# Patient Record
Sex: FEMALE | Race: WHITE | NOT HISPANIC OR LATINO | Employment: FULL TIME | URBAN - METROPOLITAN AREA
[De-identification: names, ages, dates, MRNs, and addresses within clinical notes are randomized per-mention and may not be internally consistent; named-entity substitution may affect disease eponyms.]

---

## 2022-03-07 ENCOUNTER — TRANSFERRED RECORDS (OUTPATIENT)
Dept: HEALTH INFORMATION MANAGEMENT | Facility: CLINIC | Age: 37
End: 2022-03-07

## 2022-03-08 LAB
ALT SERPL-CCNC: 15 U/L (ref 6–29)
AST SERPL-CCNC: 13 U/L (ref 10–30)
CHOLESTEROL (EXTERNAL): 172 MG/DL
CREATININE (EXTERNAL): 0.63 MG/DL (ref 0.5–1.1)
GFR ESTIMATED (EXTERNAL): 115 ML/MIN/1.73M2
GFR ESTIMATED (IF AFRICAN AMERICAN) (EXTERNAL): 133 ML/MIN/1.73M2
GLUCOSE (EXTERNAL): 102 MG/DL (ref 65–?)
HBA1C MFR BLD: 5 %
HDLC SERPL-MCNC: 65 MG/DL
LDL CHOLESTEROL CALCULATED (EXTERNAL): NORMAL MG/DL
NON HDL CHOLESTEROL (EXTERNAL): 107 MG/DL
POTASSIUM (EXTERNAL): 3.9 MMOL/L (ref 3.5–5.3)
TRIGLYCERIDES (EXTERNAL): 93 MG/DL
TSH SERPL-ACNC: 4.47 MIU/L (ref 0.4–4.5)

## 2022-03-21 LAB
ALT SERPL-CCNC: NORMAL U/L (ref 6–29)
AST SERPL-CCNC: 10 U/L (ref 10–30)
CREATININE (EXTERNAL): 0.7 MG/DL (ref 0.5–1.1)
GFR ESTIMATED (EXTERNAL): NORMAL ML/MIN/1.73M2
GFR ESTIMATED (IF AFRICAN AMERICAN) (EXTERNAL): 128 ML/MIN/1.73M2
GLUCOSE (EXTERNAL): 113 MG/DL (ref 65–99)
POTASSIUM (EXTERNAL): 4 MMOL/L (ref 3.5–5.3)

## 2022-05-13 ENCOUNTER — TRANSFERRED RECORDS (OUTPATIENT)
Dept: HEALTH INFORMATION MANAGEMENT | Facility: CLINIC | Age: 37
End: 2022-05-13
Payer: COMMERCIAL

## 2022-05-13 LAB
ALT SERPL-CCNC: 16 U/L (ref 6–29)
AST SERPL-CCNC: 13 U/L (ref 10–30)
CREATININE (EXTERNAL): 0.62 MG/DL (ref 0.5–1.1)
GFR ESTIMATED (EXTERNAL): 115 ML/MIN/1.73M2
GFR ESTIMATED (IF AFRICAN AMERICAN) (EXTERNAL): 134 ML/MIN/1.73M2
GLUCOSE (EXTERNAL): 94 MG/DL (ref 65–99)
POTASSIUM (EXTERNAL): 3.9 MMOL/L (ref 3.5–5.3)
TSH SERPL-ACNC: 0.02 MIU/L (ref 0.4–4.5)

## 2022-05-18 ENCOUNTER — TRANSCRIBE ORDERS (OUTPATIENT)
Dept: OTHER | Age: 37
End: 2022-05-18

## 2022-05-18 DIAGNOSIS — R79.9 ABNORMAL BLOOD CHEMISTRY: Primary | ICD-10-CM

## 2022-05-19 ENCOUNTER — DOCUMENTATION ONLY (OUTPATIENT)
Dept: ONCOLOGY | Facility: CLINIC | Age: 37
End: 2022-05-19

## 2022-05-19 ENCOUNTER — MEDICAL CORRESPONDENCE (OUTPATIENT)
Dept: HEALTH INFORMATION MANAGEMENT | Facility: CLINIC | Age: 37
End: 2022-05-19

## 2022-05-19 NOTE — PROGRESS NOTES
RECORDS STATUS - ALL OTHER DIAGNOSIS      Action    Action Taken 5/19/22  LVM @ [facility] [phone] re recs.   1:24 PM    5/20/22  Records received, sent to HIM for STAT upload.  9:02 AM         RECORDS RECEIVED FROM: [facility]   DATE RECEIVED:    NOTES STATUS DETAILS   OFFICE NOTE from referring provider Received 5/20    OFFICE NOTE from medical oncologist     DISCHARGE SUMMARY from hospital     DISCHARGE REPORT from the ER     OPERATIVE REPORT     MEDICATION LIST     CLINICAL TRIAL TREATMENTS TO DATE     LABS     PATHOLOGY REPORTS     ANYTHING RELATED TO DIAGNOSIS     GENONOMIC TESTING     TYPE:     IMAGING (NEED IMAGES & REPORT)     CT SCANS     MRI     MAMMO     ULTRASOUND     PET

## 2022-05-23 ENCOUNTER — PATIENT OUTREACH (OUTPATIENT)
Dept: ONCOLOGY | Facility: CLINIC | Age: 37
End: 2022-05-23

## 2022-05-23 NOTE — PROGRESS NOTES
Writer received referral for leukopenia, labs and records available in Media tab. Reviewed for urgency based on labs and symptomology. Appropriate scheduling instructions added and referral sent to New Patient Scheduling for completion.

## 2022-05-24 NOTE — PROGRESS NOTES
RECORDS STATUS - ALL OTHER DIAGNOSIS      Action    Action Taken 5/24/2022 5:16pm JIMENA   I called [facility] Phone: [phone]- they are closed for the night.     6/6/2022 11:29AM JIMENA   I called [facility] IMG Dept - I was transferred to radiology... I left a detailed vm for imaging to be pushed [phone]     RECORDS RECEIVED FROM:[facility]/ [facility]   DATE RECEIVED: 6/28/2022   NOTES STATUS DETAILS   OFFICE NOTE from referring provider     Labs/ Records Complete [facility] Records are in [facility]   DISCHARGE REPORT from the ER     OPERATIVE REPORT     MEDICATION LIST Complete Epic    CLINICAL TRIAL TREATMENTS TO DATE     LABS     PATHOLOGY REPORTS     ANYTHING RELATED TO DIAGNOSIS Complete Labs last updated on 5/13/2022    GENONOMIC TESTING     TYPE:     IMAGING (NEED IMAGES & REPORT)     CT SCANS     MRI     MAMMO     ULTRASOUND Complete- [facility]  US Venous Lower Extremity    PET

## 2022-05-29 ENCOUNTER — HEALTH MAINTENANCE LETTER (OUTPATIENT)
Age: 37
End: 2022-05-29

## 2022-06-07 ENCOUNTER — VIRTUAL VISIT (OUTPATIENT)
Dept: ONCOLOGY | Facility: CLINIC | Age: 37
End: 2022-06-07
Payer: COMMERCIAL

## 2022-06-07 DIAGNOSIS — R79.9 ABNORMAL BLOOD CHEMISTRY: ICD-10-CM

## 2022-06-07 DIAGNOSIS — D61.818 PANCYTOPENIA (H): Primary | ICD-10-CM

## 2022-06-07 PROCEDURE — 99204 OFFICE O/P NEW MOD 45 MIN: CPT | Mod: 95 | Performed by: INTERNAL MEDICINE

## 2022-06-07 RX ORDER — LEVOTHYROXINE SODIUM 125 UG/1
125 TABLET ORAL DAILY
Status: ON HOLD | COMMUNITY
Start: 2022-05-12 | End: 2022-07-15

## 2022-06-07 RX ORDER — BACILLUS COAGULANS/INULIN 1B-250 MG
1 CAPSULE ORAL DAILY
COMMUNITY

## 2022-06-07 RX ORDER — MULTIVITAMIN WITH IRON
250 TABLET ORAL DAILY
COMMUNITY

## 2022-06-07 RX ORDER — CHLORHEXIDINE GLUCONATE 4 %
1 LIQUID (ML) TOPICAL DAILY
COMMUNITY

## 2022-06-07 RX ORDER — MULTIVIT WITH MINERALS/LUTEIN
2000 TABLET ORAL DAILY
COMMUNITY

## 2022-06-07 RX ORDER — CHOLECALCIFEROL (VITAMIN D3) 50 MCG
1 TABLET ORAL DAILY
COMMUNITY

## 2022-06-07 RX ORDER — ZINC GLUCONATE 50 MG
50 TABLET ORAL DAILY
COMMUNITY

## 2022-06-07 NOTE — LETTER
6/7/2022         RE: Veda Marinelli  [address] [address] [facility] [address]        Dear Colleague,    Thank you for referring your patient, Veda Marinelli, to the [facility]. Please see a copy of my visit note below.    Hematology initial visit:  Date on this visit: 6/7/2022    Veda Marinelli  is referred by Dr.Referred Mendoza for a hematology consultation. She requires evaluation for new diagnosis of pancytopenia    Primary Physician: Maria Eugenia Villar     History Of Present Illness:  Ms. Marinelli is a 37 year old female who presents with pancytopenia.    This is a video visit.    I also reviewed outside records.  She was admitted from 10/1/2021 - 10/13/2021 for acute hypoxic respiratory failure from COVID-19 pneumonia.  She was treated with dexamethasone remdesivir baricitinib in addition to azithromycin and ceftriaxone.  She also developed C. difficile colitis after that treated with p.o. vancomycin.  She started to develop leukopenia/neutropenia in March 2022 and slowly it has progressed to pancytopenia.    Otherwise she feels pretty good and has good energy.  She mentions no other infections.  Recovered fully from COVID.  She developed hemorrhoids after C. difficile colitis which sometimes bothers her.  Has noticed a very superficial right armpit swelling which she thinks is a result of inflamed hair follicle but nothing deep inside.  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  Denies any pain apart from hemorrhoids bothering her.  No new skin rashes.  Denies new joint pains.  No shortness of breath currently.  No nausea vomiting diarrhea constipation.  Prior to developing COVID infection in October 2021, she was not on any medications and was feeling perfectly healthy.      ROS:  A comprehensive ROS was otherwise neg      Past Medical/Surgical History:  No past medical history on file.  No past surgical history on file.   Obesity  Hypothyroidism  Covid in Oct  2021  Allergies:  Allergies as of 06/07/2022     (No Known Allergies)     Current Medications:  Current Outpatient Medications   Medication Sig Dispense Refill     levothyroxine (SYNTHROID/LEVOTHROID) 125 MCG tablet        Bacillus Coagulans-Inulin (PROBIOTIC) 1-250 BILLION-MG CAPS        magnesium 250 MG tablet        Multiple Vitamins-Minerals (WOMENS MULTIVITAMIN) TABS        vitamin C (ASCORBIC ACID) 1000 MG TABS        vitamin D3 (CHOLECALCIFEROL) 50 mcg (2000 units) tablet        zinc gluconate 50 MG tablet         Family History:  No family history on file.  No family history of bleeding or clotting disorder.  Paternal grand mother and 2 maternal aunts with breast cancer  She does not have kids    Social History:  Social History     Socioeconomic History     Marital status: Single     Spouse name: Not on file     Number of children: Not on file     Years of education: Not on file     Highest education level: Not on file   Occupational History     Not on file   Tobacco Use     Smoking status: Never Smoker     Smokeless tobacco: Never Used   Substance and Sexual Activity     Alcohol use: Not on file     Drug use: Not on file     Sexual activity: Not on file   Other Topics Concern     Not on file   Social History Narrative     Not on file     Social Determinants of Health     Financial Resource Strain: Not on file   Food Insecurity: Not on file   Transportation Needs: Not on file   Physical Activity: Not on file   Stress: Not on file   Social Connections: Not on file   Intimate Partner Violence: Not on file   Housing Stability: Not on file     No smoking. Drinks etoh socially. No hard haley  Works as a RN with [employer].      Physical Exam:  There were no vitals taken for this visit.  Wt Readings from Last 4 Encounters:   No data found for Wt     Constitutional.  Does not seem to be in any acute distress.  Eyes.  No redness or discharge noted.  Respiratory.  Speaking in full sentences.  Breathing seems  comfortable without any accessory use of muscles.    Skin.  Visualized his skin does not show any obvious rashes.  Musculoskeletal.  Range of motion for visualized areas is intact.  Neurological.  Alert and oriented x3.  Psychiatric.  Mood, mentation and affect are normal.  Decision making capacity is intact.      The rest of a comprehensive physical examination is deferred due to Public [facility] Emergency video visit restrictions.    Laboratory/Imaging Studies    Reviewed.      In October 2021, CBC was essentially unremarkable.    CBC/differential  On 3/8/2022, white blood cell count 2.5.  Hemoglobin 13.6 with .  Platelets normal.  Neutrophils 1.  Low monocytes.  On 3/22/2022, white blood cell count was 1.8.   Hemoglobin 12.9..  ANC 8.75.  Monocytes are also low.    On 5/15/2022, she was noted to have white blood cell count of 1.6 with hemoglobin 11.  .  Platelets were 133.  ANC was 0.28.  Monocytopenia.  Chemistry was unremarkable  TSH 0.02. FT4 was 1.8    In March 2022, ferritin 104.  Iron 128.  TIBC 282.  Iron saturation index 52%.  Vitamin B12 and folate were normal.    ASSESSMENT/PLAN:    Pancytopenia.    She developed COVID in October 2021 and recovered from it.  At that time she was treated with dexamethasone remdesivir and  baricitinib in addition to azithromycin and ceftriaxone. She developed C. difficile colitis after that and she was treated with p.o. vancomycin.    Since March 2022, she started to develop leukopenia with neutropenia and monocytopenia and now has pancytopenia.  She feels very well and remains essentially asymptomatic.  I would recommend checking the peripheral blood smear and repeat vitamin B12/MMA and folate levels.  We will repeat iron studies.  We will also check HUMPHREY and haptoglobin to rule out autoimmune hemolysis  I will also check peripheral blood flow cytometry to rule out leukemia/lymphoma.  I will also check SPEP and LDH.  We may consider bone marrow biopsy  after that.   We discussed the procedure of bone marrow biopsy including its potential complications in detail.  We will decide about it later on.    I will see her back in a few weeks to    All questions were answered to her satisfaction.  She is agreeable and comfortable with the plan.    Nyla Bob MD               Minerva is a 37 year old who is being evaluated via a billable video visit.      How would you like to obtain your AVS? MyChart  If the video visit is dropped, the invitation should be resent by: Text to cell phone: [phone]  Will anyone else be joining your video visit? No       Cami Chandra Virtual Facilitator/LPN      Video Start Time: 11:40 AM  Video-Visit Details    Type of service:  Video Visit    Video End Time:11:56 AM    Originating Location (pt. Location): Home    Distant Location (provider location):  [facility]     Platform used for Video Visit: [facility]      Again, thank you for allowing me to participate in the care of your patient.        Sincerely,        Nyla Bob MD

## 2022-06-07 NOTE — PROGRESS NOTES
Hematology initial visit:  Date on this visit: 6/7/2022    Veda Marinelli  is referred by Dr.Referred Mendoza for a hematology consultation. She requires evaluation for new diagnosis of pancytopenia    Primary Physician: Maria Eugenia Villar     History Of Present Illness:  Ms. Marinelli is a 37 year old female who presents with pancytopenia.    This is a video visit.    I also reviewed outside records.  She was admitted from 10/1/2021 - 10/13/2021 for acute hypoxic respiratory failure from COVID-19 pneumonia.  She was treated with dexamethasone remdesivir baricitinib in addition to azithromycin and ceftriaxone.  She also developed C. difficile colitis after that treated with p.o. vancomycin.  She started to develop leukopenia/neutropenia in March 2022 and slowly it has progressed to pancytopenia.    Otherwise she feels pretty good and has good energy.  She mentions no other infections.  Recovered fully from COVID.  She developed hemorrhoids after C. difficile colitis which sometimes bothers her.  Has noticed a very superficial right armpit swelling which she thinks is a result of inflamed hair follicle but nothing deep inside.  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  Denies any pain apart from hemorrhoids bothering her.  No new skin rashes.  Denies new joint pains.  No shortness of breath currently.  No nausea vomiting diarrhea constipation.  Prior to developing COVID infection in October 2021, she was not on any medications and was feeling perfectly healthy.      ROS:  A comprehensive ROS was otherwise neg      Past Medical/Surgical History:  No past medical history on file.  No past surgical history on file.   Obesity  Hypothyroidism  Covid in Oct 2021  Allergies:  Allergies as of 06/07/2022     (No Known Allergies)     Current Medications:  Current Outpatient Medications   Medication Sig Dispense Refill     levothyroxine (SYNTHROID/LEVOTHROID) 125 MCG tablet        Bacillus Coagulans-Inulin (PROBIOTIC) 1-250  BILLION-MG CAPS        magnesium 250 MG tablet        Multiple Vitamins-Minerals (WOMENS MULTIVITAMIN) TABS        vitamin C (ASCORBIC ACID) 1000 MG TABS        vitamin D3 (CHOLECALCIFEROL) 50 mcg (2000 units) tablet        zinc gluconate 50 MG tablet         Family History:  No family history on file.  No family history of bleeding or clotting disorder.  Paternal grand mother and 2 maternal aunts with breast cancer  She does not have kids    Social History:  Social History     Socioeconomic History     Marital status: Single     Spouse name: Not on file     Number of children: Not on file     Years of education: Not on file     Highest education level: Not on file   Occupational History     Not on file   Tobacco Use     Smoking status: Never Smoker     Smokeless tobacco: Never Used   Substance and Sexual Activity     Alcohol use: Not on file     Drug use: Not on file     Sexual activity: Not on file   Other Topics Concern     Not on file   Social History Narrative     Not on file     Social Determinants of Health     Financial Resource Strain: Not on file   Food Insecurity: Not on file   Transportation Needs: Not on file   Physical Activity: Not on file   Stress: Not on file   Social Connections: Not on file   Intimate Partner Violence: Not on file   Housing Stability: Not on file     No smoking. Drinks etoh socially. No hard liqor  Works as a RN with [employer].      Physical Exam:  There were no vitals taken for this visit.  Wt Readings from Last 4 Encounters:   No data found for Wt     Constitutional.  Does not seem to be in any acute distress.  Eyes.  No redness or discharge noted.  Respiratory.  Speaking in full sentences.  Breathing seems comfortable without any accessory use of muscles.    Skin.  Visualized his skin does not show any obvious rashes.  Musculoskeletal.  Range of motion for visualized areas is intact.  Neurological.  Alert and oriented x3.  Psychiatric.  Mood, mentation and affect are  normal.  Decision making capacity is intact.      The rest of a comprehensive physical examination is deferred due to Public [facility] Emergency video visit restrictions.    Laboratory/Imaging Studies    Reviewed.      In October 2021, CBC was essentially unremarkable.    CBC/differential  On 3/8/2022, white blood cell count 2.5.  Hemoglobin 13.6 with .  Platelets normal.  Neutrophils 1.  Low monocytes.  On 3/22/2022, white blood cell count was 1.8.   Hemoglobin 12.9..  ANC 8.75.  Monocytes are also low.    On 5/15/2022, she was noted to have white blood cell count of 1.6 with hemoglobin 11.  .  Platelets were 133.  ANC was 0.28.  Monocytopenia.  Chemistry was unremarkable  TSH 0.02. FT4 was 1.8    In March 2022, ferritin 104.  Iron 128.  TIBC 282.  Iron saturation index 52%.  Vitamin B12 and folate were normal.    ASSESSMENT/PLAN:    Pancytopenia.    She developed COVID in October 2021 and recovered from it.  At that time she was treated with dexamethasone remdesivir and  baricitinib in addition to azithromycin and ceftriaxone. She developed C. difficile colitis after that and she was treated with p.o. vancomycin.    Since March 2022, she started to develop leukopenia with neutropenia and monocytopenia and now has pancytopenia.  She feels very well and remains essentially asymptomatic.  I would recommend checking the peripheral blood smear and repeat vitamin B12/MMA and folate levels.  We will repeat iron studies.  We will also check HUMPHREY and haptoglobin to rule out autoimmune hemolysis  I will also check peripheral blood flow cytometry to rule out leukemia/lymphoma.  I will also check SPEP and LDH.  We may consider bone marrow biopsy after that.   We discussed the procedure of bone marrow biopsy including its potential complications in detail.  We will decide about it later on.    I will see her back in a few weeks to    All questions were answered to her satisfaction.  She is agreeable and  comfortable with the plan.    Nyla Bob MD

## 2022-06-07 NOTE — NURSING NOTE
Chief Complaint   Patient presents with     Video Visit     Abnormal blood chemistry, self referral        Patient confirms medications and allergies are accurate via patients echeck in completion, and or denies any changes since last reviewed/verified.     Cami Chandra, Virtual Facilitator/LPN

## 2022-06-07 NOTE — PROGRESS NOTES
Minerva is a 37 year old who is being evaluated via a billable video visit.      How would you like to obtain your AVS? MyChart  If the video visit is dropped, the invitation should be resent by: Text to cell phone: [phone]  Will anyone else be joining your video visit? Rebecca Tanner Facilitator/LPN      Video Start Time: 11:40 AM  Video-Visit Details    Type of service:  Video Visit    Video End Time:11:56 AM    Originating Location (pt. Location): Home    Distant Location (provider location):  [facility]     Platform used for Video Visit: [employer] - Social Platform

## 2022-06-14 ENCOUNTER — LAB (OUTPATIENT)
Dept: LAB | Facility: CLINIC | Age: 37
End: 2022-06-14
Payer: COMMERCIAL

## 2022-06-14 ENCOUNTER — PATIENT OUTREACH (OUTPATIENT)
Dept: ONCOLOGY | Facility: CLINIC | Age: 37
End: 2022-06-14

## 2022-06-14 ENCOUNTER — TELEPHONE (OUTPATIENT)
Dept: ONCOLOGY | Facility: CLINIC | Age: 37
End: 2022-06-14

## 2022-06-14 DIAGNOSIS — D61.818 PANCYTOPENIA (H): ICD-10-CM

## 2022-06-14 DIAGNOSIS — D61.818 PANCYTOPENIA (H): Primary | ICD-10-CM

## 2022-06-14 LAB
ALBUMIN SERPL-MCNC: 3.8 G/DL (ref 3.4–5)
ALP SERPL-CCNC: 69 U/L (ref 40–150)
ALT SERPL W P-5'-P-CCNC: 26 U/L (ref 0–50)
ANION GAP SERPL CALCULATED.3IONS-SCNC: 6 MMOL/L (ref 3–14)
AST SERPL W P-5'-P-CCNC: 17 U/L (ref 0–45)
BILIRUB SERPL-MCNC: 0.8 MG/DL (ref 0.2–1.3)
BUN SERPL-MCNC: 10 MG/DL (ref 7–30)
CALCIUM SERPL-MCNC: 9 MG/DL (ref 8.5–10.1)
CHLORIDE BLD-SCNC: 105 MMOL/L (ref 94–109)
CO2 SERPL-SCNC: 26 MMOL/L (ref 20–32)
CREAT SERPL-MCNC: 0.48 MG/DL (ref 0.52–1.04)
FERRITIN SERPL-MCNC: 321 NG/ML (ref 12–150)
FOLATE SERPL-MCNC: 18.8 NG/ML
GFR SERPL CREATININE-BSD FRML MDRD: >90 ML/MIN/1.73M2
GLUCOSE BLD-MCNC: 110 MG/DL (ref 70–99)
IRON SATN MFR SERPL: 39 % (ref 15–46)
IRON SERPL-MCNC: 110 UG/DL (ref 35–180)
LDH SERPL L TO P-CCNC: 247 U/L (ref 81–234)
PLAT MORPH BLD: NORMAL
POTASSIUM BLD-SCNC: 3.8 MMOL/L (ref 3.4–5.3)
PROT SERPL-MCNC: 7.6 G/DL (ref 6.8–8.8)
RBC MORPH BLD: NORMAL
RETICS # AUTO: 0.08 10E6/UL (ref 0.03–0.1)
RETICS/RBC NFR AUTO: 2.9 % (ref 0.5–2)
SODIUM SERPL-SCNC: 137 MMOL/L (ref 133–144)
TIBC SERPL-MCNC: 279 UG/DL (ref 240–430)
TOTAL PROTEIN SERUM FOR ELP: 7.4 G/DL (ref 6.8–8.8)
VIT B12 SERPL-MCNC: 607 PG/ML (ref 193–986)

## 2022-06-14 PROCEDURE — 83615 LACTATE (LD) (LDH) ENZYME: CPT

## 2022-06-14 PROCEDURE — 83010 ASSAY OF HAPTOGLOBIN QUANT: CPT

## 2022-06-14 PROCEDURE — 85045 AUTOMATED RETICULOCYTE COUNT: CPT

## 2022-06-14 PROCEDURE — 84155 ASSAY OF PROTEIN SERUM: CPT | Mod: 59

## 2022-06-14 PROCEDURE — 80053 COMPREHEN METABOLIC PANEL: CPT

## 2022-06-14 PROCEDURE — 83550 IRON BINDING TEST: CPT

## 2022-06-14 PROCEDURE — 82607 VITAMIN B-12: CPT

## 2022-06-14 PROCEDURE — 84165 PROTEIN E-PHORESIS SERUM: CPT

## 2022-06-14 PROCEDURE — 83921 ORGANIC ACID SINGLE QUANT: CPT

## 2022-06-14 PROCEDURE — 36415 COLL VENOUS BLD VENIPUNCTURE: CPT

## 2022-06-14 PROCEDURE — 82746 ASSAY OF FOLIC ACID SERUM: CPT

## 2022-06-14 PROCEDURE — 82728 ASSAY OF FERRITIN: CPT

## 2022-06-14 PROCEDURE — 85025 COMPLETE CBC W/AUTO DIFF WBC: CPT

## 2022-06-14 PROCEDURE — 85060 BLOOD SMEAR INTERPRETATION: CPT | Performed by: PATHOLOGY

## 2022-06-14 NOTE — TELEPHONE ENCOUNTER
[place] lab is calling wanting us to reorder HUMPHREY Mariann, Lab 274. The tube that was drawn today was invalid and patient will need to be redrawn. They will r/s the patient. Future lab ordered.    Una Santacruz, RN, BSN, PHN  [facility]

## 2022-06-14 NOTE — PROGRESS NOTES
"[facility]:  Cancer Care Note    Received the following message/request from Dr. Bob regarding this patient's lab results from today:    Received: Today  Nyla Bob MD Lutz, Basia, RN  Lets get BM bx ASAP.  WBC is high with monocytosis.   Please let her know.     Writer placed telephone call to patient this afternoon, to communicate these recommendations from Dr. Bob.  Patient verbalized understanding, and is in agreement with this plan.  Provided over view of bone marrow biopsy procedure with the patient, and the need to have a  the day of the procedure.    Urgent message was sent to cancer center scheduling team to request ASAP appointment for patient.  [place],\" as she [place] of [place], [place].     Ronni Cornell, RN, BSN, OCN  RN Care Coordinator - Oncology  [facility]    "

## 2022-06-15 LAB
ALBUMIN SERPL ELPH-MCNC: 4.4 G/DL (ref 3.7–5.1)
ALPHA1 GLOB SERPL ELPH-MCNC: 0.4 G/DL (ref 0.2–0.4)
ALPHA2 GLOB SERPL ELPH-MCNC: 0.7 G/DL (ref 0.5–0.9)
B-GLOBULIN SERPL ELPH-MCNC: 0.9 G/DL (ref 0.6–1)
BASOPHILS # BLD MANUAL: 0 10E3/UL (ref 0–0.2)
BASOPHILS NFR BLD MANUAL: 0 %
BLASTS # BLD MANUAL: 20.5 10E3/UL
BLASTS NFR BLD MANUAL: 81 %
EOSINOPHIL # BLD MANUAL: 0 10E3/UL (ref 0–0.7)
EOSINOPHIL NFR BLD MANUAL: 0 %
ERYTHROCYTE [DISTWIDTH] IN BLOOD BY AUTOMATED COUNT: 14.3 % (ref 10–15)
GAMMA GLOB SERPL ELPH-MCNC: 1 G/DL (ref 0.7–1.6)
HAPTOGLOB SERPL-MCNC: 154 MG/DL (ref 32–197)
HCT VFR BLD AUTO: 30.2 % (ref 35–47)
HGB BLD-MCNC: 10.1 G/DL (ref 11.7–15.7)
LYMPHOCYTES # BLD MANUAL: 3.3 10E3/UL (ref 0.8–5.3)
LYMPHOCYTES NFR BLD MANUAL: 13 %
M PROTEIN SERPL ELPH-MCNC: 0 G/DL
MCH RBC QN AUTO: 36.2 PG (ref 26.5–33)
MCHC RBC AUTO-ENTMCNC: 33.4 G/DL (ref 31.5–36.5)
MCV RBC AUTO: 108 FL (ref 78–100)
METAMYELOCYTES # BLD MANUAL: 0.3 10E3/UL
METAMYELOCYTES NFR BLD MANUAL: 1 %
MONOCYTES # BLD MANUAL: 0.3 10E3/UL (ref 0–1.3)
MONOCYTES NFR BLD MANUAL: 1 %
MYELOCYTES # BLD MANUAL: 0.3 10E3/UL
MYELOCYTES NFR BLD MANUAL: 1 %
NEUTROPHILS # BLD MANUAL: 0.8 10E3/UL (ref 1.6–8.3)
NEUTROPHILS NFR BLD MANUAL: 3 %
OTHER CELLS # BLD MANUAL: 0 10E3/UL
OTHER CELLS NFR BLD MANUAL: 0 %
PATH REPORT.COMMENTS IMP SPEC: ABNORMAL
PATH REPORT.COMMENTS IMP SPEC: ABNORMAL
PATH REPORT.COMMENTS IMP SPEC: YES
PATH REPORT.FINAL DX SPEC: ABNORMAL
PATH REPORT.MICROSCOPIC SPEC OTHER STN: ABNORMAL
PATH REPORT.MICROSCOPIC SPEC OTHER STN: ABNORMAL
PATH REV: ABNORMAL
PLASMA CELLS # BLD MANUAL: 0 10E3/UL
PLASMA CELLS NFR BLD MANUAL: 0 %
PLAT MORPH BLD: ABNORMAL
PLATELET # BLD AUTO: 90 10E3/UL (ref 150–450)
PROMYELOCYTES # BLD MANUAL: 0 10E3/UL
PROMYELOCYTES NFR BLD MANUAL: 0 %
PROT PATTERN SERPL ELPH-IMP: NORMAL
RBC # BLD AUTO: 2.79 10E6/UL (ref 3.8–5.2)
RBC MORPH BLD: ABNORMAL
WBC # BLD AUTO: 25.3 10E3/UL (ref 4–11)

## 2022-06-16 ENCOUNTER — APPOINTMENT (OUTPATIENT)
Dept: CARDIOLOGY | Facility: CLINIC | Age: 37
DRG: 834 | End: 2022-06-16
Attending: PHYSICIAN ASSISTANT
Payer: COMMERCIAL

## 2022-06-16 ENCOUNTER — TELEPHONE (OUTPATIENT)
Dept: ONCOLOGY | Facility: CLINIC | Age: 37
End: 2022-06-16
Payer: COMMERCIAL

## 2022-06-16 ENCOUNTER — TRANSFERRED RECORDS (OUTPATIENT)
Dept: HEALTH INFORMATION MANAGEMENT | Facility: CLINIC | Age: 37
End: 2022-06-16

## 2022-06-16 ENCOUNTER — HOSPITAL ENCOUNTER (INPATIENT)
Facility: CLINIC | Age: 37
LOS: 29 days | Discharge: HOME | DRG: 834 | End: 2022-07-15
Attending: EMERGENCY MEDICINE | Admitting: INTERNAL MEDICINE
Payer: COMMERCIAL

## 2022-06-16 ENCOUNTER — APPOINTMENT (OUTPATIENT)
Dept: CT IMAGING | Facility: CLINIC | Age: 37
DRG: 834 | End: 2022-06-16
Attending: PHYSICIAN ASSISTANT
Payer: COMMERCIAL

## 2022-06-16 DIAGNOSIS — R50.81 NEUTROPENIC FEVER (H): ICD-10-CM

## 2022-06-16 DIAGNOSIS — A49.8 CLOSTRIDIUM DIFFICILE INFECTION: ICD-10-CM

## 2022-06-16 DIAGNOSIS — K64.4 EXTERNAL HEMORRHOIDS: ICD-10-CM

## 2022-06-16 DIAGNOSIS — Z11.52 ENCOUNTER FOR SCREENING LABORATORY TESTING FOR SEVERE ACUTE RESPIRATORY SYNDROME CORONAVIRUS 2 (SARS-COV-2): ICD-10-CM

## 2022-06-16 DIAGNOSIS — C95.00: Primary | ICD-10-CM

## 2022-06-16 DIAGNOSIS — I82.A12 ACUTE DEEP VEIN THROMBOSIS (DVT) OF AXILLARY VEIN OF LEFT UPPER EXTREMITY (H): ICD-10-CM

## 2022-06-16 DIAGNOSIS — D70.9 NEUTROPENIC FEVER (H): ICD-10-CM

## 2022-06-16 DIAGNOSIS — E03.9 HYPOTHYROIDISM, UNSPECIFIED TYPE: ICD-10-CM

## 2022-06-16 DIAGNOSIS — C95.00 ACUTE LEUKEMIA NOT HAVING ACHIEVED REMISSION (H): ICD-10-CM

## 2022-06-16 DIAGNOSIS — R21 RASH: ICD-10-CM

## 2022-06-16 LAB
3D LVEF ECHO: NORMAL
ALBUMIN SERPL-MCNC: 3.9 G/DL (ref 3.4–5)
ALP SERPL-CCNC: 64 U/L (ref 40–150)
ALT SERPL W P-5'-P-CCNC: 28 U/L (ref 0–50)
ANION GAP SERPL CALCULATED.3IONS-SCNC: 10 MMOL/L (ref 3–14)
ANION GAP SERPL CALCULATED.3IONS-SCNC: 8 MMOL/L (ref 3–14)
APTT PPP: 30 SECONDS (ref 22–38)
APTT PPP: 32 SECONDS (ref 22–38)
AST SERPL W P-5'-P-CCNC: 19 U/L (ref 0–45)
BASOPHILS # BLD MANUAL: 0 10E3/UL (ref 0–0.2)
BASOPHILS NFR BLD MANUAL: 0 %
BILIRUB SERPL-MCNC: 0.6 MG/DL (ref 0.2–1.3)
BLASTS # BLD MANUAL: 37.3 10E3/UL
BLASTS # BLD MANUAL: 39.3 10E3/UL
BLASTS NFR BLD MANUAL: 88 %
BLASTS NFR BLD MANUAL: 91 %
BUN SERPL-MCNC: 5 MG/DL (ref 7–30)
BUN SERPL-MCNC: 8 MG/DL (ref 7–30)
CALCIUM SERPL-MCNC: 9.2 MG/DL (ref 8.5–10.1)
CALCIUM SERPL-MCNC: 9.4 MG/DL (ref 8.5–10.1)
CHLORIDE BLD-SCNC: 107 MMOL/L (ref 94–109)
CHLORIDE BLD-SCNC: 107 MMOL/L (ref 94–109)
CO2 SERPL-SCNC: 23 MMOL/L (ref 20–32)
CO2 SERPL-SCNC: 24 MMOL/L (ref 20–32)
CREAT SERPL-MCNC: 0.51 MG/DL (ref 0.52–1.04)
CREAT SERPL-MCNC: 0.6 MG/DL (ref 0.52–1.04)
EOSINOPHIL # BLD MANUAL: 0 10E3/UL (ref 0–0.7)
EOSINOPHIL # BLD MANUAL: 0 10E3/UL (ref 0–0.7)
EOSINOPHIL # BLD MANUAL: 0.4 10E3/UL (ref 0–0.7)
EOSINOPHIL NFR BLD MANUAL: 0 %
EOSINOPHIL NFR BLD MANUAL: 0 %
EOSINOPHIL NFR BLD MANUAL: 1 %
ERYTHROCYTE [DISTWIDTH] IN BLOOD BY AUTOMATED COUNT: 14.5 % (ref 10–15)
FIBRINOGEN PPP-MCNC: 344 MG/DL (ref 170–490)
FIBRINOGEN PPP-MCNC: 383 MG/DL (ref 170–490)
GFR SERPL CREATININE-BSD FRML MDRD: >90 ML/MIN/1.73M2
GFR SERPL CREATININE-BSD FRML MDRD: >90 ML/MIN/1.73M2
GLUCOSE BLD-MCNC: 119 MG/DL (ref 70–99)
GLUCOSE BLD-MCNC: 96 MG/DL (ref 70–99)
HCT VFR BLD AUTO: 27.6 % (ref 35–47)
HCT VFR BLD AUTO: 28.6 % (ref 35–47)
HCT VFR BLD AUTO: 29 % (ref 35–47)
HGB BLD-MCNC: 9.5 G/DL (ref 11.7–15.7)
HGB BLD-MCNC: 9.5 G/DL (ref 11.7–15.7)
HGB BLD-MCNC: 9.8 G/DL (ref 11.7–15.7)
HOLD SPECIMEN: NORMAL
INR PPP: 1.15 (ref 0.85–1.15)
INR PPP: 1.15 (ref 0.85–1.15)
LAB DIRECTOR COMMENTS: NORMAL
LAB DIRECTOR DISCLAIMER: NORMAL
LAB DIRECTOR INTERPRETATION: NORMAL
LAB DIRECTOR METHODOLOGY: NORMAL
LAB DIRECTOR RESULTS: NORMAL
LACTATE SERPL-SCNC: 0.6 MMOL/L (ref 0.7–2)
LACTATE SERPL-SCNC: 1 MMOL/L (ref 0.7–2)
LDH SERPL L TO P-CCNC: 347 U/L (ref 81–234)
LVEF ECHO: NORMAL
LYMPHOCYTES # BLD MANUAL: 2.5 10E3/UL (ref 0.8–5.3)
LYMPHOCYTES # BLD MANUAL: 3.1 10E3/UL (ref 0.8–5.3)
LYMPHOCYTES # BLD MANUAL: 5.8 10E3/UL (ref 0.8–5.3)
LYMPHOCYTES NFR BLD MANUAL: 14 %
LYMPHOCYTES NFR BLD MANUAL: 6 %
LYMPHOCYTES NFR BLD MANUAL: 7 %
MAGNESIUM SERPL-MCNC: 2.1 MG/DL (ref 1.6–2.3)
MCH RBC QN AUTO: 36.3 PG (ref 26.5–33)
MCH RBC QN AUTO: 36.4 PG (ref 26.5–33)
MCH RBC QN AUTO: 36.5 PG (ref 26.5–33)
MCHC RBC AUTO-ENTMCNC: 33.2 G/DL (ref 31.5–36.5)
MCHC RBC AUTO-ENTMCNC: 33.8 G/DL (ref 31.5–36.5)
MCHC RBC AUTO-ENTMCNC: 34.4 G/DL (ref 31.5–36.5)
MCV RBC AUTO: 106 FL (ref 78–100)
MCV RBC AUTO: 107 FL (ref 78–100)
MCV RBC AUTO: 110 FL (ref 78–100)
METAMYELOCYTES # BLD MANUAL: 0.4 10E3/UL
METAMYELOCYTES NFR BLD MANUAL: 1 %
METHYLMALONATE SERPL-SCNC: 0.15 UMOL/L (ref 0–0.4)
MONOCYTES # BLD MANUAL: 0 10E3/UL (ref 0–1.3)
MONOCYTES NFR BLD MANUAL: 0 %
MYELOCYTES # BLD MANUAL: 0.4 10E3/UL
MYELOCYTES NFR BLD MANUAL: 1 %
NEUTROPHILS # BLD MANUAL: 0 10E3/UL (ref 1.6–8.3)
NEUTROPHILS # BLD MANUAL: 0.8 10E3/UL (ref 1.6–8.3)
NEUTROPHILS # BLD MANUAL: 0.9 10E3/UL (ref 1.6–8.3)
NEUTROPHILS NFR BLD MANUAL: 0 %
NEUTROPHILS NFR BLD MANUAL: 2 %
NEUTROPHILS NFR BLD MANUAL: 2 %
NRBC # BLD AUTO: 0.8 10E3/UL
NRBC BLD MANUAL-RTO: 2 %
OTHER CELLS # BLD MANUAL: 35.9 10E3/UL
OTHER CELLS NFR BLD MANUAL: 86 %
PATH REPORT.COMMENTS IMP SPEC: ABNORMAL
PATH REPORT.COMMENTS IMP SPEC: YES
PATH REPORT.FINAL DX SPEC: ABNORMAL
PATH REPORT.MICROSCOPIC SPEC OTHER STN: ABNORMAL
PATH REPORT.RELEVANT HX SPEC: ABNORMAL
PATH REV: ABNORMAL
PHOSPHATE SERPL-MCNC: 2.4 MG/DL (ref 2.5–4.5)
PHOSPHATE SERPL-MCNC: 3.3 MG/DL (ref 2.5–4.5)
PLAT MORPH BLD: ABNORMAL
PLATELET # BLD AUTO: 71 10E3/UL (ref 150–450)
PLATELET # BLD AUTO: 72 10E3/UL (ref 150–450)
PLATELET # BLD AUTO: 76 10E3/UL (ref 150–450)
POLYCHROMASIA BLD QL SMEAR: SLIGHT
POTASSIUM BLD-SCNC: 3.6 MMOL/L (ref 3.4–5.3)
POTASSIUM BLD-SCNC: 3.7 MMOL/L (ref 3.4–5.3)
PROMYELOCYTES # BLD MANUAL: 0.4 10E3/UL
PROMYELOCYTES NFR BLD MANUAL: 1 %
PROT SERPL-MCNC: 7.8 G/DL (ref 6.8–8.8)
RBC # BLD AUTO: 2.6 10E6/UL (ref 3.8–5.2)
RBC # BLD AUTO: 2.61 10E6/UL (ref 3.8–5.2)
RBC # BLD AUTO: 2.7 10E6/UL (ref 3.8–5.2)
RBC MORPH BLD: ABNORMAL
RETICS # AUTO: 0.06 10E6/UL (ref 0.03–0.1)
RETICS/RBC NFR AUTO: 2.4 % (ref 0.5–2)
SARS-COV-2 RNA RESP QL NAA+PROBE: NEGATIVE
SODIUM SERPL-SCNC: 139 MMOL/L (ref 133–144)
SODIUM SERPL-SCNC: 140 MMOL/L (ref 133–144)
SPECIMEN DESCRIPTION: NORMAL
URATE SERPL-MCNC: 5.8 MG/DL (ref 2.6–6)
URATE SERPL-MCNC: 5.8 MG/DL (ref 2.6–6)
WBC # BLD AUTO: 41 10E3/UL (ref 4–11)
WBC # BLD AUTO: 41.7 10E3/UL (ref 4–11)
WBC # BLD AUTO: 44.7 10E3/UL (ref 4–11)

## 2022-06-16 PROCEDURE — 83615 LACTATE (LD) (LDH) ENZYME: CPT | Performed by: PHYSICIAN ASSISTANT

## 2022-06-16 PROCEDURE — 250N000013 HC RX MED GY IP 250 OP 250 PS 637: Performed by: INTERNAL MEDICINE

## 2022-06-16 PROCEDURE — 85007 BL SMEAR W/DIFF WBC COUNT: CPT | Performed by: PHYSICIAN ASSISTANT

## 2022-06-16 PROCEDURE — 88189 FLOWCYTOMETRY/READ 16 & >: CPT | Mod: GC | Performed by: PATHOLOGY

## 2022-06-16 PROCEDURE — 85045 AUTOMATED RETICULOCYTE COUNT: CPT | Performed by: PHYSICIAN ASSISTANT

## 2022-06-16 PROCEDURE — 84550 ASSAY OF BLOOD/URIC ACID: CPT | Performed by: STUDENT IN AN ORGANIZED HEALTH CARE EDUCATION/TRAINING PROGRAM

## 2022-06-16 PROCEDURE — 86644 CMV ANTIBODY: CPT | Performed by: PHYSICIAN ASSISTANT

## 2022-06-16 PROCEDURE — 36569 INSJ PICC 5 YR+ W/O IMAGING: CPT

## 2022-06-16 PROCEDURE — 36415 COLL VENOUS BLD VENIPUNCTURE: CPT | Performed by: PHYSICIAN ASSISTANT

## 2022-06-16 PROCEDURE — 74177 CT ABD & PELVIS W/CONTRAST: CPT | Mod: 26 | Performed by: RADIOLOGY

## 2022-06-16 PROCEDURE — 83735 ASSAY OF MAGNESIUM: CPT | Performed by: PHYSICIAN ASSISTANT

## 2022-06-16 PROCEDURE — 250N000011 HC RX IP 250 OP 636: Performed by: PHYSICIAN ASSISTANT

## 2022-06-16 PROCEDURE — 2894A FISH TIER 3: CPT | Mod: 26 | Performed by: MEDICAL GENETICS

## 2022-06-16 PROCEDURE — 93306 TTE W/DOPPLER COMPLETE: CPT | Mod: 26 | Performed by: INTERNAL MEDICINE

## 2022-06-16 PROCEDURE — 88264 CHROMOSOME ANALYSIS 20-25: CPT | Performed by: INTERNAL MEDICINE

## 2022-06-16 PROCEDURE — 74177 CT ABD & PELVIS W/CONTRAST: CPT

## 2022-06-16 PROCEDURE — 86803 HEPATITIS C AB TEST: CPT | Performed by: PHYSICIAN ASSISTANT

## 2022-06-16 PROCEDURE — 93010 ELECTROCARDIOGRAM REPORT: CPT | Mod: 59 | Performed by: EMERGENCY MEDICINE

## 2022-06-16 PROCEDURE — 120N000002 HC R&B MED SURG/OB UMMC

## 2022-06-16 PROCEDURE — 71260 CT THORAX DX C+: CPT | Mod: 26 | Performed by: RADIOLOGY

## 2022-06-16 PROCEDURE — 99283 EMERGENCY DEPT VISIT LOW MDM: CPT | Mod: 25 | Performed by: EMERGENCY MEDICINE

## 2022-06-16 PROCEDURE — 85027 COMPLETE CBC AUTOMATED: CPT | Performed by: STUDENT IN AN ORGANIZED HEALTH CARE EDUCATION/TRAINING PROGRAM

## 2022-06-16 PROCEDURE — 96365 THER/PROPH/DIAG IV INF INIT: CPT | Performed by: EMERGENCY MEDICINE

## 2022-06-16 PROCEDURE — 272N000451 HC KIT SHRLOCK 5FR POWER PICC DOUBLE LUMEN

## 2022-06-16 PROCEDURE — 93010 ELECTROCARDIOGRAM REPORT: CPT | Performed by: INTERNAL MEDICINE

## 2022-06-16 PROCEDURE — 82310 ASSAY OF CALCIUM: CPT | Performed by: PHYSICIAN ASSISTANT

## 2022-06-16 PROCEDURE — 93005 ELECTROCARDIOGRAM TRACING: CPT

## 2022-06-16 PROCEDURE — 99285 EMERGENCY DEPT VISIT HI MDM: CPT | Mod: 25 | Performed by: EMERGENCY MEDICINE

## 2022-06-16 PROCEDURE — 36415 COLL VENOUS BLD VENIPUNCTURE: CPT | Performed by: STUDENT IN AN ORGANIZED HEALTH CARE EDUCATION/TRAINING PROGRAM

## 2022-06-16 PROCEDURE — 84100 ASSAY OF PHOSPHORUS: CPT | Performed by: PHYSICIAN ASSISTANT

## 2022-06-16 PROCEDURE — U0003 INFECTIOUS AGENT DETECTION BY NUCLEIC ACID (DNA OR RNA); SEVERE ACUTE RESPIRATORY SYNDROME CORONAVIRUS 2 (SARS-COV-2) (CORONAVIRUS DISEASE [COVID-19]), AMPLIFIED PROBE TECHNIQUE, MAKING USE OF HIGH THROUGHPUT TECHNOLOGIES AS DESCRIBED BY CMS-2020-01-R: HCPCS | Performed by: STUDENT IN AN ORGANIZED HEALTH CARE EDUCATION/TRAINING PROGRAM

## 2022-06-16 PROCEDURE — 84550 ASSAY OF BLOOD/URIC ACID: CPT | Performed by: PHYSICIAN ASSISTANT

## 2022-06-16 PROCEDURE — 88369 M/PHMTRC ALYSISHQUANT/SEMIQ: CPT | Mod: 26 | Performed by: MEDICAL GENETICS

## 2022-06-16 PROCEDURE — 88184 FLOWCYTOMETRY/ TC 1 MARKER: CPT | Performed by: PATHOLOGY

## 2022-06-16 PROCEDURE — G0452 MOLECULAR PATHOLOGY INTERPR: HCPCS | Mod: 26 | Performed by: PATHOLOGY

## 2022-06-16 PROCEDURE — C9803 HOPD COVID-19 SPEC COLLECT: HCPCS | Performed by: EMERGENCY MEDICINE

## 2022-06-16 PROCEDURE — 85610 PROTHROMBIN TIME: CPT | Performed by: PHYSICIAN ASSISTANT

## 2022-06-16 PROCEDURE — 250N000009 HC RX 250: Performed by: PHYSICIAN ASSISTANT

## 2022-06-16 PROCEDURE — 88291 CYTO/MOLECULAR REPORT: CPT | Performed by: MEDICAL GENETICS

## 2022-06-16 PROCEDURE — 88368 INSITU HYBRIDIZATION MANUAL: CPT | Mod: 26 | Performed by: MEDICAL GENETICS

## 2022-06-16 PROCEDURE — 81378 HLA I & II TYPING HR: CPT | Performed by: PHYSICIAN ASSISTANT

## 2022-06-16 PROCEDURE — 80053 COMPREHEN METABOLIC PANEL: CPT | Performed by: STUDENT IN AN ORGANIZED HEALTH CARE EDUCATION/TRAINING PROGRAM

## 2022-06-16 PROCEDURE — 86696 HERPES SIMPLEX TYPE 2 TEST: CPT | Performed by: PHYSICIAN ASSISTANT

## 2022-06-16 PROCEDURE — 76376 3D RENDER W/INTRP POSTPROCES: CPT

## 2022-06-16 PROCEDURE — 86706 HEP B SURFACE ANTIBODY: CPT | Performed by: PHYSICIAN ASSISTANT

## 2022-06-16 PROCEDURE — 258N000003 HC RX IP 258 OP 636: Performed by: PHYSICIAN ASSISTANT

## 2022-06-16 PROCEDURE — 85027 COMPLETE CBC AUTOMATED: CPT | Performed by: PHYSICIAN ASSISTANT

## 2022-06-16 PROCEDURE — 86704 HEP B CORE ANTIBODY TOTAL: CPT | Performed by: PHYSICIAN ASSISTANT

## 2022-06-16 PROCEDURE — 99207 PR SC NO CHARGE VISIT: CPT | Performed by: INTERNAL MEDICINE

## 2022-06-16 PROCEDURE — 83605 ASSAY OF LACTIC ACID: CPT | Performed by: STUDENT IN AN ORGANIZED HEALTH CARE EDUCATION/TRAINING PROGRAM

## 2022-06-16 PROCEDURE — 93005 ELECTROCARDIOGRAM TRACING: CPT | Performed by: EMERGENCY MEDICINE

## 2022-06-16 PROCEDURE — 93306 TTE W/DOPPLER COMPLETE: CPT

## 2022-06-16 PROCEDURE — 86665 EPSTEIN-BARR CAPSID VCA: CPT | Performed by: PHYSICIAN ASSISTANT

## 2022-06-16 PROCEDURE — 36592 COLLECT BLOOD FROM PICC: CPT | Performed by: INTERNAL MEDICINE

## 2022-06-16 PROCEDURE — 87389 HIV-1 AG W/HIV-1&-2 AB AG IA: CPT | Performed by: PHYSICIAN ASSISTANT

## 2022-06-16 PROCEDURE — 88275 CYTOGENETICS 100-300: CPT | Performed by: PHYSICIAN ASSISTANT

## 2022-06-16 PROCEDURE — 85384 FIBRINOGEN ACTIVITY: CPT | Performed by: PHYSICIAN ASSISTANT

## 2022-06-16 PROCEDURE — 250N000013 HC RX MED GY IP 250 OP 250 PS 637: Performed by: PHYSICIAN ASSISTANT

## 2022-06-16 PROCEDURE — 99222 1ST HOSP IP/OBS MODERATE 55: CPT | Performed by: INTERNAL MEDICINE

## 2022-06-16 PROCEDURE — 83605 ASSAY OF LACTIC ACID: CPT | Performed by: INTERNAL MEDICINE

## 2022-06-16 PROCEDURE — 87340 HEPATITIS B SURFACE AG IA: CPT | Performed by: PHYSICIAN ASSISTANT

## 2022-06-16 PROCEDURE — 84100 ASSAY OF PHOSPHORUS: CPT | Performed by: STUDENT IN AN ORGANIZED HEALTH CARE EDUCATION/TRAINING PROGRAM

## 2022-06-16 PROCEDURE — 250N000011 HC RX IP 250 OP 636: Performed by: INTERNAL MEDICINE

## 2022-06-16 PROCEDURE — 85060 BLOOD SMEAR INTERPRETATION: CPT | Performed by: PATHOLOGY

## 2022-06-16 PROCEDURE — 88275 CYTOGENETICS 100-300: CPT | Performed by: INTERNAL MEDICINE

## 2022-06-16 PROCEDURE — 85007 BL SMEAR W/DIFF WBC COUNT: CPT | Performed by: STUDENT IN AN ORGANIZED HEALTH CARE EDUCATION/TRAINING PROGRAM

## 2022-06-16 PROCEDURE — 88185 FLOWCYTOMETRY/TC ADD-ON: CPT | Performed by: PHYSICIAN ASSISTANT

## 2022-06-16 PROCEDURE — 88271 CYTOGENETICS DNA PROBE: CPT | Performed by: PHYSICIAN ASSISTANT

## 2022-06-16 PROCEDURE — 85730 THROMBOPLASTIN TIME PARTIAL: CPT | Performed by: PHYSICIAN ASSISTANT

## 2022-06-16 PROCEDURE — 258N000003 HC RX IP 258 OP 636: Performed by: STUDENT IN AN ORGANIZED HEALTH CARE EDUCATION/TRAINING PROGRAM

## 2022-06-16 PROCEDURE — 81315 PML/RARALPHA COM BREAKPOINTS: CPT | Performed by: PHYSICIAN ASSISTANT

## 2022-06-16 PROCEDURE — 250N000009 HC RX 250: Performed by: INTERNAL MEDICINE

## 2022-06-16 RX ORDER — ACYCLOVIR 400 MG/1
400 TABLET ORAL 2 TIMES DAILY
Status: DISCONTINUED | OUTPATIENT
Start: 2022-06-16 | End: 2022-07-15 | Stop reason: HOSPADM

## 2022-06-16 RX ORDER — PROCHLORPERAZINE MALEATE 10 MG
10 TABLET ORAL EVERY 6 HOURS PRN
Status: DISCONTINUED | OUTPATIENT
Start: 2022-06-16 | End: 2022-06-16

## 2022-06-16 RX ORDER — TRETINOIN 10 MG/1
50 CAPSULE ORAL
Status: DISCONTINUED | OUTPATIENT
Start: 2022-06-17 | End: 2022-06-17

## 2022-06-16 RX ORDER — LEVOFLOXACIN 250 MG/1
250 TABLET, FILM COATED ORAL DAILY
Status: DISCONTINUED | OUTPATIENT
Start: 2022-06-16 | End: 2022-07-04

## 2022-06-16 RX ORDER — METHYLPREDNISOLONE SODIUM SUCCINATE 125 MG/2ML
125 INJECTION, POWDER, LYOPHILIZED, FOR SOLUTION INTRAMUSCULAR; INTRAVENOUS
Status: DISCONTINUED | OUTPATIENT
Start: 2022-06-16 | End: 2022-06-17

## 2022-06-16 RX ORDER — LORAZEPAM 2 MG/ML
.5-1 INJECTION INTRAMUSCULAR EVERY 6 HOURS PRN
Status: DISCONTINUED | OUTPATIENT
Start: 2022-06-16 | End: 2022-06-16

## 2022-06-16 RX ORDER — METHYLPREDNISOLONE SODIUM SUCCINATE 125 MG/2ML
125 INJECTION, POWDER, LYOPHILIZED, FOR SOLUTION INTRAMUSCULAR; INTRAVENOUS
Status: CANCELLED
Start: 2022-06-16

## 2022-06-16 RX ORDER — ALLOPURINOL 300 MG/1
300 TABLET ORAL DAILY
Status: CANCELLED
Start: 2022-06-16

## 2022-06-16 RX ORDER — ALBUTEROL SULFATE 0.83 MG/ML
2.5 SOLUTION RESPIRATORY (INHALATION)
Status: DISCONTINUED | OUTPATIENT
Start: 2022-06-16 | End: 2022-06-17

## 2022-06-16 RX ORDER — ALBUTEROL SULFATE 0.83 MG/ML
2.5 SOLUTION RESPIRATORY (INHALATION)
Status: CANCELLED | OUTPATIENT
Start: 2022-06-16

## 2022-06-16 RX ORDER — NALOXONE HYDROCHLORIDE 0.4 MG/ML
0.2 INJECTION, SOLUTION INTRAMUSCULAR; INTRAVENOUS; SUBCUTANEOUS
Status: CANCELLED | OUTPATIENT
Start: 2022-06-16

## 2022-06-16 RX ORDER — IOPAMIDOL 755 MG/ML
135 INJECTION, SOLUTION INTRAVASCULAR ONCE
Status: COMPLETED | OUTPATIENT
Start: 2022-06-16 | End: 2022-06-16

## 2022-06-16 RX ORDER — MEPERIDINE HYDROCHLORIDE 25 MG/ML
25 INJECTION INTRAMUSCULAR; INTRAVENOUS; SUBCUTANEOUS EVERY 30 MIN PRN
Status: DISCONTINUED | OUTPATIENT
Start: 2022-06-16 | End: 2022-06-17

## 2022-06-16 RX ORDER — HEPARIN SODIUM,PORCINE 10 UNIT/ML
5-20 VIAL (ML) INTRAVENOUS EVERY 24 HOURS
Status: DISCONTINUED | OUTPATIENT
Start: 2022-06-16 | End: 2022-06-23 | Stop reason: CLARIF

## 2022-06-16 RX ORDER — PROCHLORPERAZINE MALEATE 10 MG
10 TABLET ORAL EVERY 6 HOURS PRN
Status: CANCELLED
Start: 2022-06-16

## 2022-06-16 RX ORDER — DIPHENHYDRAMINE HYDROCHLORIDE 50 MG/ML
50 INJECTION INTRAMUSCULAR; INTRAVENOUS
Status: DISCONTINUED | OUTPATIENT
Start: 2022-06-16 | End: 2022-06-17

## 2022-06-16 RX ORDER — HYDROXYUREA 500 MG/1
500 CAPSULE ORAL 4 TIMES DAILY
Status: CANCELLED | OUTPATIENT
Start: 2022-06-16

## 2022-06-16 RX ORDER — EPINEPHRINE 1 MG/ML
0.3 INJECTION, SOLUTION, CONCENTRATE INTRAVENOUS EVERY 5 MIN PRN
Status: DISCONTINUED | OUTPATIENT
Start: 2022-06-16 | End: 2022-06-17

## 2022-06-16 RX ORDER — PROCHLORPERAZINE MALEATE 5 MG
5-10 TABLET ORAL EVERY 6 HOURS PRN
Status: DISCONTINUED | OUTPATIENT
Start: 2022-06-16 | End: 2022-06-22

## 2022-06-16 RX ORDER — ONDANSETRON 4 MG/1
8 TABLET, ORALLY DISINTEGRATING ORAL EVERY 8 HOURS PRN
Status: DISCONTINUED | OUTPATIENT
Start: 2022-06-16 | End: 2022-07-15 | Stop reason: HOSPADM

## 2022-06-16 RX ORDER — HEPARIN SODIUM,PORCINE 10 UNIT/ML
5-20 VIAL (ML) INTRAVENOUS
Status: DISCONTINUED | OUTPATIENT
Start: 2022-06-16 | End: 2022-07-15 | Stop reason: HOSPADM

## 2022-06-16 RX ORDER — ALBUTEROL SULFATE 90 UG/1
1-2 AEROSOL, METERED RESPIRATORY (INHALATION)
Status: DISCONTINUED | OUTPATIENT
Start: 2022-06-16 | End: 2022-06-17

## 2022-06-16 RX ORDER — VITAMIN B COMPLEX
50 TABLET ORAL DAILY
Status: DISCONTINUED | OUTPATIENT
Start: 2022-06-17 | End: 2022-07-15 | Stop reason: HOSPADM

## 2022-06-16 RX ORDER — ASCORBIC ACID 500 MG
1000 TABLET ORAL DAILY
Status: DISCONTINUED | OUTPATIENT
Start: 2022-06-17 | End: 2022-07-15 | Stop reason: HOSPADM

## 2022-06-16 RX ORDER — ALLOPURINOL 300 MG/1
300 TABLET ORAL DAILY
Status: DISCONTINUED | OUTPATIENT
Start: 2022-06-16 | End: 2022-06-22

## 2022-06-16 RX ORDER — DIPHENHYDRAMINE HYDROCHLORIDE 50 MG/ML
50 INJECTION INTRAMUSCULAR; INTRAVENOUS
Status: CANCELLED
Start: 2022-06-16

## 2022-06-16 RX ORDER — LORAZEPAM 0.5 MG/1
.5-1 TABLET ORAL EVERY 6 HOURS PRN
Status: DISCONTINUED | OUTPATIENT
Start: 2022-06-16 | End: 2022-06-22

## 2022-06-16 RX ORDER — LORAZEPAM 2 MG/ML
.5-1 INJECTION INTRAMUSCULAR EVERY 6 HOURS PRN
Status: CANCELLED | OUTPATIENT
Start: 2022-06-16

## 2022-06-16 RX ORDER — MEPERIDINE HYDROCHLORIDE 25 MG/ML
25 INJECTION INTRAMUSCULAR; INTRAVENOUS; SUBCUTANEOUS EVERY 30 MIN PRN
Status: CANCELLED | OUTPATIENT
Start: 2022-06-16

## 2022-06-16 RX ORDER — AMOXICILLIN 250 MG
1-2 CAPSULE ORAL 2 TIMES DAILY PRN
Status: DISCONTINUED | OUTPATIENT
Start: 2022-06-16 | End: 2022-07-15 | Stop reason: HOSPADM

## 2022-06-16 RX ORDER — LORAZEPAM 0.5 MG/1
.5-1 TABLET ORAL EVERY 6 HOURS PRN
Status: CANCELLED
Start: 2022-06-16

## 2022-06-16 RX ORDER — LIDOCAINE 40 MG/G
CREAM TOPICAL
Status: ACTIVE | OUTPATIENT
Start: 2022-06-16 | End: 2022-06-19

## 2022-06-16 RX ORDER — POLYETHYLENE GLYCOL 3350 17 G/17G
17 POWDER, FOR SOLUTION ORAL DAILY PRN
Status: DISCONTINUED | OUTPATIENT
Start: 2022-06-16 | End: 2022-07-15 | Stop reason: HOSPADM

## 2022-06-16 RX ORDER — ONDANSETRON 2 MG/ML
8 INJECTION INTRAMUSCULAR; INTRAVENOUS EVERY 8 HOURS PRN
Status: DISCONTINUED | OUTPATIENT
Start: 2022-06-16 | End: 2022-07-15 | Stop reason: HOSPADM

## 2022-06-16 RX ORDER — TRETINOIN 10 MG/1
60 CAPSULE ORAL
Status: DISCONTINUED | OUTPATIENT
Start: 2022-06-16 | End: 2022-06-17

## 2022-06-16 RX ORDER — ACETAMINOPHEN 325 MG/1
650 TABLET ORAL EVERY 4 HOURS PRN
Status: DISCONTINUED | OUTPATIENT
Start: 2022-06-16 | End: 2022-06-30

## 2022-06-16 RX ORDER — SODIUM CHLORIDE, SODIUM LACTATE, POTASSIUM CHLORIDE, CALCIUM CHLORIDE 600; 310; 30; 20 MG/100ML; MG/100ML; MG/100ML; MG/100ML
INJECTION, SOLUTION INTRAVENOUS ONCE
Status: COMPLETED | OUTPATIENT
Start: 2022-06-16 | End: 2022-06-16

## 2022-06-16 RX ORDER — ALBUTEROL SULFATE 90 UG/1
1-2 AEROSOL, METERED RESPIRATORY (INHALATION)
Status: CANCELLED
Start: 2022-06-16

## 2022-06-16 RX ORDER — MULTIPLE VITAMINS W/ MINERALS TAB 9MG-400MCG
1 TAB ORAL DAILY
Status: DISCONTINUED | OUTPATIENT
Start: 2022-06-17 | End: 2022-07-15 | Stop reason: HOSPADM

## 2022-06-16 RX ORDER — EPINEPHRINE 1 MG/ML
0.3 INJECTION, SOLUTION, CONCENTRATE INTRAVENOUS EVERY 5 MIN PRN
Status: CANCELLED | OUTPATIENT
Start: 2022-06-16

## 2022-06-16 RX ORDER — LORAZEPAM 2 MG/ML
.5-1 INJECTION INTRAMUSCULAR EVERY 6 HOURS PRN
Status: DISCONTINUED | OUTPATIENT
Start: 2022-06-16 | End: 2022-06-22

## 2022-06-16 RX ORDER — LORAZEPAM 0.5 MG/1
.5-1 TABLET ORAL EVERY 6 HOURS PRN
Status: DISCONTINUED | OUTPATIENT
Start: 2022-06-16 | End: 2022-06-16

## 2022-06-16 RX ORDER — TRETINOIN 10 MG/1
45 CAPSULE ORAL 2 TIMES DAILY
Status: DISCONTINUED | OUTPATIENT
Start: 2022-06-16 | End: 2022-06-16

## 2022-06-16 RX ORDER — ONDANSETRON 8 MG/1
8 TABLET, FILM COATED ORAL EVERY 8 HOURS PRN
Status: DISCONTINUED | OUTPATIENT
Start: 2022-06-16 | End: 2022-07-15 | Stop reason: HOSPADM

## 2022-06-16 RX ORDER — NALOXONE HYDROCHLORIDE 0.4 MG/ML
0.2 INJECTION, SOLUTION INTRAMUSCULAR; INTRAVENOUS; SUBCUTANEOUS
Status: DISCONTINUED | OUTPATIENT
Start: 2022-06-16 | End: 2022-06-17

## 2022-06-16 RX ADMIN — SODIUM CHLORIDE, PRESERVATIVE FREE 84 ML: 5 INJECTION INTRAVENOUS at 16:19

## 2022-06-16 RX ADMIN — LEVOFLOXACIN 250 MG: 250 TABLET, FILM COATED ORAL at 17:06

## 2022-06-16 RX ADMIN — IOPAMIDOL 135 ML: 755 INJECTION, SOLUTION INTRAVENOUS at 16:18

## 2022-06-16 RX ADMIN — ALLOPURINOL 300 MG: 300 TABLET ORAL at 17:13

## 2022-06-16 RX ADMIN — ACYCLOVIR 400 MG: 400 TABLET ORAL at 19:34

## 2022-06-16 RX ADMIN — TRETINOIN 60 MG: 10 CAPSULE ORAL at 21:55

## 2022-06-16 RX ADMIN — SODIUM CHLORIDE, POTASSIUM CHLORIDE, SODIUM LACTATE AND CALCIUM CHLORIDE: 600; 310; 30; 20 INJECTION, SOLUTION INTRAVENOUS at 12:33

## 2022-06-16 RX ADMIN — Medication 5 ML: at 19:34

## 2022-06-16 RX ADMIN — LIDOCAINE HYDROCHLORIDE 3 ML: 10 INJECTION, SOLUTION EPIDURAL; INFILTRATION; INTRACAUDAL; PERINEURAL at 18:20

## 2022-06-16 RX ADMIN — MICAFUNGIN 50 MG: 10 INJECTION, POWDER, LYOPHILIZED, FOR SOLUTION INTRAVENOUS at 17:06

## 2022-06-16 ASSESSMENT — ACTIVITIES OF DAILY LIVING (ADL)
ADLS_ACUITY_SCORE: 18
ADLS_ACUITY_SCORE: 18
ADLS_ACUITY_SCORE: 35
ADLS_ACUITY_SCORE: 18

## 2022-06-16 NOTE — PROGRESS NOTES
Nursing Focus: Admission    D: Patient admitted from ED for acute leukemia . Patient has a history of COVID, C diff and worsening leukocytosis.     I: Upon arrival to the unit patient was oriented to room, unit, and call light. Patient s height, weight, and vital signs were obtained. Allergies reviewed and allergy band applied. MD notified of patient s arrival on the unit. Adult AVS completed. Head to toe assessment completed. Full skin assessment completed. Education assessment completed. Care plan initiated.    A: Vital signs stable upon admission. Patient rates pain at 0/10. Patient s skin WDL.     P: Continue to monitor patient s labs and intervene as needed. Continue with plan of care. Notify MD with any concerns or changes in patient status.

## 2022-06-16 NOTE — TELEPHONE ENCOUNTER
Call placed to [facility] ER with report that Dr. 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 Dr. Bob called patient and advised her to go to the [facility] ER. Patient stated that she feels fine. Patient being worked up for pancytopenia.

## 2022-06-16 NOTE — H&P
"[facility]    History and Physical  Hematology / Oncology     Date of Admission:  6/16/2022  Date of Service (when I saw the patient): 06/16/22    Assessment & Plan   Veda Marinelli is a 37 year old female with PMH significant for h/o COVID19 infection (10/2021), C.diff, and hypothyroidism who presents with leukocytosis and circulating blasts, concerning for acute leukemia.     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   Was in her usual state of health until 03/2022 when she underwent a routine physical with labs. On 3/8/2022, white blood cell count 2.5 (ANC 1.0), Hgb 13.6 with , platelets normal.  On 5/15/2022, she was noted to have white blood cell count of 1.6 (ANC 0.28) with hemoglobin 11.  .  Platelets were 133. She was referred to Hematology (Dr. Bob), who she saw on 6/7/22. Further blood workup was recommended and labs done 6/14. CBC revealed WBC 25.3 (ANC 0.8), Hgb 10.1 (), Plt 90K. On the differential and morphology review, 81% blasts were seen concerning for acute leukemia. No kyle rods noted on PB blood smear pathologist review. She was advised to present to [facility] for further evaluation and management.   - CBC today demonstrates WBC 41K (ANC 0.0) with 86% \"other\" cells, Hgb 9.5, Plt 72K. Per verbal report from Heme Path fellow there is concern for Kyle rods. Coags WNL, no e/o TLS.   - sent PB FISH, PML-ANNELIESE, flow cytometry.   - may consider ATRA later tonight IF we cannot get information from initial APL blood studies  - consider Hydrea if WBC count continues to rapidly rise  - bone marrow biopsy arranged for 6/17 @ 9:30 AM, orders in place  - HLA typing  - viral serologies  - PICC placement  - baseline ECHO, EKG  - CBC w/ diff, TLS/DIC labs BID  - start Allopurinol. Ok to hold off on IVFs at this time; pt drinking PO fluids well and no lysis. If develops e/o lysis on labs, will start NS @ 100 " ml/hr.  - with h/o hemorrhoid (see below) and no neutrophils, eval for perirectal abscess with CT A/P. Given plan for scan, will also get CT Chest to eval baseline.     # Pancytopenia  2/2 underlying malignancy.  - transfuse to maintain Hgb >7, Plt >10K   - conditional orders in place for FFP if INR >1.8, cryo if fibrinogen <100 (will change to 150 if APL)    - needs blood consent signed    # LMP  Approximately 2 weeks ago, bleeding was heavier than normal with clots. Usually moderate lasting ~3 days.     # ID PPx  Denies fevers. Given profound neutropenia, start prophy anti-infectives  - ACV 400mg BID  - Levaquin 250mg daily  - Micafungin 50mg IV daily    # H/o C.diff  # Hemorrhoid  Complicated her C.diff course and has been waxing/waning since. Sometimes painful. Stools have normalized.   - get CT A/P to eval for perirectal abscess.    # h/o COVID-19  Pt is not vaccinated nor interested in being vaccinated. She was admitted from 10/1/2021 - 10/13/2021 for acute hypoxic respiratory failure from COVID-19 pneumonia.  Required IMC, high flow and intermittent CPAP. She was treated with dexamethasone, remdesivir, and baricitinib in addition to azithromycin and ceftriaxone. Recovered symptomatically from this.    # Hypothyroidism  - continue PTA synthroid    FEN  - no current IVFs  - monitor lyte, hold off on automatic repletion scales while assessing for TLS  - regular diet as tolerated    PPx  - VTE: None due to worsening TCP and new leukemia/possible APL  - GI: none at present  - bowels: PRN    Dispo: Admit for workup of new acute leukemia. Unclear at this time but anticipated 3-4 week LOS at minimum.     Seen and discussed with Dr. Maddie MATAMOROS spent >60 minutes in the care of this patient today, which included time necessary for review of interval events, obtaining history and physical exam, ordering medication(s)/test(s) as medically indicated, discussion with interdisciplinary/consult team(s), and documentation  time. Over 50% of time was spent face-to-face and/or coordinating care.    Lamar Grover PA-C  Heme/Onc  [phone] (pager)    Code Status   Full Code    Chief Complaint   Circulating blast cells    History is obtained from the patient and review of chart    History of Present Illness   Veda Marinelli is a 37 year old female with PMH significant for h/o COVID19 infection (10/2021) and C.diff colitis who was in her usual state of health until 03/2022 when she underwent a routine physical with labs, prompted by concern for hair loss and possible relationship to thyroid issues as these run in her family. Also had started new job with an [employer] organization and did the routine wellness physical. On 3/8/2022, white blood cell count 2.5 (ANC 1.0), Hgb 13.6 with , platelets normal.  On 5/15/2022, she was noted to have white blood cell count of 1.6 (ANC 0.28) with hemoglobin 11.  . Platelets were 133. She was referred to Hematology (Dr. Bob), who she saw on 6/7/22. Further blood workup was recommended and labs done 6/14. CBC revealed WBC 25.3 (ANC 0.8), Hgb 10.1 (), Plt 90K. On the differential and morphology review, 81% blasts were seen concerning for acute leukemia. No kyle rods noted on PB blood smear pathologist review. She was advised to present to [facility] for further evaluation and management.     Seen in the ED today, she reports feeling pretty well overall. She describes some SOB with activity but attributed this to being out of shape. Denies fevers, headaches, upper respiratory symptoms, nose bleeds, mouth pain/sores, gum sensitivity or bleeding, chest pain, chest pressure, nausea, abdominal pain, diarrhea or constipation (last BM this AM), or dysuria. Had a small bump in her right axilla that is getting better in the last week. Denies any other new lumps/bumps or skin changes. LMP was approx 2 weeks ago, heavier than normal with clots. Does have h/o intermittently  painful/bothersome hemorrhoid since her ordeal with C.diff.        Past Medical History    Hypothyroidism   C.diff colitis  COVID19 infection (10/2021)       Past Surgical History   R knee ACL repair  Left cartilage repair  H/o eustachian tubes as a child  Vision correction surgery    Prior to Admission Medications   Prior to Admission Medications   Prescriptions Last Dose Informant Patient Reported? Taking?   Bacillus Coagulans-Inulin (PROBIOTIC) 1-250 BILLION-MG CAPS   Yes No   Multiple Vitamins-Minerals (WOMENS MULTIVITAMIN) TABS   Yes No   levothyroxine (SYNTHROID/LEVOTHROID) 125 MCG tablet   Yes No   magnesium 250 MG tablet   Yes No   vitamin C (ASCORBIC ACID) 1000 MG TABS   Yes No   vitamin D3 (CHOLECALCIFEROL) 50 mcg (2000 units) tablet   Yes No   zinc gluconate 50 MG tablet   Yes No      Facility-Administered Medications: None     Allergies   No Known Allergies    Social History    Pt lives alone [place]. She has family close by. 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 She has recently changed to a job with [employer] (started by her sister, who is a family practice NP) and has been working from home with this change.   Tobacco: none  EtOH: describes social alcohol use with approx 2-3 drinks at least when she does drink    She has 2 siblings: an older sister and younger brother, both in good health      Social History     Socioeconomic History     Marital status: Single     Spouse name: Not on file     Number of children: Not on file     Years of education: Not on file     Highest education level: Not on file   Occupational History     Not on file   Tobacco Use     Smoking status: Never Smoker     Smokeless tobacco: Never Used   Substance and Sexual Activity     Alcohol use: Not on file     Drug use: Not on file     Sexual activity: Not on file   Other Topics Concern     Not on file   Social History Narrative     Not on file     Social Determinants of  Health     Financial Resource Strain: Not on file   Food Insecurity: Not on file   Transportation Needs: Not on file   Physical Activity: Not on file   Stress: Not on file   Social Connections: Not on file   Intimate Partner Violence: Not on file   Housing Stability: Not on file         Family History   No known family history of blood or bone marrow cancers    Review of Systems   A comprehensive Review of Systems is negative other than noted in the HPI or here.     Physical Exam   Temp: 99  F (37.2  C) Temp src: Oral BP: (!) 152/100 Pulse: 111     SpO2: 97 % O2 Device: None (Room air)    Vital Signs with Ranges  Temp:  [99  F (37.2  C)] 99  F (37.2  C)  Pulse:  [111] 111  BP: (152)/(100) 152/100  SpO2:  [97 %] 97 %  259 lbs 0 oz    Constitutional: Pleasant, generally well-appearing female seen sitting up in ED [place]. Awake, alert, cooperative, no apparent distress, and appears stated age.  HEENT: NC/AT. Ever-so-slight conjunctival irritation but no icterus. Lids and lashes normal. No nasal drainage. OP pink and moist without lesions, erythema, thrush, or gingival hyperplasia.   Respiratory: No increased work of breathing, good air exchange, on RA. Lungs clear to auscultation bilaterally, no crackles or wheezing.  Cardiovascular: RRR, no murmur noted.  GI: Normal bowel sounds. Abd soft, non-distended, non-tender, no masses or hepatosplenomegaly appreciated. Rectal exam deferred.   Lymph/Hematologic: No cervical lymphadenopathy and no supra/infraclavicular lymphadenopathy. No inguinal lymphadenopathy.  Skin: No bruising or bleeding, normal skin color, texture. No lesion or rashes appreciated on exposed skin surfaces.   Musculoskeletal: Exremities grossly normal in appearance. Moves freely in bed. Tone is normal.   Neurologic: Awake, alert, oriented. Grossly nonfocal. Speech normal.   Neuropsychiatric: Calm, normal eye contact, normal affect, memory for past and recent events intact and thought process  normal.    Data   Results for orders placed or performed during the hospital encounter of 06/16/22 (from the past 24 hour(s))   Comprehensive metabolic panel   Result Value Ref Range    Sodium 140 133 - 144 mmol/L    Potassium 3.7 3.4 - 5.3 mmol/L    Chloride 107 94 - 109 mmol/L    Carbon Dioxide (CO2) 23 20 - 32 mmol/L    Anion Gap 10 3 - 14 mmol/L    Urea Nitrogen 8 7 - 30 mg/dL    Creatinine 0.60 0.52 - 1.04 mg/dL    Calcium 9.4 8.5 - 10.1 mg/dL    Glucose 119 (H) 70 - 99 mg/dL    Alkaline Phosphatase 64 40 - 150 U/L    AST 19 0 - 45 U/L    ALT 28 0 - 50 U/L    Protein Total 7.8 6.8 - 8.8 g/dL    Albumin 3.9 3.4 - 5.0 g/dL    Bilirubin Total 0.6 0.2 - 1.3 mg/dL    GFR Estimate >90 >60 mL/min/1.73m2   Lactic acid whole blood   Result Value Ref Range    Lactic Acid 1.0 0.7 - 2.0 mmol/L   CBC with platelets differential    Narrative    The following orders were created for panel order CBC with platelets differential.  Procedure                               Abnormality         Status                     ---------                               -----------         ------                     CBC with platelets and d...[[id number]]  Abnormal            Final result               Manual Differential[[id number]]          Abnormal            Final result                 Please view results for these tests on the individual orders.   Phosphorus   Result Value Ref Range    Phosphorus 2.4 (L) 2.5 - 4.5 mg/dL   Uric acid   Result Value Ref Range    Uric Acid 5.8 2.6 - 6.0 mg/dL   [place] Draw    Narrative    The following orders were created for panel order [place] Draw.  Procedure                               Abnormality         Status                     ---------                               -----------         ------                     Extra Blue Top Tube[[id number]]                              Final result               Extra Red Top Tube[[id number]]                               Final result               Extra Green Top  (Lithium...[[id number]]                      Final result               Extra Purple Top Tube[[id number]]                            Final result                 Please view results for these tests on the individual orders.   CBC with platelets and differential   Result Value Ref Range    WBC Count 41.7 (H) 4.0 - 11.0 10e3/uL    RBC Count 2.70 (L) 3.80 - 5.20 10e6/uL    Hemoglobin 9.8 (L) 11.7 - 15.7 g/dL    Hematocrit 29.0 (L) 35.0 - 47.0 %     (H) 78 - 100 fL    MCH 36.3 (H) 26.5 - 33.0 pg    MCHC 33.8 31.5 - 36.5 g/dL    RDW 14.5 10.0 - 15.0 %    Platelet Count 71 (L) 150 - 450 10e3/uL   Extra Blue Top Tube   Result Value Ref Range    Hold Specimen JIC    Extra Red Top Tube   Result Value Ref Range    Hold Specimen JIC    Extra Green Top (Lithium Heparin) Tube   Result Value Ref Range    Hold Specimen JIC    Extra Purple Top Tube   Result Value Ref Range    Hold Specimen JIC    Manual Differential   Result Value Ref Range    % Neutrophils 0 %    % Lymphocytes 14 %    % Monocytes 0 %    % Eosinophils 0 %    % Basophils 0 %    % Other Cells 86 %    NRBCs per 100 WBC 2 (H) <=0 %    Absolute Neutrophils 0.0 (LL) 1.6 - 8.3 10e3/uL    Absolute Lymphocytes 5.8 (H) 0.8 - 5.3 10e3/uL    Absolute Monocytes 0.0 0.0 - 1.3 10e3/uL    Absolute Eosinophils 0.0 0.0 - 0.7 10e3/uL    Absolute Basophils 0.0 0.0 - 0.2 10e3/uL    Absolute Other Cells 35.9 (H) <=0.0 10e3/uL    Absolute NRBCs 0.8 (H) <=0.0 10e3/uL    RBC Morphology Confirmed RBC Indices     Platelet Assessment  Automated Count Confirmed. Platelet morphology is normal.     Automated Count Confirmed. Platelet morphology is normal.    Pathologist Review Comments       Other cells are blasts.  Occasional blasts with multiple Blanca rods  Blasts show predominantly cup-shaped/indented nuclei and rare blasts show creased/bilobed morphology.  Recommend FISH testing for t(15;17) to rule out APML  No evidence of red cell fragments.  These findings are communicated to the  clinical team at 1:30 pm 6/16/2022.    Sylvester Dominguez MD on 6/16/2022 at 1:40 PM     Lactate Dehydrogenase   Result Value Ref Range    Lactate Dehydrogenase 347 (H) 81 - 234 U/L   Magnesium   Result Value Ref Range    Magnesium 2.1 1.6 - 2.3 mg/dL   INR   Result Value Ref Range    INR 1.15 0.85 - 1.15   Fibrinogen activity   Result Value Ref Range    Fibrinogen Activity 383 170 - 490 mg/dL   Lab Blood Morphology Pathologist Review    Narrative    The following orders were created for panel order Lab Blood Morphology Pathologist Review.  Procedure                               Abnormality         Status                     ---------                               -----------         ------                     Bld morphology pathology...[[id number]]                      In process                 CBC with platelets and d...[[id number]]  Abnormal            Preliminary result         Reticulocyte count[[id number]]           Abnormal            Final result               Morphology Tracking[[id number]]                              Final result                 Please view results for these tests on the individual orders.   Partial thromboplastin time   Result Value Ref Range    aPTT 32 22 - 38 Seconds   CBC with platelets and differential   Result Value Ref Range    WBC Count 41.0 (H) 4.0 - 11.0 10e3/uL    RBC Count 2.61 (L) 3.80 - 5.20 10e6/uL    Hemoglobin 9.5 (L) 11.7 - 15.7 g/dL    Hematocrit 28.6 (L) 35.0 - 47.0 %     (H) 78 - 100 fL    MCH 36.4 (H) 26.5 - 33.0 pg    MCHC 33.2 31.5 - 36.5 g/dL    RDW 14.5 10.0 - 15.0 %    Platelet Count 72 (L) 150 - 450 10e3/uL   Reticulocyte count   Result Value Ref Range    % Reticulocyte 2.4 (H) 0.5 - 2.0 %    Absolute Reticulocyte 0.063 0.025 - 0.095 10e6/uL   Asymptomatic COVID-19 Virus (Coronavirus) by PCR Nasopharyngeal    Specimen: Nasopharyngeal; Swab   Result Value Ref Range    SARS CoV2 PCR Negative Negative, Testing sent to reference lab. Results will be returned via  unsolicited result    Narrative    Testing was performed using the Xpert Xpress SARS-CoV-2 Assay on the  Cepheid Gene-Xpert Instrument Systems. Additional information about  this Emergency Use Authorization (EUA) assay can be found via the Lab  Guide. This test should be ordered for the detection of SARS-CoV-2 in  individuals who meet SARS-CoV-2 clinical and/or epidemiological  criteria. Test performance is unknown in asymptomatic patients. This  test is for in vitro diagnostic use under the FDA EUA for  laboratories certified under CLIA to perform high complexity testing.  This test has not been FDA cleared or approved. A negative result  does not rule out the presence of PCR inhibitors in the specimen or  target RNA in concentration below the limit of detection for the  assay. The possibility of a false negative should be considered if  the patient's recent exposure or clinical presentation suggests  COVID-19. This test was validated by the [facility]. This laboratory is certified under  the Clinical Laboratory Improvement Amendments of 1988 (CLIA-88) as  qualified to perform high complexity laboratory testing.     CT Chest/Abdomen/Pelvis w Contrast    Impression    RESIDENT PRELIMINARY INTERPRETATION  IMPRESSION:   1. No acute findings. No perirectal abscess.  2. Small 4 mm nodule in the left lower lobe. Recommend attention on  follow-up.

## 2022-06-16 NOTE — ED PROVIDER NOTES
ED Provider Note  [facility]      History   No chief complaint on file.    HPI  Veda Marinelli is a 37 year old female who presents to the emergency department for elevated blast count after being called by her oncologist about her blast count. Her only notable past medical history is COVID infection and sequelae.  She was admitted to the hospital 10/1/2021 - 10/13/2021 for acute hypoxic respiratory failure due to COVID-19 pneumonia.  During her hospitalization she was treated with dexamethasone, remdesivir, baricitinib, in addition to azithromycin and ceftriaxone for possible secondary pneumonia.  She developed C. difficile colitis after her antibiotic course treated by p.o. vancomycin.  Subsequently, she began to develop leukopenia/neutropenia in March 2022, which slowly progressed to pancytopenia per review of records.  She was seen for virtual oncology visit on 6/7/2022 and peripheral blood smear was ordered.  Smear collected 6/14/2022 resulted this a.m. with acute leukemia with 81% circulating blasts without Blanca rods associated with neutrophilic left shift, moderate macrocytic normochromic anemia, and moderate thrombocytopenia.  Lab draw on same day showed leukocytosis at 25.3 with 20.5 absolute blast count, hemoglobin 10.1, platelets 90.  Her most recent prior lab draw had been on 5/15/2022, when she was noted to have WBC count 1.6 with hemoglobin 11, platelets 133.  She was called by hematology this a.m. and asked to present to the emergency department.  She denies fatigue, pallor, weakness, bone pain, fever, rash, bleeding, adenopathy, arthralgia, shortness of breath, cough, sinus congestion, runny nose, dizziness, lightheadedness, changes to hearing, changes to vision, chest pain, palpitations, abdominal pain, extremity swelling, extremity pain, nausea, vomiting, diarrhea, constipation, blood in stool, blood in urine, dysuria, abnormal taste, lesions in mouth, lesions in  "nose, or any other changes to her baseline level of health.  She denies a first or second degree family history of malignancy.    Past Medical History  No past medical history on file.  No past surgical history on file.  Bacillus Coagulans-Inulin (PROBIOTIC) 1-250 BILLION-MG CAPS  levothyroxine (SYNTHROID/LEVOTHROID) 125 MCG tablet  magnesium 250 MG tablet  Multiple Vitamins-Minerals (WOMENS MULTIVITAMIN) TABS  vitamin C (ASCORBIC ACID) 1000 MG TABS  vitamin D3 (CHOLECALCIFEROL) 50 mcg (2000 units) tablet  zinc gluconate 50 MG tablet      No Known Allergies  Family History  No family history on file.  Social History   Social History     Tobacco Use     Smoking status: Never Smoker     Smokeless tobacco: Never Used      Past medical history, past surgical history, medications, allergies, family history, and social history were reviewed with the patient. No additional pertinent items.       Review of Systems  A complete review of systems was performed with pertinent positives and negatives noted in the HPI, and all other systems negative.    Physical Exam   BP: (!) 152/100  Pulse: 111  Temp: 99  F (37.2  C)  Height: 170.2 cm (5' 7\")  Weight: 117.5 kg (259 lb)  SpO2: 97 %  Physical Exam  Constitutional:       General: She is not in acute distress.     Appearance: Normal appearance. She is not ill-appearing, toxic-appearing or diaphoretic.   HENT:      Head: Normocephalic and atraumatic.      Nose: No congestion or rhinorrhea.      Mouth/Throat:      Mouth: Mucous membranes are moist.      Pharynx: Oropharynx is clear. No oropharyngeal exudate or posterior oropharyngeal erythema.   Eyes:      Extraocular Movements: Extraocular movements intact.      Conjunctiva/sclera: Conjunctivae normal.      Pupils: Pupils are equal, round, and reactive to light.   Cardiovascular:      Rate and Rhythm: Normal rate and regular rhythm.      Pulses: Normal pulses.      Heart sounds: Normal heart sounds. No murmur heard.    No friction " rub. No gallop.   Pulmonary:      Effort: Pulmonary effort is normal. No respiratory distress.      Breath sounds: Normal breath sounds. No stridor. No wheezing, rhonchi or rales.   Abdominal:      General: Bowel sounds are normal.      Palpations: Abdomen is soft.      Tenderness: There is no abdominal tenderness. There is no guarding or rebound.   Musculoskeletal:         General: No swelling, tenderness or deformity.      Cervical back: Normal range of motion and neck supple. No rigidity or tenderness.   Skin:     General: Skin is warm and dry.      Capillary Refill: Capillary refill takes less than 2 seconds.      Findings: No bruising, erythema or rash.   Neurological:      General: No focal deficit present.      Mental Status: She is alert and oriented to person, place, and time. Mental status is at baseline.      Cranial Nerves: No cranial nerve deficit.      Sensory: No sensory deficit.      Motor: No weakness.      Coordination: Coordination normal.   Psychiatric:         Mood and Affect: Mood normal.         Behavior: Behavior normal.       ED Course      Procedures       The medical record was reviewed and interpreted.  Current labs reviewed and interpreted.  Previous labs reviewed and interpreted.     12:12 PM initial evaluation, paged hematology fellow. CBC, CMP, uric acid, phosphorus, lactate all ordered while in waiting room prior to rooming. IVF (LR) ordered at 150 ml/hr    12:39 PM received call from hematology fellow Loi Owen, patient will be admitted to oncology service, attending Won Perkins.  We will continue fluids and swab for COVID per their recommendation. Admit order entered.         EKG Interpretation:      Interpreted by PAT NG MD, MD  Time reviewed:1528   Symptoms at time of EKG: none   Rhythm: normal sinus   Rate: 87  Axis: NORMAL  Ectopy: none  Conduction: normal  ST Segments/ T Waves: No ST-T wave changes  Q Waves: none  Comparison to prior: No old EKG  available    Clinical Impression: normal EKG     Results for orders placed or performed during the hospital encounter of 06/16/22   Comprehensive metabolic panel     Status: Abnormal   Result Value Ref Range    Sodium 140 133 - 144 mmol/L    Potassium 3.7 3.4 - 5.3 mmol/L    Chloride 107 94 - 109 mmol/L    Carbon Dioxide (CO2) 23 20 - 32 mmol/L    Anion Gap 10 3 - 14 mmol/L    Urea Nitrogen 8 7 - 30 mg/dL    Creatinine 0.60 0.52 - 1.04 mg/dL    Calcium 9.4 8.5 - 10.1 mg/dL    Glucose 119 (H) 70 - 99 mg/dL    Alkaline Phosphatase 64 40 - 150 U/L    AST 19 0 - 45 U/L    ALT 28 0 - 50 U/L    Protein Total 7.8 6.8 - 8.8 g/dL    Albumin 3.9 3.4 - 5.0 g/dL    Bilirubin Total 0.6 0.2 - 1.3 mg/dL    GFR Estimate >90 >60 mL/min/1.73m2   Lactic acid whole blood     Status: Normal   Result Value Ref Range    Lactic Acid 1.0 0.7 - 2.0 mmol/L   Phosphorus     Status: Abnormal   Result Value Ref Range    Phosphorus 2.4 (L) 2.5 - 4.5 mg/dL   Uric acid     Status: Normal   Result Value Ref Range    Uric Acid 5.8 2.6 - 6.0 mg/dL   [place] Draw     Status: None (In process)    Narrative    The following orders were created for panel order [place] Draw.  Procedure                               Abnormality         Status                     ---------                               -----------         ------                     Extra Blue Top Tube[[id number]]                              Final result               Extra Red Top Tube[[id number]]                               Final result               Extra Green Top (Lithium...[[id number]]                      Final result               Extra Purple Top Tube[[id number]]                            In process                   Please view results for these tests on the individual orders.   CBC with platelets and differential     Status: Abnormal   Result Value Ref Range    WBC Count 41.7 (H) 4.0 - 11.0 10e3/uL    RBC Count 2.70 (L) 3.80 - 5.20 10e6/uL    Hemoglobin 9.8 (L) 11.7 - 15.7 g/dL     Hematocrit 29.0 (L) 35.0 - 47.0 %     (H) 78 - 100 fL    MCH 36.3 (H) 26.5 - 33.0 pg    MCHC 33.8 31.5 - 36.5 g/dL    RDW 14.5 10.0 - 15.0 %    Platelet Count 71 (L) 150 - 450 10e3/uL   Extra Blue Top Tube     Status: None   Result Value Ref Range    Hold Specimen JIC    Extra Red Top Tube     Status: None   Result Value Ref Range    Hold Specimen JIC    Extra Green Top (Lithium Heparin) Tube     Status: None   Result Value Ref Range    Hold Specimen JIC    Manual Differential     Status: Abnormal (Preliminary result)   Result Value Ref Range    % Neutrophils 0 %    % Lymphocytes 14 %    % Monocytes 0 %    % Eosinophils 0 %    % Basophils 0 %    % Other Cells 86 %    NRBCs per 100 WBC 2 (H) <=0 %    Absolute Neutrophils 0.0 (LL) 1.6 - 8.3 10e3/uL    Absolute Lymphocytes 5.8 (H) 0.8 - 5.3 10e3/uL    Absolute Monocytes 0.0 0.0 - 1.3 10e3/uL    Absolute Eosinophils 0.0 0.0 - 0.7 10e3/uL    Absolute Basophils 0.0 0.0 - 0.2 10e3/uL    Absolute Other Cells 35.9 (H) <=0.0 10e3/uL    Absolute NRBCs 0.8 (H) <=0.0 10e3/uL    RBC Morphology Confirmed RBC Indices     Platelet Assessment  Automated Count Confirmed. Platelet morphology is normal.     Automated Count Confirmed. Platelet morphology is normal.    Pathologist Review Comments     CBC with platelets differential     Status: Abnormal (In process)    Narrative    The following orders were created for panel order CBC with platelets differential.  Procedure                               Abnormality         Status                     ---------                               -----------         ------                     CBC with platelets and d...[[id number]]  Abnormal            Final result               Manual Differential[[id number]]          Abnormal            Preliminary result           Please view results for these tests on the individual orders.     Medications   lactated ringers infusion ( Intravenous New Bag 6/16/22 8896)        Assessments & Plan (with  Medical Decision Making)   37-year-old female presenting in acute leukemic blast crisis without Blanca rods.  Possibly developed leukemia secondary to MDS after COVID, given her history of developing leukopenia with neutropenia and monocytopenia that progressed to pancytopenia after COVID infection, followed by the current presentation.  She continues to feel well and remain essentially asymptomatic and vitally stable without signs of leukostasis, infection, bruising, or bleeding, though labs are notable for an interim near-doubling of her white count in the past 2 days to 41.7 from 25.3, as well as ANC 0.0.  Lactate, phosphorus, uric acid all within normal limits. Hemoglobin and platelets essentially stable. Placed on 250 ml/hr LR while in the ED.  Discussed her case with hematology fellow, and she will be admitted to the oncology team.  Coagulation parameters and blast count still pending at time of admission.    I have reviewed the nursing notes. I have reviewed the findings, diagnosis, plan and need for follow up with the patient.    Staffed with Dr. Ng.    Kenneth Hawkins MD  Internal Medicine, PGY-1  Text page via [facility] Paging/Directory     --    ED Attending Physician Attestation    I PAT NG MD, MD, cared for this patient with the Resident. I have performed a history and physical examination of the patient and discussed management with the resident. I reviewed the resident's documentation above and agree with the documented findings and plan of care.    Summary of HPI, PE, ED Course   Patient is a 37 year old female evaluated in the emergency department for admission for elevated white blood cell count concerning for acute leukemia/blast crisis.  Patient does not have any symptoms. Exam notable for no abnormality.  Patient is awake and alert.  Heart is regular rate rhythm without murmur.  Lungs are clear to auscultation bilaterally.  Abdomen soft and nontender.  Patient is awake and alert.  Normal  strength and coordination.. ED course notable for above labs.  Discussed with malignant hematology.  After the completion of care in the emergency department, the patient was admitted to inpatient.    Critical Care & Procedures  Not applicable.    Medical Decision Making  The medical record was reviewed and interpreted.  Current labs reviewed and interpreted.  Previous labs reviewed and interpreted.      RAPHAEL NG MD, MD  Emergency Medicine       New Prescriptions    No medications on file       Final diagnoses:   Leukemia, blast cell (H)       --  Chart documentation was completed with Dragon voice-recognition software. Even though reviewed, this chart may still contain some grammatical, spelling, and word errors.     [facility] EMERGENCY DEPARTMENT  6/16/2022     Raphael Ng MD  06/16/22 1413       Raphael Ng MD  06/16/22 5162

## 2022-06-16 NOTE — LETTER
Transition Communication Hand-off for Care Transitions to Next Level of Care Provider    Name: Veda Marinelli  : 1985  MRN #: [id number]  Primary Care Provider: TATA NELSON     Primary Clinic: [address], [address]  [place] [id number]     Reason for Hospitalization:  Leukemia, blast cell (H) [C95.00]  Admit Date/Time: 2022 11:55 AM  Discharge Date: 7/15/2022    Payor Source: Payor: PREFERREDONE / Plan: PREFERREDONE HMO / Product Type: PPO /   Veda Marinelli is a 37 year old female with PMH significant for h/o COVID19 infection (10/2021), C.diff, and hypothyroidism who presented with leukocytosis and circulating blasts. 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 She initiated induction chemotherapy with 7+3+midostaurin (C1D1=22). Hospital course complicated by neutropenic fever and C.difficile colitis.    Reason for Communication Hand-off Referral: Fragility    Discharge Plan: Home with clinic follow-up as recommended.        Concern for non-adherence with plan of care: N  Discharge Needs Assessment:  Needs    Flowsheet Row Most Recent Value   Anticipated Changes Related to Illness none   Equipment Currently Used at Home none          Follow-up specialty is recommended: Yes    Follow-up plan:    Future Appointments   Date Time Provider Department Center   2022  1:00 PM Ortiz Earl MD [facility]       Any outstanding tests or procedures:        Referrals     Future Labs/Procedures    Adult Oncology/Hematology  Referral     Comments:    Please Schedule:  * Twice weekly labs and possible transfusion starting  at [facility]  * Left upper extremity ultrasound on 22 or 22 [place] or [facility]  * Bone marrow biopsy to be scheduled at [facility] on 22  * MD visit with oncologist ~1 week after BMBx ( or ) - She has established with Dr Perkins and Dr Earl during her admission  [facility] will call you to  coordinate your care as prescribed by the provider.  If you don t hear from a representative within 2 business days, please call [phone]      FOLLOW UP BMT GENERIC      Comments:    Please be aware that coverage of these services is subject to the terms and limitations of your health insurance plan.  Call member services at your health plan with any benefit or coverage questions.            Xiomara Toledo, RN, BSN, PHN  7D RN Care Coordinator   Phn: [phone]  Fax: [phone]        AVS/Discharge Summary is the source of truth; this is a helpful guide for improved communication of patient story

## 2022-06-16 NOTE — PROCEDURES
[facility]    Double Lumen PICC Placement    Date/Time: 6/16/2022 6:17 PM  Performed by: Ana Juárez RN  Authorized by: Lamar Grover PA-C   Indications: vascular access      UNIVERSAL PROTOCOL   Site Marked: Yes  Prior Images Obtained and Reviewed:  Yes  Required items: Required blood products, implants, devices and special equipment available    Patient identity confirmed:  Verbally with patient and arm band  NA - No sedation, light sedation, or local anesthesia  Confirmation Checklist:  Patient's identity using two indicators, relevant allergies, procedure was appropriate and matched the consent or emergent situation and correct equipment/implants were available  Time out: Immediately prior to the procedure a time out was called    Universal Protocol: the Joint Commission Universal Protocol was followed    Preparation: Patient was prepped and draped in usual sterile fashion       ANESTHESIA    Anesthesia: See MAR for details  Local Anesthetic:  Lidocaine 1% without epinephrine  Anesthetic Total (mL):  3      SEDATION    Patient Sedated: No        Preparation: skin prepped with ChloraPrep  Skin prep agent: skin prep agent completely dried prior to procedure  Sterile barriers: maximum sterile barriers were used: cap, mask, sterile gown, sterile gloves, and large sterile sheet  Hand hygiene: hand hygiene performed prior to central venous catheter insertion  Type of line used: Power PICC  Catheter type: double lumen  Lumen type: non-valved  Catheter size: 5 Fr  Brand: Bard  Lot number: [id number]  Placement method: tip navigation system, ultrasound, venipuncture and MST  Number of attempts: 1  Difficulty threading catheter: no  Successful placement: yes  Orientation: left  Catheter to Vein (%): 13  Location: brachial vein (lateral)  Arm circumference: adults 10 cm  Extremity circumference: 31  Visible catheter length: 0  Internal length: 46 cm  Total  catheter length: 46  Dressing and securement: blood cleaned with CHG, blood removed, chlorhexidine disc applied, dressing applied, securement device, site cleansed, statlock and sterile dressing applied  Post procedure assessment: placement verified by 3CG technology  PROCEDURE   Patient Tolerance:  Patient tolerated the procedure well with no immediate complicationsDescribe Procedure: Ok to use Picc line

## 2022-06-16 NOTE — PROGRESS NOTES
I was informed that she has acute leukemia with 81% blasts with Blanca rods.  I called Minerva and she feels fine.    I discussed with her what this means and that she would require urgent evaluation and management which will be done in the hospital.  [facility]  We will also inform the staff at the emergency room.  She mentioned that most likely her sister will drive her to the hospital.    6/16/2022  7:43 AM

## 2022-06-17 ENCOUNTER — TRANSFERRED RECORDS (OUTPATIENT)
Dept: HEALTH INFORMATION MANAGEMENT | Facility: CLINIC | Age: 37
End: 2022-06-17

## 2022-06-17 ENCOUNTER — APPOINTMENT (OUTPATIENT)
Dept: PHYSICAL THERAPY | Facility: CLINIC | Age: 37
DRG: 834 | End: 2022-06-17
Attending: PHYSICIAN ASSISTANT
Payer: COMMERCIAL

## 2022-06-17 LAB
ALBUMIN SERPL-MCNC: 3.4 G/DL (ref 3.4–5)
ALBUMIN UR-MCNC: NEGATIVE MG/DL
ALP SERPL-CCNC: 55 U/L (ref 40–150)
ALT SERPL W P-5'-P-CCNC: 21 U/L (ref 0–50)
ANION GAP SERPL CALCULATED.3IONS-SCNC: 6 MMOL/L (ref 3–14)
ANION GAP SERPL CALCULATED.3IONS-SCNC: 7 MMOL/L (ref 3–14)
APPEARANCE UR: CLEAR
APTT PPP: 31 SECONDS (ref 22–38)
APTT PPP: 32 SECONDS (ref 22–38)
AST SERPL W P-5'-P-CCNC: 13 U/L (ref 0–45)
ATRIAL RATE - MUSE: 80 BPM
ATRIAL RATE - MUSE: 95 BPM
BACTERIA #/AREA URNS HPF: ABNORMAL /HPF
BASOPHILS # BLD MANUAL: 0 10E3/UL (ref 0–0.2)
BASOPHILS # BLD MANUAL: 0 10E3/UL (ref 0–0.2)
BASOPHILS NFR BLD MANUAL: 0 %
BASOPHILS NFR BLD MANUAL: 0 %
BILIRUB DIRECT SERPL-MCNC: 0.1 MG/DL (ref 0–0.2)
BILIRUB SERPL-MCNC: 0.9 MG/DL (ref 0.2–1.3)
BILIRUB UR QL STRIP: NEGATIVE
BLASTS # BLD MANUAL: 42.4 10E3/UL
BLASTS # BLD MANUAL: 56.9 10E3/UL
BLASTS NFR BLD MANUAL: 94 %
BLASTS NFR BLD MANUAL: 97 %
BUN SERPL-MCNC: 11 MG/DL (ref 7–30)
BUN SERPL-MCNC: 8 MG/DL (ref 7–30)
CALCIUM SERPL-MCNC: 8.8 MG/DL (ref 8.5–10.1)
CALCIUM SERPL-MCNC: 9.1 MG/DL (ref 8.5–10.1)
CAOX CRY #/AREA URNS HPF: ABNORMAL /HPF
CHLORIDE BLD-SCNC: 108 MMOL/L (ref 94–109)
CHLORIDE BLD-SCNC: 108 MMOL/L (ref 94–109)
CMV IGG SERPL IA-ACNC: <0.2 U/ML
CMV IGG SERPL IA-ACNC: NORMAL
CO2 SERPL-SCNC: 24 MMOL/L (ref 20–32)
CO2 SERPL-SCNC: 26 MMOL/L (ref 20–32)
COLOR UR AUTO: ABNORMAL
CREAT SERPL-MCNC: 0.48 MG/DL (ref 0.52–1.04)
CREAT SERPL-MCNC: 0.57 MG/DL (ref 0.52–1.04)
CULTURE HARVEST COMPLETE DATE: NORMAL
DACRYOCYTES BLD QL SMEAR: SLIGHT
DIASTOLIC BLOOD PRESSURE - MUSE: NORMAL MMHG
DIASTOLIC BLOOD PRESSURE - MUSE: NORMAL MMHG
EBV VCA IGG SER IA-ACNC: <10 U/ML
EBV VCA IGG SER IA-ACNC: NORMAL
EOSINOPHIL # BLD MANUAL: 0 10E3/UL (ref 0–0.7)
EOSINOPHIL # BLD MANUAL: 0 10E3/UL (ref 0–0.7)
EOSINOPHIL NFR BLD MANUAL: 0 %
EOSINOPHIL NFR BLD MANUAL: 0 %
ERYTHROCYTE [DISTWIDTH] IN BLOOD BY AUTOMATED COUNT: 14.6 % (ref 10–15)
ERYTHROCYTE [DISTWIDTH] IN BLOOD BY AUTOMATED COUNT: 14.6 % (ref 10–15)
FIBRINOGEN PPP-MCNC: 316 MG/DL (ref 170–490)
FIBRINOGEN PPP-MCNC: 317 MG/DL (ref 170–490)
GFR SERPL CREATININE-BSD FRML MDRD: >90 ML/MIN/1.73M2
GFR SERPL CREATININE-BSD FRML MDRD: >90 ML/MIN/1.73M2
GLUCOSE BLD-MCNC: 102 MG/DL (ref 70–99)
GLUCOSE BLD-MCNC: 117 MG/DL (ref 70–99)
GLUCOSE UR STRIP-MCNC: NEGATIVE MG/DL
HBV CORE AB SERPL QL IA: NONREACTIVE
HBV SURFACE AB SERPL IA-ACNC: 31.11 M[IU]/ML
HBV SURFACE AG SERPL QL IA: NONREACTIVE
HCT VFR BLD AUTO: 25.3 % (ref 35–47)
HCT VFR BLD AUTO: 25.7 % (ref 35–47)
HCV AB SERPL QL IA: NONREACTIVE
HGB BLD-MCNC: 8.4 G/DL (ref 11.7–15.7)
HGB BLD-MCNC: 8.6 G/DL (ref 11.7–15.7)
HGB UR QL STRIP: NEGATIVE
HIV 1+2 AB+HIV1 P24 AG SERPL QL IA: NONREACTIVE
HSV1 IGG SERPL QL IA: 0.13 INDEX
HSV1 IGG SERPL QL IA: NORMAL
HSV2 IGG SERPL QL IA: 0.16 INDEX
HSV2 IGG SERPL QL IA: NORMAL
INR PPP: 1.16 (ref 0.85–1.15)
INR PPP: 1.22 (ref 0.85–1.15)
INTERPRETATION ECG - MUSE: NORMAL
INTERPRETATION ECG - MUSE: NORMAL
INTERPRETATION: NORMAL
KETONES UR STRIP-MCNC: 60 MG/DL
LACTATE SERPL-SCNC: 0.9 MMOL/L (ref 0.7–2)
LEUKOCYTE ESTERASE UR QL STRIP: NEGATIVE
LYMPHOCYTES # BLD MANUAL: 0.6 10E3/UL (ref 0.8–5.3)
LYMPHOCYTES # BLD MANUAL: 2.7 10E3/UL (ref 0.8–5.3)
LYMPHOCYTES NFR BLD MANUAL: 1 %
LYMPHOCYTES NFR BLD MANUAL: 6 %
MAGNESIUM SERPL-MCNC: 2.3 MG/DL (ref 1.6–2.3)
MCH RBC QN AUTO: 35.9 PG (ref 26.5–33)
MCH RBC QN AUTO: 36.1 PG (ref 26.5–33)
MCHC RBC AUTO-ENTMCNC: 33.2 G/DL (ref 31.5–36.5)
MCHC RBC AUTO-ENTMCNC: 33.5 G/DL (ref 31.5–36.5)
MCV RBC AUTO: 108 FL (ref 78–100)
MCV RBC AUTO: 108 FL (ref 78–100)
MONOCYTES # BLD MANUAL: 0 10E3/UL (ref 0–1.3)
MONOCYTES # BLD MANUAL: 0 10E3/UL (ref 0–1.3)
MONOCYTES NFR BLD MANUAL: 0 %
MONOCYTES NFR BLD MANUAL: 0 %
MUCOUS THREADS #/AREA URNS LPF: PRESENT /LPF
NEUTROPHILS # BLD MANUAL: 0 10E3/UL (ref 1.6–8.3)
NEUTROPHILS # BLD MANUAL: 1.2 10E3/UL (ref 1.6–8.3)
NEUTROPHILS NFR BLD MANUAL: 0 %
NEUTROPHILS NFR BLD MANUAL: 2 %
NITRATE UR QL: NEGATIVE
NRBC # BLD AUTO: 0.6 10E3/UL
NRBC # BLD AUTO: 1.4 10E3/UL
NRBC BLD MANUAL-RTO: 1 %
NRBC BLD MANUAL-RTO: 3 %
P AXIS - MUSE: 19 DEGREES
P AXIS - MUSE: 41 DEGREES
PATH REPORT.COMMENTS IMP SPEC: ABNORMAL
PATH REPORT.COMMENTS IMP SPEC: ABNORMAL
PATH REPORT.COMMENTS IMP SPEC: YES
PATH REPORT.FINAL DX SPEC: ABNORMAL
PATH REPORT.MICROSCOPIC SPEC OTHER STN: ABNORMAL
PATH REPORT.MICROSCOPIC SPEC OTHER STN: ABNORMAL
PATH REPORT.RELEVANT HX SPEC: ABNORMAL
PH UR STRIP: 6 [PH] (ref 5–7)
PHOSPHATE SERPL-MCNC: 3.3 MG/DL (ref 2.5–4.5)
PHOSPHATE SERPL-MCNC: 3.8 MG/DL (ref 2.5–4.5)
PLAT MORPH BLD: ABNORMAL
PLAT MORPH BLD: ABNORMAL
PLATELET # BLD AUTO: 55 10E3/UL (ref 150–450)
PLATELET # BLD AUTO: 63 10E3/UL (ref 150–450)
POLYCHROMASIA BLD QL SMEAR: SLIGHT
POTASSIUM BLD-SCNC: 3.6 MMOL/L (ref 3.4–5.3)
POTASSIUM BLD-SCNC: 3.8 MMOL/L (ref 3.4–5.3)
PR INTERVAL - MUSE: 146 MS
PR INTERVAL - MUSE: 154 MS
PROT SERPL-MCNC: 6.6 G/DL (ref 6.8–8.8)
QRS DURATION - MUSE: 84 MS
QRS DURATION - MUSE: 86 MS
QT - MUSE: 372 MS
QT - MUSE: 394 MS
QTC - MUSE: 454 MS
QTC - MUSE: 467 MS
R AXIS - MUSE: 11 DEGREES
R AXIS - MUSE: 43 DEGREES
RBC # BLD AUTO: 2.34 10E6/UL (ref 3.8–5.2)
RBC # BLD AUTO: 2.38 10E6/UL (ref 3.8–5.2)
RBC MORPH BLD: ABNORMAL
RBC MORPH BLD: ABNORMAL
RBC URINE: <1 /HPF
SODIUM SERPL-SCNC: 139 MMOL/L (ref 133–144)
SODIUM SERPL-SCNC: 140 MMOL/L (ref 133–144)
SP GR UR STRIP: 1.02 (ref 1–1.03)
SQUAMOUS EPITHELIAL: <1 /HPF
SYSTOLIC BLOOD PRESSURE - MUSE: NORMAL MMHG
SYSTOLIC BLOOD PRESSURE - MUSE: NORMAL MMHG
T AXIS - MUSE: 23 DEGREES
T AXIS - MUSE: 5 DEGREES
URATE SERPL-MCNC: 4.2 MG/DL (ref 2.6–6)
URATE SERPL-MCNC: 5.3 MG/DL (ref 2.6–6)
UROBILINOGEN UR STRIP-MCNC: NORMAL MG/DL
VENTRICULAR RATE- MUSE: 80 BPM
VENTRICULAR RATE- MUSE: 95 BPM
WBC # BLD AUTO: 45.1 10E3/UL (ref 4–11)
WBC # BLD AUTO: 58.7 10E3/UL (ref 4–11)
WBC URINE: 1 /HPF

## 2022-06-17 PROCEDURE — 84100 ASSAY OF PHOSPHORUS: CPT | Performed by: PHYSICIAN ASSISTANT

## 2022-06-17 PROCEDURE — 81450 HL NEO GSAP 5-50DNA/DNA&RNA: CPT | Performed by: PHYSICIAN ASSISTANT

## 2022-06-17 PROCEDURE — 88275 CYTOGENETICS 100-300: CPT | Performed by: PHYSICIAN ASSISTANT

## 2022-06-17 PROCEDURE — 120N000002 HC R&B MED SURG/OB UMMC

## 2022-06-17 PROCEDURE — 88341 IMHCHEM/IMCYTCHM EA ADD ANTB: CPT | Mod: 26 | Performed by: PATHOLOGY

## 2022-06-17 PROCEDURE — 84550 ASSAY OF BLOOD/URIC ACID: CPT | Performed by: PHYSICIAN ASSISTANT

## 2022-06-17 PROCEDURE — 85384 FIBRINOGEN ACTIVITY: CPT | Performed by: PHYSICIAN ASSISTANT

## 2022-06-17 PROCEDURE — 87086 URINE CULTURE/COLONY COUNT: CPT | Performed by: PHYSICIAN ASSISTANT

## 2022-06-17 PROCEDURE — 81001 URINALYSIS AUTO W/SCOPE: CPT | Performed by: PHYSICIAN ASSISTANT

## 2022-06-17 PROCEDURE — 83735 ASSAY OF MAGNESIUM: CPT | Performed by: PHYSICIAN ASSISTANT

## 2022-06-17 PROCEDURE — 07DR3ZX EXTRACTION OF ILIAC BONE MARROW, PERCUTANEOUS APPROACH, DIAGNOSTIC: ICD-10-PCS | Performed by: PHYSICIAN ASSISTANT

## 2022-06-17 PROCEDURE — 88189 FLOWCYTOMETRY/READ 16 & >: CPT | Mod: GC | Performed by: PATHOLOGY

## 2022-06-17 PROCEDURE — G0452 MOLECULAR PATHOLOGY INTERPR: HCPCS | Mod: 26 | Performed by: PATHOLOGY

## 2022-06-17 PROCEDURE — 88185 FLOWCYTOMETRY/TC ADD-ON: CPT | Performed by: PHYSICIAN ASSISTANT

## 2022-06-17 PROCEDURE — 85027 COMPLETE CBC AUTOMATED: CPT | Performed by: PHYSICIAN ASSISTANT

## 2022-06-17 PROCEDURE — 85610 PROTHROMBIN TIME: CPT | Performed by: PHYSICIAN ASSISTANT

## 2022-06-17 PROCEDURE — 36592 COLLECT BLOOD FROM PICC: CPT | Performed by: PHYSICIAN ASSISTANT

## 2022-06-17 PROCEDURE — 88342 IMHCHEM/IMCYTCHM 1ST ANTB: CPT | Mod: 26 | Performed by: PATHOLOGY

## 2022-06-17 PROCEDURE — 88184 FLOWCYTOMETRY/ TC 1 MARKER: CPT | Performed by: PATHOLOGY

## 2022-06-17 PROCEDURE — 88264 CHROMOSOME ANALYSIS 20-25: CPT | Performed by: PHYSICIAN ASSISTANT

## 2022-06-17 PROCEDURE — 85097 BONE MARROW INTERPRETATION: CPT | Mod: GC | Performed by: PATHOLOGY

## 2022-06-17 PROCEDURE — G0452 MOLECULAR PATHOLOGY INTERPR: HCPCS | Mod: 26 | Performed by: MEDICAL GENETICS

## 2022-06-17 PROCEDURE — 36592 COLLECT BLOOD FROM PICC: CPT | Performed by: STUDENT IN AN ORGANIZED HEALTH CARE EDUCATION/TRAINING PROGRAM

## 2022-06-17 PROCEDURE — 88313 SPECIAL STAINS GROUP 2: CPT | Mod: 26 | Performed by: PATHOLOGY

## 2022-06-17 PROCEDURE — 81277 CYTOGENOMIC NEO MICRORA ALYS: CPT | Performed by: INTERNAL MEDICINE

## 2022-06-17 PROCEDURE — 97116 GAIT TRAINING THERAPY: CPT | Mod: GP

## 2022-06-17 PROCEDURE — 250N000013 HC RX MED GY IP 250 OP 250 PS 637: Performed by: INTERNAL MEDICINE

## 2022-06-17 PROCEDURE — 85730 THROMBOPLASTIN TIME PARTIAL: CPT | Performed by: PHYSICIAN ASSISTANT

## 2022-06-17 PROCEDURE — 88237 TISSUE CULTURE BONE MARROW: CPT | Performed by: PHYSICIAN ASSISTANT

## 2022-06-17 PROCEDURE — 88311 DECALCIFY TISSUE: CPT | Mod: 26 | Performed by: PATHOLOGY

## 2022-06-17 PROCEDURE — 999N000044 HC STATISTIC CVC DRESSING CHANGE

## 2022-06-17 PROCEDURE — 97161 PT EVAL LOW COMPLEX 20 MIN: CPT | Mod: GP

## 2022-06-17 PROCEDURE — 250N000013 HC RX MED GY IP 250 OP 250 PS 637: Performed by: PHYSICIAN ASSISTANT

## 2022-06-17 PROCEDURE — 38222 DX BONE MARROW BX & ASPIR: CPT | Performed by: PHYSICIAN ASSISTANT

## 2022-06-17 PROCEDURE — 250N000011 HC RX IP 250 OP 636: Performed by: PHYSICIAN ASSISTANT

## 2022-06-17 PROCEDURE — 99233 SBSQ HOSP IP/OBS HIGH 50: CPT | Mod: 25 | Performed by: INTERNAL MEDICINE

## 2022-06-17 PROCEDURE — 93010 ELECTROCARDIOGRAM REPORT: CPT | Performed by: INTERNAL MEDICINE

## 2022-06-17 PROCEDURE — 80053 COMPREHEN METABOLIC PANEL: CPT | Performed by: PHYSICIAN ASSISTANT

## 2022-06-17 PROCEDURE — 93005 ELECTROCARDIOGRAM TRACING: CPT

## 2022-06-17 PROCEDURE — 85007 BL SMEAR W/DIFF WBC COUNT: CPT | Performed by: PHYSICIAN ASSISTANT

## 2022-06-17 PROCEDURE — 258N000003 HC RX IP 258 OP 636: Performed by: PHYSICIAN ASSISTANT

## 2022-06-17 PROCEDURE — 82248 BILIRUBIN DIRECT: CPT | Performed by: PHYSICIAN ASSISTANT

## 2022-06-17 PROCEDURE — 83605 ASSAY OF LACTIC ACID: CPT | Performed by: STUDENT IN AN ORGANIZED HEALTH CARE EDUCATION/TRAINING PROGRAM

## 2022-06-17 PROCEDURE — 88305 TISSUE EXAM BY PATHOLOGIST: CPT | Mod: 26 | Performed by: PATHOLOGY

## 2022-06-17 PROCEDURE — 88311 DECALCIFY TISSUE: CPT | Mod: TC | Performed by: PHYSICIAN ASSISTANT

## 2022-06-17 PROCEDURE — 85060 BLOOD SMEAR INTERPRETATION: CPT | Mod: 26 | Performed by: PATHOLOGY

## 2022-06-17 PROCEDURE — 88184 FLOWCYTOMETRY/ TC 1 MARKER: CPT | Performed by: PHYSICIAN ASSISTANT

## 2022-06-17 RX ORDER — HYDROXYUREA 500 MG/1
1000 CAPSULE ORAL 2 TIMES DAILY
Status: DISCONTINUED | OUTPATIENT
Start: 2022-06-17 | End: 2022-06-19

## 2022-06-17 RX ADMIN — SODIUM CHLORIDE, PRESERVATIVE FREE 6 ML: 5 INJECTION INTRAVENOUS at 17:38

## 2022-06-17 RX ADMIN — Medication 1 TABLET: at 12:25

## 2022-06-17 RX ADMIN — LEVOFLOXACIN 250 MG: 250 TABLET, FILM COATED ORAL at 07:53

## 2022-06-17 RX ADMIN — MIDAZOLAM HYDROCHLORIDE 2 MG: 1 INJECTION, SOLUTION INTRAMUSCULAR; INTRAVENOUS at 10:11

## 2022-06-17 RX ADMIN — SODIUM CHLORIDE, PRESERVATIVE FREE 5 ML: 5 INJECTION INTRAVENOUS at 19:36

## 2022-06-17 RX ADMIN — HYDROXYUREA 1000 MG: 500 CAPSULE ORAL at 19:53

## 2022-06-17 RX ADMIN — ACETAMINOPHEN 650 MG: 325 TABLET, FILM COATED ORAL at 20:00

## 2022-06-17 RX ADMIN — Medication 50 MCG: at 07:52

## 2022-06-17 RX ADMIN — SODIUM CHLORIDE, PRESERVATIVE FREE 5 ML: 5 INJECTION INTRAVENOUS at 05:58

## 2022-06-17 RX ADMIN — HYDROXYUREA 1000 MG: 500 CAPSULE ORAL at 12:25

## 2022-06-17 RX ADMIN — OXYCODONE HYDROCHLORIDE AND ACETAMINOPHEN 1000 MG: 500 TABLET ORAL at 07:53

## 2022-06-17 RX ADMIN — ACYCLOVIR 400 MG: 400 TABLET ORAL at 07:53

## 2022-06-17 RX ADMIN — SODIUM CHLORIDE, PRESERVATIVE FREE 5 ML: 5 INJECTION INTRAVENOUS at 17:00

## 2022-06-17 RX ADMIN — ALLOPURINOL 300 MG: 300 TABLET ORAL at 07:52

## 2022-06-17 RX ADMIN — MICAFUNGIN 50 MG: 10 INJECTION, POWDER, LYOPHILIZED, FOR SOLUTION INTRAVENOUS at 15:51

## 2022-06-17 RX ADMIN — ACYCLOVIR 400 MG: 400 TABLET ORAL at 19:53

## 2022-06-17 RX ADMIN — TRETINOIN 50 MG: 10 CAPSULE ORAL at 07:53

## 2022-06-17 ASSESSMENT — ACTIVITIES OF DAILY LIVING (ADL)
ADLS_ACUITY_SCORE: 18

## 2022-06-17 NOTE — PROGRESS NOTES
"   06/17/22 0843   Quick Adds   Type of Visit Initial PT Evaluation       Present no   Living Environment   People in Home alone   Current Living Arrangements house   Home Accessibility stairs to enter home   Number of Stairs, Main Entrance 4   Stair Railings, Main Entrance railing on left side (ascending);railings safe and in good condition   Transportation Anticipated car, drives self   Living Environment Comments Pt reports she lives in a one-level home alone. Support nearby to assist as needed. 4 YISSEL with L handrail. (+) driving.   Self-Care   Usual Activity Tolerance good   Current Activity Tolerance moderate   Regular Exercise No   Equipment Currently Used at Home none   Fall history within last six months no   Activity/Exercise/Self-Care Comment IND with ADL's and mobility with AD. No falls reported. 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 Works as a RN.   General Information   Onset of Illness/Injury or Date of Surgery 06/16/22   Referring Physician Lamar Grover PA-C   Patient/Family Therapy Goals Statement (PT) none stated   Pertinent History of Current Problem (include personal factors and/or comorbidities that impact the POC) per EMR: \"Veda Marinelli is a 37 year old female with a past medical history of COVID pneumonia ~6 months ago, necessitating hospitalization but not intubation, and complicated by C diff colitis, presenting now with new and rapidly worsening leukocytosis. Recently she has been feeling well, with perhaps minor fatigue.   When questioned though, she also endorses several weeks of perirectal pain that she attributes to a hemorrhoid. Denies headaches, vision changes, photophobia, bruising, or recent fevers/chills.\"   Existing Precautions/Restrictions fall;immunosuppressed   Weight-Bearing Status - LUE full weight-bearing   Weight-Bearing Status - RUE full weight-bearing   Weight-Bearing Status - LLE full weight-bearing   Weight-Bearing Status - RLE full " weight-bearing   Cognition   Orientation Status (Cognition) oriented x 4   Pain Assessment   Patient Currently in Pain No   Integumentary/Edema   Integumentary/Edema no deficits were identifed   Posture    Posture Not impaired   Range of Motion (ROM)   Range of Motion ROM is WFL   Strength (Manual Muscle Testing)   Strength (Manual Muscle Testing) strength is WFL   Strength Comments   (grossly 4/5 in B LE)   Bed Mobility   Comment, (Bed Mobility) IND   Transfers   Comment, (Transfers) IND   Gait/Stairs (Locomotion)   Distance in Feet (Required for LE Total Joints) 150ft   Comment, (Gait/Stairs) Ambulated 150ft with IND and no AD. Negotiated 4 steps with L handrail and supervision   Balance   Balance Comments fair(+) standing balance   Sensory Examination   Sensory Perception patient reports no sensory changes   Coordination   Coordination no deficits were identified   Muscle Tone   Muscle Tone no deficits were identified   Clinical Impression   Criteria for Skilled Therapeutic Intervention Yes, treatment indicated   PT Diagnosis (PT) at risk for deconditioning due to chemotherapy treatment   Influenced by the following impairments prolonged hospital stay due to chemotherapy treatment   Functional limitations due to impairments decreased activity tolerance   Clinical Presentation (PT Evaluation Complexity) Stable/Uncomplicated   Clinical Presentation Rationale per clinical judgment   Clinical Decision Making (Complexity) low complexity   Planned Therapy Interventions (PT) balance training;gait training;home exercise program;neuromuscular re-education;stair training;strengthening;stretching;progressive activity/exercise;risk factor education;home program guidelines   Anticipated Equipment Needs at Discharge (PT)   (none)   Risk & Benefits of therapy have been explained evaluation/treatment results reviewed;care plan/treatment goals reviewed;risks/benefits reviewed;current/potential barriers reviewed;participants  voiced agreement with care plan;participants included;patient   PT Discharge Planning   PT Discharge Recommendation (DC Rec) home   PT Rationale for DC Rec Pt is demonstrating functional mobility at baseline. At risk for deconditioning due to chemotherapy treatment. Anticipated to d/c home once medically appropriate. Will benefit from participating with IP PT to maintain activity tolerance/endurance while in hospital to return home.   PT Brief overview of current status IND   Total Evaluation Time   Total Evaluation Time (Minutes) 5   Physical Therapy Goals   PT Frequency 3x/week   PT Predicted Duration/Target Date for Goal Attainment 07/15/22   PT: Stairs Modified independent;4 stairs;Rail on left   PT: Perform aerobic activity with stable cardiovascular response intermittent activity;continuous activity;20 minutes;25 minutes;ambulation;NuStep   PT: Goal 1 IND with HEP to improve B LE strength and muscular endurance to reduce risk for deconditioning while in hospital

## 2022-06-17 NOTE — PROGRESS NOTES
"   06/17/22 0843   Quick Adds   Type of Visit Initial PT Evaluation       Present no   Living Environment   People in Home alone   Current Living Arrangements house   Home Accessibility stairs to enter home   Number of Stairs, Main Entrance 4   Stair Railings, Main Entrance railing on left side (ascending);railings safe and in good condition   Transportation Anticipated car, drives self   Living Environment Comments Pt reports she lives in a one-level home alone. Support nearby to assist as needed. 4 YISSEL with L handrail. (+) driving.   Self-Care   Usual Activity Tolerance good   Current Activity Tolerance moderate   Regular Exercise No   Equipment Currently Used at Home none   Fall history within last six months no   Activity/Exercise/Self-Care Comment IND with ADL's and mobility with AD. No falls reported. 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 Works as a RN.   General Information   Onset of Illness/Injury or Date of Surgery 06/16/22   Referring Physician Lamar Grover PA-C   Patient/Family Therapy Goals Statement (PT) none stated   Pertinent History of Current Problem (include personal factors and/or comorbidities that impact the POC) per EMR: \"Veda Marinelli is a 37 year old female with a past medical history of COVID pneumonia ~6 months ago, necessitating hospitalization but not intubation, and complicated by C diff colitis, presenting now with new and rapidly worsening leukocytosis. Recently she has been feeling well, with perhaps minor fatigue.   When questioned though, she also endorses several weeks of perirectal pain that she attributes to a hemorrhoid. Denies headaches, vision changes, photophobia, bruising, or recent fevers/chills.\"   Existing Precautions/Restrictions immunosuppressed   Weight-Bearing Status - LUE full weight-bearing   Weight-Bearing Status - RUE full weight-bearing   Weight-Bearing Status - LLE full weight-bearing   Weight-Bearing Status - RLE full weight-bearing "   Cognition   Orientation Status (Cognition) oriented x 4   Pain Assessment   Patient Currently in Pain No   Integumentary/Edema   Integumentary/Edema no deficits were identifed   Posture    Posture Not impaired   Range of Motion (ROM)   Range of Motion ROM is WFL   Strength (Manual Muscle Testing)   Strength (Manual Muscle Testing) strength is WFL   Strength Comments   (grossly 4/5 in B LE)   Bed Mobility   Comment, (Bed Mobility) IND   Transfers   Comment, (Transfers) IND   Gait/Stairs (Locomotion)   Distance in Feet (Required for LE Total Joints) 150ft   Comment, (Gait/Stairs) Ambulated 150ft with IND and no AD. Negotiated 4 steps with L handrail and supervision   Balance   Balance Comments fair(+) standing balance   Sensory Examination   Sensory Perception patient reports no sensory changes   Coordination   Coordination no deficits were identified   Muscle Tone   Muscle Tone no deficits were identified   Clinical Impression   Criteria for Skilled Therapeutic Intervention Yes, treatment indicated   PT Diagnosis (PT) at risk for deconditioning due to chemotherapy treatment   Influenced by the following impairments prolonged hospital stay due to chemotherapy treatment   Functional limitations due to impairments decreased activity tolerance   Clinical Presentation (PT Evaluation Complexity) Stable/Uncomplicated   Clinical Presentation Rationale per clinical judgment   Clinical Decision Making (Complexity) low complexity   Planned Therapy Interventions (PT) balance training;gait training;home exercise program;neuromuscular re-education;stair training;strengthening;stretching;progressive activity/exercise;risk factor education;home program guidelines   Anticipated Equipment Needs at Discharge (PT)   (none)   Risk & Benefits of therapy have been explained evaluation/treatment results reviewed;care plan/treatment goals reviewed;risks/benefits reviewed;current/potential barriers reviewed;participants voiced agreement  with care plan;participants included;patient   PT Discharge Planning   PT Discharge Recommendation (DC Rec) home   PT Rationale for DC Rec Pt is demonstrating functional mobility at baseline. At risk for deconditioning due to chemotherapy treatment. Anticipated to d/c home once medically appropriate. Will benefit from participating with IP PT to maintain activity tolerance/endurance while in hospital to return home.   PT Brief overview of current status IND in hallway   Total Evaluation Time   Total Evaluation Time (Minutes) 5   Physical Therapy Goals   PT Frequency 3x/week   PT Predicted Duration/Target Date for Goal Attainment 07/15/22   PT: Stairs Modified independent;4 stairs;Rail on left   PT: Perform aerobic activity with stable cardiovascular response intermittent activity;continuous activity;20 minutes;25 minutes;ambulation;NuStep   PT: Goal 1 IND with HEP to improve B LE strength and muscular endurance to reduce risk for deconditioning while in hospital

## 2022-06-17 NOTE — PLAN OF CARE
"[id number]    /73 (BP Location: Right arm)   Pulse 115   Temp 98.6  F (37  C) (Oral)   Resp 16   Ht 1.702 m (5' 7\")   Wt 118 kg (260 lb 3.2 oz)   SpO2 93%   BMI 40.75 kg/m      WBC- 45.1, Hydrea started today. TLS/DIC labs BID.    HR tachy in the teens, not within parameters to notify, reports she was walking around prior to vitals. Afebrile. Overall denies pain, but had some BMB discomfort after the procedure, cold pack applied. Denies nausea and SOB. 2 BMs this shift. Appetite good. PICC site ecchymotic. UpAdLib. Plan is pending BMB results, expected to start chemotherapy next week.    "

## 2022-06-17 NOTE — PROCEDURES
Bone Marrow Biopsy Procedure Note  Patient's Name: Minerva Marinelli  Date of Procedure: 6/17/22     PROCEDURE:  Unilateral bone marrow biopsy and unilateral aspirate      INDICATION:  Concern for acute leukemia       PERFORMED BY:  Lamar Grover PA-C     CONSENT:  Informed consent was obtained from the patient. The risks and benefits of the procedure were explained. The patient agreed to undergo the procedure. The consent form was signed and placed in the paper chart.     Of note, the patient also signed consents for the HMTB.      PREMEDICATION:  Versed 2mg IV x once     PROCEDURE SUMMARY:  The patient's identification was positively verified by ID band. Patient was laid in the prone position. Premedication with Versed was administered. The LEFT posterior iliac crest (LPIC) was prepped and draped in a sterile manner. The skin, deeper tissues, and periosteum of the LPIC were anesthetized with approximately 30 mL 1% lidocaine. Following this, a 3mm incision was made. Due to difficult positioning in relation to body habitus, it required several attempts to find a suitable location to proceed with sample collection. Once position was determined, the angle of the approach was closer to a 60 degree angle. The trephine needle was advanced into the LPIC bone cavity and aspirates were first collected. Approximately 30 ml of marrow were obtained. Following this, the trephine was further advanced and a 10mm core biopsy was obtained. Following the procedure, a sterile pressure dressing was applied to the bone marrow biopsy site.      COMPLICATIONS:  None. The patient tolerated the procedure well with mild discomfort. The process for numbing with Lidocaine and multiple attempts to find proper, secured positioning took the majority of the procedure time. Once in good position, the time for collection of samples was quite short.        RECOMMENDATIONS:  The patient was placed in the supine position to maintain pressure on the  biopsy site.   The patient was instructed to lie flat for 60 minutes and not to remove the dressing or get it wet for 24 hours post-procedure.      TESTS ORDERED:  Morphology  Flow cytometry  Chromosomes  FISH   NGS expanded AML panel  Sample for HMTB     Lamar Grover PA-C  Hematology/Oncology  Pager: [phone]

## 2022-06-17 NOTE — PLAN OF CARE
Goal Outcome Evaluation:    A&O x4, HR slightly tachycardic in low 100s. OVSS on RA. Denies pain, N/V, or SOB. Triggered sepsis, LA 0.6. PICC placed this evening, heparin locked. Started tretinoin PO 60 mg, will take 50 mg in AM. BMBx scheduled for 9:30 am 6/17. Up independently, saving urine output. LBM 6/16.

## 2022-06-17 NOTE — PROGRESS NOTES
"[facility]    Hematology / Oncology Progress Note    Date of Admission: 6/16/2022  Date of Service (when I saw the patient): 06/17/2022     Assessment & Plan   Veda Marinelli is a 37 year old female with PMH significant for h/o COVID19 infection (10/2021), C.diff, and hypothyroidism who presents with leukocytosis and circulating blasts, concerning for acute leukemia.      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   Was in her usual state of health until 03/2022 when she underwent a routine physical with labs. On 3/8/2022, white blood cell count 2.5 (ANC 1.0), Hgb 13.6 with , platelets normal.  On 5/15/2022, she was noted to have white blood cell count of 1.6 (ANC 0.28) with hemoglobin 11.  .  Platelets were 133. She was referred to Hematology (Dr. Bob), who she saw on 6/7/22. Further blood workup was recommended and labs done 6/14. CBC revealed WBC 25.3 (ANC 0.8), Hgb 10.1 (), Plt 90K. On the differential and morphology review, 81% blasts were seen concerning for acute leukemia. No kyle rods noted on PB blood smear pathologist review. She was advised to present to [facility] for further evaluation and management.   - CBC on admit demonstrates WBC 41K (ANC 0.0) with 86% \"other\" cells, Hgb 9.5, Plt 72K. Per verbal report from Heme Path fellow there is concern for Kyle rods. Coags WNL, no e/o TLS.   - sent PB FISH, PML-ANNELIESE (negative), flow cytometry.   - was started on ATRA on 6/16 PM prior to result of PML-ANNELIESE. Given that this test is now negative, can stop ATRA  - start Hydrea 1g BID while awaiting BMBx results to develop trt plan  - bone marrow biopsy today  - CBC w/ diff, TLS/DIC labs BID  - HLA typing sent, will send message to BMT coordinators  - continue Allopurinol. Ok to hold off on IVFs at this time; pt drinking PO fluids well and no lysis. If develops e/o lysis on labs, will start NS @ 100 ml/hr.  - CT " CAP without evidence for infectious source      # Pancytopenia  2/2 underlying malignancy.  - transfuse to maintain Hgb >7, Plt >10K   - conditional orders in place for FFP if INR >1.8, cryo if fibrinogen <100    - blood consent signed on 6/17     # LMP  Approximately 2 weeks PTA, bleeding was heavier than normal with clots. Usually moderate lasting ~3 days.      # ID PPx  Denies fevers. Given profound neutropenia, start prophy anti-infectives  - ACV 400mg BID  - Levaquin 250mg daily  - Micafungin 50mg IV daily     # H/o C.diff  # Hemorrhoid  Complicated her C.diff course and has been waxing/waning since. Sometimes painful. Stools have normalized.   - CT A/P negative for perirectal abscess.     # h/o COVID-19  Pt is not vaccinated nor interested in being vaccinated. She was admitted from 10/1/2021 - 10/13/2021 for acute hypoxic respiratory failure from COVID-19 pneumonia.  Required IMC, high flow and intermittent CPAP. She was treated with dexamethasone, remdesivir, and baricitinib in addition to azithromycin and ceftriaxone. Recovered symptomatically from this.     # Hypothyroidism  - continue PTA synthroid     FEN  - no current IVFs  - monitor lyte, hold off on automatic repletion scales while assessing for TLS  - regular diet as tolerated     PPx  - VTE: None due to worsening TCP and new leukemia/possible APL  - GI: none at present  - bowels: PRN     Dispo: Admit for workup of new acute leukemia. Unclear at this time but anticipated 3-4 week LOS at minimum.      Plan of care discussed with Dr. Perkins     I spent >45 minutes in the care of this patient today, which included time necessary for review of interval events, obtaining history and physical exam, ordering medication(s)/test(s) as medically indicated, discussion with interdisciplinary/consult team(s), and documentation time. Over 50% of time was spent face-to-face and/or coordinating care.     Lamar Grover PA-C  Heme/Onc  [phone]  (pager)     Interval History   No acute events overnight, afebrile. Got a little sleep. She and family are still in shock with the recent news. No new concerns this AM. PICC line feeling a little sore, not itching. There is some scant blood unde disc. We talked through the BMBx and plan for today.       Physical Exam   Temp: 98.6  F (37  C) Temp src: Oral BP: 122/73 Pulse: 115   Resp: 16 SpO2: 93 % O2 Device: None (Room air)    Vitals:    06/16/22 1057 06/16/22 1816 06/17/22 0808   Weight: 117.5 kg (259 lb) 118.3 kg (260 lb 14.4 oz) 118 kg (260 lb 3.2 oz)     Vital Signs with Ranges  Temp:  [97.3  F (36.3  C)-99.1  F (37.3  C)] 98.6  F (37  C)  Pulse:  [] 115  Resp:  [16-18] 16  BP: (105-135)/(55-78) 122/73  SpO2:  [93 %-100 %] 93 %  I/O last 3 completed shifts:  In: 400 [P.O.:400]  Out: 600 [Urine:600]  Constitutional: Pleasant, generally well-appearing female seen reclined in bed. Moves easily into position for BMBx.   HEENT: NC/AT. Lids and lashes normal. No nasal drainage. OP pink and moist.    Respiratory: No increased work of breathing, good air exchange, on RA.   Cardiovascular: No edema.  GI: Abd soft, non-distended, non-tender.  Skin: No bruising or bleeding, normal skin color, texture. No lesion or rashes appreciated on exposed skin surfaces.   Musculoskeletal: Exremities grossly normal in appearance. Moves freely in bed. Tone is normal.   Neurologic: Awake, alert, oriented. Grossly nonfocal. Speech normal.   Neuropsychiatric: Calm, normal eye contact, normal affect  VAD: Newly placed PICC in LUE, scant bleeding under disc.           Medications     - MEDICATION INSTRUCTIONS -         acyclovir  400 mg Oral BID     allopurinol  300 mg Oral Daily     heparin lock flush  5-20 mL Intracatheter Q24H     hydroxyurea  1,000 mg Oral BID     levofloxacin  250 mg Oral Daily     levothyroxine  125 mcg Oral Daily     micafungin  50 mg Intravenous Q24H     multivitamin w/minerals  1 tablet Oral Daily     sodium  chloride (PF)  10-40 mL Intracatheter Q8H     vitamin C  1,000 mg Oral Daily     vitamin D3  50 mcg Oral Daily       Data   Results for orders placed or performed during the hospital encounter of 22 (from the past 24 hour(s))   Echocardiogram Complete   Result Value Ref Range    LVEF  55-60%     3D LVEF 58%     [facility]    [id number]  [id number]  [id number]  [id number]^SELMA^VIDHI^DAMIAN     [facility],[place]  Echocardiography Laboratory  [address] [place] [id number]     Name: YAO GEIGER  MRN: [id number]  : 1985  Study Date: 2022 03:33 PM  Age: 37 yrs  Gender: Female  Patient Location: [facility]  Reason For Study: Chemo  Ordering Physician: VIDHI HAHN  Performed By: Nany Frazier     BSA: 2.3 m2  Height: 67 in  Weight: 259 lb  HR: 88  BP: 152/100 mmHg  ______________________________________________________________________________  Procedure  Complete Portable Echo Adult. Echocardiogram with two-dimensional, color and  spectral Doppler performed.  ______________________________________________________________________________  Interpretation Summary  Global and regional left ventricular function is normal with an EF of 55-60%.  3D LVEF volumetric analysis is 58%. Global peak LV longitudinal strain is  averaged at -23.8%. This is within reported normal limits (normal <-18%).  No significant valvular abnormalities were noted.  No pericardial effusion is present.  Previous study not available for comparison.  ______________________________________________________________________________  Left Ventricle  Global and regional left ventricular function is normal with an EF of 55-60%.  3D LVEF volumetric analysis is 58%. Left ventricular size is normal. Left  ventricular wall thickness is normal. Global peak LV longitudinal strain is  averaged at -23.8%. This is within reported normal limits (normal <-18%). Left  ventricular diastolic function  is normal. Diastolic Doppler findings (E/E'  ratio and/or other parameters) suggest left ventricular filling pressures are  normal.     Right Ventricle  Right ventricular function, chamber size, wall motion, and thickness are  normal.     Atria  Both atria appear normal.     Mitral Valve  The mitral valve is normal.     Aortic Valve  Aortic valve is normal in structure and function. The aortic valve is  tricuspid.     Tricuspid Valve  The tricuspid valve is normal. Trace tricuspid insufficiency is present. The  peak velocity of the tricuspid regurgitant jet is not obtainable. Pulmonary  artery systolic pressure cannot be assessed.     Pulmonic Valve  The pulmonic valve is normal.     Vessels  The aorta root is normal. The thoracic aorta is normal. The pulmonary artery  cannot be assessed. IVC diameter and respiratory changes fall into an  intermediate range suggesting an RA pressure of 8 mmHg.     Pericardium  No pericardial effusion is present.     Compared to Previous Study  Previous study not available for comparison.  ______________________________________________________________________________  MMode/2D Measurements & Calculations     IVSd: 0.78 cm  LVIDd: 5.9 cm  LVIDs: 4.2 cm  LVPWd: 0.69 cm  FS: 29.4 %  LV mass(C)d: 163.6 grams  LV mass(C)dI: 72.5 grams/m2  Ao root diam: 3.4 cm  asc Aorta Diam: 3.2 cm  LVOT diam: 2.5 cm  LVOT area: 4.9 cm2  LA Volume (BP): 80.0 ml  LA Volume Index (BP): 35.4 ml/m2  RWT: 0.23     Doppler Measurements & Calculations  MV E max chris: 80.6 cm/sec  MV A max chris: 63.3 cm/sec  MV E/A: 1.3  MV dec slope: 315.0 cm/sec2  PA acc time: 0.11 sec  E/E' av.6  Lateral E/e': 5.3  Medial E/e': 10.0     QLAB 3DQ Advanced  Stroke Vol: 97.8 ml  10_EDV(3DQA): 168.5 ml  10_ESV(3DQA): 70.7 ml  10_EF(3DQA): 58.0 %  10_Tmsv 3-6: -5.0 msec  10_Tmsv 3-5: -1.0 msec     10_Tmsv 3-6 (%): -0.72 %  10_Tmsv 3-5 (%): -0.20  %  ______________________________________________________________________________  Report approved by: Abundio Mcneal 06/16/2022 05:02 PM         CT Chest/Abdomen/Pelvis w Contrast    Narrative    EXAMINATION: CT CHEST/ABDOMEN/PELVIS W CONTRAST, 6/16/2022 4:35 PM    TECHNIQUE:  Helical CT images from the thoracic inlet through the  symphysis pubis were obtained  with contrast. Contrast dose: 135 ml  isovue 370     COMPARISON: None.    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 Pt also with  h/o Cdiff and resultant hemorrhoid. Please eval for possible  perirectal abscess in setting of profound neutropenia    FINDINGS:    Chest:   Thyroid is unremarkable. Heart is not enlarged. No pericardial  effusion. Normal caliber thoracic aorta and main pulmonary artery.  Conventional great arch anatomy. No pathologically enlarged thoracic  lymph nodes.    Central tracheobronchial tree is patent. There is subtle diffuse  mosaic groundglass changes. Minor basal atelectasis. No focal  consolidative opacity, pleural effusion, or pneumothorax. 4 mm nodule  in the left lower lobe (series 5, image 164).    Abdomen and pelvis: Focal fatty infiltration along the falciform. No  other focal hepatic lesion. Normal gallbladder and spleen. Adrenal  glands and pancreas are unremarkable. Symmetric enhancement of the  kidneys without evidence of hydronephrosis or nephrolithiasis.  Visualized ureters and bladder appear normal. Left simple cyst versus  corpus luteal cyst. There may be a few small uterine fibroids best  visualized on sagittal imaging. No abnormal pelvic mass. Possible  small hiatal hernia. Normal caliber small and large bowel without  evidence of obstruction. Appendix is not visualized but there is no  evidence of acute appendicitis. No free air or fluid in the abdomen or  pelvis.    Abdominal aorta and its major branches are patent. No pathologically  enlarged lymph nodes in the abdomen or pelvis.    Bones and  soft tissues: No acute or suspicious osseous lesions.      Impression    IMPRESSION:   1. No acute findings. No perirectal abscess.  2. Small 4 mm nodule in the left lower lobe. Recommend attention on  follow-up.    I have personally reviewed the examination and initial interpretation  and I agree with the findings.    KIAN BEDOLLA MD         SYSTEM ID:  [id number]   CBC with platelets differential    Narrative    The following orders were created for panel order CBC with platelets differential.  Procedure                               Abnormality         Status                     ---------                               -----------         ------                     CBC with platelets and d...[[id number]]  Abnormal            Final result               Manual Differential[[id number]]          Abnormal            Final result                 Please view results for these tests on the individual orders.   HLA Complete Typing BMT Recipient    Narrative    The following orders were created for panel order HLA Complete Typing BMT Recipient.  Procedure                               Abnormality         Status                     ---------                               -----------         ------                     HLA Complete Typing BMT ...[[id number]]                      In process                   Please view results for these tests on the individual orders.   Herpes Simplex Virus 1 and 2 IgG   Result Value Ref Range    HSV Type 1 IgG Instrument Value 0.13 <0.90 Index    Herpes Simplex Virus Type 1 IgG Antibody No HSV-1 IgG antibodies detected. No HSV-1 IgG antibodies detected    HSV Type 2 IgG Instrument Value 0.16 <0.90 Index    Herpes Simplex Virus Type 2 IgG Antibody No HSV-2 IgG antibodies detected. No HSV-2 IgG antibodies detected   CMV Antibody IgG   Result Value Ref Range    CMV Faina IgG Instrument Value <0.20 <0.60 U/mL    CMV Antibody IgG No detectable antibody. No detectable antibody.    EBV Capsid  Antibody IgG   Result Value Ref Range    EBV Capsid Faina IgG Instrument Value <10.0 <18.0 U/mL    EBV Capsid Antibody IgG No detectable antibody. No detectable antibody.   HIV Antigen Antibody Combo   Result Value Ref Range    HIV Antigen Antibody Combo Nonreactive Nonreactive   Hepatitis B Surface Antibody   Result Value Ref Range    Hepatitis B Surface Antibody 31.11 <8.00 m[IU]/mL   Hepatitis B surface antigen   Result Value Ref Range    Hepatitis B Surface Antigen Nonreactive Nonreactive   Hepatitis B core antibody   Result Value Ref Range    Hepatitis B Core Antibody Total Nonreactive Nonreactive   Hepatitis C antibody   Result Value Ref Range    Hepatitis C Antibody Nonreactive Nonreactive    Narrative    Assay performance characteristics have not been established for newborns, infants, and children.   Basic metabolic panel   Result Value Ref Range    Sodium 139 133 - 144 mmol/L    Potassium 3.6 3.4 - 5.3 mmol/L    Chloride 107 94 - 109 mmol/L    Carbon Dioxide (CO2) 24 20 - 32 mmol/L    Anion Gap 8 3 - 14 mmol/L    Urea Nitrogen 5 (L) 7 - 30 mg/dL    Creatinine 0.51 (L) 0.52 - 1.04 mg/dL    Calcium 9.2 8.5 - 10.1 mg/dL    Glucose 96 70 - 99 mg/dL    GFR Estimate >90 >60 mL/min/1.73m2   Phosphorus   Result Value Ref Range    Phosphorus 3.3 2.5 - 4.5 mg/dL   INR   Result Value Ref Range    INR 1.15 0.85 - 1.15   Fibrinogen activity   Result Value Ref Range    Fibrinogen Activity 344 170 - 490 mg/dL   Partial thromboplastin time   Result Value Ref Range    aPTT 30 22 - 38 Seconds   Uric acid   Result Value Ref Range    Uric Acid 5.8 2.6 - 6.0 mg/dL   CBC with platelets and differential   Result Value Ref Range    WBC Count 44.7 (H) 4.0 - 11.0 10e3/uL    RBC Count 2.60 (L) 3.80 - 5.20 10e6/uL    Hemoglobin 9.5 (L) 11.7 - 15.7 g/dL    Hematocrit 27.6 (L) 35.0 - 47.0 %     (H) 78 - 100 fL    MCH 36.5 (H) 26.5 - 33.0 pg    MCHC 34.4 31.5 - 36.5 g/dL    RDW 14.5 10.0 - 15.0 %    Platelet Count 76 (L) 150 - 450  10e3/uL   Manual Differential   Result Value Ref Range    % Neutrophils 2 %    % Lymphocytes 7 %    % Monocytes 0 %    % Eosinophils 0 %    % Basophils 0 %    % Metamyelocytes 1 %    % Myelocytes 1 %    % Promyelocytes 1 %    % Blasts 88 %    Absolute Neutrophils 0.9 (L) 1.6 - 8.3 10e3/uL    Absolute Lymphocytes 3.1 0.8 - 5.3 10e3/uL    Absolute Monocytes 0.0 0.0 - 1.3 10e3/uL    Absolute Eosinophils 0.0 0.0 - 0.7 10e3/uL    Absolute Basophils 0.0 0.0 - 0.2 10e3/uL    Absolute Metamyelocytes 0.4 (H) <=0.0 10e3/uL    Absolute Myelocytes 0.4 (H) <=0.0 10e3/uL    Absolute Promyelocytes 0.4 (H) <=0.0 10e3/uL    Absolute Blasts 39.3 (H) <=0.0 10e3/uL    RBC Morphology Confirmed RBC Indices     Platelet Assessment  Automated Count Confirmed. Platelet morphology is normal.     Automated Count Confirmed. Platelet morphology is normal.    Polychromasia Slight (A) None Seen   Double Lumen PICC Placement    Narrative    Ana Juárez RN     6/16/2022  6:19 PM  [facility]    Double Lumen PICC Placement    Date/Time: 6/16/2022 6:17 PM  Performed by: Ana Juárez RN  Authorized by: Lamar Grover PA-C   Indications: vascular access      UNIVERSAL PROTOCOL   Site Marked: Yes  Prior Images Obtained and Reviewed:  Yes  Required items: Required blood products, implants, devices and special   equipment available    Patient identity confirmed:  Verbally with patient and arm band  NA - No sedation, light sedation, or local anesthesia  Confirmation Checklist:  Patient's identity using two indicators, relevant   allergies, procedure was appropriate and matched the consent or emergent   situation and correct equipment/implants were available  Time out: Immediately prior to the procedure a time out was called    Universal Protocol: the Joint Commission Universal Protocol was followed    Preparation: Patient was prepped and draped in usual sterile fashion        ANESTHESIA    Anesthesia: See MAR for details  Local Anesthetic:  Lidocaine 1% without epinephrine  Anesthetic Total (mL):  3      SEDATION    Patient Sedated: No        Preparation: skin prepped with ChloraPrep  Skin prep agent: skin prep agent completely dried prior to procedure  Sterile barriers: maximum sterile barriers were used: cap, mask, sterile   gown, sterile gloves, and large sterile sheet  Hand hygiene: hand hygiene performed prior to central venous catheter   insertion  Type of line used: Power PICC  Catheter type: double lumen  Lumen type: non-valved  Catheter size: 5 Fr  Brand: Bard  Lot number: [id number]  Placement method: tip navigation system, ultrasound, venipuncture and MST  Number of attempts: 1  Difficulty threading catheter: no  Successful placement: yes  Orientation: left  Catheter to Vein (%): 13  Location: brachial vein (lateral)  Arm circumference: adults 10 cm  Extremity circumference: 31  Visible catheter length: 0  Internal length: 46 cm  Total catheter length: 46  Dressing and securement: blood cleaned with CHG, blood removed,   chlorhexidine disc applied, dressing applied, securement device, site   cleansed, statlock and sterile dressing applied  Post procedure assessment: placement verified by 3CG technology  PROCEDURE   Patient Tolerance:  Patient tolerated the procedure well with no immediate   complicationsDescribe Procedure: Ok to use Picc line   EKG 12-lead, complete   Result Value Ref Range    Systolic Blood Pressure  mmHg    Diastolic Blood Pressure  mmHg    Ventricular Rate 95 BPM    Atrial Rate 95 BPM    NV Interval 146 ms    QRS Duration 84 ms     ms    QTc 467 ms    P Axis 19 degrees    R AXIS 11 degrees    T Axis 5 degrees    Interpretation ECG       Sinus rhythm  Normal ECG  When compared with ECG of 16-JUN-2022 15:03, (unconfirmed)  T wave inversion now evident in Inferior leads  Confirmed by MD LATA, MARLON ([id number]) on 6/17/2022 12:33:35 PM     Lactic Acid STAT    Result Value Ref Range    Lactic Acid 0.6 (L) 0.7 - 2.0 mmol/L   CBC with platelets differential    Narrative    The following orders were created for panel order CBC with platelets differential.  Procedure                               Abnormality         Status                     ---------                               -----------         ------                     CBC with platelets and d...[[id number]]  Abnormal            Final result               Manual Differential[[id number]]          Abnormal            Final result                 Please view results for these tests on the individual orders.   Magnesium   Result Value Ref Range    Magnesium 2.3 1.6 - 2.3 mg/dL   Basic metabolic panel   Result Value Ref Range    Sodium 140 133 - 144 mmol/L    Potassium 3.6 3.4 - 5.3 mmol/L    Chloride 108 94 - 109 mmol/L    Carbon Dioxide (CO2) 26 20 - 32 mmol/L    Anion Gap 6 3 - 14 mmol/L    Urea Nitrogen 8 7 - 30 mg/dL    Creatinine 0.48 (L) 0.52 - 1.04 mg/dL    Calcium 8.8 8.5 - 10.1 mg/dL    Glucose 102 (H) 70 - 99 mg/dL    GFR Estimate >90 >60 mL/min/1.73m2   Phosphorus   Result Value Ref Range    Phosphorus 3.8 2.5 - 4.5 mg/dL   INR   Result Value Ref Range    INR 1.22 (H) 0.85 - 1.15   Fibrinogen activity   Result Value Ref Range    Fibrinogen Activity 317 170 - 490 mg/dL   Partial thromboplastin time   Result Value Ref Range    aPTT 32 22 - 38 Seconds   Uric acid   Result Value Ref Range    Uric Acid 5.3 2.6 - 6.0 mg/dL   Hepatic panel   Result Value Ref Range    Bilirubin Total 0.9 0.2 - 1.3 mg/dL    Bilirubin Direct 0.1 0.0 - 0.2 mg/dL    Protein Total 6.6 (L) 6.8 - 8.8 g/dL    Albumin 3.4 3.4 - 5.0 g/dL    Alkaline Phosphatase 55 40 - 150 U/L    AST 13 0 - 45 U/L    ALT 21 0 - 50 U/L   CBC with platelets and differential   Result Value Ref Range    WBC Count 45.1 (H) 4.0 - 11.0 10e3/uL    RBC Count 2.34 (L) 3.80 - 5.20 10e6/uL    Hemoglobin 8.4 (L) 11.7 - 15.7 g/dL    Hematocrit 25.3 (L) 35.0 - 47.0 %    MCV  108 (H) 78 - 100 fL    MCH 35.9 (H) 26.5 - 33.0 pg    MCHC 33.2 31.5 - 36.5 g/dL    RDW 14.6 10.0 - 15.0 %    Platelet Count 55 (L) 150 - 450 10e3/uL   Manual Differential   Result Value Ref Range    % Neutrophils 0 %    % Lymphocytes 6 %    % Monocytes 0 %    % Eosinophils 0 %    % Basophils 0 %    % Blasts 94 %    NRBCs per 100 WBC 3 (H) <=0 %    Absolute Neutrophils 0.0 (LL) 1.6 - 8.3 10e3/uL    Absolute Lymphocytes 2.7 0.8 - 5.3 10e3/uL    Absolute Monocytes 0.0 0.0 - 1.3 10e3/uL    Absolute Eosinophils 0.0 0.0 - 0.7 10e3/uL    Absolute Basophils 0.0 0.0 - 0.2 10e3/uL    Absolute Blasts 42.4 (H) <=0.0 10e3/uL    Absolute NRBCs 1.4 (H) <=0.0 10e3/uL    RBC Morphology Confirmed RBC Indices     Platelet Assessment  Automated Count Confirmed. Platelet morphology is normal.     Automated Count Confirmed. Platelet morphology is normal.    Polychromasia Slight (A) None Seen    Teardrop Cells Slight (A) None Seen   EKG 12-lead, tracing only   Result Value Ref Range    Systolic Blood Pressure  mmHg    Diastolic Blood Pressure  mmHg    Ventricular Rate 80 BPM    Atrial Rate 80 BPM    NY Interval 154 ms    QRS Duration 86 ms     ms    QTc 454 ms    P Axis 41 degrees    R AXIS 43 degrees    T Axis 23 degrees    Interpretation ECG       Sinus rhythm  Normal ECG  When compared with ECG of 16-JUN-2022 19:06, (unconfirmed)  Nonspecific T wave abnormality no longer evident in Lateral leads  Confirmed by MD LATA, MARLON ([id number]) on 6/17/2022 12:19:09 PM

## 2022-06-17 NOTE — PLAN OF CARE
Goal Outcome Evaluation:  0342-1421    VSS on RA. Denies pain, nausea, SOB.  No acute events. BMBx at 930am. Needs UA/UC.

## 2022-06-17 NOTE — PHARMACY-ADMISSION MEDICATION HISTORY
Admission Medication History Completed by Pharmacy    See [facility] Admission Navigator for allergy information, preferred outpatient pharmacy, prior to admission medications and immunization status.     Medication History Sources:     Patient    Surescript    Changes made to PTA medication list (reason):    Added: None    Deleted: None    Changed: added direction for:  Bacillus Coagulans-Inulin (PROBIOTIC) 1-250 BILLION-MG CAPS Take 1 capsule by mouth daily   levothyroxine (SYNTHROID/LEVOTHROID) 125 MCG tablet Take 125 mcg by mouth daily   magnesium 250 MG tablet Take 250 mg by mouth daily   Multiple Vitamins-Minerals (WOMENS MULTIVITAMIN) TABS Take 1 tablet by mouth daily   vitamin C (ASCORBIC ACID) 1000 MG TABS Take 2,000 mg by mouth daily   vitamin D3 (CHOLECALCIFEROL) 50 mcg (2000 units) tablet Take 2 tablets by mouth daily   zinc gluconate 50 MG tablet Take 50 mg by mouth daily     Additional Information:    Patient was a good historian.    Per patient, she takes Quercetin 50 mg tabs. I am unsure if Quercetin comes in this strength. Most commonly availables are 250 mg and 500 mg strength.     Prior to Admission medications    Medication Sig Last Dose Taking? Auth Provider Long Term End Date   Bacillus Coagulans-Inulin (PROBIOTIC) 1-250 BILLION-MG CAPS Take 1 capsule by mouth daily 6/16/2022 at 0800 Yes Reported, Patient     levothyroxine (SYNTHROID/LEVOTHROID) 125 MCG tablet Take 125 mcg by mouth daily 6/16/2022 at 0800 Yes Reported, Patient Yes    magnesium 250 MG tablet Take 250 mg by mouth daily 6/16/2022 at 0800 Yes Reported, Patient     Multiple Vitamins-Minerals (WOMENS MULTIVITAMIN) TABS Take 1 tablet by mouth daily 6/16/2022 at 0800 Yes Reported, Patient     Quercetin 50 MG TABS Take 50 mg by mouth daily 6/16/2022 at 0800 Yes Unknown, Entered By History     vitamin C (ASCORBIC ACID) 1000 MG TABS Take 2,000 mg by mouth daily 6/16/2022 at 0800 Yes Reported, Patient     vitamin D3 (CHOLECALCIFEROL) 50 mcg  (2000 units) tablet Take 2 tablets by mouth daily 6/16/2022 at 0800 Yes Reported, Patient     zinc gluconate 50 MG tablet Take 50 mg by mouth daily 6/16/2022 at 0800 Yes Reported, Patient         Date completed: 06/17/22    Medication history completed by: Arash CONLEY Pharmacy Student

## 2022-06-18 LAB
ALBUMIN SERPL-MCNC: 3.3 G/DL (ref 3.4–5)
ALP SERPL-CCNC: 52 U/L (ref 40–150)
ALT SERPL W P-5'-P-CCNC: 20 U/L (ref 0–50)
ANION GAP SERPL CALCULATED.3IONS-SCNC: 6 MMOL/L (ref 3–14)
ANION GAP SERPL CALCULATED.3IONS-SCNC: 7 MMOL/L (ref 3–14)
APTT PPP: 32 SECONDS (ref 22–38)
APTT PPP: 34 SECONDS (ref 22–38)
AST SERPL W P-5'-P-CCNC: 18 U/L (ref 0–45)
BACTERIA UR CULT: NO GROWTH
BASOPHILS # BLD MANUAL: 0 10E3/UL (ref 0–0.2)
BASOPHILS # BLD MANUAL: 0 10E3/UL (ref 0–0.2)
BASOPHILS NFR BLD MANUAL: 0 %
BASOPHILS NFR BLD MANUAL: 0 %
BILIRUB DIRECT SERPL-MCNC: 0.1 MG/DL (ref 0–0.2)
BILIRUB SERPL-MCNC: 1.2 MG/DL (ref 0.2–1.3)
BLASTS # BLD MANUAL: 50.3 10E3/UL
BLASTS # BLD MANUAL: 60.5 10E3/UL
BLASTS NFR BLD MANUAL: 94 %
BLASTS NFR BLD MANUAL: 97 %
BUN SERPL-MCNC: 8 MG/DL (ref 7–30)
BUN SERPL-MCNC: 9 MG/DL (ref 7–30)
CALCIUM SERPL-MCNC: 9.1 MG/DL (ref 8.5–10.1)
CALCIUM SERPL-MCNC: 9.3 MG/DL (ref 8.5–10.1)
CHLORIDE BLD-SCNC: 108 MMOL/L (ref 94–109)
CHLORIDE BLD-SCNC: 110 MMOL/L (ref 94–109)
CO2 SERPL-SCNC: 25 MMOL/L (ref 20–32)
CO2 SERPL-SCNC: 25 MMOL/L (ref 20–32)
CREAT SERPL-MCNC: 0.53 MG/DL (ref 0.52–1.04)
CREAT SERPL-MCNC: 0.54 MG/DL (ref 0.52–1.04)
DACRYOCYTES BLD QL SMEAR: ABNORMAL
DACRYOCYTES BLD QL SMEAR: SLIGHT
EOSINOPHIL # BLD MANUAL: 0 10E3/UL (ref 0–0.7)
EOSINOPHIL # BLD MANUAL: 0 10E3/UL (ref 0–0.7)
EOSINOPHIL NFR BLD MANUAL: 0 %
EOSINOPHIL NFR BLD MANUAL: 0 %
ERYTHROCYTE [DISTWIDTH] IN BLOOD BY AUTOMATED COUNT: 14.6 % (ref 10–15)
ERYTHROCYTE [DISTWIDTH] IN BLOOD BY AUTOMATED COUNT: 14.6 % (ref 10–15)
FIBRINOGEN PPP-MCNC: 287 MG/DL (ref 170–490)
FIBRINOGEN PPP-MCNC: 302 MG/DL (ref 170–490)
GFR SERPL CREATININE-BSD FRML MDRD: >90 ML/MIN/1.73M2
GFR SERPL CREATININE-BSD FRML MDRD: >90 ML/MIN/1.73M2
GLUCOSE BLD-MCNC: 110 MG/DL (ref 70–99)
GLUCOSE BLD-MCNC: 98 MG/DL (ref 70–99)
HCT VFR BLD AUTO: 24.1 % (ref 35–47)
HCT VFR BLD AUTO: 24.8 % (ref 35–47)
HGB BLD-MCNC: 8.1 G/DL (ref 11.7–15.7)
HGB BLD-MCNC: 8.2 G/DL (ref 11.7–15.7)
INR PPP: 1.23 (ref 0.85–1.15)
INR PPP: 1.33 (ref 0.85–1.15)
LDH SERPL L TO P-CCNC: 292 U/L (ref 81–234)
LYMPHOCYTES # BLD MANUAL: 1.6 10E3/UL (ref 0.8–5.3)
LYMPHOCYTES # BLD MANUAL: 3.9 10E3/UL (ref 0.8–5.3)
LYMPHOCYTES NFR BLD MANUAL: 3 %
LYMPHOCYTES NFR BLD MANUAL: 6 %
MAGNESIUM SERPL-MCNC: 2.3 MG/DL (ref 1.6–2.3)
MCH RBC QN AUTO: 36 PG (ref 26.5–33)
MCH RBC QN AUTO: 36 PG (ref 26.5–33)
MCHC RBC AUTO-ENTMCNC: 33.1 G/DL (ref 31.5–36.5)
MCHC RBC AUTO-ENTMCNC: 33.6 G/DL (ref 31.5–36.5)
MCV RBC AUTO: 107 FL (ref 78–100)
MCV RBC AUTO: 109 FL (ref 78–100)
MONOCYTES # BLD MANUAL: 0 10E3/UL (ref 0–1.3)
MONOCYTES # BLD MANUAL: 0 10E3/UL (ref 0–1.3)
MONOCYTES NFR BLD MANUAL: 0 %
MONOCYTES NFR BLD MANUAL: 0 %
NEUTROPHILS # BLD MANUAL: 0 10E3/UL (ref 1.6–8.3)
NEUTROPHILS # BLD MANUAL: 0 10E3/UL (ref 1.6–8.3)
NEUTROPHILS NFR BLD MANUAL: 0 %
NEUTROPHILS NFR BLD MANUAL: 0 %
PHOSPHATE SERPL-MCNC: 3.3 MG/DL (ref 2.5–4.5)
PHOSPHATE SERPL-MCNC: 3.4 MG/DL (ref 2.5–4.5)
PLAT MORPH BLD: ABNORMAL
PLAT MORPH BLD: ABNORMAL
PLATELET # BLD AUTO: 41 10E3/UL (ref 150–450)
PLATELET # BLD AUTO: 47 10E3/UL (ref 150–450)
POLYCHROMASIA BLD QL SMEAR: SLIGHT
POTASSIUM BLD-SCNC: 3.6 MMOL/L (ref 3.4–5.3)
POTASSIUM BLD-SCNC: 3.6 MMOL/L (ref 3.4–5.3)
PROT SERPL-MCNC: 6.7 G/DL (ref 6.8–8.8)
RBC # BLD AUTO: 2.25 10E6/UL (ref 3.8–5.2)
RBC # BLD AUTO: 2.28 10E6/UL (ref 3.8–5.2)
RBC MORPH BLD: ABNORMAL
RBC MORPH BLD: ABNORMAL
SODIUM SERPL-SCNC: 140 MMOL/L (ref 133–144)
SODIUM SERPL-SCNC: 141 MMOL/L (ref 133–144)
URATE SERPL-MCNC: 3.8 MG/DL (ref 2.6–6)
URATE SERPL-MCNC: 4.3 MG/DL (ref 2.6–6)
WBC # BLD AUTO: 51.9 10E3/UL (ref 4–11)
WBC # BLD AUTO: 64.4 10E3/UL (ref 4–11)

## 2022-06-18 PROCEDURE — 999N000007 HC SITE CHECK

## 2022-06-18 PROCEDURE — 85027 COMPLETE CBC AUTOMATED: CPT | Performed by: PHYSICIAN ASSISTANT

## 2022-06-18 PROCEDURE — 84550 ASSAY OF BLOOD/URIC ACID: CPT | Performed by: PHYSICIAN ASSISTANT

## 2022-06-18 PROCEDURE — 36592 COLLECT BLOOD FROM PICC: CPT | Performed by: PHYSICIAN ASSISTANT

## 2022-06-18 PROCEDURE — 83735 ASSAY OF MAGNESIUM: CPT | Performed by: PHYSICIAN ASSISTANT

## 2022-06-18 PROCEDURE — 120N000002 HC R&B MED SURG/OB UMMC

## 2022-06-18 PROCEDURE — 258N000003 HC RX IP 258 OP 636: Performed by: PHYSICIAN ASSISTANT

## 2022-06-18 PROCEDURE — 80053 COMPREHEN METABOLIC PANEL: CPT | Performed by: PHYSICIAN ASSISTANT

## 2022-06-18 PROCEDURE — 99233 SBSQ HOSP IP/OBS HIGH 50: CPT | Performed by: INTERNAL MEDICINE

## 2022-06-18 PROCEDURE — 85384 FIBRINOGEN ACTIVITY: CPT | Performed by: PHYSICIAN ASSISTANT

## 2022-06-18 PROCEDURE — 250N000011 HC RX IP 250 OP 636: Performed by: PHYSICIAN ASSISTANT

## 2022-06-18 PROCEDURE — 85730 THROMBOPLASTIN TIME PARTIAL: CPT | Performed by: PHYSICIAN ASSISTANT

## 2022-06-18 PROCEDURE — 84100 ASSAY OF PHOSPHORUS: CPT | Performed by: PHYSICIAN ASSISTANT

## 2022-06-18 PROCEDURE — 83615 LACTATE (LD) (LDH) ENZYME: CPT | Performed by: PHYSICIAN ASSISTANT

## 2022-06-18 PROCEDURE — 82248 BILIRUBIN DIRECT: CPT | Performed by: PHYSICIAN ASSISTANT

## 2022-06-18 PROCEDURE — 85007 BL SMEAR W/DIFF WBC COUNT: CPT | Performed by: PHYSICIAN ASSISTANT

## 2022-06-18 PROCEDURE — 250N000013 HC RX MED GY IP 250 OP 250 PS 637: Performed by: PHYSICIAN ASSISTANT

## 2022-06-18 PROCEDURE — 85610 PROTHROMBIN TIME: CPT | Performed by: PHYSICIAN ASSISTANT

## 2022-06-18 RX ADMIN — Medication 1 TABLET: at 12:20

## 2022-06-18 RX ADMIN — LEVOFLOXACIN 250 MG: 250 TABLET, FILM COATED ORAL at 08:47

## 2022-06-18 RX ADMIN — OXYCODONE HYDROCHLORIDE AND ACETAMINOPHEN 1000 MG: 500 TABLET ORAL at 08:47

## 2022-06-18 RX ADMIN — ACYCLOVIR 400 MG: 400 TABLET ORAL at 08:47

## 2022-06-18 RX ADMIN — ACETAMINOPHEN 650 MG: 325 TABLET, FILM COATED ORAL at 22:36

## 2022-06-18 RX ADMIN — SODIUM CHLORIDE, PRESERVATIVE FREE 5 ML: 5 INJECTION INTRAVENOUS at 18:42

## 2022-06-18 RX ADMIN — ACYCLOVIR 400 MG: 400 TABLET ORAL at 20:36

## 2022-06-18 RX ADMIN — MICAFUNGIN 50 MG: 10 INJECTION, POWDER, LYOPHILIZED, FOR SOLUTION INTRAVENOUS at 15:59

## 2022-06-18 RX ADMIN — Medication 50 MCG: at 08:46

## 2022-06-18 RX ADMIN — SODIUM CHLORIDE, PRESERVATIVE FREE 5 ML: 5 INJECTION INTRAVENOUS at 05:44

## 2022-06-18 RX ADMIN — ALLOPURINOL 300 MG: 300 TABLET ORAL at 08:47

## 2022-06-18 RX ADMIN — HYDROXYUREA 1000 MG: 500 CAPSULE ORAL at 20:36

## 2022-06-18 RX ADMIN — SODIUM CHLORIDE, PRESERVATIVE FREE 5 ML: 5 INJECTION INTRAVENOUS at 15:59

## 2022-06-18 RX ADMIN — HYDROXYUREA 1000 MG: 500 CAPSULE ORAL at 08:47

## 2022-06-18 ASSESSMENT — ACTIVITIES OF DAILY LIVING (ADL)
ADLS_ACUITY_SCORE: 18

## 2022-06-18 NOTE — PROVIDER NOTIFICATION
"Web-based messaging to Dr. Cano    \"7D - 1899 - K.C.  WBC critical level of 58.7. Sepsis triggered and lactic to be drawn. About to give hydrea. Any other interventions?   Cindy, [id number]\"    Outcome: no further interventions needed.  "

## 2022-06-18 NOTE — PROGRESS NOTES
"[facility]    Hematology / Oncology Progress Note    Date of Admission: 6/16/2022  Date of Service (when I saw the patient): 06/18/2022     Assessment & Plan   Veda Marinelli is a 37 year old female with PMH significant for h/o COVID19 infection (10/2021), C.diff, and hypothyroidism who presents with leukocytosis and circulating blasts, concerning for acute leukemia.    Today:  - awaiting BMBx results, anticipate results and treatment planning discussions early this coming week  - continue Hydrea 1g BID, TLS/DIC labs BID  - continue ppx antiinfectives and best supportive cares  - avoid PTA probiotic. However, with h/o previously treated C.diff, will recheck for colonization status. 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 On 3/8/2022, white blood cell count 2.5 (ANC 1.0), Hgb 13.6 with , platelets normal.  On 5/15/2022, she was noted to have further decreasd white blood cell count of 1.6 (ANC 0.28) with hemoglobin 11. platelets were 133. She was ultimately referred to Hematology (Dr. Bob), who she saw on 6/7/22. Further blood workup was recommended and labs done 6/14. CBC revealed WBC 25.3 (ANC 0.8), Hgb 10.1 (), Plt 90K. On the differential and morphology review, 81% blasts were seen concerning for acute leukemia. No kyle rods noted on PB blood smear pathologist review. She was advised to present to [facility] for further evaluation and management.   - CBC on admit demonstrates WBC 41K (ANC 0.0) with 86% \"other\" cells, Hgb 9.5, Plt 72K. Per verbal report from Heme Path fellow there was concern for Kyle rods. Coags WNL, no e/o TLS.   - sent PB FISH, PML-ANNELIESE (negative), flow cytometry.   - was started on ATRA on 6/16 PM prior to result of " PML-ANNELIESE. Given that this test is now negative, stopped ATRA after 6/17 AM dose  - start Hydrea 1g BID (x 6/17) while awaiting BMBx results to develop trt plan  - BMBx done 6/17, pending  - CBC w/ diff, TLS/DIC labs BID  - HLA typing sent, will send message to BMT coordinators  - continue Allopurinol. Ok to hold off on IVFs at this time; pt drinking PO fluids well and no lysis. If develops e/o lysis on labs, will start NS @ 100 ml/hr.  - CT CAP without evidence for infectious source      # Pancytopenia  2/2 underlying malignancy.  - transfuse to maintain Hgb >7, Plt >10K   - conditional orders in place for FFP if INR >1.8, cryo if fibrinogen <100    - blood consent signed on 6/17    # Mild coagulopathy  INR gradually uptrended 1.1-->1.3. No bleeding. Fibrinogen WNL.   - monitor with DIC labs BID as above     # LMP  Approximately 2 weeks PTA, bleeding was heavier than normal with clots. Usually moderate lasting ~3 days.      # ID PPx  Denies fevers. Given profound neutropenia, start prophy anti-infectives  - ACV 400mg BID  - Levaquin 250mg daily  - Micafungin 50mg IV daily     # H/o C.diff  # Hemorrhoid  Complicated her C.diff course and has been waxing/waning since. Sometimes painful. She primarily has used witch hazel pads when symptomatic. Stools have normalized.   - CT A/P negative for perirectal abscess.  - TUCKS PRN  - avoid PTA probiotic at this time. Recheck C.diff status at this time     # h/o COVID-19  Pt is not vaccinated nor interested in being vaccinated. She was admitted from 10/1/2021 - 10/13/2021 for acute hypoxic respiratory failure from COVID-19 pneumonia.  Required IMC, high flow and intermittent CPAP. She was treated with dexamethasone, remdesivir, and baricitinib in addition to azithromycin and ceftriaxone. Recovered symptomatically from this.     # Hypothyroidism  - continue PTA synthroid     FEN  - no current IVFs  - monitor lyte, hold off on automatic repletion scales while assessing for  TLS  - regular diet as tolerated     PPx  - VTE: None due to worsening TCP and new leukemia/possible APL  - GI: none at present  - bowels: PRN     Dispo: Admit for workup of new acute leukemia. Unclear at this time but anticipated 3-4 week LOS at minimum.      Plan of care discussed with Dr. Perkins     I spent >35 minutes in the care of this patient today, which included time necessary for review of interval events, obtaining history and physical exam, ordering medication(s)/test(s) as medically indicated, discussion with interdisciplinary/consult team(s), and documentation time. Over 50% of time was spent face-to-face and/or coordinating care.     Lamar Grover PA-C  Heme/Onc  [phone] (pager)     Interval History   No acute events overnight, afebrile. Doing ok, sitting up in recliner chair today. Has been walking in halls frequently. Yesterday after back from a walk she did notice a more pronounced heartbeat and saw on her Apple watch sarah that she had a single PVC. Nothing further but wanted us to be aware. Not describing SOB. Had mild HA yesterday, relieved with Tylenol. Denies upper respiratory symptoms. Denies mouth pain/sores. Denies nausea or abdominal upset. Denies change in bowels. Hemorrhoid not bothering her at this time. BMBx sore but management. PICC line ok.         Physical Exam   Temp: 98.3  F (36.8  C) Temp src: Oral BP: 103/68 Pulse: 81   Resp: 18 SpO2: 98 % O2 Device: None (Room air)    Vitals:    06/16/22 1816 06/17/22 0808 06/18/22 0744   Weight: 118.3 kg (260 lb 14.4 oz) 118 kg (260 lb 3.2 oz) 118.8 kg (261 lb 14.4 oz)     Vital Signs with Ranges  Temp:  [98.1  F (36.7  C)-99.2  F (37.3  C)] 98.3  F (36.8  C)  Pulse:  [] 81  Resp:  [16-20] 18  BP: (103-129)/(60-78) 103/68  SpO2:  [93 %-98 %] 98 %  I/O last 3 completed shifts:  In: 1700 [P.O.:1700]  Out: 1600 [Urine:1600]  Constitutional: Pleasant, generally well-appearing female seen sitting up in chair.   HEENT: NC/AT.  Lids and lashes normal. Sclerae anicteric. No nasal drainage. OP pink and moist. No lesions or thrush.   Respiratory: No increased work of breathing, good air exchange, on RA. Lungs CTA b/l.   Cardiovascular: RRR. No edema.  GI: Normal BS. Abd soft, non-distended, non-tender.  Skin: No bruising or bleeding, normal skin color, texture. No lesion or rashes appreciated on exposed skin surfaces.   Musculoskeletal: Exremities grossly normal in appearance. Tone is normal. LPIC BMBx site with nickel sized drainage on the guaze dressing that is old appearing and not outside drawn borders.  Neurologic: Awake, alert, oriented. Grossly nonfocal. Speech normal.   Neuropsychiatric: Calm, normal eye contact, normal affect  VAD: Newly placed PICC in E, no further bleeding under disk since dressing was changed.         Medications     - MEDICATION INSTRUCTIONS -         acyclovir  400 mg Oral BID     allopurinol  300 mg Oral Daily     heparin lock flush  5-20 mL Intracatheter Q24H     hydroxyurea  1,000 mg Oral BID     levofloxacin  250 mg Oral Daily     levothyroxine  125 mcg Oral Daily     micafungin  50 mg Intravenous Q24H     multivitamin w/minerals  1 tablet Oral Daily     sodium chloride (PF)  10-40 mL Intracatheter Q8H     vitamin C  1,000 mg Oral Daily     vitamin D3  50 mcg Oral Daily       Data   Results for orders placed or performed during the hospital encounter of 06/16/22 (from the past 24 hour(s))   CBC with platelets differential    Narrative    The following orders were created for panel order CBC with platelets differential.  Procedure                               Abnormality         Status                     ---------                               -----------         ------                     CBC with platelets and d...[[id number]]  Abnormal            Final result               Manual Differential[[id number]]          Abnormal            Final result                 Please view results for these tests on the  "individual orders.   Basic metabolic panel   Result Value Ref Range    Sodium 139 133 - 144 mmol/L    Potassium 3.8 3.4 - 5.3 mmol/L    Chloride 108 94 - 109 mmol/L    Carbon Dioxide (CO2) 24 20 - 32 mmol/L    Anion Gap 7 3 - 14 mmol/L    Urea Nitrogen 11 7 - 30 mg/dL    Creatinine 0.57 0.52 - 1.04 mg/dL    Calcium 9.1 8.5 - 10.1 mg/dL    Glucose 117 (H) 70 - 99 mg/dL    GFR Estimate >90 >60 mL/min/1.73m2   Phosphorus   Result Value Ref Range    Phosphorus 3.3 2.5 - 4.5 mg/dL   INR   Result Value Ref Range    INR 1.16 (H) 0.85 - 1.15   Fibrinogen activity   Result Value Ref Range    Fibrinogen Activity 316 170 - 490 mg/dL   Partial thromboplastin time   Result Value Ref Range    aPTT 31 22 - 38 Seconds   Uric acid   Result Value Ref Range    Uric Acid 4.2 2.6 - 6.0 mg/dL   CBC with platelets and differential   Result Value Ref Range    WBC Count 58.7 (HH) 4.0 - 11.0 10e3/uL    RBC Count 2.38 (L) 3.80 - 5.20 10e6/uL    Hemoglobin 8.6 (L) 11.7 - 15.7 g/dL    Hematocrit 25.7 (L) 35.0 - 47.0 %     (H) 78 - 100 fL    MCH 36.1 (H) 26.5 - 33.0 pg    MCHC 33.5 31.5 - 36.5 g/dL    RDW 14.6 10.0 - 15.0 %    Platelet Count 63 (L) 150 - 450 10e3/uL    Narrative    Previously reported component [ NRBCs ] is no longer reported.\"  Previously reported component [ NRBCs Absolute ] is no longer reported.\"   Manual Differential   Result Value Ref Range    % Neutrophils 2 %    % Lymphocytes 1 %    % Monocytes 0 %    % Eosinophils 0 %    % Basophils 0 %    % Blasts 97 %    NRBCs per 100 WBC 1 (H) <=0 %    Absolute Neutrophils 1.2 (L) 1.6 - 8.3 10e3/uL    Absolute Lymphocytes 0.6 (L) 0.8 - 5.3 10e3/uL    Absolute Monocytes 0.0 0.0 - 1.3 10e3/uL    Absolute Eosinophils 0.0 0.0 - 0.7 10e3/uL    Absolute Basophils 0.0 0.0 - 0.2 10e3/uL    Absolute Blasts 56.9 (H) <=0.0 10e3/uL    Absolute NRBCs 0.6 (H) <=0.0 10e3/uL    RBC Morphology Confirmed RBC Indices     Platelet Assessment  Automated Count Confirmed. Platelet morphology is " normal.     Automated Count Confirmed. Platelet morphology is normal.   Lactic Acid STAT   Result Value Ref Range    Lactic Acid 0.9 0.7 - 2.0 mmol/L   CBC with platelets differential    Narrative    The following orders were created for panel order CBC with platelets differential.  Procedure                               Abnormality         Status                     ---------                               -----------         ------                     CBC with platelets and d...[[id number]]  Abnormal            Final result               Manual Differential[[id number]]          Abnormal            Final result                 Please view results for these tests on the individual orders.   Magnesium   Result Value Ref Range    Magnesium 2.3 1.6 - 2.3 mg/dL   Basic metabolic panel   Result Value Ref Range    Sodium 141 133 - 144 mmol/L    Potassium 3.6 3.4 - 5.3 mmol/L    Chloride 110 (H) 94 - 109 mmol/L    Carbon Dioxide (CO2) 25 20 - 32 mmol/L    Anion Gap 6 3 - 14 mmol/L    Urea Nitrogen 9 7 - 30 mg/dL    Creatinine 0.54 0.52 - 1.04 mg/dL    Calcium 9.1 8.5 - 10.1 mg/dL    Glucose 110 (H) 70 - 99 mg/dL    GFR Estimate >90 >60 mL/min/1.73m2   Phosphorus   Result Value Ref Range    Phosphorus 3.4 2.5 - 4.5 mg/dL   INR   Result Value Ref Range    INR 1.33 (H) 0.85 - 1.15   Fibrinogen activity   Result Value Ref Range    Fibrinogen Activity 302 170 - 490 mg/dL   Partial thromboplastin time   Result Value Ref Range    aPTT 34 22 - 38 Seconds   Uric acid   Result Value Ref Range    Uric Acid 4.3 2.6 - 6.0 mg/dL   Hepatic panel   Result Value Ref Range    Bilirubin Total 1.2 0.2 - 1.3 mg/dL    Bilirubin Direct 0.1 0.0 - 0.2 mg/dL    Protein Total 6.7 (L) 6.8 - 8.8 g/dL    Albumin 3.3 (L) 3.4 - 5.0 g/dL    Alkaline Phosphatase 52 40 - 150 U/L    AST 18 0 - 45 U/L    ALT 20 0 - 50 U/L   CBC with platelets and differential   Result Value Ref Range    WBC Count 51.9 (HH) 4.0 - 11.0 10e3/uL    RBC Count 2.28 (L) 3.80 - 5.20  "10e6/uL    Hemoglobin 8.2 (L) 11.7 - 15.7 g/dL    Hematocrit 24.8 (L) 35.0 - 47.0 %     (H) 78 - 100 fL    MCH 36.0 (H) 26.5 - 33.0 pg    MCHC 33.1 31.5 - 36.5 g/dL    RDW 14.6 10.0 - 15.0 %    Platelet Count 47 (LL) 150 - 450 10e3/uL    Narrative    Previously reported component [ NRBCs ] is no longer reported.\"  Previously reported component [ NRBCs Absolute ] is no longer reported.\"   Lactate Dehydrogenase   Result Value Ref Range    Lactate Dehydrogenase 292 (H) 81 - 234 U/L   Manual Differential   Result Value Ref Range    % Neutrophils 0 %    % Lymphocytes 3 %    % Monocytes 0 %    % Eosinophils 0 %    % Basophils 0 %    % Blasts 97 %    Absolute Neutrophils 0.0 (LL) 1.6 - 8.3 10e3/uL    Absolute Lymphocytes 1.6 0.8 - 5.3 10e3/uL    Absolute Monocytes 0.0 0.0 - 1.3 10e3/uL    Absolute Eosinophils 0.0 0.0 - 0.7 10e3/uL    Absolute Basophils 0.0 0.0 - 0.2 10e3/uL    Absolute Blasts 50.3 (H) <=0.0 10e3/uL    RBC Morphology Confirmed RBC Indices     Platelet Assessment  Automated Count Confirmed. Platelet morphology is normal.     Automated Count Confirmed. Platelet morphology is normal.    Teardrop Cells Slight (A) None Seen       "

## 2022-06-18 NOTE — PLAN OF CARE
"[id number]    /67 (BP Location: Right arm)   Pulse 84   Temp 99.3  F (37.4  C) (Oral)   Resp 18   Ht 1.702 m (5' 7\")   Wt 118.8 kg (261 lb 14.4 oz)   SpO2 98%   BMI 41.02 kg/m      VSS. Afebrile. Denies pain, nausea and SOB. Ambulating the halls frequently. TLS/DIC labs BID. Had 1 BM in the AM, none since. Needs a stool sample sent. Salt and soda provided and encouraged. Able to make needs known. Awaiting BMB results. Continue with POC.     "

## 2022-06-18 NOTE — PLAN OF CARE
Time: 4863-5990    Tmax 99.2 with HR in low 100s, OVSS on RA. Triggered sepsis, lactic of 0.9. WBC level of 58.7. A/Ox4 and able to make needs known. Endorses headache and bmbx site soreness, tylenol given with relief. Drainage outlined and unchanged at end of shift. 3 soft stools on 6/17. Adequate UO, writing amounts on whiteboard. UAL. PICC heparin locked. Would like to discuss adding probiotic to medication regimen with primary team in AM.

## 2022-06-19 LAB
ALBUMIN SERPL-MCNC: 3.2 G/DL (ref 3.4–5)
ALP SERPL-CCNC: 50 U/L (ref 40–150)
ALT SERPL W P-5'-P-CCNC: 19 U/L (ref 0–50)
ANION GAP SERPL CALCULATED.3IONS-SCNC: 7 MMOL/L (ref 3–14)
ANION GAP SERPL CALCULATED.3IONS-SCNC: 7 MMOL/L (ref 3–14)
APTT PPP: 34 SECONDS (ref 22–38)
APTT PPP: 35 SECONDS (ref 22–38)
AST SERPL W P-5'-P-CCNC: 19 U/L (ref 0–45)
BASOPHILS # BLD MANUAL: 0 10E3/UL (ref 0–0.2)
BASOPHILS # BLD MANUAL: 0 10E3/UL (ref 0–0.2)
BASOPHILS NFR BLD MANUAL: 0 %
BASOPHILS NFR BLD MANUAL: 0 %
BILIRUB DIRECT SERPL-MCNC: 0.2 MG/DL (ref 0–0.2)
BILIRUB SERPL-MCNC: 0.8 MG/DL (ref 0.2–1.3)
BLASTS # BLD MANUAL: 53.6 10E3/UL
BLASTS # BLD MANUAL: 68.3 10E3/UL
BLASTS NFR BLD MANUAL: 90 %
BLASTS NFR BLD MANUAL: 95 %
BUN SERPL-MCNC: 11 MG/DL (ref 7–30)
BUN SERPL-MCNC: 7 MG/DL (ref 7–30)
C DIFF TOX B STL QL: NEGATIVE
CALCIUM SERPL-MCNC: 8.8 MG/DL (ref 8.5–10.1)
CALCIUM SERPL-MCNC: 9.2 MG/DL (ref 8.5–10.1)
CHLORIDE BLD-SCNC: 109 MMOL/L (ref 94–109)
CHLORIDE BLD-SCNC: 111 MMOL/L (ref 94–109)
CO2 SERPL-SCNC: 24 MMOL/L (ref 20–32)
CO2 SERPL-SCNC: 25 MMOL/L (ref 20–32)
CREAT SERPL-MCNC: 0.54 MG/DL (ref 0.52–1.04)
CREAT SERPL-MCNC: 0.55 MG/DL (ref 0.52–1.04)
DACRYOCYTES BLD QL SMEAR: SLIGHT
EOSINOPHIL # BLD MANUAL: 0 10E3/UL (ref 0–0.7)
EOSINOPHIL # BLD MANUAL: 0 10E3/UL (ref 0–0.7)
EOSINOPHIL NFR BLD MANUAL: 0 %
EOSINOPHIL NFR BLD MANUAL: 0 %
ERYTHROCYTE [DISTWIDTH] IN BLOOD BY AUTOMATED COUNT: 14.8 % (ref 10–15)
ERYTHROCYTE [DISTWIDTH] IN BLOOD BY AUTOMATED COUNT: 14.9 % (ref 10–15)
FIBRINOGEN PPP-MCNC: 274 MG/DL (ref 170–490)
FIBRINOGEN PPP-MCNC: 333 MG/DL (ref 170–490)
GFR SERPL CREATININE-BSD FRML MDRD: >90 ML/MIN/1.73M2
GFR SERPL CREATININE-BSD FRML MDRD: >90 ML/MIN/1.73M2
GLUCOSE BLD-MCNC: 108 MG/DL (ref 70–99)
GLUCOSE BLD-MCNC: 141 MG/DL (ref 70–99)
HCT VFR BLD AUTO: 24.1 % (ref 35–47)
HCT VFR BLD AUTO: 25.3 % (ref 35–47)
HGB BLD-MCNC: 7.9 G/DL (ref 11.7–15.7)
HGB BLD-MCNC: 8.4 G/DL (ref 11.7–15.7)
INR PPP: 1.22 (ref 0.85–1.15)
INR PPP: 1.22 (ref 0.85–1.15)
LACTATE SERPL-SCNC: 1.4 MMOL/L (ref 0.7–2)
LDH SERPL L TO P-CCNC: 298 U/L (ref 81–234)
LYMPHOCYTES # BLD MANUAL: 2.2 10E3/UL (ref 0.8–5.3)
LYMPHOCYTES # BLD MANUAL: 5.4 10E3/UL (ref 0.8–5.3)
LYMPHOCYTES NFR BLD MANUAL: 3 %
LYMPHOCYTES NFR BLD MANUAL: 9 %
MAGNESIUM SERPL-MCNC: 2.3 MG/DL (ref 1.6–2.3)
MCH RBC QN AUTO: 35.9 PG (ref 26.5–33)
MCH RBC QN AUTO: 36.1 PG (ref 26.5–33)
MCHC RBC AUTO-ENTMCNC: 32.8 G/DL (ref 31.5–36.5)
MCHC RBC AUTO-ENTMCNC: 33.2 G/DL (ref 31.5–36.5)
MCV RBC AUTO: 108 FL (ref 78–100)
MCV RBC AUTO: 110 FL (ref 78–100)
MONOCYTES # BLD MANUAL: 0 10E3/UL (ref 0–1.3)
MONOCYTES # BLD MANUAL: 0 10E3/UL (ref 0–1.3)
MONOCYTES NFR BLD MANUAL: 0 %
MONOCYTES NFR BLD MANUAL: 0 %
NEUTROPHILS # BLD MANUAL: 0.6 10E3/UL (ref 1.6–8.3)
NEUTROPHILS # BLD MANUAL: 1.4 10E3/UL (ref 1.6–8.3)
NEUTROPHILS NFR BLD MANUAL: 1 %
NEUTROPHILS NFR BLD MANUAL: 2 %
NRBC # BLD AUTO: 1.4 10E3/UL
NRBC BLD MANUAL-RTO: 2 %
PHOSPHATE SERPL-MCNC: 3.6 MG/DL (ref 2.5–4.5)
PHOSPHATE SERPL-MCNC: 4.7 MG/DL (ref 2.5–4.5)
PLAT MORPH BLD: ABNORMAL
PLAT MORPH BLD: ABNORMAL
PLATELET # BLD AUTO: 35 10E3/UL (ref 150–450)
PLATELET # BLD AUTO: 40 10E3/UL (ref 150–450)
POLYCHROMASIA BLD QL SMEAR: SLIGHT
POTASSIUM BLD-SCNC: 3.3 MMOL/L (ref 3.4–5.3)
POTASSIUM BLD-SCNC: 3.9 MMOL/L (ref 3.4–5.3)
PROT SERPL-MCNC: 6.2 G/DL (ref 6.8–8.8)
RBC # BLD AUTO: 2.19 10E6/UL (ref 3.8–5.2)
RBC # BLD AUTO: 2.34 10E6/UL (ref 3.8–5.2)
RBC MORPH BLD: ABNORMAL
RBC MORPH BLD: ABNORMAL
SODIUM SERPL-SCNC: 140 MMOL/L (ref 133–144)
SODIUM SERPL-SCNC: 143 MMOL/L (ref 133–144)
URATE SERPL-MCNC: 3.2 MG/DL (ref 2.6–6)
URATE SERPL-MCNC: 3.8 MG/DL (ref 2.6–6)
WBC # BLD AUTO: 59.6 10E3/UL (ref 4–11)
WBC # BLD AUTO: 71.9 10E3/UL (ref 4–11)

## 2022-06-19 PROCEDURE — 85384 FIBRINOGEN ACTIVITY: CPT | Performed by: PHYSICIAN ASSISTANT

## 2022-06-19 PROCEDURE — 258N000003 HC RX IP 258 OP 636: Performed by: PHYSICIAN ASSISTANT

## 2022-06-19 PROCEDURE — 87493 C DIFF AMPLIFIED PROBE: CPT | Performed by: PHYSICIAN ASSISTANT

## 2022-06-19 PROCEDURE — 84550 ASSAY OF BLOOD/URIC ACID: CPT | Performed by: PHYSICIAN ASSISTANT

## 2022-06-19 PROCEDURE — 83615 LACTATE (LD) (LDH) ENZYME: CPT | Performed by: PHYSICIAN ASSISTANT

## 2022-06-19 PROCEDURE — 84100 ASSAY OF PHOSPHORUS: CPT | Performed by: PHYSICIAN ASSISTANT

## 2022-06-19 PROCEDURE — 82248 BILIRUBIN DIRECT: CPT | Performed by: PHYSICIAN ASSISTANT

## 2022-06-19 PROCEDURE — 83605 ASSAY OF LACTIC ACID: CPT | Performed by: INTERNAL MEDICINE

## 2022-06-19 PROCEDURE — 36592 COLLECT BLOOD FROM PICC: CPT | Performed by: INTERNAL MEDICINE

## 2022-06-19 PROCEDURE — 80053 COMPREHEN METABOLIC PANEL: CPT | Performed by: PHYSICIAN ASSISTANT

## 2022-06-19 PROCEDURE — 250N000011 HC RX IP 250 OP 636: Performed by: PHYSICIAN ASSISTANT

## 2022-06-19 PROCEDURE — 120N000002 HC R&B MED SURG/OB UMMC

## 2022-06-19 PROCEDURE — 85027 COMPLETE CBC AUTOMATED: CPT | Performed by: PHYSICIAN ASSISTANT

## 2022-06-19 PROCEDURE — 83735 ASSAY OF MAGNESIUM: CPT | Performed by: PHYSICIAN ASSISTANT

## 2022-06-19 PROCEDURE — 85610 PROTHROMBIN TIME: CPT | Performed by: PHYSICIAN ASSISTANT

## 2022-06-19 PROCEDURE — 84132 ASSAY OF SERUM POTASSIUM: CPT | Performed by: INTERNAL MEDICINE

## 2022-06-19 PROCEDURE — 250N000013 HC RX MED GY IP 250 OP 250 PS 637: Performed by: INTERNAL MEDICINE

## 2022-06-19 PROCEDURE — 250N000013 HC RX MED GY IP 250 OP 250 PS 637: Performed by: PHYSICIAN ASSISTANT

## 2022-06-19 PROCEDURE — 85730 THROMBOPLASTIN TIME PARTIAL: CPT | Performed by: PHYSICIAN ASSISTANT

## 2022-06-19 PROCEDURE — 36592 COLLECT BLOOD FROM PICC: CPT | Performed by: PHYSICIAN ASSISTANT

## 2022-06-19 PROCEDURE — 85007 BL SMEAR W/DIFF WBC COUNT: CPT | Performed by: PHYSICIAN ASSISTANT

## 2022-06-19 PROCEDURE — 99233 SBSQ HOSP IP/OBS HIGH 50: CPT | Performed by: INTERNAL MEDICINE

## 2022-06-19 RX ORDER — HYDROXYUREA 500 MG/1
1000 CAPSULE ORAL ONCE
Status: COMPLETED | OUTPATIENT
Start: 2022-06-19 | End: 2022-06-19

## 2022-06-19 RX ORDER — POTASSIUM CHLORIDE 750 MG/1
40 TABLET, EXTENDED RELEASE ORAL ONCE
Status: COMPLETED | OUTPATIENT
Start: 2022-06-19 | End: 2022-06-19

## 2022-06-19 RX ORDER — HYDROXYUREA 500 MG/1
2000 CAPSULE ORAL 2 TIMES DAILY
Status: DISCONTINUED | OUTPATIENT
Start: 2022-06-19 | End: 2022-06-20

## 2022-06-19 RX ADMIN — ACETAMINOPHEN 650 MG: 325 TABLET, FILM COATED ORAL at 20:29

## 2022-06-19 RX ADMIN — LEVOFLOXACIN 250 MG: 250 TABLET, FILM COATED ORAL at 08:33

## 2022-06-19 RX ADMIN — Medication 1 TABLET: at 11:44

## 2022-06-19 RX ADMIN — ACYCLOVIR 400 MG: 400 TABLET ORAL at 20:30

## 2022-06-19 RX ADMIN — POTASSIUM CHLORIDE 40 MEQ: 750 TABLET, EXTENDED RELEASE ORAL at 20:29

## 2022-06-19 RX ADMIN — ACYCLOVIR 400 MG: 400 TABLET ORAL at 08:33

## 2022-06-19 RX ADMIN — OXYCODONE HYDROCHLORIDE AND ACETAMINOPHEN 1000 MG: 500 TABLET ORAL at 08:33

## 2022-06-19 RX ADMIN — MICAFUNGIN 50 MG: 10 INJECTION, POWDER, LYOPHILIZED, FOR SOLUTION INTRAVENOUS at 15:21

## 2022-06-19 RX ADMIN — HYDROXYUREA 1000 MG: 500 CAPSULE ORAL at 08:34

## 2022-06-19 RX ADMIN — ALLOPURINOL 300 MG: 300 TABLET ORAL at 08:33

## 2022-06-19 RX ADMIN — SODIUM CHLORIDE, PRESERVATIVE FREE 5 ML: 5 INJECTION INTRAVENOUS at 18:21

## 2022-06-19 RX ADMIN — SODIUM CHLORIDE, PRESERVATIVE FREE 5 ML: 5 INJECTION INTRAVENOUS at 15:21

## 2022-06-19 RX ADMIN — HYDROXYUREA 1000 MG: 500 CAPSULE ORAL at 15:21

## 2022-06-19 RX ADMIN — HYDROXYUREA 2000 MG: 500 CAPSULE ORAL at 20:30

## 2022-06-19 RX ADMIN — Medication 50 MCG: at 08:33

## 2022-06-19 ASSESSMENT — ACTIVITIES OF DAILY LIVING (ADL)
ADLS_ACUITY_SCORE: 18

## 2022-06-19 NOTE — PLAN OF CARE
"[id number]    /65 (BP Location: Right arm)   Pulse 101   Temp 98.3  F (36.8  C) (Oral)   Resp 18   Ht 1.702 m (5' 7\")   Wt 118.5 kg (261 lb 3.2 oz)   SpO2 99%   BMI 40.91 kg/m       TLS/DIC labs BID, Phos slightly up. Hydrea increased to 2 grams.     HR tachy in the low 100s, asymptomatic. Triggered sepsis, Lactic - 1.4. Afebrile. Had an episode of night sweats. Overall denies pain, but does have some PICC discomfort, feels like a muscle is pulled when reaching for things etc, ice-packs applied. BMB site w/ scant drainage, primapore on. Denies nausea and SOB. Had 1 BM, C.Diff NEG. UpAdLib. Plan to start chemo sometime this week. Continue with POC.     "

## 2022-06-19 NOTE — PLAN OF CARE
Time: 4271-0356    Afebrile with VSS on RA. Endorses bmbx site soreness and tender PICC site, tylenol and ice/heat given. Denies nausea/SOB. Had 3 BM today prior to stool sample being ordered. Adequate UO. UAL. Regular diet. Waiting on bmbx results. Continue with POC.

## 2022-06-19 NOTE — PROGRESS NOTES
"[facility]    Hematology / Oncology Progress Note    Date of Admission: 6/16/2022  Date of Service (when I saw the patient): 06/19/2022     Assessment & Plan   Veda Marinelli is a 37 year old female with PMH significant for h/o COVID19 infection (10/2021), C.diff, and hypothyroidism who presents with leukocytosis and circulating blasts, concerning for acute leukemia.    Today:  - awaiting BMBx results, anticipate results and treatment planning discussions early this coming week. Pt's sister planning to be available on 6/20 at 11 AM to discuss chemo plan, Dr. Perkins aware.  - continue Hydrea but increase to 2g BID, TLS/DIC labs BID  - continue ppx antiinfectives and best supportive cares  - avoid PTA probiotic. However, with h/o previously treated C.diff, will recheck for colonization status. 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 On 3/8/2022, white blood cell count 2.5 (ANC 1.0), Hgb 13.6 with , platelets normal.  On 5/15/2022, she was noted to have further decreasd white blood cell count of 1.6 (ANC 0.28) with hemoglobin 11. platelets were 133. She was ultimately referred to Hematology (Dr. Bob), who she saw on 6/7/22. Further blood workup was recommended and labs done 6/14. CBC revealed WBC 25.3 (ANC 0.8), Hgb 10.1 (), Plt 90K. On the differential and morphology review, 81% blasts were seen concerning for acute leukemia. No kyle rods noted on PB blood smear pathologist review. She was advised to present to [facility] for further evaluation and management.   - CBC on admit demonstrates WBC 41K (ANC 0.0) with 86% \"other\" cells, Hgb 9.5, Plt 72K. Per verbal report from Heme Path fellow there was concern for Kyle rods. Coags WNL, no e/o TLS. "   - sent PB FISH, PML-ANNELIESE (negative), flow cytometry.   - was started on ATRA on 6/16 PM prior to result of PML-ANNELIESE. Given that this test is now negative, stopped ATRA after 6/17 AM dose  - start Hydrea 1g BID (x 6/17), increase to 2g BID (x 6/19) while awaiting BMBx results to develop trt plan  - BMBx done 6/17, pending  - CBC w/ diff, TLS/DIC labs BID  - HLA typing sent, will need message sent to BMT coordinators  - continue Allopurinol. Ok to hold off on IVFs at this time; pt drinking PO fluids well and no lysis. If develops e/o lysis on labs, will start NS @ 100 ml/hr.  - CT CAP without evidence for infectious source      # Pancytopenia  2/2 underlying malignancy.  - transfuse to maintain Hgb >7, Plt >10K   - conditional orders in place for FFP if INR >1.8, cryo if fibrinogen <100    - blood consent signed on 6/17    # Mild coagulopathy  INR gradually uptrended 1.1-->1.3. No bleeding. Fibrinogen WNL.   - monitor with DIC labs BID as above     # LMP  Approximately 2 weeks PTA, bleeding was heavier than normal with clots. Usually moderate lasting ~3 days.      # ID PPx  Denies fevers. Given profound neutropenia, started prophy anti-infectives  - ACV 400mg BID  - Levaquin 250mg daily  - Micafungin 50mg IV daily     # H/o C.diff  # Hemorrhoid  Complicated her C.diff course and has been waxing/waning since. Sometimes painful. She primarily has used witch hazel pads when symptomatic. Stools have normalized.   - CT A/P negative for perirectal abscess.  - TUCKS PRN  - avoid PTA probiotic at this time. Recheck C.diff status at this time if able to collect stool sample for this     # h/o COVID-19  Pt is not vaccinated nor interested in being vaccinated. She was admitted from 10/1/2021 - 10/13/2021 for acute hypoxic respiratory failure from COVID-19 pneumonia.  Required IMC, high flow and intermittent CPAP. She was treated with dexamethasone, remdesivir, and baricitinib in addition to azithromycin and ceftriaxone.  Recovered symptomatically from this.     # Hypothyroidism  - continue PTA synthroid     FEN  - no current IVFs  - monitor lyte, hold off on automatic repletion scales while assessing for TLS  - regular diet as tolerated     PPx  - VTE: None due to worsening TCP and new leukemia/possible APL  - GI: none at present  - bowels: PRN     Dispo: Admit for workup of new acute leukemia. Unclear at this time but anticipated 3-4 week LOS at minimum.      Plan of care discussed with Dr. Maddie MATAMOROS spent >35 minutes in the care of this patient today, which included time necessary for review of interval events, obtaining history and physical exam, ordering medication(s)/test(s) as medically indicated, discussion with interdisciplinary/consult team(s), and documentation time. Over 50% of time was spent face-to-face and/or coordinating care.     Lamar Grover PA-C  Heme/Onc  [phone] (pager)     Interval History   No acute events overnight, afebrile. Doing ok, sitting up in recliner chair today. Has been walking in halls and feels good doing so. Yesterday, spent some time outside with her parents. She continues with some soreness at BMBx site but states this is improving. She does continue with some soreness of PICC line, proximal to insertion site. She has not appreciated any edema or erythema of the area, no palpable induration, and no edema distal to insertion site. No SOB or HA this AM. Denies nausea or abdominal upset. No mouth pain/sores. Denies change in bowels. Hemorrhoid not bothering her at this time.         Physical Exam   Temp: 98.3  F (36.8  C) Temp src: Oral BP: 108/65 Pulse: 101   Resp: 18 SpO2: 99 % O2 Device: None (Room air)    Vitals:    06/17/22 0808 06/18/22 0744 06/19/22 0821   Weight: 118 kg (260 lb 3.2 oz) 118.8 kg (261 lb 14.4 oz) 118.5 kg (261 lb 3.2 oz)     Vital Signs with Ranges  Temp:  [97.2  F (36.2  C)-99.3  F (37.4  C)] 98.3  F (36.8  C)  Pulse:  [] 101  Resp:  [18] 18  BP:  (103-116)/(62-76) 108/65  SpO2:  [97 %-100 %] 99 %  I/O last 3 completed shifts:  In: 1300 [P.O.:1200; I.V.:100]  Out: 2050 [Urine:2050]  Constitutional: Pleasant, generally well-appearing female seen sitting up in chair.   HEENT: NC/AT. Lids and lashes normal. Sclerae anicteric. No nasal drainage. OP pink and moist. No lesions or thrush.   Respiratory: No increased work of breathing, good air exchange, on RA. Lungs CTA b/l.   Cardiovascular: RRR. No edema.  GI: Normal BS. Abd soft, non-distended, non-tender.  Skin: No bruising or bleeding, normal skin color, texture. No lesion or rashes appreciated on exposed skin surfaces.   Musculoskeletal: Exremities grossly normal in appearance. Tone is normal. LPIC BMBx site c/d/i, non-tender to light palpation.  Neurologic: Awake, alert, oriented. Grossly nonfocal. Speech normal.   Neuropsychiatric: Calm, normal eye contact, normal affect  VAD: Newly placed PICC in E, no further bleeding under disk since dressing was changed. No erythema or edema.        Medications     - MEDICATION INSTRUCTIONS -         acyclovir  400 mg Oral BID     allopurinol  300 mg Oral Daily     heparin lock flush  5-20 mL Intracatheter Q24H     hydroxyurea  1,000 mg Oral Once     hydroxyurea  2,000 mg Oral BID     levofloxacin  250 mg Oral Daily     levothyroxine  125 mcg Oral Daily     micafungin  50 mg Intravenous Q24H     multivitamin w/minerals  1 tablet Oral Daily     sodium chloride (PF)  10-40 mL Intracatheter Q8H     vitamin C  1,000 mg Oral Daily     vitamin D3  50 mcg Oral Daily       Data   Results for orders placed or performed during the hospital encounter of 06/16/22 (from the past 24 hour(s))   CBC with platelets differential    Narrative    The following orders were created for panel order CBC with platelets differential.  Procedure                               Abnormality         Status                     ---------                               -----------         ------           "           CBC with platelets and d...[[id number]]  Abnormal            Final result               Manual Differential[[id number]]          Abnormal            Final result                 Please view results for these tests on the individual orders.   Basic metabolic panel   Result Value Ref Range    Sodium 140 133 - 144 mmol/L    Potassium 3.6 3.4 - 5.3 mmol/L    Chloride 108 94 - 109 mmol/L    Carbon Dioxide (CO2) 25 20 - 32 mmol/L    Anion Gap 7 3 - 14 mmol/L    Urea Nitrogen 8 7 - 30 mg/dL    Creatinine 0.53 0.52 - 1.04 mg/dL    Calcium 9.3 8.5 - 10.1 mg/dL    Glucose 98 70 - 99 mg/dL    GFR Estimate >90 >60 mL/min/1.73m2   Phosphorus   Result Value Ref Range    Phosphorus 3.3 2.5 - 4.5 mg/dL   INR   Result Value Ref Range    INR 1.23 (H) 0.85 - 1.15   Fibrinogen activity   Result Value Ref Range    Fibrinogen Activity 287 170 - 490 mg/dL   Partial thromboplastin time   Result Value Ref Range    aPTT 32 22 - 38 Seconds   Uric acid   Result Value Ref Range    Uric Acid 3.8 2.6 - 6.0 mg/dL   CBC with platelets and differential   Result Value Ref Range    WBC Count 64.4 (HH) 4.0 - 11.0 10e3/uL    RBC Count 2.25 (L) 3.80 - 5.20 10e6/uL    Hemoglobin 8.1 (L) 11.7 - 15.7 g/dL    Hematocrit 24.1 (L) 35.0 - 47.0 %     (H) 78 - 100 fL    MCH 36.0 (H) 26.5 - 33.0 pg    MCHC 33.6 31.5 - 36.5 g/dL    RDW 14.6 10.0 - 15.0 %    Platelet Count 41 (LL) 150 - 450 10e3/uL    Narrative    Previously reported component [ NRBCs ] is no longer reported.\"  Previously reported component [ NRBCs Absolute ] is no longer reported.\"   Manual Differential   Result Value Ref Range    % Neutrophils 0 %    % Lymphocytes 6 %    % Monocytes 0 %    % Eosinophils 0 %    % Basophils 0 %    % Blasts 94 %    Absolute Neutrophils 0.0 (LL) 1.6 - 8.3 10e3/uL    Absolute Lymphocytes 3.9 0.8 - 5.3 10e3/uL    Absolute Monocytes 0.0 0.0 - 1.3 10e3/uL    Absolute Eosinophils 0.0 0.0 - 0.7 10e3/uL    Absolute Basophils 0.0 0.0 - 0.2 10e3/uL    Absolute " Blasts 60.5 (H) <=0.0 10e3/uL    RBC Morphology Confirmed RBC Indices     Platelet Assessment  Automated Count Confirmed. Platelet morphology is normal.     Automated Count Confirmed. Platelet morphology is normal.    Polychromasia Slight (A) None Seen    Teardrop Cells Moderate (A) None Seen   CBC with platelets differential    Narrative    The following orders were created for panel order CBC with platelets differential.  Procedure                               Abnormality         Status                     ---------                               -----------         ------                     CBC with platelets and d...[[id number]]  Abnormal            Final result               Manual Differential[[id number]]          Abnormal            Final result                 Please view results for these tests on the individual orders.   Lactate Dehydrogenase   Result Value Ref Range    Lactate Dehydrogenase 298 (H) 81 - 234 U/L   Magnesium   Result Value Ref Range    Magnesium 2.3 1.6 - 2.3 mg/dL   Basic metabolic panel   Result Value Ref Range    Sodium 143 133 - 144 mmol/L    Potassium 3.9 3.4 - 5.3 mmol/L    Chloride 111 (H) 94 - 109 mmol/L    Carbon Dioxide (CO2) 25 20 - 32 mmol/L    Anion Gap 7 3 - 14 mmol/L    Urea Nitrogen 11 7 - 30 mg/dL    Creatinine 0.55 0.52 - 1.04 mg/dL    Calcium 8.8 8.5 - 10.1 mg/dL    Glucose 108 (H) 70 - 99 mg/dL    GFR Estimate >90 >60 mL/min/1.73m2   Phosphorus   Result Value Ref Range    Phosphorus 4.7 (H) 2.5 - 4.5 mg/dL   INR   Result Value Ref Range    INR 1.22 (H) 0.85 - 1.15   Fibrinogen activity   Result Value Ref Range    Fibrinogen Activity 274 170 - 490 mg/dL   Partial thromboplastin time   Result Value Ref Range    aPTT 34 22 - 38 Seconds   Uric acid   Result Value Ref Range    Uric Acid 3.8 2.6 - 6.0 mg/dL   Hepatic panel   Result Value Ref Range    Bilirubin Total 0.8 0.2 - 1.3 mg/dL    Bilirubin Direct 0.2 0.0 - 0.2 mg/dL    Protein Total 6.2 (L) 6.8 - 8.8 g/dL    Albumin  "3.2 (L) 3.4 - 5.0 g/dL    Alkaline Phosphatase 50 40 - 150 U/L    AST 19 0 - 45 U/L    ALT 19 0 - 50 U/L   CBC with platelets and differential   Result Value Ref Range    WBC Count 59.6 (HH) 4.0 - 11.0 10e3/uL    RBC Count 2.19 (L) 3.80 - 5.20 10e6/uL    Hemoglobin 7.9 (L) 11.7 - 15.7 g/dL    Hematocrit 24.1 (L) 35.0 - 47.0 %     (H) 78 - 100 fL    MCH 36.1 (H) 26.5 - 33.0 pg    MCHC 32.8 31.5 - 36.5 g/dL    RDW 14.8 10.0 - 15.0 %    Platelet Count 35 (LL) 150 - 450 10e3/uL    Narrative    Previously reported component [ NRBCs ] is no longer reported.\"  Previously reported component [ NRBCs Absolute ] is no longer reported.\"   Manual Differential   Result Value Ref Range    % Neutrophils 1 %    % Lymphocytes 9 %    % Monocytes 0 %    % Eosinophils 0 %    % Basophils 0 %    % Blasts 90 %    Absolute Neutrophils 0.6 (L) 1.6 - 8.3 10e3/uL    Absolute Lymphocytes 5.4 (H) 0.8 - 5.3 10e3/uL    Absolute Monocytes 0.0 0.0 - 1.3 10e3/uL    Absolute Eosinophils 0.0 0.0 - 0.7 10e3/uL    Absolute Basophils 0.0 0.0 - 0.2 10e3/uL    Absolute Blasts 53.6 (H) <=0.0 10e3/uL    RBC Morphology Confirmed RBC Indices     Platelet Assessment  Automated Count Confirmed. Platelet morphology is normal.     Automated Count Confirmed. Platelet morphology is normal.   Clostridium difficile toxin B PCR    Specimen: Per Rectum; Stool   Result Value Ref Range    C Difficile Toxin B by PCR Negative Negative    Narrative    The [employer] Xpert C. difficile Assay, performed on the [employer], is a qualitative in vitro diagnostic test for rapid detection of toxin B gene sequences from unformed (liquid or soft) stool specimens collected from patients suspected of having Clostridioides difficile infection (CDI). The test utilizes automated real-time polymerase chain reaction (PCR) to detect toxin gene sequences associated with toxin producing C. difficile. The Xpert C. difficile Assay is intended as an aid in the " diagnosis of CDI.   Lactic Acid STAT   Result Value Ref Range    Lactic Acid 1.4 0.7 - 2.0 mmol/L

## 2022-06-20 LAB
ALBUMIN SERPL-MCNC: 3.2 G/DL (ref 3.4–5)
ALP SERPL-CCNC: 48 U/L (ref 40–150)
ALT SERPL W P-5'-P-CCNC: 22 U/L (ref 0–50)
ANION GAP SERPL CALCULATED.3IONS-SCNC: 5 MMOL/L (ref 3–14)
ANION GAP SERPL CALCULATED.3IONS-SCNC: 8 MMOL/L (ref 3–14)
APTT PPP: 34 SECONDS (ref 22–38)
APTT PPP: 36 SECONDS (ref 22–38)
AST SERPL W P-5'-P-CCNC: 18 U/L (ref 0–45)
BASOPHILS # BLD MANUAL: 0 10E3/UL (ref 0–0.2)
BASOPHILS # BLD MANUAL: 0 10E3/UL (ref 0–0.2)
BASOPHILS NFR BLD MANUAL: 0 %
BASOPHILS NFR BLD MANUAL: 0 %
BILIRUB DIRECT SERPL-MCNC: 0.1 MG/DL (ref 0–0.2)
BILIRUB SERPL-MCNC: 0.6 MG/DL (ref 0.2–1.3)
BLASTS # BLD MANUAL: 62.3 10E3/UL
BLASTS # BLD MANUAL: 74.9 10E3/UL
BLASTS NFR BLD MANUAL: 88 %
BLASTS NFR BLD MANUAL: 93 %
BUN SERPL-MCNC: 7 MG/DL (ref 7–30)
BUN SERPL-MCNC: 9 MG/DL (ref 7–30)
CALCIUM SERPL-MCNC: 8.6 MG/DL (ref 8.5–10.1)
CALCIUM SERPL-MCNC: 9.1 MG/DL (ref 8.5–10.1)
CHLORIDE BLD-SCNC: 107 MMOL/L (ref 94–109)
CHLORIDE BLD-SCNC: 111 MMOL/L (ref 94–109)
CO2 SERPL-SCNC: 24 MMOL/L (ref 20–32)
CO2 SERPL-SCNC: 26 MMOL/L (ref 20–32)
CREAT SERPL-MCNC: 0.51 MG/DL (ref 0.52–1.04)
CREAT SERPL-MCNC: 0.63 MG/DL (ref 0.52–1.04)
CULTURE HARVEST COMPLETE DATE: NORMAL
EOSINOPHIL # BLD MANUAL: 0 10E3/UL (ref 0–0.7)
EOSINOPHIL # BLD MANUAL: 0 10E3/UL (ref 0–0.7)
EOSINOPHIL NFR BLD MANUAL: 0 %
EOSINOPHIL NFR BLD MANUAL: 0 %
ERYTHROCYTE [DISTWIDTH] IN BLOOD BY AUTOMATED COUNT: 14.7 % (ref 10–15)
ERYTHROCYTE [DISTWIDTH] IN BLOOD BY AUTOMATED COUNT: 14.9 % (ref 10–15)
FIBRINOGEN PPP-MCNC: 302 MG/DL (ref 170–490)
FIBRINOGEN PPP-MCNC: 309 MG/DL (ref 170–490)
GFR SERPL CREATININE-BSD FRML MDRD: >90 ML/MIN/1.73M2
GFR SERPL CREATININE-BSD FRML MDRD: >90 ML/MIN/1.73M2
GLUCOSE BLD-MCNC: 101 MG/DL (ref 70–99)
GLUCOSE BLD-MCNC: 108 MG/DL (ref 70–99)
HCT VFR BLD AUTO: 23.7 % (ref 35–47)
HCT VFR BLD AUTO: 24.2 % (ref 35–47)
HGB BLD-MCNC: 8 G/DL (ref 11.7–15.7)
HGB BLD-MCNC: 8 G/DL (ref 11.7–15.7)
INR PPP: 1.25 (ref 0.85–1.15)
INR PPP: 1.3 (ref 0.85–1.15)
INTERPRETATION: NORMAL
LACTATE SERPL-SCNC: 1 MMOL/L (ref 0.7–2)
LDH SERPL L TO P-CCNC: 342 U/L (ref 81–234)
LYMPHOCYTES # BLD MANUAL: 5.6 10E3/UL (ref 0.8–5.3)
LYMPHOCYTES # BLD MANUAL: 7.1 10E3/UL (ref 0.8–5.3)
LYMPHOCYTES NFR BLD MANUAL: 10 %
LYMPHOCYTES NFR BLD MANUAL: 7 %
MAGNESIUM SERPL-MCNC: 2.3 MG/DL (ref 1.6–2.3)
MCH RBC QN AUTO: 36.2 PG (ref 26.5–33)
MCH RBC QN AUTO: 36.5 PG (ref 26.5–33)
MCHC RBC AUTO-ENTMCNC: 33.1 G/DL (ref 31.5–36.5)
MCHC RBC AUTO-ENTMCNC: 33.8 G/DL (ref 31.5–36.5)
MCV RBC AUTO: 108 FL (ref 78–100)
MCV RBC AUTO: 110 FL (ref 78–100)
MONOCYTES # BLD MANUAL: 0 10E3/UL (ref 0–1.3)
MONOCYTES # BLD MANUAL: 0.7 10E3/UL (ref 0–1.3)
MONOCYTES NFR BLD MANUAL: 0 %
MONOCYTES NFR BLD MANUAL: 1 %
NEUTROPHILS # BLD MANUAL: 0 10E3/UL (ref 1.6–8.3)
NEUTROPHILS # BLD MANUAL: 0.7 10E3/UL (ref 1.6–8.3)
NEUTROPHILS NFR BLD MANUAL: 0 %
NEUTROPHILS NFR BLD MANUAL: 1 %
NRBC # BLD AUTO: 0.7 10E3/UL
NRBC BLD MANUAL-RTO: 1 %
PATH REPORT.COMMENTS IMP SPEC: ABNORMAL
PATH REPORT.COMMENTS IMP SPEC: YES
PATH REPORT.COMMENTS IMP SPEC: YES
PATH REPORT.FINAL DX SPEC: ABNORMAL
PATH REPORT.FINAL DX SPEC: ABNORMAL
PATH REPORT.GROSS SPEC: ABNORMAL
PATH REPORT.MICROSCOPIC SPEC OTHER STN: ABNORMAL
PATH REPORT.RELEVANT HX SPEC: ABNORMAL
PATH REPORT.RELEVANT HX SPEC: ABNORMAL
PHOSPHATE SERPL-MCNC: 3.6 MG/DL (ref 2.5–4.5)
PHOSPHATE SERPL-MCNC: 3.7 MG/DL (ref 2.5–4.5)
PLAT MORPH BLD: ABNORMAL
PLAT MORPH BLD: ABNORMAL
PLATELET # BLD AUTO: 37 10E3/UL (ref 150–450)
PLATELET # BLD AUTO: 41 10E3/UL (ref 150–450)
POTASSIUM BLD-SCNC: 3.5 MMOL/L (ref 3.4–5.3)
POTASSIUM BLD-SCNC: 3.5 MMOL/L (ref 3.4–5.3)
POTASSIUM BLD-SCNC: 3.8 MMOL/L (ref 3.4–5.3)
PROT SERPL-MCNC: 6.4 G/DL (ref 6.8–8.8)
RBC # BLD AUTO: 2.19 10E6/UL (ref 3.8–5.2)
RBC # BLD AUTO: 2.21 10E6/UL (ref 3.8–5.2)
RBC MORPH BLD: ABNORMAL
RBC MORPH BLD: ABNORMAL
SODIUM SERPL-SCNC: 139 MMOL/L (ref 133–144)
SODIUM SERPL-SCNC: 142 MMOL/L (ref 133–144)
URATE SERPL-MCNC: 3.5 MG/DL (ref 2.6–6)
URATE SERPL-MCNC: 3.7 MG/DL (ref 2.6–6)
WBC # BLD AUTO: 70.8 10E3/UL (ref 4–11)
WBC # BLD AUTO: 80.5 10E3/UL (ref 4–11)

## 2022-06-20 PROCEDURE — 36592 COLLECT BLOOD FROM PICC: CPT | Performed by: PHYSICIAN ASSISTANT

## 2022-06-20 PROCEDURE — 258N000003 HC RX IP 258 OP 636: Performed by: PHYSICIAN ASSISTANT

## 2022-06-20 PROCEDURE — 83615 LACTATE (LD) (LDH) ENZYME: CPT | Performed by: PHYSICIAN ASSISTANT

## 2022-06-20 PROCEDURE — 999N000007 HC SITE CHECK

## 2022-06-20 PROCEDURE — 85027 COMPLETE CBC AUTOMATED: CPT | Performed by: PHYSICIAN ASSISTANT

## 2022-06-20 PROCEDURE — 99233 SBSQ HOSP IP/OBS HIGH 50: CPT | Performed by: INTERNAL MEDICINE

## 2022-06-20 PROCEDURE — 84550 ASSAY OF BLOOD/URIC ACID: CPT | Performed by: PHYSICIAN ASSISTANT

## 2022-06-20 PROCEDURE — 85610 PROTHROMBIN TIME: CPT | Performed by: PHYSICIAN ASSISTANT

## 2022-06-20 PROCEDURE — 84100 ASSAY OF PHOSPHORUS: CPT | Performed by: PHYSICIAN ASSISTANT

## 2022-06-20 PROCEDURE — 83605 ASSAY OF LACTIC ACID: CPT | Performed by: INTERNAL MEDICINE

## 2022-06-20 PROCEDURE — 250N000011 HC RX IP 250 OP 636: Performed by: PHYSICIAN ASSISTANT

## 2022-06-20 PROCEDURE — 85007 BL SMEAR W/DIFF WBC COUNT: CPT | Performed by: PHYSICIAN ASSISTANT

## 2022-06-20 PROCEDURE — 250N000013 HC RX MED GY IP 250 OP 250 PS 637: Performed by: PHYSICIAN ASSISTANT

## 2022-06-20 PROCEDURE — 36592 COLLECT BLOOD FROM PICC: CPT | Performed by: INTERNAL MEDICINE

## 2022-06-20 PROCEDURE — 85384 FIBRINOGEN ACTIVITY: CPT | Performed by: PHYSICIAN ASSISTANT

## 2022-06-20 PROCEDURE — 120N000002 HC R&B MED SURG/OB UMMC

## 2022-06-20 PROCEDURE — 85730 THROMBOPLASTIN TIME PARTIAL: CPT | Performed by: PHYSICIAN ASSISTANT

## 2022-06-20 PROCEDURE — 83735 ASSAY OF MAGNESIUM: CPT | Performed by: PHYSICIAN ASSISTANT

## 2022-06-20 PROCEDURE — 82248 BILIRUBIN DIRECT: CPT | Performed by: PHYSICIAN ASSISTANT

## 2022-06-20 PROCEDURE — 82310 ASSAY OF CALCIUM: CPT | Performed by: PHYSICIAN ASSISTANT

## 2022-06-20 RX ORDER — HYDROXYUREA 500 MG/1
2000 CAPSULE ORAL 4 TIMES DAILY
Status: DISCONTINUED | OUTPATIENT
Start: 2022-06-20 | End: 2022-06-22

## 2022-06-20 RX ADMIN — HYDROXYUREA 2000 MG: 500 CAPSULE ORAL at 08:04

## 2022-06-20 RX ADMIN — ALLOPURINOL 300 MG: 300 TABLET ORAL at 08:04

## 2022-06-20 RX ADMIN — OXYCODONE HYDROCHLORIDE AND ACETAMINOPHEN 1000 MG: 500 TABLET ORAL at 08:04

## 2022-06-20 RX ADMIN — SODIUM CHLORIDE, PRESERVATIVE FREE 5 ML: 5 INJECTION INTRAVENOUS at 17:52

## 2022-06-20 RX ADMIN — ACETAMINOPHEN 650 MG: 325 TABLET, FILM COATED ORAL at 03:05

## 2022-06-20 RX ADMIN — Medication 1 TABLET: at 12:17

## 2022-06-20 RX ADMIN — SODIUM CHLORIDE, PRESERVATIVE FREE 3 ML: 5 INJECTION INTRAVENOUS at 05:46

## 2022-06-20 RX ADMIN — Medication 50 MCG: at 08:04

## 2022-06-20 RX ADMIN — ACYCLOVIR 400 MG: 400 TABLET ORAL at 08:04

## 2022-06-20 RX ADMIN — HYDROXYUREA 2000 MG: 500 CAPSULE ORAL at 19:44

## 2022-06-20 RX ADMIN — Medication 5 ML: at 12:59

## 2022-06-20 RX ADMIN — HYDROXYUREA 2000 MG: 500 CAPSULE ORAL at 12:17

## 2022-06-20 RX ADMIN — LEVOFLOXACIN 250 MG: 250 TABLET, FILM COATED ORAL at 08:04

## 2022-06-20 RX ADMIN — HYDROXYUREA 2000 MG: 500 CAPSULE ORAL at 16:37

## 2022-06-20 RX ADMIN — SODIUM CHLORIDE, PRESERVATIVE FREE 5 ML: 5 INJECTION INTRAVENOUS at 20:26

## 2022-06-20 RX ADMIN — ACYCLOVIR 400 MG: 400 TABLET ORAL at 19:44

## 2022-06-20 RX ADMIN — MICAFUNGIN 50 MG: 10 INJECTION, POWDER, LYOPHILIZED, FOR SOLUTION INTRAVENOUS at 16:40

## 2022-06-20 ASSESSMENT — ACTIVITIES OF DAILY LIVING (ADL)
ADLS_ACUITY_SCORE: 18

## 2022-06-20 NOTE — PLAN OF CARE
Goal Outcome Evaluation:  Lissette continues to have WBC increase.  Today's level is 70.8 and Hydrea increased to 2 gr 4x / day.  Waiting for final results of BMBX before starting chemo.  BMBX site not approximated / scabbed yet.  Site slightly red and tender.  Cleansed and bandaide on.  C/O sore left upper arm - bruise is present on underarm.  PICC is CDI   UAL in room and showered.  Family here for a [place] planning meeting with MD.

## 2022-06-20 NOTE — PLAN OF CARE
Time: 8410-8814    Afebrile with VSS on RA. Endorses right arm pain at PICC site and bmbx site soreness, tylenol given x2. Ice pack also given. Voiding and not saving. Endorses regular bowel pattern. UAL. Regular diet. Given education on sun exposure in anticipation of chemotherapy this week. Sister would like to be called during rounds before chemo begins. WBC 71.9, 2g hydrea started tonight.

## 2022-06-20 NOTE — PROGRESS NOTES
"[facility]    Hematology / Oncology Progress Note    Date of Admission: 6/16/2022  Date of Service (when I saw the patient): 06/20/2022     Assessment & Plan   Veda Marinelli is a 37 year old female with PMH significant for h/o COVID19 infection (10/2021), C.diff, and hypothyroidism who presents with leukocytosis and circulating blasts, concerning for acute leukemia.    Today:  - Awaiting BMBx results, anticipate results and treatment planning discussions early this coming week. Thoroughly discussed treatment options. Pending the final result patient will likely start chemotherapy tomorrow.  - Continue ppx antiinfectives and best supportive cares  - Avoid PTA probiotic. However, with h/o previously treated C.diff, will recheck for colonization status. 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 On 3/8/2022, white blood cell count 2.5 (ANC 1.0), Hgb 13.6 with , platelets normal.  On 5/15/2022, she was noted to have further decreasd white blood cell count of 1.6 (ANC 0.28) with hemoglobin 11. platelets were 133. She was ultimately referred to Hematology (Dr. Bob), who she saw on 6/7/22. Further blood workup was recommended and labs done 6/14. CBC revealed WBC 25.3 (ANC 0.8), Hgb 10.1 (), Plt 90K. On the differential and morphology review, 81% blasts were seen concerning for acute leukemia. No kyle rods noted on PB blood smear pathologist review. She was advised to present to [facility] for further evaluation and management.   - CBC on admit demonstrates WBC 41K (ANC 0.0) with 86% \"other\" cells, Hgb 9.5, Plt 72K. Per verbal report from Heme Path fellow there was concern for Kyle rods. Coags WNL, no e/o TLS.   - sent PB FISH, PML-ANNELIESE (negative), " flow cytometry.   - was started on ATRA on 6/16 PM prior to result of PML-ANNELIESE. Given that this test is now negative, stopped ATRA after 6/17 AM dose  - Start Hydrea 1g BID (x 6/17), increase to 2g BID (x 6/19), given that patient's WBC has remain elevated in the 70K - will increase Hydrea to 2g QID  - BMBx done 6/17, pending  - CBC w/ diff, TLS/DIC labs BID  - HLA typing sent, will need message sent to BMT coordinators  - continue Allopurinol. Ok to hold off on IVFs at this time; pt drinking PO fluids well and no lysis. If develops e/o lysis on labs, will start NS @ 100 ml/hr.  - CT CAP without evidence for infectious source      # Pancytopenia  2/2 underlying malignancy.  - transfuse to maintain Hgb >7, Plt >10K   - conditional orders in place for FFP if INR >1.8, cryo if fibrinogen <100    - blood consent signed on 6/17    # Mild coagulopathy  INR gradually uptrended 1.1-->1.3. No bleeding. Fibrinogen WNL.   - monitor with DIC labs BID as above     # LMP  Approximately 2 weeks PTA, bleeding was heavier than normal with clots. Usually moderate lasting ~3 days.      # ID PPx  Denies fevers. Given profound neutropenia, started prophy anti-infectives  - ACV 400mg BID  - Levaquin 250mg daily  - Micafungin 50mg IV daily     # H/o C.diff  # Hemorrhoid  Complicated her C.diff course and has been waxing/waning since. Sometimes painful. She primarily has used witch hazel pads when symptomatic. Stools have normalized.   - CT A/P negative for perirectal abscess.  - TUCKS PRN  - avoid PTA probiotic at this time. Recheck C.diff status at this time if able to collect stool sample for this     # h/o COVID-19  Pt is not vaccinated nor interested in being vaccinated. She was admitted from 10/1/2021 - 10/13/2021 for acute hypoxic respiratory failure from COVID-19 pneumonia.  Required IMC, high flow and intermittent CPAP. She was treated with dexamethasone, remdesivir, and baricitinib in addition to azithromycin and ceftriaxone.  Recovered symptomatically from this.     # Hypothyroidism  - continue PTA synthroid     FEN  - no current IVFs  - monitor lyte, hold off on automatic repletion scales while assessing for TLS  - regular diet as tolerated     PPx  - VTE: None due to worsening TCP and new leukemia/possible APL  - GI: none at present  - bowels: PRN     Dispo: Admit for workup of new acute leukemia. Unclear at this time but anticipated 3-4 week LOS at minimum.      Plan of care discussed with Dr. Perkins     I spent >80 minutes in the care of this patient today, which included time necessary for review of interval events, obtaining history and physical exam, ordering medication(s)/test(s) as medically indicated, discussion with interdisciplinary/consult team(s), and documentation time. Over 50% of time was spent face-to-face and/or coordinating care.     Gaby Arteaga PA-C  Heme/Onc  [phone](pager)     Interval History   No acute events overnight, afebrile.   Minerva is overall doing well today. No new symptoms. Still having some fatigue if she exerts herself too much. Continues to remain active and walking the halls.   Long discussion regarding treatment options, side effects, risk of treatment and the course of induction chemotherapy. Discussion with patient, mother and sister. Patient and family had many good questions. All questions answered at bedside.   Denies fever, chills, mouth sores, headache, SOB, cough, abdominal pain, diarrhea, constipation, nausea, vomiting, dysuria, hematuria, numbness, tingling, swelling    Physical Exam   Temp: 98.6  F (37  C) Temp src: Oral BP: 112/70 Pulse: 100   Resp: 18 SpO2: 99 % O2 Device: None (Room air)    Vitals:    06/17/22 0808 06/18/22 0744 06/19/22 0821   Weight: 118 kg (260 lb 3.2 oz) 118.8 kg (261 lb 14.4 oz) 118.5 kg (261 lb 3.2 oz)     Vital Signs with Ranges  Temp:  [96.3  F (35.7  C)-99.7  F (37.6  C)] 98.6  F (37  C)  Pulse:  [] 100  Resp:  [18] 18  BP: ()/(62-95)  112/70  SpO2:  [96 %-100 %] 99 %  I/O last 3 completed shifts:  In: 530 [P.O.:500; I.V.:30]  Out: -     Constitutional: Pleasant, generally well-appearing female seen sitting up in chair.   HEENT: NC/AT. Lids and lashes normal. Sclerae anicteric. No nasal drainage. OP pink and moist. No lesions or thrush.   Respiratory: No increased work of breathing, good air exchange, on RA. Lungs CTA b/l.   Cardiovascular: RRR. No edema.  GI: Normal BS. Abd soft, non-distended, non-tender.  Skin: No bruising or bleeding, normal skin color, texture. No lesion or rashes appreciated on exposed skin surfaces.   Musculoskeletal: Exremities grossly normal in appearance. Tone is normal. LPIC BMBx site c/d/i, non-tender to light palpation.  Neurologic: Awake, alert, oriented. Grossly nonfocal. Speech normal.   Neuropsychiatric: Calm, normal eye contact, normal affect  VAD: PICC in LUE, no further bleeding under disk since dressing was changed. No erythema or edema.        Medications     - MEDICATION INSTRUCTIONS -         acyclovir  400 mg Oral BID     allopurinol  300 mg Oral Daily     heparin lock flush  5-20 mL Intracatheter Q24H     hydroxyurea  2,000 mg Oral 4x Daily     levofloxacin  250 mg Oral Daily     levothyroxine  125 mcg Oral Daily     micafungin  50 mg Intravenous Q24H     multivitamin w/minerals  1 tablet Oral Daily     sodium chloride (PF)  10-40 mL Intracatheter Q8H     vitamin C  1,000 mg Oral Daily     vitamin D3  50 mcg Oral Daily       Data   Results for orders placed or performed during the hospital encounter of 06/16/22 (from the past 24 hour(s))   CBC with platelets differential    Narrative    The following orders were created for panel order CBC with platelets differential.  Procedure                               Abnormality         Status                     ---------                               -----------         ------                     CBC with platelets and d...[[id number]]  Abnormal            Final  "result               Manual Differential[[id number]]          Abnormal            Final result                 Please view results for these tests on the individual orders.   Basic metabolic panel   Result Value Ref Range    Sodium 140 133 - 144 mmol/L    Potassium 3.3 (L) 3.4 - 5.3 mmol/L    Chloride 109 94 - 109 mmol/L    Carbon Dioxide (CO2) 24 20 - 32 mmol/L    Anion Gap 7 3 - 14 mmol/L    Urea Nitrogen 7 7 - 30 mg/dL    Creatinine 0.54 0.52 - 1.04 mg/dL    Calcium 9.2 8.5 - 10.1 mg/dL    Glucose 141 (H) 70 - 99 mg/dL    GFR Estimate >90 >60 mL/min/1.73m2   Phosphorus   Result Value Ref Range    Phosphorus 3.6 2.5 - 4.5 mg/dL   INR   Result Value Ref Range    INR 1.22 (H) 0.85 - 1.15   Fibrinogen activity   Result Value Ref Range    Fibrinogen Activity 333 170 - 490 mg/dL   Partial thromboplastin time   Result Value Ref Range    aPTT 35 22 - 38 Seconds   Uric acid   Result Value Ref Range    Uric Acid 3.2 2.6 - 6.0 mg/dL   CBC with platelets and differential   Result Value Ref Range    WBC Count 71.9 (HH) 4.0 - 11.0 10e3/uL    RBC Count 2.34 (L) 3.80 - 5.20 10e6/uL    Hemoglobin 8.4 (L) 11.7 - 15.7 g/dL    Hematocrit 25.3 (L) 35.0 - 47.0 %     (H) 78 - 100 fL    MCH 35.9 (H) 26.5 - 33.0 pg    MCHC 33.2 31.5 - 36.5 g/dL    RDW 14.9 10.0 - 15.0 %    Platelet Count 40 (LL) 150 - 450 10e3/uL    Narrative    Previously reported component [ NRBCs ] is no longer reported.\"  Previously reported component [ NRBCs Absolute ] is no longer reported.\"   Manual Differential   Result Value Ref Range    % Neutrophils 2 %    % Lymphocytes 3 %    % Monocytes 0 %    % Eosinophils 0 %    % Basophils 0 %    % Blasts 95 %    NRBCs per 100 WBC 2 (H) <=0 %    Absolute Neutrophils 1.4 (L) 1.6 - 8.3 10e3/uL    Absolute Lymphocytes 2.2 0.8 - 5.3 10e3/uL    Absolute Monocytes 0.0 0.0 - 1.3 10e3/uL    Absolute Eosinophils 0.0 0.0 - 0.7 10e3/uL    Absolute Basophils 0.0 0.0 - 0.2 10e3/uL    Absolute Blasts 68.3 (H) <=0.0 10e3/uL    " Absolute NRBCs 1.4 (H) <=0.0 10e3/uL    RBC Morphology Confirmed RBC Indices     Platelet Assessment  Automated Count Confirmed. Platelet morphology is normal.     Automated Count Confirmed. Platelet morphology is normal.    Polychromasia Slight (A) None Seen    Teardrop Cells Slight (A) None Seen   Potassium   Result Value Ref Range    Potassium 3.5 3.4 - 5.3 mmol/L   CBC with platelets differential    Narrative    The following orders were created for panel order CBC with platelets differential.  Procedure                               Abnormality         Status                     ---------                               -----------         ------                     CBC with platelets and d...[[id number]]  Abnormal            Final result               Manual Differential[[id number]]          Abnormal            Final result                 Please view results for these tests on the individual orders.   Lactate Dehydrogenase   Result Value Ref Range    Lactate Dehydrogenase 342 (H) 81 - 234 U/L   Magnesium   Result Value Ref Range    Magnesium 2.3 1.6 - 2.3 mg/dL   Basic metabolic panel   Result Value Ref Range    Sodium 142 133 - 144 mmol/L    Potassium 3.8 3.4 - 5.3 mmol/L    Chloride 111 (H) 94 - 109 mmol/L    Carbon Dioxide (CO2) 26 20 - 32 mmol/L    Anion Gap 5 3 - 14 mmol/L    Urea Nitrogen 7 7 - 30 mg/dL    Creatinine 0.51 (L) 0.52 - 1.04 mg/dL    Calcium 8.6 8.5 - 10.1 mg/dL    Glucose 108 (H) 70 - 99 mg/dL    GFR Estimate >90 >60 mL/min/1.73m2   Phosphorus   Result Value Ref Range    Phosphorus 3.7 2.5 - 4.5 mg/dL   INR   Result Value Ref Range    INR 1.30 (H) 0.85 - 1.15   Fibrinogen activity   Result Value Ref Range    Fibrinogen Activity 302 170 - 490 mg/dL   Partial thromboplastin time   Result Value Ref Range    aPTT 36 22 - 38 Seconds   Uric acid   Result Value Ref Range    Uric Acid 3.7 2.6 - 6.0 mg/dL   Hepatic panel   Result Value Ref Range    Bilirubin Total 0.6 0.2 - 1.3 mg/dL    Bilirubin  "Direct 0.1 0.0 - 0.2 mg/dL    Protein Total 6.4 (L) 6.8 - 8.8 g/dL    Albumin 3.2 (L) 3.4 - 5.0 g/dL    Alkaline Phosphatase 48 40 - 150 U/L    AST 18 0 - 45 U/L    ALT 22 0 - 50 U/L   CBC with platelets and differential   Result Value Ref Range    WBC Count 70.8 (HH) 4.0 - 11.0 10e3/uL    RBC Count 2.21 (L) 3.80 - 5.20 10e6/uL    Hemoglobin 8.0 (L) 11.7 - 15.7 g/dL    Hematocrit 24.2 (L) 35.0 - 47.0 %     (H) 78 - 100 fL    MCH 36.2 (H) 26.5 - 33.0 pg    MCHC 33.1 31.5 - 36.5 g/dL    RDW 14.9 10.0 - 15.0 %    Platelet Count 41 (LL) 150 - 450 10e3/uL    Narrative    Previously reported component [ NRBCs ] is no longer reported.\"  Previously reported component [ NRBCs Absolute ] is no longer reported.\"   Manual Differential   Result Value Ref Range    % Neutrophils 1 %    % Lymphocytes 10 %    % Monocytes 1 %    % Eosinophils 0 %    % Basophils 0 %    % Blasts 88 %    NRBCs per 100 WBC 1 (H) <=0 %    Absolute Neutrophils 0.7 (L) 1.6 - 8.3 10e3/uL    Absolute Lymphocytes 7.1 (H) 0.8 - 5.3 10e3/uL    Absolute Monocytes 0.7 0.0 - 1.3 10e3/uL    Absolute Eosinophils 0.0 0.0 - 0.7 10e3/uL    Absolute Basophils 0.0 0.0 - 0.2 10e3/uL    Absolute Blasts 62.3 (H) <=0.0 10e3/uL    Absolute NRBCs 0.7 (H) <=0.0 10e3/uL    RBC Morphology Confirmed RBC Indices     Platelet Assessment  Automated Count Confirmed. Platelet morphology is normal.     Automated Count Confirmed. Platelet morphology is normal.       "

## 2022-06-21 ENCOUNTER — APPOINTMENT (OUTPATIENT)
Dept: PHYSICAL THERAPY | Facility: CLINIC | Age: 37
DRG: 834 | End: 2022-06-21
Payer: COMMERCIAL

## 2022-06-21 LAB
A*LOCUS SEROLOGIC EQUIVALENT: 3
A*LOCUS: NORMAL
ABTEST METHOD: NORMAL
ALBUMIN SERPL-MCNC: 3.4 G/DL (ref 3.4–5)
ALP SERPL-CCNC: 50 U/L (ref 40–150)
ALT SERPL W P-5'-P-CCNC: 21 U/L (ref 0–50)
ANION GAP SERPL CALCULATED.3IONS-SCNC: 10 MMOL/L (ref 7–15)
ANION GAP SERPL CALCULATED.3IONS-SCNC: 5 MMOL/L (ref 3–14)
APTT PPP: 36 SECONDS (ref 22–38)
APTT PPP: 38 SECONDS (ref 22–38)
AST SERPL W P-5'-P-CCNC: 20 U/L (ref 0–45)
B*LOCUS SEROLOGIC EQUIVALENT: 7
B*LOCUS: NORMAL
BASOPHILS # BLD MANUAL: 0 10E3/UL (ref 0–0.2)
BASOPHILS # BLD MANUAL: 0 10E3/UL (ref 0–0.2)
BASOPHILS NFR BLD MANUAL: 0 %
BASOPHILS NFR BLD MANUAL: 0 %
BILIRUB DIRECT SERPL-MCNC: 0.1 MG/DL (ref 0–0.2)
BILIRUB SERPL-MCNC: 0.8 MG/DL (ref 0.2–1.3)
BLASTS # BLD MANUAL: 65.6 10E3/UL
BLASTS # BLD MANUAL: 78.3 10E3/UL
BLASTS NFR BLD MANUAL: 91 %
BLASTS NFR BLD MANUAL: 96 %
BUN SERPL-MCNC: 10 MG/DL (ref 7–30)
BUN SERPL-MCNC: 7.1 MG/DL (ref 6–20)
BW-1: NORMAL
C*: NORMAL
C*LOCUS SEROLOGIC EQUIVALENT: 6
C*LOCUS: NORMAL
C*SEROLOGIC EQUIVALENT: 7
CALCIUM SERPL-MCNC: 9 MG/DL (ref 8.5–10.1)
CALCIUM SERPL-MCNC: 9.2 MG/DL (ref 8.6–10)
CHLORIDE BLD-SCNC: 109 MMOL/L (ref 94–109)
CHLORIDE SERPL-SCNC: 101 MMOL/L (ref 98–107)
CO2 SERPL-SCNC: 27 MMOL/L (ref 20–32)
CREAT SERPL-MCNC: 0.52 MG/DL (ref 0.52–1.04)
CREAT SERPL-MCNC: 0.62 MG/DL (ref 0.51–0.95)
DACRYOCYTES BLD QL SMEAR: SLIGHT
DEPRECATED HCO3 PLAS-SCNC: 23 MMOL/L (ref 22–29)
DPA1*: NORMAL
DPB1*: NORMAL
DQA1*: NORMAL
DQA1*LOCUS: NORMAL
DQB1*: NORMAL
DQB1*LOCUS SEROLOGIC EQUIVALENT: 8
DQB1*LOCUS: NORMAL
DQB1*SEROLOGIC EQUIVALENT: 5
DRB1*: NORMAL
DRB1*LOCUS SEROLOGIC EQUIVALENT: NORMAL
DRB1*LOCUS: NORMAL
DRB1*SEROLOGIC EQUIVALENT: 4
DRB4*LOCUS SEROLOGIC EQUIVALENT: 53
DRB4*LOCUS: NORMAL
DRSSO TEST METHOD: NORMAL
EOSINOPHIL # BLD MANUAL: 0 10E3/UL (ref 0–0.7)
EOSINOPHIL # BLD MANUAL: 0 10E3/UL (ref 0–0.7)
EOSINOPHIL NFR BLD MANUAL: 0 %
EOSINOPHIL NFR BLD MANUAL: 0 %
ERYTHROCYTE [DISTWIDTH] IN BLOOD BY AUTOMATED COUNT: 14.8 % (ref 10–15)
ERYTHROCYTE [DISTWIDTH] IN BLOOD BY AUTOMATED COUNT: 14.8 % (ref 10–15)
FIBRINOGEN PPP-MCNC: 291 MG/DL (ref 170–490)
FIBRINOGEN PPP-MCNC: 302 MG/DL (ref 170–490)
GFR SERPL CREATININE-BSD FRML MDRD: >90 ML/MIN/1.73M2
GFR SERPL CREATININE-BSD FRML MDRD: >90 ML/MIN/1.73M2
GLUCOSE BLD-MCNC: 99 MG/DL (ref 70–99)
GLUCOSE SERPL-MCNC: 99 MG/DL (ref 70–99)
HCT VFR BLD AUTO: 24.3 % (ref 35–47)
HCT VFR BLD AUTO: 25.4 % (ref 35–47)
HGB BLD-MCNC: 8 G/DL (ref 11.7–15.7)
HGB BLD-MCNC: 8.4 G/DL (ref 11.7–15.7)
INR PPP: 1.27 (ref 0.85–1.15)
INR PPP: 1.29 (ref 0.85–1.15)
LACTATE SERPL-SCNC: 1.3 MMOL/L (ref 0.7–2)
LDH SERPL L TO P-CCNC: 391 U/L (ref 81–234)
LYMPHOCYTES # BLD MANUAL: 2.4 10E3/UL (ref 0.8–5.3)
LYMPHOCYTES # BLD MANUAL: 5.8 10E3/UL (ref 0.8–5.3)
LYMPHOCYTES NFR BLD MANUAL: 3 %
LYMPHOCYTES NFR BLD MANUAL: 8 %
MAGNESIUM SERPL-MCNC: 2.5 MG/DL (ref 1.6–2.3)
MCH RBC QN AUTO: 35.4 PG (ref 26.5–33)
MCH RBC QN AUTO: 35.9 PG (ref 26.5–33)
MCHC RBC AUTO-ENTMCNC: 32.9 G/DL (ref 31.5–36.5)
MCHC RBC AUTO-ENTMCNC: 33.1 G/DL (ref 31.5–36.5)
MCV RBC AUTO: 107 FL (ref 78–100)
MCV RBC AUTO: 109 FL (ref 78–100)
MONOCYTES # BLD MANUAL: 0 10E3/UL (ref 0–1.3)
MONOCYTES # BLD MANUAL: 0 10E3/UL (ref 0–1.3)
MONOCYTES NFR BLD MANUAL: 0 %
MONOCYTES NFR BLD MANUAL: 0 %
NEUTROPHILS # BLD MANUAL: 0.7 10E3/UL (ref 1.6–8.3)
NEUTROPHILS # BLD MANUAL: 0.8 10E3/UL (ref 1.6–8.3)
NEUTROPHILS NFR BLD MANUAL: 1 %
NEUTROPHILS NFR BLD MANUAL: 1 %
NRBC # BLD AUTO: 0.7 10E3/UL
NRBC BLD MANUAL-RTO: 1 %
PHOSPHATE SERPL-MCNC: 3.6 MG/DL (ref 2.5–4.5)
PHOSPHATE SERPL-MCNC: 4 MG/DL (ref 2.5–4.5)
PLAT MORPH BLD: ABNORMAL
PLAT MORPH BLD: ABNORMAL
PLATELET # BLD AUTO: 33 10E3/UL (ref 150–450)
PLATELET # BLD AUTO: 37 10E3/UL (ref 150–450)
POTASSIUM BLD-SCNC: 3.6 MMOL/L (ref 3.4–5.3)
POTASSIUM SERPL-SCNC: 3.5 MMOL/L (ref 3.4–4.5)
PROT SERPL-MCNC: 6.6 G/DL (ref 6.8–8.8)
RBC # BLD AUTO: 2.23 10E6/UL (ref 3.8–5.2)
RBC # BLD AUTO: 2.37 10E6/UL (ref 3.8–5.2)
RBC MORPH BLD: ABNORMAL
RBC MORPH BLD: ABNORMAL
SODIUM SERPL-SCNC: 134 MMOL/L (ref 136–145)
SODIUM SERPL-SCNC: 141 MMOL/L (ref 133–144)
URATE SERPL-MCNC: 4.3 MG/DL (ref 2.6–6)
URATE SERPL-MCNC: 4.5 MG/DL (ref 2.4–5.7)
WBC # BLD AUTO: 72.1 10E3/UL (ref 4–11)
WBC # BLD AUTO: 81.6 10E3/UL (ref 4–11)

## 2022-06-21 PROCEDURE — 250N000013 HC RX MED GY IP 250 OP 250 PS 637: Performed by: PHYSICIAN ASSISTANT

## 2022-06-21 PROCEDURE — 83735 ASSAY OF MAGNESIUM: CPT | Performed by: PHYSICIAN ASSISTANT

## 2022-06-21 PROCEDURE — 99233 SBSQ HOSP IP/OBS HIGH 50: CPT | Performed by: INTERNAL MEDICINE

## 2022-06-21 PROCEDURE — 84100 ASSAY OF PHOSPHORUS: CPT | Performed by: PHYSICIAN ASSISTANT

## 2022-06-21 PROCEDURE — 250N000011 HC RX IP 250 OP 636: Performed by: PHYSICIAN ASSISTANT

## 2022-06-21 PROCEDURE — 85007 BL SMEAR W/DIFF WBC COUNT: CPT | Performed by: PHYSICIAN ASSISTANT

## 2022-06-21 PROCEDURE — 85384 FIBRINOGEN ACTIVITY: CPT | Performed by: PHYSICIAN ASSISTANT

## 2022-06-21 PROCEDURE — 999N000007 HC SITE CHECK

## 2022-06-21 PROCEDURE — 83605 ASSAY OF LACTIC ACID: CPT | Performed by: INTERNAL MEDICINE

## 2022-06-21 PROCEDURE — 82248 BILIRUBIN DIRECT: CPT | Performed by: PHYSICIAN ASSISTANT

## 2022-06-21 PROCEDURE — 36592 COLLECT BLOOD FROM PICC: CPT | Performed by: INTERNAL MEDICINE

## 2022-06-21 PROCEDURE — 83615 LACTATE (LD) (LDH) ENZYME: CPT | Performed by: PHYSICIAN ASSISTANT

## 2022-06-21 PROCEDURE — 120N000002 HC R&B MED SURG/OB UMMC

## 2022-06-21 PROCEDURE — 97110 THERAPEUTIC EXERCISES: CPT | Mod: GP

## 2022-06-21 PROCEDURE — 85027 COMPLETE CBC AUTOMATED: CPT | Performed by: PHYSICIAN ASSISTANT

## 2022-06-21 PROCEDURE — 258N000003 HC RX IP 258 OP 636: Performed by: PHYSICIAN ASSISTANT

## 2022-06-21 PROCEDURE — 97530 THERAPEUTIC ACTIVITIES: CPT | Mod: GP

## 2022-06-21 PROCEDURE — 85730 THROMBOPLASTIN TIME PARTIAL: CPT | Performed by: PHYSICIAN ASSISTANT

## 2022-06-21 PROCEDURE — 36592 COLLECT BLOOD FROM PICC: CPT | Performed by: PHYSICIAN ASSISTANT

## 2022-06-21 PROCEDURE — 85610 PROTHROMBIN TIME: CPT | Performed by: PHYSICIAN ASSISTANT

## 2022-06-21 PROCEDURE — 84550 ASSAY OF BLOOD/URIC ACID: CPT | Performed by: PHYSICIAN ASSISTANT

## 2022-06-21 PROCEDURE — 80053 COMPREHEN METABOLIC PANEL: CPT | Performed by: PHYSICIAN ASSISTANT

## 2022-06-21 RX ADMIN — HYDROXYUREA 2000 MG: 500 CAPSULE ORAL at 12:46

## 2022-06-21 RX ADMIN — HYDROXYUREA 2000 MG: 500 CAPSULE ORAL at 16:45

## 2022-06-21 RX ADMIN — LEVOFLOXACIN 250 MG: 250 TABLET, FILM COATED ORAL at 08:23

## 2022-06-21 RX ADMIN — Medication 50 MCG: at 08:23

## 2022-06-21 RX ADMIN — MICAFUNGIN 50 MG: 10 INJECTION, POWDER, LYOPHILIZED, FOR SOLUTION INTRAVENOUS at 16:41

## 2022-06-21 RX ADMIN — HYDROXYUREA 2000 MG: 500 CAPSULE ORAL at 20:04

## 2022-06-21 RX ADMIN — ACYCLOVIR 400 MG: 400 TABLET ORAL at 08:23

## 2022-06-21 RX ADMIN — ACYCLOVIR 400 MG: 400 TABLET ORAL at 20:04

## 2022-06-21 RX ADMIN — HYDROXYUREA 2000 MG: 500 CAPSULE ORAL at 08:24

## 2022-06-21 RX ADMIN — SODIUM CHLORIDE, PRESERVATIVE FREE 3 ML: 5 INJECTION INTRAVENOUS at 06:09

## 2022-06-21 RX ADMIN — ALLOPURINOL 300 MG: 300 TABLET ORAL at 08:23

## 2022-06-21 RX ADMIN — OXYCODONE HYDROCHLORIDE AND ACETAMINOPHEN 1000 MG: 500 TABLET ORAL at 08:23

## 2022-06-21 RX ADMIN — Medication 1 TABLET: at 12:46

## 2022-06-21 ASSESSMENT — ACTIVITIES OF DAILY LIVING (ADL)
ADLS_ACUITY_SCORE: 18

## 2022-06-21 NOTE — PLAN OF CARE
Goal Outcome Evaluation:  Afebrile. Denied pain or nausea. Waiting for a few more lab tests to come back so she can start chemotherapy. UP independently.

## 2022-06-21 NOTE — PROGRESS NOTES
"[facility]    Hematology / Oncology Progress Note    Date of Admission: 6/16/2022  Date of Service (when I saw the patient): 06/21/2022     Assessment & Plan   Veda Marinelli is a 37 year old female with PMH significant for h/o COVID19 infection (10/2021), C.diff, and hypothyroidism who presents with leukocytosis and circulating blasts, concerning for acute leukemia. Patient has been medicallyy stable therefore we will continue to await final BMBx results prior to initiation of treatment.    Today:  - Awaiting BMBx results, anticipate results today or tomorrow. Thoroughly discussed treatment options. Pending the final result patient will likely start chemotherapy today or tomorrow.  - Continue ppx antiinfectives and best supportive cares  - Avoid PTA probiotic. C diff negative so will plan to hold off on adding ppx PO Vanco. Continue to monitor closely.    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 On 3/8/2022, white blood cell count 2.5 (ANC 1.0), Hgb 13.6 with , platelets normal.  On 5/15/2022, she was noted to have further decreasd white blood cell count of 1.6 (ANC 0.28) with hemoglobin 11. platelets were 133. She was ultimately referred to Hematology (Dr. Bob), who she saw on 6/7/22. Further blood workup was recommended and labs done 6/14. CBC revealed WBC 25.3 (ANC 0.8), Hgb 10.1 (), Plt 90K. On the differential and morphology review, 81% blasts were seen concerning for acute leukemia. No kyle rods noted on PB blood smear pathologist review. She was advised to present to [facility] for further evaluation and management.   - CBC on admit demonstrates WBC 41K (ANC 0.0) with 86% \"other\" cells, Hgb 9.5, Plt 72K. Per verbal report from Heme Path fellow there was concern for Kyle rods. Coags WNL, no e/o TLS.   - sent PB " FISH, PML-ANNELIESE (negative), flow cytometry.   - was started on ATRA on 6/16 PM prior to result of PML-ANNELIESE. Given that this test is now negative, stopped ATRA after 6/17 AM dose  - Start Hydrea 1g BID (x 6/17), increase to 2g BID (x 6/19), Continue hydrea to 2g QID  - BMBx done 6/17, pending  - CBC w/ diff, TLS/DIC labs BID  - HLA typing sent, will need message sent to BMT coordinators. Awaiting NPM1 and FLT3 results.  - CT CAP without evidence for infectious source     # Risk of TLS  - Continue Allopurinol. Ok to hold off on IVFs at this time; pt drinking PO fluids well and no lysis. If develops e/o lysis on labs, will start NS @ 100 ml/hr.  - Phos and uric acid slightly uptrending today. Monitor  - TLS BID (BMP, Phos, Uric Acid), LDH daily, DIC labs BID   ? If uric acid >8, give rasburicase 6 mg x1 dose  ? If phosphorus >5, start Phoslo TID  ? Additional correction of electrolyte abnormalities (K+, Ca++) PRN per usual means.     # Pancytopenia  2/2 underlying malignancy.  - transfuse to maintain Hgb >7, Plt >10K   - conditional orders in place for FFP if INR >1.8, cryo if fibrinogen <100    - blood consent signed on 6/17    # Mild coagulopathy  INR gradually uptrended 1.1-->1.3. No bleeding. Fibrinogen WNL.   - monitor with DIC labs BID as above     GYN  # LMP  Approximately 2 weeks PTA, bleeding was heavier than normal with clots. Usually moderate lasting ~3 days.      ID  # ID PPx  Denies fevers. Given profound neutropenia, started prophy anti-infectives  - ACV 400mg BID  - Levaquin 250mg daily  - Micafungin 50mg IV daily     # H/o C.diff  # Hemorrhoid  Complicated her C.diff course and has been waxing/waning since. Sometimes painful. She primarily has used witch hazel pads when symptomatic. Stools have normalized.   - CT A/P negative for perirectal abscess.  - TUCKS PRN  - Avoid PTA probiotic at this time.  - C. Diff negative. Will hold off on adding oral ppx vanco.  - Continue to monitor for diarrhea. Recheck  C.diff status if diarrhea occurs.    # h/o COVID-19  Pt is not vaccinated nor interested in being vaccinated. She was admitted from 10/1/2021 - 10/13/2021 for acute hypoxic respiratory failure from COVID-19 pneumonia.  Required IMC, high flow and intermittent CPAP. She was treated with dexamethasone, remdesivir, and baricitinib in addition to azithromycin and ceftriaxone. Recovered symptomatically from this.     Endocrine  # Hypothyroidism  - continue PTA synthroid    MISC  # Social  - Patient considering donating her hair prior to receiving chemotherapy.  - [employer] application faxed 6/21/22.         FEN  - no current IVFs  - monitor lyte, hold off on automatic repletion scales while assessing for TLS  - regular diet as tolerated     PPx  - VTE: None due to worsening TCP and new leukemia/possible APL  - GI: none at present  - bowels: PRN     Dispo: Admit for workup of new acute leukemia. Unclear at this time but anticipated 3-4 week LOS at minimum.      Plan of care discussed with Dr. Perkins     I spent >70 minutes in the care of this patient today, which included time necessary for review of interval events, obtaining history and physical exam, ordering medication(s)/test(s) as medically indicated, discussion with interdisciplinary/consult team(s), and documentation time. Over 50% of time was spent face-to-face and/or coordinating care.     Gaby Arteaga PA-C  Heme/Onc  [phone](pager)     Interval History   No acute events overnight, afebrile.   Minerva continues to feel well today. Having some fatigue. States her sleep was shortened due to being woken up in the morning. No other new symptoms or concerns.   Continues to remain active and walking the halls.   Re-discussed treatment options and answered all questions at bedside.  Minerva is thinking about donating her hair prior to getting chemotherapy. Have arranged for a  to come in and then will send her hair in for donation if Minerva wishes to  proceed.   Denies fever, chills, mouth sores, headache, SOB, cough, abdominal pain, diarrhea, constipation, nausea, vomiting, dysuria, hematuria, numbness, tingling, swelling    Physical Exam   Temp: 98.9  F (37.2  C) Temp src: Oral BP: 134/88 Pulse: 103   Resp: 18 SpO2: 97 % O2 Device: None (Room air)    Vitals:    06/19/22 0821 06/20/22 1501 06/21/22 1037   Weight: 118.5 kg (261 lb 3.2 oz) 118.5 kg (261 lb 4.8 oz) 117.3 kg (258 lb 11.2 oz)     Vital Signs with Ranges  Temp:  [98  F (36.7  C)-99.5  F (37.5  C)] 98.9  F (37.2  C)  Pulse:  [] 103  Resp:  [18] 18  BP: (104-138)/(53-88) 134/88  SpO2:  [96 %-98 %] 97 %  I/O last 3 completed shifts:  In: 740 [P.O.:600; I.V.:140]  Out: -     Constitutional: Pleasant, generally well-appearing female seen sitting up in chair.   HEENT: NC/AT. Lids and lashes normal. Sclerae anicteric. No nasal drainage. OP pink and moist. No lesions or thrush.   Respiratory: No increased work of breathing, good air exchange, on RA. Lungs CTA b/l.   Cardiovascular: RRR. No edema.  GI: Normal BS. Abd soft, non-distended, non-tender.  Skin: No bruising or bleeding, normal skin color, texture. No lesion or rashes appreciated on exposed skin surfaces.   Musculoskeletal: Exremities grossly normal in appearance. Tone is normal. LPIC BMBx site c/d/i, non-tender to light palpation.  Neurologic: Awake, alert, oriented. Grossly nonfocal. Speech normal.   Neuropsychiatric: Calm, normal eye contact, normal affect  VAD: PICC in LUE, no further bleeding under disk since dressing was changed. No erythema or edema.        Medications     - MEDICATION INSTRUCTIONS -         acyclovir  400 mg Oral BID     allopurinol  300 mg Oral Daily     heparin lock flush  5-20 mL Intracatheter Q24H     hydroxyurea  2,000 mg Oral 4x Daily     levofloxacin  250 mg Oral Daily     levothyroxine  125 mcg Oral Daily     micafungin  50 mg Intravenous Q24H     multivitamin w/minerals  1 tablet Oral Daily     vitamin C  1,000  mg Oral Daily     vitamin D3  50 mcg Oral Daily       Data   Results for orders placed or performed during the hospital encounter of 06/16/22 (from the past 24 hour(s))   Lactic Acid STAT   Result Value Ref Range    Lactic Acid 1.0 0.7 - 2.0 mmol/L   CBC with platelets differential    Narrative    The following orders were created for panel order CBC with platelets differential.  Procedure                               Abnormality         Status                     ---------                               -----------         ------                     CBC with platelets and d...[[id number]]  Abnormal            Final result               Manual Differential[[id number]]          Abnormal            Final result                 Please view results for these tests on the individual orders.   Basic metabolic panel   Result Value Ref Range    Sodium 139 133 - 144 mmol/L    Potassium 3.5 3.4 - 5.3 mmol/L    Chloride 107 94 - 109 mmol/L    Carbon Dioxide (CO2) 24 20 - 32 mmol/L    Anion Gap 8 3 - 14 mmol/L    Urea Nitrogen 9 7 - 30 mg/dL    Creatinine 0.63 0.52 - 1.04 mg/dL    Calcium 9.1 8.5 - 10.1 mg/dL    Glucose 101 (H) 70 - 99 mg/dL    GFR Estimate >90 >60 mL/min/1.73m2   Phosphorus   Result Value Ref Range    Phosphorus 3.6 2.5 - 4.5 mg/dL   INR   Result Value Ref Range    INR 1.25 (H) 0.85 - 1.15   Fibrinogen activity   Result Value Ref Range    Fibrinogen Activity 309 170 - 490 mg/dL   Partial thromboplastin time   Result Value Ref Range    aPTT 34 22 - 38 Seconds   Uric acid   Result Value Ref Range    Uric Acid 3.5 2.6 - 6.0 mg/dL   CBC with platelets and differential   Result Value Ref Range    WBC Count 80.5 (HH) 4.0 - 11.0 10e3/uL    RBC Count 2.19 (L) 3.80 - 5.20 10e6/uL    Hemoglobin 8.0 (L) 11.7 - 15.7 g/dL    Hematocrit 23.7 (L) 35.0 - 47.0 %     (H) 78 - 100 fL    MCH 36.5 (H) 26.5 - 33.0 pg    MCHC 33.8 31.5 - 36.5 g/dL    RDW 14.7 10.0 - 15.0 %    Platelet Count 37 (LL) 150 - 450 10e3/uL     "Narrative    Previously reported component [ NRBCs ] is no longer reported.\"  Previously reported component [ NRBCs Absolute ] is no longer reported.\"   Manual Differential   Result Value Ref Range    % Neutrophils 0 %    % Lymphocytes 7 %    % Monocytes 0 %    % Eosinophils 0 %    % Basophils 0 %    % Blasts 93 %    Absolute Neutrophils 0.0 (LL) 1.6 - 8.3 10e3/uL    Absolute Lymphocytes 5.6 (H) 0.8 - 5.3 10e3/uL    Absolute Monocytes 0.0 0.0 - 1.3 10e3/uL    Absolute Eosinophils 0.0 0.0 - 0.7 10e3/uL    Absolute Basophils 0.0 0.0 - 0.2 10e3/uL    Absolute Blasts 74.9 (H) <=0.0 10e3/uL    RBC Morphology Confirmed RBC Indices     Platelet Assessment  Automated Count Confirmed. Platelet morphology is normal.     Automated Count Confirmed. Platelet morphology is normal.   CBC with platelets differential    Narrative    The following orders were created for panel order CBC with platelets differential.  Procedure                               Abnormality         Status                     ---------                               -----------         ------                     CBC with platelets and d...[[id number]]  Abnormal            Final result               Manual Differential[[id number]]          Abnormal            Final result                 Please view results for these tests on the individual orders.   Lactate Dehydrogenase   Result Value Ref Range    Lactate Dehydrogenase 391 (H) 81 - 234 U/L   Magnesium   Result Value Ref Range    Magnesium 2.5 (H) 1.6 - 2.3 mg/dL   Basic metabolic panel   Result Value Ref Range    Sodium 141 133 - 144 mmol/L    Potassium 3.6 3.4 - 5.3 mmol/L    Chloride 109 94 - 109 mmol/L    Carbon Dioxide (CO2) 27 20 - 32 mmol/L    Anion Gap 5 3 - 14 mmol/L    Urea Nitrogen 10 7 - 30 mg/dL    Creatinine 0.52 0.52 - 1.04 mg/dL    Calcium 9.0 8.5 - 10.1 mg/dL    Glucose 99 70 - 99 mg/dL    GFR Estimate >90 >60 mL/min/1.73m2   Phosphorus   Result Value Ref Range    Phosphorus 4.0 2.5 - 4.5 mg/dL " "  INR   Result Value Ref Range    INR 1.29 (H) 0.85 - 1.15   Fibrinogen activity   Result Value Ref Range    Fibrinogen Activity 291 170 - 490 mg/dL   Partial thromboplastin time   Result Value Ref Range    aPTT 36 22 - 38 Seconds   Uric acid   Result Value Ref Range    Uric Acid 4.3 2.6 - 6.0 mg/dL   Hepatic panel   Result Value Ref Range    Bilirubin Total 0.8 0.2 - 1.3 mg/dL    Bilirubin Direct 0.1 0.0 - 0.2 mg/dL    Protein Total 6.6 (L) 6.8 - 8.8 g/dL    Albumin 3.4 3.4 - 5.0 g/dL    Alkaline Phosphatase 50 40 - 150 U/L    AST 20 0 - 45 U/L    ALT 21 0 - 50 U/L   CBC with platelets and differential   Result Value Ref Range    WBC Count 72.1 (HH) 4.0 - 11.0 10e3/uL    RBC Count 2.23 (L) 3.80 - 5.20 10e6/uL    Hemoglobin 8.0 (L) 11.7 - 15.7 g/dL    Hematocrit 24.3 (L) 35.0 - 47.0 %     (H) 78 - 100 fL    MCH 35.9 (H) 26.5 - 33.0 pg    MCHC 32.9 31.5 - 36.5 g/dL    RDW 14.8 10.0 - 15.0 %    Platelet Count 33 (LL) 150 - 450 10e3/uL    Narrative    Previously reported component [ NRBCs ] is no longer reported.\"  Previously reported component [ NRBCs Absolute ] is no longer reported.\"   Manual Differential   Result Value Ref Range    % Neutrophils 1 %    % Lymphocytes 8 %    % Monocytes 0 %    % Eosinophils 0 %    % Basophils 0 %    % Blasts 91 %    NRBCs per 100 WBC 1 (H) <=0 %    Absolute Neutrophils 0.7 (L) 1.6 - 8.3 10e3/uL    Absolute Lymphocytes 5.8 (H) 0.8 - 5.3 10e3/uL    Absolute Monocytes 0.0 0.0 - 1.3 10e3/uL    Absolute Eosinophils 0.0 0.0 - 0.7 10e3/uL    Absolute Basophils 0.0 0.0 - 0.2 10e3/uL    Absolute Blasts 65.6 (H) <=0.0 10e3/uL    Absolute NRBCs 0.7 (H) <=0.0 10e3/uL    RBC Morphology Confirmed RBC Indices     Platelet Assessment  Automated Count Confirmed. Platelet morphology is normal.     Automated Count Confirmed. Platelet morphology is normal.    Teardrop Cells Slight (A) None Seen       "

## 2022-06-21 NOTE — PLAN OF CARE
[id number]:     A&O x4. VSS on RA. UAL. Pt reports mild discomfort in left upper arm r/t PICC. Denies having any N/V or SOB. Last BM: 6/20, and pt reports voiding spontaneously with UAOP. Patient hopeful to start chemo today. Large bruise on left upper arm r/t PICC. Bmbx site - dressing is CDI.

## 2022-06-21 NOTE — PLAN OF CARE
Goal Outcome Evaluation:    [id number]    Tachycardic at rest 100-110s, OVSS on RA. Sepsis triggered at 1500, lactic 1.3. Denies pain, SOB and N/V. PICC heparin locked. Continues on Hydrea 4 times a day. Up independently, voiding spontaneously. No BM this shift. Appetite good. Able to make needs known. Pt was able to cut hair for donation. Pt is in good spirits and verbalized eagerness to start treatment.

## 2022-06-21 NOTE — PLAN OF CARE
"Goal Outcome Evaluation:    Plan of Care Reviewed With: patient     Overall Patient Progress: improving    Time 7534-0996    /78 (BP Location: Right arm)   Pulse 98   Temp 99.3  F (37.4  C) (Oral)   Resp 18   Ht 1.702 m (5' 7\")   Wt 118.5 kg (261 lb 4.8 oz)   SpO2 97%   BMI 40.93 kg/m      Reason for admission: Workup of new acute leukemia.  Activity: Independent, ambulated in halls.  Pain: Denies  Neuro: A&Ox4  Cardiac: wnl x tachycardic  Respiratory: wnl  GI/: Voiding adequately. Last BM today.  Diet: Regular  Lines: PICC - heparin locked  Wounds: Bruising on left underarm. BMBx site CDI.  Labs/imaging: Triggered sepsis - lactic 1.0      New changes this shift: Pt requested mini fridge for room. Start chemo 6/21/22. Provided chemo education.     Plan: Unclear at this time but anticipated 3-4 week LOS at minimum.       Continue to monitor and follow POC       "

## 2022-06-22 LAB
ALBUMIN SERPL BCG-MCNC: 3.8 G/DL (ref 3.5–5.2)
ALP SERPL-CCNC: 48 U/L (ref 35–104)
ALT SERPL W P-5'-P-CCNC: 19 U/L (ref 10–35)
ANION GAP SERPL CALCULATED.3IONS-SCNC: 11 MMOL/L (ref 7–15)
APTT PPP: 37 SECONDS (ref 22–38)
AST SERPL W P-5'-P-CCNC: 20 U/L (ref 10–35)
AUER BODIES BLD QL SMEAR: PRESENT
BASOPHILS # BLD MANUAL: 0 10E3/UL (ref 0–0.2)
BASOPHILS # BLD MANUAL: 0 10E3/UL (ref 0–0.2)
BASOPHILS NFR BLD MANUAL: 0 %
BASOPHILS NFR BLD MANUAL: 0 %
BILIRUB DIRECT SERPL-MCNC: <0.2 MG/DL (ref 0–0.3)
BILIRUB SERPL-MCNC: 0.6 MG/DL
BLASTS # BLD MANUAL: 57.3 10E3/UL
BLASTS # BLD MANUAL: 62.4 10E3/UL
BLASTS NFR BLD MANUAL: 96 %
BLASTS NFR BLD MANUAL: 96 %
BUN SERPL-MCNC: 12.5 MG/DL (ref 6–20)
CALCIUM SERPL-MCNC: 9.1 MG/DL (ref 8.6–10)
CHLORIDE SERPL-SCNC: 107 MMOL/L (ref 98–107)
CREAT SERPL-MCNC: 0.52 MG/DL (ref 0.51–0.95)
DACRYOCYTES BLD QL SMEAR: SLIGHT
DEPRECATED HCO3 PLAS-SCNC: 22 MMOL/L (ref 22–29)
EOSINOPHIL # BLD MANUAL: 0 10E3/UL (ref 0–0.7)
EOSINOPHIL # BLD MANUAL: 0 10E3/UL (ref 0–0.7)
EOSINOPHIL NFR BLD MANUAL: 0 %
EOSINOPHIL NFR BLD MANUAL: 0 %
ERYTHROCYTE [DISTWIDTH] IN BLOOD BY AUTOMATED COUNT: 14.9 % (ref 10–15)
ERYTHROCYTE [DISTWIDTH] IN BLOOD BY AUTOMATED COUNT: 14.9 % (ref 10–15)
FIBRINOGEN PPP-MCNC: 262 MG/DL (ref 170–490)
FRAGMENTS BLD QL SMEAR: SLIGHT
GFR SERPL CREATININE-BSD FRML MDRD: >90 ML/MIN/1.73M2
GLUCOSE SERPL-MCNC: 100 MG/DL (ref 70–99)
HCT VFR BLD AUTO: 23.4 % (ref 35–47)
HCT VFR BLD AUTO: 25 % (ref 35–47)
HGB BLD-MCNC: 7.6 G/DL (ref 11.7–15.7)
HGB BLD-MCNC: 8.3 G/DL (ref 11.7–15.7)
INR PPP: 1.26 (ref 0.85–1.15)
INTERPRETATION: NORMAL
INTERPRETATION: NORMAL
LACTATE SERPL-SCNC: 0.6 MMOL/L (ref 0.7–2)
LDH SERPL L TO P-CCNC: 350 U/L (ref 0–250)
LYMPHOCYTES # BLD MANUAL: 2 10E3/UL (ref 0.8–5.3)
LYMPHOCYTES # BLD MANUAL: 2.4 10E3/UL (ref 0.8–5.3)
LYMPHOCYTES NFR BLD MANUAL: 3 %
LYMPHOCYTES NFR BLD MANUAL: 4 %
MAGNESIUM SERPL-MCNC: 2.2 MG/DL (ref 1.7–2.3)
MCH RBC QN AUTO: 35.3 PG (ref 26.5–33)
MCH RBC QN AUTO: 35.5 PG (ref 26.5–33)
MCHC RBC AUTO-ENTMCNC: 32.5 G/DL (ref 31.5–36.5)
MCHC RBC AUTO-ENTMCNC: 33.2 G/DL (ref 31.5–36.5)
MCV RBC AUTO: 106 FL (ref 78–100)
MCV RBC AUTO: 109 FL (ref 78–100)
MONOCYTES # BLD MANUAL: 0 10E3/UL (ref 0–1.3)
MONOCYTES # BLD MANUAL: 0 10E3/UL (ref 0–1.3)
MONOCYTES NFR BLD MANUAL: 0 %
MONOCYTES NFR BLD MANUAL: 0 %
NEUTROPHILS # BLD MANUAL: 0 10E3/UL (ref 1.6–8.3)
NEUTROPHILS # BLD MANUAL: 0.7 10E3/UL (ref 1.6–8.3)
NEUTROPHILS NFR BLD MANUAL: 0 %
NEUTROPHILS NFR BLD MANUAL: 1 %
NRBC # BLD AUTO: 0.7 10E3/UL
NRBC BLD MANUAL-RTO: 1 %
PHOSPHATE SERPL-MCNC: 3.9 MG/DL (ref 2.5–4.5)
PLAT MORPH BLD: ABNORMAL
PLAT MORPH BLD: ABNORMAL
PLATELET # BLD AUTO: 27 10E3/UL (ref 150–450)
PLATELET # BLD AUTO: 31 10E3/UL (ref 150–450)
POTASSIUM SERPL-SCNC: 3.8 MMOL/L (ref 3.4–4.5)
PROT SERPL-MCNC: 6.1 G/DL (ref 6.4–8.3)
RBC # BLD AUTO: 2.14 10E6/UL (ref 3.8–5.2)
RBC # BLD AUTO: 2.35 10E6/UL (ref 3.8–5.2)
RBC MORPH BLD: ABNORMAL
RBC MORPH BLD: ABNORMAL
SODIUM SERPL-SCNC: 140 MMOL/L (ref 136–145)
URATE SERPL-MCNC: 5 MG/DL (ref 2.4–5.7)
WBC # BLD AUTO: 59.7 10E3/UL (ref 4–11)
WBC # BLD AUTO: 65 10E3/UL (ref 4–11)

## 2022-06-22 PROCEDURE — 250N000013 HC RX MED GY IP 250 OP 250 PS 637: Performed by: PHYSICIAN ASSISTANT

## 2022-06-22 PROCEDURE — 83605 ASSAY OF LACTIC ACID: CPT | Performed by: STUDENT IN AN ORGANIZED HEALTH CARE EDUCATION/TRAINING PROGRAM

## 2022-06-22 PROCEDURE — 84100 ASSAY OF PHOSPHORUS: CPT | Performed by: PHYSICIAN ASSISTANT

## 2022-06-22 PROCEDURE — 999N000007 HC SITE CHECK

## 2022-06-22 PROCEDURE — 83615 LACTATE (LD) (LDH) ENZYME: CPT | Performed by: PHYSICIAN ASSISTANT

## 2022-06-22 PROCEDURE — 250N000011 HC RX IP 250 OP 636: Performed by: PHYSICIAN ASSISTANT

## 2022-06-22 PROCEDURE — 85007 BL SMEAR W/DIFF WBC COUNT: CPT | Performed by: PHYSICIAN ASSISTANT

## 2022-06-22 PROCEDURE — 85610 PROTHROMBIN TIME: CPT | Performed by: PHYSICIAN ASSISTANT

## 2022-06-22 PROCEDURE — 99207 PR SC NO CHARGE VISIT: CPT | Performed by: STUDENT IN AN ORGANIZED HEALTH CARE EDUCATION/TRAINING PROGRAM

## 2022-06-22 PROCEDURE — 250N000011 HC RX IP 250 OP 636: Performed by: STUDENT IN AN ORGANIZED HEALTH CARE EDUCATION/TRAINING PROGRAM

## 2022-06-22 PROCEDURE — 84550 ASSAY OF BLOOD/URIC ACID: CPT | Performed by: PHYSICIAN ASSISTANT

## 2022-06-22 PROCEDURE — 85730 THROMBOPLASTIN TIME PARTIAL: CPT | Performed by: PHYSICIAN ASSISTANT

## 2022-06-22 PROCEDURE — 83735 ASSAY OF MAGNESIUM: CPT | Performed by: PHYSICIAN ASSISTANT

## 2022-06-22 PROCEDURE — 85014 HEMATOCRIT: CPT | Performed by: PHYSICIAN ASSISTANT

## 2022-06-22 PROCEDURE — 3E04305 INTRODUCTION OF OTHER ANTINEOPLASTIC INTO CENTRAL VEIN, PERCUTANEOUS APPROACH: ICD-10-PCS | Performed by: STUDENT IN AN ORGANIZED HEALTH CARE EDUCATION/TRAINING PROGRAM

## 2022-06-22 PROCEDURE — 82248 BILIRUBIN DIRECT: CPT | Performed by: PHYSICIAN ASSISTANT

## 2022-06-22 PROCEDURE — 258N000003 HC RX IP 258 OP 636: Performed by: STUDENT IN AN ORGANIZED HEALTH CARE EDUCATION/TRAINING PROGRAM

## 2022-06-22 PROCEDURE — 85384 FIBRINOGEN ACTIVITY: CPT | Performed by: PHYSICIAN ASSISTANT

## 2022-06-22 PROCEDURE — 99232 SBSQ HOSP IP/OBS MODERATE 35: CPT | Performed by: STUDENT IN AN ORGANIZED HEALTH CARE EDUCATION/TRAINING PROGRAM

## 2022-06-22 PROCEDURE — 36592 COLLECT BLOOD FROM PICC: CPT | Performed by: STUDENT IN AN ORGANIZED HEALTH CARE EDUCATION/TRAINING PROGRAM

## 2022-06-22 PROCEDURE — 120N000002 HC R&B MED SURG/OB UMMC

## 2022-06-22 PROCEDURE — 36592 COLLECT BLOOD FROM PICC: CPT | Performed by: PHYSICIAN ASSISTANT

## 2022-06-22 PROCEDURE — 258N000003 HC RX IP 258 OP 636: Performed by: PHYSICIAN ASSISTANT

## 2022-06-22 RX ORDER — ONDANSETRON 8 MG/1
8 TABLET, FILM COATED ORAL EVERY 12 HOURS
Status: COMPLETED | OUTPATIENT
Start: 2022-06-22 | End: 2022-06-29

## 2022-06-22 RX ORDER — LORAZEPAM 0.5 MG/1
.5-1 TABLET ORAL EVERY 6 HOURS PRN
Status: DISCONTINUED | OUTPATIENT
Start: 2022-06-22 | End: 2022-07-15 | Stop reason: HOSPADM

## 2022-06-22 RX ORDER — DIPHENHYDRAMINE HYDROCHLORIDE 50 MG/ML
50 INJECTION INTRAMUSCULAR; INTRAVENOUS
Status: DISCONTINUED | OUTPATIENT
Start: 2022-06-22 | End: 2022-07-15 | Stop reason: HOSPADM

## 2022-06-22 RX ORDER — NALOXONE HYDROCHLORIDE 0.4 MG/ML
0.2 INJECTION, SOLUTION INTRAMUSCULAR; INTRAVENOUS; SUBCUTANEOUS
Status: DISCONTINUED | OUTPATIENT
Start: 2022-06-22 | End: 2022-07-15 | Stop reason: HOSPADM

## 2022-06-22 RX ORDER — LORAZEPAM 2 MG/ML
.5-1 INJECTION INTRAMUSCULAR EVERY 6 HOURS PRN
Status: DISCONTINUED | OUTPATIENT
Start: 2022-06-22 | End: 2022-06-22

## 2022-06-22 RX ORDER — ALBUTEROL SULFATE 90 UG/1
1-2 AEROSOL, METERED RESPIRATORY (INHALATION)
Status: DISCONTINUED | OUTPATIENT
Start: 2022-06-22 | End: 2022-07-14

## 2022-06-22 RX ORDER — PROCHLORPERAZINE MALEATE 10 MG
10 TABLET ORAL EVERY 6 HOURS PRN
Status: DISCONTINUED | OUTPATIENT
Start: 2022-06-22 | End: 2022-07-15 | Stop reason: HOSPADM

## 2022-06-22 RX ORDER — METHYLPREDNISOLONE SODIUM SUCCINATE 125 MG/2ML
125 INJECTION, POWDER, LYOPHILIZED, FOR SOLUTION INTRAMUSCULAR; INTRAVENOUS
Status: DISCONTINUED | OUTPATIENT
Start: 2022-06-22 | End: 2022-07-14

## 2022-06-22 RX ORDER — ALBUTEROL SULFATE 0.83 MG/ML
2.5 SOLUTION RESPIRATORY (INHALATION)
Status: DISCONTINUED | OUTPATIENT
Start: 2022-06-22 | End: 2022-07-14

## 2022-06-22 RX ORDER — ALLOPURINOL 300 MG/1
300 TABLET ORAL DAILY
Status: DISCONTINUED | OUTPATIENT
Start: 2022-06-22 | End: 2022-06-28

## 2022-06-22 RX ORDER — MEPERIDINE HYDROCHLORIDE 25 MG/ML
25 INJECTION INTRAMUSCULAR; INTRAVENOUS; SUBCUTANEOUS EVERY 30 MIN PRN
Status: DISCONTINUED | OUTPATIENT
Start: 2022-06-22 | End: 2022-07-15 | Stop reason: HOSPADM

## 2022-06-22 RX ORDER — DEXAMETHASONE 4 MG/1
12 TABLET ORAL EVERY 24 HOURS
Status: COMPLETED | OUTPATIENT
Start: 2022-06-22 | End: 2022-06-24

## 2022-06-22 RX ORDER — EPINEPHRINE 1 MG/ML
0.3 INJECTION, SOLUTION, CONCENTRATE INTRAVENOUS EVERY 5 MIN PRN
Status: DISCONTINUED | OUTPATIENT
Start: 2022-06-22 | End: 2022-07-14

## 2022-06-22 RX ADMIN — SODIUM CHLORIDE, PRESERVATIVE FREE 5 ML: 5 INJECTION INTRAVENOUS at 16:47

## 2022-06-22 RX ADMIN — Medication 50 MCG: at 08:15

## 2022-06-22 RX ADMIN — ACYCLOVIR 400 MG: 400 TABLET ORAL at 08:15

## 2022-06-22 RX ADMIN — ONDANSETRON HYDROCHLORIDE 8 MG: 8 TABLET, FILM COATED ORAL at 16:22

## 2022-06-22 RX ADMIN — ALLOPURINOL 300 MG: 300 TABLET ORAL at 08:15

## 2022-06-22 RX ADMIN — HYDROXYUREA 2000 MG: 500 CAPSULE ORAL at 08:16

## 2022-06-22 RX ADMIN — DAUNORUBICIN HYDROCHLORIDE 212 MG: 5 INJECTION, SOLUTION INTRAVENOUS at 17:35

## 2022-06-22 RX ADMIN — LEVOFLOXACIN 250 MG: 250 TABLET, FILM COATED ORAL at 08:15

## 2022-06-22 RX ADMIN — OXYCODONE HYDROCHLORIDE AND ACETAMINOPHEN 1000 MG: 500 TABLET ORAL at 08:15

## 2022-06-22 RX ADMIN — Medication 1 TABLET: at 13:17

## 2022-06-22 RX ADMIN — ACYCLOVIR 400 MG: 400 TABLET ORAL at 19:35

## 2022-06-22 RX ADMIN — DEXAMETHASONE 12 MG: 4 TABLET ORAL at 16:23

## 2022-06-22 RX ADMIN — SODIUM CHLORIDE, PRESERVATIVE FREE 5 ML: 5 INJECTION INTRAVENOUS at 17:40

## 2022-06-22 RX ADMIN — MICAFUNGIN 50 MG: 10 INJECTION, POWDER, LYOPHILIZED, FOR SOLUTION INTRAVENOUS at 16:23

## 2022-06-22 RX ADMIN — CYTARABINE 240 MG: 100 INJECTION, SOLUTION INTRATHECAL; INTRAVENOUS; SUBCUTANEOUS at 18:47

## 2022-06-22 ASSESSMENT — ACTIVITIES OF DAILY LIVING (ADL)
ADLS_ACUITY_SCORE: 18
DEPENDENT_IADLS:: INDEPENDENT
ADLS_ACUITY_SCORE: 18

## 2022-06-22 NOTE — PROGRESS NOTES
"[facility]    Hematology / Oncology Progress Note    Date of Admission: 6/16/2022  Date of Service (when I saw the patient): 06/22/2022     Assessment & Plan   Veda Marinelli is a 37 year old female with PMH significant for h/o COVID19 infection (10/2021), C.diff, and hypothyroidism who presents with leukocytosis and circulating blasts, concerning for acute leukemia. Patient has been medicallyy stable therefore we will continue to await final BMBx results prior to initiation of treatment.    Today:  - BMBx shows markedly hypercellular marrow (85-95% estimated cellularity), with markedly diminished trilineage hematopoiesis, no overt dysplasia in the evaluable elements, and 92% blasts    - FLT3 ITD +/ TKD neg  - Will plan to initiate chemotherapy today (C1D1: 6/22/22) with 7+3+Midostaurin  - Continue ppx antiinfectives and best supportive cares  - Avoid PTA probiotic. C diff negative so will plan to hold off on adding ppx PO Vanco. Continue to monitor closely.    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 On 3/8/2022, white blood cell count 2.5 (ANC 1.0), Hgb 13.6 with , platelets normal.  On 5/15/2022, she was noted to have further decreasd white blood cell count of 1.6 (ANC 0.28) with hemoglobin 11. platelets were 133. She was ultimately referred to Hematology (Dr. Bob), who she saw on 6/7/22. Further blood workup was recommended and labs done 6/14. CBC revealed WBC 25.3 (ANC 0.8), Hgb 10.1 (), Plt 90K. On the differential and morphology review, 81% blasts were seen concerning for acute leukemia. No kyle rods noted on PB blood smear pathologist review. She was advised to present to [facility] for further evaluation and management.   - CBC on admit demonstrates WBC 41K (ANC 0.0) with 86% \"other\" cells, Hgb 9.5, Plt 72K. " Per verbal report from Heme Path fellow there was concern for Blanca rods. Coags WNL, no e/o TLS.   - CT CAP without evidence for infectious source   - Sent PB FISH, PML-ANNELIESE (negative), flow cytometry.   - was started on ATRA on 6/16 PM prior to result of PML-ANNELIESE. Given that this test is now negative, stopped ATRA after 6/17 AM dose  - Start Hydrea 1g BID (x 6/17), increase to 2g BID (x 6/19), Continue hydrea to 2g QID. Will now stop Hydrea will plan to initiate chemotherapy with 7+3 today.   - BMBx shows markedly hypercellular marrow (85-95% estimated cellularity), with markedly diminished trilineage hematopoiesis, no overt dysplasia in the evaluable elements, and 92% blasts    - FLT3 ITD +/ TKD neg  - Will plan to initiate chemotherapy today (C1D1: 6/22/22) with 7+3+Midostaurin  - HLA typing sent, will need message sent to BMT coordinators. Awaiting NPM1 and FLT3 results.     Treatment Plan: Cytarabine and Daunorubicin (7+3) plus Midostaurin (D1: 6/22/22)   - Daunorubicin 90mg/m2 - D1-3   - Cytarabine 100 mg/m2 - D1-7   - Midostaurin 50mg BID Day 8-21   Supportive Meds -    - Dexamethasone 12mg - D1-3   - Compazine, Zofran, Ativan    - bowel regimen     # Risk of TLS  - Continue Allopurinol. Ok to hold off on IVFs at this time; pt drinking PO fluids well and no lysis. If develops e/o lysis on labs, will start NS @ 100 ml/hr.  - Phos and uric acid slightly uptrending today. Monitor  - TLS BID (BMP, Phos, Uric Acid), LDH daily, DIC labs BID   ? If uric acid >8, give rasburicase 6 mg x1 dose  ? If phosphorus >5, start Phoslo TID  ? Additional correction of electrolyte abnormalities (K+, Ca++) PRN per usual means.     # Pancytopenia  2/2 underlying malignancy.  - transfuse to maintain Hgb >7, Plt >10K   - conditional orders in place for FFP if INR >1.8, cryo if fibrinogen <100    - blood consent signed on 6/17    # Mild coagulopathy  INR gradually uptrended 1.1-->1.3. No bleeding. Fibrinogen WNL.   - monitor with DIC  labs BID as above     GYN  # LMP  Approximately 2 weeks PTA, bleeding was heavier than normal with clots. Usually moderate lasting ~3 days.      ID  # ID PPx  Denies fevers. Given profound neutropenia, started prophy anti-infectives  - ACV 400mg BID  - Levaquin 250mg daily  - Micafungin 50mg IV daily     # H/o C.diff  # Hemorrhoid  Complicated her C.diff course and has been waxing/waning since. Sometimes painful. She primarily has used witch hazel pads when symptomatic. Stools have normalized.   - CT A/P negative for perirectal abscess.  - TUCKS PRN  - Avoid PTA probiotic at this time.  - C. Diff negative. Will hold off on adding oral ppx vanco.  - Continue to monitor for diarrhea. Recheck C.diff status if diarrhea occurs.    # h/o COVID-19  Pt is not vaccinated nor interested in being vaccinated. She was admitted from 10/1/2021 - 10/13/2021 for acute hypoxic respiratory failure from COVID-19 pneumonia.  Required IMC, high flow and intermittent CPAP. She was treated with dexamethasone, remdesivir, and baricitinib in addition to azithromycin and ceftriaxone. Recovered symptomatically from this.     Endocrine  # Hypothyroidism  - continue PTA synthroid    MISC  # Social  - Patient considering donating her hair prior to receiving chemotherapy.  - [employer] application faxed 6/21/22.    # Integrated Health Services  - Patient is interested with our integrated health services/ support  - Appreciate support from [facility] health       [facility]  - IVF per chemotherapy protocol  - monitor lyte, hold off on automatic repletion scales while assessing for TLS  - regular diet as tolerated     PPx  - VTE: None due to worsening TCP and new leukemia/possible APL  - GI: none at present  - bowels: PRN     Dispo: Admit for workup of new acute leukemia. Unclear at this time but anticipated 3-4 week LOS at minimum.      Plan of care discussed with Dr. Palacios     I spent >80 minutes in the care of this patient today, which  included time necessary for review of interval events, obtaining history and physical exam, ordering medication(s)/test(s) as medically indicated, discussion with interdisciplinary/consult team(s), and documentation time. Over 50% of time was spent face-to-face and/or coordinating care.     Gaby Arteaga PA-C  Heme/Onc  [phone](pager)     Interval History   No acute events overnight, afebrile.   Minerva reports that she is physically feeling well. Continues to struggle with the emotional side of this new diagnosis. Will consult spiritual health to offer further emotional support. Patient is interested in a second opinion from [place]. Referral placed. Multiple conversations held at bedside regarding questions of new diagnosis. All questions answered at bedside.  No other new symptoms or concerns.   Continues to remain active and walking the halls.   Denies fever, chills, mouth sores, headache, SOB, cough, abdominal pain, diarrhea, constipation, nausea, vomiting, dysuria, hematuria, numbness, tingling, swelling    Physical Exam   Temp: 99.1  F (37.3  C) Temp src: Oral BP: 115/77 Pulse: 93   Resp: 16 SpO2: 97 % O2 Device: None (Room air)    Vitals:    06/20/22 1501 06/21/22 1037 06/22/22 0726   Weight: 118.5 kg (261 lb 4.8 oz) 117.3 kg (258 lb 11.2 oz) 117.9 kg (259 lb 14.4 oz)     Vital Signs with Ranges  Temp:  [97  F (36.1  C)-99.1  F (37.3  C)] 99.1  F (37.3  C)  Pulse:  [] 93  Resp:  [16-20] 16  BP: (114-130)/(55-97) 115/77  SpO2:  [95 %-99 %] 97 %  I/O last 3 completed shifts:  In: 750 [P.O.:650; I.V.:100]  Out: -     Constitutional: Pleasant, generally well-appearing female seen sitting up in chair.   HEENT: NC/AT. Lids and lashes normal. Sclerae anicteric. No nasal drainage. OP pink and moist. No lesions or thrush.   Respiratory: No increased work of breathing, good air exchange, on RA. Lungs CTA b/l.   Cardiovascular: RRR. No edema.  GI: Normal BS. Abd soft, non-distended, non-tender.  Skin: No  bruising or bleeding, normal skin color, texture. No lesion or rashes appreciated on exposed skin surfaces.   Musculoskeletal: Exremities grossly normal in appearance. Tone is normal. LPIC BMBx site c/d/i, non-tender to light palpation.  Neurologic: Awake, alert, oriented. Grossly nonfocal. Speech normal.   Neuropsychiatric: Calm, normal eye contact, normal affect  VAD: PICC in LUE, no further bleeding under disk since dressing was changed. No erythema or edema.        Medications     - MEDICATION INSTRUCTIONS -         acyclovir  400 mg Oral BID     allopurinol  300 mg Oral Daily     allopurinol  300 mg Oral Daily     Chemotherapy Infusing-Continuous Infusion   Does not apply Q8H     cytarabine (CYTOSAR) infusion  100 mg/m2 (Treatment Plan Recorded) Intravenous Q24H     DAUNOrubicin  90 mg/m2 (Treatment Plan Recorded) Intravenous Q24H     dexamethasone  12 mg Oral Q24H     heparin lock flush  5-20 mL Intracatheter Q24H     hydroxyurea  2,000 mg Oral 4x Daily     levofloxacin  250 mg Oral Daily     levothyroxine  125 mcg Oral Daily     micafungin  50 mg Intravenous Q24H     multivitamin w/minerals  1 tablet Oral Daily     ondansetron  8 mg Oral Q12H     vitamin C  1,000 mg Oral Daily     vitamin D3  50 mcg Oral Daily       Data   Results for orders placed or performed during the hospital encounter of 06/16/22 (from the past 24 hour(s))   Lactic Acid STAT   Result Value Ref Range    Lactic Acid 1.3 0.7 - 2.0 mmol/L   CBC with platelets differential    Narrative    The following orders were created for panel order CBC with platelets differential.  Procedure                               Abnormality         Status                     ---------                               -----------         ------                     CBC with platelets and d...[[id number]]  Abnormal            Final result               Manual Differential[[id number]]          Abnormal            Final result                 Please view results for  "these tests on the individual orders.   Basic metabolic panel   Result Value Ref Range    Creatinine 0.62 0.51 - 0.95 mg/dL    Sodium 134 (L) 136 - 145 mmol/L    Potassium 3.5 3.4 - 4.5 mmol/L    Urea Nitrogen 7.1 6.0 - 20.0 mg/dL    Chloride 101 98 - 107 mmol/L    Carbon Dioxide (CO2) 23 22 - 29 mmol/L    Anion Gap 10 7 - 15 mmol/L    Glucose 99 70 - 99 mg/dL    GFR Estimate >90 >60 mL/min/1.73m2    Calcium 9.2 8.6 - 10.0 mg/dL   Phosphorus   Result Value Ref Range    Phosphorus 3.6 2.5 - 4.5 mg/dL   INR   Result Value Ref Range    INR 1.27 (H) 0.85 - 1.15   Fibrinogen activity   Result Value Ref Range    Fibrinogen Activity 302 170 - 490 mg/dL   Partial thromboplastin time   Result Value Ref Range    aPTT 38 22 - 38 Seconds   Uric acid   Result Value Ref Range    Uric Acid 4.5 2.4 - 5.7 mg/dL   CBC with platelets and differential   Result Value Ref Range    WBC Count 81.6 (HH) 4.0 - 11.0 10e3/uL    RBC Count 2.37 (L) 3.80 - 5.20 10e6/uL    Hemoglobin 8.4 (L) 11.7 - 15.7 g/dL    Hematocrit 25.4 (L) 35.0 - 47.0 %     (H) 78 - 100 fL    MCH 35.4 (H) 26.5 - 33.0 pg    MCHC 33.1 31.5 - 36.5 g/dL    RDW 14.8 10.0 - 15.0 %    Platelet Count 37 (LL) 150 - 450 10e3/uL    Narrative    Previously reported component [ NRBCs ] is no longer reported.\"  Previously reported component [ NRBCs Absolute ] is no longer reported.\"   Manual Differential   Result Value Ref Range    % Neutrophils 1 %    % Lymphocytes 3 %    % Monocytes 0 %    % Eosinophils 0 %    % Basophils 0 %    % Blasts 96 %    Absolute Neutrophils 0.8 (L) 1.6 - 8.3 10e3/uL    Absolute Lymphocytes 2.4 0.8 - 5.3 10e3/uL    Absolute Monocytes 0.0 0.0 - 1.3 10e3/uL    Absolute Eosinophils 0.0 0.0 - 0.7 10e3/uL    Absolute Basophils 0.0 0.0 - 0.2 10e3/uL    Absolute Blasts 78.3 (H) <=0.0 10e3/uL    RBC Morphology Confirmed RBC Indices     Platelet Assessment  Automated Count Confirmed. Platelet morphology is normal.     Automated Count Confirmed. Platelet " "morphology is normal.   CBC with platelets differential    Narrative    The following orders were created for panel order CBC with platelets differential.  Procedure                               Abnormality         Status                     ---------                               -----------         ------                     CBC with platelets and d...[[id number]]  Abnormal            Final result               Manual Differential[[id number]]          Abnormal            Final result                 Please view results for these tests on the individual orders.   Lactate Dehydrogenase   Result Value Ref Range    Lactate Dehydrogenase 350 (H) 0 - 250 U/L   Magnesium   Result Value Ref Range    Magnesium 2.2 1.7 - 2.3 mg/dL   Phosphorus   Result Value Ref Range    Phosphorus 3.9 2.5 - 4.5 mg/dL   INR   Result Value Ref Range    INR 1.26 (H) 0.85 - 1.15   Fibrinogen activity   Result Value Ref Range    Fibrinogen Activity 262 170 - 490 mg/dL   Partial thromboplastin time   Result Value Ref Range    aPTT 37 22 - 38 Seconds   Uric acid   Result Value Ref Range    Uric Acid 5.0 2.4 - 5.7 mg/dL   Hepatic panel   Result Value Ref Range    Protein Total 6.1 (L) 6.4 - 8.3 g/dL    Albumin 3.8 3.5 - 5.2 g/dL    Bilirubin Total 0.6 <=1.2 mg/dL    Alkaline Phosphatase 48 35 - 104 U/L    AST 20 10 - 35 U/L    ALT 19 10 - 35 U/L    Bilirubin Direct <0.20 0.00 - 0.30 mg/dL   CBC with platelets and differential   Result Value Ref Range    WBC Count 65.0 (HH) 4.0 - 11.0 10e3/uL    RBC Count 2.14 (L) 3.80 - 5.20 10e6/uL    Hemoglobin 7.6 (L) 11.7 - 15.7 g/dL    Hematocrit 23.4 (L) 35.0 - 47.0 %     (H) 78 - 100 fL    MCH 35.5 (H) 26.5 - 33.0 pg    MCHC 32.5 31.5 - 36.5 g/dL    RDW 14.9 10.0 - 15.0 %    Platelet Count 27 (LL) 150 - 450 10e3/uL    Narrative    Previously reported component [ NRBCs ] is no longer reported.\"  Previously reported component [ NRBCs Absolute ] is no longer reported.\"   Manual Differential   Result " Value Ref Range    % Neutrophils 1 %    % Lymphocytes 3 %    % Monocytes 0 %    % Eosinophils 0 %    % Basophils 0 %    % Blasts 96 %    NRBCs per 100 WBC 1 (H) <=0 %    Absolute Neutrophils 0.7 (L) 1.6 - 8.3 10e3/uL    Absolute Lymphocytes 2.0 0.8 - 5.3 10e3/uL    Absolute Monocytes 0.0 0.0 - 1.3 10e3/uL    Absolute Eosinophils 0.0 0.0 - 0.7 10e3/uL    Absolute Basophils 0.0 0.0 - 0.2 10e3/uL    Absolute Blasts 62.4 (H) <=0.0 10e3/uL    Absolute NRBCs 0.7 (H) <=0.0 10e3/uL    RBC Morphology Confirmed RBC Indices     Platelet Assessment  Automated Count Confirmed. Platelet morphology is normal.     Automated Count Confirmed. Platelet morphology is normal.    Blanca Rods Present (A) None Seen    RBC Fragments Slight (A) None Seen    Teardrop Cells Slight (A) None Seen

## 2022-06-22 NOTE — PLAN OF CARE
Goal Outcome Evaluation:  Denied pain or nausea. Afebrile. She will start chemotherapy tonight. Given information on the chemotherapy she will receive. Up independently. She had a stool today.

## 2022-06-22 NOTE — PROGRESS NOTES
"SPIRITUAL HEALTH SERVICES  [facility]D  REFERRAL SOURCE: Consult     Pt shared some of her recent medical narrative that has left her concerned about potential outcomes, still unknown.     Pt states that in the past she has ignored upsetting things \"I put them in a box and set them aside,\" but in this current case, pt is having some difficulty waiting for results. 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     PLAN: Chaplains remain available per pt/family/staff request.     Rev. Samia Ferrera MDiv, [facility]  Staff    Pager [phone]  * [facility] remains available 24/7 for emergent requests/referrals, either by having the switchboard page the on-call  or by entering an ASAP/STAT consult in Epic (this will also page the on-call ).*   "

## 2022-06-22 NOTE — PLAN OF CARE
Goal Outcome Evaluation:    [id number]    Tachycardic in the 100s, sepsis triggered, lactic 0.6. OVSS on RA. Denies pain, SOB and N/V. PICC infusing CIVI cytarabine. Today is D1 of 7+3. Daunorubicin given without incident, see chemo notes for details. Pt doing salt and soda rinses throughout shift. Up independently, voiding adequately. No BM this shift. Able to make needs known.

## 2022-06-22 NOTE — CONSULTS
Care Management Initial Consult    General Information  Assessment completed with: Patient, Care Team Member, Parents, chart review  Type of CM/SW Visit: Initial Assessment  Primary Care Provider verified and updated as needed: Yes   Readmission within the last 30 days: no previous admission in last 30 days   Reason for Consult: insurance concerns  Advance Care Planning Reviewed: no concerns identified       Communication Assessment  Patient's communication style: spoken language (English or Bilingual)    Hearing Difficulty or Deaf: no   Wear Glasses or Blind: no    Cognitive  Cognitive/Neuro/Behavioral: WDL                      Living Environment:   People in home: alone     Current living Arrangements: house      Able to return to prior arrangements: yes     Family/Social Support:  Care provided by: self  Provides care for: no one  Marital Status: Single  Support System: Parent(s)          Description of Support System: Supportive, Involved    Support Assessment: Adequate family and caregiver support, Adequate social supports    Current Resources:   Patient receiving home care services: No  Community Resources: None  Equipment currently used at home: none  Supplies currently used at home: None    Employment/Financial:  Employment Status: employed full-time     Financial Concerns: No concerns identified   Referral to Financial Worker: No       Lifestyle & Psychosocial Needs:  Social Determinants of Health     Tobacco Use: Low Risk      Smoking Tobacco Use: Never Smoker     Smokeless Tobacco Use: Never Used   Alcohol Use: Not on file   Financial Resource Strain: Not on file   Food Insecurity: Not on file   Transportation Needs: Not on file   Physical Activity: Not on file   Stress: Not on file   Social Connections: Not on file   Intimate Partner Violence: Not on file   Depression: Not on file   Housing Stability: Not on file       Functional Status:  Prior to admission patient needed assistance:   Dependent ADLs::  Independent  Dependent IADLs:: Independent  Assesssment of Functional Status: At functional baseline    Mental Health Status:  Mental Health Status: No Current Concerns       Chemical Dependency Status:  Chemical Dependency Status: No Current Concerns           Values/Beliefs:  Spiritual, Cultural Beliefs, [employer]ism Practices, Values that affect care: no               Additional Information:    Patient is a 37 year old female with PMH significant for h/o COVID19 infection (10/2021), C.diff, and hypothyroidism who presents with leukocytosis and circulating blasts, concerning for acute leukemia. Patient has been medicallyy stable therefore we will continue to await final BMBx results prior to initiation of treatment.    Request received from patient for  to visit.      met with patient in her hospital room; introduced self and explained role. Patient being visited with father and mother at time of writer's visit.     Patient explained that as of July 2022, her insurance will be changing from [employer] to [employer]. Patient expressed concerns that she may have a lapse in coverage, experience an error of coverage, or have to continue to pay [employer] premiums as she admitted with it.  explained that once her new plan is active and the information is available to her, to please provide the information to this writer so details can be provided to the Financial Counseling Department in order to keep her information up to date.     Patient and family inquired about financial assistance programs. Writer discussed the [employer], Cancer Care, and the [employer]. Patient stated that she does not feel like she needs to apply at this time but expressed gratitude for the information.     No additional needs or questions reported at this time. Writer provided contact information and encouraged them to reach out.     SAMMY Tyler  7D/5C  Hematology/Oncology Social Worker  Phone: [phone]  Pager: [phone]  [email or web address]

## 2022-06-22 NOTE — PROGRESS NOTES
Nursing Focus: Chemotherapy    D: Positive blood return via PICC, via purple lumen. Insertion site is clean/dry/intact, dressing intact with no complaints of pain.  Urine output is recorded in intake in Doc Flowsheet.    I: Premedications given per order (see electronic medical administration record). Dose #1 of daunorubicin started to infuse over 30 minutes. Reviewed pt teaching on chemotherapy side effects.  Pt denies need for further teaching. Chemotherapy double checked per protocol by two chemotherapy competent RN's.   A: Tolerating procedure well. Denies nausea and or pain.   P: Continue to monitor urine output and symptoms of nausea. Screen for symptoms of toxicity.

## 2022-06-22 NOTE — PLAN OF CARE
[id number]: VSS on RA. Denies pain, nausea, and SOB. LBM 6/21. No acute events. Patient hoping to start chemo today.

## 2022-06-23 ENCOUNTER — APPOINTMENT (OUTPATIENT)
Dept: GENERAL RADIOLOGY | Facility: CLINIC | Age: 37
DRG: 834 | End: 2022-06-23
Attending: PHYSICIAN ASSISTANT
Payer: COMMERCIAL

## 2022-06-23 ENCOUNTER — APPOINTMENT (OUTPATIENT)
Dept: PHYSICAL THERAPY | Facility: CLINIC | Age: 37
DRG: 834 | End: 2022-06-23
Payer: COMMERCIAL

## 2022-06-23 LAB
ALBUMIN SERPL BCG-MCNC: 4 G/DL (ref 3.5–5.2)
ALBUMIN UR-MCNC: 10 MG/DL
ALP SERPL-CCNC: 51 U/L (ref 35–104)
ALT SERPL W P-5'-P-CCNC: 33 U/L (ref 10–35)
ANION GAP SERPL CALCULATED.3IONS-SCNC: 11 MMOL/L (ref 7–15)
ANION GAP SERPL CALCULATED.3IONS-SCNC: 12 MMOL/L (ref 7–15)
APPEARANCE UR: ABNORMAL
APTT PPP: 38 SECONDS (ref 22–38)
APTT PPP: 44 SECONDS (ref 22–38)
AST SERPL W P-5'-P-CCNC: 35 U/L (ref 10–35)
BACTERIA #/AREA URNS HPF: ABNORMAL /HPF
BASOPHILS # BLD MANUAL: 0 10E3/UL (ref 0–0.2)
BASOPHILS NFR BLD MANUAL: 0 %
BILIRUB DIRECT SERPL-MCNC: <0.2 MG/DL (ref 0–0.3)
BILIRUB SERPL-MCNC: 0.5 MG/DL
BILIRUB UR QL STRIP: NEGATIVE
BLASTS # BLD MANUAL: 44.1 10E3/UL
BLASTS NFR BLD MANUAL: 96 %
BUN SERPL-MCNC: 13.7 MG/DL (ref 6–20)
BUN SERPL-MCNC: 15.2 MG/DL (ref 6–20)
CALCIUM SERPL-MCNC: 8.6 MG/DL (ref 8.6–10)
CALCIUM SERPL-MCNC: 9.4 MG/DL (ref 8.6–10)
CHLORIDE SERPL-SCNC: 104 MMOL/L (ref 98–107)
CHLORIDE SERPL-SCNC: 98 MMOL/L (ref 98–107)
COLOR UR AUTO: ABNORMAL
CREAT SERPL-MCNC: 0.52 MG/DL (ref 0.51–0.95)
CREAT SERPL-MCNC: 0.56 MG/DL (ref 0.51–0.95)
CULTURE HARVEST COMPLETE DATE: NORMAL
DACRYOCYTES BLD QL SMEAR: SLIGHT
DEPRECATED HCO3 PLAS-SCNC: 20 MMOL/L (ref 22–29)
DEPRECATED HCO3 PLAS-SCNC: 22 MMOL/L (ref 22–29)
EOSINOPHIL # BLD MANUAL: 0 10E3/UL (ref 0–0.7)
EOSINOPHIL NFR BLD MANUAL: 0 %
ERYTHROCYTE [DISTWIDTH] IN BLOOD BY AUTOMATED COUNT: 14.6 % (ref 10–15)
FIBRINOGEN PPP-MCNC: 204 MG/DL (ref 170–490)
FIBRINOGEN PPP-MCNC: 225 MG/DL (ref 170–490)
GFR SERPL CREATININE-BSD FRML MDRD: >90 ML/MIN/1.73M2
GFR SERPL CREATININE-BSD FRML MDRD: >90 ML/MIN/1.73M2
GLUCOSE SERPL-MCNC: 132 MG/DL (ref 70–99)
GLUCOSE SERPL-MCNC: 316 MG/DL (ref 70–99)
GLUCOSE UR STRIP-MCNC: NEGATIVE MG/DL
HCT VFR BLD AUTO: 24.1 % (ref 35–47)
HGB BLD-MCNC: 8.1 G/DL (ref 11.7–15.7)
HGB UR QL STRIP: NEGATIVE
INR PPP: 1.62 (ref 0.85–1.15)
INR PPP: 1.82 (ref 0.85–1.15)
INTERPRETATION: NORMAL
ISCN: NORMAL
KETONES UR STRIP-MCNC: 20 MG/DL
LACTATE SERPL-SCNC: 0.8 MMOL/L (ref 0.7–2)
LDH SERPL L TO P-CCNC: 503 U/L (ref 0–250)
LEUKOCYTE ESTERASE UR QL STRIP: NEGATIVE
LYMPHOCYTES # BLD MANUAL: 1.4 10E3/UL (ref 0.8–5.3)
LYMPHOCYTES NFR BLD MANUAL: 3 %
MAGNESIUM SERPL-MCNC: 2.3 MG/DL (ref 1.7–2.3)
MCH RBC QN AUTO: 35.7 PG (ref 26.5–33)
MCHC RBC AUTO-ENTMCNC: 33.6 G/DL (ref 31.5–36.5)
MCV RBC AUTO: 106 FL (ref 78–100)
METHODS: NORMAL
MONOCYTES # BLD MANUAL: 0 10E3/UL (ref 0–1.3)
MONOCYTES NFR BLD MANUAL: 0 %
MUCOUS THREADS #/AREA URNS LPF: PRESENT /LPF
NEUTROPHILS # BLD MANUAL: 0.5 10E3/UL (ref 1.6–8.3)
NEUTROPHILS NFR BLD MANUAL: 1 %
NITRATE UR QL: NEGATIVE
NRBC # BLD AUTO: 0.5 10E3/UL
NRBC BLD MANUAL-RTO: 1 %
PH UR STRIP: 6 [PH] (ref 5–7)
PHOSPHATE SERPL-MCNC: 3.8 MG/DL (ref 2.5–4.5)
PHOSPHATE SERPL-MCNC: 4.8 MG/DL (ref 2.5–4.5)
PLAT MORPH BLD: ABNORMAL
PLATELET # BLD AUTO: 28 10E3/UL (ref 150–450)
POTASSIUM SERPL-SCNC: 3.6 MMOL/L (ref 3.4–5.3)
POTASSIUM SERPL-SCNC: 4.2 MMOL/L (ref 3.4–4.5)
PROT SERPL-MCNC: 6.7 G/DL (ref 6.4–8.3)
RBC # BLD AUTO: 2.27 10E6/UL (ref 3.8–5.2)
RBC MORPH BLD: ABNORMAL
RBC URINE: <1 /HPF
SODIUM SERPL-SCNC: 130 MMOL/L (ref 136–145)
SODIUM SERPL-SCNC: 137 MMOL/L (ref 136–145)
SP GR UR STRIP: 1.02 (ref 1–1.03)
SQUAMOUS EPITHELIAL: 1 /HPF
URATE SERPL-MCNC: 6.2 MG/DL (ref 2.4–5.7)
URATE SERPL-MCNC: 6.2 MG/DL (ref 2.4–5.7)
UROBILINOGEN UR STRIP-MCNC: NORMAL MG/DL
WBC # BLD AUTO: 45.9 10E3/UL (ref 4–11)
WBC URINE: 4 /HPF

## 2022-06-23 PROCEDURE — 85730 THROMBOPLASTIN TIME PARTIAL: CPT | Performed by: PHYSICIAN ASSISTANT

## 2022-06-23 PROCEDURE — 84550 ASSAY OF BLOOD/URIC ACID: CPT | Performed by: PHYSICIAN ASSISTANT

## 2022-06-23 PROCEDURE — 80053 COMPREHEN METABOLIC PANEL: CPT | Performed by: PHYSICIAN ASSISTANT

## 2022-06-23 PROCEDURE — 85610 PROTHROMBIN TIME: CPT | Performed by: PHYSICIAN ASSISTANT

## 2022-06-23 PROCEDURE — 36415 COLL VENOUS BLD VENIPUNCTURE: CPT | Performed by: PHYSICIAN ASSISTANT

## 2022-06-23 PROCEDURE — 250N000011 HC RX IP 250 OP 636: Performed by: PHYSICIAN ASSISTANT

## 2022-06-23 PROCEDURE — 97110 THERAPEUTIC EXERCISES: CPT | Mod: GP

## 2022-06-23 PROCEDURE — 85007 BL SMEAR W/DIFF WBC COUNT: CPT | Performed by: PHYSICIAN ASSISTANT

## 2022-06-23 PROCEDURE — 250N000013 HC RX MED GY IP 250 OP 250 PS 637: Performed by: PHYSICIAN ASSISTANT

## 2022-06-23 PROCEDURE — 999N000044 HC STATISTIC CVC DRESSING CHANGE

## 2022-06-23 PROCEDURE — 258N000003 HC RX IP 258 OP 636: Performed by: PHYSICIAN ASSISTANT

## 2022-06-23 PROCEDURE — 85384 FIBRINOGEN ACTIVITY: CPT | Performed by: PHYSICIAN ASSISTANT

## 2022-06-23 PROCEDURE — 87040 BLOOD CULTURE FOR BACTERIA: CPT | Performed by: PHYSICIAN ASSISTANT

## 2022-06-23 PROCEDURE — 36592 COLLECT BLOOD FROM PICC: CPT | Performed by: PHYSICIAN ASSISTANT

## 2022-06-23 PROCEDURE — 83735 ASSAY OF MAGNESIUM: CPT | Performed by: PHYSICIAN ASSISTANT

## 2022-06-23 PROCEDURE — 71046 X-RAY EXAM CHEST 2 VIEWS: CPT | Mod: 26 | Performed by: STUDENT IN AN ORGANIZED HEALTH CARE EDUCATION/TRAINING PROGRAM

## 2022-06-23 PROCEDURE — 250N000011 HC RX IP 250 OP 636: Performed by: STUDENT IN AN ORGANIZED HEALTH CARE EDUCATION/TRAINING PROGRAM

## 2022-06-23 PROCEDURE — 87086 URINE CULTURE/COLONY COUNT: CPT | Performed by: PHYSICIAN ASSISTANT

## 2022-06-23 PROCEDURE — 85027 COMPLETE CBC AUTOMATED: CPT | Performed by: PHYSICIAN ASSISTANT

## 2022-06-23 PROCEDURE — 83615 LACTATE (LD) (LDH) ENZYME: CPT | Performed by: PHYSICIAN ASSISTANT

## 2022-06-23 PROCEDURE — 120N000002 HC R&B MED SURG/OB UMMC

## 2022-06-23 PROCEDURE — 82248 BILIRUBIN DIRECT: CPT | Performed by: PHYSICIAN ASSISTANT

## 2022-06-23 PROCEDURE — 250N000013 HC RX MED GY IP 250 OP 250 PS 637: Performed by: STUDENT IN AN ORGANIZED HEALTH CARE EDUCATION/TRAINING PROGRAM

## 2022-06-23 PROCEDURE — 81003 URINALYSIS AUTO W/O SCOPE: CPT | Performed by: PHYSICIAN ASSISTANT

## 2022-06-23 PROCEDURE — 83605 ASSAY OF LACTIC ACID: CPT | Performed by: STUDENT IN AN ORGANIZED HEALTH CARE EDUCATION/TRAINING PROGRAM

## 2022-06-23 PROCEDURE — 84100 ASSAY OF PHOSPHORUS: CPT | Performed by: PHYSICIAN ASSISTANT

## 2022-06-23 PROCEDURE — 71046 X-RAY EXAM CHEST 2 VIEWS: CPT

## 2022-06-23 PROCEDURE — 258N000003 HC RX IP 258 OP 636: Performed by: STUDENT IN AN ORGANIZED HEALTH CARE EDUCATION/TRAINING PROGRAM

## 2022-06-23 PROCEDURE — 99232 SBSQ HOSP IP/OBS MODERATE 35: CPT | Performed by: STUDENT IN AN ORGANIZED HEALTH CARE EDUCATION/TRAINING PROGRAM

## 2022-06-23 RX ORDER — SODIUM CHLORIDE 9 MG/ML
INJECTION, SOLUTION INTRAVENOUS CONTINUOUS
Status: DISCONTINUED | OUTPATIENT
Start: 2022-06-23 | End: 2022-06-25

## 2022-06-23 RX ADMIN — Medication 50 MCG: at 08:40

## 2022-06-23 RX ADMIN — ONDANSETRON HYDROCHLORIDE 8 MG: 8 TABLET, FILM COATED ORAL at 04:27

## 2022-06-23 RX ADMIN — ACYCLOVIR 400 MG: 400 TABLET ORAL at 20:42

## 2022-06-23 RX ADMIN — DEXAMETHASONE 12 MG: 4 TABLET ORAL at 15:28

## 2022-06-23 RX ADMIN — SODIUM CHLORIDE: 9 INJECTION, SOLUTION INTRAVENOUS at 15:25

## 2022-06-23 RX ADMIN — ACETAMINOPHEN 650 MG: 325 TABLET, FILM COATED ORAL at 15:28

## 2022-06-23 RX ADMIN — Medication 1 TABLET: at 08:40

## 2022-06-23 RX ADMIN — ACYCLOVIR 400 MG: 400 TABLET ORAL at 08:40

## 2022-06-23 RX ADMIN — CYTARABINE 240 MG: 100 INJECTION, SOLUTION INTRATHECAL; INTRAVENOUS; SUBCUTANEOUS at 17:22

## 2022-06-23 RX ADMIN — MICAFUNGIN 50 MG: 10 INJECTION, POWDER, LYOPHILIZED, FOR SOLUTION INTRAVENOUS at 17:09

## 2022-06-23 RX ADMIN — OXYCODONE HYDROCHLORIDE AND ACETAMINOPHEN 1000 MG: 500 TABLET ORAL at 08:40

## 2022-06-23 RX ADMIN — ALLOPURINOL 300 MG: 300 TABLET ORAL at 08:40

## 2022-06-23 RX ADMIN — CEFEPIME HYDROCHLORIDE 2 G: 2 INJECTION, POWDER, FOR SOLUTION INTRAVENOUS at 16:16

## 2022-06-23 RX ADMIN — ONDANSETRON HYDROCHLORIDE 8 MG: 8 TABLET, FILM COATED ORAL at 15:28

## 2022-06-23 RX ADMIN — LEVOFLOXACIN 250 MG: 250 TABLET, FILM COATED ORAL at 08:42

## 2022-06-23 RX ADMIN — DAUNORUBICIN HYDROCHLORIDE 212 MG: 5 INJECTION, SOLUTION INTRAVENOUS at 18:34

## 2022-06-23 ASSESSMENT — ACTIVITIES OF DAILY LIVING (ADL)
ADLS_ACUITY_SCORE: 18

## 2022-06-23 NOTE — PROGRESS NOTES
Nursing Focus: Chemotherapy    D: Positive blood return via PICC, purple lumen. Insertion site is clean/dry/intact, dressing intact with no complaints of pain.  Urine output is recorded in intake in Doc Flowsheet.    I: Premedications given per order (see electronic medical administration record). Dose #1 of Cytarabine started to infuse over 24 hours. Reviewed pt teaching on chemotherapy side effects.  Pt denies need for further teaching. Chemotherapy double checked per protocol by two chemotherapy competent RN's.   A: Tolerating procedure well. Denies nausea and or pain.   P: Continue to monitor urine output and symptoms of nausea. Screen for symptoms of toxicity.

## 2022-06-23 NOTE — PLAN OF CARE
[id number]:     A&O x4. VSS on RA. UAL. Pt denies having any pain, N/V or SOB. Last BM: 6/22, pt voiding spontaneously with AUOP. CIVI cytarabine infusing @ 43.9 ml/hr - pt continuing to tolerate well. Continue with POC.

## 2022-06-23 NOTE — PLAN OF CARE
Predicted problem r/t initiation of chemotherapy 6/22.     Problem: Nausea and Vomiting (Chemotherapy Effects)  Goal: Fluid and Electrolyte Balance  6/23/2022 1508 by Dora Rush  Outcome: Ongoing, Progressing     Problem: Oral Mucositis (Chemotherapy Effects)  Goal: Improved Oral Mucous Membrane Integrity  Outcome: Ongoing, Progressing   Goal Outcome Evaluation:      Observed by tim OLIVARES

## 2022-06-23 NOTE — PROGRESS NOTES
"CLINICAL NUTRITION SERVICES - ASSESSMENT NOTE     Nutrition Prescription    RECOMMENDATIONS FOR MDs/PROVIDERS TO ORDER:  None at this time.     Malnutrition Status:    Patient does not meet two of the established criteria necessary for diagnosing malnutrition    Recommendations already ordered by Registered Dietitian (RD):  None at this time.     Future/Additional Recommendations:  Monitor for symptoms of nausea and poor appetite as chemotherapy regimen progresses.  Monitor for potential downward trends in PO intake.         REASON FOR ASSESSMENT  Veda Marinelli is a/an 37 year old female assessed by the dietitian for LOS.     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 FISH negative for PML-ANNELIESE.\"     Pt started chemotherapy regimen evening of 6/22/2022.     NUTRITION HISTORY  Writer met with patient today, 6/23. Prior to admission, pt reported eating 2-3 meals daily. At home pt takes vitamins upon waking: Vit D, Vit C, Mg, Zn, Quercetin (allergy and immune support), Ca. For breakfast has cottage cheese and coffee. For lunch, pt typically has eggs and breakfast meat or a salad. For dinner, she would have meat, veg and occasionally a pastry. Pt reports 2-4 alcoholic drinks weekly. Pt reported no changes in appetite prior to admit.     Since admission, pt reports that appetite is normal. Supplementing meals with eggs, cottage cheese, and almonds brought in by parents. Pt reports no nausea or vomiting, no constipation or diarrhea. Pt drinking adequate fluids and remaining hydrated.     CURRENT NUTRITION ORDERS  Diet: Regular  Intake/Tolerance: Pt appears to be ordering two meals daily, 100% intake recorded. " "Pt reports tolerating meals and snacks from home.     LABS  PO4: 4.8 (H)   WBC: 59.7, trending down, Hemoglobin: 8.1, Hematocrit: 24.1  Lactate acid: 0.6 (L)  Lactate Dehydrogenase: 503 (H)   Uric acid: 6.2 (H)  Ketones Urine: 60 (H)  Creatinine: 0.52     MEDICATIONS  Acyclovir BID   Chemotherapy infusing- continuous every 8 hrs   Multivitamin w/ minerals daily   Vitamin C daily   Vitamin D daily   Methylprednisolone PRN       ANTHROPOMETRICS  Height: 170.2 cm (5' 7\")  Most Recent Weight: 117.9 kg (259 lb 14.4 oz)    IBW: 61.2 kg kg (1925)   BMI: Obesity Grade III BMI >40  Weight History:   06/22/22 0726 117.9 kg (259 lb 14.4 oz) --   06/21/22 1037 117.3 kg (258 lb 11.2 oz) Standing scale   06/20/22 1501 118.5 kg (261 lb 4.8 oz) --   06/19/22 0821 118.5 kg (261 lb 3.2 oz) Standing scale   06/18/22 0744 118.8 kg (261 lb 14.4 oz) Standing scale   06/17/22 0808 118 kg (260 lb 3.2 oz) Standing scale   06/16/22 1816 118.3 kg (260 lb 14.4 oz) Standing scale   Wt stable since admission.     Dosing Weight: 75 kg AdjBW    ASSESSED NUTRITION NEEDS  Estimated Energy Needs: 8733-9065 kcals/day (20 - 25 kcals/kg)  Justification: Obese  Estimated Protein Needs:  grams protein/day (1.2 - 1.5 grams of pro/kg)  Justification: Hypercatabolism with critical illness  Estimated Fluid Needs: 1 mL/kcal   Justification: Maintenance    PHYSICAL FINDINGS  See malnutrition section below.    MALNUTRITION  % Intake: No decreased intake noted  % Weight Loss: None noted  Subcutaneous Fat Loss: None observed  Muscle Loss: None observed  Fluid Accumulation/Edema: None noted  Malnutrition Diagnosis: Patient does not meet two of the established criteria necessary for diagnosing malnutrition    NUTRITION DIAGNOSIS  Predicted inadequate oral intake related to chemotherapy initiation evening of 6/22 and it's anticipated side effects of nausea, vomiting and poor appetite.   PO intake adequate at this time. "     INTERVENTIONS  Implementation  Nutrition Education: Provided education on continued adequate oral intake and importance of protein foods.      Goals  Patient to consume % of nutritionally adequate meal trays TID, or the equivalent with supplements/snacks.     Monitoring/Evaluation  Progress toward goals will be monitored and evaluated per protocol.    Dora Rush, Dietetic Intern

## 2022-06-23 NOTE — PROGRESS NOTES
[facility]    Hematology / Oncology Progress Note    Date of Admission: 6/16/2022  Date of Service (when I saw the patient): 06/23/2022     Assessment & Plan   Veda Marinelli is a 37 year old female with PMH significant for h/o COVID19 infection (10/2021), C.diff, and hypothyroidism who presents with leukocytosis and circulating blasts, concerning for acute leukemia. Patient has been medicallyy stable therefore we will continue to await final BMBx results prior to initiation of treatment.    Today:  - BMBx shows markedly hypercellular marrow (85-95% estimated cellularity), with markedly diminished trilineage hematopoiesis, no overt dysplasia in the evaluable elements, and 92% blasts    - FLT3 ITD +/ TKD neg  - Today is Day 2 of 7+3+Midostaurin (C1D1: 6/22/22)   - Tolerating well.   - WBC count trending down. Uric acid, phos and potassium trending up.   - Start IVF 100mL/hr  - Continue ppx anti-infectives and best supportive cares  - Avoid PTA probiotic. C diff negative so will plan to hold off on adding ppx PO Vanco. Continue to monitor closely.    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 On 3/8/2022, white blood cell count 2.5 (ANC 1.0), Hgb 13.6 with , platelets normal.  On 5/15/2022, she was noted to have further decreasd white blood cell count of 1.6 (ANC 0.28) with hemoglobin 11. platelets were 133. She was ultimately referred to Hematology (Dr. Bob), who she saw on 6/7/22. Further blood workup was recommended and labs done 6/14. CBC revealed WBC 25.3 (ANC 0.8), Hgb 10.1 (), Plt 90K. On the differential and morphology review, 81% blasts were seen concerning for acute leukemia. No kyle rods noted on PB blood smear pathologist review. She was advised to present to [facility] for further evaluation and management.  "  - CBC on admit demonstrates WBC 41K (ANC 0.0) with 86% \"other\" cells, Hgb 9.5, Plt 72K. Per verbal report from Heme Path fellow there was concern for Blanca rods. Coags WNL, no e/o TLS.   - CT CAP without evidence for infectious source   - Sent PB FISH, PML-ANNELIESE (negative), flow cytometry.   - was started on ATRA on 6/16 PM prior to result of PML-ANNELIESE. Given that this test is now negative, stopped ATRA after 6/17 AM dose  - Start Hydrea 1g BID (x 6/17), increase to 2g BID (x 6/19), Continue hydrea to 2g QID. Will now stop Hydrea will plan to initiate chemotherapy with 7+3 today.   - BMBx shows markedly hypercellular marrow (85-95% estimated cellularity), with markedly diminished trilineage hematopoiesis, no overt dysplasia in the evaluable elements, and 92% blasts    - FLT3 ITD +/ TKD neg  - HLA typing sent, will need message sent to BMT coordinators. Awaiting NPM1 and FLT3 results.     Treatment Plan: Cytarabine and Daunorubicin (7+3) plus Midostaurin (D1: 6/22/22)   - Daunorubicin 90mg/m2 - D1-3   - Cytarabine 100 mg/m2 - D1-7   - Midostaurin 50mg BID Day 8-21   Supportive Meds -    - Dexamethasone 12mg - D1-3   - Compazine, Zofran, Ativan    - bowel regimen     # TLS  - Continue Allopurinol 300mg  - WBC count trending down. Uric acid, phos and potassium trending up. Creatinine stable.   - Start IVF 100mL/hr  - TLS BID (BMP, Phos, Uric Acid), LDH daily, DIC labs BID   ? If uric acid >8, give rasburicase 6 mg x1 dose  ? If phosphorus >5, start Phoslo TID  ? Additional correction of electrolyte abnormalities (K+, Ca++) PRN per usual means.     # Pancytopenia  2/2 underlying malignancy.  - transfuse to maintain Hgb >7, Plt >10K   - conditional orders in place for FFP if INR >1.8, cryo if fibrinogen <100    - blood consent signed on 6/17    # Mild coagulopathy  INR gradually uptrended 1.1-->1.3. No bleeding. Fibrinogen WNL.   - monitor with DIC labs BID as above     GYN  # LMP  Approximately 2 weeks PTA, bleeding was " heavier than normal with clots. Usually moderate lasting ~3 days.      ID  # ID PPx  Denies fevers. Given profound neutropenia, started prophy anti-infectives  - ACV 400mg BID  - Levaquin 250mg daily  - Micafungin 50mg IV daily     # H/o C.diff  # Hemorrhoid  Complicated her C.diff course and has been waxing/waning since. Sometimes painful. She primarily has used witch hazel pads when symptomatic. Stools have normalized.   - CT A/P negative for perirectal abscess.  - TUCKS PRN  - Avoid PTA probiotic at this time.  - C. Diff negative. Will hold off on adding oral ppx vanco.  - Continue to monitor for diarrhea. Recheck C.diff status if diarrhea occurs.    # h/o COVID-19  Pt is not vaccinated nor interested in being vaccinated. She was admitted from 10/1/2021 - 10/13/2021 for acute hypoxic respiratory failure from COVID-19 pneumonia.  Required IMC, high flow and intermittent CPAP. She was treated with dexamethasone, remdesivir, and baricitinib in addition to azithromycin and ceftriaxone. Recovered symptomatically from this.     Endocrine  # Hypothyroidism  - continue PTA synthroid    MISC  # Social  - Patient considering donating her hair prior to receiving chemotherapy.  - [employer] application faxed 6/21/22.    # Integrated Health Services  - Patient is interested with our integrated health services/ support  - Appreciate support from [facility] health       FEN  - IVF per chemotherapy protocol  - monitor lyte, hold off on automatic repletion scales while assessing for TLS  - regular diet as tolerated     PPx  - VTE: None due to worsening TCP and new leukemia/possible APL  - GI: none at present  - bowels: PRN     Dispo: Admit for workup of new acute leukemia. Unclear at this time but anticipated 3-4 week LOS at minimum.      Plan of care discussed with Dr. Palacios     I spent >45 minutes in the care of this patient today, which included time necessary for review of interval events, obtaining history and  physical exam, ordering medication(s)/test(s) as medically indicated, discussion with interdisciplinary/consult team(s), and documentation time. Over 50% of time was spent face-to-face and/or coordinating care.     Gaby Arteaga PA-C  Heme/Onc  [phone]     Interval History   No acute events overnight, afebrile.   Minerva is overall feeling well today. Appears to continue to struggle with coping with this new diagnosis. Continuing to give support as able.   Discussed plan for today. Starting treatment with IVF for concern for TLS  No other new symptoms or concerns.   Continues to remain active and walking the halls.   Denies fever, chills, mouth sores, headache, SOB, cough, abdominal pain, diarrhea, constipation, nausea, vomiting, dysuria, hematuria, numbness, tingling, swelling    Physical Exam   Temp: 98.5  F (36.9  C) Temp src: Oral BP: 126/73 Pulse: 93   Resp: 18 SpO2: 99 % O2 Device: None (Room air)    Vitals:    06/20/22 1501 06/21/22 1037 06/22/22 0726   Weight: 118.5 kg (261 lb 4.8 oz) 117.3 kg (258 lb 11.2 oz) 117.9 kg (259 lb 14.4 oz)     Vital Signs with Ranges  Temp:  [97.3  F (36.3  C)-99.1  F (37.3  C)] 98.5  F (36.9  C)  Pulse:  [] 93  Resp:  [16-18] 18  BP: (110-127)/(60-82) 126/73  SpO2:  [94 %-100 %] 99 %  I/O last 3 completed shifts:  In: 1193 [P.O.:800; I.V.:120; IV Piggyback:273]  Out: 1150 [Urine:1150]    Constitutional: Pleasant, generally well-appearing female seen sitting up in chair.   HEENT: NC/AT. Lids and lashes normal. Sclerae anicteric. No nasal drainage. OP pink and moist. No lesions or thrush.   Respiratory: No increased work of breathing, good air exchange, on RA. Lungs CTA b/l.   Cardiovascular: RRR. No edema.  GI: Normal BS. Abd soft, non-distended, non-tender.  Skin: No bruising or bleeding, normal skin color, texture. No lesion or rashes appreciated on exposed skin surfaces.   Musculoskeletal: Exremities grossly normal in appearance. Tone is normal. LPIC BMBx site  c/d/i, non-tender to light palpation.  Neurologic: Awake, alert, oriented. Grossly nonfocal. Speech normal.   Neuropsychiatric: Calm, normal eye contact, normal affect  VAD: PICC in LUE, no further bleeding under disk since dressing was changed. No erythema or edema.        Medications     - MEDICATION INSTRUCTIONS -         acyclovir  400 mg Oral BID     allopurinol  300 mg Oral Daily     Chemotherapy Infusing-Continuous Infusion   Does not apply Q8H     cytarabine (CYTOSAR) infusion  100 mg/m2 (Treatment Plan Recorded) Intravenous Q24H     DAUNOrubicin  90 mg/m2 (Treatment Plan Recorded) Intravenous Q24H     dexamethasone  12 mg Oral Q24H     levofloxacin  250 mg Oral Daily     levothyroxine  125 mcg Oral Daily     micafungin  50 mg Intravenous Q24H     multivitamin w/minerals  1 tablet Oral Daily     ondansetron  8 mg Oral Q12H     vitamin C  1,000 mg Oral Daily     vitamin D3  50 mcg Oral Daily       Data   Results for orders placed or performed during the hospital encounter of 06/16/22 (from the past 24 hour(s))   HLA Complete Typing BMT Recipient *Canceled*    Narrative    The following orders were created for panel order HLA Complete Typing BMT Recipient.  Procedure                               Abnormality         Status                     ---------                               -----------         ------                       Please view results for these tests on the individual orders.   Lactic Acid STAT   Result Value Ref Range    Lactic Acid 0.6 (L) 0.7 - 2.0 mmol/L   CBC with platelets differential    Narrative    The following orders were created for panel order CBC with platelets differential.  Procedure                               Abnormality         Status                     ---------                               -----------         ------                     CBC with platelets and d...[[id number]]  Abnormal            Final result               Manual Differential[[id number]]          Abnormal             Final result                 Please view results for these tests on the individual orders.   CBC with platelets and differential   Result Value Ref Range    WBC Count 59.7 (HH) 4.0 - 11.0 10e3/uL    RBC Count 2.35 (L) 3.80 - 5.20 10e6/uL    Hemoglobin 8.3 (L) 11.7 - 15.7 g/dL    Hematocrit 25.0 (L) 35.0 - 47.0 %     (H) 78 - 100 fL    MCH 35.3 (H) 26.5 - 33.0 pg    MCHC 33.2 31.5 - 36.5 g/dL    RDW 14.9 10.0 - 15.0 %    Platelet Count 31 (LL) 150 - 450 10e3/uL   Manual Differential   Result Value Ref Range    % Neutrophils 0 %    % Lymphocytes 4 %    % Monocytes 0 %    % Eosinophils 0 %    % Basophils 0 %    % Blasts 96 %    Absolute Neutrophils 0.0 (LL) 1.6 - 8.3 10e3/uL    Absolute Lymphocytes 2.4 0.8 - 5.3 10e3/uL    Absolute Monocytes 0.0 0.0 - 1.3 10e3/uL    Absolute Eosinophils 0.0 0.0 - 0.7 10e3/uL    Absolute Basophils 0.0 0.0 - 0.2 10e3/uL    Absolute Blasts 57.3 (H) <=0.0 10e3/uL    RBC Morphology Confirmed RBC Indices     Platelet Assessment  Automated Count Confirmed. Platelet morphology is normal.     Automated Count Confirmed. Platelet morphology is normal.   CBC with platelets differential    Narrative    The following orders were created for panel order CBC with platelets differential.  Procedure                               Abnormality         Status                     ---------                               -----------         ------                     CBC with platelets and d...[[id number]]  Abnormal            Final result               Manual Differential[[id number]]          Abnormal            Final result                 Please view results for these tests on the individual orders.   Lactate Dehydrogenase   Result Value Ref Range    Lactate Dehydrogenase 503 (H) 0 - 250 U/L   Magnesium   Result Value Ref Range    Magnesium 2.3 1.7 - 2.3 mg/dL   Basic metabolic panel   Result Value Ref Range    Creatinine 0.52 0.51 - 0.95 mg/dL    Sodium 137 136 - 145 mmol/L    Potassium 4.2  "3.4 - 4.5 mmol/L    Urea Nitrogen 13.7 6.0 - 20.0 mg/dL    Chloride 104 98 - 107 mmol/L    Carbon Dioxide (CO2) 22 22 - 29 mmol/L    Anion Gap 11 7 - 15 mmol/L    Glucose 132 (H) 70 - 99 mg/dL    GFR Estimate >90 >60 mL/min/1.73m2    Calcium 9.4 8.6 - 10.0 mg/dL   Phosphorus   Result Value Ref Range    Phosphorus 4.8 (H) 2.5 - 4.5 mg/dL   INR   Result Value Ref Range    INR 1.62 (H) 0.85 - 1.15   Fibrinogen activity   Result Value Ref Range    Fibrinogen Activity 225 170 - 490 mg/dL   Partial thromboplastin time   Result Value Ref Range    aPTT 44 (H) 22 - 38 Seconds   Uric acid   Result Value Ref Range    Uric Acid 6.2 (H) 2.4 - 5.7 mg/dL   Hepatic panel   Result Value Ref Range    Protein Total 6.7 6.4 - 8.3 g/dL    Albumin 4.0 3.5 - 5.2 g/dL    Bilirubin Total 0.5 <=1.2 mg/dL    Alkaline Phosphatase 51 35 - 104 U/L    AST 35 10 - 35 U/L    ALT 33 10 - 35 U/L    Bilirubin Direct <0.20 0.00 - 0.30 mg/dL   CBC with platelets and differential   Result Value Ref Range    WBC Count 45.9 (H) 4.0 - 11.0 10e3/uL    RBC Count 2.27 (L) 3.80 - 5.20 10e6/uL    Hemoglobin 8.1 (L) 11.7 - 15.7 g/dL    Hematocrit 24.1 (L) 35.0 - 47.0 %     (H) 78 - 100 fL    MCH 35.7 (H) 26.5 - 33.0 pg    MCHC 33.6 31.5 - 36.5 g/dL    RDW 14.6 10.0 - 15.0 %    Platelet Count 28 (LL) 150 - 450 10e3/uL    Narrative    Previously reported component [ NRBCs ] is no longer reported.\"  Previously reported component [ NRBCs Absolute ] is no longer reported.\"   Manual Differential   Result Value Ref Range    % Neutrophils 1 %    % Lymphocytes 3 %    % Monocytes 0 %    % Eosinophils 0 %    % Basophils 0 %    % Blasts 96 %    NRBCs per 100 WBC 1 (H) <=0 %    Absolute Neutrophils 0.5 (L) 1.6 - 8.3 10e3/uL    Absolute Lymphocytes 1.4 0.8 - 5.3 10e3/uL    Absolute Monocytes 0.0 0.0 - 1.3 10e3/uL    Absolute Eosinophils 0.0 0.0 - 0.7 10e3/uL    Absolute Basophils 0.0 0.0 - 0.2 10e3/uL    Absolute Blasts 44.1 (H) <=0.0 10e3/uL    Absolute NRBCs 0.5 (H) " <=0.0 10e3/uL    RBC Morphology Confirmed RBC Indices     Platelet Assessment  Automated Count Confirmed. Platelet morphology is normal.     Automated Count Confirmed. Platelet morphology is normal.    Teardrop Cells Slight (A) None Seen

## 2022-06-24 ENCOUNTER — TELEPHONE (OUTPATIENT)
Dept: TRANSPLANT | Facility: CLINIC | Age: 37
End: 2022-06-24

## 2022-06-24 ENCOUNTER — APPOINTMENT (OUTPATIENT)
Dept: PHYSICAL THERAPY | Facility: CLINIC | Age: 37
DRG: 834 | End: 2022-06-24
Payer: COMMERCIAL

## 2022-06-24 DIAGNOSIS — C92.00 AML (ACUTE MYELOGENOUS LEUKEMIA) (H): Primary | ICD-10-CM

## 2022-06-24 LAB
ABO/RH(D): NORMAL
ALBUMIN SERPL BCG-MCNC: 3.6 G/DL (ref 3.5–5.2)
ALP SERPL-CCNC: 42 U/L (ref 35–104)
ALT SERPL W P-5'-P-CCNC: 27 U/L (ref 10–35)
ANION GAP SERPL CALCULATED.3IONS-SCNC: 11 MMOL/L (ref 7–15)
ANION GAP SERPL CALCULATED.3IONS-SCNC: 8 MMOL/L (ref 7–15)
ANTIBODY SCREEN: NEGATIVE
APTT PPP: 38 SECONDS (ref 22–38)
APTT PPP: 48 SECONDS (ref 22–38)
AST SERPL W P-5'-P-CCNC: 31 U/L (ref 10–35)
BASOPHILS # BLD MANUAL: 0 10E3/UL (ref 0–0.2)
BASOPHILS # BLD MANUAL: 0 10E3/UL (ref 0–0.2)
BASOPHILS NFR BLD MANUAL: 0 %
BASOPHILS NFR BLD MANUAL: 0 %
BILIRUB DIRECT SERPL-MCNC: <0.2 MG/DL (ref 0–0.3)
BILIRUB SERPL-MCNC: 0.6 MG/DL
BLASTS # BLD MANUAL: 1.1 10E3/UL
BLASTS # BLD MANUAL: 2.5 10E3/UL
BLASTS NFR BLD MANUAL: 71 %
BLASTS NFR BLD MANUAL: 94 %
BLD PROD TYP BPU: NORMAL
BLOOD COMPONENT TYPE: NORMAL
BUN SERPL-MCNC: 14.2 MG/DL (ref 6–20)
BUN SERPL-MCNC: 16.9 MG/DL (ref 6–20)
CALCIUM SERPL-MCNC: 8.5 MG/DL (ref 8.6–10)
CALCIUM SERPL-MCNC: 8.8 MG/DL (ref 8.6–10)
CHLORIDE SERPL-SCNC: 106 MMOL/L (ref 98–107)
CHLORIDE SERPL-SCNC: 107 MMOL/L (ref 98–107)
CODING SYSTEM: NORMAL
CREAT SERPL-MCNC: 0.45 MG/DL (ref 0.51–0.95)
CREAT SERPL-MCNC: 0.6 MG/DL (ref 0.51–0.95)
CROSSMATCH: NORMAL
DACRYOCYTES BLD QL SMEAR: SLIGHT
DACRYOCYTES BLD QL SMEAR: SLIGHT
DEPRECATED HCO3 PLAS-SCNC: 21 MMOL/L (ref 22–29)
DEPRECATED HCO3 PLAS-SCNC: 21 MMOL/L (ref 22–29)
ELLIPTOCYTES BLD QL SMEAR: SLIGHT
EOSINOPHIL # BLD MANUAL: 0 10E3/UL (ref 0–0.7)
EOSINOPHIL # BLD MANUAL: 0 10E3/UL (ref 0–0.7)
EOSINOPHIL NFR BLD MANUAL: 0 %
EOSINOPHIL NFR BLD MANUAL: 0 %
ERYTHROCYTE [DISTWIDTH] IN BLOOD BY AUTOMATED COUNT: 14.5 % (ref 10–15)
ERYTHROCYTE [DISTWIDTH] IN BLOOD BY AUTOMATED COUNT: 17 % (ref 10–15)
FIBRINOGEN PPP-MCNC: 245 MG/DL (ref 170–490)
FIBRINOGEN PPP-MCNC: 250 MG/DL (ref 170–490)
GFR SERPL CREATININE-BSD FRML MDRD: >90 ML/MIN/1.73M2
GFR SERPL CREATININE-BSD FRML MDRD: >90 ML/MIN/1.73M2
GLUCOSE SERPL-MCNC: 138 MG/DL (ref 70–99)
GLUCOSE SERPL-MCNC: 139 MG/DL (ref 70–99)
HCT VFR BLD AUTO: 19.2 % (ref 35–47)
HCT VFR BLD AUTO: 21.9 % (ref 35–47)
HGB BLD-MCNC: 6.4 G/DL (ref 11.7–15.7)
HGB BLD-MCNC: 7.4 G/DL (ref 11.7–15.7)
INR PPP: 1.41 (ref 0.85–1.15)
INR PPP: 1.61 (ref 0.85–1.15)
ISSUE DATE AND TIME: NORMAL
LDH SERPL L TO P-CCNC: 889 U/L (ref 0–250)
LYMPHOCYTES # BLD MANUAL: 0.1 10E3/UL (ref 0.8–5.3)
LYMPHOCYTES # BLD MANUAL: 0.4 10E3/UL (ref 0.8–5.3)
LYMPHOCYTES NFR BLD MANUAL: 25 %
LYMPHOCYTES NFR BLD MANUAL: 3 %
MAGNESIUM SERPL-MCNC: 3.2 MG/DL (ref 1.7–2.3)
MCH RBC QN AUTO: 34.6 PG (ref 26.5–33)
MCH RBC QN AUTO: 35.6 PG (ref 26.5–33)
MCHC RBC AUTO-ENTMCNC: 33.3 G/DL (ref 31.5–36.5)
MCHC RBC AUTO-ENTMCNC: 33.8 G/DL (ref 31.5–36.5)
MCV RBC AUTO: 102 FL (ref 78–100)
MCV RBC AUTO: 107 FL (ref 78–100)
MONOCYTES # BLD MANUAL: 0 10E3/UL (ref 0–1.3)
MONOCYTES # BLD MANUAL: 0 10E3/UL (ref 0–1.3)
MONOCYTES NFR BLD MANUAL: 0 %
MONOCYTES NFR BLD MANUAL: 0 %
NEUTROPHILS # BLD MANUAL: 0.1 10E3/UL (ref 1.6–8.3)
NEUTROPHILS # BLD MANUAL: 0.1 10E3/UL (ref 1.6–8.3)
NEUTROPHILS NFR BLD MANUAL: 3 %
NEUTROPHILS NFR BLD MANUAL: 4 %
NRBC # BLD AUTO: 0 10E3/UL
NRBC BLD MANUAL-RTO: 1 %
PHOSPHATE SERPL-MCNC: 3 MG/DL (ref 2.5–4.5)
PHOSPHATE SERPL-MCNC: 3.5 MG/DL (ref 2.5–4.5)
PLAT MORPH BLD: ABNORMAL
PLAT MORPH BLD: ABNORMAL
PLATELET # BLD AUTO: 13 10E3/UL (ref 150–450)
PLATELET # BLD AUTO: 16 10E3/UL (ref 150–450)
POTASSIUM SERPL-SCNC: 3.9 MMOL/L (ref 3.4–5.3)
POTASSIUM SERPL-SCNC: 4.1 MMOL/L (ref 3.4–5.3)
PROT SERPL-MCNC: 5.9 G/DL (ref 6.4–8.3)
RBC # BLD AUTO: 1.8 10E6/UL (ref 3.8–5.2)
RBC # BLD AUTO: 2.14 10E6/UL (ref 3.8–5.2)
RBC MORPH BLD: ABNORMAL
RBC MORPH BLD: ABNORMAL
SODIUM SERPL-SCNC: 136 MMOL/L (ref 136–145)
SODIUM SERPL-SCNC: 138 MMOL/L (ref 136–145)
SPECIMEN EXPIRATION DATE: NORMAL
T4 FREE SERPL-MCNC: 1.48 NG/DL (ref 0.9–1.7)
TSH SERPL DL<=0.005 MIU/L-ACNC: 0.04 UIU/ML (ref 0.3–4.2)
UNIT ABO/RH: NORMAL
UNIT NUMBER: NORMAL
UNIT STATUS: NORMAL
UNIT TYPE ISBT: 600
URATE SERPL-MCNC: 4.2 MG/DL (ref 2.4–5.7)
URATE SERPL-MCNC: 5.6 MG/DL (ref 2.4–5.7)
WBC # BLD AUTO: 1.6 10E3/UL (ref 4–11)
WBC # BLD AUTO: 2.7 10E3/UL (ref 4–11)

## 2022-06-24 PROCEDURE — 97110 THERAPEUTIC EXERCISES: CPT | Mod: GP

## 2022-06-24 PROCEDURE — 250N000011 HC RX IP 250 OP 636: Performed by: PHYSICIAN ASSISTANT

## 2022-06-24 PROCEDURE — 120N000002 HC R&B MED SURG/OB UMMC

## 2022-06-24 PROCEDURE — 258N000003 HC RX IP 258 OP 636: Performed by: STUDENT IN AN ORGANIZED HEALTH CARE EDUCATION/TRAINING PROGRAM

## 2022-06-24 PROCEDURE — 250N000013 HC RX MED GY IP 250 OP 250 PS 637: Performed by: STUDENT IN AN ORGANIZED HEALTH CARE EDUCATION/TRAINING PROGRAM

## 2022-06-24 PROCEDURE — 82248 BILIRUBIN DIRECT: CPT | Performed by: PHYSICIAN ASSISTANT

## 2022-06-24 PROCEDURE — 86923 COMPATIBILITY TEST ELECTRIC: CPT | Performed by: PHYSICIAN ASSISTANT

## 2022-06-24 PROCEDURE — 84550 ASSAY OF BLOOD/URIC ACID: CPT | Performed by: PHYSICIAN ASSISTANT

## 2022-06-24 PROCEDURE — 99232 SBSQ HOSP IP/OBS MODERATE 35: CPT | Performed by: STUDENT IN AN ORGANIZED HEALTH CARE EDUCATION/TRAINING PROGRAM

## 2022-06-24 PROCEDURE — 250N000011 HC RX IP 250 OP 636: Performed by: STUDENT IN AN ORGANIZED HEALTH CARE EDUCATION/TRAINING PROGRAM

## 2022-06-24 PROCEDURE — P9016 RBC LEUKOCYTES REDUCED: HCPCS | Performed by: PHYSICIAN ASSISTANT

## 2022-06-24 PROCEDURE — 85384 FIBRINOGEN ACTIVITY: CPT | Performed by: PHYSICIAN ASSISTANT

## 2022-06-24 PROCEDURE — 99207 PR SC NO CHARGE VISIT: CPT | Performed by: STUDENT IN AN ORGANIZED HEALTH CARE EDUCATION/TRAINING PROGRAM

## 2022-06-24 PROCEDURE — 83735 ASSAY OF MAGNESIUM: CPT | Performed by: PHYSICIAN ASSISTANT

## 2022-06-24 PROCEDURE — 85610 PROTHROMBIN TIME: CPT | Performed by: PHYSICIAN ASSISTANT

## 2022-06-24 PROCEDURE — 83615 LACTATE (LD) (LDH) ENZYME: CPT | Performed by: PHYSICIAN ASSISTANT

## 2022-06-24 PROCEDURE — 84439 ASSAY OF FREE THYROXINE: CPT | Performed by: PHYSICIAN ASSISTANT

## 2022-06-24 PROCEDURE — 36592 COLLECT BLOOD FROM PICC: CPT | Performed by: PHYSICIAN ASSISTANT

## 2022-06-24 PROCEDURE — 85027 COMPLETE CBC AUTOMATED: CPT | Performed by: PHYSICIAN ASSISTANT

## 2022-06-24 PROCEDURE — 258N000003 HC RX IP 258 OP 636: Performed by: PHYSICIAN ASSISTANT

## 2022-06-24 PROCEDURE — 84443 ASSAY THYROID STIM HORMONE: CPT | Performed by: PHYSICIAN ASSISTANT

## 2022-06-24 PROCEDURE — 86850 RBC ANTIBODY SCREEN: CPT | Performed by: PHYSICIAN ASSISTANT

## 2022-06-24 PROCEDURE — 84100 ASSAY OF PHOSPHORUS: CPT | Performed by: PHYSICIAN ASSISTANT

## 2022-06-24 PROCEDURE — 85007 BL SMEAR W/DIFF WBC COUNT: CPT | Performed by: PHYSICIAN ASSISTANT

## 2022-06-24 PROCEDURE — 250N000013 HC RX MED GY IP 250 OP 250 PS 637: Performed by: PHYSICIAN ASSISTANT

## 2022-06-24 PROCEDURE — 85730 THROMBOPLASTIN TIME PARTIAL: CPT | Performed by: PHYSICIAN ASSISTANT

## 2022-06-24 RX ADMIN — SODIUM CHLORIDE: 9 INJECTION, SOLUTION INTRAVENOUS at 03:21

## 2022-06-24 RX ADMIN — DEXAMETHASONE 12 MG: 4 TABLET ORAL at 16:09

## 2022-06-24 RX ADMIN — Medication 50 MCG: at 09:42

## 2022-06-24 RX ADMIN — ACYCLOVIR 400 MG: 400 TABLET ORAL at 09:42

## 2022-06-24 RX ADMIN — Medication 1 TABLET: at 09:42

## 2022-06-24 RX ADMIN — ONDANSETRON HYDROCHLORIDE 8 MG: 8 TABLET, FILM COATED ORAL at 16:10

## 2022-06-24 RX ADMIN — ACYCLOVIR 400 MG: 400 TABLET ORAL at 20:19

## 2022-06-24 RX ADMIN — SODIUM CHLORIDE: 9 INJECTION, SOLUTION INTRAVENOUS at 18:37

## 2022-06-24 RX ADMIN — DAUNORUBICIN HYDROCHLORIDE 212 MG: 5 INJECTION, SOLUTION INTRAVENOUS at 16:30

## 2022-06-24 RX ADMIN — MICAFUNGIN 50 MG: 10 INJECTION, POWDER, LYOPHILIZED, FOR SOLUTION INTRAVENOUS at 18:34

## 2022-06-24 RX ADMIN — CEFEPIME HYDROCHLORIDE 2 G: 2 INJECTION, POWDER, FOR SOLUTION INTRAVENOUS at 00:34

## 2022-06-24 RX ADMIN — ONDANSETRON HYDROCHLORIDE 8 MG: 8 TABLET, FILM COATED ORAL at 03:22

## 2022-06-24 RX ADMIN — CEFEPIME HYDROCHLORIDE 2 G: 2 INJECTION, POWDER, FOR SOLUTION INTRAVENOUS at 09:43

## 2022-06-24 RX ADMIN — ALLOPURINOL 300 MG: 300 TABLET ORAL at 09:42

## 2022-06-24 RX ADMIN — CEFEPIME HYDROCHLORIDE 2 G: 2 INJECTION, POWDER, FOR SOLUTION INTRAVENOUS at 17:32

## 2022-06-24 RX ADMIN — OXYCODONE HYDROCHLORIDE AND ACETAMINOPHEN 1000 MG: 500 TABLET ORAL at 09:42

## 2022-06-24 RX ADMIN — CYTARABINE 240 MG: 100 INJECTION, SOLUTION INTRATHECAL; INTRAVENOUS; SUBCUTANEOUS at 16:30

## 2022-06-24 ASSESSMENT — ACTIVITIES OF DAILY LIVING (ADL)
ADLS_ACUITY_SCORE: 18

## 2022-06-24 NOTE — PLAN OF CARE
Goal Outcome Evaluation:  [id number]  She spiked a fever of 101.6 (0) . Blood culture, UA/UC, and chest xray done. She started Cefepime. Day # 2 of chemotherapy hung per protocol. Good blood return from PICC line. Denied pain or nausea. Up independently. She had a stool today.

## 2022-06-24 NOTE — PROGRESS NOTES
"SPIRITUAL HEALTH SERVICES Progress Note  [facility] ([place]) 7D  Follow Up    Pt shared that she is \"doing okay,\" and \"getting into the swing of things.\" She expressed that she was \"less overwhelmed,\" and felt \"supported\" by a string of visitors. Pt's aunt was present at beginning of visit.    Plan: [facility] remains available per pt/family/staff request.    Yohan Bansal, [facility]   Intern  Pager [phone]      * [facility] remains available 24/7 for emergent requests/referrals, either by having the switchboard page the on-call  or by entering an ASAP/STAT consult in Epic (this will also page the on-call ). Routine Epic consults receive an initial response within 24 hours.*    "

## 2022-06-24 NOTE — PROGRESS NOTES
"2300- 0700    BP 99/53 (BP Location: Right arm)   Pulse 76   Temp 97.4  F (36.3  C) (Oral)   Resp 18   Ht 1.702 m (5' 7\")   Wt 117.9 kg (259 lb 14.4 oz)   SpO2 96%   BMI 40.71 kg/m      VSS on RA. Pt denies pain, nausea, SOB. Cytarabine infusing through PICC. Pt tolerating well. Up independently. Able to make needs known. Last BM: 6/23, pt reports voiding spontaneously with AUOP. Continue with POC.  "

## 2022-06-24 NOTE — PROGRESS NOTES
"[facility]    Hematology / Oncology Progress Note    Date of Admission: 6/16/2022  Date of Service (when I saw the patient): 06/24/2022     Assessment & Plan   Veda Marinelli is a 37 year old female with PMH significant for h/o COVID19 infection (10/2021), C.diff, and hypothyroidism who presents with leukocytosis and circulating blasts, concerning for acute leukemia. Patient has been medicallyy stable therefore we will continue to await final BMBx results prior to initiation of treatment.    Today:  - BMBx shows markedly hypercellular marrow (85-95% estimated cellularity), with markedly diminished trilineage hematopoiesis, no overt dysplasia in the evaluable elements, and 92% blasts    - FLT3 ITD positive / TKD neg  - Today is Day 3 of 7+3+Midostaurin (C1D1: 6/22/22)   - Tolerating well.   - Neutropenic Fever to 101.6F on 6/23.   - Started Cefepime 2g q8h IV   - UA/UCx; Blood culture; CXR clear  - WBC down to 2.7. Uric acid, phos and potassium stable. 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 On 3/8/2022, white blood cell count 2.5 (ANC 1.0), Hgb 13.6 with , platelets normal.  On 5/15/2022, she was noted to have further decreasd white blood cell count of 1.6 (ANC 0.28) with hemoglobin 11. platelets were 133. She was ultimately referred to Hematology (Dr. Bob), who she saw on 6/7/22. Further blood workup was recommended and labs done 6/14. CBC revealed WBC 25.3 (ANC 0.8), Hgb 10.1 (), Plt 90K. On the differential and morphology review, 81% blasts were seen concerning for acute leukemia. No kyle rods noted on PB blood smear pathologist review. She was advised to present to [facility] for further evaluation and management.   - CBC on admit demonstrates WBC 41K (ANC 0.0) with 86% \"other\" " cells, Hgb 9.5, Plt 72K. Per verbal report from Heme Path fellow there was concern for Blanca rods. Coags WNL, no e/o TLS.   - CT CAP without evidence for infectious source   - Sent PB FISH, PML-ANNELIESE (negative), flow cytometry.   - was started on ATRA on 6/16 PM prior to result of PML-ANNELIESE. Given that this test is now negative, stopped ATRA after 6/17 AM dose  - Start Hydrea 1g BID (x 6/17), increase to 2g BID (x 6/19), Continue hydrea to 2g QID. Will now stop Hydrea will plan to initiate chemotherapy with 7+3 today.   - BMBx shows markedly hypercellular marrow (85-95% estimated cellularity), with markedly diminished trilineage hematopoiesis, no overt dysplasia in the evaluable elements, and 92% blasts    - FLT3 ITD +/ TKD neg  - HLA typing sent, message sent to BMT coordinators 6/24. Awaiting NPM1 results.     Treatment Plan: Cytarabine and Daunorubicin (7+3) plus Midostaurin (D1: 6/22/22)   - Daunorubicin 90mg/m2 - D1-3   - Cytarabine 100 mg/m2 - D1-7   - Midostaurin 50mg BID Day 8-21   Supportive Meds -    - Dexamethasone 12mg - D1-3   - Compazine, Zofran, Ativan    - bowel regimen     # TLS  - Continue Allopurinol 300mg  - WBC down to 2.7. Uric acid, phos and potassium stable. Will continue NS IVF 100mL/hr   - TLS BID (BMP, Phos, Uric Acid), LDH daily, DIC labs BID   ? If uric acid >8, give rasburicase 6 mg x1 dose  ? If phosphorus >5, start Phoslo TID  ? Additional correction of electrolyte abnormalities (K+, Ca++) PRN per usual means.     # Pancytopenia  2/2 underlying malignancy.  - transfuse to maintain Hgb >7, Plt >10K   - conditional orders in place for FFP if INR >1.8, cryo if fibrinogen <100    - blood consent signed on 6/17    # Mild coagulopathy  INR gradually uptrended 1.1-->1.3. No bleeding. Fibrinogen WNL.   - monitor with DIC labs BID as above     GYN  # LMP  Approximately 2 weeks PTA, bleeding was heavier than normal with clots. Usually moderate lasting ~3 days.      ID  # Neutropenic Fever  Fevered  o 101.6F on 6/23. Noted to have chills though no other focal symptoms.  - Started Cefepime 2g q8h IV  - UA/UCx; Blood culture  - CXR clear    # ID PPx  Denies fevers. Given profound neutropenia, started prophy anti-infectives  - ACV 400mg BID  - Levaquin 250mg daily  - Micafungin 50mg IV daily     # H/o C.diff  # Hemorrhoid  Complicated her C.diff course and has been waxing/waning since. Sometimes painful. She primarily has used witch hazel pads when symptomatic. Stools have normalized.   - CT A/P negative for perirectal abscess.  - TUCKS PRN  - Avoid PTA probiotic at this time.  - C. Diff negative. Will hold off on adding oral ppx vanco.  - Continue to monitor for diarrhea. Recheck C.diff status if diarrhea occurs.    # h/o COVID-19  Pt is not vaccinated nor interested in being vaccinated. She was admitted from 10/1/2021 - 10/13/2021 for acute hypoxic respiratory failure from COVID-19 pneumonia.  Required IMC, high flow and intermittent CPAP. She was treated with dexamethasone, remdesivir, and baricitinib in addition to azithromycin and ceftriaxone. Recovered symptomatically from this.     Endocrine  # Hypothyroidism  - continue PTA synthroid    MISC  # Social  - Patient considering donating her hair prior to receiving chemotherapy.  - [employer] application faxed 6/21/22.    # Integrated Health Services  - Patient is interested with our integrated health services/ support  - Appreciate support from [facility] health       [facility]  - IVF per chemotherapy protocol  - monitor lyte, hold off on automatic repletion scales while assessing for TLS  - regular diet as tolerated     PPx  - VTE: None due to worsening TCP and new leukemia/possible APL  - GI: none at present  - bowels: PRN     Dispo: Admit for workup of new acute leukemia. Unclear at this time but anticipated 3-4 week LOS at minimum.      Plan of care discussed with Dr. Palacios     I spent >45 minutes in the care of this patient today, which included  time necessary for review of interval events, obtaining history and physical exam, ordering medication(s)/test(s) as medically indicated, discussion with interdisciplinary/consult team(s), and documentation time. Over 50% of time was spent face-to-face and/or coordinating care.     Gaby Arteaga PA-C  Heme/Onc  [phone]     Interval History   No acute events overnight, afebrile.   Minerva is feeling well today. No repeat fevers. Has not noted any focal symptoms. States that while she was fevering she felt warm. Had some chills.   No other new symptoms or concerns.   Continues to remain active and walking the halls.   Denies fever, chills, mouth sores, headache, SOB, cough, abdominal pain, diarrhea, constipation, nausea, vomiting, dysuria, hematuria, numbness, tingling, swelling    Physical Exam   Temp: 98.1  F (36.7  C) Temp src: Oral BP: 118/76 Pulse: 70   Resp: 16 SpO2: 100 % O2 Device: None (Room air)    Vitals:    06/20/22 1501 06/21/22 1037 06/22/22 0726   Weight: 118.5 kg (261 lb 4.8 oz) 117.3 kg (258 lb 11.2 oz) 117.9 kg (259 lb 14.4 oz)     Vital Signs with Ranges  Temp:  [97.3  F (36.3  C)-101.6  F (38.7  C)] 98.1  F (36.7  C)  Pulse:  [70-97] 70  Resp:  [16-18] 16  BP: ()/(53-93) 118/76  SpO2:  [96 %-100 %] 100 %  I/O last 3 completed shifts:  In: 568.6 [I.V.:120; IV Piggyback:448.6]  Out: 4575 [Urine:4575]    Constitutional: Pleasant female sitting up in chair. Awake and conversational. Non- toxic appearing. No acute distress.   HEENT: Normocephalic, atraumatic. Sclerae anicteric. EOM intact. Moist mucus membranes without lesions, thrush, or exudates appreciated  Respiratory: Breathing comfortable with no increased work on room air. Good air exchange. No signs of respiratory distress or accessory muscle use. Lungs clear to auscultation bilaterally, no crackles or wheezing noted.  Cardiovascular: Regular rate and rhythm. Normal S1 and S2. No murmurs, rubs, or gallops. No peripheral edema.    GI:  Abdomen is soft, non-distended, non-tender and without distension. Bowel sounds present and normoactive.   Skin: Skin is clean, dry, intact. No jaundice appreciated.   Musculoskeletal/ Extremities: No redness, warmth, or swelling of the joints appreciated. Distal pulses palpable. Nailbeds pink and without cyanosis or clubbing.   Neurologic: Alert and oriented. Speech normal. Grossly nonfocal. Memory and thought process preserved. Motor function grossly normal. Sensation intact bilaterally. CN II-XII intact.   Neuropsychiatric: Calm, affect congruent to situation. Appropriate mood and affect. Good judgment and insight. No visual/auditory hallucinations.  VAD: PICC in LUE. No erythema or edema.        Medications     - MEDICATION INSTRUCTIONS -       sodium chloride 100 mL/hr at 06/24/22 0321       acyclovir  400 mg Oral BID     allopurinol  300 mg Oral Daily     ceFEPIme (MAXIPIME) IV  2 g Intravenous Q8H     Chemotherapy Infusing-Continuous Infusion   Does not apply Q8H     cytarabine (CYTOSAR) infusion  100 mg/m2 (Treatment Plan Recorded) Intravenous Q24H     DAUNOrubicin  90 mg/m2 (Treatment Plan Recorded) Intravenous Q24H     dexamethasone  12 mg Oral Q24H     [Held by provider] levofloxacin  250 mg Oral Daily     levothyroxine  125 mcg Oral Daily     micafungin  50 mg Intravenous Q24H     multivitamin w/minerals  1 tablet Oral Daily     ondansetron  8 mg Oral Q12H     vitamin C  1,000 mg Oral Daily     vitamin D3  50 mcg Oral Daily       Data   Results for orders placed or performed during the hospital encounter of 06/16/22 (from the past 24 hour(s))   Blood Culture Arm, Right    Specimen: Arm, Right; Blood   Result Value Ref Range    Culture No growth after 12 hours    Blood Culture Line, venous    Specimen: Line, venous; Blood   Result Value Ref Range    Culture No growth after 12 hours    Urine Culture    Specimen: Urine, Midstream   Result Value Ref Range    Culture No growth, less than 1 day    UA with  Microscopic reflex to Culture    Specimen: Urine, Midstream   Result Value Ref Range    Color Urine Orange (A) Colorless, Straw, Light Yellow, Yellow    Appearance Urine Cloudy (A) Clear    Glucose Urine Negative Negative mg/dL    Bilirubin Urine Negative Negative    Ketones Urine 20  (A) Negative mg/dL    Specific Gravity Urine 1.024 1.003 - 1.035    Blood Urine Negative Negative    pH Urine 6.0 5.0 - 7.0    Protein Albumin Urine 10  (A) Negative mg/dL    Urobilinogen Urine Normal Normal, 2.0 mg/dL    Nitrite Urine Negative Negative    Leukocyte Esterase Urine Negative Negative    Bacteria Urine Many (A) None Seen /HPF    Mucus Urine Present (A) None Seen /LPF    RBC Urine <1 <=2 /HPF    WBC Urine 4 <=5 /HPF    Squamous Epithelials Urine 1 <=1 /HPF    Narrative    Urine Culture not indicated   XR Chest 2 Views    Narrative    EXAM: XR CHEST 2 VW 6/23/2022 4:52 PM    HISTORY: neutropenic fever.    COMPARISON: CT of the chest and pelvis is 6/16/2022.    TECHNIQUE: Upright frontal and lateral views of the chest.    FINDINGS: Left-sided PICC with tip in the low SVC. Trachea is midline.  Cardiac and mediastinal silhouette is within normal limits. No focal  pulmonary opacities. No pleural effusions. No pneumothoraces. No acute  osseous abnormalities.      Impression    IMPRESSION: No focal pulmonary opacities.    I have personally reviewed the examination and initial interpretation  and I agree with the findings.    VALERIE ARNOLD MD         SYSTEM ID:  [id number]   Basic metabolic panel   Result Value Ref Range    Creatinine 0.56 0.51 - 0.95 mg/dL    Sodium 130 (L) 136 - 145 mmol/L    Potassium 3.6 3.4 - 5.3 mmol/L    Urea Nitrogen 15.2 6.0 - 20.0 mg/dL    Chloride 98 98 - 107 mmol/L    Carbon Dioxide (CO2) 20 (L) 22 - 29 mmol/L    Anion Gap 12 7 - 15 mmol/L    Glucose 316 (H) 70 - 99 mg/dL    GFR Estimate >90 >60 mL/min/1.73m2    Calcium 8.6 8.6 - 10.0 mg/dL   Phosphorus   Result Value Ref Range    Phosphorus 3.8 2.5 -  4.5 mg/dL   INR   Result Value Ref Range    INR 1.82 (H) 0.85 - 1.15   Fibrinogen activity   Result Value Ref Range    Fibrinogen Activity 204 170 - 490 mg/dL   Partial thromboplastin time   Result Value Ref Range    aPTT 38 22 - 38 Seconds   Uric acid   Result Value Ref Range    Uric Acid 6.2 (H) 2.4 - 5.7 mg/dL   Lactic Acid STAT   Result Value Ref Range    Lactic Acid 0.8 0.7 - 2.0 mmol/L   CBC with platelets differential    Narrative    The following orders were created for panel order CBC with platelets differential.  Procedure                               Abnormality         Status                     ---------                               -----------         ------                     CBC with platelets and d...[[id number]]  Abnormal            Final result               Manual Differential[[id number]]          Abnormal            Final result                 Please view results for these tests on the individual orders.   Lactate Dehydrogenase   Result Value Ref Range    Lactate Dehydrogenase 889 (H) 0 - 250 U/L   Magnesium   Result Value Ref Range    Magnesium 3.2 (H) 1.7 - 2.3 mg/dL   Basic metabolic panel   Result Value Ref Range    Creatinine 0.45 (L) 0.51 - 0.95 mg/dL    Sodium 138 136 - 145 mmol/L    Potassium 4.1 3.4 - 5.3 mmol/L    Urea Nitrogen 14.2 6.0 - 20.0 mg/dL    Chloride 106 98 - 107 mmol/L    Carbon Dioxide (CO2) 21 (L) 22 - 29 mmol/L    Anion Gap 11 7 - 15 mmol/L    Glucose 138 (H) 70 - 99 mg/dL    GFR Estimate >90 >60 mL/min/1.73m2    Calcium 8.8 8.6 - 10.0 mg/dL   Phosphorus   Result Value Ref Range    Phosphorus 3.5 2.5 - 4.5 mg/dL   INR   Result Value Ref Range    INR 1.61 (H) 0.85 - 1.15   Fibrinogen activity   Result Value Ref Range    Fibrinogen Activity 245 170 - 490 mg/dL   Partial thromboplastin time   Result Value Ref Range    aPTT 48 (H) 22 - 38 Seconds   Uric acid   Result Value Ref Range    Uric Acid 5.6 2.4 - 5.7 mg/dL   Hepatic panel   Result Value Ref Range    Protein Total  "5.9 (L) 6.4 - 8.3 g/dL    Albumin 3.6 3.5 - 5.2 g/dL    Bilirubin Total 0.6 <=1.2 mg/dL    Alkaline Phosphatase 42 35 - 104 U/L    AST 31 10 - 35 U/L    ALT 27 10 - 35 U/L    Bilirubin Direct <0.20 0.00 - 0.30 mg/dL   CBC with platelets and differential   Result Value Ref Range    WBC Count 2.7 (L) 4.0 - 11.0 10e3/uL    RBC Count 1.80 (L) 3.80 - 5.20 10e6/uL    Hemoglobin 6.4 (LL) 11.7 - 15.7 g/dL    Hematocrit 19.2 (L) 35.0 - 47.0 %     (H) 78 - 100 fL    MCH 35.6 (H) 26.5 - 33.0 pg    MCHC 33.3 31.5 - 36.5 g/dL    RDW 14.5 10.0 - 15.0 %    Platelet Count 16 (LL) 150 - 450 10e3/uL    Narrative    Previously reported component [ NRBCs ] is no longer reported.\"  Previously reported component [ NRBCs Absolute ] is no longer reported.\"   Manual Differential   Result Value Ref Range    % Neutrophils 3 %    % Lymphocytes 3 %    % Monocytes 0 %    % Eosinophils 0 %    % Basophils 0 %    % Blasts 94 %    NRBCs per 100 WBC 1 (H) <=0 %    Absolute Neutrophils 0.1 (LL) 1.6 - 8.3 10e3/uL    Absolute Lymphocytes 0.1 (L) 0.8 - 5.3 10e3/uL    Absolute Monocytes 0.0 0.0 - 1.3 10e3/uL    Absolute Eosinophils 0.0 0.0 - 0.7 10e3/uL    Absolute Basophils 0.0 0.0 - 0.2 10e3/uL    Absolute Blasts 2.5 (H) <=0.0 10e3/uL    Absolute NRBCs 0.0 <=0.0 10e3/uL    RBC Morphology Confirmed RBC Indices     Platelet Assessment  Automated Count Confirmed. Platelet morphology is normal.     Automated Count Confirmed. Platelet morphology is normal.    Elliptocytes Slight (A) None Seen    Teardrop Cells Slight (A) None Seen   ABO/Rh type and screen    Narrative    The following orders were created for panel order ABO/Rh type and screen.  Procedure                               Abnormality         Status                     ---------                               -----------         ------                     Adult Type and Screen[[id number]]                            Final result                 Please view results for these tests on the " individual orders.   Adult Type and Screen   Result Value Ref Range    ABO/RH(D) A NEG     Antibody Screen Negative Negative    SPECIMEN EXPIRATION DATE [id number]    CONDITIONAL Prepare red blood cells (unit)   Result Value Ref Range    CROSSMATCH Compatible     UNIT ABO/RH A Neg     Unit Number [id number]     Unit Status Issued     Blood Component Type Red Blood Cells     Product Code [id number]     CODING SYSTEM [id number]     UNIT TYPE ISBT 0600     ISSUE DATE AND TIME [id number]

## 2022-06-25 LAB
ALBUMIN SERPL BCG-MCNC: 4.1 G/DL (ref 3.5–5.2)
ALP SERPL-CCNC: 35 U/L (ref 35–104)
ALT SERPL W P-5'-P-CCNC: 31 U/L (ref 10–35)
ANION GAP SERPL CALCULATED.3IONS-SCNC: 9 MMOL/L (ref 7–15)
APTT PPP: 35 SECONDS (ref 22–38)
AST SERPL W P-5'-P-CCNC: 18 U/L (ref 10–35)
BACTERIA UR CULT: NORMAL
BASOPHILS # BLD MANUAL: 0 10E3/UL (ref 0–0.2)
BASOPHILS NFR BLD MANUAL: 0 %
BILIRUB DIRECT SERPL-MCNC: <0.2 MG/DL (ref 0–0.3)
BILIRUB SERPL-MCNC: 0.6 MG/DL
BLASTS # BLD MANUAL: 0.3 10E3/UL
BLASTS NFR BLD MANUAL: 42 %
BLD PROD TYP BPU: NORMAL
BLOOD COMPONENT TYPE: NORMAL
BUN SERPL-MCNC: 12.4 MG/DL (ref 6–20)
BURR CELLS BLD QL SMEAR: SLIGHT
CALCIUM SERPL-MCNC: 8.9 MG/DL (ref 8.6–10)
CHLORIDE SERPL-SCNC: 108 MMOL/L (ref 98–107)
CODING SYSTEM: NORMAL
CREAT SERPL-MCNC: 0.39 MG/DL (ref 0.51–0.95)
DACRYOCYTES BLD QL SMEAR: ABNORMAL
DEPRECATED HCO3 PLAS-SCNC: 21 MMOL/L (ref 22–29)
ELLIPTOCYTES BLD QL SMEAR: SLIGHT
EOSINOPHIL # BLD MANUAL: 0 10E3/UL (ref 0–0.7)
EOSINOPHIL NFR BLD MANUAL: 0 %
ERYTHROCYTE [DISTWIDTH] IN BLOOD BY AUTOMATED COUNT: 16.3 % (ref 10–15)
FIBRINOGEN PPP-MCNC: 236 MG/DL (ref 170–490)
GFR SERPL CREATININE-BSD FRML MDRD: >90 ML/MIN/1.73M2
GLUCOSE SERPL-MCNC: 128 MG/DL (ref 70–99)
HCT VFR BLD AUTO: 21.5 % (ref 35–47)
HGB BLD-MCNC: 7.3 G/DL (ref 11.7–15.7)
INR PPP: 1.29 (ref 0.85–1.15)
ISSUE DATE AND TIME: NORMAL
LDH SERPL L TO P-CCNC: 545 U/L (ref 0–250)
LYMPHOCYTES # BLD MANUAL: 0.3 10E3/UL (ref 0.8–5.3)
LYMPHOCYTES NFR BLD MANUAL: 51 %
MAGNESIUM SERPL-MCNC: 2.6 MG/DL (ref 1.7–2.3)
MCH RBC QN AUTO: 34.8 PG (ref 26.5–33)
MCHC RBC AUTO-ENTMCNC: 34 G/DL (ref 31.5–36.5)
MCV RBC AUTO: 102 FL (ref 78–100)
MONOCYTES # BLD MANUAL: 0 10E3/UL (ref 0–1.3)
MONOCYTES NFR BLD MANUAL: 0 %
NEUTROPHILS # BLD MANUAL: 0 10E3/UL (ref 1.6–8.3)
NEUTROPHILS NFR BLD MANUAL: 7 %
PHOSPHATE SERPL-MCNC: 3.5 MG/DL (ref 2.5–4.5)
PLAT MORPH BLD: ABNORMAL
PLATELET # BLD AUTO: 10 10E3/UL (ref 150–450)
POTASSIUM SERPL-SCNC: 4.2 MMOL/L (ref 3.4–5.3)
PROT SERPL-MCNC: 5.9 G/DL (ref 6.4–8.3)
RBC # BLD AUTO: 2.1 10E6/UL (ref 3.8–5.2)
RBC MORPH BLD: ABNORMAL
SODIUM SERPL-SCNC: 138 MMOL/L (ref 136–145)
UNIT ABO/RH: NORMAL
UNIT NUMBER: NORMAL
UNIT STATUS: NORMAL
UNIT TYPE ISBT: 600
URATE SERPL-MCNC: 4.6 MG/DL (ref 2.4–5.7)
WBC # BLD AUTO: 0.6 10E3/UL (ref 4–11)

## 2022-06-25 PROCEDURE — 99232 SBSQ HOSP IP/OBS MODERATE 35: CPT | Performed by: STUDENT IN AN ORGANIZED HEALTH CARE EDUCATION/TRAINING PROGRAM

## 2022-06-25 PROCEDURE — 120N000002 HC R&B MED SURG/OB UMMC

## 2022-06-25 PROCEDURE — 83735 ASSAY OF MAGNESIUM: CPT | Performed by: PHYSICIAN ASSISTANT

## 2022-06-25 PROCEDURE — 258N000003 HC RX IP 258 OP 636: Performed by: PHYSICIAN ASSISTANT

## 2022-06-25 PROCEDURE — 83615 LACTATE (LD) (LDH) ENZYME: CPT | Performed by: PHYSICIAN ASSISTANT

## 2022-06-25 PROCEDURE — 80053 COMPREHEN METABOLIC PANEL: CPT | Performed by: PHYSICIAN ASSISTANT

## 2022-06-25 PROCEDURE — 85730 THROMBOPLASTIN TIME PARTIAL: CPT | Performed by: PHYSICIAN ASSISTANT

## 2022-06-25 PROCEDURE — 84100 ASSAY OF PHOSPHORUS: CPT | Performed by: PHYSICIAN ASSISTANT

## 2022-06-25 PROCEDURE — 85007 BL SMEAR W/DIFF WBC COUNT: CPT | Performed by: PHYSICIAN ASSISTANT

## 2022-06-25 PROCEDURE — 258N000003 HC RX IP 258 OP 636: Performed by: STUDENT IN AN ORGANIZED HEALTH CARE EDUCATION/TRAINING PROGRAM

## 2022-06-25 PROCEDURE — 85027 COMPLETE CBC AUTOMATED: CPT | Performed by: PHYSICIAN ASSISTANT

## 2022-06-25 PROCEDURE — 36592 COLLECT BLOOD FROM PICC: CPT | Performed by: PHYSICIAN ASSISTANT

## 2022-06-25 PROCEDURE — 250N000011 HC RX IP 250 OP 636: Performed by: STUDENT IN AN ORGANIZED HEALTH CARE EDUCATION/TRAINING PROGRAM

## 2022-06-25 PROCEDURE — P9037 PLATE PHERES LEUKOREDU IRRAD: HCPCS | Performed by: PHYSICIAN ASSISTANT

## 2022-06-25 PROCEDURE — 250N000013 HC RX MED GY IP 250 OP 250 PS 637: Performed by: INTERNAL MEDICINE

## 2022-06-25 PROCEDURE — 250N000013 HC RX MED GY IP 250 OP 250 PS 637: Performed by: PHYSICIAN ASSISTANT

## 2022-06-25 PROCEDURE — 82248 BILIRUBIN DIRECT: CPT | Performed by: PHYSICIAN ASSISTANT

## 2022-06-25 PROCEDURE — 85384 FIBRINOGEN ACTIVITY: CPT | Performed by: PHYSICIAN ASSISTANT

## 2022-06-25 PROCEDURE — 250N000013 HC RX MED GY IP 250 OP 250 PS 637: Performed by: STUDENT IN AN ORGANIZED HEALTH CARE EDUCATION/TRAINING PROGRAM

## 2022-06-25 PROCEDURE — 99207 PR SC NO CHARGE VISIT: CPT | Performed by: STUDENT IN AN ORGANIZED HEALTH CARE EDUCATION/TRAINING PROGRAM

## 2022-06-25 PROCEDURE — 250N000011 HC RX IP 250 OP 636: Performed by: PHYSICIAN ASSISTANT

## 2022-06-25 PROCEDURE — 85610 PROTHROMBIN TIME: CPT | Performed by: PHYSICIAN ASSISTANT

## 2022-06-25 PROCEDURE — 84550 ASSAY OF BLOOD/URIC ACID: CPT | Performed by: PHYSICIAN ASSISTANT

## 2022-06-25 RX ORDER — PANTOPRAZOLE SODIUM 40 MG/1
40 TABLET, DELAYED RELEASE ORAL
Status: DISCONTINUED | OUTPATIENT
Start: 2022-06-25 | End: 2022-07-15 | Stop reason: HOSPADM

## 2022-06-25 RX ORDER — PANTOPRAZOLE SODIUM 40 MG/1
40 TABLET, DELAYED RELEASE ORAL
Status: DISCONTINUED | OUTPATIENT
Start: 2022-06-26 | End: 2022-06-25

## 2022-06-25 RX ORDER — CALCIUM CARBONATE 750 MG/1
750 TABLET, CHEWABLE ORAL 3 TIMES DAILY PRN
Status: DISCONTINUED | OUTPATIENT
Start: 2022-06-25 | End: 2022-07-15 | Stop reason: HOSPADM

## 2022-06-25 RX ADMIN — CEFEPIME HYDROCHLORIDE 2 G: 2 INJECTION, POWDER, FOR SOLUTION INTRAVENOUS at 09:10

## 2022-06-25 RX ADMIN — ONDANSETRON HYDROCHLORIDE 8 MG: 8 TABLET, FILM COATED ORAL at 16:25

## 2022-06-25 RX ADMIN — ALLOPURINOL 300 MG: 300 TABLET ORAL at 09:10

## 2022-06-25 RX ADMIN — CEFEPIME HYDROCHLORIDE 2 G: 2 INJECTION, POWDER, FOR SOLUTION INTRAVENOUS at 18:31

## 2022-06-25 RX ADMIN — Medication 50 MCG: at 09:10

## 2022-06-25 RX ADMIN — ACYCLOVIR 400 MG: 400 TABLET ORAL at 09:10

## 2022-06-25 RX ADMIN — ACYCLOVIR 400 MG: 400 TABLET ORAL at 20:19

## 2022-06-25 RX ADMIN — ONDANSETRON HYDROCHLORIDE 8 MG: 8 TABLET, FILM COATED ORAL at 04:27

## 2022-06-25 RX ADMIN — MICAFUNGIN 50 MG: 10 INJECTION, POWDER, LYOPHILIZED, FOR SOLUTION INTRAVENOUS at 16:25

## 2022-06-25 RX ADMIN — CEFEPIME HYDROCHLORIDE 2 G: 2 INJECTION, POWDER, FOR SOLUTION INTRAVENOUS at 00:15

## 2022-06-25 RX ADMIN — OXYCODONE HYDROCHLORIDE AND ACETAMINOPHEN 1000 MG: 500 TABLET ORAL at 09:10

## 2022-06-25 RX ADMIN — CYTARABINE 240 MG: 100 INJECTION, SOLUTION INTRATHECAL; INTRAVENOUS; SUBCUTANEOUS at 16:36

## 2022-06-25 RX ADMIN — Medication 1 TABLET: at 09:10

## 2022-06-25 RX ADMIN — SODIUM CHLORIDE: 9 INJECTION, SOLUTION INTRAVENOUS at 07:23

## 2022-06-25 RX ADMIN — PANTOPRAZOLE SODIUM 40 MG: 40 TABLET, DELAYED RELEASE ORAL at 17:06

## 2022-06-25 RX ADMIN — CALCIUM CARBONATE (ANTACID) CHEW TAB 500 MG 750 MG: 500 CHEW TAB at 20:21

## 2022-06-25 ASSESSMENT — ACTIVITIES OF DAILY LIVING (ADL)
ADLS_ACUITY_SCORE: 18

## 2022-06-25 NOTE — PLAN OF CARE
[id number]    Afebrile, VSS. CIVI cytarabine infusing through PICC. Good UOP. Denies pain/nausea. NS infusing at 100 mL/hr. Continue w/ POC.

## 2022-06-25 NOTE — PROGRESS NOTES
"[facility]    Hematology / Oncology Progress Note    Date of Admission: 6/16/2022  Date of Service (when I saw the patient): 06/25/2022     Assessment & Plan   Veda Marinelli is a 37 year old female with PMH significant for h/o COVID19 infection (10/2021), C.diff, and hypothyroidism who presents with leukocytosis and circulating blasts, concerning for acute leukemia. Patient has been medicallyy stable therefore we will continue to await final BMBx results prior to initiation of treatment.    Today:  - Today is Day 3 of 7+3+Midostaurin (C1D1: 6/22/22)   - Tolerating well.   - Neutropenic Fever to 101.6F on 6/23. Afebrile for >36 hours.   - Started Cefepime 2g q8h IV   - UA/UCx; Blood culture; CXR clear  - WBC down to 0.6. Uric acid, phos and potassium stable. Will stop NS IVF 100mL/hr given stable labs.    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 On 3/8/2022, white blood cell count 2.5 (ANC 1.0), Hgb 13.6 with , platelets normal.  On 5/15/2022, she was noted to have further decreasd white blood cell count of 1.6 (ANC 0.28) with hemoglobin 11. platelets were 133. She was ultimately referred to Hematology (Dr. Bob), who she saw on 6/7/22. Further blood workup was recommended and labs done 6/14. CBC revealed WBC 25.3 (ANC 0.8), Hgb 10.1 (), Plt 90K. On the differential and morphology review, 81% blasts were seen concerning for acute leukemia. No kyle rods noted on PB blood smear pathologist review. She was advised to present to [facility] for further evaluation and management.   - CBC on admit demonstrates WBC 41K (ANC 0.0) with 86% \"other\" cells, Hgb 9.5, Plt 72K. Per verbal report from Heme Path fellow there was concern for Kyle rods. Coags WNL, no e/o TLS.   - CT CAP " without evidence for infectious source   - Sent PB FISH, PML-ANNELIESE (negative), flow cytometry.   - was started on ATRA on 6/16 PM prior to result of PML-ANNELIESE. Given that this test is now negative, stopped ATRA after 6/17 AM dose  - Start Hydrea 1g BID (x 6/17), increase to 2g BID (x 6/19), Continue hydrea to 2g QID. Will now stop Hydrea will plan to initiate chemotherapy with 7+3 today.   - BMBx shows markedly hypercellular marrow (85-95% estimated cellularity), with markedly diminished trilineage hematopoiesis, no overt dysplasia in the evaluable elements, and 92% blasts    - FLT3 ITD positive / TKD neg  - HLA typing sent, message sent to BMT coordinators on 6/24. Awaiting NPM1 results.     Treatment Plan: Cytarabine and Daunorubicin (7+3) plus Midostaurin (D1: 6/22/22)   - Daunorubicin 90mg/m2 - D1-3   - Cytarabine 100 mg/m2 - D1-7   - Midostaurin 50mg BID Day 8-21   Supportive Meds -    - Dexamethasone 12mg - D1-3   - Compazine, Zofran, Ativan    - bowel regimen     # TLS  - Continue Allopurinol 300mg  - WBC down to 0.6. Uric acid, phos and potassium stable. Will stop NS IVF 100mL/hr given stable labs.    - Decrease frequency of TLS/DIC labs to daily   - TLS BID (BMP, Phos, Uric Acid), LDH daily, DIC labs BID   ? If uric acid >8, give rasburicase 6 mg x1 dose  ? If phosphorus >5, start Phoslo TID  ? Additional correction of electrolyte abnormalities (K+, Ca++) PRN per usual means.     # Pancytopenia  2/2 underlying malignancy.  - transfuse to maintain Hgb >7, Plt >10K   - conditional orders in place for FFP if INR >1.8, cryo if fibrinogen <100    - blood consent signed on 6/17    # Mild coagulopathy  INR gradually uptrended 1.1-->1.3. No bleeding. Fibrinogen WNL.   - monitor with DIC labs BID as above     GYN  # LMP  Approximately 2 weeks PTA, bleeding was heavier than normal with clots. Usually moderate lasting ~3 days.   - Monitor      ID  # Neutropenic Fever  Fevered to 101.6F on 6/23. Noted to have chills  though no other focal symptoms. Afebrile now for >36 hours  - Continue Cefepime 2g q8h IV  - UA/UCx; Blood culture  - CXR clear    # ID PPx  Denies fevers. Given profound neutropenia, started prophy anti-infectives  - ACV 400mg BID  - Levaquin 250mg daily - on HOLD due to treatment dose abx  - Micafungin 50mg IV daily     # H/o C.diff  # Hemorrhoid  Complicated her C.diff course and has been waxing/waning since. Sometimes painful. She primarily has used witch hazel pads when symptomatic. Stools have normalized.   - CT A/P negative for perirectal abscess.  - TUCKS PRN  - Avoid PTA probiotic at this time.  - C. Diff negative. Will hold off on adding oral ppx vanco.  - Continue to monitor for diarrhea. Recheck C.diff status if diarrhea occurs.    # h/o COVID-19  Pt is not vaccinated nor interested in being vaccinated. She was admitted from 10/1/2021 - 10/13/2021 for acute hypoxic respiratory failure from COVID-19 pneumonia.  Required IMC, high flow and intermittent CPAP. She was treated with dexamethasone, remdesivir, and baricitinib in addition to azithromycin and ceftriaxone. Recovered symptomatically from this.     Endocrine  # Hypothyroidism  - continue PTA synthroid  - TSH low at 0.04, T4 wnl at 1.48,     MISC  # Social  - Patient considering donating her hair prior to receiving chemotherapy.  - [employer] application faxed 6/21/22. Consult completed 6/25.    # Integrated Health Services  - Patient is interested with our integrated health services/ support  - Appreciate support from [facility] health       FEN  - IVF per chemotherapy protocol  - monitor lyte, hold off on automatic repletion scales while assessing for TLS  - regular diet as tolerated     PPx  - VTE: None due to worsening TCP and new leukemia/possible APL  - GI: none at present  - bowels: PRN     Dispo: Admit for workup of new acute leukemia. Unclear at this time but anticipated 3-4 week LOS at minimum.      Plan of care discussed with  Dr. Philip MATAMOROS spent >35 minutes in the care of this patient today, which included time necessary for review of interval events, obtaining history and physical exam, ordering medication(s)/test(s) as medically indicated, discussion with interdisciplinary/consult team(s), and documentation time. Over 50% of time was spent face-to-face and/or coordinating care.     Gaby Arteaga PA-C  Heme/Onc  [phone]     Interval History   No acute events overnight, afebrile.  Minerva continues to feel well. No new symptoms or concerns today. Completed consult with deeplocal today. No repeat fevers. Discussed that her T4 levels looked good and we will keep her on her same dose of levothyroxine.   No other new symptoms or concerns.   Continues to remain active and walking the halls.   Denies fever, chills, mouth sores, headache, SOB, cough, abdominal pain, diarrhea, constipation, nausea, vomiting, dysuria, hematuria, numbness, tingling, swelling    Physical Exam   Temp: 97.8  F (36.6  C) Temp src: Oral BP: 119/84 Pulse: 62   Resp: 18 SpO2: 97 % O2 Device: None (Room air)    Vitals:    06/21/22 1037 06/22/22 0726 06/25/22 0856   Weight: 117.3 kg (258 lb 11.2 oz) 117.9 kg (259 lb 14.4 oz) 117.7 kg (259 lb 7.7 oz)     Vital Signs with Ranges  Temp:  [97.2  F (36.2  C)-98.8  F (37.1  C)] 97.8  F (36.6  C)  Pulse:  [62-86] 62  Resp:  [16-18] 18  BP: (100-135)/(58-93) 119/84  SpO2:  [97 %-100 %] 97 %  I/O last 3 completed shifts:  In: 3651.93 [I.V.:2598.33; IV Piggyback:1053.6]  Out: 6100 [Urine:6100]    Constitutional: Pleasant female sitting up in chair. Awake and conversational. Non- toxic appearing. No acute distress.   HEENT: Normocephalic, atraumatic. Sclerae anicteric. EOM intact. Moist mucus membranes without lesions, thrush, or exudates appreciated  Respiratory: Breathing comfortable with no increased work on room air. Good air exchange. No signs of respiratory distress or accessory muscle use. Lungs clear to  auscultation bilaterally, no crackles or wheezing noted.  Cardiovascular: Regular rate and rhythm. Normal S1 and S2. No murmurs, rubs, or gallops. No peripheral edema.    GI: Abdomen is soft, non-distended, non-tender and without distension. Bowel sounds present and normoactive.   Skin: Skin is clean, dry, intact. No jaundice appreciated.   Musculoskeletal/ Extremities: No redness, warmth, or swelling of the joints appreciated. Distal pulses palpable. Nailbeds pink and without cyanosis or clubbing.   Neurologic: Alert and oriented. Speech normal. Grossly nonfocal. Memory and thought process preserved. Motor function grossly normal. Sensation intact bilaterally. CN II-XII intact.   Neuropsychiatric: Calm, affect congruent to situation. Appropriate mood and affect. Good judgment and insight. No visual/auditory hallucinations.  VAD: PICC in LUE. No erythema or edema.        Medications     - MEDICATION INSTRUCTIONS -         acyclovir  400 mg Oral BID     allopurinol  300 mg Oral Daily     ceFEPIme (MAXIPIME) IV  2 g Intravenous Q8H     Chemotherapy Infusing-Continuous Infusion   Does not apply Q8H     cytarabine (CYTOSAR) infusion  100 mg/m2 (Treatment Plan Recorded) Intravenous Q24H     [Held by provider] levofloxacin  250 mg Oral Daily     levothyroxine  125 mcg Oral Daily     micafungin  50 mg Intravenous Q24H     multivitamin w/minerals  1 tablet Oral Daily     ondansetron  8 mg Oral Q12H     vitamin C  1,000 mg Oral Daily     vitamin D3  50 mcg Oral Daily       Data   Results for orders placed or performed during the hospital encounter of 06/16/22 (from the past 24 hour(s))   CBC with platelets differential    Narrative    The following orders were created for panel order CBC with platelets differential.  Procedure                               Abnormality         Status                     ---------                               -----------         ------                     CBC with platelets and  "d...[[id number]]  Abnormal            Final result               Manual Differential[[id number]]          Abnormal            Final result                 Please view results for these tests on the individual orders.   Basic metabolic panel   Result Value Ref Range    Creatinine 0.60 0.51 - 0.95 mg/dL    Sodium 136 136 - 145 mmol/L    Potassium 3.9 3.4 - 5.3 mmol/L    Urea Nitrogen 16.9 6.0 - 20.0 mg/dL    Chloride 107 98 - 107 mmol/L    Carbon Dioxide (CO2) 21 (L) 22 - 29 mmol/L    Anion Gap 8 7 - 15 mmol/L    Glucose 139 (H) 70 - 99 mg/dL    GFR Estimate >90 >60 mL/min/1.73m2    Calcium 8.5 (L) 8.6 - 10.0 mg/dL   Phosphorus   Result Value Ref Range    Phosphorus 3.0 2.5 - 4.5 mg/dL   INR   Result Value Ref Range    INR 1.41 (H) 0.85 - 1.15   Fibrinogen activity   Result Value Ref Range    Fibrinogen Activity 250 170 - 490 mg/dL   Partial thromboplastin time   Result Value Ref Range    aPTT 38 22 - 38 Seconds   Uric acid   Result Value Ref Range    Uric Acid 4.2 2.4 - 5.7 mg/dL   CBC with platelets and differential   Result Value Ref Range    WBC Count 1.6 (L) 4.0 - 11.0 10e3/uL    RBC Count 2.14 (L) 3.80 - 5.20 10e6/uL    Hemoglobin 7.4 (L) 11.7 - 15.7 g/dL    Hematocrit 21.9 (L) 35.0 - 47.0 %     (H) 78 - 100 fL    MCH 34.6 (H) 26.5 - 33.0 pg    MCHC 33.8 31.5 - 36.5 g/dL    RDW 17.0 (H) 10.0 - 15.0 %    Platelet Count 13 (LL) 150 - 450 10e3/uL    Narrative    Previously reported component [ NRBCs ] is no longer reported.\"  Previously reported component [ NRBCs Absolute ] is no longer reported.\"   Manual Differential   Result Value Ref Range    % Neutrophils 4 %    % Lymphocytes 25 %    % Monocytes 0 %    % Eosinophils 0 %    % Basophils 0 %    % Blasts 71 %    Absolute Neutrophils 0.1 (LL) 1.6 - 8.3 10e3/uL    Absolute Lymphocytes 0.4 (L) 0.8 - 5.3 10e3/uL    Absolute Monocytes 0.0 0.0 - 1.3 10e3/uL    Absolute Eosinophils 0.0 0.0 - 0.7 10e3/uL    Absolute Basophils 0.0 0.0 - 0.2 10e3/uL    Absolute Blasts " 1.1 (H) <=0.0 10e3/uL    RBC Morphology Confirmed RBC Indices     Platelet Assessment  Automated Count Confirmed. Platelet morphology is normal.     Automated Count Confirmed. Platelet morphology is normal.    Teardrop Cells Slight (A) None Seen   CBC with platelets differential    Narrative    The following orders were created for panel order CBC with platelets differential.  Procedure                               Abnormality         Status                     ---------                               -----------         ------                     CBC with platelets and d...[[id number]]  Abnormal            Final result               Manual Differential[[id number]]          Abnormal            Final result                 Please view results for these tests on the individual orders.   Lactate Dehydrogenase   Result Value Ref Range    Lactate Dehydrogenase 545 (H) 0 - 250 U/L   Magnesium   Result Value Ref Range    Magnesium 2.6 (H) 1.7 - 2.3 mg/dL   Basic metabolic panel   Result Value Ref Range    Creatinine 0.39 (L) 0.51 - 0.95 mg/dL    Sodium 138 136 - 145 mmol/L    Potassium 4.2 3.4 - 5.3 mmol/L    Urea Nitrogen 12.4 6.0 - 20.0 mg/dL    Chloride 108 (H) 98 - 107 mmol/L    Carbon Dioxide (CO2) 21 (L) 22 - 29 mmol/L    Anion Gap 9 7 - 15 mmol/L    Glucose 128 (H) 70 - 99 mg/dL    GFR Estimate >90 >60 mL/min/1.73m2    Calcium 8.9 8.6 - 10.0 mg/dL   Phosphorus   Result Value Ref Range    Phosphorus 3.5 2.5 - 4.5 mg/dL   INR   Result Value Ref Range    INR 1.29 (H) 0.85 - 1.15   Fibrinogen activity   Result Value Ref Range    Fibrinogen Activity 236 170 - 490 mg/dL   Partial thromboplastin time   Result Value Ref Range    aPTT 35 22 - 38 Seconds   Uric acid   Result Value Ref Range    Uric Acid 4.6 2.4 - 5.7 mg/dL   Hepatic panel   Result Value Ref Range    Protein Total 5.9 (L) 6.4 - 8.3 g/dL    Albumin 4.1 3.5 - 5.2 g/dL    Bilirubin Total 0.6 <=1.2 mg/dL    Alkaline Phosphatase 35 35 - 104 U/L    AST 18 10 - 35 U/L  "   ALT 31 10 - 35 U/L    Bilirubin Direct <0.20 0.00 - 0.30 mg/dL   CBC with platelets and differential   Result Value Ref Range    WBC Count 0.6 (LL) 4.0 - 11.0 10e3/uL    RBC Count 2.10 (L) 3.80 - 5.20 10e6/uL    Hemoglobin 7.3 (L) 11.7 - 15.7 g/dL    Hematocrit 21.5 (L) 35.0 - 47.0 %     (H) 78 - 100 fL    MCH 34.8 (H) 26.5 - 33.0 pg    MCHC 34.0 31.5 - 36.5 g/dL    RDW 16.3 (H) 10.0 - 15.0 %    Platelet Count 10 (LL) 150 - 450 10e3/uL    Narrative    Previously reported component [ NRBCs ] is no longer reported.\"  Previously reported component [ NRBCs Absolute ] is no longer reported.\"   Manual Differential   Result Value Ref Range    % Neutrophils 7 %    % Lymphocytes 51 %    % Monocytes 0 %    % Eosinophils 0 %    % Basophils 0 %    % Blasts 42 %    Absolute Neutrophils 0.0 (LL) 1.6 - 8.3 10e3/uL    Absolute Lymphocytes 0.3 (L) 0.8 - 5.3 10e3/uL    Absolute Monocytes 0.0 0.0 - 1.3 10e3/uL    Absolute Eosinophils 0.0 0.0 - 0.7 10e3/uL    Absolute Basophils 0.0 0.0 - 0.2 10e3/uL    Absolute Blasts 0.3 (H) <=0.0 10e3/uL    RBC Morphology Confirmed RBC Indices     Platelet Assessment  Automated Count Confirmed. Platelet morphology is normal.     Automated Count Confirmed. Platelet morphology is normal.    [place] Cells Slight (A) None Seen    Elliptocytes Slight (A) None Seen    Teardrop Cells Moderate (A) None Seen   CONDITIONAL Prepare pheresed platelets (unit)   Result Value Ref Range    UNIT ABO/RH A Neg     Unit Number [id number]     Unit Status Issued     Blood Component Type Platelets     Product Code [id number]     CODING SYSTEM [id number]     UNIT TYPE ISBT 0600     ISSUE DATE AND TIME [id number]        "

## 2022-06-25 NOTE — PLAN OF CARE
Afebrile, vss. RBCs given for 6.4. Good appetite. Adequate urine output, BM today. No complaints at this time.     Nursing Focus: Chemotherapy  D: Positive brisk bright red blood return via Picc. Insertion site is clean/dry/intact, dressing intact with no complaints of pain.  Urine output is recorded in intake Doc Flowsheet.    I: Premedications given per order (see electronic medical administration record). Dose #3 of daunorubicin started to infuse over 60 minutes. Reviewed pt teaching on chemotherapy side effects.  Pt denies need for further teaching. Chemotherapy double checked per protocol by two chemotherapy competent RN's.   A: Tolerating chemo well. Denies nausea.   P: Continue to monitor urine output and symptoms of nausea. Screen for symptoms of toxicity.    Nursing Focus: Chemotherapy  D: Positive brisk bright red blood return via Picc. Insertion site is clean/dry/intact, dressing intact with no complaints of pain.  Urine output is recorded in intake Doc Flowsheet.    I: Premedications given per order (see electronic medical administration record). Dose #3 of cytarabine started to infuse over 24 hours. Reviewed pt teaching on chemotherapy side effects.  Pt denies need for further teaching. Chemotherapy double checked per protocol by two chemotherapy competent RN's.   A: Tolerating chemo well. Denies nausea.   P: Continue to monitor urine output and symptoms of nausea. Screen for symptoms of toxicity.

## 2022-06-26 LAB
ALBUMIN SERPL BCG-MCNC: 3.3 G/DL (ref 3.5–5.2)
ALP SERPL-CCNC: 37 U/L (ref 35–104)
ALT SERPL W P-5'-P-CCNC: 25 U/L (ref 10–35)
ANION GAP SERPL CALCULATED.3IONS-SCNC: 8 MMOL/L (ref 7–15)
APTT PPP: 31 SECONDS (ref 22–38)
AST SERPL W P-5'-P-CCNC: 11 U/L (ref 10–35)
BASOPHILS # BLD MANUAL: 0 10E3/UL (ref 0–0.2)
BASOPHILS NFR BLD MANUAL: 0 %
BILIRUB DIRECT SERPL-MCNC: 0.26 MG/DL (ref 0–0.3)
BILIRUB SERPL-MCNC: 1.1 MG/DL
BLASTS # BLD MANUAL: 0.1 10E3/UL
BLASTS NFR BLD MANUAL: 10 %
BLD PROD TYP BPU: NORMAL
BLOOD COMPONENT TYPE: NORMAL
BUN SERPL-MCNC: 14.6 MG/DL (ref 6–20)
CALCIUM SERPL-MCNC: 8.2 MG/DL (ref 8.6–10)
CHLORIDE SERPL-SCNC: 108 MMOL/L (ref 98–107)
CODING SYSTEM: NORMAL
CREAT SERPL-MCNC: 0.46 MG/DL (ref 0.51–0.95)
CROSSMATCH: NORMAL
DACRYOCYTES BLD QL SMEAR: SLIGHT
DEPRECATED HCO3 PLAS-SCNC: 21 MMOL/L (ref 22–29)
EOSINOPHIL # BLD MANUAL: 0 10E3/UL (ref 0–0.7)
EOSINOPHIL NFR BLD MANUAL: 0 %
ERYTHROCYTE [DISTWIDTH] IN BLOOD BY AUTOMATED COUNT: 15.1 % (ref 10–15)
FIBRINOGEN PPP-MCNC: 227 MG/DL (ref 170–490)
GFR SERPL CREATININE-BSD FRML MDRD: >90 ML/MIN/1.73M2
GLUCOSE SERPL-MCNC: 94 MG/DL (ref 70–99)
HCT VFR BLD AUTO: 19.6 % (ref 35–47)
HGB BLD-MCNC: 6.7 G/DL (ref 11.7–15.7)
INR PPP: 1.16 (ref 0.85–1.15)
ISSUE DATE AND TIME: NORMAL
LYMPHOCYTES # BLD MANUAL: 1.2 10E3/UL (ref 0.8–5.3)
LYMPHOCYTES NFR BLD MANUAL: 88 %
MAGNESIUM SERPL-MCNC: 2 MG/DL (ref 1.7–2.3)
MCH RBC QN AUTO: 34.5 PG (ref 26.5–33)
MCHC RBC AUTO-ENTMCNC: 34.2 G/DL (ref 31.5–36.5)
MCV RBC AUTO: 101 FL (ref 78–100)
MONOCYTES # BLD MANUAL: 0 10E3/UL (ref 0–1.3)
MONOCYTES NFR BLD MANUAL: 0 %
NEUTROPHILS # BLD MANUAL: 0 10E3/UL (ref 1.6–8.3)
NEUTROPHILS NFR BLD MANUAL: 2 %
PHOSPHATE SERPL-MCNC: 3.8 MG/DL (ref 2.5–4.5)
PLAT MORPH BLD: ABNORMAL
PLATELET # BLD AUTO: 22 10E3/UL (ref 150–450)
POTASSIUM SERPL-SCNC: 3.9 MMOL/L (ref 3.4–5.3)
PROT SERPL-MCNC: 5.3 G/DL (ref 6.4–8.3)
RBC # BLD AUTO: 1.94 10E6/UL (ref 3.8–5.2)
RBC MORPH BLD: ABNORMAL
SODIUM SERPL-SCNC: 137 MMOL/L (ref 136–145)
UNIT ABO/RH: NORMAL
UNIT NUMBER: NORMAL
UNIT STATUS: NORMAL
UNIT TYPE ISBT: 9500
URATE SERPL-MCNC: 3.7 MG/DL (ref 2.4–5.7)
WBC # BLD AUTO: 1.4 10E3/UL (ref 4–11)

## 2022-06-26 PROCEDURE — 82248 BILIRUBIN DIRECT: CPT | Performed by: PHYSICIAN ASSISTANT

## 2022-06-26 PROCEDURE — 36592 COLLECT BLOOD FROM PICC: CPT | Performed by: PHYSICIAN ASSISTANT

## 2022-06-26 PROCEDURE — 250N000011 HC RX IP 250 OP 636: Performed by: PHYSICIAN ASSISTANT

## 2022-06-26 PROCEDURE — 84100 ASSAY OF PHOSPHORUS: CPT | Performed by: PHYSICIAN ASSISTANT

## 2022-06-26 PROCEDURE — 85610 PROTHROMBIN TIME: CPT | Performed by: PHYSICIAN ASSISTANT

## 2022-06-26 PROCEDURE — 99207 PR SC NO CHARGE VISIT: CPT | Performed by: STUDENT IN AN ORGANIZED HEALTH CARE EDUCATION/TRAINING PROGRAM

## 2022-06-26 PROCEDURE — 258N000003 HC RX IP 258 OP 636: Performed by: PHYSICIAN ASSISTANT

## 2022-06-26 PROCEDURE — 258N000003 HC RX IP 258 OP 636: Performed by: STUDENT IN AN ORGANIZED HEALTH CARE EDUCATION/TRAINING PROGRAM

## 2022-06-26 PROCEDURE — P9016 RBC LEUKOCYTES REDUCED: HCPCS | Performed by: PHYSICIAN ASSISTANT

## 2022-06-26 PROCEDURE — 250N000013 HC RX MED GY IP 250 OP 250 PS 637: Performed by: STUDENT IN AN ORGANIZED HEALTH CARE EDUCATION/TRAINING PROGRAM

## 2022-06-26 PROCEDURE — 250N000011 HC RX IP 250 OP 636: Performed by: STUDENT IN AN ORGANIZED HEALTH CARE EDUCATION/TRAINING PROGRAM

## 2022-06-26 PROCEDURE — 85730 THROMBOPLASTIN TIME PARTIAL: CPT | Performed by: PHYSICIAN ASSISTANT

## 2022-06-26 PROCEDURE — 85384 FIBRINOGEN ACTIVITY: CPT | Performed by: PHYSICIAN ASSISTANT

## 2022-06-26 PROCEDURE — 84550 ASSAY OF BLOOD/URIC ACID: CPT | Performed by: PHYSICIAN ASSISTANT

## 2022-06-26 PROCEDURE — 250N000013 HC RX MED GY IP 250 OP 250 PS 637: Performed by: PHYSICIAN ASSISTANT

## 2022-06-26 PROCEDURE — 85027 COMPLETE CBC AUTOMATED: CPT | Performed by: PHYSICIAN ASSISTANT

## 2022-06-26 PROCEDURE — 99232 SBSQ HOSP IP/OBS MODERATE 35: CPT | Performed by: STUDENT IN AN ORGANIZED HEALTH CARE EDUCATION/TRAINING PROGRAM

## 2022-06-26 PROCEDURE — 83735 ASSAY OF MAGNESIUM: CPT | Performed by: PHYSICIAN ASSISTANT

## 2022-06-26 PROCEDURE — 120N000002 HC R&B MED SURG/OB UMMC

## 2022-06-26 PROCEDURE — 85007 BL SMEAR W/DIFF WBC COUNT: CPT | Performed by: PHYSICIAN ASSISTANT

## 2022-06-26 RX ADMIN — CEFEPIME HYDROCHLORIDE 2 G: 2 INJECTION, POWDER, FOR SOLUTION INTRAVENOUS at 00:19

## 2022-06-26 RX ADMIN — ONDANSETRON HYDROCHLORIDE 8 MG: 8 TABLET, FILM COATED ORAL at 15:16

## 2022-06-26 RX ADMIN — PANTOPRAZOLE SODIUM 40 MG: 40 TABLET, DELAYED RELEASE ORAL at 06:09

## 2022-06-26 RX ADMIN — MICAFUNGIN 50 MG: 10 INJECTION, POWDER, LYOPHILIZED, FOR SOLUTION INTRAVENOUS at 15:21

## 2022-06-26 RX ADMIN — ACYCLOVIR 400 MG: 400 TABLET ORAL at 20:01

## 2022-06-26 RX ADMIN — Medication 1 TABLET: at 08:18

## 2022-06-26 RX ADMIN — ONDANSETRON HYDROCHLORIDE 8 MG: 8 TABLET, FILM COATED ORAL at 04:09

## 2022-06-26 RX ADMIN — CYTARABINE 240 MG: 100 INJECTION, SOLUTION INTRATHECAL; INTRAVENOUS; SUBCUTANEOUS at 16:49

## 2022-06-26 RX ADMIN — OXYCODONE HYDROCHLORIDE AND ACETAMINOPHEN 1000 MG: 500 TABLET ORAL at 08:18

## 2022-06-26 RX ADMIN — CEFEPIME HYDROCHLORIDE 2 G: 2 INJECTION, POWDER, FOR SOLUTION INTRAVENOUS at 16:59

## 2022-06-26 RX ADMIN — ALLOPURINOL 300 MG: 300 TABLET ORAL at 08:18

## 2022-06-26 RX ADMIN — ACYCLOVIR 400 MG: 400 TABLET ORAL at 08:18

## 2022-06-26 RX ADMIN — CEFEPIME HYDROCHLORIDE 2 G: 2 INJECTION, POWDER, FOR SOLUTION INTRAVENOUS at 08:17

## 2022-06-26 RX ADMIN — Medication 50 MCG: at 08:18

## 2022-06-26 ASSESSMENT — ACTIVITIES OF DAILY LIVING (ADL)
ADLS_ACUITY_SCORE: 18

## 2022-06-26 NOTE — PROGRESS NOTES
"[facility]    Hematology / Oncology Progress Note    Date of Admission: 6/16/2022  Date of Service (when I saw the patient): 06/26/2022     Assessment & Plan   Veda Marinelli is a 37 year old female with PMH significant for h/o COVID19 infection (10/2021), C.diff, and hypothyroidism who presents with leukocytosis and circulating blasts, concerning for acute leukemia. Patient has been medicallyy stable therefore we will continue to await final BMBx results prior to initiation of treatment.    Today:  - Today is Day 5 of 7+3+Midostaurin (C1D1: 6/22/22)   - Tolerating well.   - Neutropenic Fever to 101.6F on 6/23. Afebrile for >48 hours. Could consider deescalating IV abx tomorrow, 6/27 pending cultures, symptoms and hemodynamic stability.   - Continue Cefepime 2g q8h IV   - UCx NGTD; Blood culture NGTD; CXR clear  - WBC stable. Uric acid, phos and potassium stable. Off NS IVF 100mL/hr given stable labs. 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 On 3/8/2022, white blood cell count 2.5 (ANC 1.0), Hgb 13.6 with , platelets normal.  On 5/15/2022, she was noted to have further decreasd white blood cell count of 1.6 (ANC 0.28) with hemoglobin 11. platelets were 133. She was ultimately referred to Hematology (Dr. Bob), who she saw on 6/7/22. Further blood workup was recommended and labs done 6/14. CBC revealed WBC 25.3 (ANC 0.8), Hgb 10.1 (), Plt 90K. On the differential and morphology review, 81% blasts were seen concerning for acute leukemia. No kyle rods noted on PB blood smear pathologist review. She was advised to present to [facility] for further evaluation and management.   - CBC on admit demonstrates WBC 41K (ANC 0.0) with 86% \"other\" cells, Hgb 9.5, Plt 72K. Per verbal report from Heme " Path fellow there was concern for Blanca rods. Coags WNL, no e/o TLS.   - CT CAP without evidence for infectious source   - Sent PB FISH, PML-ANNELIESE (negative), flow cytometry.   - was started on ATRA on 6/16 PM prior to result of PML-ANNELIESE. Given that this test is now negative, stopped ATRA after 6/17 AM dose  - Start Hydrea 1g BID (x 6/17), increase to 2g BID (x 6/19), Continue hydrea to 2g QID (6/20-6/22). Now off Hydrea given initiation of chemotherapy with 7+3+midostaurin.   - BMBx shows markedly hypercellular marrow (85-95% estimated cellularity), with markedly diminished trilineage hematopoiesis, no overt dysplasia in the evaluable elements, and 92% blasts    - FLT3 ITD positive / TKD neg  - HLA typing sent, message sent to BMT coordinators on 6/24. Awaiting NPM1 results.     Treatment Plan: Cytarabine and Daunorubicin (7+3) plus Midostaurin (D1: 6/22/22)   - Daunorubicin 90mg/m2 - D1-3   - Cytarabine 100 mg/m2 - D1-7   - Midostaurin 50mg BID Day 8-21   Supportive Meds -    - Dexamethasone 12mg - D1-3   - Compazine, Zofran, Ativan    - bowel regimen     # TLS  - Continue Allopurinol 300mg  - WBC stable. Uric acid, phos and potassium stable. Off NS IVF 100mL/hr given stable labs.   - TLS DAILY (BMP, Phos, Uric Acid), LDH daily, DIC labs DAILY   ? If uric acid >8, give rasburicase 6 mg x1 dose  ? If phosphorus >5, start Phoslo TID  ? Additional correction of electrolyte abnormalities (K+, Ca++) PRN per usual means.     # Pancytopenia  2/2 underlying malignancy.  - transfuse to maintain Hgb >7, Plt >10K   - conditional orders in place for FFP if INR >1.8, cryo if fibrinogen <100    - blood consent signed on 6/17    # Mild coagulopathy  INR gradually uptrended 1.1-->1.3. No bleeding. Fibrinogen WNL.   - monitor with DIC labs BID as above     GYN  # LMP  Approximately 2 weeks PTA, bleeding was heavier than normal with clots. Usually moderate lasting ~3 days.   - Monitor      ID  # Neutropenic Fever  Fevered to 101.6F  on 6/23. Noted to have chills though no other focal symptoms. Afebrile now for >48 hours  - Continue Cefepime 2g q8h IV (will consider de-escalating abx tomorrow, 6/27)  - UA/UCx NGTD; Blood culture NGTD  - CXR clear    # ID PPx  Denies fevers. Given profound neutropenia, started prophy anti-infectives  - ACV 400mg BID  - Levaquin 250mg daily - on HOLD due to treatment dose abx  - Micafungin 50mg IV daily       # h/o COVID-19  Pt is not vaccinated nor interested in being vaccinated. She was admitted from 10/1/2021 - 10/13/2021 for acute hypoxic respiratory failure from COVID-19 pneumonia.  Required IMC, high flow and intermittent CPAP. She was treated with dexamethasone, remdesivir, and baricitinib in addition to azithromycin and ceftriaxone. Recovered symptomatically from this.    GI  # H/o C.diff  # Hemorrhoid  Complicated her C.diff course and has been waxing/waning since. Sometimes painful. She primarily has used witch hazel pads when symptomatic. Stools have normalized.   - CT A/P negative for perirectal abscess.  - TUCKS PRN  - Avoid PTA probiotic at this time.  - C. Diff negative. Will hold off on adding oral ppx vanco.  - Continue to monitor for diarrhea. Recheck C.diff status if diarrhea occurs.    # Heartburn  - Noted to have some heart burn yesterday evening. Typically uses TUMS at home  - SANDEEP PRN  - PPI daily     Endocrine  # Hypothyroidism  - continue PTA synthroid  - TSH low at 0.04, T4 wnl at 1.48,     MISC  # Social  - Patient considering donating her hair prior to receiving chemotherapy.  - [employer] application faxed 6/21/22. Consult completed 6/25.    # Integrated Health Services  - Patient is interested with our integrated health services/ support  - Appreciate support from spiritual health       FEN  - IVF per chemotherapy protocol  - monitor lyte, hold off on automatic repletion scales while assessing for TLS  - regular diet as tolerated     PPx  - VTE: None due to worsening TCP  and new leukemia/possible APL  - GI: none at present  - bowels: PRN     Dispo: Admit for workup of new acute leukemia. Unclear at this time but anticipated 3-4 week LOS at minimum.      Plan of care discussed with Dr. Palacios     I spent >35 minutes in the care of this patient today, which included time necessary for review of interval events, obtaining history and physical exam, ordering medication(s)/test(s) as medically indicated, discussion with interdisciplinary/consult team(s), and documentation time. Over 50% of time was spent face-to-face and/or coordinating care.     Gaby Arteaga PA-C  Heme/Onc  [phone]     Interval History   No acute events overnight, afebrile.  Minerva continues to feel well. Noted some heartburn last night. Usually take TUMS. No other new symptoms or concerns today. No repeat fevers.   Continues to remain active and walking the halls.   Denies fever, chills, mouth sores, headache, SOB, cough, abdominal pain, diarrhea, constipation, nausea, vomiting, dysuria, hematuria, numbness, tingling, swelling    Physical Exam   Temp: 98.1  F (36.7  C) Temp src: Oral BP: 106/67 Pulse: 80   Resp: 18 SpO2: 99 % O2 Device: None (Room air)    Vitals:    06/22/22 0726 06/25/22 0856 06/26/22 0802   Weight: 117.9 kg (259 lb 14.4 oz) 117.7 kg (259 lb 7.7 oz) 118.8 kg (261 lb 14.5 oz)     Vital Signs with Ranges  Temp:  [97.5  F (36.4  C)-99.1  F (37.3  C)] 98.1  F (36.7  C)  Pulse:  [69-85] 80  Resp:  [16-18] 18  BP: (100-117)/(60-75) 106/67  SpO2:  [97 %-100 %] 99 %  I/O last 3 completed shifts:  In: 1151.8 [I.V.:420; IV Piggyback:526.8]  Out: 4500 [Urine:4500]    Constitutional: Pleasant female sitting up in chair. Awake and conversational. Non- toxic appearing. No acute distress.   HEENT: Normocephalic, atraumatic. Sclerae anicteric. EOM intact. Moist mucus membranes without lesions, thrush, or exudates appreciated  Respiratory: Breathing comfortable with no increased work on room air. Good air  exchange. No signs of respiratory distress or accessory muscle use. Lungs clear to auscultation bilaterally, no crackles or wheezing noted.  Cardiovascular: Regular rate and rhythm. Normal S1 and S2. No murmurs, rubs, or gallops. No peripheral edema.    GI: Abdomen is soft, non-distended, non-tender and without distension. Bowel sounds present and normoactive.   Skin: Skin is clean, dry, intact. No jaundice appreciated.   Musculoskeletal/ Extremities: No redness, warmth, or swelling of the joints appreciated. Distal pulses palpable. Nailbeds pink and without cyanosis or clubbing.   Neurologic: Alert and oriented. Speech normal. Grossly nonfocal. Memory and thought process preserved. Motor function grossly normal. Sensation intact bilaterally. CN II-XII intact.   Neuropsychiatric: Calm, affect congruent to situation. Appropriate mood and affect. Good judgment and insight. No visual/auditory hallucinations.  VAD: PICC in LUE. No erythema or edema.        Medications     - MEDICATION INSTRUCTIONS -         acyclovir  400 mg Oral BID     allopurinol  300 mg Oral Daily     ceFEPIme (MAXIPIME) IV  2 g Intravenous Q8H     Chemotherapy Infusing-Continuous Infusion   Does not apply Q8H     cytarabine (CYTOSAR) infusion  100 mg/m2 (Treatment Plan Recorded) Intravenous Q24H     [Held by provider] levofloxacin  250 mg Oral Daily     levothyroxine  125 mcg Oral Daily     micafungin  50 mg Intravenous Q24H     multivitamin w/minerals  1 tablet Oral Daily     ondansetron  8 mg Oral Q12H     pantoprazole  40 mg Oral QAM AC     vitamin C  1,000 mg Oral Daily     vitamin D3  50 mcg Oral Daily       Data   Results for orders placed or performed during the hospital encounter of 06/16/22 (from the past 24 hour(s))   CBC with platelets differential    Narrative    The following orders were created for panel order CBC with platelets differential.  Procedure                               Abnormality         Status                    "  ---------                               -----------         ------                     CBC with platelets and d...[[id number]]  Abnormal            Final result               Manual Differential[[id number]]          Abnormal            Final result                 Please view results for these tests on the individual orders.   Basic metabolic panel   Result Value Ref Range    Creatinine 0.46 (L) 0.51 - 0.95 mg/dL    Sodium 137 136 - 145 mmol/L    Potassium 3.9 3.4 - 5.3 mmol/L    Urea Nitrogen 14.6 6.0 - 20.0 mg/dL    Chloride 108 (H) 98 - 107 mmol/L    Carbon Dioxide (CO2) 21 (L) 22 - 29 mmol/L    Anion Gap 8 7 - 15 mmol/L    Glucose 94 70 - 99 mg/dL    GFR Estimate >90 >60 mL/min/1.73m2    Calcium 8.2 (L) 8.6 - 10.0 mg/dL   Fibrinogen activity   Result Value Ref Range    Fibrinogen Activity 227 170 - 490 mg/dL   INR   Result Value Ref Range    INR 1.16 (H) 0.85 - 1.15   Partial thromboplastin time   Result Value Ref Range    aPTT 31 22 - 38 Seconds   Phosphorus   Result Value Ref Range    Phosphorus 3.8 2.5 - 4.5 mg/dL   Uric acid   Result Value Ref Range    Uric Acid 3.7 2.4 - 5.7 mg/dL   Magnesium   Result Value Ref Range    Magnesium 2.0 1.7 - 2.3 mg/dL   Hepatic panel   Result Value Ref Range    Protein Total 5.3 (L) 6.4 - 8.3 g/dL    Albumin 3.3 (L) 3.5 - 5.2 g/dL    Bilirubin Total 1.1 <=1.2 mg/dL    Alkaline Phosphatase 37 35 - 104 U/L    AST 11 10 - 35 U/L    ALT 25 10 - 35 U/L    Bilirubin Direct 0.26 0.00 - 0.30 mg/dL   CBC with platelets and differential   Result Value Ref Range    WBC Count 1.4 (L) 4.0 - 11.0 10e3/uL    RBC Count 1.94 (L) 3.80 - 5.20 10e6/uL    Hemoglobin 6.7 (LL) 11.7 - 15.7 g/dL    Hematocrit 19.6 (L) 35.0 - 47.0 %     (H) 78 - 100 fL    MCH 34.5 (H) 26.5 - 33.0 pg    MCHC 34.2 31.5 - 36.5 g/dL    RDW 15.1 (H) 10.0 - 15.0 %    Platelet Count 22 (LL) 150 - 450 10e3/uL    Narrative    Previously reported component [ NRBCs ] is no longer reported.\"  Previously reported component " "[ NRBCs Absolute ] is no longer reported.\"   Manual Differential   Result Value Ref Range    % Neutrophils 2 %    % Lymphocytes 88 %    % Monocytes 0 %    % Eosinophils 0 %    % Basophils 0 %    % Blasts 10 %    Absolute Neutrophils 0.0 (LL) 1.6 - 8.3 10e3/uL    Absolute Lymphocytes 1.2 0.8 - 5.3 10e3/uL    Absolute Monocytes 0.0 0.0 - 1.3 10e3/uL    Absolute Eosinophils 0.0 0.0 - 0.7 10e3/uL    Absolute Basophils 0.0 0.0 - 0.2 10e3/uL    Absolute Blasts 0.1 (H) <=0.0 10e3/uL    RBC Morphology Confirmed RBC Indices     Platelet Assessment  Automated Count Confirmed. Platelet morphology is normal.     Automated Count Confirmed. Platelet morphology is normal.    Teardrop Cells Slight (A) None Seen   CONDITIONAL Prepare red blood cells (unit)   Result Value Ref Range    CROSSMATCH Compatible     UNIT ABO/RH O Neg     Unit Number [id number]     Unit Status Issued     Blood Component Type Red Blood Cells     Product Code J0680S53     CODING SYSTEM [id number]     UNIT TYPE ISBT 9500     ISSUE DATE AND TIME [id number]        "

## 2022-06-26 NOTE — PLAN OF CARE
[phone]    VSS, afebrile. Cytarabine infusing. No pain/nausea. PRN tums given x1 for heartburn. Good UOP. Slept between cares. Continue w/ POC.                        Cheek To Nose Interpolation Flap Division And Inset Text: Division and inset of the cheek to nose interpolation flap was performed to achieve optimal aesthetic result, restore normal anatomic appearance and avoid distortion of normal anatomy, expedite and facilitate wound healing, achieve optimal functional result and because linear closure either not possible or would produce suboptimal result. The patient was prepped and draped in the usual manner. The pedicle was infiltrated with local anesthesia. The pedicle was sectioned with a #15 blade. The pedicle was de-bulked and trimmed to match the shape of the defect. Hemostasis was achieved. The flap donor site and free margin of the flap were secured with deep buried sutures and the wound edges were re-approximated.

## 2022-06-26 NOTE — PLAN OF CARE
Vss. Afebrile. Plts given for 10k. Chemo day 4 infusing without incidence. C/o heartburn, protonix and tums added. Pt has good appetite. Adequate urine output. Two BMs. Up walking the halls. No further complaints at this time.

## 2022-06-26 NOTE — PROGRESS NOTES
Occupational Therapy Daily Treatment     Visit Count: 8  Plan of Care Dates: Initial: 10/12/2017 Through: 12/7/2017  Insurance Information: [email or web address]  ID#: [id number]  Grp#: [id number]  Nursing Focus: Chemotherapy  D: Positive brisk bright red blood return via PICC. Insertion site is clean/dry/intact, dressing intact with no complaints of pain.  Urine output is recorded in intake Doc Flowsheet.    I: Premedications given per order (see electronic medical administration record). Dose #5 of Cytarabine started to infuse over 24 hours. Reviewed pt teaching on chemotherapy side effects.  Pt denies need for further teaching. Chemotherapy double checked per protocol by two chemotherapy competent RN's.   A: Tolerating chemo well. Denies nausea.   P: Continue to monitor urine output and symptoms of nausea. Screen for symptoms of toxicity.     home exercise program.  2. Patient will decrease involved shoulder pain to 1/10  to aid in normalization of upper extremity movements to aid activities of independent daily living.   3. Patient will increase involved shoulder active range of motion to abduction WFL°, flexion WFL° to aid in normalization of upper extremity movements to aid activities of independent daily living.   4. Patient will increase involved shoulder strength to 4/5 to aid in normalization of upper extremity movements to aid activities of independent daily living.  5. Patient will be able to reach behind back with minimal pain/difficulty to improve function in dressing.   6. Patient will be able to reach over head with minimal pain/difficulty to improve function in cooking, reaching into cupboard, grooming.  7. Patient will be able to sleep 6 hours without disruption from pain.   8. Patient will demonstrate within functional limits involved scapulohumeral rhythm to aid in normalization of upper extremity movements to aid activities of independent daily living.    PLAN   Frequency/Duration: 2 times per week for 8 weeks with tapering as the patient progresses  Skilled training and instruction for the following interventions:  Activities of Daily Living/Self Care (17045)  Manual Therapy (27264)  Neuromuscular Re-Education (58736)  Therapeutic Activity (42756)  Therapeutic Exercise (48466)  Electrical Stimulation (33918//24351)   Fluidotherapy/Whirlpool (55692/16480)  Heat/Cold (92057)  Iontophoresis (14489) dexamethasone sodium phosphate, 4mg/ml  Ultrasound/Phonophoresis (91562)  Laser  Orthotics Training (16407)  Paraffin (58339)  Sensory Integration (47348)  Strapping  Splinting/Orthotics      The plan of care and goals were established with the patient who concurs.  Patient has been given attendance policy at time of initial evaluation    THERAPY DAILY BILLING   Primary Insurance:  [facility]  Secondary Insurance: N/A    Evaluation  Procedures:  No evaluation codes were used on this date of service    Timed Procedures:  Manual Therapy, 16 minutes  Therapeutic Exercise, 29 minutes    Untimed Procedures:  No untimed codes were used on this date of service    Total Treatment Time: 45 minutes

## 2022-06-26 NOTE — PLAN OF CARE
Afebrile, vss. Chemo day 5 infusing. Got RBCs for hgb of 6.7. Eating well. Voiding adequate amounts and had a BM. Up walking the halls and outside. Salt water rinse used frequently, no change in mouth lesions.

## 2022-06-27 ENCOUNTER — DOCUMENTATION ONLY (OUTPATIENT)
Dept: MEDSURG UNIT | Facility: CLINIC | Age: 37
End: 2022-06-27

## 2022-06-27 LAB
ALBUMIN SERPL BCG-MCNC: 3.7 G/DL (ref 3.5–5.2)
ALP SERPL-CCNC: 37 U/L (ref 35–104)
ALT SERPL W P-5'-P-CCNC: 21 U/L (ref 10–35)
ANION GAP SERPL CALCULATED.3IONS-SCNC: 8 MMOL/L (ref 7–15)
APTT PPP: 31 SECONDS (ref 22–38)
AST SERPL W P-5'-P-CCNC: 9 U/L (ref 10–35)
BASOPHILS # BLD MANUAL: 0 10E3/UL (ref 0–0.2)
BASOPHILS NFR BLD MANUAL: 0 %
BILIRUB DIRECT SERPL-MCNC: 0.27 MG/DL (ref 0–0.3)
BILIRUB SERPL-MCNC: 1.2 MG/DL
BLASTS # BLD MANUAL: 0 10E3/UL
BLASTS NFR BLD MANUAL: 2 %
BUN SERPL-MCNC: 12.2 MG/DL (ref 6–20)
CALCIUM SERPL-MCNC: 9 MG/DL (ref 8.6–10)
CHLORIDE SERPL-SCNC: 104 MMOL/L (ref 98–107)
CREAT SERPL-MCNC: 0.44 MG/DL (ref 0.51–0.95)
DACRYOCYTES BLD QL SMEAR: SLIGHT
DEPRECATED HCO3 PLAS-SCNC: 22 MMOL/L (ref 22–29)
ELLIPTOCYTES BLD QL SMEAR: SLIGHT
EOSINOPHIL # BLD MANUAL: 0 10E3/UL (ref 0–0.7)
EOSINOPHIL NFR BLD MANUAL: 1 %
ERYTHROCYTE [DISTWIDTH] IN BLOOD BY AUTOMATED COUNT: 15.8 % (ref 10–15)
FIBRINOGEN PPP-MCNC: 288 MG/DL (ref 170–490)
GFR SERPL CREATININE-BSD FRML MDRD: >90 ML/MIN/1.73M2
GLUCOSE SERPL-MCNC: 98 MG/DL (ref 70–99)
HCT VFR BLD AUTO: 23.2 % (ref 35–47)
HGB BLD-MCNC: 8.1 G/DL (ref 11.7–15.7)
INR PPP: 1.09 (ref 0.85–1.15)
INTERPRETATION: NORMAL
LAB DIRECTOR COMMENTS: NORMAL
LAB DIRECTOR DISCLAIMER: NORMAL
LAB DIRECTOR INTERPRETATION: NORMAL
LAB DIRECTOR METHODOLOGY: NORMAL
LAB DIRECTOR RESULTS: NORMAL
LYMPHOCYTES # BLD MANUAL: 1.1 10E3/UL (ref 0.8–5.3)
LYMPHOCYTES NFR BLD MANUAL: 94 %
MAGNESIUM SERPL-MCNC: 2.2 MG/DL (ref 1.7–2.3)
MCH RBC QN AUTO: 34.2 PG (ref 26.5–33)
MCHC RBC AUTO-ENTMCNC: 34.9 G/DL (ref 31.5–36.5)
MCV RBC AUTO: 98 FL (ref 78–100)
MONOCYTES # BLD MANUAL: 0 10E3/UL (ref 0–1.3)
MONOCYTES NFR BLD MANUAL: 0 %
NEUTROPHILS # BLD MANUAL: 0 10E3/UL (ref 1.6–8.3)
NEUTROPHILS NFR BLD MANUAL: 3 %
PHOSPHATE SERPL-MCNC: 3.3 MG/DL (ref 2.5–4.5)
PLAT MORPH BLD: ABNORMAL
PLATELET # BLD AUTO: 19 10E3/UL (ref 150–450)
POTASSIUM SERPL-SCNC: 4 MMOL/L (ref 3.4–5.3)
PROT SERPL-MCNC: 5.8 G/DL (ref 6.4–8.3)
RBC # BLD AUTO: 2.37 10E6/UL (ref 3.8–5.2)
RBC MORPH BLD: ABNORMAL
SIGNIFICANT RESULTS: NORMAL
SODIUM SERPL-SCNC: 134 MMOL/L (ref 136–145)
SPECIMEN DESCRIPTION: NORMAL
SPECIMEN DESCRIPTION: NORMAL
TEST DETAILS, MDL: NORMAL
URATE SERPL-MCNC: 3 MG/DL (ref 2.4–5.7)
WBC # BLD AUTO: 1.2 10E3/UL (ref 4–11)

## 2022-06-27 PROCEDURE — 84550 ASSAY OF BLOOD/URIC ACID: CPT | Performed by: PHYSICIAN ASSISTANT

## 2022-06-27 PROCEDURE — 258N000003 HC RX IP 258 OP 636: Performed by: STUDENT IN AN ORGANIZED HEALTH CARE EDUCATION/TRAINING PROGRAM

## 2022-06-27 PROCEDURE — 85384 FIBRINOGEN ACTIVITY: CPT | Performed by: PHYSICIAN ASSISTANT

## 2022-06-27 PROCEDURE — 84100 ASSAY OF PHOSPHORUS: CPT | Performed by: PHYSICIAN ASSISTANT

## 2022-06-27 PROCEDURE — 36592 COLLECT BLOOD FROM PICC: CPT | Performed by: PHYSICIAN ASSISTANT

## 2022-06-27 PROCEDURE — 82248 BILIRUBIN DIRECT: CPT | Performed by: PHYSICIAN ASSISTANT

## 2022-06-27 PROCEDURE — 250N000011 HC RX IP 250 OP 636: Performed by: PHYSICIAN ASSISTANT

## 2022-06-27 PROCEDURE — 85007 BL SMEAR W/DIFF WBC COUNT: CPT | Performed by: PHYSICIAN ASSISTANT

## 2022-06-27 PROCEDURE — 250N000013 HC RX MED GY IP 250 OP 250 PS 637: Performed by: STUDENT IN AN ORGANIZED HEALTH CARE EDUCATION/TRAINING PROGRAM

## 2022-06-27 PROCEDURE — 80053 COMPREHEN METABOLIC PANEL: CPT | Performed by: PHYSICIAN ASSISTANT

## 2022-06-27 PROCEDURE — 85014 HEMATOCRIT: CPT | Performed by: PHYSICIAN ASSISTANT

## 2022-06-27 PROCEDURE — 99232 SBSQ HOSP IP/OBS MODERATE 35: CPT | Performed by: STUDENT IN AN ORGANIZED HEALTH CARE EDUCATION/TRAINING PROGRAM

## 2022-06-27 PROCEDURE — 250N000011 HC RX IP 250 OP 636: Performed by: STUDENT IN AN ORGANIZED HEALTH CARE EDUCATION/TRAINING PROGRAM

## 2022-06-27 PROCEDURE — 85610 PROTHROMBIN TIME: CPT | Performed by: PHYSICIAN ASSISTANT

## 2022-06-27 PROCEDURE — 85730 THROMBOPLASTIN TIME PARTIAL: CPT | Performed by: PHYSICIAN ASSISTANT

## 2022-06-27 PROCEDURE — 83735 ASSAY OF MAGNESIUM: CPT | Performed by: PHYSICIAN ASSISTANT

## 2022-06-27 PROCEDURE — 250N000013 HC RX MED GY IP 250 OP 250 PS 637: Performed by: INTERNAL MEDICINE

## 2022-06-27 PROCEDURE — 250N000013 HC RX MED GY IP 250 OP 250 PS 637: Performed by: PHYSICIAN ASSISTANT

## 2022-06-27 PROCEDURE — 258N000003 HC RX IP 258 OP 636: Performed by: PHYSICIAN ASSISTANT

## 2022-06-27 PROCEDURE — 120N000002 HC R&B MED SURG/OB UMMC

## 2022-06-27 RX ORDER — LEVOFLOXACIN 250 MG/1
250 TABLET, FILM COATED ORAL DAILY
Status: DISCONTINUED | OUTPATIENT
Start: 2022-06-27 | End: 2022-06-27

## 2022-06-27 RX ADMIN — Medication 50 MCG: at 08:19

## 2022-06-27 RX ADMIN — ONDANSETRON HYDROCHLORIDE 8 MG: 8 TABLET, FILM COATED ORAL at 17:59

## 2022-06-27 RX ADMIN — ONDANSETRON HYDROCHLORIDE 8 MG: 8 TABLET, FILM COATED ORAL at 03:34

## 2022-06-27 RX ADMIN — CEFEPIME HYDROCHLORIDE 2 G: 2 INJECTION, POWDER, FOR SOLUTION INTRAVENOUS at 00:18

## 2022-06-27 RX ADMIN — ACYCLOVIR 400 MG: 400 TABLET ORAL at 08:19

## 2022-06-27 RX ADMIN — PANTOPRAZOLE SODIUM 40 MG: 40 TABLET, DELAYED RELEASE ORAL at 06:08

## 2022-06-27 RX ADMIN — CEFEPIME HYDROCHLORIDE 2 G: 2 INJECTION, POWDER, FOR SOLUTION INTRAVENOUS at 08:18

## 2022-06-27 RX ADMIN — CALCIUM CARBONATE (ANTACID) CHEW TAB 500 MG 500 MG: 500 CHEW TAB at 03:36

## 2022-06-27 RX ADMIN — MICAFUNGIN 50 MG: 10 INJECTION, POWDER, LYOPHILIZED, FOR SOLUTION INTRAVENOUS at 16:39

## 2022-06-27 RX ADMIN — CALCIUM CARBONATE (ANTACID) CHEW TAB 500 MG 500 MG: 500 CHEW TAB at 21:02

## 2022-06-27 RX ADMIN — CYTARABINE 240 MG: 100 INJECTION, SOLUTION INTRATHECAL; INTRAVENOUS; SUBCUTANEOUS at 16:09

## 2022-06-27 RX ADMIN — OXYCODONE HYDROCHLORIDE AND ACETAMINOPHEN 1000 MG: 500 TABLET ORAL at 08:19

## 2022-06-27 RX ADMIN — ALLOPURINOL 300 MG: 300 TABLET ORAL at 08:19

## 2022-06-27 RX ADMIN — Medication 1 TABLET: at 08:19

## 2022-06-27 RX ADMIN — ACYCLOVIR 400 MG: 400 TABLET ORAL at 20:56

## 2022-06-27 RX ADMIN — SODIUM CHLORIDE, PRESERVATIVE FREE 5 ML: 5 INJECTION INTRAVENOUS at 21:02

## 2022-06-27 ASSESSMENT — ACTIVITIES OF DAILY LIVING (ADL)
ADLS_ACUITY_SCORE: 18

## 2022-06-27 NOTE — PROGRESS NOTES
[facility]    Hematology / Oncology Progress Note    Date of Admission: 6/16/2022  Date of Service (when I saw the patient): 06/27/2022     Assessment & Plan   Veda Marinelli is a 37 year old female with PMH significant for h/o COVID19 infection (10/2021), C.diff, and hypothyroidism who presented with leukocytosis and circulating blasts. 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 She initiated induction chemotherapy with 7+3+midostaurin (C1D1=6/22/22).      Today:  - D6 of chemotherapy, tolerated well overall  - did have neutropenic fever on 6/23 without identified source of infection. Now afebrile and clinically stable. Will de-escalate back to ppx antibiotic dosing.  - ok to further de-escalate frequency of TLS/DIC labs      LIZ  # AML (FLT3 ITD positive)  Was in her usual state of health until 03/2022 when she underwent a routine physical with labs. On 3/8/2022, white blood cell count 2.5 (ANC 1.0), Hgb 13.6 with , platelets normal.  On 5/15/2022, she was noted to have further decreasd white blood cell count of 1.6 (ANC 0.28) with hemoglobin 11. platelets were 133. She was ultimately referred to Hematology (Dr. Bob), who she saw on 6/7/22. Further blood workup was recommended and labs done 6/14. CBC revealed WBC 25.3 (ANC 0.8), Hgb 10.1 (), Plt 90K. On the differential and morphology review, 81% blasts were seen concerning for acute leukemia. She was advised to present to [facility] for further evaluation and management.   - Per verbal report from Heme Path fellow there was concern for Blanca rods. She was started on ATRA 6/16 PM-6/17 AM while awaiting PML-ANNELIESE testing. This was ultimately negative.   - s/p Hydrea (6/17-6/22) while awaiting final diagnostic studies  - 6/16 PB chromosomes: 46, XX. No abnormality.   - 6/16 PB FISH: no rearrangments of MLLT10, NUP98, or KMT2A.   - 6/17 BMBx: markedly hypercellular marrow (85-95% estimated cellularity),  with markedly diminished trilineage hematopoiesis, no overt dysplasia in the evaluable elements, and 92% blasts   - FLT3 ITD positive   - NGS complete AML panel pending  - HLA typing sent, message sent to BMT coordinators on 6/24. Awaiting NPM1 results.  - will plan for Day 21 BMBx (~7/12), needs to be scheduled    Treatment Plan: Cytarabine and Daunorubicin (7+3) plus Midostaurin (D1=6/22/22)   - Daunorubicin 90mg/m2 - D1-3   - Cytarabine 100 mg/m2 - D1-7   - Midostaurin 50mg BID Day 8-21     # Monitor for TLS  - Continue Allopurinol 300mg     # Pancytopenia  2/2 underlying malignancy and now exacerbated by chemotherapy  - transfuse to maintain Hgb >7, Plt >10K   - conditional orders in place for FFP if INR >1.8, cryo if fibrinogen <100    - blood consent signed on 6/17    # Mild coagulopathy, improved  INR gradually uptrended 1.1-->1.3. No bleeding. Fibrinogen WNL.      GYN  # LMP  Approximately 2 weeks PTA, bleeding was heavier than normal with clots. Usually moderate lasting ~3 days.   - Monitor      ID  # Neutropenic Fever  Fevered to 101.6F on 6/23. Noted to have chills though no other focal symptoms. Afebrile now for >48 hours  - 6/16 baseline CT CAP without evidence for infectious source   - UA/UCx NGTD; Blood culture NGTD  - CXR clear  - Continue Cefepime 2g q8h IV (x 6/23-6/27). De-escalate back to ppx Levaquin on 6/27.    # ID PPx  Denies fevers. Given profound neutropenia, started prophy anti-infectives  - ACV 400mg BID  - Levaquin 250mg daily  - Micafungin 50mg IV daily. As she is nearing completion of chemotherapy, prior auth in works for [employer].       # h/o COVID-19  Pt is not vaccinated nor interested in being vaccinated. She was admitted from 10/1/2021 - 10/13/2021 for acute hypoxic respiratory failure from COVID-19 pneumonia.  Required IMC, high flow and intermittent CPAP. She was treated with dexamethasone, remdesivir, and baricitinib in addition to azithromycin and ceftriaxone. Recovered  symptomatically from this.    GI  # H/o C.diff  # Hemorrhoid  Complicated her C.diff course and has been waxing/waning since. Sometimes painful. She primarily has used witch hazel pads when symptomatic. Stools have normalized.   - CT A/P negative for perirectal abscess.  - TUCKS PRN  - Avoid PTA probiotic at this time.  - C. Diff negative. Will hold off on adding oral ppx vanco.  - Continue to monitor for diarrhea. Recheck C.diff status if diarrhea occurs.    # Heartburn  - Noted to have some heart burn yesterday evening. Typically uses TUMS at home  - SANDEEP PRN  - PPI daily     Endocrine  # Hypothyroidism  - continue PTA synthroid    MISC  # Social  Works for [employer]. Currently reports that the company has been flexible and she does not currently have any paperwork for medical team to complete.     # Integrated Health Services  - Patient is interested in our integrated health services/ support  - Appreciate support from spiritual health       [facility]  - no current IVFs, bolus PRN  - lyte repletion per unit sliding scale  - regular diet as tolerated     PPx  - VTE: none due to thrombocytopenia  - GI: PPI     Dispo: Admit for workup of new acute leukemia. Unclear at this time but anticipated 3-4 week LOS at minimum.      Plan of care discussed with Dr. Palacios     I spent >35 minutes in the care of this patient today, which included time necessary for review of interval events, obtaining history and physical exam, discussion with interdisciplinary/consult team(s), and documentation time. Over 50% of time was spent face-to-face and/or coordinating care.     Lamar Grover PA-C  Heme/Onc  [phone] (pager)      Interval History   No acute events overnight, afebrile. Doing well overall. Heartburn relieved with TUMS and recent addition of Protonix. No nausea or abdominal upset, no change in bowels. Denies difficulty with known intermittent hemorrhoid. Started to notice a little ridginess along buccal mucosa  but no overt sores. Denies feeling of thick tongue coating or taste changes. Denies change in breathing. Not feeling like she is retaining fluid. Continues to walk. Feels that she is tolerating chemo well at present.       Physical Exam   Temp: 98.3  F (36.8  C) Temp src: Oral BP: 110/71 Pulse: 70   Resp: 16 SpO2: 100 % O2 Device: None (Room air)    Vitals:    06/25/22 0856 06/26/22 0802 06/27/22 0817   Weight: 117.7 kg (259 lb 7.7 oz) 118.8 kg (261 lb 14.5 oz) 118.8 kg (262 lb)     Vital Signs with Ranges  Temp:  [97.5  F (36.4  C)-98.9  F (37.2  C)] 98.3  F (36.8  C)  Pulse:  [67-80] 70  Resp:  [16-18] 16  BP: ()/(54-73) 110/71  SpO2:  [98 %-100 %] 100 %  I/O last 3 completed shifts:  In: 1603.6 [I.V.:200; IV Piggyback:1053.6]  Out: 3900 [Urine:3900]    Constitutional: Pleasant female sitting up in chair, working on computer. Awake and conversational. Non- toxic appearing. No acute distress.   HEENT: NC/AT. Sclerae anicteric. OP pink and moist, no lesions or sores appreciated. No thrush.    Respiratory: Breathing comfortable with no increased work on room air. Good air exchange. Lungs clear to auscultation bilaterally, no crackles or wheezing noted.  Cardiovascular: Regular rate and rhythm. No peripheral edema.    GI: Normal BS. Abdomen is soft, non-distended, non-tender and without distension.  Skin: Skin is clean, dry, intact.   Musculoskeletal/ Extremities: Extremities grossly normal in appearance. Moves freely/able to maneuver self in chair.   Neurologic: Alert and oriented. Speech normal. Grossly nonfocal.   Neuropsychiatric: Calm, affect congruent to situation.   VAD: PICC in LUE. Scattered surrounding and distal ecchymoses in various stages of healing. No associated erythema or edema.        Medications     - MEDICATION INSTRUCTIONS -         acyclovir  400 mg Oral BID     allopurinol  300 mg Oral Daily     ceFEPIme (MAXIPIME) IV  2 g Intravenous Q8H     Chemotherapy Infusing-Continuous Infusion    Does not apply Q8H     cytarabine (CYTOSAR) infusion  100 mg/m2 (Treatment Plan Recorded) Intravenous Q24H     [Held by provider] levofloxacin  250 mg Oral Daily     levothyroxine  125 mcg Oral Daily     micafungin  50 mg Intravenous Q24H     multivitamin w/minerals  1 tablet Oral Daily     ondansetron  8 mg Oral Q12H     pantoprazole  40 mg Oral QAM AC     vitamin C  1,000 mg Oral Daily     vitamin D3  50 mcg Oral Daily       Data   Results for orders placed or performed during the hospital encounter of 06/16/22 (from the past 24 hour(s))   CBC with platelets differential    Narrative    The following orders were created for panel order CBC with platelets differential.  Procedure                               Abnormality         Status                     ---------                               -----------         ------                     CBC with platelets and d...[[id number]]  Abnormal            Final result               Manual Differential[[id number]]          Abnormal            Final result                 Please view results for these tests on the individual orders.   Basic metabolic panel   Result Value Ref Range    Creatinine 0.44 (L) 0.51 - 0.95 mg/dL    Sodium 134 (L) 136 - 145 mmol/L    Potassium 4.0 3.4 - 5.3 mmol/L    Urea Nitrogen 12.2 6.0 - 20.0 mg/dL    Chloride 104 98 - 107 mmol/L    Carbon Dioxide (CO2) 22 22 - 29 mmol/L    Anion Gap 8 7 - 15 mmol/L    Glucose 98 70 - 99 mg/dL    GFR Estimate >90 >60 mL/min/1.73m2    Calcium 9.0 8.6 - 10.0 mg/dL   Fibrinogen activity   Result Value Ref Range    Fibrinogen Activity 288 170 - 490 mg/dL   INR   Result Value Ref Range    INR 1.09 0.85 - 1.15   Partial thromboplastin time   Result Value Ref Range    aPTT 31 22 - 38 Seconds   Phosphorus   Result Value Ref Range    Phosphorus 3.3 2.5 - 4.5 mg/dL   Uric acid   Result Value Ref Range    Uric Acid 3.0 2.4 - 5.7 mg/dL   Magnesium   Result Value Ref Range    Magnesium 2.2 1.7 - 2.3 mg/dL   Hepatic panel  "  Result Value Ref Range    Protein Total 5.8 (L) 6.4 - 8.3 g/dL    Albumin 3.7 3.5 - 5.2 g/dL    Bilirubin Total 1.2 <=1.2 mg/dL    Alkaline Phosphatase 37 35 - 104 U/L    AST 9 (L) 10 - 35 U/L    ALT 21 10 - 35 U/L    Bilirubin Direct 0.27 0.00 - 0.30 mg/dL   CBC with platelets and differential   Result Value Ref Range    WBC Count 1.2 (L) 4.0 - 11.0 10e3/uL    RBC Count 2.37 (L) 3.80 - 5.20 10e6/uL    Hemoglobin 8.1 (L) 11.7 - 15.7 g/dL    Hematocrit 23.2 (L) 35.0 - 47.0 %    MCV 98 78 - 100 fL    MCH 34.2 (H) 26.5 - 33.0 pg    MCHC 34.9 31.5 - 36.5 g/dL    RDW 15.8 (H) 10.0 - 15.0 %    Platelet Count 19 (LL) 150 - 450 10e3/uL    Narrative    Previously reported component [ NRBCs ] is no longer reported.\"  Previously reported component [ NRBCs Absolute ] is no longer reported.\"   Manual Differential   Result Value Ref Range    % Neutrophils 3 %    % Lymphocytes 94 %    % Monocytes 0 %    % Eosinophils 1 %    % Basophils 0 %    % Blasts 2 %    Absolute Neutrophils 0.0 (LL) 1.6 - 8.3 10e3/uL    Absolute Lymphocytes 1.1 0.8 - 5.3 10e3/uL    Absolute Monocytes 0.0 0.0 - 1.3 10e3/uL    Absolute Eosinophils 0.0 0.0 - 0.7 10e3/uL    Absolute Basophils 0.0 0.0 - 0.2 10e3/uL    Absolute Blasts 0.0 <=0.0 10e3/uL    RBC Morphology Confirmed RBC Indices     Platelet Assessment  Automated Count Confirmed. Platelet morphology is normal.     Automated Count Confirmed. Platelet morphology is normal.    Elliptocytes Slight (A) None Seen    Teardrop Cells Slight (A) None Seen       "

## 2022-06-27 NOTE — PLAN OF CARE
[id number]    VSS, afebrile. Denies pain/nausea. Salt and soda rinse provided for mouth sores. Good UOP.Tums given x1 for heartburn. Cytarabine infusing. Continue w/ POC.

## 2022-06-27 NOTE — PROGRESS NOTES
Nursing Focus: Chemotherapy    D: Positive blood return via PICC. Insertion site is clean/dry/intact, dressing intact with no complaints of pain.  Urine output is recorded in intake in Doc Flowsheet.      I: Premedications given per order (see electronic medical administration record). Dose #6 of Cytarabine started to infuse over 24 hours. Reviewed pt teaching on chemotherapy side effects.  Pt denies need for further teaching. Chemotherapy double checked per protocol by two chemotherapy competent RN's.     A: Tolerating chemotherapy well. Denies nausea and or pain.     P: Continue to monitor urine output and symptoms of nausea. Screen for symptoms of toxicity.

## 2022-06-27 NOTE — PLAN OF CARE
Chemo day 6 infusing per protocol, tolerating well thus far. Eating well. Mouth discomfort unchanged. No need for blood or lytes today. Continue to monitor.

## 2022-06-28 ENCOUNTER — PRE VISIT (OUTPATIENT)
Dept: ONCOLOGY | Facility: CLINIC | Age: 37
End: 2022-06-28

## 2022-06-28 ENCOUNTER — DOCUMENTATION ONLY (OUTPATIENT)
Dept: MEDSURG UNIT | Facility: CLINIC | Age: 37
End: 2022-06-28

## 2022-06-28 LAB
ANION GAP SERPL CALCULATED.3IONS-SCNC: 11 MMOL/L (ref 7–15)
BACTERIA BLD CULT: NO GROWTH
BACTERIA BLD CULT: NO GROWTH
BASOPHILS # BLD AUTO: 0 10E3/UL (ref 0–0.2)
BASOPHILS NFR BLD AUTO: 0 %
BUN SERPL-MCNC: 12 MG/DL (ref 6–20)
CALCIUM SERPL-MCNC: 9.1 MG/DL (ref 8.6–10)
CHLORIDE SERPL-SCNC: 106 MMOL/L (ref 98–107)
CREAT SERPL-MCNC: 0.46 MG/DL (ref 0.51–0.95)
DACRYOCYTES BLD QL SMEAR: ABNORMAL
DEPRECATED HCO3 PLAS-SCNC: 21 MMOL/L (ref 22–29)
EOSINOPHIL # BLD AUTO: 0 10E3/UL (ref 0–0.7)
EOSINOPHIL NFR BLD AUTO: 0 %
ERYTHROCYTE [DISTWIDTH] IN BLOOD BY AUTOMATED COUNT: 14.8 % (ref 10–15)
GFR SERPL CREATININE-BSD FRML MDRD: >90 ML/MIN/1.73M2
GLUCOSE SERPL-MCNC: 113 MG/DL (ref 70–99)
HCT VFR BLD AUTO: 26.6 % (ref 35–47)
HGB BLD-MCNC: 9.3 G/DL (ref 11.7–15.7)
IMM GRANULOCYTES # BLD: 0 10E3/UL
IMM GRANULOCYTES NFR BLD: 0 %
LYMPHOCYTES # BLD AUTO: 1 10E3/UL (ref 0.8–5.3)
LYMPHOCYTES NFR BLD AUTO: 98 %
MAGNESIUM SERPL-MCNC: 2.4 MG/DL (ref 1.7–2.3)
MCH RBC QN AUTO: 34.1 PG (ref 26.5–33)
MCHC RBC AUTO-ENTMCNC: 35 G/DL (ref 31.5–36.5)
MCV RBC AUTO: 97 FL (ref 78–100)
MONOCYTES # BLD AUTO: 0 10E3/UL (ref 0–1.3)
MONOCYTES NFR BLD AUTO: 1 %
NEUTROPHILS # BLD AUTO: 0 10E3/UL (ref 1.6–8.3)
NEUTROPHILS NFR BLD AUTO: 1 %
NRBC # BLD AUTO: 0 10E3/UL
NRBC BLD AUTO-RTO: 0 /100
PHOSPHATE SERPL-MCNC: 3.1 MG/DL (ref 2.5–4.5)
PLAT MORPH BLD: ABNORMAL
PLATELET # BLD AUTO: 14 10E3/UL (ref 150–450)
POTASSIUM SERPL-SCNC: 4.2 MMOL/L (ref 3.4–5.3)
RBC # BLD AUTO: 2.73 10E6/UL (ref 3.8–5.2)
RBC MORPH BLD: ABNORMAL
SODIUM SERPL-SCNC: 138 MMOL/L (ref 136–145)
WBC # BLD AUTO: 1.1 10E3/UL (ref 4–11)

## 2022-06-28 PROCEDURE — 250N000013 HC RX MED GY IP 250 OP 250 PS 637: Performed by: PHYSICIAN ASSISTANT

## 2022-06-28 PROCEDURE — 99207 PR SC NO CHARGE VISIT: CPT | Performed by: STUDENT IN AN ORGANIZED HEALTH CARE EDUCATION/TRAINING PROGRAM

## 2022-06-28 PROCEDURE — 250N000011 HC RX IP 250 OP 636: Performed by: PHYSICIAN ASSISTANT

## 2022-06-28 PROCEDURE — 120N000002 HC R&B MED SURG/OB UMMC

## 2022-06-28 PROCEDURE — 84100 ASSAY OF PHOSPHORUS: CPT | Performed by: STUDENT IN AN ORGANIZED HEALTH CARE EDUCATION/TRAINING PROGRAM

## 2022-06-28 PROCEDURE — 83735 ASSAY OF MAGNESIUM: CPT | Performed by: STUDENT IN AN ORGANIZED HEALTH CARE EDUCATION/TRAINING PROGRAM

## 2022-06-28 PROCEDURE — 250N000013 HC RX MED GY IP 250 OP 250 PS 637: Performed by: STUDENT IN AN ORGANIZED HEALTH CARE EDUCATION/TRAINING PROGRAM

## 2022-06-28 PROCEDURE — 85004 AUTOMATED DIFF WBC COUNT: CPT | Performed by: PHYSICIAN ASSISTANT

## 2022-06-28 PROCEDURE — 36592 COLLECT BLOOD FROM PICC: CPT | Performed by: PHYSICIAN ASSISTANT

## 2022-06-28 PROCEDURE — 99232 SBSQ HOSP IP/OBS MODERATE 35: CPT | Performed by: STUDENT IN AN ORGANIZED HEALTH CARE EDUCATION/TRAINING PROGRAM

## 2022-06-28 PROCEDURE — 258N000003 HC RX IP 258 OP 636: Performed by: PHYSICIAN ASSISTANT

## 2022-06-28 PROCEDURE — 250N000011 HC RX IP 250 OP 636: Performed by: STUDENT IN AN ORGANIZED HEALTH CARE EDUCATION/TRAINING PROGRAM

## 2022-06-28 PROCEDURE — 258N000003 HC RX IP 258 OP 636: Performed by: STUDENT IN AN ORGANIZED HEALTH CARE EDUCATION/TRAINING PROGRAM

## 2022-06-28 PROCEDURE — 80048 BASIC METABOLIC PNL TOTAL CA: CPT | Performed by: PHYSICIAN ASSISTANT

## 2022-06-28 RX ORDER — LIDOCAINE HYDROCHLORIDE 20 MG/ML
5 SOLUTION OROPHARYNGEAL
Status: DISCONTINUED | OUTPATIENT
Start: 2022-06-28 | End: 2022-07-15 | Stop reason: HOSPADM

## 2022-06-28 RX ORDER — LEVOTHYROXINE SODIUM 100 UG/1
100 TABLET ORAL DAILY
Status: DISCONTINUED | OUTPATIENT
Start: 2022-06-29 | End: 2022-06-29

## 2022-06-28 RX ORDER — DIPHENHYDRAMINE HYDROCHLORIDE AND LIDOCAINE HYDROCHLORIDE AND ALUMINUM HYDROXIDE AND MAGNESIUM HYDRO
10 KIT 4 TIMES DAILY PRN
Status: DISCONTINUED | OUTPATIENT
Start: 2022-06-28 | End: 2022-07-15 | Stop reason: HOSPADM

## 2022-06-28 RX ADMIN — LEVOFLOXACIN 250 MG: 250 TABLET, FILM COATED ORAL at 07:40

## 2022-06-28 RX ADMIN — Medication 50 MCG: at 07:40

## 2022-06-28 RX ADMIN — CYTARABINE 240 MG: 100 INJECTION, SOLUTION INTRATHECAL; INTRAVENOUS; SUBCUTANEOUS at 16:05

## 2022-06-28 RX ADMIN — ACYCLOVIR 400 MG: 400 TABLET ORAL at 07:40

## 2022-06-28 RX ADMIN — PANTOPRAZOLE SODIUM 40 MG: 40 TABLET, DELAYED RELEASE ORAL at 05:45

## 2022-06-28 RX ADMIN — SODIUM CHLORIDE, PRESERVATIVE FREE 5 ML: 5 INJECTION INTRAVENOUS at 18:14

## 2022-06-28 RX ADMIN — MICAFUNGIN 50 MG: 10 INJECTION, POWDER, LYOPHILIZED, FOR SOLUTION INTRAVENOUS at 16:10

## 2022-06-28 RX ADMIN — ONDANSETRON HYDROCHLORIDE 8 MG: 8 TABLET, FILM COATED ORAL at 05:44

## 2022-06-28 RX ADMIN — Medication 1 TABLET: at 11:32

## 2022-06-28 RX ADMIN — ALLOPURINOL 300 MG: 300 TABLET ORAL at 07:40

## 2022-06-28 RX ADMIN — OXYCODONE HYDROCHLORIDE AND ACETAMINOPHEN 1000 MG: 500 TABLET ORAL at 07:40

## 2022-06-28 RX ADMIN — ACYCLOVIR 400 MG: 400 TABLET ORAL at 20:49

## 2022-06-28 RX ADMIN — ONDANSETRON HYDROCHLORIDE 8 MG: 8 TABLET, FILM COATED ORAL at 18:14

## 2022-06-28 ASSESSMENT — ACTIVITIES OF DAILY LIVING (ADL)
ADLS_ACUITY_SCORE: 18

## 2022-06-28 NOTE — PLAN OF CARE
2602-1487: VSS on RA. Denies pain, nausea, and SOB. CIVI chemo infusing into PICC with good blood return. LBM 6/27. Voiding adequately. Continue with POC.

## 2022-06-28 NOTE — PLAN OF CARE
Goal Outcome Evaluation:    Plan of Care Reviewed With: patient     0700-1530: Afebrile, VSS. No transfusions required today. Reports 2 loose stools today. Minerva states she will let us know if she has a 3rd. Bag #6 of Cytarabine infusing CIVI via left PICC with great blood return. Up in the chair all shift working. Ambulated in the halls independently. Mild mucositis. Using saline rinses. Declines MMW    0384-0175:     Day # 7 of Cytarabine hung CIVI over 24 hours via left PICC with great blood return.Scheduled Zofran given. Denies nausea. Eating dinner with her family. To start Ryda[employer] tomorrow.     Of note, family visiting and putting masks on only when staff came into the room. I reiterated our policy and the importance of wearing a mask the entire time they are in the hospital.

## 2022-06-28 NOTE — PROGRESS NOTES
Prior Authorization Approval    Posaconazole 100mg tabs  Date Initiated: 6/27/2022  Date Completed: 6/27/2022  Prior Auth Type: Clinical                Status: Approved    Effective Date: 06/27/2022 - 06/27/2023  Copay: 2048.66     Note: patient has an unmet deductible with 2541.64 remaining  Filling Pharmacy: [facility] - [address]    Insurance: [employer] - Phone [phone] Fax [phone]  ID: [id number]  Case Number: [id number]   Submitted Via: Saurav Farrell  [facility] Liaison  Ph: [phone] Pager: [phone]

## 2022-06-28 NOTE — PLAN OF CARE
[id number]    Cytarabine infusing through PICC. Salt and soda rinse provided for mouth sores. Denies pain/nausea. Tums given x1 for heartburn.

## 2022-06-28 NOTE — PROGRESS NOTES
[facility]  Hematology / Oncology Progress Note    Date of Admission: 6/16/2022  Date of Service (when I saw the patient): 06/28/2022     Assessment & Plan   Veda Marinelli is a 37 year old female with PMH significant for h/o COVID19 infection (10/2021), C.diff, and hypothyroidism who presented with leukocytosis and circulating blasts. 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 She initiated induction chemotherapy with 7+3+midostaurin (C1D1=6/22/22).      Today:  - D7 of chemotherapy, tolerating well overall  - ok to stop Allopurinol    - plan for midostaurin to start on 6/29 evening (after completion of D7 CIVI chemo). Consider scheduled Zofran premed BID with midostaurin dosing should pt develop difficulty with nausea.   - updated BMT team with results of NGS, will await scheduling of BMT consult inpatient   - continue ppx anti-infectives: ACV, Levaquin, Micafungin  - monitor fluid status. Note that her weight is down 5 lbs on 6/28 (from 6/27) and Hgb appears to be slightly hemoconcentrated today. Pt reports eating/drinking ok at this time however does now have loose stools. Encourage to push PO fluids today. Consider bolus fluids pending clinical trend.  - monitor for mucositis, continue salt/soda rinses, made MMW and viscous lidocaine available  - if pt has 3 or more loose stools, will recheck C.diff status      HEME  # AML (FLT3 ITD(low), NPM1, IDH2)  Was in her usual state of health until 03/2022 when she underwent a routine physical with labs. On 3/8/2022, white blood cell count 2.5 (ANC 1.0), Hgb 13.6 with , platelets normal.  On 5/15/2022, she was noted to have further decreasd white blood cell count of 1.6 (ANC 0.28) with hemoglobin 11. platelets were 133. She was ultimately referred to Hematology (Dr. Bob), who she saw on 6/7/22. Further blood workup was recommended and labs done 6/14. CBC revealed WBC 25.3 (ANC 0.8), Hgb 10.1 (), Plt 90K. On  the differential and morphology review, 81% blasts were seen concerning for acute leukemia. She was advised to present to [facility] for further evaluation and management.   - Per verbal report from Heme Path fellow there was concern for Blanca rods. She was started on ATRA 6/16 PM-6/17 AM while awaiting PML-ANNELIESE testing. This was ultimately negative.   - s/p Hydrea (6/17-6/22) while awaiting final diagnostic studies  - 6/16 PB chromosomes: 46, XX. No abnormality.   - 6/16 PB FISH: no rearrangments of MLLT10, NUP98, or KMT2A.   - 6/17 BMBx: markedly hypercellular marrow (85-95% estimated cellularity), with markedly diminished trilineage hematopoiesis, no overt dysplasia in the evaluable elements, and 92% blasts  - FLT3 PCR: positive for FLT3-ITD(low) with allelic ratio 0.21 (corrected from previously reported value of 0.144 per staff message to Lamar Grover from molecular lab personnel)  - NGS panel: NPM1 positive, IDH2, FLT3-ITD  - HLA typing sent, follow-up message sent to BMT coordinators on 6/28 with NGS results, awaiting BMT consult to be arranged inpatient  - discontinue Allopurinol on 6/28  - will plan for repeat BMBx ~Day 21 (~7/12) vs count recovery    Treatment Plan: Cytarabine and Daunorubicin (7+3) plus Midostaurin (D1=6/22/22)   - Daunorubicin 90mg/m2 - D1-3   - Cytarabine 100 mg/m2 - D1-7   - Midostaurin 50mg BID Day 8-21. Consider scheduled Zofran premed BID with midostaurin dosing should pt develop difficulty with nausea.      # Pancytopenia  2/2 underlying malignancy and now exacerbated by chemotherapy  - transfuse to maintain Hgb >7, Plt >10K   - conditional orders in place for FFP if INR >1.8, cryo if fibrinogen <100    - blood consent signed on 6/17    # Mild coagulopathy, improved  INR gradually uptrended 1.1-->1.3. Now improved back to WNL (as of 6/27). No bleeding. Fibrinogen WNL.   - reduced frequency of DIC labs    GYN  # LMP  Approximately 2 weeks PTA, bleeding was heavier than normal  with clots. Usually moderate lasting ~3 days.   - Monitor for recurrent bleeding and consider Provera at that time     ID  # Neutropenic Fever, fever resolved  Fevered to 101.6F on 6/23. Noted to have chills though no other focal symptoms. Afebrile since that time.   - 6/16 baseline CT CAP without evidence for infectious source   - UA/UCx NGTD; Blood culture NGTD  - CXR clear  - s/p Cefepime 2g q8h IV (x 6/23-6/27). De-escalated back to ppx Levaquin on 6/27.    # ID PPx  - ACV 400mg BID  - Levaquin 250mg daily  - Micafungin 50mg IV daily. Given that she will be on midostaurin, will keep on Micafungin to limit potential interactions with posaconazole/voriconazole. However, Prior Auth is in the works for both should we need oral antifungal after completion of midostaurin.      # h/o COVID-19  Pt is not vaccinated nor interested in being vaccinated. She was admitted from 10/1/2021 - 10/13/2021 for acute hypoxic respiratory failure from COVID-19 pneumonia.  Required IMC, high flow and intermittent CPAP. She was treated with dexamethasone, remdesivir, and baricitinib in addition to azithromycin and ceftriaxone. Recovered symptomatically from this.    GI  # H/o C.diff  # Hemorrhoid  # Loose stools (6/27-6/28)  H/o C.diff (10/20/21) and course was complicated by hemorrhoid which does wax/wane and was sometimes painful. She primarily has used witch hazel pads when symptomatic. Pt reported normal bowels at time of AML diagnosis.   - 6/16 CT A/P negative for perirectal abscess.  - TUCKS PRN  - Avoid PTA probiotic at this time.  - baseline 6/19 C. Diff negative so we deferred prophylactic PO Vancomycin during chemo course  - 6/28: pt reporting new onset loose stools, not yet watery diarrhea. Suspect related to mucosal changes with chemotherapy. No associated nausea, abd pain or cramping. If has 3 or more loose stools in 24hrs, will repeat C.diff test.    # Mucositis   Pt noting inflammatory changes in b/l buccal mucosa and  small lesion on right inner lower lip. Not painful or affecting PO intake at this time. Continue to monitor.   - s/s rinses  - MMW PRN, viscous lidocaine PRN    # Heartburn  - TUMS PRN  - PPI daily     Endocrine  # Hypothyroidism  PTA was on Synthroid 125mcg daily. TSH 0.04, T4 Free 1.48 (done 6/24). Per pt, her PCP reviewed and advised her to reduce her Synthroid to 100mcg daily. Ordered to start on 6/29.     MISC  # Social  Works for [employer]. Currently reports that the company has been flexible and she does not currently have any paperwork for medical team to complete.     # Integrated Health Services  - Patient is interested in our integrated health services/ support  - Appreciate support from [facility]  - no current IVFs, bolus PRN. Her weight is down 5 lbs on 6/28 (from 6/27) and Hgb appears to be slightly hemoconcentrated. Pt reports eating/drinking ok at this time however does now have loose stools. Encourage to push PO fluids today. Consider bolus fluids pending clinical trend.  - lyte repletion per unit sliding scale  - regular diet as tolerated     PPx  - VTE: none due to thrombocytopenia  - GI: PPI     Dispo: Admit for workup of new acute leukemia. Unclear at this time but anticipated 3-4 week LOS at minimum.      Plan of care discussed with Dr. Philip MATAMOROS spent >45 minutes in the care of this patient today, which included time necessary for review of interval events, obtaining history and physical exam, adjusting medication orders, discussion with interdisciplinary/consult team(s), and documentation time. Over 50% of time was spent face-to-face and/or coordinating care.     Lamar Grover PA-C  Heme/Onc  [phone] (pager)      Interval History   No acute events overnight, afebrile. Doing well overall. She does having ongoing buccal mucosal changes (describes feeling ridges on each inner cheek) and a small lesion to her right inner lower lip. She also has had new  loose stools overnight/early this AM. No associated abdominal pain or abdominal cramping. Denies nausea. Denies blood in stool. Known hemorrhoid is more sore with loose stools but not persistently painful. She has TUCKS pads available. Otherwise denies HA, SOB, or feeling of heart palpitations. Reports eating food from home and drinking PO fluids well. Asking good questions about her AML NGS panel and next steps after this round of chemo.       Physical Exam   Temp: 98.2  F (36.8  C) Temp src: Oral BP: 109/76 Pulse: 88   Resp: 18 SpO2: 100 % O2 Device: None (Room air)    Vitals:    06/26/22 0802 06/27/22 0817 06/28/22 0802   Weight: 118.8 kg (261 lb 14.5 oz) 118.8 kg (262 lb) 116.7 kg (257 lb 3.2 oz)     Vital Signs with Ranges  Temp:  [97.9  F (36.6  C)-99.1  F (37.3  C)] 98.2  F (36.8  C)  Pulse:  [76-88] 88  Resp:  [16-18] 18  BP: ()/(64-81) 109/76  SpO2:  [97 %-100 %] 100 %  I/O last 3 completed shifts:  In: 2178 [P.O.:1290; I.V.:10; IV Piggyback:878]  Out: 4600 [Urine:4600]    Constitutional: Pleasant female sitting up in chair, working on computer. Awake and conversational. Non- toxic appearing. No acute distress.   HEENT: NC/AT. Sclerae anicteric. OP pink and moist. Some whitish inflammatory changes to b/l buccal mucosa and one small erythematous sore to right lower inner lip. No thrush.   Respiratory: Breathing comfortable with no increased work on room air. Good air exchange. Lungs clear to auscultation bilaterally, no crackles or wheezing noted.  Cardiovascular: Regular rate and rhythm. No peripheral edema.    GI: Normal BS. Abdomen is soft, non-distended, non-tender.  Skin: Skin is clean, dry, intact.   Musculoskeletal/ Extremities: Extremities grossly normal in appearance. Moves freely/able to maneuver self in chair.   Neurologic: Alert and oriented. Speech normal. Grossly nonfocal.   Neuropsychiatric: Calm, affect congruent to situation.   VAD: PICC in LUE. Scattered surrounding and distal  ecchymoses in various stages of healing. No associated erythema or edema.        Medications     - MEDICATION INSTRUCTIONS -         acyclovir  400 mg Oral BID     Chemotherapy Infusing-Continuous Infusion   Does not apply Q8H     cytarabine (CYTOSAR) infusion  100 mg/m2 (Treatment Plan Recorded) Intravenous Q24H     levofloxacin  250 mg Oral Daily     [START ON 6/29/2022] levothyroxine  100 mcg Oral Daily     micafungin  50 mg Intravenous Q24H     multivitamin w/minerals  1 tablet Oral Daily     ondansetron  8 mg Oral Q12H     pantoprazole  40 mg Oral QAM AC     vitamin C  1,000 mg Oral Daily     vitamin D3  50 mcg Oral Daily       Data   Results for orders placed or performed during the hospital encounter of 06/16/22 (from the past 24 hour(s))   CBC with platelets differential    Narrative    The following orders were created for panel order CBC with platelets differential.  Procedure                               Abnormality         Status                     ---------                               -----------         ------                     CBC with platelets and d...[[id number]]  Abnormal            Final result               RBC and Platelet Morphology[[id number]]  Abnormal            Final result                 Please view results for these tests on the individual orders.   Basic metabolic panel   Result Value Ref Range    Creatinine 0.46 (L) 0.51 - 0.95 mg/dL    Sodium 138 136 - 145 mmol/L    Potassium 4.2 3.4 - 5.3 mmol/L    Urea Nitrogen 12.0 6.0 - 20.0 mg/dL    Chloride 106 98 - 107 mmol/L    Carbon Dioxide (CO2) 21 (L) 22 - 29 mmol/L    Anion Gap 11 7 - 15 mmol/L    Glucose 113 (H) 70 - 99 mg/dL    GFR Estimate >90 >60 mL/min/1.73m2    Calcium 9.1 8.6 - 10.0 mg/dL   Magnesium   Result Value Ref Range    Magnesium 2.4 (H) 1.7 - 2.3 mg/dL   Phosphorus   Result Value Ref Range    Phosphorus 3.1 2.5 - 4.5 mg/dL   CBC with platelets and differential   Result Value Ref Range    WBC Count 1.1 (L) 4.0 - 11.0  10e3/uL    RBC Count 2.73 (L) 3.80 - 5.20 10e6/uL    Hemoglobin 9.3 (L) 11.7 - 15.7 g/dL    Hematocrit 26.6 (L) 35.0 - 47.0 %    MCV 97 78 - 100 fL    MCH 34.1 (H) 26.5 - 33.0 pg    MCHC 35.0 31.5 - 36.5 g/dL    RDW 14.8 10.0 - 15.0 %    Platelet Count 14 (LL) 150 - 450 10e3/uL    % Neutrophils 1 %    % Lymphocytes 98 %    % Monocytes 1 %    % Eosinophils 0 %    % Basophils 0 %    % Immature Granulocytes 0 %    NRBCs per 100 WBC 0 <1 /100    Absolute Neutrophils 0.0 (LL) 1.6 - 8.3 10e3/uL    Absolute Lymphocytes 1.0 0.8 - 5.3 10e3/uL    Absolute Monocytes 0.0 0.0 - 1.3 10e3/uL    Absolute Eosinophils 0.0 0.0 - 0.7 10e3/uL    Absolute Basophils 0.0 0.0 - 0.2 10e3/uL    Absolute Immature Granulocytes 0.0 <=0.4 10e3/uL    Absolute NRBCs 0.0 10e3/uL   RBC and Platelet Morphology   Result Value Ref Range    Platelet Assessment  Automated Count Confirmed. Platelet morphology is normal.     Automated Count Confirmed. Platelet morphology is normal.    Teardrop Cells Moderate (A) None Seen    RBC Morphology Confirmed RBC Indices

## 2022-06-29 ENCOUNTER — APPOINTMENT (OUTPATIENT)
Dept: GENERAL RADIOLOGY | Facility: CLINIC | Age: 37
DRG: 834 | End: 2022-06-29
Attending: STUDENT IN AN ORGANIZED HEALTH CARE EDUCATION/TRAINING PROGRAM
Payer: COMMERCIAL

## 2022-06-29 LAB
ALBUMIN SERPL BCG-MCNC: 3.8 G/DL (ref 3.5–5.2)
ALBUMIN UR-MCNC: NEGATIVE MG/DL
ALP SERPL-CCNC: 42 U/L (ref 35–104)
ALT SERPL W P-5'-P-CCNC: 15 U/L (ref 10–35)
ANION GAP SERPL CALCULATED.3IONS-SCNC: 8 MMOL/L (ref 7–15)
APPEARANCE UR: CLEAR
AST SERPL W P-5'-P-CCNC: 7 U/L (ref 10–35)
BASOPHILS # BLD MANUAL: 0 10E3/UL (ref 0–0.2)
BASOPHILS NFR BLD MANUAL: 0 %
BILIRUB DIRECT SERPL-MCNC: 0.38 MG/DL (ref 0–0.3)
BILIRUB SERPL-MCNC: 2 MG/DL
BILIRUB UR QL STRIP: NEGATIVE
BLD PROD TYP BPU: NORMAL
BLOOD COMPONENT TYPE: NORMAL
BUN SERPL-MCNC: 8.1 MG/DL (ref 6–20)
CALCIUM SERPL-MCNC: 9.4 MG/DL (ref 8.6–10)
CHLORIDE SERPL-SCNC: 106 MMOL/L (ref 98–107)
CODING SYSTEM: NORMAL
COLOR UR AUTO: NORMAL
CREAT SERPL-MCNC: 0.41 MG/DL (ref 0.51–0.95)
DACRYOCYTES BLD QL SMEAR: SLIGHT
DEPRECATED HCO3 PLAS-SCNC: 23 MMOL/L (ref 22–29)
EOSINOPHIL # BLD MANUAL: 0 10E3/UL (ref 0–0.7)
EOSINOPHIL NFR BLD MANUAL: 0 %
ERYTHROCYTE [DISTWIDTH] IN BLOOD BY AUTOMATED COUNT: 14.2 % (ref 10–15)
GFR SERPL CREATININE-BSD FRML MDRD: >90 ML/MIN/1.73M2
GLUCOSE SERPL-MCNC: 112 MG/DL (ref 70–99)
GLUCOSE UR STRIP-MCNC: NEGATIVE MG/DL
HCT VFR BLD AUTO: 22.8 % (ref 35–47)
HGB BLD-MCNC: 8 G/DL (ref 11.7–15.7)
HGB UR QL STRIP: NEGATIVE
ISSUE DATE AND TIME: NORMAL
ISSUE DATE AND TIME: NORMAL
KETONES UR STRIP-MCNC: NEGATIVE MG/DL
LEUKOCYTE ESTERASE UR QL STRIP: NEGATIVE
LYMPHOCYTES # BLD MANUAL: 0.7 10E3/UL (ref 0.8–5.3)
LYMPHOCYTES NFR BLD MANUAL: 100 %
MAGNESIUM SERPL-MCNC: 2.1 MG/DL (ref 1.7–2.3)
MCH RBC QN AUTO: 34.2 PG (ref 26.5–33)
MCHC RBC AUTO-ENTMCNC: 35.1 G/DL (ref 31.5–36.5)
MCV RBC AUTO: 97 FL (ref 78–100)
MONOCYTES # BLD MANUAL: 0 10E3/UL (ref 0–1.3)
MONOCYTES NFR BLD MANUAL: 0 %
NEUTROPHILS # BLD MANUAL: 0 10E3/UL (ref 1.6–8.3)
NEUTROPHILS NFR BLD MANUAL: 0 %
NITRATE UR QL: NEGATIVE
PH UR STRIP: 7 [PH] (ref 5–7)
PHOSPHATE SERPL-MCNC: 3 MG/DL (ref 2.5–4.5)
PLAT MORPH BLD: ABNORMAL
PLATELET # BLD AUTO: 8 10E3/UL (ref 150–450)
POTASSIUM SERPL-SCNC: 4.2 MMOL/L (ref 3.4–5.3)
PROT SERPL-MCNC: 6.1 G/DL (ref 6.4–8.3)
RBC # BLD AUTO: 2.34 10E6/UL (ref 3.8–5.2)
RBC MORPH BLD: ABNORMAL
SODIUM SERPL-SCNC: 137 MMOL/L (ref 136–145)
SP GR UR STRIP: 1.01 (ref 1–1.03)
TRANSFUSION REACTION REPORT?: YES
UNIT ABO/RH: NORMAL
UNIT NUMBER: NORMAL
UNIT STATUS: NORMAL
UNIT TYPE ISBT: 600
UROBILINOGEN UR STRIP-MCNC: NORMAL MG/DL
WBC # BLD AUTO: 0.7 10E3/UL (ref 4–11)

## 2022-06-29 PROCEDURE — 86078 PHYS BLOOD BANK SERV REACTJ: CPT | Performed by: PATHOLOGY

## 2022-06-29 PROCEDURE — 250N000013 HC RX MED GY IP 250 OP 250 PS 637: Performed by: PHYSICIAN ASSISTANT

## 2022-06-29 PROCEDURE — 36592 COLLECT BLOOD FROM PICC: CPT | Performed by: INTERNAL MEDICINE

## 2022-06-29 PROCEDURE — 258N000003 HC RX IP 258 OP 636: Performed by: PHYSICIAN ASSISTANT

## 2022-06-29 PROCEDURE — 84100 ASSAY OF PHOSPHORUS: CPT | Performed by: PHYSICIAN ASSISTANT

## 2022-06-29 PROCEDURE — 99207 PR SC NO CHARGE VISIT: CPT | Performed by: INTERNAL MEDICINE

## 2022-06-29 PROCEDURE — P9037 PLATE PHERES LEUKOREDU IRRAD: HCPCS | Performed by: INTERNAL MEDICINE

## 2022-06-29 PROCEDURE — 250N000013 HC RX MED GY IP 250 OP 250 PS 637: Performed by: STUDENT IN AN ORGANIZED HEALTH CARE EDUCATION/TRAINING PROGRAM

## 2022-06-29 PROCEDURE — 85007 BL SMEAR W/DIFF WBC COUNT: CPT | Performed by: PHYSICIAN ASSISTANT

## 2022-06-29 PROCEDURE — 87040 BLOOD CULTURE FOR BACTERIA: CPT | Performed by: INTERNAL MEDICINE

## 2022-06-29 PROCEDURE — 36592 COLLECT BLOOD FROM PICC: CPT | Performed by: PHYSICIAN ASSISTANT

## 2022-06-29 PROCEDURE — 250N000011 HC RX IP 250 OP 636: Performed by: STUDENT IN AN ORGANIZED HEALTH CARE EDUCATION/TRAINING PROGRAM

## 2022-06-29 PROCEDURE — 71045 X-RAY EXAM CHEST 1 VIEW: CPT | Mod: 26 | Performed by: RADIOLOGY

## 2022-06-29 PROCEDURE — 99233 SBSQ HOSP IP/OBS HIGH 50: CPT | Performed by: INTERNAL MEDICINE

## 2022-06-29 PROCEDURE — 71045 X-RAY EXAM CHEST 1 VIEW: CPT

## 2022-06-29 PROCEDURE — 81003 URINALYSIS AUTO W/O SCOPE: CPT | Performed by: STUDENT IN AN ORGANIZED HEALTH CARE EDUCATION/TRAINING PROGRAM

## 2022-06-29 PROCEDURE — 85027 COMPLETE CBC AUTOMATED: CPT | Performed by: PHYSICIAN ASSISTANT

## 2022-06-29 PROCEDURE — 250N000011 HC RX IP 250 OP 636: Performed by: PHYSICIAN ASSISTANT

## 2022-06-29 PROCEDURE — 120N000002 HC R&B MED SURG/OB UMMC

## 2022-06-29 PROCEDURE — 83735 ASSAY OF MAGNESIUM: CPT | Performed by: PHYSICIAN ASSISTANT

## 2022-06-29 PROCEDURE — 82248 BILIRUBIN DIRECT: CPT | Performed by: PHYSICIAN ASSISTANT

## 2022-06-29 PROCEDURE — 36415 COLL VENOUS BLD VENIPUNCTURE: CPT | Performed by: INTERNAL MEDICINE

## 2022-06-29 PROCEDURE — 80053 COMPREHEN METABOLIC PANEL: CPT | Performed by: PHYSICIAN ASSISTANT

## 2022-06-29 PROCEDURE — 250N000012 HC RX MED GY IP 250 OP 636 PS 637: Performed by: STUDENT IN AN ORGANIZED HEALTH CARE EDUCATION/TRAINING PROGRAM

## 2022-06-29 RX ORDER — SUCRALFATE ORAL 1 G/10ML
1 SUSPENSION ORAL 4 TIMES DAILY PRN
Status: DISCONTINUED | OUTPATIENT
Start: 2022-06-29 | End: 2022-07-01

## 2022-06-29 RX ORDER — LEVOTHYROXINE SODIUM 100 UG/1
100 TABLET ORAL
Status: DISCONTINUED | OUTPATIENT
Start: 2022-06-29 | End: 2022-07-15 | Stop reason: HOSPADM

## 2022-06-29 RX ORDER — MAGNESIUM HYDROXIDE/ALUMINUM HYDROXICE/SIMETHICONE 120; 1200; 1200 MG/30ML; MG/30ML; MG/30ML
30 SUSPENSION ORAL
Status: COMPLETED | OUTPATIENT
Start: 2022-06-29 | End: 2022-06-29

## 2022-06-29 RX ORDER — SIMETHICONE 80 MG
80 TABLET,CHEWABLE ORAL EVERY 6 HOURS PRN
Status: DISCONTINUED | OUTPATIENT
Start: 2022-06-29 | End: 2022-07-15 | Stop reason: HOSPADM

## 2022-06-29 RX ORDER — DIPHENHYDRAMINE HCL 25 MG
25 CAPSULE ORAL
Status: COMPLETED | OUTPATIENT
Start: 2022-06-29 | End: 2022-06-29

## 2022-06-29 RX ADMIN — SODIUM CHLORIDE, PRESERVATIVE FREE 5 ML: 5 INJECTION INTRAVENOUS at 21:24

## 2022-06-29 RX ADMIN — SODIUM CHLORIDE, PRESERVATIVE FREE 5 ML: 5 INJECTION INTRAVENOUS at 17:58

## 2022-06-29 RX ADMIN — ACYCLOVIR 400 MG: 400 TABLET ORAL at 08:19

## 2022-06-29 RX ADMIN — ONDANSETRON HYDROCHLORIDE 8 MG: 8 TABLET, FILM COATED ORAL at 06:26

## 2022-06-29 RX ADMIN — SODIUM CHLORIDE, PRESERVATIVE FREE 5 ML: 5 INJECTION INTRAVENOUS at 17:29

## 2022-06-29 RX ADMIN — Medication 50 MCG: at 08:19

## 2022-06-29 RX ADMIN — SIMETHICONE 80 MG: 80 TABLET, CHEWABLE ORAL at 16:41

## 2022-06-29 RX ADMIN — ACYCLOVIR 400 MG: 400 TABLET ORAL at 20:16

## 2022-06-29 RX ADMIN — Medication 1 TABLET: at 12:01

## 2022-06-29 RX ADMIN — ACETAMINOPHEN 650 MG: 325 TABLET, FILM COATED ORAL at 14:48

## 2022-06-29 RX ADMIN — DIPHENHYDRAMINE HYDROCHLORIDE 25 MG: 25 CAPSULE ORAL at 14:48

## 2022-06-29 RX ADMIN — ALUMINUM HYDROXIDE, MAGNESIUM HYDROXIDE, AND SIMETHICONE 30 ML: 200; 200; 20 SUSPENSION ORAL at 20:29

## 2022-06-29 RX ADMIN — RYDAPT 50 MG: 25 CAPSULE, LIQUID FILLED ORAL at 21:27

## 2022-06-29 RX ADMIN — PANTOPRAZOLE SODIUM 40 MG: 40 TABLET, DELAYED RELEASE ORAL at 06:26

## 2022-06-29 RX ADMIN — LEVOTHYROXINE SODIUM 100 MCG: 100 TABLET ORAL at 06:55

## 2022-06-29 RX ADMIN — SIMETHICONE 80 MG: 80 TABLET, CHEWABLE ORAL at 10:39

## 2022-06-29 RX ADMIN — CEFEPIME HYDROCHLORIDE 2 G: 2 INJECTION, POWDER, FOR SOLUTION INTRAVENOUS at 20:17

## 2022-06-29 RX ADMIN — ONDANSETRON HYDROCHLORIDE 8 MG: 8 TABLET, FILM COATED ORAL at 18:04

## 2022-06-29 RX ADMIN — MICAFUNGIN 50 MG: 10 INJECTION, POWDER, LYOPHILIZED, FOR SOLUTION INTRAVENOUS at 16:41

## 2022-06-29 RX ADMIN — LEVOFLOXACIN 250 MG: 250 TABLET, FILM COATED ORAL at 08:19

## 2022-06-29 RX ADMIN — ACETAMINOPHEN 650 MG: 325 TABLET, FILM COATED ORAL at 21:29

## 2022-06-29 RX ADMIN — ACETAMINOPHEN 650 MG: 325 TABLET, FILM COATED ORAL at 10:39

## 2022-06-29 RX ADMIN — OXYCODONE HYDROCHLORIDE AND ACETAMINOPHEN 1000 MG: 500 TABLET ORAL at 08:19

## 2022-06-29 ASSESSMENT — ACTIVITIES OF DAILY LIVING (ADL)
ADLS_ACUITY_SCORE: 18

## 2022-06-29 NOTE — PROVIDER NOTIFICATION
"Provider Notification     Chen Menendez (#[id number]) paged at 1213:  \"FYI pt has platelets infusing and developed three raised bumps on the left shoulder. Reports they are itchy, but not bothersome enough that she needs medication. Should we stop the transfusion?\"    Response: Continue the transfusion and notify provider if new or worsening symptoms occur.       Will continue to monitor.   "

## 2022-06-29 NOTE — PROGRESS NOTES
Prior Authorization Approval    Voriconazole 200mg tabs  Date Initiated: 6/28/2022  Date Completed: 6/28/2022  Prior Auth Type: Clinical                Status: Approved    Effective Date: 06/28/2022 - 12/28/2022  Copay: 176.41     Filling Pharmacy: [facility] - [place], [place] - [address] [place] [address]    Insurance: [employer] - Phone [phone] Fax [phone]  ID: [id number]  Case Number: [id number] / [id number]   Submitted Via: Saurav Farrell  [facility] Liaison  Ph: [phone] Pager: [phone]

## 2022-06-29 NOTE — PROVIDER NOTIFICATION
"Provider Notification      Chen Menendez (#[id number]) paged at 1314:  \"FYI pt's platelet transfusion is complete. She notes three new areas on her face and forehead that look red and feel itchy. Still declining any meds for the itching.\"     Response: No interventions ordered.       Will continue to monitor.   "

## 2022-06-29 NOTE — PROGRESS NOTES
[facility]  Hematology / Oncology Progress Note    Date of Admission: 6/16/2022  Date of Service (when I saw the patient): 06/29/2022     Assessment & Plan   Veda Marinelli is a 37 year old female with PMH significant for h/o COVID19 infection (10/2021), C.diff, and hypothyroidism who presented with leukocytosis and circulating blasts. 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 She initiated induction chemotherapy with 7+3+midostaurin (C1D1=6/22/22).      Today:  - D8 of chemotherapy, tolerating well overall  - Completing final cytarabine infusion today. Midostaurin scheduled to start tonight. Consider scheduled Zofran premed BID with midostaurin dosing should pt develop difficulty with nausea.   - 1 unit(s) plt for plts 8k on AM labs.  - Pt reporting esophageal dysphagia with solid foods. Will monitor for now and consider barium swallow study if persistent. Trial Carafate, simethicone PRN.   - Pt also reporting bilateral ear erythema, irritation. See details below. Continue to monitor.  - continue ppx anti-infectives: ACV, Levaquin, Micafungin  - Encourage PO intake and monitor weights. Bolus PRN.   - monitor for mucositis, continue salt/soda rinses, made MMW and viscous lidocaine available    ADDENDUM: With plt tranfusion, pt developed several hives and had rigors/chills. Benadryl 25 mg and Tylenol given. RNs initiating transfusion work up. No angioedema or respiratory issues.     HEME  # AML (FLT3 ITD(low), NPM1, IDH2)  Was in her usual state of health until 03/2022 when she underwent a routine physical with labs. On 3/8/2022, white blood cell count 2.5 (ANC 1.0), Hgb 13.6 with , platelets normal.  On 5/15/2022, she was noted to have further decreasd white blood cell count of 1.6 (ANC 0.28) with hemoglobin 11. platelets were 133. She was ultimately referred to Hematology (Dr. Bob), who she saw on 6/7/22. Further blood workup was recommended and labs done 6/14.  CBC revealed WBC 25.3 (ANC 0.8), Hgb 10.1 (), Plt 90K. On the differential and morphology review, 81% blasts were seen concerning for acute leukemia. She was advised to present to [facility] for further evaluation and management.   - Per verbal report from Heme Path fellow there was concern for Blanca rods. She was started on ATRA 6/16 PM-6/17 AM while awaiting PML-ANNELIESE testing. This was ultimately negative.   - s/p Hydrea (6/17-6/22) while awaiting final diagnostic studies  - 6/16 PB chromosomes: 46, XX. No abnormality.   - 6/16 PB FISH: no rearrangments of MLLT10, NUP98, or KMT2A.   - 6/17 BMBx: markedly hypercellular marrow (85-95% estimated cellularity), with markedly diminished trilineage hematopoiesis, no overt dysplasia in the evaluable elements, and 92% blasts  - FLT3 PCR: positive for FLT3-ITD(low) with allelic ratio 0.21 (corrected from previously reported value of 0.144 per staff message to Lamar Grover from molecular lab personnel)  - NGS panel: NPM1 positive, IDH2, FLT3-ITD  - HLA typing sent, BMT coordinators aware and working on arranging IP consultation  - discontinued Allopurinol on 6/28  - will plan for repeat BMBx ~Day 21 (~7/12) vs count recovery    Treatment Plan: Cytarabine and Daunorubicin (7+3) plus Midostaurin (D1=6/22/22)   - Daunorubicin 90mg/m2 - D1-3   - Cytarabine 100 mg/m2 - D1-7   - Midostaurin 50mg BID Day 8-21, started 6/29 PM. Consider scheduled Zofran premed BID with midostaurin dosing should pt develop difficulty with nausea.      # Pancytopenia  2/2 underlying malignancy and now exacerbated by chemotherapy  - transfuse to maintain Hgb >7, Plt >10K   - conditional orders in place for FFP if INR >1.8, cryo if fibrinogen <100      # Mild coagulopathy, improved  INR gradually uptrended 1.1-->1.3. Now improved back to WNL (as of 6/27). No bleeding. Fibrinogen WNL.   - reduced frequency of DIC labs    GYN  # LMP  Approximately 2 weeks PTA, bleeding was heavier than normal  with clots. Usually moderate lasting ~3 days.   - Monitor for recurrent bleeding and consider Provera at that time     ID  # Neutropenic Fever, fever resolved  Fevered to 101.6F on 6/23. Noted to have chills though no other focal symptoms. Afebrile since that time.   - 6/16 baseline CT CAP without evidence for infectious source   - UA/UCx NGTD; Blood culture NGTD  - CXR clear  - s/p Cefepime 2g q8h IV (x 6/23-6/27). De-escalated back to ppx Levaquin on 6/27.    # ID PPx  - ACV 400mg BID  - Levaquin 250mg daily  - Micafungin 50mg IV daily. Given that she will be on midostaurin, will keep on Micafungin to limit potential interactions with posaconazole/voriconazole. However, Prior Auth is in the works for both should we need oral antifungal after completion of midostaurin.      # h/o COVID-19  Pt is not vaccinated nor interested in being vaccinated. She was admitted from 10/1/2021 - 10/13/2021 for acute hypoxic respiratory failure from COVID-19 pneumonia.  Required IMC, high flow and intermittent CPAP. She was treated with dexamethasone, remdesivir, and baricitinib in addition to azithromycin and ceftriaxone. Recovered symptomatically from this.    GI  # H/o C.diff  # Hemorrhoid  # Loose stools (6/27-6/28)  H/o C.diff (10/20/21) and course was complicated by hemorrhoid which does wax/wane and was sometimes painful. She primarily has used witch hazel pads when symptomatic. Pt reported normal bowels at time of AML diagnosis.   - 6/16 CT A/P negative for perirectal abscess.  - TUCKS PRN  - Avoid PTA probiotic at this time.  - baseline 6/19 C. Diff negative so we deferred prophylactic PO Vancomycin during chemo course  - 6/28: pt reporting new onset loose stools, not watery diarrhea. Suspect related to mucosal changes with chemotherapy. No associated nausea, abd pain or cramping. Improved with firmer stool on 6/29. If has 3 or more loose stools in 24hrs, will repeat C.diff test.    # Mucositis   Pt noting inflammatory  "changes in b/l buccal mucosa and small lesion on right inner lower lip. Not painful or affecting PO intake at this time. No e/o thrush. Continue to monitor.   - s/s rinses  - MMW PRN, viscous lidocaine PRN    # Heartburn  - TUMS PRN  - PPI daily    # Esophageal dysphagia  On 6/29, patient reports new onset of lower chest pain when swallowing solid foods, no pain with liquids. Denies SOB, reflux, palpitations, nausea. Already on PPI as above. No e/o oral candidiasis, on micafungin ppx.   - Trial Carafate, simethicone PRN  - Continue to monitor, consider barium swallow study if persistent     Endocrine  # Hypothyroidism  PTA was on Synthroid 125mcg daily. TSH 0.04, T4 Free 1.48 (done 6/24). Per pt, her PCP reviewed and advised her to reduce her Synthroid to 100mcg daily. Ordered to start on 6/29.     MISC  # Bilateral ear irritation  On 6/29, pt reporting bilateral ear lobe erythema and irritation in ear canal \"like there's a zit inside.\" No mastoid or auricular tenderness. Otoscopic exam with cerumen impaction of L ear canal, normal canal and TM on R. No discharge from ear canal.   - Continue to monitor    # Social  Works for [employer]. Currently reports that the company has been flexible and she does not currently have any paperwork for medical team to complete.     # Integrated Health Services  - Patient is interested in our integrated health services/ support  - Appreciate support from [employer] [facility]  - no current IVFs, bolus PRN. Her weight is down 5 lbs on 6/28 (from 6/27) and Hgb appears to be slightly hemoconcentrated. Pt reports eating/drinking ok at this time however does now have loose stools. Encourage to push PO fluids today. Consider bolus fluids pending clinical trend.  - lyte repletion per unit sliding scale  - regular diet as tolerated     PPx  - VTE: none due to thrombocytopenia  - GI: PPI     Dispo: Admit for workup of new acute leukemia. Unclear at this time but anticipated " "3-4 week LOS at minimum.      Plan of care discussed with Dr. Earl.     I spent >55 minutes in the care of this patient today, which included time necessary for review of interval events, obtaining history and physical exam, adjusting medication orders, discussion with interdisciplinary/consult team(s), and documentation time. Over 50% of time was spent face-to-face and/or coordinating care.     Chen Menendez PA-C  Heme/Onc  [phone] (pager)      Interval History   No acute events overnight, afebrile. Doing well overall. Reports new onset of lower chest pain when swallowing solid foods, no pain with liquids. Denies SOB, reflux, palpitations, nausea. Already on PPI as above. No e/o oral candidiasis, on micafungin ppx. Pt is ok with monitoring, does not feel the need to pursue swallow study since <24 hrs onset. Will trial simethicone. Reports that PO intake is good. She was hungry this AM but maybe ate a little less because of dysphagia. She ate most of her omelet on bedside table. Good fluid intake per pt. She notes bilateral ear lobe redness and bilateral ear canal irritation, like \"there is a zit inside.\" No mastoid or auricular tenderness. Otoscopic exam unremakrable. No discharge from ear canal. Pt reports that loose stool has improved, BM this morning was more firm. Hemorrhoid mildly bothersome. Mucositis with mild gingival irritation, no sores on exam.     Physical Exam   Temp: 98.4  F (36.9  C) Temp src: Oral BP: 109/76 Pulse: 95   Resp: 16 SpO2: 98 % O2 Device: None (Room air)    Vitals:    06/26/22 0802 06/27/22 0817 06/28/22 0802   Weight: 118.8 kg (261 lb 14.5 oz) 118.8 kg (262 lb) 116.7 kg (257 lb 3.2 oz)     Vital Signs with Ranges  Temp:  [97.9  F (36.6  C)-98.8  F (37.1  C)] 98.4  F (36.9  C)  Pulse:  [80-95] 95  Resp:  [16] 16  BP: (104-128)/(62-87) 109/76  SpO2:  [95 %-99 %] 98 %  I/O last 3 completed shifts:  In: 1706.8 [P.O.:1180; IV Piggyback:526.8]  Out: 6820 " [Urine:4450]    Constitutional: Pleasant female sitting up in chair, working on computer. Awake and conversational. Non-toxic appearing. No acute distress.   HEENT: NC/AT. Sclerae anicteric. OP pink and moist. Gingival and pharynx mildly erythematous but no sores visualized. No thrush. Bilateral inferior ear lob mildly erythematous, no discharge in external canal. No mastoid or auricular tenderness.  Otoscopic exam: L ear canal with cerumen impaction, no erythema or exposed canal. R ear with normal canal, no erythema, discharge; and grey, pearly TM with no notable fluid or bulge.   Respiratory: Breathing comfortable with no increased work on room air. Good air exchange. Lungs clear to auscultation bilaterally, no crackles or wheezing noted.  Cardiovascular: Regular rate and rhythm. No peripheral edema.    GI: Normal BS. Abdomen is soft, non-distended, non-tender.  Skin: Skin is clean, dry, intact.   Musculoskeletal/ Extremities: Extremities grossly normal in appearance. Moves freely/able to maneuver self in chair.   Neurologic: Alert and oriented. Speech normal. Grossly nonfocal.   Neuropsychiatric: Calm, affect congruent to situation.      Medications     - MEDICATION INSTRUCTIONS -         acyclovir  400 mg Oral BID     cytarabine (CYTOSAR) infusion  100 mg/m2 (Treatment Plan Recorded) Intravenous Q24H     levofloxacin  250 mg Oral Daily     levothyroxine  100 mcg Oral QAM AC     micafungin  50 mg Intravenous Q24H     multivitamin w/minerals  1 tablet Oral Daily     pantoprazole  40 mg Oral QAM AC     vitamin C  1,000 mg Oral Daily     vitamin D3  50 mcg Oral Daily       Data   Results for orders placed or performed during the hospital encounter of 06/16/22 (from the past 24 hour(s))   CBC with platelets differential    Narrative    The following orders were created for panel order CBC with platelets differential.  Procedure                               Abnormality         Status                     ---------      "                          -----------         ------                     CBC with platelets and d...[[id number]]  Abnormal            Final result               Manual Differential[[id number]]          Abnormal            Final result                 Please view results for these tests on the individual orders.   Basic metabolic panel   Result Value Ref Range    Creatinine 0.41 (L) 0.51 - 0.95 mg/dL    Sodium 137 136 - 145 mmol/L    Potassium 4.2 3.4 - 5.3 mmol/L    Urea Nitrogen 8.1 6.0 - 20.0 mg/dL    Chloride 106 98 - 107 mmol/L    Carbon Dioxide (CO2) 23 22 - 29 mmol/L    Anion Gap 8 7 - 15 mmol/L    Glucose 112 (H) 70 - 99 mg/dL    GFR Estimate >90 >60 mL/min/1.73m2    Calcium 9.4 8.6 - 10.0 mg/dL   Hepatic panel   Result Value Ref Range    Protein Total 6.1 (L) 6.4 - 8.3 g/dL    Albumin 3.8 3.5 - 5.2 g/dL    Bilirubin Total 2.0 (H) <=1.2 mg/dL    Alkaline Phosphatase 42 35 - 104 U/L    AST 7 (L) 10 - 35 U/L    ALT 15 10 - 35 U/L    Bilirubin Direct 0.38 (H) 0.00 - 0.30 mg/dL   Magnesium   Result Value Ref Range    Magnesium 2.1 1.7 - 2.3 mg/dL   Phosphorus   Result Value Ref Range    Phosphorus 3.0 2.5 - 4.5 mg/dL   CBC with platelets and differential   Result Value Ref Range    WBC Count 0.7 (LL) 4.0 - 11.0 10e3/uL    RBC Count 2.34 (L) 3.80 - 5.20 10e6/uL    Hemoglobin 8.0 (L) 11.7 - 15.7 g/dL    Hematocrit 22.8 (L) 35.0 - 47.0 %    MCV 97 78 - 100 fL    MCH 34.2 (H) 26.5 - 33.0 pg    MCHC 35.1 31.5 - 36.5 g/dL    RDW 14.2 10.0 - 15.0 %    Platelet Count 8 (LL) 150 - 450 10e3/uL    Narrative    Previously reported component [ NRBCs ] is no longer reported.\"  Previously reported component [ NRBCs Absolute ] is no longer reported.\"   Manual Differential   Result Value Ref Range    % Neutrophils 0 %    % Lymphocytes 100 %    % Monocytes 0 %    % Eosinophils 0 %    % Basophils 0 %    Absolute Neutrophils 0.0 (LL) 1.6 - 8.3 10e3/uL    Absolute Lymphocytes 0.7 (L) 0.8 - 5.3 10e3/uL    Absolute Monocytes 0.0 0.0 - " 1.3 10e3/uL    Absolute Eosinophils 0.0 0.0 - 0.7 10e3/uL    Absolute Basophils 0.0 0.0 - 0.2 10e3/uL    RBC Morphology Confirmed RBC Indices     Platelet Assessment  Automated Count Confirmed. Platelet morphology is normal.     Automated Count Confirmed. Platelet morphology is normal.    Teardrop Cells Slight (A) None Seen   CONDITIONAL Prepare pheresed platelets (unit)   Result Value Ref Range    UNIT ABO/RH A Neg     Unit Number [id number]     Unit Status Ready     Blood Component Type Platelets     Product Code B5499G62     CODING SYSTEM [id number]     UNIT TYPE ISBT 0600    Prepare pheresed platelets (unit)   Result Value Ref Range    UNIT ABO/RH A Neg     Unit Number [id number]     Unit Status Issued     Blood Component Type Platelets     Product Code J3347U17     CODING SYSTEM [id number]     UNIT TYPE ISBT 0600     ISSUE DATE AND TIME [id number]

## 2022-06-29 NOTE — PLAN OF CARE
[id number]    A&Ox4. VSS on RA. C/o itching and pain in ears, given tylenol x1 with some relief. Stomach discomfort managed with simethicone x1. Denies nausea and SOB. Platelets 8 this AM, received 1 unit this shift. Pt developed redness and itching on left shoulder that then spread to face, MD notified, no interventions ordered. Pt later developed chills, transfusion reaction workup in progress. Given tylenol and benadryl. Cytarabine infusing 43.9 mL/hr, tolerating well. Continue with plan of care.

## 2022-06-29 NOTE — PLAN OF CARE
Goal Outcome Evaluation:  [id number]    VSS on RA. Denies pain, nausea, SOB. Reports mild heartburn/indigestion. Declined tums. CIVI cytarabine infusing via PICC with brisk blood return. No loose stools overnight. No acute events. Continue with POC.

## 2022-06-30 ENCOUNTER — APPOINTMENT (OUTPATIENT)
Dept: PHYSICAL THERAPY | Facility: CLINIC | Age: 37
DRG: 834 | End: 2022-06-30
Payer: COMMERCIAL

## 2022-06-30 LAB
ABO/RH(D): NORMAL
ALBUMIN SERPL BCG-MCNC: 3.8 G/DL (ref 3.5–5.2)
ALP SERPL-CCNC: 43 U/L (ref 35–104)
ALT SERPL W P-5'-P-CCNC: 15 U/L (ref 10–35)
AMYLASE SERPL-CCNC: 35 U/L (ref 28–100)
ANION GAP SERPL CALCULATED.3IONS-SCNC: 9 MMOL/L (ref 7–15)
ANTIBODY SCREEN: NEGATIVE
AST SERPL W P-5'-P-CCNC: 10 U/L (ref 10–35)
BILIRUB DIRECT SERPL-MCNC: 0.32 MG/DL (ref 0–0.3)
BILIRUB SERPL-MCNC: 1.7 MG/DL
BLD PROD TYP BPU: NORMAL
BLOOD COMPONENT TYPE: NORMAL
BUN SERPL-MCNC: 9 MG/DL (ref 6–20)
CALCIUM SERPL-MCNC: 9 MG/DL (ref 8.6–10)
CHLORIDE SERPL-SCNC: 104 MMOL/L (ref 98–107)
CODING SYSTEM: NORMAL
CREAT SERPL-MCNC: 0.44 MG/DL (ref 0.51–0.95)
CROSSMATCH: NORMAL
DACRYOCYTES BLD QL SMEAR: SLIGHT
DEPRECATED HCO3 PLAS-SCNC: 22 MMOL/L (ref 22–29)
ERYTHROCYTE [DISTWIDTH] IN BLOOD BY AUTOMATED COUNT: 13.8 % (ref 10–15)
FIBRINOGEN PPP-MCNC: 523 MG/DL (ref 170–490)
GFR SERPL CREATININE-BSD FRML MDRD: >90 ML/MIN/1.73M2
GLUCOSE SERPL-MCNC: 123 MG/DL (ref 70–99)
HCT VFR BLD AUTO: 19.9 % (ref 35–47)
HGB BLD-MCNC: 6.9 G/DL (ref 11.7–15.7)
INR PPP: 1.14 (ref 0.85–1.15)
ISSUE DATE AND TIME: NORMAL
LACTATE SERPL-SCNC: 0.8 MMOL/L (ref 0.7–2)
LIPASE SERPL-CCNC: 23 U/L (ref 13–60)
MAGNESIUM SERPL-MCNC: 2 MG/DL (ref 1.7–2.3)
MCH RBC QN AUTO: 33.5 PG (ref 26.5–33)
MCHC RBC AUTO-ENTMCNC: 34.7 G/DL (ref 31.5–36.5)
MCV RBC AUTO: 97 FL (ref 78–100)
PHOSPHATE SERPL-MCNC: 3 MG/DL (ref 2.5–4.5)
PLAT MORPH BLD: ABNORMAL
PLATELET # BLD AUTO: 35 10E3/UL (ref 150–450)
POTASSIUM SERPL-SCNC: 3.9 MMOL/L (ref 3.4–5.3)
PROT SERPL-MCNC: 6.1 G/DL (ref 6.4–8.3)
RBC # BLD AUTO: 2.06 10E6/UL (ref 3.8–5.2)
RBC MORPH BLD: ABNORMAL
SODIUM SERPL-SCNC: 135 MMOL/L (ref 136–145)
SPECIMEN EXPIRATION DATE: NORMAL
UNIT ABO/RH: NORMAL
UNIT NUMBER: NORMAL
UNIT STATUS: NORMAL
UNIT TYPE ISBT: 600
URATE SERPL-MCNC: 2 MG/DL (ref 2.4–5.7)
WBC # BLD AUTO: 0.4 10E3/UL (ref 4–11)

## 2022-06-30 PROCEDURE — 36592 COLLECT BLOOD FROM PICC: CPT | Performed by: PHYSICIAN ASSISTANT

## 2022-06-30 PROCEDURE — 250N000011 HC RX IP 250 OP 636: Performed by: STUDENT IN AN ORGANIZED HEALTH CARE EDUCATION/TRAINING PROGRAM

## 2022-06-30 PROCEDURE — 97110 THERAPEUTIC EXERCISES: CPT | Mod: GP

## 2022-06-30 PROCEDURE — 83735 ASSAY OF MAGNESIUM: CPT | Performed by: INTERNAL MEDICINE

## 2022-06-30 PROCEDURE — 250N000013 HC RX MED GY IP 250 OP 250 PS 637: Performed by: INTERNAL MEDICINE

## 2022-06-30 PROCEDURE — 250N000012 HC RX MED GY IP 250 OP 636 PS 637: Performed by: STUDENT IN AN ORGANIZED HEALTH CARE EDUCATION/TRAINING PROGRAM

## 2022-06-30 PROCEDURE — 250N000011 HC RX IP 250 OP 636: Performed by: PHYSICIAN ASSISTANT

## 2022-06-30 PROCEDURE — 250N000013 HC RX MED GY IP 250 OP 250 PS 637: Performed by: PHYSICIAN ASSISTANT

## 2022-06-30 PROCEDURE — 999N000044 HC STATISTIC CVC DRESSING CHANGE

## 2022-06-30 PROCEDURE — 83690 ASSAY OF LIPASE: CPT | Performed by: PHYSICIAN ASSISTANT

## 2022-06-30 PROCEDURE — 258N000003 HC RX IP 258 OP 636: Performed by: PHYSICIAN ASSISTANT

## 2022-06-30 PROCEDURE — 84550 ASSAY OF BLOOD/URIC ACID: CPT | Performed by: PHYSICIAN ASSISTANT

## 2022-06-30 PROCEDURE — 97530 THERAPEUTIC ACTIVITIES: CPT | Mod: GP

## 2022-06-30 PROCEDURE — 85027 COMPLETE CBC AUTOMATED: CPT | Performed by: PHYSICIAN ASSISTANT

## 2022-06-30 PROCEDURE — 86923 COMPATIBILITY TEST ELECTRIC: CPT | Performed by: PHYSICIAN ASSISTANT

## 2022-06-30 PROCEDURE — 82248 BILIRUBIN DIRECT: CPT | Performed by: PHYSICIAN ASSISTANT

## 2022-06-30 PROCEDURE — 83605 ASSAY OF LACTIC ACID: CPT | Performed by: INTERNAL MEDICINE

## 2022-06-30 PROCEDURE — 86901 BLOOD TYPING SEROLOGIC RH(D): CPT | Performed by: INTERNAL MEDICINE

## 2022-06-30 PROCEDURE — 86850 RBC ANTIBODY SCREEN: CPT | Performed by: INTERNAL MEDICINE

## 2022-06-30 PROCEDURE — 120N000002 HC R&B MED SURG/OB UMMC

## 2022-06-30 PROCEDURE — 84100 ASSAY OF PHOSPHORUS: CPT | Performed by: INTERNAL MEDICINE

## 2022-06-30 PROCEDURE — P9016 RBC LEUKOCYTES REDUCED: HCPCS | Performed by: PHYSICIAN ASSISTANT

## 2022-06-30 PROCEDURE — 82150 ASSAY OF AMYLASE: CPT | Performed by: PHYSICIAN ASSISTANT

## 2022-06-30 PROCEDURE — 99233 SBSQ HOSP IP/OBS HIGH 50: CPT | Performed by: INTERNAL MEDICINE

## 2022-06-30 PROCEDURE — 250N000013 HC RX MED GY IP 250 OP 250 PS 637: Performed by: STUDENT IN AN ORGANIZED HEALTH CARE EDUCATION/TRAINING PROGRAM

## 2022-06-30 PROCEDURE — 85384 FIBRINOGEN ACTIVITY: CPT | Performed by: PHYSICIAN ASSISTANT

## 2022-06-30 PROCEDURE — 85610 PROTHROMBIN TIME: CPT | Performed by: PHYSICIAN ASSISTANT

## 2022-06-30 PROCEDURE — 80053 COMPREHEN METABOLIC PANEL: CPT | Performed by: PHYSICIAN ASSISTANT

## 2022-06-30 RX ORDER — DIPHENHYDRAMINE HCL 25 MG
25-50 CAPSULE ORAL EVERY 6 HOURS PRN
Status: COMPLETED | OUTPATIENT
Start: 2022-06-30 | End: 2022-07-05

## 2022-06-30 RX ORDER — TRIAMCINOLONE ACETONIDE 1 MG/G
CREAM TOPICAL 2 TIMES DAILY
Status: DISCONTINUED | OUTPATIENT
Start: 2022-06-30 | End: 2022-07-04

## 2022-06-30 RX ORDER — DIPHENHYDRAMINE HCL 25 MG
25-50 CAPSULE ORAL
Status: COMPLETED | OUTPATIENT
Start: 2022-06-30 | End: 2022-06-30

## 2022-06-30 RX ORDER — ACETAMINOPHEN 325 MG/1
650 TABLET ORAL EVERY 4 HOURS PRN
Status: DISCONTINUED | OUTPATIENT
Start: 2022-06-30 | End: 2022-07-15 | Stop reason: HOSPADM

## 2022-06-30 RX ADMIN — SUCRALFATE 1 G: 1 SUSPENSION ORAL at 14:30

## 2022-06-30 RX ADMIN — CEFEPIME HYDROCHLORIDE 2 G: 2 INJECTION, POWDER, FOR SOLUTION INTRAVENOUS at 02:53

## 2022-06-30 RX ADMIN — ACETAMINOPHEN 650 MG: 325 TABLET, FILM COATED ORAL at 14:30

## 2022-06-30 RX ADMIN — SODIUM CHLORIDE, PRESERVATIVE FREE 5 ML: 5 INJECTION INTRAVENOUS at 11:07

## 2022-06-30 RX ADMIN — SODIUM CHLORIDE, PRESERVATIVE FREE 5 ML: 5 INJECTION INTRAVENOUS at 20:09

## 2022-06-30 RX ADMIN — DIPHENHYDRAMINE HYDROCHLORIDE 50 MG: 25 CAPSULE ORAL at 07:52

## 2022-06-30 RX ADMIN — ACETAMINOPHEN 650 MG: 325 TABLET, FILM COATED ORAL at 07:52

## 2022-06-30 RX ADMIN — MICAFUNGIN 50 MG: 10 INJECTION, POWDER, LYOPHILIZED, FOR SOLUTION INTRAVENOUS at 16:27

## 2022-06-30 RX ADMIN — OXYCODONE HYDROCHLORIDE AND ACETAMINOPHEN 1000 MG: 500 TABLET ORAL at 07:52

## 2022-06-30 RX ADMIN — ACYCLOVIR 400 MG: 400 TABLET ORAL at 19:24

## 2022-06-30 RX ADMIN — TRIAMCINOLONE ACETONIDE: 1 CREAM TOPICAL at 19:24

## 2022-06-30 RX ADMIN — SODIUM CHLORIDE, PRESERVATIVE FREE 5 ML: 5 INJECTION INTRAVENOUS at 17:42

## 2022-06-30 RX ADMIN — RYDAPT 50 MG: 25 CAPSULE, LIQUID FILLED ORAL at 19:34

## 2022-06-30 RX ADMIN — ACETAMINOPHEN 650 MG: 325 TABLET, FILM COATED ORAL at 02:41

## 2022-06-30 RX ADMIN — Medication 50 MCG: at 07:52

## 2022-06-30 RX ADMIN — ACYCLOVIR 400 MG: 400 TABLET ORAL at 07:52

## 2022-06-30 RX ADMIN — TRIAMCINOLONE ACETONIDE: 1 CREAM TOPICAL at 11:01

## 2022-06-30 RX ADMIN — SODIUM CHLORIDE, PRESERVATIVE FREE 5 ML: 5 INJECTION INTRAVENOUS at 06:06

## 2022-06-30 RX ADMIN — PANTOPRAZOLE SODIUM 40 MG: 40 TABLET, DELAYED RELEASE ORAL at 06:06

## 2022-06-30 RX ADMIN — CEFEPIME HYDROCHLORIDE 2 G: 2 INJECTION, POWDER, FOR SOLUTION INTRAVENOUS at 19:24

## 2022-06-30 RX ADMIN — CEFEPIME HYDROCHLORIDE 2 G: 2 INJECTION, POWDER, FOR SOLUTION INTRAVENOUS at 11:04

## 2022-06-30 RX ADMIN — SUCRALFATE 1 G: 1 SUSPENSION ORAL at 09:32

## 2022-06-30 RX ADMIN — RYDAPT 50 MG: 25 CAPSULE, LIQUID FILLED ORAL at 07:52

## 2022-06-30 RX ADMIN — Medication 1 TABLET: at 11:48

## 2022-06-30 RX ADMIN — LEVOTHYROXINE SODIUM 100 MCG: 100 TABLET ORAL at 06:05

## 2022-06-30 ASSESSMENT — ACTIVITIES OF DAILY LIVING (ADL)
ADLS_ACUITY_SCORE: 18

## 2022-06-30 NOTE — PLAN OF CARE
[id number]    VSS on RA. Tmax 101.2, given tylenol. Pt not due for BCs at this time and provider notified. Denies pain, nausea, and SOB. Continues to have stomach discomfort, given prn carafate x2 with some relief. Hgb 6.9, received RBCs. Given tylenol and benadryl prior to transfusion. Continue with plan of care.

## 2022-06-30 NOTE — INTERIM SUMMARY
Cross Cover    Patient now spiking fever. Blood cultures, urine culture, CXR ordered and restarted on cefepime per sign out.    Leodan Prado DO on 6/29/2022 at 7:06 PM

## 2022-06-30 NOTE — PLAN OF CARE
[id number] hours: Tmax 100.9. Other vital signs stable. On room air with adequate oxygenation. Neutropenic work up of chest xray, urine sample and blood cultures x 2 obtained. Complaints of chills and epigastric abdominal cramping/discomfort. Simethicone and zofran given without improvement. Maalox ordered. Plan to start Rydapt when patients stomach can handle the medication this evening. Hives from platelet transfusion this afternoon is improved on left side of face and behind the left arm (deltoid region).

## 2022-06-30 NOTE — PROGRESS NOTES
[facility]  Hematology / Oncology Progress Note    Date of Admission: 6/16/2022  Date of Service (when I saw the patient): 06/30/2022     Assessment & Plan   Veda Marinelli is a 37 year old female with PMH significant for h/o COVID19 infection (10/2021), C.diff, and hypothyroidism who presented with leukocytosis and circulating blasts. 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 She initiated induction chemotherapy with 7+3+midostaurin (C1D1=6/22/22).      Today:  - D9 of 7+3+midostaurin  - Midostaurin started 6/29 PM. Continue D8-21. Consider scheduled Zofran premed BID with midostaurin dosing should pt develop difficulty with nausea.   - 1 unit(s) PRBC today  - recurrent neutropenic fever on 6/29 evening, Tmax 101.6  - BCx pending  - UA negative, CXR negative  - started Cefepime (x 6/29 evening)  - intermittent epigastric discomfort exacerbated by eating. Some relief with Maalox. Trial carafate PRN.   - low suspicion but check amylase/lipase   - ongoing bilateral external ear erythema, itching. Possibly related to recent cytarabine. No overt lesions or extended rashes. Trial triamcinolone cream. Could also consider oral antihistamine. Continue to monitor.  - repeat hepatic panel to monitor isolated elevated Tbili   - Encourage PO intake and monitor weights. Bolus PRN.          HEME  # AML (FLT3 ITD(low), NPM1, IDH2)  Was in her usual state of health until 03/2022 when she underwent a routine physical with labs. On 3/8/2022, white blood cell count 2.5 (ANC 1.0), Hgb 13.6 with , platelets normal.  On 5/15/2022, she was noted to have further decreasd white blood cell count of 1.6 (ANC 0.28) with hemoglobin 11. platelets were 133. She was ultimately referred to Hematology (Dr. Bob), who she saw on 6/7/22. Further blood workup was recommended and labs done 6/14. CBC revealed WBC 25.3 (ANC 0.8), Hgb 10.1 (), Plt 90K. On the differential and morphology review,  81% blasts were seen concerning for acute leukemia. She was advised to present to [facility] for further evaluation and management.   - Per verbal report from Heme Path fellow there was concern for Blanca rods. She was started on ATRA 6/16 PM-6/17 AM while awaiting PML-ANNELIESE testing. This was ultimately negative.   - s/p Hydrea (6/17-6/22) while awaiting final diagnostic studies  - 6/16 PB chromosomes: 46, XX. No abnormality.   - 6/16 PB FISH: no rearrangments of MLLT10, NUP98, or KMT2A.   - 6/17 BMBx: markedly hypercellular marrow (85-95% estimated cellularity), with markedly diminished trilineage hematopoiesis, no overt dysplasia in the evaluable elements, and 92% blasts  - FLT3 PCR: positive for FLT3-ITD(low) with allelic ratio 0.21 (corrected from previously reported value of 0.144 per staff message to Lamar Grover from molecular lab personnel)  - NGS panel: NPM1 positive, IDH2, FLT3-ITD  - HLA typing sent, BMT coordinators aware and working on arranging IP consultation  - discontinued Allopurinol on 6/28  - will plan for repeat BMBx ~Day 21 (7/12)    Treatment Plan: Cytarabine and Daunorubicin (7+3) plus Midostaurin (D1=6/22/22)   - Daunorubicin 90mg/m2 - D1-3   - Cytarabine 100 mg/m2 - D1-7   - Midostaurin 50mg BID Day 8-21, started 6/29 PM. Consider scheduled Zofran premed BID with midostaurin dosing should pt develop difficulty with nausea.      # Pancytopenia  2/2 underlying malignancy and now exacerbated by chemotherapy  - transfuse to maintain Hgb >7, Plt >10K   - conditional orders in place for FFP if INR >1.8, cryo if fibrinogen <100      # Mild coagulopathy, improved  INR gradually uptrended 1.1-->1.3. Now improved back to WNL (as of 6/27). No bleeding. Fibrinogen WNL.   - reduced frequency of DIC labs    GYN  # LMP  Approximately 2 weeks PTA, bleeding was heavier than normal with clots. Usually moderate lasting ~3 days.   - Monitor for recurrent bleeding     ID  # Neutropenic Fever, recurrent  (1)  Fevered to 101.6F on 6/23. Noted to have chills though no other focal symptoms. Afebrile thereafter.  - 6/16 baseline CT CAP without evidence for infectious source   - 6/23: BCx NG, UA/UCx NGTD, CXR clear  - s/p IV Cefepime (x 6/23-6/27). De-escalated back to ppx Levaquin on 6/27.  (2) Recurrent fever on 6/29 PM. Persistent temps 6/29-6/30. Having some epigastric discomfort but no other new symptoms.  - 6/29: BCx pending, UA negative, CXR negative.   - restarted on Cefepime 2g IV q8hr (x 6/29 PM)    # ID PPx  - ACV 400mg BID  - Levaquin 250mg daily  - Micafungin 50mg IV daily. Given that she will be on midostaurin, will keep on Micafungin to limit potential interactions with posaconazole/voriconazole.  - when able to transition to PO antifungal, Prior Auth approved for both posaconazole and voriconazole. However, as of 6/29, pt has not met deductible (but should meet it once hospital stay is billed). At present, monthly cost for Posaconazole $2048.66, Voriconazole $176.41.       # h/o COVID-19  Pt is not vaccinated nor interested in being vaccinated. She was admitted from 10/1/2021 - 10/13/2021 for acute hypoxic respiratory failure from COVID-19 pneumonia.  Required IMC, high flow and intermittent CPAP. She was treated with dexamethasone, remdesivir, and baricitinib in addition to azithromycin and ceftriaxone. Recovered symptomatically from this.    GI  # H/o C.diff  # Hemorrhoid  # Loose stools (6/27-6/28)  H/o C.diff (10/20/21) and course was complicated by hemorrhoid which does wax/wane and was sometimes painful. She primarily has used witch hazel pads when symptomatic. Pt reported normal bowels at time of AML diagnosis.   - 6/16 CT A/P negative for perirectal abscess.  - TUCKS PRN  - Avoid PTA probiotic at this time.  - baseline 6/19 C. Diff negative so we deferred prophylactic PO Vancomycin during chemo course  - on 6/29, pt reported new onset loose stools, not watery diarrhea. Suspect related to mucosal  "changes with chemotherapy. No associated nausea, abd pain or cramping. Improved with firmer stool on 6/29. If has 3 or more loose stools in 24hrs, will repeat C.diff test.    # Mucositis, stable   On ~6/27, pt noting inflammatory changes in b/l buccal mucosa and small lesion on right inner lower lip. Not painful or affecting PO intake at this time. No e/o thrush. Continue to monitor. Stable at this time.  - s/s rinses  - MMW PRN, viscous lidocaine PRN    # Heartburn, improved  - TUMS PRN  - PPI daily    # Distal esophageal discomfort/dysphagia  On 6/29, patient reported new onset of epigastric discomfort when swallowing solid foods, not with liquids. No radiation to back or other areas of abdomen, no RUQ tenderness. Denies reflux, nausea or emesis. Most recent stool more formed. Already on PPI as above. No e/o oral candidiasis, on micafungin ppx. Question relation to GI mucosal changes. Better on 6/30 AM, continue to monitor.  - Trial Carafate, simethicone PRN, Maalox PRN  - Continue to monitor, could consider barium swallow study if persistent    # Isolated Hyperbilirubinemia, mainly indirect  Tbili 2.0 on 6/29. Repeat hepatic panel today.     Endocrine  # Hypothyroidism  PTA was on Synthroid 125mcg daily. TSH 0.04, T4 Free 1.48 (done 6/24). Per pt, her PCP reviewed and advised her to reduce her Synthroid to 100mcg daily. Ordered to start on 6/29.     MISC  # Bilateral ear erythema, pruritis  On 6/29, pt reported bilateral ear lobe erythema and irritation in ear canal \"like there's a zit inside.\" No mastoid or auricular tenderness. Otoscopic exam (6/29) with cerumen impaction of L ear canal, normal canal and TM on R. No discharge from ear canal. Ear canal irritation improved on 6/30 but pt continues with erythema/irritation and pruritis of b/l ear lobs. Of note, symptoms started before her Plt transfusion to which she did have a transfusion reaction. On 6/30, ongoing erythema of b/l external ears. No overt lesions. " "Nontender to palpation. Findings possible related to recent cytarabine as delayed hypersensitivities can be seen. Interestingly, there are some case reports of this (\"cytarabine ears\") occurring 6-8 days from start of chemotherapy.   - trial triamcinolone cream   - if not improving, could also trial oral antihistamine   - Continue to monitor    # en[employer] for [employer]. Currently reports that the company has been flexible and she does not currently have any paperwork for medical team to complete.     # Integrated Health Services  - Patient is interested in our integrated health services/ support  - Appreciate support from [employer] [facility]  - no current IVFs, bolus PRN. Her weight is slightly further downtrended again on 6/30 and she had persistent fevers overnight. She reports that she continues to eat/drink ok at this time and urinate normally. Pt would like to see how the day goes first before getting bolus IVFs. She is also going to get volume with PRBCs today.   - lyte repletion per unit sliding scale  - regular diet as tolerated     PPx  - VTE: none due to thrombocytopenia  - GI: PPI     Dispo: Admit for workup of new acute leukemia. Presented under Dr. Won Perkins. Unclear at this time but anticipated 3-4 week LOS at minimum.      Plan of care discussed with Dr. Earl.     I spent >45 minutes in the care of this patient today, which included time necessary for review of interval events, obtaining history and physical exam, adjusting medication orders, discussion with interdisciplinary/consult team(s), and documentation time. Over 50% of time was spent face-to-face and/or coordinating care.     Lamar Grover PA-C  Heme/Onc  [phone] (pager)     Interval History   Recurrent neutropenic fever last evening, Tmax 101.6. Infectious workup completed, Cefepime started. Continues to feel generally well overall. However, does discuss her epigastric discomfort that she experienced " yesterday. States she didn't get Carafate but did take Maalox with some relief. Describes the sensation with solid foods, not with liquids. No radiation to back or other areas of abdomen. Denies reflux, nausea or emesis. Most recent stool was more formed and she only had 1 yesterday. Discomfort is not present this AM so she is going to order breakfast and see how it goes. She reports drinking PO fluids ok. Her weight is slightly further downtrended again on 6/30 and she had persistent fevers overnight. She reports that she continues to urinate normally. Pt would like to see how the day goes first before getting bolus IVFs. She is also going to get volume with PRBCs today.     Mouth is feeling ok. She denies feeling of sores. Otherwise denies HA, sinus pain/pressure, SOB, or dysuria. Did have slight blood streak with last BM which she attributes to recent hemorrhoidal irritation from several loose stools the day before yesterday. Denies any vaginal bleeding but would be due for her menstrual cycle to start in the next couple days. She will monitor and let us know if she starts to have vaginal bleeding. She showered this AM. Felt good, denies any dizziness. PICC site if feeling ok.        Physical Exam   Temp: 99.3  F (37.4  C) Temp src: Oral BP: 109/69 Pulse: 94   Resp: 18 SpO2: 98 % O2 Device: None (Room air)    Vitals:    06/28/22 0802 06/29/22 1017 06/30/22 0800   Weight: 116.7 kg (257 lb 3.2 oz) 116.4 kg (256 lb 9.6 oz) 115.3 kg (254 lb 4.8 oz)     Vital Signs with Ranges  Temp:  [98.2  F (36.8  C)-101.6  F (38.7  C)] 99.3  F (37.4  C)  Pulse:  [] 94  Resp:  [16-18] 18  BP: (101-120)/(53-78) 109/69  SpO2:  [97 %-100 %] 98 %  I/O last 3 completed shifts:  In: 1641.2 [P.O.:640; I.V.:300; IV Piggyback:351.2]  Out: 2800 [Urine:2800]    Constitutional: Pleasant female sitting up in chair. Already showered.  Awake and conversational. Non-toxic appearing. No acute distress.   HEENT: NC/AT. Sclerae anicteric. OP  pink and moist. Bilateral ear lobes mildly erythematous, no discharge in external canal. No mastoid or auricular tenderness.  Respiratory: Breathing comfortable with no increased work on room air. Good air exchange. Lungs clear to auscultation bilaterally, no crackles or wheezing noted.  Cardiovascular: Regular rate and rhythm. No peripheral edema.    GI: Normal BS. Abdomen is soft, non-distended, non-tender throughout and particularly no epigastric tenderness.   Skin: Skin is clean, dry, intact.   Musculoskeletal/ Extremities: Extremities grossly normal in appearance. Moves freely/able to maneuver self in chair.   Neurologic: Alert and oriented. Speech normal. Grossly nonfocal.   Neuropsychiatric: Calm, affect congruent to situation.   VAD: PICC line in E, c/d/i. She has surrounding ecchymoses in various stages of healing.        Medications     - MEDICATION INSTRUCTIONS -         acyclovir  400 mg Oral BID     ceFEPIme (MAXIPIME) IV  2 g Intravenous Q8H     [Held by provider] levofloxacin  250 mg Oral Daily     levothyroxine  100 mcg Oral QAM AC     micafungin  50 mg Intravenous Q24H     midostaurin  50 mg Oral Q12H     multivitamin w/minerals  1 tablet Oral Daily     pantoprazole  40 mg Oral QAM AC     triamcinolone   Topical BID     vitamin C  1,000 mg Oral Daily     vitamin D3  50 mcg Oral Daily       Data   Results for orders placed or performed during the hospital encounter of 06/16/22 (from the past 24 hour(s))   Prepare pheresed platelets (unit)   Result Value Ref Range    UNIT ABO/RH A Neg     Unit Number [id number]     Unit Status Issued     Blood Component Type Platelets     Product Code [id number]     CODING SYSTEM [id number]     UNIT TYPE ISBT 0600     ISSUE DATE AND TIME [id number]    Prepare pheresed platelets (unit)   Result Value Ref Range    UNIT ABO/RH A Neg     Unit Number [id number]     Unit Status Transfused     Blood Component Type Platelets     Product Code [id number]     ISSUE DATE AND  TIME [id number]     CODING SYSTEM [id number]     UNIT TYPE ISBT 0600    Transfusion Reaction Investigation *Canceled*    Narrative    The following orders were created for panel order Transfusion Reaction Investigation.  Procedure                               Abnormality         Status                     ---------                               -----------         ------                       Please view results for these tests on the individual orders.   Transfusion reaction hives   Result Value Ref Range    TRX Interpretation Yes    Transfusion Reaction Investigation *Canceled*    Narrative    The following orders were created for panel order Transfusion Reaction Investigation.  Procedure                               Abnormality         Status                     ---------                               -----------         ------                     Transfusion reaction walker...[[id number]]                                                   Please view results for these tests on the individual orders.   Blood Culture Arm, Right    Specimen: Arm, Right; Blood   Result Value Ref Range    Culture No growth after 12 hours    Blood Culture Purple Lumen    Specimen: Purple Lumen; Blood   Result Value Ref Range    Culture No growth after 12 hours    XR Chest Port 1 View    Narrative    EXAM: XR CHEST PORT 1 VIEW  6/29/2022 7:19 PM     HISTORY:  neutropenic fever, concern for PNA       COMPARISON:  Chest radiograph 6/23/2022    FINDINGS: Single view of the chest. Left upper extremity PICC tip  projects over the low SVC.     Trachea is midline. The cardiomediastinal silhouette is within normal  limits. No significant pleural effusion or pneumothorax. Linear  opacity at the left lung consistent with a skinfold. No acute focal  airspace opacity. The visualized upper abdomen is unremarkable.      Impression    IMPRESSION: No focal airspace opacity. If high clinical concern  consider a CT chest which is more sensitive.    I have  personally reviewed the examination and initial interpretation  and I agree with the findings.    YANELY BLEVINS MD         SYSTEM ID:  [id number]   UA reflex to Microscopic and Culture    Specimen: Urine, Clean Catch   Result Value Ref Range    Color Urine Light Yellow Colorless, Straw, Light Yellow, Yellow    Appearance Urine Clear Clear    Glucose Urine Negative Negative mg/dL    Bilirubin Urine Negative Negative    Ketones Urine Negative Negative mg/dL    Specific Gravity Urine 1.011 1.003 - 1.035    Blood Urine Negative Negative    pH Urine 7.0 5.0 - 7.0    Protein Albumin Urine Negative Negative mg/dL    Urobilinogen Urine Normal Normal, 2.0 mg/dL    Nitrite Urine Negative Negative    Leukocyte Esterase Urine Negative Negative    Narrative    Microscopic not indicated   CBC with platelets differential    Narrative    The following orders were created for panel order CBC with platelets differential.  Procedure                               Abnormality         Status                     ---------                               -----------         ------                     CBC with platelets and d...[[id number]]  Abnormal            Final result               RBC and Platelet Morphology[[id number]]  Abnormal            Final result                 Please view results for these tests on the individual orders.   Basic metabolic panel   Result Value Ref Range    Creatinine 0.44 (L) 0.51 - 0.95 mg/dL    Sodium 135 (L) 136 - 145 mmol/L    Potassium 3.9 3.4 - 5.3 mmol/L    Urea Nitrogen 9.0 6.0 - 20.0 mg/dL    Chloride 104 98 - 107 mmol/L    Carbon Dioxide (CO2) 22 22 - 29 mmol/L    Anion Gap 9 7 - 15 mmol/L    Glucose 123 (H) 70 - 99 mg/dL    GFR Estimate >90 >60 mL/min/1.73m2    Calcium 9.0 8.6 - 10.0 mg/dL   Fibrinogen activity   Result Value Ref Range    Fibrinogen Activity 523 (H) 170 - 490 mg/dL   INR   Result Value Ref Range    INR 1.14 0.85 - 1.15   Uric acid   Result Value Ref Range    Uric Acid 2.0 (L) 2.4 -  5.7 mg/dL   Lactic Acid STAT   Result Value Ref Range    Lactic Acid 0.8 0.7 - 2.0 mmol/L   CBC with platelets and differential   Result Value Ref Range    WBC Count 0.4 (LL) 4.0 - 11.0 10e3/uL    RBC Count 2.06 (L) 3.80 - 5.20 10e6/uL    Hemoglobin 6.9 (LL) 11.7 - 15.7 g/dL    Hematocrit 19.9 (L) 35.0 - 47.0 %    MCV 97 78 - 100 fL    MCH 33.5 (H) 26.5 - 33.0 pg    MCHC 34.7 31.5 - 36.5 g/dL    RDW 13.8 10.0 - 15.0 %    Platelet Count 35 (LL) 150 - 450 10e3/uL   RBC and Platelet Morphology   Result Value Ref Range    Platelet Assessment  Automated Count Confirmed. Platelet morphology is normal.     Automated Count Confirmed. Platelet morphology is normal.    Teardrop Cells Slight (A) None Seen    RBC Morphology Confirmed RBC Indices    ABO/Rh type and screen    Narrative    The following orders were created for panel order ABO/Rh type and screen.  Procedure                               Abnormality         Status                     ---------                               -----------         ------                     Adult Type and Screen[[id number]]                            Final result                 Please view results for these tests on the individual orders.   Adult Type and Screen   Result Value Ref Range    ABO/RH(D) A NEG     Antibody Screen Negative Negative    SPECIMEN EXPIRATION DATE [id number]    CONDITIONAL Prepare red blood cells (unit)   Result Value Ref Range    CROSSMATCH Compatible     UNIT ABO/RH A Neg     Unit Number [id number]     Unit Status Issued     Blood Component Type Red Blood Cells     Product Code [id number]     CODING SYSTEM [id number]     UNIT TYPE ISBT 0600     ISSUE DATE AND TIME [id number]

## 2022-06-30 NOTE — PLAN OF CARE
Goal Outcome Evaluation:    Plan of Care Reviewed With: patient     Overall Patient Progress: declining d/t onset of epigastric pain

## 2022-06-30 NOTE — PLAN OF CARE
Goal Outcome Evaluation:        [id number]      Febrile Tmax 101.6. Tachy 100s. OVSS on RA. Denies nausea and SOB. Maalox given for epigastric pain with some effect. Slight chills with fevers.Tylenol x2 effective. Received first dose of Rydapt. Triggered sepsis. Lactic 0.8. UA sent, WDL. Rash/hives resolved on neck and left shoulder. C/o ears itching, right greater than left.

## 2022-06-30 NOTE — PROVIDER NOTIFICATION
"Provider Notification     Lamar Grover (#[id number]) paged at [phone]:   \"FYI pt spiked a temp of 101.2. Given tylenol. Pt is already receiving cefepime and not due for BCs at this time. Thanks!\"   "

## 2022-06-30 NOTE — PROGRESS NOTES
CLINICAL NUTRITION SERVICES - REASSESSMENT NOTE     Nutrition Prescription    RECOMMENDATIONS FOR MDs/PROVIDERS TO ORDER:  - None at this time    Malnutrition Status:    - Patient does not meet two of the established criteria necessary for diagnosing malnutrition but is at risk for malnutrition      Recommendations already ordered by Registered Dietitian (RD):  - Provided cafe passes to help increase meal option d/t menu fatigue with hospitalization    Future/Additional Recommendations:  - Monitor wt trends and possible need for MIVF's to ensure maintenance  - Monitor po tolerance d/t epigastric pain and need for diet modification (? Clear liquid diet)   - Monitor po intakes and need for calorie count monitoring to assess adequacy       EVALUATION OF THE PROGRESS TOWARD GOALS   Diet: Regular    Intake: Reports she has been eating well until the past 2 days d/t onset of cramping and epigastric pain with eating (primarily with solid foods). Denies any swallowing discomfort. Received maalox with some relief.  - Per review of Room Service ordering, pt only ordered on meal yesterday. Commented that she has snacks that her family has brought in for her.    NEW FINDINGS   Weight: 115.3 kg (today) - lowest wt this admission, 117.9 kg (6.22); Trending downward over past week with loss of 2.6 kg (2.2% loss).  Question d/t vol depletion d/t high     Labs:  Labs reviewed    Meds:  Meds reviewed    MALNUTRITION (limited d/t pt in street clothes)  % Intake: Decreased intake does not meet criteria  % Weight Loss: > 2% in 1 week (severe)  Subcutaneous Fat Loss: None observed  Muscle Loss: None observed  Fluid Accumulation/Edema: None noted per chart review  Malnutrition Diagnosis: Patient does not meet two of the established criteria necessary for diagnosing malnutrition but is at risk for malnutrition    Previous Goals   Patient to consume % of nutritionally adequate meal trays TID, or the equivalent with  supplements/snacks.    Evaluation: Not met for past 1-2 days    Previous Nutrition Diagnosis  Predicted inadequate oral intake related to chemotherapy initiation evening of 6/22 and it's anticipated side effects of nausea, vomiting and poor appetite.   PO intake adequate at this time.     Evaluation: No longer applicable, nutrition diagnosis changed below    CURRENT NUTRITION DIAGNOSIS  Inadequate oral intake related to new onset of epigastric pain and cramping ith solids as evidenced by pt consuming less meals over past 24 hrs.    INTERVENTIONS  Implementation  Nutrition education for recommended modifications    Goals  Patient to consume % of nutritionally adequate meal trays TID, or the equivalent with supplements/snacks.    Monitoring/Evaluation  Progress toward goals will be monitored and evaluated per protocol.      Sheeba Pandey RD,LD  7D pager [phone]

## 2022-07-01 LAB
ALBUMIN SERPL BCG-MCNC: 3.7 G/DL (ref 3.5–5.2)
ALP SERPL-CCNC: 47 U/L (ref 35–104)
ALT SERPL W P-5'-P-CCNC: 13 U/L (ref 10–35)
ANION GAP SERPL CALCULATED.3IONS-SCNC: 10 MMOL/L (ref 7–15)
AST SERPL W P-5'-P-CCNC: 6 U/L (ref 10–35)
BASOPHILS # BLD MANUAL: 0 10E3/UL (ref 0–0.2)
BASOPHILS NFR BLD MANUAL: 0 %
BILIRUB DIRECT SERPL-MCNC: 0.35 MG/DL (ref 0–0.3)
BILIRUB SERPL-MCNC: 1.1 MG/DL
BKR LAB AP TR RXN INTERPRETATION: NORMAL
BKR LAB AP TRAN RXN RECOMMENDATION: NORMAL
BKR LAB AP TRANS SIGNS AND SX: NORMAL
BKR LAB AP TRANSFUSION REACTION: NORMAL
BUN SERPL-MCNC: 8.6 MG/DL (ref 6–20)
C DIFF TOX B STL QL: POSITIVE
CALCIUM SERPL-MCNC: 9 MG/DL (ref 8.6–10)
CHLORIDE SERPL-SCNC: 105 MMOL/L (ref 98–107)
CREAT SERPL-MCNC: 0.41 MG/DL (ref 0.51–0.95)
DEPRECATED HCO3 PLAS-SCNC: 21 MMOL/L (ref 22–29)
EOSINOPHIL # BLD MANUAL: 0 10E3/UL (ref 0–0.7)
EOSINOPHIL NFR BLD MANUAL: 0 %
ERYTHROCYTE [DISTWIDTH] IN BLOOD BY AUTOMATED COUNT: 14.9 % (ref 10–15)
GFR SERPL CREATININE-BSD FRML MDRD: >90 ML/MIN/1.73M2
GLUCOSE SERPL-MCNC: 114 MG/DL (ref 70–99)
HCT VFR BLD AUTO: 20.3 % (ref 35–47)
HGB BLD-MCNC: 7.2 G/DL (ref 11.7–15.7)
LYMPHOCYTES # BLD MANUAL: 0.5 10E3/UL (ref 0.8–5.3)
LYMPHOCYTES NFR BLD MANUAL: 100 %
MAGNESIUM SERPL-MCNC: 2.1 MG/DL (ref 1.7–2.3)
MCH RBC QN AUTO: 33.5 PG (ref 26.5–33)
MCHC RBC AUTO-ENTMCNC: 35.5 G/DL (ref 31.5–36.5)
MCV RBC AUTO: 94 FL (ref 78–100)
MONOCYTES # BLD MANUAL: 0 10E3/UL (ref 0–1.3)
MONOCYTES NFR BLD MANUAL: 0 %
NEUTROPHILS # BLD MANUAL: 0 10E3/UL (ref 1.6–8.3)
NEUTROPHILS NFR BLD MANUAL: 0 %
PATH REPORT.COMMENTS IMP SPEC: NORMAL
PATH REPORT.COMMENTS IMP SPEC: NORMAL
PHOSPHATE SERPL-MCNC: 2.5 MG/DL (ref 2.5–4.5)
PLAT MORPH BLD: ABNORMAL
PLATELET # BLD AUTO: 22 10E3/UL (ref 150–450)
POTASSIUM SERPL-SCNC: 3.8 MMOL/L (ref 3.4–5.3)
PROT SERPL-MCNC: 6.4 G/DL (ref 6.4–8.3)
RBC # BLD AUTO: 2.15 10E6/UL (ref 3.8–5.2)
RBC MORPH BLD: ABNORMAL
SODIUM SERPL-SCNC: 136 MMOL/L (ref 136–145)
WBC # BLD AUTO: 0.5 10E3/UL (ref 4–11)

## 2022-07-01 PROCEDURE — 250N000011 HC RX IP 250 OP 636: Performed by: PHYSICIAN ASSISTANT

## 2022-07-01 PROCEDURE — 99233 SBSQ HOSP IP/OBS HIGH 50: CPT | Performed by: INTERNAL MEDICINE

## 2022-07-01 PROCEDURE — 999N000007 HC SITE CHECK

## 2022-07-01 PROCEDURE — 250N000013 HC RX MED GY IP 250 OP 250 PS 637: Performed by: STUDENT IN AN ORGANIZED HEALTH CARE EDUCATION/TRAINING PROGRAM

## 2022-07-01 PROCEDURE — 84100 ASSAY OF PHOSPHORUS: CPT | Performed by: PHYSICIAN ASSISTANT

## 2022-07-01 PROCEDURE — 99207 PR SC NO CHARGE VISIT: CPT | Performed by: STUDENT IN AN ORGANIZED HEALTH CARE EDUCATION/TRAINING PROGRAM

## 2022-07-01 PROCEDURE — 83735 ASSAY OF MAGNESIUM: CPT | Performed by: PHYSICIAN ASSISTANT

## 2022-07-01 PROCEDURE — 258N000003 HC RX IP 258 OP 636: Performed by: PHYSICIAN ASSISTANT

## 2022-07-01 PROCEDURE — 82248 BILIRUBIN DIRECT: CPT | Performed by: PHYSICIAN ASSISTANT

## 2022-07-01 PROCEDURE — 80053 COMPREHEN METABOLIC PANEL: CPT | Performed by: PHYSICIAN ASSISTANT

## 2022-07-01 PROCEDURE — 120N000002 HC R&B MED SURG/OB UMMC

## 2022-07-01 PROCEDURE — 85007 BL SMEAR W/DIFF WBC COUNT: CPT | Performed by: PHYSICIAN ASSISTANT

## 2022-07-01 PROCEDURE — 36592 COLLECT BLOOD FROM PICC: CPT | Performed by: PHYSICIAN ASSISTANT

## 2022-07-01 PROCEDURE — 85027 COMPLETE CBC AUTOMATED: CPT | Performed by: PHYSICIAN ASSISTANT

## 2022-07-01 PROCEDURE — 87493 C DIFF AMPLIFIED PROBE: CPT | Performed by: PHYSICIAN ASSISTANT

## 2022-07-01 PROCEDURE — 250N000011 HC RX IP 250 OP 636: Performed by: STUDENT IN AN ORGANIZED HEALTH CARE EDUCATION/TRAINING PROGRAM

## 2022-07-01 PROCEDURE — 250N000012 HC RX MED GY IP 250 OP 636 PS 637: Performed by: STUDENT IN AN ORGANIZED HEALTH CARE EDUCATION/TRAINING PROGRAM

## 2022-07-01 PROCEDURE — 250N000013 HC RX MED GY IP 250 OP 250 PS 637: Performed by: PHYSICIAN ASSISTANT

## 2022-07-01 RX ORDER — MEDROXYPROGESTERONE ACETATE 10 MG
10 TABLET ORAL DAILY
Status: DISCONTINUED | OUTPATIENT
Start: 2022-07-01 | End: 2022-07-06

## 2022-07-01 RX ORDER — VANCOMYCIN HYDROCHLORIDE 125 MG/1
125 CAPSULE ORAL 4 TIMES DAILY
Status: DISCONTINUED | OUTPATIENT
Start: 2022-07-01 | End: 2022-07-08

## 2022-07-01 RX ORDER — SUCRALFATE ORAL 1 G/10ML
1 SUSPENSION ORAL
Status: DISCONTINUED | OUTPATIENT
Start: 2022-07-01 | End: 2022-07-04

## 2022-07-01 RX ADMIN — Medication 5 DROP: at 19:44

## 2022-07-01 RX ADMIN — VANCOMYCIN HYDROCHLORIDE 125 MG: 125 CAPSULE ORAL at 19:39

## 2022-07-01 RX ADMIN — ACYCLOVIR 400 MG: 400 TABLET ORAL at 07:57

## 2022-07-01 RX ADMIN — MICAFUNGIN 50 MG: 10 INJECTION, POWDER, LYOPHILIZED, FOR SOLUTION INTRAVENOUS at 15:51

## 2022-07-01 RX ADMIN — SUCRALFATE 1 G: 1 SUSPENSION ORAL at 22:15

## 2022-07-01 RX ADMIN — Medication 1 TABLET: at 11:41

## 2022-07-01 RX ADMIN — MEDROXYPROGESTERONE ACETATE 10 MG: 10 TABLET ORAL at 12:13

## 2022-07-01 RX ADMIN — SODIUM CHLORIDE, PRESERVATIVE FREE 5 ML: 5 INJECTION INTRAVENOUS at 05:31

## 2022-07-01 RX ADMIN — SUCRALFATE 1 G: 1 SUSPENSION ORAL at 15:52

## 2022-07-01 RX ADMIN — SODIUM CHLORIDE, PRESERVATIVE FREE 5 ML: 5 INJECTION INTRAVENOUS at 11:47

## 2022-07-01 RX ADMIN — CEFEPIME HYDROCHLORIDE 2 G: 2 INJECTION, POWDER, FOR SOLUTION INTRAVENOUS at 18:48

## 2022-07-01 RX ADMIN — Medication 50 MCG: at 07:57

## 2022-07-01 RX ADMIN — CEFEPIME HYDROCHLORIDE 2 G: 2 INJECTION, POWDER, FOR SOLUTION INTRAVENOUS at 11:41

## 2022-07-01 RX ADMIN — CEFEPIME HYDROCHLORIDE 2 G: 2 INJECTION, POWDER, FOR SOLUTION INTRAVENOUS at 02:16

## 2022-07-01 RX ADMIN — LEVOTHYROXINE SODIUM 100 MCG: 100 TABLET ORAL at 05:31

## 2022-07-01 RX ADMIN — RYDAPT 50 MG: 25 CAPSULE, LIQUID FILLED ORAL at 07:57

## 2022-07-01 RX ADMIN — SODIUM CHLORIDE, PRESERVATIVE FREE 5 ML: 5 INJECTION INTRAVENOUS at 22:15

## 2022-07-01 RX ADMIN — PANTOPRAZOLE SODIUM 40 MG: 40 TABLET, DELAYED RELEASE ORAL at 05:31

## 2022-07-01 RX ADMIN — Medication 5 DROP: at 10:16

## 2022-07-01 RX ADMIN — SODIUM CHLORIDE, PRESERVATIVE FREE 5 ML: 5 INJECTION INTRAVENOUS at 07:02

## 2022-07-01 RX ADMIN — RYDAPT 50 MG: 25 CAPSULE, LIQUID FILLED ORAL at 19:39

## 2022-07-01 RX ADMIN — OXYCODONE HYDROCHLORIDE AND ACETAMINOPHEN 1000 MG: 500 TABLET ORAL at 07:57

## 2022-07-01 RX ADMIN — ACYCLOVIR 400 MG: 400 TABLET ORAL at 19:39

## 2022-07-01 RX ADMIN — TRIAMCINOLONE ACETONIDE: 1 CREAM TOPICAL at 07:57

## 2022-07-01 RX ADMIN — VANCOMYCIN HYDROCHLORIDE 125 MG: 125 CAPSULE ORAL at 15:52

## 2022-07-01 RX ADMIN — TRIAMCINOLONE ACETONIDE: 1 CREAM TOPICAL at 19:44

## 2022-07-01 ASSESSMENT — ACTIVITIES OF DAILY LIVING (ADL)
ADLS_ACUITY_SCORE: 18

## 2022-07-01 NOTE — PLAN OF CARE
[id number]  No acute event, Afebrile, VSS.      NEURO: Alert and oriented x4.      RESPIRATORY: Room Air, denies dizziness, and SOB. Lungs sound, clear, equal bilateral.     CARDIO: VSS, denies dizziness, and extremity pain.      GI/:  Denies N/V, BS active, BM x1, AUOP     SKIN: Intact.      ACTIVITY: Independent.      PAIN: Denies pain.    DLA: PICC, double lumen, both heparin locked.     BG: No      PLAN: Continue monitoring.         Goal Outcome Evaluation:    Plan of Care Reviewed With: patient

## 2022-07-01 NOTE — PLAN OF CARE
[id number]    VSS on RA. Afebrile. Stomach discomfort managed with prn carafate x1. Denies pain, nausea, and SOB. Pt reports 3 loose stools overnight, stool sample collected. C. Diff positive, enteric precautions initiated. Started on PO vanco. 1 loose stool this shift. Started menses, platelet parameters adjusted. Pt reports increased blood loss, provera ordered and given. Continue with plan of care.

## 2022-07-01 NOTE — PLAN OF CARE
A&Ox4. Up ad qasim. Tmax 100. BC due if fever spikes. Tachycardic in 110's with activity. C/o abdominal pain with eating solids. Denies nausea. BM today. Recording o/p on whiteboard - adequate o/p. PICC line heparin locked. Started period - pads and tampons provided. Remains in neutropenic isolation. Continue to follow plan of care.     Goal Outcome Evaluation:    Plan of Care Reviewed With: patient     Overall Patient Progress: no change

## 2022-07-01 NOTE — PROVIDER NOTIFICATION
"Provider Notification     Lamar Grover (#[id number]) paged at [phone]:  \"FYI pt's c diff sample came back positive. Thanks.\"    Response: PO randall ordered 4x/day.       Continue with plan of care.  "

## 2022-07-01 NOTE — PROVIDER NOTIFICATION
"Provider Notification     Lamar Grover (#[id number]) paged at [phone]:   \"Hi, pt reports three loose stools overnight, do we want to test for c diff? Thanks.\"    Response: C diff test ordered.       Will continue to monitor.  "

## 2022-07-02 LAB
ANION GAP SERPL CALCULATED.3IONS-SCNC: 7 MMOL/L (ref 7–15)
BASOPHILS # BLD MANUAL: 0 10E3/UL (ref 0–0.2)
BASOPHILS NFR BLD MANUAL: 0 %
BLD PROD TYP BPU: NORMAL
BLD PROD TYP BPU: NORMAL
BLOOD COMPONENT TYPE: NORMAL
BLOOD COMPONENT TYPE: NORMAL
BUN SERPL-MCNC: 9.2 MG/DL (ref 6–20)
CALCIUM SERPL-MCNC: 8.8 MG/DL (ref 8.6–10)
CHLORIDE SERPL-SCNC: 104 MMOL/L (ref 98–107)
CODING SYSTEM: NORMAL
CODING SYSTEM: NORMAL
CREAT SERPL-MCNC: 0.45 MG/DL (ref 0.51–0.95)
CROSSMATCH: NORMAL
DACRYOCYTES BLD QL SMEAR: SLIGHT
DEPRECATED HCO3 PLAS-SCNC: 21 MMOL/L (ref 22–29)
EOSINOPHIL # BLD MANUAL: 0 10E3/UL (ref 0–0.7)
EOSINOPHIL NFR BLD MANUAL: 0 %
ERYTHROCYTE [DISTWIDTH] IN BLOOD BY AUTOMATED COUNT: 14.6 % (ref 10–15)
GFR SERPL CREATININE-BSD FRML MDRD: >90 ML/MIN/1.73M2
GLUCOSE SERPL-MCNC: 113 MG/DL (ref 70–99)
HCT VFR BLD AUTO: 19 % (ref 35–47)
HGB BLD-MCNC: 6.4 G/DL (ref 11.7–15.7)
ISSUE DATE AND TIME: NORMAL
ISSUE DATE AND TIME: NORMAL
LYMPHOCYTES # BLD MANUAL: 0.6 10E3/UL (ref 0.8–5.3)
LYMPHOCYTES NFR BLD MANUAL: 98 %
MAGNESIUM SERPL-MCNC: 2 MG/DL (ref 1.7–2.3)
MCH RBC QN AUTO: 32.3 PG (ref 26.5–33)
MCHC RBC AUTO-ENTMCNC: 33.7 G/DL (ref 31.5–36.5)
MCV RBC AUTO: 96 FL (ref 78–100)
MONOCYTES # BLD MANUAL: 0 10E3/UL (ref 0–1.3)
MONOCYTES NFR BLD MANUAL: 2 %
NEUTROPHILS # BLD MANUAL: 0 10E3/UL (ref 1.6–8.3)
NEUTROPHILS NFR BLD MANUAL: 0 %
PHOSPHATE SERPL-MCNC: 3.3 MG/DL (ref 2.5–4.5)
PLAT MORPH BLD: ABNORMAL
PLATELET # BLD AUTO: 16 10E3/UL (ref 150–450)
POTASSIUM SERPL-SCNC: 3.6 MMOL/L (ref 3.4–5.3)
RBC # BLD AUTO: 1.98 10E6/UL (ref 3.8–5.2)
RBC MORPH BLD: ABNORMAL
SODIUM SERPL-SCNC: 132 MMOL/L (ref 136–145)
UNIT ABO/RH: NORMAL
UNIT ABO/RH: NORMAL
UNIT NUMBER: NORMAL
UNIT NUMBER: NORMAL
UNIT STATUS: NORMAL
UNIT STATUS: NORMAL
UNIT TYPE ISBT: 600
UNIT TYPE ISBT: 600
WBC # BLD AUTO: 0.6 10E3/UL (ref 4–11)

## 2022-07-02 PROCEDURE — 250N000013 HC RX MED GY IP 250 OP 250 PS 637: Performed by: STUDENT IN AN ORGANIZED HEALTH CARE EDUCATION/TRAINING PROGRAM

## 2022-07-02 PROCEDURE — 99233 SBSQ HOSP IP/OBS HIGH 50: CPT | Mod: GC | Performed by: INTERNAL MEDICINE

## 2022-07-02 PROCEDURE — P9037 PLATE PHERES LEUKOREDU IRRAD: HCPCS | Performed by: PHYSICIAN ASSISTANT

## 2022-07-02 PROCEDURE — 36592 COLLECT BLOOD FROM PICC: CPT | Performed by: PHYSICIAN ASSISTANT

## 2022-07-02 PROCEDURE — 250N000011 HC RX IP 250 OP 636: Performed by: STUDENT IN AN ORGANIZED HEALTH CARE EDUCATION/TRAINING PROGRAM

## 2022-07-02 PROCEDURE — 83735 ASSAY OF MAGNESIUM: CPT | Performed by: INTERNAL MEDICINE

## 2022-07-02 PROCEDURE — 250N000011 HC RX IP 250 OP 636: Performed by: PHYSICIAN ASSISTANT

## 2022-07-02 PROCEDURE — P9016 RBC LEUKOCYTES REDUCED: HCPCS | Performed by: PHYSICIAN ASSISTANT

## 2022-07-02 PROCEDURE — 85007 BL SMEAR W/DIFF WBC COUNT: CPT | Performed by: PHYSICIAN ASSISTANT

## 2022-07-02 PROCEDURE — 84100 ASSAY OF PHOSPHORUS: CPT | Performed by: INTERNAL MEDICINE

## 2022-07-02 PROCEDURE — 250N000013 HC RX MED GY IP 250 OP 250 PS 637: Performed by: PHYSICIAN ASSISTANT

## 2022-07-02 PROCEDURE — 85027 COMPLETE CBC AUTOMATED: CPT | Performed by: PHYSICIAN ASSISTANT

## 2022-07-02 PROCEDURE — 258N000003 HC RX IP 258 OP 636: Performed by: PHYSICIAN ASSISTANT

## 2022-07-02 PROCEDURE — 120N000002 HC R&B MED SURG/OB UMMC

## 2022-07-02 PROCEDURE — 80048 BASIC METABOLIC PNL TOTAL CA: CPT | Performed by: PHYSICIAN ASSISTANT

## 2022-07-02 PROCEDURE — 250N000012 HC RX MED GY IP 250 OP 636 PS 637: Performed by: STUDENT IN AN ORGANIZED HEALTH CARE EDUCATION/TRAINING PROGRAM

## 2022-07-02 RX ADMIN — SODIUM CHLORIDE, PRESERVATIVE FREE 5 ML: 5 INJECTION INTRAVENOUS at 03:42

## 2022-07-02 RX ADMIN — ACYCLOVIR 400 MG: 400 TABLET ORAL at 08:35

## 2022-07-02 RX ADMIN — MEDROXYPROGESTERONE ACETATE 10 MG: 10 TABLET ORAL at 08:45

## 2022-07-02 RX ADMIN — SUCRALFATE 1 G: 1 SUSPENSION ORAL at 08:37

## 2022-07-02 RX ADMIN — SUCRALFATE 1 G: 1 SUSPENSION ORAL at 16:03

## 2022-07-02 RX ADMIN — Medication 5 DROP: at 19:05

## 2022-07-02 RX ADMIN — MICAFUNGIN 50 MG: 10 INJECTION, POWDER, LYOPHILIZED, FOR SOLUTION INTRAVENOUS at 16:03

## 2022-07-02 RX ADMIN — TRIAMCINOLONE ACETONIDE: 1 CREAM TOPICAL at 19:05

## 2022-07-02 RX ADMIN — VANCOMYCIN HYDROCHLORIDE 125 MG: 125 CAPSULE ORAL at 19:03

## 2022-07-02 RX ADMIN — CEFEPIME HYDROCHLORIDE 2 G: 2 INJECTION, POWDER, FOR SOLUTION INTRAVENOUS at 18:21

## 2022-07-02 RX ADMIN — RYDAPT 50 MG: 25 CAPSULE, LIQUID FILLED ORAL at 19:03

## 2022-07-02 RX ADMIN — Medication 5 DROP: at 08:38

## 2022-07-02 RX ADMIN — VANCOMYCIN HYDROCHLORIDE 125 MG: 125 CAPSULE ORAL at 08:35

## 2022-07-02 RX ADMIN — ACYCLOVIR 400 MG: 400 TABLET ORAL at 19:03

## 2022-07-02 RX ADMIN — LEVOTHYROXINE SODIUM 100 MCG: 100 TABLET ORAL at 06:05

## 2022-07-02 RX ADMIN — Medication 1 TABLET: at 12:09

## 2022-07-02 RX ADMIN — CEFEPIME HYDROCHLORIDE 2 G: 2 INJECTION, POWDER, FOR SOLUTION INTRAVENOUS at 10:18

## 2022-07-02 RX ADMIN — PANTOPRAZOLE SODIUM 40 MG: 40 TABLET, DELAYED RELEASE ORAL at 06:05

## 2022-07-02 RX ADMIN — SUCRALFATE 1 G: 1 SUSPENSION ORAL at 12:09

## 2022-07-02 RX ADMIN — CEFEPIME HYDROCHLORIDE 2 G: 2 INJECTION, POWDER, FOR SOLUTION INTRAVENOUS at 02:54

## 2022-07-02 RX ADMIN — TRIAMCINOLONE ACETONIDE: 1 CREAM TOPICAL at 08:39

## 2022-07-02 RX ADMIN — VANCOMYCIN HYDROCHLORIDE 125 MG: 125 CAPSULE ORAL at 16:03

## 2022-07-02 RX ADMIN — VANCOMYCIN HYDROCHLORIDE 125 MG: 125 CAPSULE ORAL at 12:09

## 2022-07-02 RX ADMIN — OXYCODONE HYDROCHLORIDE AND ACETAMINOPHEN 1000 MG: 500 TABLET ORAL at 08:35

## 2022-07-02 RX ADMIN — Medication 50 MCG: at 08:35

## 2022-07-02 RX ADMIN — RYDAPT 50 MG: 25 CAPSULE, LIQUID FILLED ORAL at 08:44

## 2022-07-02 ASSESSMENT — ACTIVITIES OF DAILY LIVING (ADL)
ADLS_ACUITY_SCORE: 18

## 2022-07-02 NOTE — PLAN OF CARE
Alert and oriented X 4. AVSS. Denies SOB, pain or nausea. Ambulating to and from bathroom independently. She reports two loose stool. Voiding spontaneously. Sleeping in between cares.

## 2022-07-02 NOTE — PLAN OF CARE
[id number]: On Enteric precautions, pt had 4-5 watery BMs this shift. VSS on RA. Denies pain and nausea. On scheduled Caravate for stomach discomfort. No acute events. Continue with POC.

## 2022-07-02 NOTE — PROGRESS NOTES
[facility]  Hematology / Oncology Progress Note    Date of Admission: 6/16/2022  Date of Service (when I saw the patient): 07/02/2022     Assessment & Plan   Veda Marinelli is a 37 year old female with PMH significant for h/o COVID19 infection (10/2021), C.diff, and hypothyroidism who presented with leukocytosis and circulating blasts. 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 She initiated induction chemotherapy with 7+3+midostaurin (C1D1=6/22/22).      Today:  - D10 of 7+3+midostaurin  - Midostaurin started 6/29 PM. Continue D8-21. Consider scheduled Zofran premed BID with midostaurin dosing should pt develop difficulty with nausea.   - 1 unit(s) PRBC and 1 Unit PLT on 7/2  - recurrent neutropenic fever on 6/29 evening, Tmax 101.6  - BCx negative  - UA negative, CXR negative  - C Diff positive on 7/1- is on oral vancomycin 4 times a day  - Started Cefepime (x 6/29 evening)  - intermittent epigastric discomfort exacerbated by eating. Some relief with Maalox. Trial carafate PRN.   - low suspicion but check amylase/lipase - NL  - ongoing bilateral external ear erythema, itching. Possibly related to recent cytarabine. No overt lesions or extended rashes. Trial triamcinolone cream. Could also consider oral antihistamine. Continue to monitor.  - repeat hepatic panel to monitor isolated elevated Tbili - Hilario NL  - Encourage PO intake and monitor weights. Bolus PRN.          HEME  # AML (FLT3 ITD(low), NPM1, IDH2)  Was in her usual state of health until 03/2022 when she underwent a routine physical with labs. On 3/8/2022, white blood cell count 2.5 (ANC 1.0), Hgb 13.6 with , platelets normal.  On 5/15/2022, she was noted to have further decreasd white blood cell count of 1.6 (ANC 0.28) with hemoglobin 11. platelets were 133. She was ultimately referred to Hematology (Dr. Bob), who she saw on 6/7/22. Further blood workup was recommended and labs done 6/14. CBC revealed  WBC 25.3 (ANC 0.8), Hgb 10.1 (), Plt 90K. On the differential and morphology review, 81% blasts were seen concerning for acute leukemia. She was advised to present to [facility] for further evaluation and management.   - Per verbal report from Heme Path fellow there was concern for Blanca rods. She was started on ATRA 6/16 PM-6/17 AM while awaiting PML-ANNELIESE testing. This was ultimately negative.   - s/p Hydrea (6/17-6/22) while awaiting final diagnostic studies  - 6/16 PB chromosomes: 46, XX. No abnormality.   - 6/16 PB FISH: no rearrangments of MLLT10, NUP98, or KMT2A.   - 6/17 BMBx: markedly hypercellular marrow (85-95% estimated cellularity), with markedly diminished trilineage hematopoiesis, no overt dysplasia in the evaluable elements, and 92% blasts  - FLT3 PCR: positive for FLT3-ITD(low) with allelic ratio 0.21 (corrected from previously reported value of 0.144 per staff message to Lamar Grover from molecular lab personnel)  - NGS panel: NPM1 positive, IDH2, FLT3-ITD  - HLA typing sent, BMT coordinators aware and working on arranging IP consultation  - discontinued Allopurinol on 6/28  - will plan for repeat BMBx ~Day 21 (7/12)    Treatment Plan: Cytarabine and Daunorubicin (7+3) plus Midostaurin (D1=6/22/22)   - Daunorubicin 90mg/m2 - D1-3   - Cytarabine 100 mg/m2 - D1-7   - Midostaurin 50mg BID Day 8-21, started 6/29 PM. Consider scheduled Zofran premed BID with midostaurin dosing should pt develop difficulty with nausea.      # Pancytopenia  2/2 underlying malignancy and now exacerbated by chemotherapy  - transfuse to maintain Hgb >7, Plt >10K   - conditional orders in place for FFP if INR >1.8, cryo if fibrinogen <100      # Mild coagulopathy, improved  INR gradually uptrended 1.1-->1.3. Now improved back to WNL (as of 6/27). No bleeding. Fibrinogen WNL.   - reduced frequency of DIC labs    GYN  # LMP  Approximately 2 weeks PTA, bleeding was heavier than normal with clots. Usually moderate  lasting ~3 days.   - Monitor for recurrent bleeding     ID  # Neutropenic Fever, recurrent  (1) Fevered to 101.6F on 6/23. Noted to have chills though no other focal symptoms. Afebrile thereafter.  - 6/16 baseline CT CAP without evidence for infectious source   - 6/23: BCx NG, UA/UCx NGTD, CXR clear  - s/p IV Cefepime (x 6/23-6/27). De-escalated back to ppx Levaquin on 6/27.  (2) Recurrent fever on 6/29 PM. Persistent temps 6/29-6/30. Having some epigastric discomfort but no other new symptoms.  - 6/29: BCx negative, UA negative, CXR negative.   - restarted on Cefepime 2g IV q8hr (x 6/29 PM)   - C Diff positive on 7/1- is on 125 mg oral vancomycin 4 times a day      # ID PPx  - ACV 400mg BID  - Levaquin 250mg daily  - Micafungin 50mg IV daily. Given that she will be on midostaurin, will keep on Micafungin to limit potential interactions with posaconazole/voriconazole.  - when able to transition to PO antifungal, Prior Auth approved for both posaconazole and voriconazole. However, as of 6/29, pt has not met deductible (but should meet it once hospital stay is billed). At present, monthly cost for Posaconazole $2048.66, Voriconazole $176.41.       # h/o COVID-19  Pt is not vaccinated nor interested in being vaccinated. She was admitted from 10/1/2021 - 10/13/2021 for acute hypoxic respiratory failure from COVID-19 pneumonia.  Required IMC, high flow and intermittent CPAP. She was treated with dexamethasone, remdesivir, and baricitinib in addition to azithromycin and ceftriaxone. Recovered symptomatically from this.    GI  # H/o C.diff  # Hemorrhoid  # Loose stools (6/27-6/28)  H/o C.diff (10/20/21) and course was complicated by hemorrhoid which does wax/wane and was sometimes painful. She primarily has used witch hazel pads when symptomatic. Pt reported normal bowels at time of AML diagnosis.   - 6/16 CT A/P negative for perirectal abscess.  - TUCKS PRN  - Avoid PTA probiotic at this time.  - baseline 6/19 C. Diff  "negative so we deferred prophylactic PO Vancomycin during chemo course- on 6/29, pt reported new onset loose stools, not watery diarrhea. Suspect related to mucosal changes with chemotherapy. No associated nausea, abd pain or cramping.- C Diff positive on 7/1- is on oral vancomycin 4 times a day      # Mucositis, stable   On ~6/27, pt noting inflammatory changes in b/l buccal mucosa and small lesion on right inner lower lip. Not painful or affecting PO intake at this time. No e/o thrush. Continue to monitor. Stable at this time.  - s/s rinses  - MMW PRN, viscous lidocaine PRN    # Heartburn, improved  - TUMS PRN  - PPI daily    # Distal esophageal discomfort/dysphagia  On 6/29, patient reported new onset of epigastric discomfort when swallowing solid foods, not with liquids. No radiation to back or other areas of abdomen, no RUQ tenderness. Denies reflux, nausea or emesis. Most recent stool more formed. Already on PPI as above. No e/o oral candidiasis, on micafungin ppx. Question relation to GI mucosal changes. Better on 6/30 AM, continue to monitor.  - Trial Carafate, simethicone PRN, Maalox PRN  - Continue to monitor, could consider barium swallow study if persistent    # Isolated Hyperbilirubinemia, mainly indirect  Tbili 2.0 on 6/29. Repeat hepatic panel today. Hilario back to normal.     Endocrine  # Hypothyroidism  PTA was on Synthroid 125mcg daily. TSH 0.04, T4 Free 1.48 (done 6/24). Per pt, her PCP reviewed and advised her to reduce her Synthroid to 100mcg daily. Ordered to start on 6/29.     MISC  # Bilateral ear erythema, pruritis  On 6/29, pt reported bilateral ear lobe erythema and irritation in ear canal \"like there's a zit inside.\" No mastoid or auricular tenderness. Otoscopic exam (6/29) with cerumen impaction of L ear canal, normal canal and TM on R. No discharge from ear canal. Ear canal irritation improved on 6/30 but pt continues with erythema/irritation and pruritis of b/l ear lobs. Of note, symptoms " "started before her Plt transfusion to which she did have a transfusion reaction. On 6/30, ongoing erythema of b/l external ears. No overt lesions. Nontender to palpation. Findings possible related to recent cytarabine as delayed hypersensitivities can be seen. Interestingly, there are some case reports of this (\"cytarabine ears\") occurring 6-8 days from start of chemotherapy.   - trial triamcinolone cream   - if not improving, could also trial oral antihistamine   - Continue to monitor    # [employer] for [employer]. Currently reports that the company has been flexible and she does not currently have any paperwork for medical team to complete.     # Integrated Health Services  - Patient is interested in our integrated health services/ support  - Appreciate support from [employer] [facility]  - no current IVFs, bolus PRN. Her weight is slightly further downtrended again on 6/30 and she had persistent fevers overnight. She reports that she continues to eat/drink ok at this time and urinate normally. Pt would like to see how the day goes first before getting bolus IVFs. She is also going to get volume with PRBCs today.   - lyte repletion per unit sliding scale  - regular diet as tolerated     PPx  - VTE: none due to thrombocytopenia  - GI: PPI     Dispo: Admit for workup of new acute leukemia. Presented under Dr. Won Perkins. Unclear at this time but anticipated 3-4 week LOS at minimum.      Plan of care discussed with Dr. Earl.  Cristy Quinn MD  Hem-Onc Fellow  Pager: [phone]  Plan was discussed with attending  Please feel free to page the on call fellow if any questions    Interval History   Patient had 2 episodes of loose stool in the morning. She thinks her diarrhea is slowly improving. She has remained afebrile since 6/30. She does not endorse any pain or bleeding.    Physical Exam   Temp: 97.9  F (36.6  C) Temp src: Oral BP: 130/84 Pulse: 89   Resp: 18 SpO2: 98 % O2 Device: None (Room air)  "   Vitals:    06/30/22 0800 07/01/22 0740 07/02/22 0917   Weight: 115.3 kg (254 lb 4.8 oz) 114.3 kg (251 lb 14.4 oz) 114.6 kg (252 lb 10.4 oz)     Vital Signs with Ranges  Temp:  [97.9  F (36.6  C)-99.3  F (37.4  C)] 97.9  F (36.6  C)  Pulse:  [75-95] 89  Resp:  [18-20] 18  BP: ()/(60-84) 130/84  SpO2:  [97 %-100 %] 98 %  I/O last 3 completed shifts:  In: 1050 [P.O.:640; I.V.:410]  Out: -     Constitutional: Pleasant female sitting up in chair. Already showered.  Awake and conversational. Non-toxic appearing. No acute distress.   HEENT: NC/AT. Sclerae anicteric. OP pink and moist. Bilateral ear lobes mildly erythematous, no discharge in external canal. No mastoid or auricular tenderness.  Respiratory: Breathing comfortable with no increased work on room air. Good air exchange. Lungs clear to auscultation bilaterally, no crackles or wheezing noted.  Cardiovascular: Regular rate and rhythm. No peripheral edema.    GI: Normal BS. Abdomen is soft, non-distended, non-tender throughout and particularly no epigastric tenderness.   Skin: Skin is clean, dry, intact.   Musculoskeletal/ Extremities: Extremities grossly normal in appearance. Moves freely/able to maneuver self in chair.   Neurologic: Alert and oriented. Speech normal. Grossly nonfocal.   Neuropsychiatric: Calm, affect congruent to situation.   VAD: PICC line in LUE, c/d/i. She has surrounding ecchymoses in various stages of healing.        Medications     - MEDICATION INSTRUCTIONS -         acyclovir  400 mg Oral BID     carbamide peroxide  5 drop Left Ear BID     ceFEPIme (MAXIPIME) IV  2 g Intravenous Q8H     [Held by provider] levofloxacin  250 mg Oral Daily     levothyroxine  100 mcg Oral QAM AC     medroxyPROGESTERone  10 mg Oral Daily     micafungin  50 mg Intravenous Q24H     midostaurin  50 mg Oral Q12H     multivitamin w/minerals  1 tablet Oral Daily     pantoprazole  40 mg Oral QAM AC     sucralfate  1 g Oral 4x Daily AC & HS     triamcinolone    "Topical BID     vancomycin  125 mg Oral 4x Daily     vitamin C  1,000 mg Oral Daily     vitamin D3  50 mcg Oral Daily       Data   Results for orders placed or performed during the hospital encounter of 06/16/22 (from the past 24 hour(s))   CBC with platelets differential    Narrative    The following orders were created for panel order CBC with platelets differential.  Procedure                               Abnormality         Status                     ---------                               -----------         ------                     CBC with platelets and d...[[id number]]  Abnormal            Final result               Manual Differential[[id number]]          Abnormal            Final result                 Please view results for these tests on the individual orders.   Basic metabolic panel   Result Value Ref Range    Creatinine 0.45 (L) 0.51 - 0.95 mg/dL    Sodium 132 (L) 136 - 145 mmol/L    Potassium 3.6 3.4 - 5.3 mmol/L    Urea Nitrogen 9.2 6.0 - 20.0 mg/dL    Chloride 104 98 - 107 mmol/L    Carbon Dioxide (CO2) 21 (L) 22 - 29 mmol/L    Anion Gap 7 7 - 15 mmol/L    Glucose 113 (H) 70 - 99 mg/dL    GFR Estimate >90 >60 mL/min/1.73m2    Calcium 8.8 8.6 - 10.0 mg/dL   CBC with platelets and differential   Result Value Ref Range    WBC Count 0.6 (LL) 4.0 - 11.0 10e3/uL    RBC Count 1.98 (L) 3.80 - 5.20 10e6/uL    Hemoglobin 6.4 (LL) 11.7 - 15.7 g/dL    Hematocrit 19.0 (L) 35.0 - 47.0 %    MCV 96 78 - 100 fL    MCH 32.3 26.5 - 33.0 pg    MCHC 33.7 31.5 - 36.5 g/dL    RDW 14.6 10.0 - 15.0 %    Platelet Count 16 (LL) 150 - 450 10e3/uL    Narrative    Previously reported component [ NRBCs ] is no longer reported.\"  Previously reported component [ NRBCs Absolute ] is no longer reported.\"   Magnesium   Result Value Ref Range    Magnesium 2.0 1.7 - 2.3 mg/dL   Phosphorus   Result Value Ref Range    Phosphorus 3.3 2.5 - 4.5 mg/dL   Manual Differential   Result Value Ref Range    % Neutrophils 0 %    % Lymphocytes 98 % "    % Monocytes 2 %    % Eosinophils 0 %    % Basophils 0 %    Absolute Neutrophils 0.0 (LL) 1.6 - 8.3 10e3/uL    Absolute Lymphocytes 0.6 (L) 0.8 - 5.3 10e3/uL    Absolute Monocytes 0.0 0.0 - 1.3 10e3/uL    Absolute Eosinophils 0.0 0.0 - 0.7 10e3/uL    Absolute Basophils 0.0 0.0 - 0.2 10e3/uL    RBC Morphology Confirmed RBC Indices     Platelet Assessment  Automated Count Confirmed. Platelet morphology is normal.     Automated Count Confirmed. Platelet morphology is normal.    Teardrop Cells Slight (A) None Seen   CONDITIONAL Prepare red blood cells (unit)   Result Value Ref Range    CROSSMATCH Compatible     UNIT ABO/RH A Neg     Unit Number [id number]     Unit Status Issued     Blood Component Type Red Blood Cells     Product Code [id number]     CODING SYSTEM [id number]     UNIT TYPE ISBT 0600     ISSUE DATE AND TIME [id number]    CONDITIONAL Prepare pheresed platelets (unit)   Result Value Ref Range    UNIT ABO/RH A Neg     Unit Number [id number]     Unit Status Issued     Blood Component Type Platelets     Product Code [id number]     CODING SYSTEM [id number]     UNIT TYPE ISBT 0600     ISSUE DATE AND TIME [id number]

## 2022-07-03 LAB
ANION GAP SERPL CALCULATED.3IONS-SCNC: 9 MMOL/L (ref 7–15)
BASOPHILS # BLD MANUAL: 0 10E3/UL (ref 0–0.2)
BASOPHILS NFR BLD MANUAL: 0 %
BLASTS # BLD MANUAL: 0 10E3/UL
BLASTS NFR BLD MANUAL: 8 %
BLD PROD TYP BPU: NORMAL
BLOOD COMPONENT TYPE: NORMAL
BUN SERPL-MCNC: 9.9 MG/DL (ref 6–20)
CALCIUM SERPL-MCNC: 8.7 MG/DL (ref 8.6–10)
CHLORIDE SERPL-SCNC: 107 MMOL/L (ref 98–107)
CODING SYSTEM: NORMAL
CREAT SERPL-MCNC: 0.43 MG/DL (ref 0.51–0.95)
CROSSMATCH: NORMAL
DEPRECATED HCO3 PLAS-SCNC: 22 MMOL/L (ref 22–29)
EOSINOPHIL # BLD MANUAL: 0 10E3/UL (ref 0–0.7)
EOSINOPHIL NFR BLD MANUAL: 0 %
ERYTHROCYTE [DISTWIDTH] IN BLOOD BY AUTOMATED COUNT: 15.6 % (ref 10–15)
GFR SERPL CREATININE-BSD FRML MDRD: >90 ML/MIN/1.73M2
GLUCOSE SERPL-MCNC: 103 MG/DL (ref 70–99)
HCT VFR BLD AUTO: 18.5 % (ref 35–47)
HGB BLD-MCNC: 6.4 G/DL (ref 11.7–15.7)
ISSUE DATE AND TIME: NORMAL
LACTATE SERPL-SCNC: 0.5 MMOL/L (ref 0.7–2)
LYMPHOCYTES # BLD MANUAL: 0.5 10E3/UL (ref 0.8–5.3)
LYMPHOCYTES NFR BLD MANUAL: 89 %
MAGNESIUM SERPL-MCNC: 2.2 MG/DL (ref 1.7–2.3)
MCH RBC QN AUTO: 32.8 PG (ref 26.5–33)
MCHC RBC AUTO-ENTMCNC: 34.6 G/DL (ref 31.5–36.5)
MCV RBC AUTO: 95 FL (ref 78–100)
MONOCYTES # BLD MANUAL: 0 10E3/UL (ref 0–1.3)
MONOCYTES NFR BLD MANUAL: 0 %
NEUTROPHILS # BLD MANUAL: 0 10E3/UL (ref 1.6–8.3)
NEUTROPHILS NFR BLD MANUAL: 3 %
PHOSPHATE SERPL-MCNC: 3.4 MG/DL (ref 2.5–4.5)
PLAT MORPH BLD: ABNORMAL
PLATELET # BLD AUTO: 30 10E3/UL (ref 150–450)
POTASSIUM SERPL-SCNC: 3.6 MMOL/L (ref 3.4–5.3)
RBC # BLD AUTO: 1.95 10E6/UL (ref 3.8–5.2)
RBC MORPH BLD: ABNORMAL
SODIUM SERPL-SCNC: 138 MMOL/L (ref 136–145)
UNIT ABO/RH: NORMAL
UNIT NUMBER: NORMAL
UNIT STATUS: NORMAL
UNIT TYPE ISBT: 600
WBC # BLD AUTO: 0.6 10E3/UL (ref 4–11)

## 2022-07-03 PROCEDURE — P9016 RBC LEUKOCYTES REDUCED: HCPCS | Performed by: PHYSICIAN ASSISTANT

## 2022-07-03 PROCEDURE — 120N000002 HC R&B MED SURG/OB UMMC

## 2022-07-03 PROCEDURE — 250N000011 HC RX IP 250 OP 636: Performed by: STUDENT IN AN ORGANIZED HEALTH CARE EDUCATION/TRAINING PROGRAM

## 2022-07-03 PROCEDURE — 250N000011 HC RX IP 250 OP 636: Performed by: PHYSICIAN ASSISTANT

## 2022-07-03 PROCEDURE — 36592 COLLECT BLOOD FROM PICC: CPT | Performed by: PHYSICIAN ASSISTANT

## 2022-07-03 PROCEDURE — 80048 BASIC METABOLIC PNL TOTAL CA: CPT | Performed by: PHYSICIAN ASSISTANT

## 2022-07-03 PROCEDURE — 258N000003 HC RX IP 258 OP 636: Performed by: PHYSICIAN ASSISTANT

## 2022-07-03 PROCEDURE — 250N000012 HC RX MED GY IP 250 OP 636 PS 637: Performed by: STUDENT IN AN ORGANIZED HEALTH CARE EDUCATION/TRAINING PROGRAM

## 2022-07-03 PROCEDURE — 83735 ASSAY OF MAGNESIUM: CPT | Performed by: INTERNAL MEDICINE

## 2022-07-03 PROCEDURE — 250N000013 HC RX MED GY IP 250 OP 250 PS 637: Performed by: STUDENT IN AN ORGANIZED HEALTH CARE EDUCATION/TRAINING PROGRAM

## 2022-07-03 PROCEDURE — 84100 ASSAY OF PHOSPHORUS: CPT | Performed by: INTERNAL MEDICINE

## 2022-07-03 PROCEDURE — 250N000013 HC RX MED GY IP 250 OP 250 PS 637: Performed by: PHYSICIAN ASSISTANT

## 2022-07-03 PROCEDURE — 99233 SBSQ HOSP IP/OBS HIGH 50: CPT | Mod: GC | Performed by: INTERNAL MEDICINE

## 2022-07-03 PROCEDURE — 83605 ASSAY OF LACTIC ACID: CPT | Performed by: INTERNAL MEDICINE

## 2022-07-03 PROCEDURE — 85027 COMPLETE CBC AUTOMATED: CPT | Performed by: PHYSICIAN ASSISTANT

## 2022-07-03 PROCEDURE — 36592 COLLECT BLOOD FROM PICC: CPT | Performed by: INTERNAL MEDICINE

## 2022-07-03 PROCEDURE — 85007 BL SMEAR W/DIFF WBC COUNT: CPT | Performed by: PHYSICIAN ASSISTANT

## 2022-07-03 RX ADMIN — CEFEPIME HYDROCHLORIDE 2 G: 2 INJECTION, POWDER, FOR SOLUTION INTRAVENOUS at 18:17

## 2022-07-03 RX ADMIN — ACYCLOVIR 400 MG: 400 TABLET ORAL at 08:58

## 2022-07-03 RX ADMIN — ALTEPLASE 2 MG: 2.2 INJECTION, POWDER, LYOPHILIZED, FOR SOLUTION INTRAVENOUS at 13:01

## 2022-07-03 RX ADMIN — ACYCLOVIR 400 MG: 400 TABLET ORAL at 19:17

## 2022-07-03 RX ADMIN — TRIAMCINOLONE ACETONIDE: 1 CREAM TOPICAL at 09:03

## 2022-07-03 RX ADMIN — SUCRALFATE 1 G: 1 SUSPENSION ORAL at 15:48

## 2022-07-03 RX ADMIN — VANCOMYCIN HYDROCHLORIDE 125 MG: 125 CAPSULE ORAL at 08:57

## 2022-07-03 RX ADMIN — VANCOMYCIN HYDROCHLORIDE 125 MG: 125 CAPSULE ORAL at 11:36

## 2022-07-03 RX ADMIN — VANCOMYCIN HYDROCHLORIDE 125 MG: 125 CAPSULE ORAL at 19:17

## 2022-07-03 RX ADMIN — SUCRALFATE 1 G: 1 SUSPENSION ORAL at 09:01

## 2022-07-03 RX ADMIN — Medication 5 DROP: at 19:22

## 2022-07-03 RX ADMIN — SODIUM CHLORIDE, PRESERVATIVE FREE 5 ML: 5 INJECTION INTRAVENOUS at 19:31

## 2022-07-03 RX ADMIN — LEVOTHYROXINE SODIUM 100 MCG: 100 TABLET ORAL at 06:16

## 2022-07-03 RX ADMIN — VANCOMYCIN HYDROCHLORIDE 125 MG: 125 CAPSULE ORAL at 15:47

## 2022-07-03 RX ADMIN — OXYCODONE HYDROCHLORIDE AND ACETAMINOPHEN 1000 MG: 500 TABLET ORAL at 08:57

## 2022-07-03 RX ADMIN — PANTOPRAZOLE SODIUM 40 MG: 40 TABLET, DELAYED RELEASE ORAL at 06:16

## 2022-07-03 RX ADMIN — Medication 1 TABLET: at 11:36

## 2022-07-03 RX ADMIN — RYDAPT 50 MG: 25 CAPSULE, LIQUID FILLED ORAL at 19:17

## 2022-07-03 RX ADMIN — RYDAPT 50 MG: 25 CAPSULE, LIQUID FILLED ORAL at 08:59

## 2022-07-03 RX ADMIN — Medication 5 DROP: at 09:02

## 2022-07-03 RX ADMIN — SODIUM CHLORIDE, PRESERVATIVE FREE 5 ML: 5 INJECTION INTRAVENOUS at 06:47

## 2022-07-03 RX ADMIN — SUCRALFATE 1 G: 1 SUSPENSION ORAL at 11:36

## 2022-07-03 RX ADMIN — CEFEPIME HYDROCHLORIDE 2 G: 2 INJECTION, POWDER, FOR SOLUTION INTRAVENOUS at 13:06

## 2022-07-03 RX ADMIN — CEFEPIME HYDROCHLORIDE 2 G: 2 INJECTION, POWDER, FOR SOLUTION INTRAVENOUS at 02:56

## 2022-07-03 RX ADMIN — MICAFUNGIN 50 MG: 10 INJECTION, POWDER, LYOPHILIZED, FOR SOLUTION INTRAVENOUS at 15:47

## 2022-07-03 RX ADMIN — TRIAMCINOLONE ACETONIDE: 1 CREAM TOPICAL at 19:22

## 2022-07-03 RX ADMIN — SODIUM CHLORIDE, PRESERVATIVE FREE 5 ML: 5 INJECTION INTRAVENOUS at 03:41

## 2022-07-03 RX ADMIN — Medication 50 MCG: at 08:56

## 2022-07-03 RX ADMIN — MEDROXYPROGESTERONE ACETATE 10 MG: 10 TABLET ORAL at 08:58

## 2022-07-03 ASSESSMENT — ACTIVITIES OF DAILY LIVING (ADL)
ADLS_ACUITY_SCORE: 18

## 2022-07-03 NOTE — PLAN OF CARE
[id number]: VSS on RA. Denies pain and nausea. Pt endorses less frequent watery stools today. Independent in her room. Continue with POC.

## 2022-07-03 NOTE — PLAN OF CARE
Alert and oriented X 4. AVSS. Denies pain, SOB, nausea or abdominal discomfort. She's had two watery stools this shift. She reports voiding adequately. Sleeping in between cares.

## 2022-07-03 NOTE — PLAN OF CARE
AVSS, no c/o pain or nausea. Hgb 6.4, 1 unit pRBC with no signs or symptoms of a reaction. No electrolyte replacement needed; re-checks scheduled for 7/4 AM. Up ind. Watery stool continue w/ some improvement per pt. Sepsis protocol triggered; lactic: 0.5.

## 2022-07-03 NOTE — PROGRESS NOTES
[facility]  Hematology / Oncology Progress Note    Date of Admission: 6/16/2022  Date of Service (when I saw the patient): 07/03/2022     Assessment & Plan   Veda Marinelli is a 37 year old female with PMH significant for h/o COVID19 infection (10/2021), C.diff, and hypothyroidism who presented with leukocytosis and circulating blasts. 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 She initiated induction chemotherapy with 7+3+midostaurin (C1D1=6/22/22). Hospital course complicated by neutropenic fever and C.difficile colitis.    Today:  - D12 of 7+3+midostaurin  - Transfusion support as needed per parameter  - Continue cefepime for neutropenic fever (6/29 - ), plan to de-escalate to prophylactic dose if remains afebrile past 5 days course of antibiotics  - Continue PO vancomycin for C.difficile colitis    HEME  # AML (FLT3 ITD(low), NPM1, IDH2)  Was in her usual state of health until 03/2022 when she underwent a routine physical with labs. On 3/8/2022, white blood cell count 2.5 (ANC 1.0), Hgb 13.6 with , platelets normal.  On 5/15/2022, she was noted to have further decreasd white blood cell count of 1.6 (ANC 0.28) with hemoglobin 11. platelets were 133. She was ultimately referred to Hematology (Dr. Bob), who she saw on 6/7/22. Further blood workup was recommended and labs done 6/14. CBC revealed WBC 25.3 (ANC 0.8), Hgb 10.1 (), Plt 90K. On the differential and morphology review, 81% blasts were seen concerning for acute leukemia. She was advised to present to [facility] for further evaluation and management.   - Per verbal report from Heme Path fellow there was concern for Blanca rods. She was started on ATRA 6/16 PM-6/17 AM while awaiting PML-ANNELIESE testing. This was ultimately negative.   - s/p Hydrea (6/17-6/22) while awaiting final diagnostic studies  - 6/16 PB chromosomes: 46, XX. No abnormality.   - 6/16 PB FISH: no rearrangments of MLLT10, NUP98, or  KMT2A.   - 6/17 BMBx: markedly hypercellular marrow (85-95% estimated cellularity), with markedly diminished trilineage hematopoiesis, no overt dysplasia in the evaluable elements, and 92% blasts  - FLT3 PCR: positive for FLT3-ITD(low) with allelic ratio 0.21 (corrected from previously reported value of 0.144 per staff message to Lamar Grover from molecular lab personnel)  - NGS panel: NPM1 positive, IDH2, FLT3-ITD  - HLA typing sent, BMT coordinators aware and working on arranging IP consultation  - Will plan for repeat BMBx ~Day 21 (7/12)  - No concerns for TLS or DIC    Treatment Plan: Cytarabine and Daunorubicin (7+3) plus Midostaurin (D1=6/22/22)   - Daunorubicin 90mg/m2 - D1-3   - Cytarabine 100 mg/m2 - D1-7   - Midostaurin 50mg BID Day 8-21, started 6/29 PM. Consider scheduled Zofran premed BID with midostaurin dosing should pt develop difficulty with nausea.      # Pancytopenia secondary to chemotherapy and AML  - Transfuse to maintain Hgb >7, Plt >10K   - Conditional orders in place for FFP if INR >1.8, cryo if fibrinogen <100      ID  # Neutropenic Fever, recurrent  Recurrent fever while on chemotherapy treatment, last episode was 6/29. Work-up so far positive for C.difficile colitis.  - Continue cefepime for neutropenic fever (6/29 - ), plan to de-escalate to prophylactic dose levofloxacin if remains afebrile past 5 days course of antibiotics     - C Diff positive on 7/1- is on 125 mg oral vancomycin 4 times a day      # ID PPx  - ACV 400mg BID  - Levaquin 250mg daily on hold while on cefepime  - Micafungin 50mg IV daily. Given that she will be on midostaurin, will keep on Micafungin to limit potential interactions with posaconazole/voriconazole.   -- When able to transition to PO antifungal, Prior Auth approved for both posaconazole and voriconazole. However, as of  6/29, pt has not met deductible (but should meet it once hospital stay is billed). At present, monthly cost for  Posaconazole $2045.66,  Voriconazole $176.41.    GI  # C.difficile colitis  H/o C.diff (10/20/21), developed loose/watery stool on 6/29. C. Diff positive on 7/1.  - PO vancomycin QID (7/1 - ), plan to continue to complete a course of 10 days, then prophylactic dose while on antibiotics    # Mucositis, stable   On ~6/27, pt noting inflammatory changes in b/l buccal mucosa and small lesion on right inner lower lip. Not painful or affecting PO intake at this time.   - MMW PRN, viscous lidocaine PRN    # GERD  - Trial Carafate, simethicone PRN, Maalox PRN  - TUMS PRN  - PPI daily       Endocrine  # Hypothyroidism  PTA was on Synthroid 125mcg daily. TSH 0.04, T4 Free 1.48 (done 6/24). Per pt, her PCP reviewed and advised her to reduce her Synthroid to 100mcg daily. Ordered to start on 6/29.     MISC  # Bilateral ear erythema, pruritis  On 6/29, pt reported bilateral ear lobe erythema and irritation in ear canal. No mastoid or auricular tenderness. Otoscopic exam (6/29) with cerumen impaction of L ear canal, normal canal and TM on R. No discharge from ear canal. Ear canal irritation improved on 6/30 but pt continues with erythema/irritation and pruritis of b/l ear lobs.   - Trial triamcinolone cream      FEN  - Regular diet as tolerated  PPx  - VTE: none due to thrombocytopenia  - GI: PPI     Dispo: Inpatient treatment for AML. Anticipate 3-4 week LOS at minimum.      Plan of care discussed with Dr. Earl.    Maycol Scott MD  Hematology/Oncology PGY-4  P: [phone]      Interval History   Patient had 5 episodes of loose stool overnight, feels that frequency is decreasing. She thinks her diarrhea is slowly improving.     Physical Exam   Temp: 98.1  F (36.7  C) Temp src: Oral BP: 99/66 Pulse: 85   Resp: 20 SpO2: 99 % O2 Device: None (Room air)    Vitals:    06/30/22 0800 07/01/22 0740 07/02/22 0917   Weight: 115.3 kg (254 lb 4.8 oz) 114.3 kg (251 lb 14.4 oz) 114.6 kg (252 lb 10.4 oz)     Vital Signs with Ranges  Temp:  [97.8  F (36.6   C)-99.1  F (37.3  C)] 98.1  F (36.7  C)  Pulse:  [80-95] 85  Resp:  [18-20] 20  BP: ()/(60-84) 99/66  SpO2:  [98 %-100 %] 99 %  I/O last 3 completed shifts:  In: 1749 [P.O.:840; I.V.:320]  Out: -     Constitutional: Awake and conversational. Non-toxic appearing. No acute distress.   Respiratory: Breathing comfortable with no increased work on room air. Good air exchange. Lungs clear to auscultation bilaterally, no crackles or wheezing noted.  Cardiovascular: Regular rate and rhythm. No peripheral edema.    GI: Normal BS. Abdomen is soft, non-distended, non-tender throughout and particularly no epigastric tenderness.   Skin: Skin is clean, dry, intact.   Musculoskeletal/ Extremities: Extremities grossly normal in appearance.  Neurologic: Alert and oriented. Speech normal. Grossly nonfocal.   VAD: PICC line in E, c/d/i. She has surrounding ecchymoses in various stages of healing.       Medications     - MEDICATION INSTRUCTIONS -         acyclovir  400 mg Oral BID     carbamide peroxide  5 drop Left Ear BID     ceFEPIme (MAXIPIME) IV  2 g Intravenous Q8H     [Held by provider] levofloxacin  250 mg Oral Daily     levothyroxine  100 mcg Oral QAM AC     medroxyPROGESTERone  10 mg Oral Daily     micafungin  50 mg Intravenous Q24H     midostaurin  50 mg Oral Q12H     multivitamin w/minerals  1 tablet Oral Daily     pantoprazole  40 mg Oral QAM AC     sucralfate  1 g Oral 4x Daily AC & HS     triamcinolone   Topical BID     vancomycin  125 mg Oral 4x Daily     vitamin C  1,000 mg Oral Daily     vitamin D3  50 mcg Oral Daily       Data   Results for orders placed or performed during the hospital encounter of 06/16/22 (from the past 24 hour(s))   CONDITIONAL Prepare pheresed platelets (unit)   Result Value Ref Range    UNIT ABO/RH A Neg     Unit Number [id number]     Unit Status Transfused     Blood Component Type Platelets     Product Code [id number]     CODING SYSTEM [id number]     UNIT TYPE ISBT 0600     ISSUE DATE  AND TIME [id number]    CBC with platelets differential    Narrative    The following orders were created for panel order CBC with platelets differential.  Procedure                               Abnormality         Status                     ---------                               -----------         ------                     CBC with platelets and d...[[id number]]                      In process                   Please view results for these tests on the individual orders.

## 2022-07-04 LAB
ALBUMIN SERPL BCG-MCNC: 3.5 G/DL (ref 3.5–5.2)
ALP SERPL-CCNC: 44 U/L (ref 35–104)
ALT SERPL W P-5'-P-CCNC: 10 U/L (ref 10–35)
ANION GAP SERPL CALCULATED.3IONS-SCNC: 6 MMOL/L (ref 7–15)
AST SERPL W P-5'-P-CCNC: 10 U/L (ref 10–35)
BACTERIA BLD CULT: NO GROWTH
BACTERIA BLD CULT: NO GROWTH
BASOPHILS # BLD MANUAL: 0 10E3/UL (ref 0–0.2)
BASOPHILS NFR BLD MANUAL: 0 %
BILIRUB DIRECT SERPL-MCNC: 0.26 MG/DL (ref 0–0.3)
BILIRUB SERPL-MCNC: 1.2 MG/DL
BUN SERPL-MCNC: 8.4 MG/DL (ref 6–20)
CALCIUM SERPL-MCNC: 8.8 MG/DL (ref 8.6–10)
CHLORIDE SERPL-SCNC: 105 MMOL/L (ref 98–107)
CREAT SERPL-MCNC: 0.46 MG/DL (ref 0.51–0.95)
DEPRECATED HCO3 PLAS-SCNC: 22 MMOL/L (ref 22–29)
EOSINOPHIL # BLD MANUAL: 0 10E3/UL (ref 0–0.7)
EOSINOPHIL NFR BLD MANUAL: 0 %
ERYTHROCYTE [DISTWIDTH] IN BLOOD BY AUTOMATED COUNT: 14.5 % (ref 10–15)
FIBRINOGEN PPP-MCNC: 475 MG/DL (ref 170–490)
GFR SERPL CREATININE-BSD FRML MDRD: >90 ML/MIN/1.73M2
GLUCOSE SERPL-MCNC: 104 MG/DL (ref 70–99)
HCT VFR BLD AUTO: 20.5 % (ref 35–47)
HGB BLD-MCNC: 7.2 G/DL (ref 11.7–15.7)
INR PPP: 1.1 (ref 0.85–1.15)
LYMPHOCYTES # BLD MANUAL: 0.7 10E3/UL (ref 0.8–5.3)
LYMPHOCYTES NFR BLD MANUAL: 98 %
MAGNESIUM SERPL-MCNC: 2.1 MG/DL (ref 1.7–2.3)
MCH RBC QN AUTO: 33 PG (ref 26.5–33)
MCHC RBC AUTO-ENTMCNC: 35.1 G/DL (ref 31.5–36.5)
MCV RBC AUTO: 94 FL (ref 78–100)
MONOCYTES # BLD MANUAL: 0 10E3/UL (ref 0–1.3)
MONOCYTES NFR BLD MANUAL: 2 %
NEUTROPHILS # BLD MANUAL: 0 10E3/UL (ref 1.6–8.3)
NEUTROPHILS NFR BLD MANUAL: 0 %
PHOSPHATE SERPL-MCNC: 3.7 MG/DL (ref 2.5–4.5)
PLAT MORPH BLD: ABNORMAL
PLATELET # BLD AUTO: 25 10E3/UL (ref 150–450)
POTASSIUM SERPL-SCNC: 3.6 MMOL/L (ref 3.4–5.3)
PROT SERPL-MCNC: 6 G/DL (ref 6.4–8.3)
RBC # BLD AUTO: 2.18 10E6/UL (ref 3.8–5.2)
RBC MORPH BLD: ABNORMAL
SODIUM SERPL-SCNC: 133 MMOL/L (ref 136–145)
WBC # BLD AUTO: 0.7 10E3/UL (ref 4–11)

## 2022-07-04 PROCEDURE — 250N000011 HC RX IP 250 OP 636: Performed by: STUDENT IN AN ORGANIZED HEALTH CARE EDUCATION/TRAINING PROGRAM

## 2022-07-04 PROCEDURE — 82310 ASSAY OF CALCIUM: CPT | Performed by: PHYSICIAN ASSISTANT

## 2022-07-04 PROCEDURE — 99233 SBSQ HOSP IP/OBS HIGH 50: CPT | Mod: GC | Performed by: INTERNAL MEDICINE

## 2022-07-04 PROCEDURE — 250N000012 HC RX MED GY IP 250 OP 636 PS 637: Performed by: STUDENT IN AN ORGANIZED HEALTH CARE EDUCATION/TRAINING PROGRAM

## 2022-07-04 PROCEDURE — 84100 ASSAY OF PHOSPHORUS: CPT | Performed by: PHYSICIAN ASSISTANT

## 2022-07-04 PROCEDURE — 120N000002 HC R&B MED SURG/OB UMMC

## 2022-07-04 PROCEDURE — 250N000013 HC RX MED GY IP 250 OP 250 PS 637: Performed by: PHYSICIAN ASSISTANT

## 2022-07-04 PROCEDURE — 258N000003 HC RX IP 258 OP 636: Performed by: PHYSICIAN ASSISTANT

## 2022-07-04 PROCEDURE — 250N000011 HC RX IP 250 OP 636: Performed by: PHYSICIAN ASSISTANT

## 2022-07-04 PROCEDURE — 82248 BILIRUBIN DIRECT: CPT | Performed by: PHYSICIAN ASSISTANT

## 2022-07-04 PROCEDURE — 83735 ASSAY OF MAGNESIUM: CPT | Performed by: PHYSICIAN ASSISTANT

## 2022-07-04 PROCEDURE — 85027 COMPLETE CBC AUTOMATED: CPT | Performed by: PHYSICIAN ASSISTANT

## 2022-07-04 PROCEDURE — 250N000013 HC RX MED GY IP 250 OP 250 PS 637: Performed by: STUDENT IN AN ORGANIZED HEALTH CARE EDUCATION/TRAINING PROGRAM

## 2022-07-04 PROCEDURE — 36592 COLLECT BLOOD FROM PICC: CPT | Performed by: PHYSICIAN ASSISTANT

## 2022-07-04 PROCEDURE — 85610 PROTHROMBIN TIME: CPT | Performed by: PHYSICIAN ASSISTANT

## 2022-07-04 PROCEDURE — 85384 FIBRINOGEN ACTIVITY: CPT | Performed by: PHYSICIAN ASSISTANT

## 2022-07-04 PROCEDURE — 85007 BL SMEAR W/DIFF WBC COUNT: CPT | Performed by: PHYSICIAN ASSISTANT

## 2022-07-04 RX ORDER — SUCRALFATE ORAL 1 G/10ML
1 SUSPENSION ORAL 4 TIMES DAILY PRN
Status: DISCONTINUED | OUTPATIENT
Start: 2022-07-04 | End: 2022-07-15 | Stop reason: HOSPADM

## 2022-07-04 RX ORDER — TRIAMCINOLONE ACETONIDE 1 MG/G
CREAM TOPICAL 2 TIMES DAILY PRN
Status: DISCONTINUED | OUTPATIENT
Start: 2022-07-04 | End: 2022-07-06

## 2022-07-04 RX ORDER — LEVOFLOXACIN 250 MG/1
250 TABLET, FILM COATED ORAL DAILY
Status: DISCONTINUED | OUTPATIENT
Start: 2022-07-04 | End: 2022-07-06

## 2022-07-04 RX ADMIN — RYDAPT 50 MG: 25 CAPSULE, LIQUID FILLED ORAL at 20:22

## 2022-07-04 RX ADMIN — Medication 50 MCG: at 08:18

## 2022-07-04 RX ADMIN — RYDAPT 50 MG: 25 CAPSULE, LIQUID FILLED ORAL at 08:21

## 2022-07-04 RX ADMIN — TRIAMCINOLONE ACETONIDE: 1 CREAM TOPICAL at 08:23

## 2022-07-04 RX ADMIN — Medication 1 TABLET: at 13:56

## 2022-07-04 RX ADMIN — GLYCERIN, PETROLATUM, PHENYLEPHRINE HCL, PRAMOXINE HCL: 144; 2.5; 10; 15 CREAM TOPICAL at 20:23

## 2022-07-04 RX ADMIN — SODIUM CHLORIDE, PRESERVATIVE FREE 3 ML: 5 INJECTION INTRAVENOUS at 06:35

## 2022-07-04 RX ADMIN — VANCOMYCIN HYDROCHLORIDE 125 MG: 125 CAPSULE ORAL at 13:56

## 2022-07-04 RX ADMIN — VANCOMYCIN HYDROCHLORIDE 125 MG: 125 CAPSULE ORAL at 08:18

## 2022-07-04 RX ADMIN — GLYCERIN, PETROLATUM, PHENYLEPHRINE HCL, PRAMOXINE HCL: 144; 2.5; 10; 15 CREAM TOPICAL at 10:10

## 2022-07-04 RX ADMIN — VANCOMYCIN HYDROCHLORIDE 125 MG: 125 CAPSULE ORAL at 16:44

## 2022-07-04 RX ADMIN — SUCRALFATE 1 G: 1 SUSPENSION ORAL at 08:18

## 2022-07-04 RX ADMIN — ACYCLOVIR 400 MG: 400 TABLET ORAL at 08:18

## 2022-07-04 RX ADMIN — LEVOTHYROXINE SODIUM 100 MCG: 100 TABLET ORAL at 06:13

## 2022-07-04 RX ADMIN — OXYCODONE HYDROCHLORIDE AND ACETAMINOPHEN 1000 MG: 500 TABLET ORAL at 08:18

## 2022-07-04 RX ADMIN — MICAFUNGIN 50 MG: 10 INJECTION, POWDER, LYOPHILIZED, FOR SOLUTION INTRAVENOUS at 16:44

## 2022-07-04 RX ADMIN — ACYCLOVIR 400 MG: 400 TABLET ORAL at 20:22

## 2022-07-04 RX ADMIN — SODIUM CHLORIDE, PRESERVATIVE FREE 5 ML: 5 INJECTION INTRAVENOUS at 18:19

## 2022-07-04 RX ADMIN — VANCOMYCIN HYDROCHLORIDE 125 MG: 125 CAPSULE ORAL at 20:22

## 2022-07-04 RX ADMIN — SODIUM CHLORIDE, PRESERVATIVE FREE 5 ML: 5 INJECTION INTRAVENOUS at 03:49

## 2022-07-04 RX ADMIN — LEVOFLOXACIN 250 MG: 250 TABLET, FILM COATED ORAL at 10:09

## 2022-07-04 RX ADMIN — MEDROXYPROGESTERONE ACETATE 10 MG: 10 TABLET ORAL at 08:19

## 2022-07-04 RX ADMIN — CEFEPIME HYDROCHLORIDE 2 G: 2 INJECTION, POWDER, FOR SOLUTION INTRAVENOUS at 03:03

## 2022-07-04 RX ADMIN — GLYCERIN, PETROLATUM, PHENYLEPHRINE HCL, PRAMOXINE HCL: 144; 2.5; 10; 15 CREAM TOPICAL at 13:57

## 2022-07-04 RX ADMIN — PANTOPRAZOLE SODIUM 40 MG: 40 TABLET, DELAYED RELEASE ORAL at 06:13

## 2022-07-04 ASSESSMENT — ACTIVITIES OF DAILY LIVING (ADL)
ADLS_ACUITY_SCORE: 18

## 2022-07-04 NOTE — PLAN OF CARE
[id number]: Afebrile, VSS on RA. Denies pain and nausea. 1 unit of RBCs given in the AM. Pt says that she is having less frequent watery stools. Independent. Continue with POC.

## 2022-07-04 NOTE — PROGRESS NOTES
[facility]  Hematology / Oncology Progress Note    Date of Admission: 6/16/2022  Date of Service (when I saw the patient): 07/04/2022     Assessment & Plan   Veda Marinelli is a 37 year old female with PMH significant for h/o COVID19 infection (10/2021), C.diff, and hypothyroidism who presented with leukocytosis and circulating blasts. 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 She initiated induction chemotherapy with 7+3+midostaurin (C1D1=6/22/22). Hospital course complicated by neutropenic fever and C.difficile colitis.    Today:  - D13 of 7+3+midostaurin  - de-escalate Cefepime to ppx Levaquin  - continue trt dose PO Vanco  - trial prepH for hemorrhoid, if ineffective could trial dilt cream, applied very superficially  - ok to back off on carafate and triamcinolone cream to PRN      HEME  # AML (FLT3 ITD(low), NPM1, IDH2)  Was in her usual state of health until 03/2022 when she underwent a routine physical with labs. On 3/8/2022, white blood cell count 2.5 (ANC 1.0), Hgb 13.6 with , platelets normal.  On 5/15/2022, she was noted to have further decreasd white blood cell count of 1.6 (ANC 0.28) with hemoglobin 11. platelets were 133. She was ultimately referred to Hematology (Dr. Bob), who she saw on 6/7/22. Further blood workup was recommended and labs done 6/14. CBC revealed WBC 25.3 (ANC 0.8), Hgb 10.1 (), Plt 90K. On the differential and morphology review, 81% blasts were seen concerning for acute leukemia. She was advised to present to [facility] for further evaluation and management.   - Per verbal report from Heme Path fellow there was concern for Blanca rods. She was started on ATRA 6/16 PM-6/17 AM while awaiting PML-ANNELIESE testing. This was ultimately negative.   - s/p Hydrea (6/17-6/22) while awaiting final diagnostic studies  - 6/16 PB chromosomes: 46, XX. No abnormality.   - 6/16 PB FISH: no rearrangments of MLLT10, NUP98, or KMT2A.   - 6/17  BMBx: markedly hypercellular marrow (85-95% estimated cellularity), with markedly diminished trilineage hematopoiesis, no overt dysplasia in the evaluable elements, and 92% blasts  - FLT3 PCR: positive for FLT3-ITD(low) with allelic ratio 0.21 (corrected from previously reported value of 0.144 per staff message to Lamar Grover from molecular lab personnel)  - NGS panel: NPM1 positive, IDH2, FLT3-ITD  - HLA typing sent, BMT coordinators aware and working on arranging IP consultation  - receiving induction with 7+3 (cytarabine+daunorubicin PLUS midostaurin (D1= 6/22/22)   - Will plan for repeat BMBx ~Day 21 (7/12); needs to be scheduled     Treatment Plan: Cytarabine and Daunorubicin (7+3) plus Midostaurin (D1=6/22/22)   - Daunorubicin 90mg/m2 - D1-3   - Cytarabine 100 mg/m2 - D1-7   - Midostaurin 50mg BID Day 8-21, started 6/29 PM. Consider scheduled Zofran premed BID with midostaurin dosing should pt develop difficulty with nausea.      # Pancytopenia secondary to chemotherapy and AML  - Transfuse to maintain Hgb >7, Plt >10K     GYN  # Vaginal bleeding  Menstrual cycle began inpatient overnight 6/30-7/1. Usually heaviest flow is first ~3 days. Reviewed neutropenic precautions and advised not to use tampons at this time. On 7/4, bleeding is improving but she is still passing some small clots.  - adjusted Plt parameter to >20K with active bleeding  - Provera 10mg daily while bleeding continues    ID  # Neutropenic Fever, recurrent  # Recurrent C.diff colitis  (1) Fevered to 101.6F on 6/23. Noted to have chills though no other focal symptoms. Afebrile thereafter.  - 6/16 baseline CT CAP without evidence for infectious source   - 6/23: BCx NG, UA/UCx NGTD, CXR clear  - s/p IV Cefepime (x 6/23-6/27). De-escalated back to ppx Levaquin on 6/27.  (2) Recurrent fever on 6/29 PM. Persistent temps 6/29-6/30, now afebrile since 6/30 afternoon. C.diff positive on 71 - 6/29: BCx pending, UA negative, CXR negative.   -  restarted on Cefepime 2g IV q8hr (x 6/29 PM - 7/4 AM)  - continue PO Vancomycin 125mg QID (x 7/1); plan for treatment course followed by taper and then ongoing maintenance dosing.    # ID PPx  - ACV 400mg BID  - resume Levaquin 250mg daily   - Micafungin 50mg IV daily. Given that she will be on midostaurin, will keep on Micafungin to limit potential interactions with posaconazole/voriconazole.  - when able to transition to PO antifungal, Prior Auth approved for both posaconazole and voriconazole. However, as of  6/29, pt has not met deductible (but should meet it once hospital stay is billed). At present, monthly cost for Posaconazole $2048.66, Voriconazole $176.41.     # h/o COVID-19  Pt is not vaccinated nor interested in being vaccinated. She was admitted from 10/1/2021 - 10/13/2021 for acute hypoxic respiratory failure from COVID-19 pneumonia.  Required IMC, high flow and intermittent CPAP. She was treated with dexamethasone, remdesivir, and baricitinib in addition to azithromycin and ceftriaxone. Recovered symptomatically from this.    GI  # H/o C.diff (10/2021)  # Hemorrhoid  # Recurrent C.diff (7/1)  H/o C.diff (10/20/21) and course was complicated by hemorrhoid which does wax/wane and was sometimes painful. She primarily has used witch hazel pads when symptomatic. Pt reported normal bowels at time of AML diagnosis. Developed intermittent loose stools throughout admission, then increased frequency of loose/watery stools overnight 6/30-7/1. Rechecked C.diff (7/1), positive.   - 6/16 CT A/P negative for perirectal abscess.  - TUCKS PRN, add Prep H (7/4)  - Avoid PTA probiotic at this time.  - baseline 6/19 C. Diff negative so we deferred prophylactic PO Vancomycin during chemo course  - recurrent/increased loose stools reported on 7/1, checked C.diff and this was positive. Continue PO Vancomycin as above.    # Mucositis, improved  On ~6/27, pt noting inflammatory changes in b/l buccal mucosa and small lesion on  right inner lower lip. Not painful or affecting PO intake at this time.   - MMW PRN, viscous lidocaine PRN    # GERD  - TUMS PRN  - PPI daily     # Distal esophageal discomfort/dysphagia - improving  On 6/29, patient reported new onset of epigastric discomfort when swallowing solid foods, not with liquids. No radiation to back or other areas of abdomen, no RUQ tenderness. Denies reflux, nausea or emesis. Already on PPI as above. No e/o oral candidiasis, on micafungin ppx. Question relation to GI mucosal changes? Nearly resolved on 7/4.  - Trial Carafate before meals and at nighttime --> can back off to PRN (x 7/4)  - simethicone PRN, Maalox PRN, GI Cocktail PRN  - Continue to monitor, could consider barium swallow study if persistent     Endocrine  # Hypothyroidism  PTA was on Synthroid 125mcg daily. TSH 0.04, T4 Free 1.48 (done 6/24). Per pt, her PCP reviewed and advised her to reduce her Synthroid to 100mcg daily. Ordered to start on 6/29.     MISC  # Bilateral ear erythema, pruritis - nearly resolved  On 6/29, pt reported bilateral ear lobe erythema and irritation in ear canal. No mastoid or auricular tenderness. Otoscopic exam (6/29) with cerumen impaction of L ear canal, normal canal and TM on R. No discharge from ear canal. Ear canal irritation improved on 6/30 but pt continues with erythema/irritation and pruritis of b/l ear lobs.   - Trial triamcinolone cream      FEN  - Regular diet as tolerated    PPx  - VTE: none due to thrombocytopenia  - GI: PPI     Dispo:  Anticipate 4+ week LOS for management of acute leukemia. Presented under Dr. Won Perkins.     Plan of care discussed with Dr. Earl.     I spent >40 minutes in the care of this patient today, which included time necessary for review of interval events, obtaining history and physical exam, adjusting medication orders, discussion with interdisciplinary/consult team(s), and documentation time. Over 50% of time was spent face-to-face and/or  coordinating care.     Plan of care discussed with Dr. Earl.    Lamar Grover PA-C  Hematology/Oncology   pager: [phone]       Interval History   No acute events overnight, afebrile. Epigastric discomfort with eating nearly resolved. Now almost feels more like heartburn. Thinks it is ok to change carafate back to PRN. Loose stools continue but are less frequent. Her hemorrhoid has become more bothersome due to stool frequency. Denies nausea. Ongoing vaginal bleeding though this is improving, passing some small clots. Feels she is eating/drinking PO fluids ok. PICC is feeling ok.     Physical Exam   Temp: 98.6  F (37  C) Temp src: Oral BP: 107/69 Pulse: 79   Resp: 16 SpO2: 98 % O2 Device: None (Room air)    Vitals:    07/01/22 0740 07/02/22 0917 07/03/22 0619   Weight: 114.3 kg (251 lb 14.4 oz) 114.6 kg (252 lb 10.4 oz) 114.8 kg (253 lb 1.4 oz)     Vital Signs with Ranges  Temp:  [96.7  F (35.9  C)-99.1  F (37.3  C)] 98.6  F (37  C)  Pulse:  [] 79  Resp:  [16-20] 16  BP: ()/(60-85) 107/69  SpO2:  [96 %-100 %] 98 %  I/O last 3 completed shifts:  In: 1050 [P.O.:600; I.V.:100]  Out: -     Constitutional: Awake and conversational. Non-toxic appearing. No acute distress.   Respiratory: Breathing comfortable with no increased work on room air. Good air exchange. Lungs clear to auscultation bilaterally, no crackles or wheezing noted.  Cardiovascular: Regular rate and rhythm. No peripheral edema.    GI: Normal BS. Abdomen is soft, non-distended, non-tender throughout and particularly no epigastric tenderness.   Skin: Skin is clean, dry, intact.   Musculoskeletal/ Extremities: Extremities grossly normal in appearance.  Neurologic: Alert and oriented. Speech normal. Grossly nonfocal.   VAD: PICC line in LUE, c/d/i. She has surrounding ecchymoses in various stages of healing.       Medications     - MEDICATION INSTRUCTIONS -         acyclovir  400 mg Oral BID     carbamide peroxide  5 drop Left Ear BID      levofloxacin  250 mg Oral Daily     levothyroxine  100 mcg Oral QAM AC     medroxyPROGESTERone  10 mg Oral Daily     micafungin  50 mg Intravenous Q24H     midostaurin  50 mg Oral Q12H     multivitamin w/minerals  1 tablet Oral Daily     pantoprazole  40 mg Oral QAM AC     pramox-pe-glycerin-petrolatum   Rectal TID     vancomycin  125 mg Oral 4x Daily     vitamin C  1,000 mg Oral Daily     vitamin D3  50 mcg Oral Daily       Data   Results for orders placed or performed during the hospital encounter of 06/16/22 (from the past 24 hour(s))   CONDITIONAL Prepare red blood cells (unit)   Result Value Ref Range    CROSSMATCH Compatible     UNIT ABO/RH A Neg     Unit Number [id number]     Unit Status Transfused     Blood Component Type Red Blood Cells     Product Code [id number]     CODING SYSTEM [id number]     UNIT TYPE ISBT 0600     ISSUE DATE AND TIME [id number]    Lactic Acid STAT   Result Value Ref Range    Lactic Acid 0.5 (L) 0.7 - 2.0 mmol/L   CBC with platelets differential    Narrative    The following orders were created for panel order CBC with platelets differential.  Procedure                               Abnormality         Status                     ---------                               -----------         ------                     CBC with platelets and d...[[id number]]  Abnormal            Final result               Manual Differential[[id number]]          Abnormal            Final result                 Please view results for these tests on the individual orders.   Basic metabolic panel   Result Value Ref Range    Creatinine 0.46 (L) 0.51 - 0.95 mg/dL    Sodium 133 (L) 136 - 145 mmol/L    Potassium 3.6 3.4 - 5.3 mmol/L    Urea Nitrogen 8.4 6.0 - 20.0 mg/dL    Chloride 105 98 - 107 mmol/L    Carbon Dioxide (CO2) 22 22 - 29 mmol/L    Anion Gap 6 (L) 7 - 15 mmol/L    Glucose 104 (H) 70 - 99 mg/dL    GFR Estimate >90 >60 mL/min/1.73m2    Calcium 8.8 8.6 - 10.0 mg/dL   Fibrinogen activity   Result  "Value Ref Range    Fibrinogen Activity 475 170 - 490 mg/dL   Hepatic panel   Result Value Ref Range    Protein Total 6.0 (L) 6.4 - 8.3 g/dL    Albumin 3.5 3.5 - 5.2 g/dL    Bilirubin Total 1.2 <=1.2 mg/dL    Alkaline Phosphatase 44 35 - 104 U/L    AST 10 10 - 35 U/L    ALT 10 10 - 35 U/L    Bilirubin Direct 0.26 0.00 - 0.30 mg/dL   INR   Result Value Ref Range    INR 1.10 0.85 - 1.15   Magnesium   Result Value Ref Range    Magnesium 2.1 1.7 - 2.3 mg/dL   Phosphorus   Result Value Ref Range    Phosphorus 3.7 2.5 - 4.5 mg/dL   CBC with platelets and differential   Result Value Ref Range    WBC Count 0.7 (LL) 4.0 - 11.0 10e3/uL    RBC Count 2.18 (L) 3.80 - 5.20 10e6/uL    Hemoglobin 7.2 (L) 11.7 - 15.7 g/dL    Hematocrit 20.5 (L) 35.0 - 47.0 %    MCV 94 78 - 100 fL    MCH 33.0 26.5 - 33.0 pg    MCHC 35.1 31.5 - 36.5 g/dL    RDW 14.5 10.0 - 15.0 %    Platelet Count 25 (LL) 150 - 450 10e3/uL    Narrative    Previously reported component [ NRBCs ] is no longer reported.\"  Previously reported component [ NRBCs Absolute ] is no longer reported.\"   Manual Differential   Result Value Ref Range    % Neutrophils 0 %    % Lymphocytes 98 %    % Monocytes 2 %    % Eosinophils 0 %    % Basophils 0 %    Absolute Neutrophils 0.0 (LL) 1.6 - 8.3 10e3/uL    Absolute Lymphocytes 0.7 (L) 0.8 - 5.3 10e3/uL    Absolute Monocytes 0.0 0.0 - 1.3 10e3/uL    Absolute Eosinophils 0.0 0.0 - 0.7 10e3/uL    Absolute Basophils 0.0 0.0 - 0.2 10e3/uL    RBC Morphology Confirmed RBC Indices     Platelet Assessment  Automated Count Confirmed. Platelet morphology is normal.     Automated Count Confirmed. Platelet morphology is normal.       "

## 2022-07-04 NOTE — PLAN OF CARE
Alert and oriented X 4. AVSS. Denies SOB, pain, nausea or abdominal discomfort. She reports decreased stool frequency but still having loose stools. Voiding spontaneously. Sleeping in between cares.

## 2022-07-04 NOTE — PLAN OF CARE
AVSS, no c/o pain or nausea. Prep H cream scheduled for hemmorhoids; pt applies herself. oriented X 4. AVSS. Denies SOB, pain, nausea or abdominal discomfort. Loose stools continue but not worsening. Voiding spontaneously.

## 2022-07-05 ENCOUNTER — APPOINTMENT (OUTPATIENT)
Dept: PHYSICAL THERAPY | Facility: CLINIC | Age: 37
DRG: 834 | End: 2022-07-05
Payer: COMMERCIAL

## 2022-07-05 LAB
ANION GAP SERPL CALCULATED.3IONS-SCNC: 9 MMOL/L (ref 7–15)
BASOPHILS # BLD MANUAL: 0 10E3/UL (ref 0–0.2)
BASOPHILS NFR BLD MANUAL: 0 %
BLD PROD TYP BPU: NORMAL
BLOOD COMPONENT TYPE: NORMAL
BUN SERPL-MCNC: 10.8 MG/DL (ref 6–20)
CALCIUM SERPL-MCNC: 9.2 MG/DL (ref 8.6–10)
CHLORIDE SERPL-SCNC: 108 MMOL/L (ref 98–107)
CODING SYSTEM: NORMAL
CREAT SERPL-MCNC: 0.46 MG/DL (ref 0.51–0.95)
DEPRECATED HCO3 PLAS-SCNC: 23 MMOL/L (ref 22–29)
EOSINOPHIL # BLD MANUAL: 0 10E3/UL (ref 0–0.7)
EOSINOPHIL NFR BLD MANUAL: 0 %
ERYTHROCYTE [DISTWIDTH] IN BLOOD BY AUTOMATED COUNT: 14 % (ref 10–15)
GFR SERPL CREATININE-BSD FRML MDRD: >90 ML/MIN/1.73M2
GLUCOSE SERPL-MCNC: 111 MG/DL (ref 70–99)
HCT VFR BLD AUTO: 20.5 % (ref 35–47)
HGB BLD-MCNC: 7.1 G/DL (ref 11.7–15.7)
ISSUE DATE AND TIME: NORMAL
LYMPHOCYTES # BLD MANUAL: 0.8 10E3/UL (ref 0.8–5.3)
LYMPHOCYTES NFR BLD MANUAL: 98 %
MAGNESIUM SERPL-MCNC: 2.2 MG/DL (ref 1.7–2.3)
MCH RBC QN AUTO: 32.1 PG (ref 26.5–33)
MCHC RBC AUTO-ENTMCNC: 34.6 G/DL (ref 31.5–36.5)
MCV RBC AUTO: 93 FL (ref 78–100)
MONOCYTES # BLD MANUAL: 0 10E3/UL (ref 0–1.3)
MONOCYTES NFR BLD MANUAL: 2 %
NEUTROPHILS # BLD MANUAL: 0 10E3/UL (ref 1.6–8.3)
NEUTROPHILS NFR BLD MANUAL: 0 %
NRBC # BLD AUTO: 0 10E3/UL
NRBC BLD MANUAL-RTO: 3 %
PHOSPHATE SERPL-MCNC: 4.2 MG/DL (ref 2.5–4.5)
PLAT MORPH BLD: ABNORMAL
PLATELET # BLD AUTO: 11 10E3/UL (ref 150–450)
POTASSIUM SERPL-SCNC: 3.6 MMOL/L (ref 3.4–5.3)
RBC # BLD AUTO: 2.21 10E6/UL (ref 3.8–5.2)
RBC MORPH BLD: ABNORMAL
SODIUM SERPL-SCNC: 140 MMOL/L (ref 136–145)
UNIT ABO/RH: NORMAL
UNIT NUMBER: NORMAL
UNIT STATUS: NORMAL
UNIT TYPE ISBT: 600
WBC # BLD AUTO: 0.8 10E3/UL (ref 4–11)

## 2022-07-05 PROCEDURE — 36415 COLL VENOUS BLD VENIPUNCTURE: CPT | Performed by: INTERNAL MEDICINE

## 2022-07-05 PROCEDURE — 250N000011 HC RX IP 250 OP 636: Performed by: PHYSICIAN ASSISTANT

## 2022-07-05 PROCEDURE — 250N000012 HC RX MED GY IP 250 OP 636 PS 637: Performed by: STUDENT IN AN ORGANIZED HEALTH CARE EDUCATION/TRAINING PROGRAM

## 2022-07-05 PROCEDURE — 87040 BLOOD CULTURE FOR BACTERIA: CPT | Performed by: INTERNAL MEDICINE

## 2022-07-05 PROCEDURE — 83735 ASSAY OF MAGNESIUM: CPT | Performed by: INTERNAL MEDICINE

## 2022-07-05 PROCEDURE — 250N000013 HC RX MED GY IP 250 OP 250 PS 637: Performed by: STUDENT IN AN ORGANIZED HEALTH CARE EDUCATION/TRAINING PROGRAM

## 2022-07-05 PROCEDURE — 99233 SBSQ HOSP IP/OBS HIGH 50: CPT | Performed by: INTERNAL MEDICINE

## 2022-07-05 PROCEDURE — 97110 THERAPEUTIC EXERCISES: CPT | Mod: GP

## 2022-07-05 PROCEDURE — 250N000011 HC RX IP 250 OP 636: Performed by: INTERNAL MEDICINE

## 2022-07-05 PROCEDURE — 80048 BASIC METABOLIC PNL TOTAL CA: CPT | Performed by: PHYSICIAN ASSISTANT

## 2022-07-05 PROCEDURE — 85027 COMPLETE CBC AUTOMATED: CPT | Performed by: PHYSICIAN ASSISTANT

## 2022-07-05 PROCEDURE — 36592 COLLECT BLOOD FROM PICC: CPT | Performed by: PHYSICIAN ASSISTANT

## 2022-07-05 PROCEDURE — 87086 URINE CULTURE/COLONY COUNT: CPT | Performed by: INTERNAL MEDICINE

## 2022-07-05 PROCEDURE — 250N000013 HC RX MED GY IP 250 OP 250 PS 637: Performed by: PHYSICIAN ASSISTANT

## 2022-07-05 PROCEDURE — 84100 ASSAY OF PHOSPHORUS: CPT | Performed by: INTERNAL MEDICINE

## 2022-07-05 PROCEDURE — 36592 COLLECT BLOOD FROM PICC: CPT | Performed by: INTERNAL MEDICINE

## 2022-07-05 PROCEDURE — 85007 BL SMEAR W/DIFF WBC COUNT: CPT | Performed by: PHYSICIAN ASSISTANT

## 2022-07-05 PROCEDURE — 120N000002 HC R&B MED SURG/OB UMMC

## 2022-07-05 PROCEDURE — P9037 PLATE PHERES LEUKOREDU IRRAD: HCPCS | Performed by: PHYSICIAN ASSISTANT

## 2022-07-05 PROCEDURE — 258N000003 HC RX IP 258 OP 636: Performed by: PHYSICIAN ASSISTANT

## 2022-07-05 RX ORDER — DIPHENHYDRAMINE HYDROCHLORIDE 50 MG/ML
25 INJECTION INTRAMUSCULAR; INTRAVENOUS ONCE
Status: COMPLETED | OUTPATIENT
Start: 2022-07-05 | End: 2022-07-05

## 2022-07-05 RX ADMIN — RYDAPT 50 MG: 25 CAPSULE, LIQUID FILLED ORAL at 20:32

## 2022-07-05 RX ADMIN — VANCOMYCIN HYDROCHLORIDE 125 MG: 125 CAPSULE ORAL at 09:02

## 2022-07-05 RX ADMIN — DIPHENHYDRAMINE HYDROCHLORIDE 50 MG: 25 CAPSULE ORAL at 10:01

## 2022-07-05 RX ADMIN — CEFEPIME HYDROCHLORIDE 2 G: 2 INJECTION, POWDER, FOR SOLUTION INTRAVENOUS at 19:25

## 2022-07-05 RX ADMIN — SUCRALFATE 1 G: 1 SUSPENSION ORAL at 05:59

## 2022-07-05 RX ADMIN — VANCOMYCIN HYDROCHLORIDE 125 MG: 125 CAPSULE ORAL at 16:32

## 2022-07-05 RX ADMIN — DIPHENHYDRAMINE HYDROCHLORIDE 25 MG: 50 INJECTION, SOLUTION INTRAMUSCULAR; INTRAVENOUS at 11:46

## 2022-07-05 RX ADMIN — ACYCLOVIR 400 MG: 400 TABLET ORAL at 20:24

## 2022-07-05 RX ADMIN — SUCRALFATE 1 G: 1 SUSPENSION ORAL at 16:32

## 2022-07-05 RX ADMIN — VANCOMYCIN HYDROCHLORIDE 125 MG: 125 CAPSULE ORAL at 20:24

## 2022-07-05 RX ADMIN — GLYCERIN, PETROLATUM, PHENYLEPHRINE HCL, PRAMOXINE HCL: 144; 2.5; 10; 15 CREAM TOPICAL at 20:43

## 2022-07-05 RX ADMIN — LEVOFLOXACIN 250 MG: 250 TABLET, FILM COATED ORAL at 09:01

## 2022-07-05 RX ADMIN — GLYCERIN, PETROLATUM, PHENYLEPHRINE HCL, PRAMOXINE HCL: 144; 2.5; 10; 15 CREAM TOPICAL at 15:07

## 2022-07-05 RX ADMIN — SODIUM CHLORIDE, PRESERVATIVE FREE 5 ML: 5 INJECTION INTRAVENOUS at 20:37

## 2022-07-05 RX ADMIN — ACETAMINOPHEN 650 MG: 325 TABLET, FILM COATED ORAL at 17:57

## 2022-07-05 RX ADMIN — Medication 1 TABLET: at 12:43

## 2022-07-05 RX ADMIN — ACETAMINOPHEN 650 MG: 325 TABLET, FILM COATED ORAL at 10:01

## 2022-07-05 RX ADMIN — OXYCODONE HYDROCHLORIDE AND ACETAMINOPHEN 1000 MG: 500 TABLET ORAL at 09:01

## 2022-07-05 RX ADMIN — SODIUM CHLORIDE, PRESERVATIVE FREE 5 ML: 5 INJECTION INTRAVENOUS at 14:22

## 2022-07-05 RX ADMIN — LEVOTHYROXINE SODIUM 100 MCG: 100 TABLET ORAL at 05:59

## 2022-07-05 RX ADMIN — RYDAPT 50 MG: 25 CAPSULE, LIQUID FILLED ORAL at 09:10

## 2022-07-05 RX ADMIN — Medication 50 MCG: at 09:02

## 2022-07-05 RX ADMIN — ACYCLOVIR 400 MG: 400 TABLET ORAL at 09:02

## 2022-07-05 RX ADMIN — PANTOPRAZOLE SODIUM 40 MG: 40 TABLET, DELAYED RELEASE ORAL at 05:59

## 2022-07-05 RX ADMIN — MEDROXYPROGESTERONE ACETATE 10 MG: 10 TABLET ORAL at 09:02

## 2022-07-05 RX ADMIN — VANCOMYCIN HYDROCHLORIDE 125 MG: 125 CAPSULE ORAL at 12:43

## 2022-07-05 RX ADMIN — GLYCERIN, PETROLATUM, PHENYLEPHRINE HCL, PRAMOXINE HCL: 144; 2.5; 10; 15 CREAM TOPICAL at 09:05

## 2022-07-05 RX ADMIN — MICAFUNGIN 50 MG: 10 INJECTION, POWDER, LYOPHILIZED, FOR SOLUTION INTRAVENOUS at 16:32

## 2022-07-05 ASSESSMENT — ACTIVITIES OF DAILY LIVING (ADL)
ADLS_ACUITY_SCORE: 18

## 2022-07-05 NOTE — PLAN OF CARE
[id number]:  Goal Outcome Evaluation:  Pt A&Ox4, VSS on RA. Denies pain/N/V. Loose BM x1. Enteric precautions maintained. Sleep rest/promoted overnight. Continue to monitor and with POC.

## 2022-07-05 NOTE — PLAN OF CARE
"[id number]    /68 (BP Location: Right arm)   Pulse 86   Temp 99  F (37.2  C) (Oral)   Resp 16   Ht 1.702 m (5' 7\")   Wt 114.8 kg (253 lb 1.6 oz)   SpO2 97%   BMI 39.64 kg/m       Reason for admission: Chemo for new AML, fevers, c-diff  Activity: UAL  Pain: Denies  Neuro: A&Ox4, neuros intact  Cardiac: Febrile, T-max 100.5, MD aware.   Respiratory: Room air, denies SOB  GI/: C-diff positive, continues to have loose stools. UA sent to lab. Voiding spontaneously.  Diet: Regular, tolerating well  Lines: DL PICC HL, blood return noted  Wounds: Some bruising on left arm. Rectum continues to be irritated, preparation H cream applied by patient.   Labs/imaging: Reviewed, see chart.      New changes this shift: T-max 100.4. Tylenol given. MD aware, UA & blood cultures collected. Carafate given x1 for discomfort with eating.      Continue to follow POC     "

## 2022-07-05 NOTE — PLAN OF CARE
"Goal Outcome Evaluation:    /68   Pulse 82   Temp 98.1  F (36.7  C)   Resp 16   Ht 1.702 m (5' 7\")   Wt 114.8 kg (253 lb 1.6 oz)   SpO2 98%   BMI 39.64 kg/m      Pt A&Ox4. VSS. Afebrile. Denied pain. Endorsed continued loose/watery stools. Pt had platelets transfused, noted itching and hives with administration. Had been premedicated with PO tylenol and benadryl. PRETTY Zuniga made aware. Ordered one time 25 mg IV benadryl. Platelets restarted per verbal order after hives had notably resolved. No new itching or hives one hour into restarting platelets.   Parents at bedside today.  "

## 2022-07-05 NOTE — PROGRESS NOTES
Swing 2 crosscover:     Notified that patient fevering to 100.4F, ordered repeat BC, UC, restarted cefepime.    Pily Webber MD

## 2022-07-05 NOTE — PROGRESS NOTES
[facility]  Hematology / Oncology Progress Note    Date of Admission: 6/16/2022  Date of Service (when I saw the patient): 07/05/2022     Assessment & Plan   Veda Marinelli is a 37 year old female with PMH significant for h/o COVID19 infection (10/2021), C.diff, and hypothyroidism who presented with leukocytosis and circulating blasts. 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 She initiated induction chemotherapy with 7+3+midostaurin (C1D1=6/22/22). Hospital course complicated by neutropenic fever and C.difficile colitis.    Today:  - D14 of 7+3+midostaurin  - hives during Plt transfusion today (despite premeds). Give IV benadryl x once and try to re-challenge. If unable to complete, will check Plt count this evening.  - message sent to BMT team to follow-up on scheduling of consult      HEME  # AML (FLT3 ITD(low), NPM1, IDH2)  Was in her usual state of health until 03/2022 when she underwent a routine physical with labs. On 3/8/2022, white blood cell count 2.5 (ANC 1.0), Hgb 13.6 with , platelets normal.  On 5/15/2022, she was noted to have further decreasd white blood cell count of 1.6 (ANC 0.28) with hemoglobin 11. platelets were 133. She was ultimately referred to Hematology (Dr. Bob), who she saw on 6/7/22. Further blood workup was recommended and labs done 6/14. CBC revealed WBC 25.3 (ANC 0.8), Hgb 10.1 (), Plt 90K. On the differential and morphology review, 81% blasts were seen concerning for acute leukemia. She was advised to present to [facility] for further evaluation and management.   - Per verbal report from Heme Path fellow there was concern for Blanca rods. She was started on ATRA 6/16 PM-6/17 AM while awaiting PML-ANNELIESE testing. This was ultimately negative.   - s/p Hydrea (6/17-6/22) while awaiting final diagnostic studies  - 6/16 PB chromosomes: 46, XX. No abnormality.   - 6/16 PB FISH: no rearrangments of MLLT10, NUP98, or KMT2A.   - 6/17  BMBx: markedly hypercellular marrow (85-95% estimated cellularity), with markedly diminished trilineage hematopoiesis, no overt dysplasia in the evaluable elements, and 92% blasts  - FLT3 PCR: positive for FLT3-ITD(low) with allelic ratio 0.21 (corrected from previously reported value of 0.144 per staff message to Lamar Grover from molecular lab personnel)  - NGS panel: NPM1 positive, IDH2, FLT3-ITD  - HLA typing sent, BMT coordinators aware and working on arranging IP consultation  - receiving induction with 7+3 (cytarabine+daunorubicin PLUS midostaurin (D1= 6/22/22)   - Will plan for repeat BMBx ~Day 21 (7/12); needs to be scheduled     Treatment Plan: Cytarabine and Daunorubicin (7+3) plus Midostaurin (D1=6/22/22)   - Daunorubicin 90mg/m2 - D1-3   - Cytarabine 100 mg/m2 - D1-7   - Midostaurin 50mg BID Day 8-21, started 6/29 PM. Consider scheduled Zofran premed BID with midostaurin dosing should pt develop difficulty with nausea.      # Pancytopenia secondary to chemotherapy and AML  - Transfuse to maintain Hgb >7, Plt >10K     GYN  # Vaginal bleeding, improving   Menstrual cycle began inpatient overnight 6/30-7/1. Usually heaviest flow is first ~3 days. Reviewed neutropenic precautions and advised not to use tampons at this time. On 7/4, bleeding is improving but she is still passing some small clots.  - adjusted Plt parameter to >20K with active bleeding  - Provera 10mg daily while bleeding continues    ID  # Neutropenic Fever, recurrent  # Recurrent C.diff colitis  (1) Fevered to 101.6F on 6/23. Noted to have chills though no other focal symptoms. Afebrile thereafter.  - 6/16 baseline CT CAP without evidence for infectious source   - 6/23: BCx NG, UA/UCx NGTD, CXR clear  - s/p IV Cefepime (x 6/23-6/27). De-escalated back to ppx Levaquin on 6/27.  (2) Recurrent fever on 6/29 PM. Persistent temps 6/29-6/30, now afebrile since 6/30 afternoon. C.diff positive on 71 - 6/29: BCx pending, UA negative, CXR  negative.   - restarted on Cefepime 2g IV q8hr (x 6/29 PM - 7/4 AM)  - continue PO Vancomycin 125mg QID (x 7/1); plan for treatment course followed by taper/ongoing maintenance dosing.    # ID PPx  - ACV 400mg BID  - resume Levaquin 250mg daily   - Micafungin 50mg IV daily. Given that she will be on midostaurin, will keep on Micafungin to limit potential interactions with posaconazole/voriconazole.  - when able to transition to PO antifungal, Prior Auth approved for both posaconazole and voriconazole. However, as of  6/29, pt has not met deductible (but should meet it once hospital stay is billed). At present, monthly cost for Posaconazole $2048.66, Voriconazole $176.41.     # h/o COVID-19  Pt is not vaccinated nor interested in being vaccinated. She was admitted from 10/1/2021 - 10/13/2021 for acute hypoxic respiratory failure from COVID-19 pneumonia.  Required IMC, high flow and intermittent CPAP. She was treated with dexamethasone, remdesivir, and baricitinib in addition to azithromycin and ceftriaxone. Recovered symptomatically from this.    GI  # H/o C.diff (10/2021)  # Hemorrhoid  # Recurrent C.diff (7/1)  H/o C.diff (10/20/21) and course was complicated by hemorrhoid which does wax/wane and was sometimes painful. She primarily has used witch hazel pads when symptomatic. Pt reported normal bowels at time of AML diagnosis. Developed intermittent loose stools throughout admission, then increased frequency of loose/watery stools overnight 6/30-7/1. Rechecked C.diff (7/1), positive.   - 6/16 CT A/P negative for perirectal abscess.  - TUCKS PRN, add Prep H (7/4)  - Avoid PTA probiotic at this time.  - baseline 6/19 C. Diff negative so we deferred prophylactic PO Vancomycin during chemo course  - recurrent/increased loose stools reported on 7/1, checked C.diff and this was positive. Continue PO Vancomycin as above.    # Mucositis, improved  On ~6/27, pt noting inflammatory changes in b/l buccal mucosa and small  lesion on right inner lower lip. Not painful or affecting PO intake at this time.   - MMW PRN, viscous lidocaine PRN    # GERD  - TUMS PRN  - PPI daily     # Distal esophageal discomfort/dysphagia - improving  On 6/29, patient reported new onset of epigastric discomfort when swallowing solid foods, not with liquids. No radiation to back or other areas of abdomen, no RUQ tenderness. Denies reflux, nausea or emesis. Already on PPI as above. No e/o oral candidiasis, on micafungin ppx. Question relation to GI mucosal changes? Nearly resolved on 7/4.  - Trial Carafate before meals and at nighttime --> can back off to PRN (x 7/4)  - simethicone PRN, Maalox PRN, GI Cocktail PRN  - Continue to monitor, could consider barium swallow study if persistent     Endocrine  # Hypothyroidism  PTA was on Synthroid 125mcg daily. TSH 0.04, T4 Free 1.48 (done 6/24). Per pt, her PCP reviewed and advised her to reduce her Synthroid to 100mcg daily. Ordered to start on 6/29.     MISC  # Bilateral ear erythema, pruritis - nearly resolved  On 6/29, pt reported bilateral ear lobe erythema and irritation in ear canal. No mastoid or auricular tenderness. Otoscopic exam (6/29) with cerumen impaction of L ear canal, normal canal and TM on R. No discharge from ear canal. Ear canal irritation improved on 6/30 but pt continues with erythema/irritation and pruritis of b/l ear lobs.   - Trial triamcinolone cream --> PRN     FEN  - Regular diet as tolerated    PPx  - VTE: none due to thrombocytopenia  - GI: PPI     Dispo:  Anticipate 4+ week LOS for management of acute leukemia. Presented under Dr. Won Perkins.     Plan of care discussed with Dr. Earl.     I spent >35 minutes in the care of this patient today, which included time necessary for review of interval events, obtaining history and physical exam, adjusting medication orders, discussion with interdisciplinary/consult team(s), and documentation time. Over 50% of time was spent face-to-face  and/or coordinating care.     Plan of care discussed with Dr. Earl.    Lamar Grover PA-C  Hematology/Oncology   pager: [phone]       Interval History   No acute events overnight, afebrile. Epigastric discomfort with eating remains nearly resolved. Loose stools continue but are less frequent. Denies nausea. Vaginal bleeding also improving but still present. Parents at bedside today, asking about when BMT consult will be. Worked with PT earlier, feels ok when up and moving around.    Physical Exam   Temp: 98.1  F (36.7  C) Temp src: Oral BP: 110/68 Pulse: 82   Resp: 16 SpO2: 98 % O2 Device: None (Room air)    Vitals:    07/03/22 0619 07/04/22 1045 07/05/22 0900   Weight: 114.8 kg (253 lb 1.4 oz) 114.9 kg (253 lb 4.9 oz) 114.8 kg (253 lb 1.6 oz)     Vital Signs with Ranges  Temp:  [97.9  F (36.6  C)-99.7  F (37.6  C)] 98.1  F (36.7  C)  Pulse:  [77-96] 82  Resp:  [16-18] 16  BP: ()/(62-82) 110/68  SpO2:  [92 %-99 %] 98 %  No intake/output data recorded.    Constitutional: Awake and conversational. Non-toxic appearing. No acute distress.   HEENT: NC/AT. MMM.   Respiratory: Breathing comfortable with no increased work on room air. Cardiovascular: Regular rate and rhythm. No peripheral edema.    GI: Normal BS. Abdomen is soft, non-distended, non-tender throughout and particularly no epigastric tenderness.   Skin: Skin is clean, dry, intact. Few scattered hives on shoulders/upper chest.  Musculoskeletal/ Extremities: Extremities grossly normal in appearance.  Neurologic: Alert and oriented. Speech normal. Grossly nonfocal.   VAD: PICC line in E, c/d/i. She has surrounding ecchymoses in various stages of healing.       Medications     - MEDICATION INSTRUCTIONS -         acyclovir  400 mg Oral BID     levofloxacin  250 mg Oral Daily     levothyroxine  100 mcg Oral QAM AC     medroxyPROGESTERone  10 mg Oral Daily     micafungin  50 mg Intravenous Q24H     midostaurin  50 mg Oral Q12H     multivitamin  "w/minerals  1 tablet Oral Daily     pantoprazole  40 mg Oral QAM AC     pramox-pe-glycerin-petrolatum   Rectal TID     vancomycin  125 mg Oral 4x Daily     vitamin C  1,000 mg Oral Daily     vitamin D3  50 mcg Oral Daily       Data   Results for orders placed or performed during the hospital encounter of 06/16/22 (from the past 24 hour(s))   CBC with platelets differential    Narrative    The following orders were created for panel order CBC with platelets differential.  Procedure                               Abnormality         Status                     ---------                               -----------         ------                     CBC with platelets and d...[[id number]]  Abnormal            Final result               Manual Differential[[id number]]          Abnormal            Final result                 Please view results for these tests on the individual orders.   Basic metabolic panel   Result Value Ref Range    Creatinine 0.46 (L) 0.51 - 0.95 mg/dL    Sodium 140 136 - 145 mmol/L    Potassium 3.6 3.4 - 5.3 mmol/L    Urea Nitrogen 10.8 6.0 - 20.0 mg/dL    Chloride 108 (H) 98 - 107 mmol/L    Carbon Dioxide (CO2) 23 22 - 29 mmol/L    Anion Gap 9 7 - 15 mmol/L    Glucose 111 (H) 70 - 99 mg/dL    GFR Estimate >90 >60 mL/min/1.73m2    Calcium 9.2 8.6 - 10.0 mg/dL   Magnesium   Result Value Ref Range    Magnesium 2.2 1.7 - 2.3 mg/dL   Phosphorus   Result Value Ref Range    Phosphorus 4.2 2.5 - 4.5 mg/dL   CBC with platelets and differential   Result Value Ref Range    WBC Count 0.8 (LL) 4.0 - 11.0 10e3/uL    RBC Count 2.21 (L) 3.80 - 5.20 10e6/uL    Hemoglobin 7.1 (L) 11.7 - 15.7 g/dL    Hematocrit 20.5 (L) 35.0 - 47.0 %    MCV 93 78 - 100 fL    MCH 32.1 26.5 - 33.0 pg    MCHC 34.6 31.5 - 36.5 g/dL    RDW 14.0 10.0 - 15.0 %    Platelet Count 11 (LL) 150 - 450 10e3/uL    Narrative    Previously reported component [ NRBCs ] is no longer reported.\"  Previously reported component [ NRBCs Absolute ] is no longer " "reported.\"   Manual Differential   Result Value Ref Range    % Neutrophils 0 %    % Lymphocytes 98 %    % Monocytes 2 %    % Eosinophils 0 %    % Basophils 0 %    NRBCs per 100 WBC 3 (H) <=0 %    Absolute Neutrophils 0.0 (LL) 1.6 - 8.3 10e3/uL    Absolute Lymphocytes 0.8 0.8 - 5.3 10e3/uL    Absolute Monocytes 0.0 0.0 - 1.3 10e3/uL    Absolute Eosinophils 0.0 0.0 - 0.7 10e3/uL    Absolute Basophils 0.0 0.0 - 0.2 10e3/uL    Absolute NRBCs 0.0 <=0.0 10e3/uL    RBC Morphology Confirmed RBC Indices     Platelet Assessment  Automated Count Confirmed. Platelet morphology is normal.     Automated Count Confirmed. Platelet morphology is normal.   CONDITIONAL Prepare pheresed platelets (unit)   Result Value Ref Range    UNIT ABO/RH A Neg     Unit Number [id number]     Unit Status Issued     Blood Component Type Platelets     Product Code C8058Q20     CODING SYSTEM [id number]     UNIT TYPE ISBT 0600     ISSUE DATE AND TIME [id number]        "

## 2022-07-05 NOTE — PROGRESS NOTES
A&O times 4. Pt denies pain, SOB, nausea, and dizziness. Pt states she is still having frequent loose stools but that it is improving. Pt has no further needs and is safe with call light in reach.

## 2022-07-06 LAB
ABO/RH(D): NORMAL
ALBUMIN SERPL BCG-MCNC: 3.8 G/DL (ref 3.5–5.2)
ALP SERPL-CCNC: 48 U/L (ref 35–104)
ALT SERPL W P-5'-P-CCNC: 10 U/L (ref 10–35)
ANION GAP SERPL CALCULATED.3IONS-SCNC: 9 MMOL/L (ref 7–15)
ANTIBODY SCREEN: NEGATIVE
AST SERPL W P-5'-P-CCNC: 7 U/L (ref 10–35)
BILIRUB DIRECT SERPL-MCNC: 0.29 MG/DL (ref 0–0.3)
BILIRUB SERPL-MCNC: 1.2 MG/DL
BLD PROD TYP BPU: NORMAL
BLOOD COMPONENT TYPE: NORMAL
BUN SERPL-MCNC: 8.4 MG/DL (ref 6–20)
CALCIUM SERPL-MCNC: 9.1 MG/DL (ref 8.6–10)
CHLORIDE SERPL-SCNC: 106 MMOL/L (ref 98–107)
CODING SYSTEM: NORMAL
CREAT SERPL-MCNC: 0.42 MG/DL (ref 0.51–0.95)
CROSSMATCH: NORMAL
DEPRECATED HCO3 PLAS-SCNC: 23 MMOL/L (ref 22–29)
ERYTHROCYTE [DISTWIDTH] IN BLOOD BY AUTOMATED COUNT: 13.6 % (ref 10–15)
GFR SERPL CREATININE-BSD FRML MDRD: >90 ML/MIN/1.73M2
GLUCOSE SERPL-MCNC: 111 MG/DL (ref 70–99)
HCT VFR BLD AUTO: 18.9 % (ref 35–47)
HGB BLD-MCNC: 6.6 G/DL (ref 11.7–15.7)
ISSUE DATE AND TIME: NORMAL
LACTATE SERPL-SCNC: 0.8 MMOL/L (ref 0.7–2)
MAGNESIUM SERPL-MCNC: 2 MG/DL (ref 1.7–2.3)
MCH RBC QN AUTO: 32.4 PG (ref 26.5–33)
MCHC RBC AUTO-ENTMCNC: 34.9 G/DL (ref 31.5–36.5)
MCV RBC AUTO: 93 FL (ref 78–100)
MRSA DNA SPEC QL NAA+PROBE: NEGATIVE
PHOSPHATE SERPL-MCNC: 3.4 MG/DL (ref 2.5–4.5)
PLAT MORPH BLD: NORMAL
PLATELET # BLD AUTO: 23 10E3/UL (ref 150–450)
POTASSIUM SERPL-SCNC: 3.6 MMOL/L (ref 3.4–5.3)
PROT SERPL-MCNC: 6.4 G/DL (ref 6.4–8.3)
RBC # BLD AUTO: 2.04 10E6/UL (ref 3.8–5.2)
RBC MORPH BLD: NORMAL
SA TARGET DNA: NEGATIVE
SODIUM SERPL-SCNC: 138 MMOL/L (ref 136–145)
SPECIMEN EXPIRATION DATE: NORMAL
UNIT ABO/RH: NORMAL
UNIT NUMBER: NORMAL
UNIT STATUS: NORMAL
UNIT TYPE ISBT: 9500
WBC # BLD AUTO: 0.4 10E3/UL (ref 4–11)

## 2022-07-06 PROCEDURE — 250N000011 HC RX IP 250 OP 636: Performed by: INTERNAL MEDICINE

## 2022-07-06 PROCEDURE — P9016 RBC LEUKOCYTES REDUCED: HCPCS | Performed by: PHYSICIAN ASSISTANT

## 2022-07-06 PROCEDURE — 86850 RBC ANTIBODY SCREEN: CPT | Performed by: PHYSICIAN ASSISTANT

## 2022-07-06 PROCEDURE — 99233 SBSQ HOSP IP/OBS HIGH 50: CPT | Performed by: STUDENT IN AN ORGANIZED HEALTH CARE EDUCATION/TRAINING PROGRAM

## 2022-07-06 PROCEDURE — 36592 COLLECT BLOOD FROM PICC: CPT | Performed by: PHYSICIAN ASSISTANT

## 2022-07-06 PROCEDURE — 250N000011 HC RX IP 250 OP 636: Performed by: PHYSICIAN ASSISTANT

## 2022-07-06 PROCEDURE — 84100 ASSAY OF PHOSPHORUS: CPT | Performed by: PHYSICIAN ASSISTANT

## 2022-07-06 PROCEDURE — 36592 COLLECT BLOOD FROM PICC: CPT | Performed by: INTERNAL MEDICINE

## 2022-07-06 PROCEDURE — 80053 COMPREHEN METABOLIC PANEL: CPT | Performed by: PHYSICIAN ASSISTANT

## 2022-07-06 PROCEDURE — 87641 MR-STAPH DNA AMP PROBE: CPT | Performed by: PHYSICIAN ASSISTANT

## 2022-07-06 PROCEDURE — 85027 COMPLETE CBC AUTOMATED: CPT | Performed by: PHYSICIAN ASSISTANT

## 2022-07-06 PROCEDURE — 86901 BLOOD TYPING SEROLOGIC RH(D): CPT | Performed by: PHYSICIAN ASSISTANT

## 2022-07-06 PROCEDURE — 83605 ASSAY OF LACTIC ACID: CPT | Performed by: INTERNAL MEDICINE

## 2022-07-06 PROCEDURE — 82248 BILIRUBIN DIRECT: CPT | Performed by: PHYSICIAN ASSISTANT

## 2022-07-06 PROCEDURE — 258N000003 HC RX IP 258 OP 636: Performed by: PHYSICIAN ASSISTANT

## 2022-07-06 PROCEDURE — 250N000013 HC RX MED GY IP 250 OP 250 PS 637: Performed by: STUDENT IN AN ORGANIZED HEALTH CARE EDUCATION/TRAINING PROGRAM

## 2022-07-06 PROCEDURE — 120N000002 HC R&B MED SURG/OB UMMC

## 2022-07-06 PROCEDURE — 250N000012 HC RX MED GY IP 250 OP 636 PS 637: Performed by: STUDENT IN AN ORGANIZED HEALTH CARE EDUCATION/TRAINING PROGRAM

## 2022-07-06 PROCEDURE — 86923 COMPATIBILITY TEST ELECTRIC: CPT | Performed by: PHYSICIAN ASSISTANT

## 2022-07-06 PROCEDURE — 82435 ASSAY OF BLOOD CHLORIDE: CPT | Performed by: PHYSICIAN ASSISTANT

## 2022-07-06 PROCEDURE — 83735 ASSAY OF MAGNESIUM: CPT | Performed by: PHYSICIAN ASSISTANT

## 2022-07-06 PROCEDURE — 250N000013 HC RX MED GY IP 250 OP 250 PS 637: Performed by: PHYSICIAN ASSISTANT

## 2022-07-06 RX ORDER — TRIAMCINOLONE ACETONIDE 1 MG/G
CREAM TOPICAL 2 TIMES DAILY
Status: DISCONTINUED | OUTPATIENT
Start: 2022-07-06 | End: 2022-07-12

## 2022-07-06 RX ADMIN — Medication 50 MCG: at 08:48

## 2022-07-06 RX ADMIN — MEDROXYPROGESTERONE ACETATE 10 MG: 10 TABLET ORAL at 08:48

## 2022-07-06 RX ADMIN — TRIAMCINOLONE ACETONIDE: 1 CREAM TOPICAL at 12:19

## 2022-07-06 RX ADMIN — SODIUM CHLORIDE, PRESERVATIVE FREE 5 ML: 5 INJECTION INTRAVENOUS at 14:01

## 2022-07-06 RX ADMIN — Medication 1 TABLET: at 12:19

## 2022-07-06 RX ADMIN — CEFEPIME HYDROCHLORIDE 2 G: 2 INJECTION, POWDER, FOR SOLUTION INTRAVENOUS at 02:07

## 2022-07-06 RX ADMIN — TRIAMCINOLONE ACETONIDE: 1 CREAM TOPICAL at 19:51

## 2022-07-06 RX ADMIN — VANCOMYCIN HYDROCHLORIDE 125 MG: 125 CAPSULE ORAL at 12:19

## 2022-07-06 RX ADMIN — SODIUM CHLORIDE, PRESERVATIVE FREE 5 ML: 5 INJECTION INTRAVENOUS at 07:48

## 2022-07-06 RX ADMIN — SUCRALFATE 1 G: 1 SUSPENSION ORAL at 17:44

## 2022-07-06 RX ADMIN — PANTOPRAZOLE SODIUM 40 MG: 40 TABLET, DELAYED RELEASE ORAL at 06:24

## 2022-07-06 RX ADMIN — ACETAMINOPHEN 650 MG: 325 TABLET, FILM COATED ORAL at 18:50

## 2022-07-06 RX ADMIN — SUCRALFATE 1 G: 1 SUSPENSION ORAL at 08:48

## 2022-07-06 RX ADMIN — Medication 0.25 G: at 12:18

## 2022-07-06 RX ADMIN — VANCOMYCIN HYDROCHLORIDE 125 MG: 125 CAPSULE ORAL at 19:51

## 2022-07-06 RX ADMIN — VANCOMYCIN HYDROCHLORIDE 125 MG: 125 CAPSULE ORAL at 08:48

## 2022-07-06 RX ADMIN — VANCOMYCIN HYDROCHLORIDE 125 MG: 125 CAPSULE ORAL at 16:12

## 2022-07-06 RX ADMIN — ACYCLOVIR 400 MG: 400 TABLET ORAL at 08:48

## 2022-07-06 RX ADMIN — OXYCODONE HYDROCHLORIDE AND ACETAMINOPHEN 1000 MG: 500 TABLET ORAL at 08:48

## 2022-07-06 RX ADMIN — SUCRALFATE 1 G: 1 SUSPENSION ORAL at 12:28

## 2022-07-06 RX ADMIN — ACYCLOVIR 400 MG: 400 TABLET ORAL at 19:51

## 2022-07-06 RX ADMIN — CEFEPIME HYDROCHLORIDE 2 G: 2 INJECTION, POWDER, FOR SOLUTION INTRAVENOUS at 18:35

## 2022-07-06 RX ADMIN — ACETAMINOPHEN 650 MG: 325 TABLET, FILM COATED ORAL at 12:19

## 2022-07-06 RX ADMIN — RYDAPT 50 MG: 25 CAPSULE, LIQUID FILLED ORAL at 19:51

## 2022-07-06 RX ADMIN — ACETAMINOPHEN 650 MG: 325 TABLET, FILM COATED ORAL at 02:13

## 2022-07-06 RX ADMIN — GLYCERIN, PETROLATUM, PHENYLEPHRINE HCL, PRAMOXINE HCL: 144; 2.5; 10; 15 CREAM TOPICAL at 14:01

## 2022-07-06 RX ADMIN — MICAFUNGIN 50 MG: 10 INJECTION, POWDER, LYOPHILIZED, FOR SOLUTION INTRAVENOUS at 16:12

## 2022-07-06 RX ADMIN — GLYCERIN, PETROLATUM, PHENYLEPHRINE HCL, PRAMOXINE HCL: 144; 2.5; 10; 15 CREAM TOPICAL at 10:25

## 2022-07-06 RX ADMIN — GLYCERIN, PETROLATUM, PHENYLEPHRINE HCL, PRAMOXINE HCL: 144; 2.5; 10; 15 CREAM TOPICAL at 19:51

## 2022-07-06 RX ADMIN — CEFEPIME HYDROCHLORIDE 2 G: 2 INJECTION, POWDER, FOR SOLUTION INTRAVENOUS at 10:25

## 2022-07-06 RX ADMIN — LEVOTHYROXINE SODIUM 100 MCG: 100 TABLET ORAL at 06:23

## 2022-07-06 RX ADMIN — RYDAPT 50 MG: 25 CAPSULE, LIQUID FILLED ORAL at 08:48

## 2022-07-06 ASSESSMENT — ACTIVITIES OF DAILY LIVING (ADL)
ADLS_ACUITY_SCORE: 18

## 2022-07-06 NOTE — PLAN OF CARE
"/52 (BP Location: Right arm)   Pulse 98   Temp 99.8  F (37.7  C) (Oral)   Resp 18   Ht 1.702 m (5' 7\")   Wt 115.2 kg (253 lb 14.4 oz)   SpO2 96%   BMI 39.77 kg/m      7901-5713: Intermittently tachycardic, OVSS on RA. Afebrile this shift. Denies nausea and SOB. Indigestion controlled by PRN carafate given prior to meals. Hemorrhoid pain controlled by diltiazem cream and scheduled preparation H. Continues having loose stools, on PO vanco. Provera discontinued this shift - pt reports no more spotting. RBCs transfused without incident for hgb 6.6. Triamcinolone cream scheduled for rash on chest/abdomen. MRSA swab collected and sent - see results. Continues on V cefepime - PICC heparin locked between infusions. Up in chair most of the day. Fair appetite. Continue to monitor.    "

## 2022-07-06 NOTE — PROGRESS NOTES
[facility]  Hematology / Oncology Progress Note    Date of Admission: 6/16/2022  Date of Service (when I saw the patient): 07/06/2022     Assessment & Plan   Veda Marinelli is a 37 year old female with PMH significant for h/o COVID19 infection (10/2021), C.diff, and hypothyroidism who presented with leukocytosis and circulating blasts. 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 She initiated induction chemotherapy with 7+3+midostaurin (C1D1=6/22/22). Hospital course complicated by neutropenic fever and C.difficile colitis.    Today:  - D15 of 7+3+midostaurin  - 1U PRBCs today  - D21 BMBx now scheduled for 7/12 @ 11 AM  - recurrent neutropenic fever 7/5-7/6, back on Cefepime. Follow ID workup. If has recurrent fevers, will get further imaging.  - continue PO Vancomycin for C.diff  - new rash on torso and upper abdomen, not itchy or bothersome to pt. Suspect possible cytarabine rash. Pt has been on/off Cefepime and previously tolerated but rash to abx is also in differential. Can try triamcinolone cream to rash sites.  - follow erythematous/tender lesion to right lower shin (outlined on 7/6)      HEME  # AML (FLT3 ITD(low), NPM1, IDH2)  Was in her usual state of health until 03/2022 when she underwent a routine physical with labs. On 3/8/2022, white blood cell count 2.5 (ANC 1.0), Hgb 13.6 with , platelets normal.  On 5/15/2022, she was noted to have further decreasd white blood cell count of 1.6 (ANC 0.28) with hemoglobin 11. platelets were 133. She was ultimately referred to Hematology (Dr. Bob), who she saw on 6/7/22. Further blood workup was recommended and labs done 6/14. CBC revealed WBC 25.3 (ANC 0.8), Hgb 10.1 (), Plt 90K. On the differential and morphology review, 81% blasts were seen concerning for acute leukemia. She was advised to present to [facility] for further evaluation and management.   - Per verbal report from Heme Path fellow there was  concern for Blanca rods. She was started on ATRA 6/16 PM-6/17 AM while awaiting PML-ANNELIESE testing. This was ultimately negative.   - s/p Hydrea (6/17-6/22) while awaiting final diagnostic studies  - 6/16 PB chromosomes: 46, XX. No abnormality.   - 6/16 PB FISH: no rearrangments of MLLT10, NUP98, or KMT2A.   - 6/17 BMBx: markedly hypercellular marrow (85-95% estimated cellularity), with markedly diminished trilineage hematopoiesis, no overt dysplasia in the evaluable elements, and 92% blasts  - FLT3 PCR: positive for FLT3-ITD(low) with allelic ratio 0.21 (corrected from previously reported value of 0.144 per staff message to Lamar Grover from molecular lab personnel)  - NGS panel: NPM1 positive, IDH2, FLT3-ITD  - HLA typing sent, BMT coordinators aware and working on arranging IP consultation  - receiving induction with 7+3 (cytarabine+daunorubicin PLUS midostaurin (D1= 6/22/22)   - D21 BMBx scheduled on 7/12 at 11 AM    # Pancytopenia secondary to chemotherapy and AML  - Transfuse to maintain Hgb >7, Plt >10K     GYN  # Vaginal bleeding, nearly resolved  Menstrual cycle began inpatient overnight 6/30-7/1. Usually heaviest flow is first ~3 days. Reviewed neutropenic precautions and advised not to use tampons at this time. As of 7/6, bleeding is nearly resolved.  - adjusted Plt parameter to >20K with active bleeding. Will reassess counts on 7/7 but likely plan to decrease parameter back to 10K  - Provera 10mg daily while bleeding continues. Will stop after today's dose and monitor.     ID  # Neutropenic Fever, recurrent  # Recurrent C.diff colitis  (1) Fevered to 101.6F on 6/23. Noted to have chills though no other focal symptoms. Afebrile thereafter.  - 6/16 baseline CT CAP without evidence for infectious source   - 6/23: BCx NG, UA/UCx NGTD, CXR clear  - s/p IV Cefepime (x 6/23-6/27). De-escalated back to ppx Levaquin on 6/27.  (2) Recurrent fever on 6/29 PM. Persistent temps 6/29-6/30, then afebrile since 6/30  afternoon. BCx, UA, CXR negative. C.diff positive on 7/1  - restarted on Cefepime 2g IV q8hr (x 6/29 PM - 7/4 AM)  (3) Recurrent fever 100.4-->101 (7/5-7/6), restarted Cefepime (x 7/5)  - BCx/UCx pending  - if continues to have fevers, will get repeat imaging  - check MRSA nares swab    **Antibiotics:  - Cefepime (6/23-6/27, 6/29-7/4), (x 7/5 - present)  - PO Vancomycin 125mg QID (x 7/1); plan for treatment course at least 10-14 days but will also depend on ongoing systemic IV abx, followed by ongoing maintenance dosing.    # ID PPx  - ACV 400mg BID  - resume Levaquin 250mg daily   - Micafungin 50mg IV daily. Given that she will be on midostaurin, will keep on Micafungin to limit potential interactions with posaconazole/voriconazole.  - when able to transition to PO antifungal, Prior Auth approved for both posaconazole and voriconazole. However, as of  6/29, pt has not met deductible (but should meet it once hospital stay is billed). At present, monthly cost for Posaconazole $2048.66, Voriconazole $176.41.     # h/o COVID-19  Pt is not vaccinated nor interested in being vaccinated. She was admitted from 10/1/2021 - 10/13/2021 for acute hypoxic respiratory failure from COVID-19 pneumonia.  Required IMC, high flow and intermittent CPAP. She was treated with dexamethasone, remdesivir, and baricitinib in addition to azithromycin and ceftriaxone. Recovered symptomatically from this.    GI  # H/o C.diff (10/2021)  # Hemorrhoid  # Recurrent C.diff (7/1)  H/o C.diff (10/20/21) and course was complicated by hemorrhoid which does wax/wane and was sometimes painful. She primarily has used witch hazel pads when symptomatic. Pt reported normal bowels at time of AML diagnosis. Developed intermittent loose stools throughout admission, then increased frequency of loose/watery stools overnight 6/30-7/1. Rechecked C.diff (7/1), positive.   - 6/16 CT A/P negative for perirectal abscess.  - TUCKS PRN, add Prep H (7/4), dilt cream  -  Avoid PTA probiotic at this time.  - baseline 6/19 C. Diff negative so we deferred prophylactic PO Vancomycin during chemo course  - recurrent/increased loose stools reported on 7/1, checked C.diff and this was positive. Continue PO Vancomycin as above.    # Mucositis, improved  On ~6/27, pt noting inflammatory changes in b/l buccal mucosa and small lesion on right inner lower lip. Not painful or affecting PO intake. Since improved and pt has denied mouth pain/sores recently, eating/drinking well.   - MMW PRN, viscous lidocaine PRN    # GERD  - TUMS PRN  - PPI daily     # Distal esophageal discomfort/dysphagia - improved  On 6/29, patient reported new onset of epigastric discomfort when swallowing solid foods, not with liquids. No radiation to back or other areas of abdomen, no RUQ tenderness. Denies reflux, nausea or emesis. Already on PPI as above. No e/o oral candidiasis, on micafungin ppx. Question relation to GI mucosal changes? Nearly resolved on 7/4.  - Trial Carafate before meals and at nighttime --> can back off to PRN (x 7/4)  - simethicone PRN, Maalox PRN, GI Cocktail PRN  - Continue to monitor, could consider barium swallow study if persistent     Endocrine  # Hypothyroidism  PTA was on Synthroid 125mcg daily. TSH 0.04, T4 Free 1.48 (done 6/24). Per pt, her PCP reviewed and advised her to reduce her Synthroid to 100mcg daily. Ordered to start on 6/29.     DERM  # Bilateral ear erythema, pruritis - resolved  On 6/29, pt reported bilateral ear lobe erythema and irritation in ear canal. No mastoid or auricular tenderness. Otoscopic exam (6/29) with cerumen impaction of L ear canal, normal canal and TM on R. No discharge from ear canal. Ear canal irritation improved on 6/30 but pt continues with erythema/irritation and pruritis of b/l ear lobs. Resolved early week of 7/4.  - Trial triamcinolone cream --> PRN     # Truncal rash  Noted rash to upper abdomen which progressed to upper torso and back throughout  morning of 7/6. Not itchy or bothersome to pt. Suspect possible cytarabine rash. Pt has been on/off Cefepime and previously tolerated but rash to abx is also in differential. Can try triamcinolone cream to rash sites.    # Lesion to R shin  - outlined, monitor for worsening cellulitis     FEN  - Regular diet as tolerated  - lyte repletion per unit protocol  - regular diet     PPx  - VTE: none due to thrombocytopenia  - GI: PPI     Dispo:  Anticipate 4+ week LOS for management of acute leukemia. Presented under Dr. Won Perkins.     Plan of care discussed with Dr. Pantoja     I spent >35 minutes in the care of this patient today, which included time necessary for review of interval events, obtaining history and physical exam, adjusting medication orders, discussion with interdisciplinary/consult team(s), and documentation time. Over 50% of time was spent face-to-face and/or coordinating care.     Plan of care discussed with Dr. Scarlett Grover PA-C  Hematology/Oncology   pager: [phone]       Interval History   No acute events overnight, afebrile. Doing ok again today. Noted new rash across her upper abdomen today, now also on lower torso and back. Not itchy or bothersome to her. She also noted increased erythematous/purple lesion to right lower shin that was tender. States there has been a small spot there over the last several days but she noticed it was larger and more tender when showering today. Ongoing loose stools but overall improving slowly. Denies abd pain or cramping. Hemorrhoidal discomfort is the biggest thing so she plans to try the dilt cream. Denies nausea. Had some of that epigastric discomfort with eating yesterday but it is better than it was. Vaginal bleeding nearly resolved. Suspects her menstrual cycle will be done by tomorrow.     Gave her insurance info to BMT team; they are working on scheduling her consult.       Physical Exam   Temp: 99.8  F (37.7  C) Temp src: Oral BP: 108/52  Pulse: 98   Resp: 18 SpO2: 96 % O2 Device: None (Room air)    Vitals:    07/04/22 1045 07/05/22 0900 07/06/22 0819   Weight: 114.9 kg (253 lb 4.9 oz) 114.8 kg (253 lb 1.6 oz) 115.2 kg (253 lb 14.4 oz)     Vital Signs with Ranges  Temp:  [99  F (37.2  C)-101  F (38.3  C)] 99.8  F (37.7  C)  Pulse:  [] 98  Resp:  [16-20] 18  BP: ()/(52-81) 108/52  SpO2:  [96 %-99 %] 96 %  I/O last 3 completed shifts:  In: 1180 [P.O.:480; I.V.:200]  Out: -     Constitutional: Awake and conversational. Non-toxic appearing. No acute distress. Sitting up in chair, working on her computer.  HEENT: NC/AT. OP pale pink and moist, few tiny petechial like lesions to buccal mucosa. No overt sores or thrush.    Respiratory: Breathing comfortable with no increased work on room air. Lungs CTA b/l.  Cardiovascular: Borderline tachycardia. No peripheral edema.    GI: Normal BS. Abdomen is soft, non-distended, non-tender throughout and particularly no epigastric tenderness.   Skin: Skin is clean, dry, intact. Faint pink papular rash to upper abdomen and lower torso as well as back. Dime size red-purple lesion to right lower shin; outlined. Mildly tender on exam.   Musculoskeletal/ Extremities: Extremities grossly normal in appearance.  Neurologic: Alert and oriented. Speech normal. Grossly nonfocal.   VAD: PICC line in [facility], c/d/i. She has surrounding ecchymoses in various stages of healing.       Medications     - MEDICATION INSTRUCTIONS -         acyclovir  400 mg Oral BID     ceFEPIme (MAXIPIME) IV  2 g Intravenous Q8H     levothyroxine  100 mcg Oral QAM AC     micafungin  50 mg Intravenous Q24H     midostaurin  50 mg Oral Q12H     multivitamin w/minerals  1 tablet Oral Daily     pantoprazole  40 mg Oral QAM AC     pramox-pe-glycerin-petrolatum   Rectal TID     triamcinolone   Topical BID     vancomycin  125 mg Oral 4x Daily     vitamin C  1,000 mg Oral Daily     vitamin D3  50 mcg Oral Daily       Data   Results for orders placed or  performed during the hospital encounter of 06/16/22 (from the past 24 hour(s))   Blood Culture Arm, Right    Specimen: Arm, Right; Blood   Result Value Ref Range    Culture No growth after 12 hours    Blood Culture Red Lumen    Specimen: Red Lumen; Blood   Result Value Ref Range    Culture No growth after 12 hours    Lactic Acid STAT   Result Value Ref Range    Lactic Acid 0.8 0.7 - 2.0 mmol/L   CBC with platelets differential    Narrative    The following orders were created for panel order CBC with platelets differential.  Procedure                               Abnormality         Status                     ---------                               -----------         ------                     CBC with platelets and d...[[id number]]  Abnormal            Final result               RBC and Platelet Morphology[[id number]]                      Final result                 Please view results for these tests on the individual orders.   Basic metabolic panel   Result Value Ref Range    Creatinine 0.42 (L) 0.51 - 0.95 mg/dL    Sodium 138 136 - 145 mmol/L    Potassium 3.6 3.4 - 5.3 mmol/L    Urea Nitrogen 8.4 6.0 - 20.0 mg/dL    Chloride 106 98 - 107 mmol/L    Carbon Dioxide (CO2) 23 22 - 29 mmol/L    Anion Gap 9 7 - 15 mmol/L    Glucose 111 (H) 70 - 99 mg/dL    GFR Estimate >90 >60 mL/min/1.73m2    Calcium 9.1 8.6 - 10.0 mg/dL   Hepatic panel   Result Value Ref Range    Protein Total 6.4 6.4 - 8.3 g/dL    Albumin 3.8 3.5 - 5.2 g/dL    Bilirubin Total 1.2 <=1.2 mg/dL    Alkaline Phosphatase 48 35 - 104 U/L    AST 7 (L) 10 - 35 U/L    ALT 10 10 - 35 U/L    Bilirubin Direct 0.29 0.00 - 0.30 mg/dL   Magnesium   Result Value Ref Range    Magnesium 2.0 1.7 - 2.3 mg/dL   Phosphorus   Result Value Ref Range    Phosphorus 3.4 2.5 - 4.5 mg/dL   CBC with platelets and differential   Result Value Ref Range    WBC Count 0.4 (LL) 4.0 - 11.0 10e3/uL    RBC Count 2.04 (L) 3.80 - 5.20 10e6/uL    Hemoglobin 6.6 (LL) 11.7 - 15.7 g/dL     Hematocrit 18.9 (L) 35.0 - 47.0 %    MCV 93 78 - 100 fL    MCH 32.4 26.5 - 33.0 pg    MCHC 34.9 31.5 - 36.5 g/dL    RDW 13.6 10.0 - 15.0 %    Platelet Count 23 (LL) 150 - 450 10e3/uL   RBC and Platelet Morphology   Result Value Ref Range    Platelet Assessment  Automated Count Confirmed. Platelet morphology is normal.     Automated Count Confirmed. Platelet morphology is normal.    RBC Morphology Confirmed RBC Indices    ABO/Rh type and screen    Narrative    The following orders were created for panel order ABO/Rh type and screen.  Procedure                               Abnormality         Status                     ---------                               -----------         ------                     Adult Type and Screen[[id number]]                            Final result                 Please view results for these tests on the individual orders.   Adult Type and Screen   Result Value Ref Range    ABO/RH(D) A NEG     Antibody Screen Negative Negative    SPECIMEN EXPIRATION DATE [id number]    CONDITIONAL Prepare red blood cells (unit)   Result Value Ref Range    CROSSMATCH Compatible     UNIT ABO/RH O Neg     Unit Number [id number]     Unit Status Issued     Blood Component Type Red Blood Cells     Product Code [id number]     CODING SYSTEM [id number]     UNIT TYPE ISBT 9500     ISSUE DATE AND TIME [id number]

## 2022-07-06 NOTE — PLAN OF CARE
[id number]: Tmax 101. . OVSS on RA. Tylenol given with effect. Triggered sepsis. Lactic 0.8. Voiding well. Frequency/ consistency of stools somewhat improving. Continues with menses.

## 2022-07-07 ENCOUNTER — APPOINTMENT (OUTPATIENT)
Dept: CT IMAGING | Facility: CLINIC | Age: 37
DRG: 834 | End: 2022-07-07
Attending: PHYSICIAN ASSISTANT
Payer: COMMERCIAL

## 2022-07-07 LAB
ANION GAP SERPL CALCULATED.3IONS-SCNC: 9 MMOL/L (ref 7–15)
BACTERIA UR CULT: NO GROWTH
BASOPHILS # BLD MANUAL: 0 10E3/UL (ref 0–0.2)
BASOPHILS NFR BLD MANUAL: 0 %
BLD PROD TYP BPU: NORMAL
BLOOD COMPONENT TYPE: NORMAL
BUN SERPL-MCNC: 6.8 MG/DL (ref 6–20)
CALCIUM SERPL-MCNC: 8.8 MG/DL (ref 8.6–10)
CHLORIDE SERPL-SCNC: 107 MMOL/L (ref 98–107)
CODING SYSTEM: NORMAL
CREAT SERPL-MCNC: 0.44 MG/DL (ref 0.51–0.95)
CROSSMATCH: NORMAL
DEPRECATED HCO3 PLAS-SCNC: 22 MMOL/L (ref 22–29)
EOSINOPHIL # BLD MANUAL: 0 10E3/UL (ref 0–0.7)
EOSINOPHIL NFR BLD MANUAL: 0 %
ERYTHROCYTE [DISTWIDTH] IN BLOOD BY AUTOMATED COUNT: 13.5 % (ref 10–15)
GFR SERPL CREATININE-BSD FRML MDRD: >90 ML/MIN/1.73M2
GLUCOSE SERPL-MCNC: 106 MG/DL (ref 70–99)
HCT VFR BLD AUTO: 18.1 % (ref 35–47)
HGB BLD-MCNC: 6.3 G/DL (ref 11.7–15.7)
ISSUE DATE AND TIME: NORMAL
LACTATE SERPL-SCNC: 0.4 MMOL/L (ref 0.7–2)
LYMPHOCYTES # BLD MANUAL: 0.5 10E3/UL (ref 0.8–5.3)
LYMPHOCYTES NFR BLD MANUAL: 98 %
MAGNESIUM SERPL-MCNC: 2.2 MG/DL (ref 1.7–2.3)
MCH RBC QN AUTO: 32.3 PG (ref 26.5–33)
MCHC RBC AUTO-ENTMCNC: 34.8 G/DL (ref 31.5–36.5)
MCV RBC AUTO: 93 FL (ref 78–100)
MONOCYTES # BLD MANUAL: 0 10E3/UL (ref 0–1.3)
MONOCYTES NFR BLD MANUAL: 0 %
NEUTROPHILS # BLD MANUAL: 0 10E3/UL (ref 1.6–8.3)
NEUTROPHILS NFR BLD MANUAL: 0 %
NRBC # BLD AUTO: 0 10E3/UL
NRBC BLD MANUAL-RTO: 2 %
PHOSPHATE SERPL-MCNC: 3.5 MG/DL (ref 2.5–4.5)
PLASMA CELLS # BLD MANUAL: 0 10E3/UL
PLASMA CELLS NFR BLD MANUAL: 2 %
PLAT MORPH BLD: ABNORMAL
PLATELET # BLD AUTO: 12 10E3/UL (ref 150–450)
POTASSIUM SERPL-SCNC: 3.3 MMOL/L (ref 3.4–5.3)
POTASSIUM SERPL-SCNC: 3.7 MMOL/L (ref 3.4–5.3)
RBC # BLD AUTO: 1.95 10E6/UL (ref 3.8–5.2)
RBC MORPH BLD: ABNORMAL
SODIUM SERPL-SCNC: 138 MMOL/L (ref 136–145)
UNIT ABO/RH: NORMAL
UNIT NUMBER: NORMAL
UNIT STATUS: NORMAL
UNIT TYPE ISBT: 600
WBC # BLD AUTO: 0.5 10E3/UL (ref 4–11)

## 2022-07-07 PROCEDURE — 85027 COMPLETE CBC AUTOMATED: CPT | Performed by: PHYSICIAN ASSISTANT

## 2022-07-07 PROCEDURE — 74177 CT ABD & PELVIS W/CONTRAST: CPT | Mod: 26 | Performed by: STUDENT IN AN ORGANIZED HEALTH CARE EDUCATION/TRAINING PROGRAM

## 2022-07-07 PROCEDURE — 250N000013 HC RX MED GY IP 250 OP 250 PS 637: Performed by: STUDENT IN AN ORGANIZED HEALTH CARE EDUCATION/TRAINING PROGRAM

## 2022-07-07 PROCEDURE — P9016 RBC LEUKOCYTES REDUCED: HCPCS | Performed by: PHYSICIAN ASSISTANT

## 2022-07-07 PROCEDURE — 120N000002 HC R&B MED SURG/OB UMMC

## 2022-07-07 PROCEDURE — 71260 CT THORAX DX C+: CPT | Mod: 26 | Performed by: STUDENT IN AN ORGANIZED HEALTH CARE EDUCATION/TRAINING PROGRAM

## 2022-07-07 PROCEDURE — 74177 CT ABD & PELVIS W/CONTRAST: CPT

## 2022-07-07 PROCEDURE — 36592 COLLECT BLOOD FROM PICC: CPT | Performed by: STUDENT IN AN ORGANIZED HEALTH CARE EDUCATION/TRAINING PROGRAM

## 2022-07-07 PROCEDURE — 999N000044 HC STATISTIC CVC DRESSING CHANGE

## 2022-07-07 PROCEDURE — 87040 BLOOD CULTURE FOR BACTERIA: CPT | Performed by: STUDENT IN AN ORGANIZED HEALTH CARE EDUCATION/TRAINING PROGRAM

## 2022-07-07 PROCEDURE — 250N000012 HC RX MED GY IP 250 OP 636 PS 637: Performed by: STUDENT IN AN ORGANIZED HEALTH CARE EDUCATION/TRAINING PROGRAM

## 2022-07-07 PROCEDURE — 84132 ASSAY OF SERUM POTASSIUM: CPT | Performed by: STUDENT IN AN ORGANIZED HEALTH CARE EDUCATION/TRAINING PROGRAM

## 2022-07-07 PROCEDURE — 99233 SBSQ HOSP IP/OBS HIGH 50: CPT | Performed by: STUDENT IN AN ORGANIZED HEALTH CARE EDUCATION/TRAINING PROGRAM

## 2022-07-07 PROCEDURE — 250N000011 HC RX IP 250 OP 636: Performed by: INTERNAL MEDICINE

## 2022-07-07 PROCEDURE — 36592 COLLECT BLOOD FROM PICC: CPT | Performed by: PHYSICIAN ASSISTANT

## 2022-07-07 PROCEDURE — 250N000011 HC RX IP 250 OP 636: Performed by: STUDENT IN AN ORGANIZED HEALTH CARE EDUCATION/TRAINING PROGRAM

## 2022-07-07 PROCEDURE — 83605 ASSAY OF LACTIC ACID: CPT | Performed by: STUDENT IN AN ORGANIZED HEALTH CARE EDUCATION/TRAINING PROGRAM

## 2022-07-07 PROCEDURE — 83735 ASSAY OF MAGNESIUM: CPT | Performed by: STUDENT IN AN ORGANIZED HEALTH CARE EDUCATION/TRAINING PROGRAM

## 2022-07-07 PROCEDURE — 258N000003 HC RX IP 258 OP 636: Performed by: PHYSICIAN ASSISTANT

## 2022-07-07 PROCEDURE — 84100 ASSAY OF PHOSPHORUS: CPT | Performed by: STUDENT IN AN ORGANIZED HEALTH CARE EDUCATION/TRAINING PROGRAM

## 2022-07-07 PROCEDURE — 250N000013 HC RX MED GY IP 250 OP 250 PS 637: Performed by: PHYSICIAN ASSISTANT

## 2022-07-07 PROCEDURE — 250N000009 HC RX 250: Performed by: STUDENT IN AN ORGANIZED HEALTH CARE EDUCATION/TRAINING PROGRAM

## 2022-07-07 PROCEDURE — 85007 BL SMEAR W/DIFF WBC COUNT: CPT | Performed by: PHYSICIAN ASSISTANT

## 2022-07-07 PROCEDURE — 250N000011 HC RX IP 250 OP 636: Performed by: PHYSICIAN ASSISTANT

## 2022-07-07 PROCEDURE — 82310 ASSAY OF CALCIUM: CPT | Performed by: PHYSICIAN ASSISTANT

## 2022-07-07 RX ORDER — POTASSIUM CHLORIDE 750 MG/1
40 TABLET, EXTENDED RELEASE ORAL ONCE
Status: COMPLETED | OUTPATIENT
Start: 2022-07-07 | End: 2022-07-07

## 2022-07-07 RX ORDER — DIPHENHYDRAMINE HCL 25 MG
25-50 CAPSULE ORAL EVERY 6 HOURS PRN
Status: DISCONTINUED | OUTPATIENT
Start: 2022-07-07 | End: 2022-07-15 | Stop reason: HOSPADM

## 2022-07-07 RX ORDER — IOPAMIDOL 755 MG/ML
125 INJECTION, SOLUTION INTRAVASCULAR ONCE
Status: COMPLETED | OUTPATIENT
Start: 2022-07-07 | End: 2022-07-07

## 2022-07-07 RX ADMIN — CEFEPIME HYDROCHLORIDE 2 G: 2 INJECTION, POWDER, FOR SOLUTION INTRAVENOUS at 10:43

## 2022-07-07 RX ADMIN — VANCOMYCIN HYDROCHLORIDE 125 MG: 125 CAPSULE ORAL at 12:17

## 2022-07-07 RX ADMIN — ACYCLOVIR 400 MG: 400 TABLET ORAL at 08:07

## 2022-07-07 RX ADMIN — CEFEPIME HYDROCHLORIDE 2 G: 2 INJECTION, POWDER, FOR SOLUTION INTRAVENOUS at 17:45

## 2022-07-07 RX ADMIN — ACETAMINOPHEN 650 MG: 325 TABLET, FILM COATED ORAL at 01:53

## 2022-07-07 RX ADMIN — TRIAMCINOLONE ACETONIDE: 1 CREAM TOPICAL at 20:01

## 2022-07-07 RX ADMIN — GLYCERIN, PETROLATUM, PHENYLEPHRINE HCL, PRAMOXINE HCL: 144; 2.5; 10; 15 CREAM TOPICAL at 08:07

## 2022-07-07 RX ADMIN — SODIUM CHLORIDE, PRESERVATIVE FREE 5 ML: 5 INJECTION INTRAVENOUS at 12:17

## 2022-07-07 RX ADMIN — MICAFUNGIN 50 MG: 10 INJECTION, POWDER, LYOPHILIZED, FOR SOLUTION INTRAVENOUS at 16:21

## 2022-07-07 RX ADMIN — ACETAMINOPHEN 650 MG: 325 TABLET, FILM COATED ORAL at 22:21

## 2022-07-07 RX ADMIN — ACETAMINOPHEN 650 MG: 325 TABLET, FILM COATED ORAL at 16:21

## 2022-07-07 RX ADMIN — CEFEPIME HYDROCHLORIDE 2 G: 2 INJECTION, POWDER, FOR SOLUTION INTRAVENOUS at 01:53

## 2022-07-07 RX ADMIN — SODIUM CHLORIDE, PRESERVATIVE FREE 5 ML: 5 INJECTION INTRAVENOUS at 05:34

## 2022-07-07 RX ADMIN — VANCOMYCIN HYDROCHLORIDE 125 MG: 125 CAPSULE ORAL at 08:07

## 2022-07-07 RX ADMIN — SODIUM CHLORIDE, PRESERVATIVE FREE 5 ML: 5 INJECTION INTRAVENOUS at 22:21

## 2022-07-07 RX ADMIN — RYDAPT 50 MG: 25 CAPSULE, LIQUID FILLED ORAL at 19:57

## 2022-07-07 RX ADMIN — Medication 1 TABLET: at 12:17

## 2022-07-07 RX ADMIN — OXYCODONE HYDROCHLORIDE AND ACETAMINOPHEN 1000 MG: 500 TABLET ORAL at 08:07

## 2022-07-07 RX ADMIN — GLYCERIN, PETROLATUM, PHENYLEPHRINE HCL, PRAMOXINE HCL: 144; 2.5; 10; 15 CREAM TOPICAL at 19:57

## 2022-07-07 RX ADMIN — LEVOTHYROXINE SODIUM 100 MCG: 100 TABLET ORAL at 05:50

## 2022-07-07 RX ADMIN — TRIAMCINOLONE ACETONIDE: 1 CREAM TOPICAL at 08:08

## 2022-07-07 RX ADMIN — SUCRALFATE 1 G: 1 SUSPENSION ORAL at 22:21

## 2022-07-07 RX ADMIN — ACETAMINOPHEN 650 MG: 325 TABLET, FILM COATED ORAL at 08:57

## 2022-07-07 RX ADMIN — Medication 50 MCG: at 08:07

## 2022-07-07 RX ADMIN — VANCOMYCIN HYDROCHLORIDE 125 MG: 125 CAPSULE ORAL at 16:21

## 2022-07-07 RX ADMIN — POTASSIUM CHLORIDE 40 MEQ: 750 TABLET, EXTENDED RELEASE ORAL at 09:00

## 2022-07-07 RX ADMIN — GLYCERIN, PETROLATUM, PHENYLEPHRINE HCL, PRAMOXINE HCL: 144; 2.5; 10; 15 CREAM TOPICAL at 14:16

## 2022-07-07 RX ADMIN — IOPAMIDOL 125 ML: 755 INJECTION, SOLUTION INTRAVENOUS at 12:58

## 2022-07-07 RX ADMIN — SUCRALFATE 1 G: 1 SUSPENSION ORAL at 08:56

## 2022-07-07 RX ADMIN — VANCOMYCIN HYDROCHLORIDE 125 MG: 125 CAPSULE ORAL at 19:57

## 2022-07-07 RX ADMIN — SODIUM CHLORIDE, PRESERVATIVE FREE 84 ML: 5 INJECTION INTRAVENOUS at 12:58

## 2022-07-07 RX ADMIN — PANTOPRAZOLE SODIUM 40 MG: 40 TABLET, DELAYED RELEASE ORAL at 05:50

## 2022-07-07 RX ADMIN — ACYCLOVIR 400 MG: 400 TABLET ORAL at 19:57

## 2022-07-07 RX ADMIN — RYDAPT 50 MG: 25 CAPSULE, LIQUID FILLED ORAL at 08:07

## 2022-07-07 ASSESSMENT — ACTIVITIES OF DAILY LIVING (ADL)
ADLS_ACUITY_SCORE: 18

## 2022-07-07 NOTE — PLAN OF CARE
[id number]    Goal Outcome Evaluation:    VSS on RA, afebrile. Denies pain, nausea, or shortness of breath. Due for cultures  if pt spikes a fever. Abdominal/truncal rash remains unchanged. On preparation H for hemorrhoids, spotting improving per pt report. Reports improvement in loose stools today. Up ad qasim, voiding spontaneously, able to make needs known.

## 2022-07-07 NOTE — PROGRESS NOTES
[facility]  Hematology / Oncology Progress Note    Date of Admission: 6/16/2022  Date of Service (when I saw the patient): 07/07/2022     Assessment & Plan   Veda Marinelli is a 37 year old female with PMH significant for h/o COVID19 infection (10/2021), C.diff, and hypothyroidism who presented with leukocytosis and circulating blasts. 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 She initiated induction chemotherapy with 7+3+midostaurin (C1D1=6/22/22). Hospital course complicated by neutropenic fever and C.difficile colitis.    Today:  - D16 of 7+3+midostaurin  - trial 2U PRBCs today  - D21 BMBx now scheduled for 7/12 @ 11 AM  - persistent, intermittent neutropenic fevers  - continue Cefepime.  - MRSA nares negative, ok to hold off on IV Vanco  - continue PO Vancomycin for C.diff  - CT CAP today  - pending results of imaging and trend of fevers/stools, may need to add second agent for C.diff vs discuss management with ID  - ongoing hemorrhoid discomfort, using PrepH and dilt cream  - generally stable rash on upper abdomen and chest; not itchy or bothersome to pt. Suspect possible cytarabine rash. Pt has been on/off Cefepime and previously tolerated but rash to abx is also in differential. Trialing triamcinolone cream to rash sites.  - erythematous/tender lesion to right lower shin (outlined on 7/6) slightly improved today, continue to monitor      HEME  # AML (FLT3 ITD(low), NPM1, IDH2)  Was in her usual state of health until 03/2022 when she underwent a routine physical with labs. On 3/8/2022, white blood cell count 2.5 (ANC 1.0), Hgb 13.6 with , platelets normal.  On 5/15/2022, she was noted to have further decreasd white blood cell count of 1.6 (ANC 0.28) with hemoglobin 11. platelets were 133. She was ultimately referred to Hematology (Dr. Bob), who she saw on 6/7/22. Further blood workup was recommended and labs done 6/14. CBC revealed WBC 25.3 (ANC 0.8), Hgb  10.1 (), Plt 90K. On the differential and morphology review, 81% blasts were seen concerning for acute leukemia. She was advised to present to [facility] for further evaluation and management.   - Per verbal report from Heme Path fellow there was concern for Blanca rods. She was started on ATRA 6/16 PM-6/17 AM while awaiting PML-ANNELIESE testing. This was ultimately negative.   - s/p Hydrea (6/17-6/22) while awaiting final diagnostic studies  - 6/16 PB chromosomes: 46, XX. No abnormality.   - 6/16 PB FISH: no rearrangments of MLLT10, NUP98, or KMT2A.   - 6/17 BMBx: markedly hypercellular marrow (85-95% estimated cellularity), with markedly diminished trilineage hematopoiesis, no overt dysplasia in the evaluable elements, and 92% blasts  - FLT3 PCR: positive for FLT3-ITD(low) with allelic ratio 0.21 (corrected from previously reported value of 0.144 per staff message to Lamar Grover from molecular lab personnel)  - NGS panel: NPM1 positive, IDH2, FLT3-ITD  - HLA typing sent, BMT coordinators aware and working on arranging IP consultation (delayed due to insurance change/financial approval)  - receiving induction with 7+3 (cytarabine+daunorubicin PLUS midostaurin (D1= 6/22/22)   - D21 BMBx scheduled on 7/12 at 11 AM    # Pancytopenia secondary to chemotherapy and AML  - Transfuse to maintain Hgb >7, Plt >10K     GYN  # Vaginal bleeding, resolved  Menstrual cycle began inpatient overnight 6/30-7/1. Usually heaviest flow is first ~3 days. Reviewed neutropenic precautions and advised not to use tampons at this time. Resolved as of 7/7.  - back down to Plt parameter 10K  - s/p Provera 10mg daily (7/1-7/6)    ID  # Neutropenic Fever, recurrent  # Recurrent C.diff colitis  (1) Fevered to 101.6F on 6/23. Noted to have chills though no other focal symptoms. Afebrile thereafter.  - 6/16 baseline CT CAP without evidence for infectious source   - 6/23: BCx NG, UA/UCx NGTD, CXR clear  - s/p IV Cefepime (x 6/23-6/27).  De-escalated back to ppx Levaquin on 6/27.  (2) Recurrent fever on 6/29 PM. Persistent temps 6/29-6/30, then afebrile since 6/30 afternoon. BCx, UA, CXR negative. C.diff positive on 7/1  - restarted on Cefepime 2g IV q8hr (x 6/29 PM - 7/4 AM)  (3) Recurrent fever 100.4-->101 (7/5-7/6), restarted Cefepime (x 7/5)  - 7/5 BCx NGTD, UCx NG  - 7/6 MRSA swab negative  - 7/7 BCx pending   - plan for CT CAP today     **Antibiotics:  - Cefepime (6/23-6/27, 6/29-7/4), (x 7/5 - present)  - PO Vancomycin 125mg QID (x 7/1); plan for treatment course at least 10-14 days but will also depend on ongoing systemic IV abx, followed by ongoing maintenance dosing.    # ID PPx  - ACV 400mg BID  - resume Levaquin 250mg daily   - Micafungin 50mg IV daily. Given that she will be on midostaurin, will keep on Micafungin to limit potential interactions with posaconazole/voriconazole.  - when able to transition to PO antifungal, Prior Auth approved for both posaconazole and voriconazole. However, as of  6/29, pt has not met deductible (but should meet it once hospital stay is billed). At present, monthly cost for Posaconazole $2048.66, Voriconazole $176.41.     # h/o COVID-19  Pt is not vaccinated nor interested in being vaccinated. She was admitted from 10/1/2021 - 10/13/2021 for acute hypoxic respiratory failure from COVID-19 pneumonia.  Required IMC, high flow and intermittent CPAP. She was treated with dexamethasone, remdesivir, and baricitinib in addition to azithromycin and ceftriaxone. Recovered symptomatically from this.    GI  # H/o C.diff (10/2021)  # Hemorrhoid  # Recurrent C.diff (7/1)  H/o C.diff (10/20/21) and course was complicated by hemorrhoid which does wax/wane and was sometimes painful. She primarily has used witch hazel pads when symptomatic. Pt reported normal bowels at time of AML diagnosis. Developed intermittent loose stools throughout admission, then increased frequency of loose/watery stools overnight 6/30-7/1.  Rechecked C.diff (7/1), positive.   - 6/16 CT A/P negative for perirectal abscess.  - TUCKS PRN, add Prep H (7/4), dilt cream (7/6)  - Avoid PTA probiotic at this time.  - baseline 6/19 C. Diff negative so we deferred prophylactic PO Vancomycin during chemo course  - recurrent/increased loose stools reported on 7/1, checked C.diff and this was positive. Continue PO Vancomycin as above.    # Mucositis, improved  On ~6/27, pt noting inflammatory changes in b/l buccal mucosa and small lesion on right inner lower lip. Not painful or affecting PO intake. Since improved and pt has denied mouth pain/sores recently, eating/drinking well.   - MMW PRN, viscous lidocaine PRN    # GERD  - TUMS PRN  - PPI daily     # Distal esophageal discomfort/dysphagia - improved  On 6/29, patient reported new onset of epigastric discomfort when swallowing solid foods, not with liquids. No radiation to back or other areas of abdomen, no RUQ tenderness. Denies reflux, nausea or emesis. Already on PPI as above. No e/o oral candidiasis, on micafungin ppx. Question relation to GI mucosal changes? Nearly resolved on 7/4.  - Trial Carafate before meals and at nighttime --> given improvement, backed off to PRN (x 7/4)  - simethicone PRN, Maalox PRN, GI Cocktail PRN  - Continue to monitor, could consider barium swallow study if persistent     Endocrine  # Hypothyroidism  PTA was on Synthroid 125mcg daily. TSH 0.04, T4 Free 1.48 (done 6/24). Per pt, her PCP reviewed and advised her to reduce her Synthroid to 100mcg daily. Ordered to start on 6/29.     DERM  # Bilateral ear erythema, pruritis - resolved  On 6/29, pt reported bilateral ear lobe erythema and irritation in ear canal. Believed to be related to cytarabine. S/p triamcinolone cream. Resolved early week of 7/4.      # Truncal rash  Noted rash to upper abdomen which progressed to upper torso and back throughout morning of 7/6. Not itchy or bothersome to pt. Suspect possible cytarabine rash. Pt  "has been on/off Cefepime and previously tolerated but rash to abx is also in differential. Can try triamcinolone cream to rash sites.    # Lesion to R shin  Noted on 7/6; outlined, monitor for worsening cellulitis. On 7/7, slightly improved in degree of erythema and slightly retracted from drawn border, less tender as well.    FEN  - Regular diet as tolerated  - lyte repletion per unit protocol  - regular diet     PPx  - VTE: none due to thrombocytopenia  - GI: PPI     Dispo:  Anticipate 4+ week LOS for management of acute leukemia. Presented under Dr. Won Perkins.     Plan of care discussed with Dr. Pantoja     I spent >40 minutes in the care of this patient today, which included time necessary for review of interval events, obtaining history and physical exam, adjusting medication orders, discussion with interdisciplinary/consult team(s), and documentation time. Over 50% of time was spent face-to-face and/or coordinating care.     Plan of care discussed with Dr. Scarlett Grover PA-C  Hematology/Oncology   pager: [phone]       Interval History   No acute events overnight, recurrent fevers to 101 overnight. Feels less well with the fevers but notes that she wasn't deeply chilled like she sometimes is when temps hit 101. Rash is stable to slightly better on abdomen/chest. Not itching or bothersome. Ongoing loose stools but stable to slightly better. Hemorrhoid irritation continues; worse with \"contraction\" moves like coughing or moving to stand. No pain with sitting. Lesion to R shin feels better today. Can feel when she has occasional PVCs but denies lightheadedness or dizziness.     Denies HA, vision changes, sinus pain/pressure, mouth pain/sores, SOB, cough, nausea, abdominal pain or cramping. Denies dysuria. Vaginal bleeding resolved. Not feeling particularly more fatigued with lower Hgb. Hair is starting to come out in greater clumps. Scalp is feeling more sensitive.     She is still actively working " remotely.       Physical Exam   Temp: (!) 101  F (38.3  C) Temp src: Oral BP: 113/64 Pulse: 103   Resp: 18 SpO2: 100 % O2 Device: None (Room air)    Vitals:    07/05/22 0900 07/06/22 0819 07/07/22 0744   Weight: 114.8 kg (253 lb 1.6 oz) 115.2 kg (253 lb 14.4 oz) 115.2 kg (254 lb)     Vital Signs with Ranges  Temp:  [98.4  F (36.9  C)-101.2  F (38.4  C)] 101  F (38.3  C)  Pulse:  [] 103  Resp:  [18] 18  BP: (100-132)/(52-81) 113/64  SpO2:  [96 %-100 %] 100 %  I/O last 3 completed shifts:  In: 645 [I.V.:360]  Out: -   Constitutional: Awake and conversational. Non-toxic appearing. No acute distress. Sitting up in chair, working on her computer.  HEENT: NC/AT. Wearing head scarf. OP pale pink and moist.  Respiratory: Breathing comfortable with no increased work on room air. Lungs CTA b/l.  Cardiovascular: Borderline tachycardia with few late beats. No peripheral edema.    GI: Normal BS. Abdomen is soft, non-distended, non-tender throughout and particularly no epigastric tenderness.   Skin: Skin is clean, dry, intact. Faint pink papular rash to upper abdomen and chest (not visualized on back today). Dime size erythematous (less purple) lesion with central pustular like appearance to right lower shin; outlined and slightly retracted within borders. Less tender today.   Musculoskeletal/ Extremities: Extremities grossly normal in appearance.  Neurologic: Alert and oriented. Speech normal. Grossly nonfocal.   VAD: PICC line in E, c/d/i. She has surrounding ecchymoses in various stages of healing.       Medications     - MEDICATION INSTRUCTIONS -         acyclovir  400 mg Oral BID     ceFEPIme (MAXIPIME) IV  2 g Intravenous Q8H     diltiazem 2% in PLO gel  0.25-0.5 g Topical TID     levothyroxine  100 mcg Oral QAM AC     micafungin  50 mg Intravenous Q24H     midostaurin  50 mg Oral Q12H     multivitamin w/minerals  1 tablet Oral Daily     pantoprazole  40 mg Oral QAM AC     pramox-pe-glycerin-petrolatum   Rectal TID      triamcinolone   Topical BID     vancomycin  125 mg Oral 4x Daily     vitamin C  1,000 mg Oral Daily     vitamin D3  50 mcg Oral Daily       Data   Results for orders placed or performed during the hospital encounter of 06/16/22 (from the past 24 hour(s))   MRSA MSSA PCR, Nasal Swab    Specimen: Nare, Right; Swab   Result Value Ref Range    MRSA Target DNA Negative Negative    SA Target DNA Negative     Narrative    The [employer]  Xpert SA Nasal Complete assay performed in the [employer]x System is a qualitative in vitro diagnostic test designed for rapid detection of Staphylococcus aureus (SA) and methicillin-resistant Staphylococcus aureus (MRSA) from nasal swabs in patients at risk for nasal colonization. The test utilizes automated real-time polymerase chain reaction (PCR) to detect MRSA/SA DNA. The Xpert SA Nasal Complete assay is intended to aid in the prevention and control of MRSA/SA infections in healthcare settings. The assay is not intended to diagnose, guide or monitor treatment for MRSA/SA infections, or provide results of susceptibility to methicillin. A negative result does not preclude MRSA/SA nasal colonization.    CBC with platelets differential    Narrative    The following orders were created for panel order CBC with platelets differential.  Procedure                               Abnormality         Status                     ---------                               -----------         ------                     CBC with platelets and d...[[id number]]  Abnormal            Preliminary result           Please view results for these tests on the individual orders.   Basic metabolic panel   Result Value Ref Range    Creatinine 0.44 (L) 0.51 - 0.95 mg/dL    Sodium 138 136 - 145 mmol/L    Potassium 3.3 (L) 3.4 - 5.3 mmol/L    Urea Nitrogen 6.8 6.0 - 20.0 mg/dL    Chloride 107 98 - 107 mmol/L    Carbon Dioxide (CO2) 22 22 - 29 mmol/L    Anion Gap 9 7 - 15 mmol/L    Glucose 106 (H) 70 - 99 mg/dL    GFR  Estimate >90 >60 mL/min/1.73m2    Calcium 8.8 8.6 - 10.0 mg/dL   Magnesium   Result Value Ref Range    Magnesium 2.2 1.7 - 2.3 mg/dL   Phosphorus   Result Value Ref Range    Phosphorus 3.5 2.5 - 4.5 mg/dL   Lactic Acid STAT   Result Value Ref Range    Lactic Acid 0.4 (L) 0.7 - 2.0 mmol/L   CBC with platelets and differential   Result Value Ref Range    WBC Count 0.5 (LL) 4.0 - 11.0 10e3/uL    RBC Count 1.95 (L) 3.80 - 5.20 10e6/uL    Hemoglobin 6.3 (LL) 11.7 - 15.7 g/dL    Hematocrit 18.1 (L) 35.0 - 47.0 %    MCV 93 78 - 100 fL    MCH 32.3 26.5 - 33.0 pg    MCHC 34.8 31.5 - 36.5 g/dL    RDW 13.5 10.0 - 15.0 %    Platelet Count 12 (LL) 150 - 450 10e3/uL    NRBCs per 100 WBC 0 <1 /100    Absolute NRBCs 0.0 10e3/uL   Prepare red blood cells (unit)   Result Value Ref Range    CROSSMATCH Compatible     UNIT ABO/RH A Neg     Unit Number [id number]     Unit Status Returned from Issue     Blood Component Type Red Blood Cells     Product Code [id number]     CODING SYSTEM [id number]     UNIT TYPE ISBT 0600     ISSUE DATE AND TIME [id number]    Prepare red blood cells (unit)   Result Value Ref Range    CROSSMATCH Compatible     UNIT ABO/RH A Neg     Unit Number [id number]     Unit Status Issued     Blood Component Type Red Blood Cells     Product Code [id number]     CODING SYSTEM [id number]     UNIT TYPE ISBT 0600     ISSUE DATE AND TIME [id number]

## 2022-07-07 NOTE — PLAN OF CARE
"Goal Outcome Evaluation:        /52 (BP Location: Right arm)   Pulse 98   Temp 99.8  F (37.7  C) (Oral)   Resp 18   Ht 1.702 m (5' 7\")   Wt 115.2 kg (253 lb 14.4 oz)   SpO2 96%   BMI 39.77 kg/m       9644-0086: Intermittently tachycardic, OVSS on RA. Temperature elevated to 101.2, given PRN Tylenol. Denies nausea, SOB and pain.  Hemorrhoid pain controlled by diltiazem cream and scheduled preparation H. Continues having loose stools, on PO vanco.Cytarabine rash still on abdomen. Right shin/calf usama has redness but has not grown outside of marked area.  RBCs transfused without incident for hgb 6.3. New patient care order to transfer blood products with fever.  Potassium replaced for 3.3, re-checked 3.7. Continues on V cefepime - PICC heparin locked between infusions. PICC dressing changed today. CT scan done. Up in chair most of the day. Fair appetite. Continue to monitor.                "

## 2022-07-07 NOTE — PROGRESS NOTES
Care Management Follow Up    Length of Stay (days): 21    Expected Discharge Date: 07/15/2022     Concerns to be Addressed: New insurance card     Patient plan of care discussed at interdisciplinary rounds: Yes    Anticipated Discharge Disposition: Home     Anticipated Discharge Services: Outpatient Infusion Services  Anticipated Discharge DME: None    Patient/family educated on Medicare website which has current facility and service quality ratings: N/A  Education Provided on the Discharge Plan: Yes  Patient/Family in Agreement with the Plan: Yes    Referrals Placed by CM/SW:   N/A  Private pay costs discussed: Not applicable    Additional Information:    Writer notified by VLAD that patient's insurance coverage changed, effective 7/1/22. Copy of insurance card received.    Copy of new insurance emailed to Financial Counseling to update in patient's chart. Copy also placed in patient's paper chart.     RNCC will follow.    Xiomara Toledo, RN, BSN, PHN  7D RN Care Coordinator   Phn: [phone]  Fax: [phone]

## 2022-07-07 NOTE — PROGRESS NOTES
CLINICAL NUTRITION SERVICES - REASSESSMENT NOTE     Nutrition Prescription    RECOMMENDATIONS FOR MDs/PROVIDERS TO ORDER:  - Continue to monitor lytes (K+, Mg++ and PO4)   - Recommend checking Zinc level d/t diarrhea and possible need for supplementation.    Malnutrition Status:    - Patient does not meet two of the established criteria necessary for diagnosing malnutrition      Recommendations already ordered by Registered Dietitian (RD):  - None at this time    Future/Additional Recommendations:  - Monitor need for MIVF's for hydration if volume depletion noted secondary to diarrhea  - Monitor wt trends and lytes  - Monitor po tolerance and adequacy of intakes and usage of cafe passes     EVALUATION OF THE PROGRESS TOWARD GOALS   Diet: Regular    Intake: Reports tolerating po with good intakes (ave 2-3 meals per day, in addition to snacks from home) over the past week. Denied c/o's abd cramping and epigastric pain over past week.  - Hasn't used any of the cafe passes that she received a week ago.      NEW FINDINGS   Weight: 115.2 kg (today), 115.3 kg (6/30), 117.5 kg (6.22); Wt fairly stable over the past week. However since admit, wt loss of 2.3 kg (~ 2% loss) x psst 3 weeks.    Labs:  C.Diff (+) on 7/1  K+ 3.3 (low) --> 3.7 (WNL) with replacement     Meds:  Vanco QID secondary to (+)  C.diff    GI:  2 to 5 stools over past several days    MALNUTRITION (limited d/t pt in street clothing)  % Intake: No decreased intake noted  % Weight Loss: None noted  Subcutaneous Fat Loss: None observed  Muscle Loss: None observed  Fluid Accumulation/Edema: Unable to assess  Malnutrition Diagnosis: Patient does not meet two of the established criteria necessary for diagnosing malnutrition    Previous Goals   Patient to consume % of nutritionally adequate meal trays TID, or the equivalent with supplements/snacks.    Evaluation: Met    Previous Nutrition Diagnosis  Inadequate oral intake related to new onset of epigastric  pain and cramping ith solids as evidenced by pt consuming less meals over past 24 hrs.    Evaluation: No longer applicable, nutrition diagnosis changed below    CURRENT NUTRITION DIAGNOSIS  Predicted inadequate nutrient intake (calorie-protein) related to tolerating po with good intakes, but at risk for po to falter d/t menu fatigue with prolong hospitalization and GI issues      INTERVENTIONS  Implementation  Collaboration with other providers - bedside RN  Nutrition education - provided diet education on tips for coping with diarrhea.    Goals  Patient to consume % of nutritionally adequate meal trays TID, or the equivalent with supplements/snacks.    Monitoring/Evaluation  Progress toward goals will be monitored and evaluated per protocol.      Sheeba Pandey RD,LD  7D page [phone]

## 2022-07-07 NOTE — PLAN OF CARE
Problem: Oral Intake Altered (Oncology Care)  Goal: Optimal Oral Intake  Outcome: Ongoing, Progressing   Goal Outcome Evaluation:

## 2022-07-07 NOTE — PLAN OF CARE
"/65 (BP Location: Right arm)   Pulse 96   Temp (!) 100.9  F (38.3  C) (Oral)   Resp 18   Ht 1.702 m (5' 7\")   Wt 115.2 kg (254 lb)   SpO2 100%   BMI 39.78 kg/m      8195-0624: Tmax 100.9, OVSS on RA. Denies nausea and SOB. Hemorrhoid pain controlled by scheduled creams. Up independently - ambulated to cafeteria to get some dinner. Fair appetite. Tylenol given for fever. Heparin locked between IV ABX. No acute events, continue to monitor.   "

## 2022-07-07 NOTE — PLAN OF CARE
3309-5240: Tmax 101. Tachy to 103. OVSS on RA. Blood cultures ordered and Tylenol given. Continues on Cefepime. Triggered sepsis. Lactic 0.3. MD notified of the above. Denies pain, nausea, and SOB. 2 loose bowel movement overnight. Continues with menses. Utilizing Preparation H & Diltiazem for hemorrhoid management. Will need RBCs this morning for Hgb 6.3.

## 2022-07-08 LAB
ALBUMIN SERPL BCG-MCNC: 3.7 G/DL (ref 3.5–5.2)
ALP SERPL-CCNC: 49 U/L (ref 35–104)
ALT SERPL W P-5'-P-CCNC: 10 U/L (ref 10–35)
ANION GAP SERPL CALCULATED.3IONS-SCNC: 11 MMOL/L (ref 7–15)
AST SERPL W P-5'-P-CCNC: 7 U/L (ref 10–35)
BASOPHILS # BLD MANUAL: 0 10E3/UL (ref 0–0.2)
BASOPHILS NFR BLD MANUAL: 0 %
BILIRUB DIRECT SERPL-MCNC: 0.29 MG/DL (ref 0–0.3)
BILIRUB SERPL-MCNC: 1 MG/DL
BLASTS # BLD MANUAL: 0 10E3/UL
BLASTS NFR BLD MANUAL: 3 %
BLD PROD TYP BPU: NORMAL
BLOOD COMPONENT TYPE: NORMAL
BUN SERPL-MCNC: 7.6 MG/DL (ref 6–20)
BURR CELLS BLD QL SMEAR: SLIGHT
C PNEUM DNA SPEC QL NAA+PROBE: NOT DETECTED
CALCIUM SERPL-MCNC: 9.1 MG/DL (ref 8.6–10)
CHLORIDE SERPL-SCNC: 107 MMOL/L (ref 98–107)
CODING SYSTEM: NORMAL
CREAT SERPL-MCNC: 0.48 MG/DL (ref 0.51–0.95)
DEPRECATED HCO3 PLAS-SCNC: 22 MMOL/L (ref 22–29)
EOSINOPHIL # BLD MANUAL: 0 10E3/UL (ref 0–0.7)
EOSINOPHIL NFR BLD MANUAL: 0 %
ERYTHROCYTE [DISTWIDTH] IN BLOOD BY AUTOMATED COUNT: 13.9 % (ref 10–15)
FLUAV H1 2009 PAND RNA SPEC QL NAA+PROBE: NOT DETECTED
FLUAV H1 RNA SPEC QL NAA+PROBE: NOT DETECTED
FLUAV H3 RNA SPEC QL NAA+PROBE: NOT DETECTED
FLUAV RNA SPEC QL NAA+PROBE: NOT DETECTED
FLUBV RNA SPEC QL NAA+PROBE: NOT DETECTED
GFR SERPL CREATININE-BSD FRML MDRD: >90 ML/MIN/1.73M2
GLUCOSE SERPL-MCNC: 111 MG/DL (ref 70–99)
GROUP A STREP BY PCR: NOT DETECTED
HADV DNA SPEC QL NAA+PROBE: NOT DETECTED
HCOV PNL SPEC NAA+PROBE: NOT DETECTED
HCT VFR BLD AUTO: 23.6 % (ref 35–47)
HGB BLD-MCNC: 8.3 G/DL (ref 11.7–15.7)
HMPV RNA SPEC QL NAA+PROBE: NOT DETECTED
HPIV1 RNA SPEC QL NAA+PROBE: NOT DETECTED
HPIV2 RNA SPEC QL NAA+PROBE: NOT DETECTED
HPIV3 RNA SPEC QL NAA+PROBE: NOT DETECTED
HPIV4 RNA SPEC QL NAA+PROBE: NOT DETECTED
ISSUE DATE AND TIME: NORMAL
LACTATE SERPL-SCNC: 0.8 MMOL/L (ref 0.7–2)
LYMPHOCYTES # BLD MANUAL: 0.5 10E3/UL (ref 0.8–5.3)
LYMPHOCYTES NFR BLD MANUAL: 97 %
M PNEUMO DNA SPEC QL NAA+PROBE: NOT DETECTED
MAGNESIUM SERPL-MCNC: 2 MG/DL (ref 1.7–2.3)
MCH RBC QN AUTO: 31.7 PG (ref 26.5–33)
MCHC RBC AUTO-ENTMCNC: 35.2 G/DL (ref 31.5–36.5)
MCV RBC AUTO: 90 FL (ref 78–100)
MONOCYTES # BLD MANUAL: 0 10E3/UL (ref 0–1.3)
MONOCYTES NFR BLD MANUAL: 0 %
NEUTROPHILS # BLD MANUAL: 0 10E3/UL (ref 1.6–8.3)
NEUTROPHILS NFR BLD MANUAL: 0 %
PHOSPHATE SERPL-MCNC: 2.9 MG/DL (ref 2.5–4.5)
PLAT MORPH BLD: ABNORMAL
PLATELET # BLD AUTO: 6 10E3/UL (ref 150–450)
POTASSIUM SERPL-SCNC: 4 MMOL/L (ref 3.4–5.3)
PROT SERPL-MCNC: 6.6 G/DL (ref 6.4–8.3)
RBC # BLD AUTO: 2.62 10E6/UL (ref 3.8–5.2)
RBC MORPH BLD: ABNORMAL
RSV RNA SPEC QL NAA+PROBE: NOT DETECTED
RSV RNA SPEC QL NAA+PROBE: NOT DETECTED
RV+EV RNA SPEC QL NAA+PROBE: NOT DETECTED
SODIUM SERPL-SCNC: 140 MMOL/L (ref 136–145)
UNIT ABO/RH: NORMAL
UNIT NUMBER: NORMAL
UNIT STATUS: NORMAL
UNIT TYPE ISBT: 600
WBC # BLD AUTO: 0.5 10E3/UL (ref 4–11)

## 2022-07-08 PROCEDURE — 87040 BLOOD CULTURE FOR BACTERIA: CPT | Performed by: PHYSICIAN ASSISTANT

## 2022-07-08 PROCEDURE — 99223 1ST HOSP IP/OBS HIGH 75: CPT | Performed by: STUDENT IN AN ORGANIZED HEALTH CARE EDUCATION/TRAINING PROGRAM

## 2022-07-08 PROCEDURE — 87385 HISTOPLASMA CAPSUL AG IA: CPT | Performed by: STUDENT IN AN ORGANIZED HEALTH CARE EDUCATION/TRAINING PROGRAM

## 2022-07-08 PROCEDURE — 87651 STREP A DNA AMP PROBE: CPT | Performed by: PHYSICIAN ASSISTANT

## 2022-07-08 PROCEDURE — 250N000012 HC RX MED GY IP 250 OP 636 PS 637: Performed by: STUDENT IN AN ORGANIZED HEALTH CARE EDUCATION/TRAINING PROGRAM

## 2022-07-08 PROCEDURE — 250N000011 HC RX IP 250 OP 636: Performed by: PHYSICIAN ASSISTANT

## 2022-07-08 PROCEDURE — 87449 NOS EACH ORGANISM AG IA: CPT | Performed by: STUDENT IN AN ORGANIZED HEALTH CARE EDUCATION/TRAINING PROGRAM

## 2022-07-08 PROCEDURE — 258N000003 HC RX IP 258 OP 636: Performed by: PHYSICIAN ASSISTANT

## 2022-07-08 PROCEDURE — 120N000002 HC R&B MED SURG/OB UMMC

## 2022-07-08 PROCEDURE — 85027 COMPLETE CBC AUTOMATED: CPT | Performed by: PHYSICIAN ASSISTANT

## 2022-07-08 PROCEDURE — 87305 ASPERGILLUS AG IA: CPT | Performed by: STUDENT IN AN ORGANIZED HEALTH CARE EDUCATION/TRAINING PROGRAM

## 2022-07-08 PROCEDURE — 86606 ASPERGILLUS ANTIBODY: CPT | Performed by: STUDENT IN AN ORGANIZED HEALTH CARE EDUCATION/TRAINING PROGRAM

## 2022-07-08 PROCEDURE — 36592 COLLECT BLOOD FROM PICC: CPT | Performed by: STUDENT IN AN ORGANIZED HEALTH CARE EDUCATION/TRAINING PROGRAM

## 2022-07-08 PROCEDURE — 85007 BL SMEAR W/DIFF WBC COUNT: CPT | Performed by: PHYSICIAN ASSISTANT

## 2022-07-08 PROCEDURE — 93010 ELECTROCARDIOGRAM REPORT: CPT | Performed by: INTERNAL MEDICINE

## 2022-07-08 PROCEDURE — 87486 CHLMYD PNEUM DNA AMP PROBE: CPT | Performed by: PHYSICIAN ASSISTANT

## 2022-07-08 PROCEDURE — P9037 PLATE PHERES LEUKOREDU IRRAD: HCPCS | Performed by: PHYSICIAN ASSISTANT

## 2022-07-08 PROCEDURE — 250N000013 HC RX MED GY IP 250 OP 250 PS 637: Performed by: STUDENT IN AN ORGANIZED HEALTH CARE EDUCATION/TRAINING PROGRAM

## 2022-07-08 PROCEDURE — 84100 ASSAY OF PHOSPHORUS: CPT | Performed by: PHYSICIAN ASSISTANT

## 2022-07-08 PROCEDURE — 99233 SBSQ HOSP IP/OBS HIGH 50: CPT | Performed by: STUDENT IN AN ORGANIZED HEALTH CARE EDUCATION/TRAINING PROGRAM

## 2022-07-08 PROCEDURE — 87101 SKIN FUNGI CULTURE: CPT | Performed by: STUDENT IN AN ORGANIZED HEALTH CARE EDUCATION/TRAINING PROGRAM

## 2022-07-08 PROCEDURE — 87070 CULTURE OTHR SPECIMN AEROBIC: CPT | Performed by: PHYSICIAN ASSISTANT

## 2022-07-08 PROCEDURE — 83735 ASSAY OF MAGNESIUM: CPT | Performed by: PHYSICIAN ASSISTANT

## 2022-07-08 PROCEDURE — 93005 ELECTROCARDIOGRAM TRACING: CPT

## 2022-07-08 PROCEDURE — 80053 COMPREHEN METABOLIC PANEL: CPT | Performed by: PHYSICIAN ASSISTANT

## 2022-07-08 PROCEDURE — 82248 BILIRUBIN DIRECT: CPT | Performed by: PHYSICIAN ASSISTANT

## 2022-07-08 PROCEDURE — 250N000013 HC RX MED GY IP 250 OP 250 PS 637: Performed by: PHYSICIAN ASSISTANT

## 2022-07-08 PROCEDURE — 250N000011 HC RX IP 250 OP 636: Performed by: INTERNAL MEDICINE

## 2022-07-08 PROCEDURE — 83605 ASSAY OF LACTIC ACID: CPT | Performed by: STUDENT IN AN ORGANIZED HEALTH CARE EDUCATION/TRAINING PROGRAM

## 2022-07-08 PROCEDURE — 87103 BLOOD FUNGUS CULTURE: CPT | Performed by: STUDENT IN AN ORGANIZED HEALTH CARE EDUCATION/TRAINING PROGRAM

## 2022-07-08 PROCEDURE — 36415 COLL VENOUS BLD VENIPUNCTURE: CPT | Performed by: PHYSICIAN ASSISTANT

## 2022-07-08 RX ADMIN — CEFEPIME HYDROCHLORIDE 2 G: 2 INJECTION, POWDER, FOR SOLUTION INTRAVENOUS at 10:10

## 2022-07-08 RX ADMIN — LEVOTHYROXINE SODIUM 100 MCG: 100 TABLET ORAL at 05:32

## 2022-07-08 RX ADMIN — GLYCERIN, PETROLATUM, PHENYLEPHRINE HCL, PRAMOXINE HCL: 144; 2.5; 10; 15 CREAM TOPICAL at 20:51

## 2022-07-08 RX ADMIN — SODIUM CHLORIDE, PRESERVATIVE FREE 5 ML: 5 INJECTION INTRAVENOUS at 14:34

## 2022-07-08 RX ADMIN — ACYCLOVIR 400 MG: 400 TABLET ORAL at 08:36

## 2022-07-08 RX ADMIN — PANTOPRAZOLE SODIUM 40 MG: 40 TABLET, DELAYED RELEASE ORAL at 05:32

## 2022-07-08 RX ADMIN — VANCOMYCIN HYDROCHLORIDE 125 MG: 125 CAPSULE ORAL at 17:12

## 2022-07-08 RX ADMIN — VANCOMYCIN HYDROCHLORIDE 125 MG: 125 CAPSULE ORAL at 12:14

## 2022-07-08 RX ADMIN — OXYCODONE HYDROCHLORIDE AND ACETAMINOPHEN 1000 MG: 500 TABLET ORAL at 08:37

## 2022-07-08 RX ADMIN — DIPHENHYDRAMINE HYDROCHLORIDE 50 MG: 25 CAPSULE ORAL at 10:09

## 2022-07-08 RX ADMIN — GLYCERIN, PETROLATUM, PHENYLEPHRINE HCL, PRAMOXINE HCL: 144; 2.5; 10; 15 CREAM TOPICAL at 13:36

## 2022-07-08 RX ADMIN — GLYCERIN, PETROLATUM, PHENYLEPHRINE HCL, PRAMOXINE HCL: 144; 2.5; 10; 15 CREAM TOPICAL at 08:42

## 2022-07-08 RX ADMIN — RYDAPT 50 MG: 25 CAPSULE, LIQUID FILLED ORAL at 20:45

## 2022-07-08 RX ADMIN — TRIAMCINOLONE ACETONIDE: 1 CREAM TOPICAL at 08:41

## 2022-07-08 RX ADMIN — CEFEPIME HYDROCHLORIDE 2 G: 2 INJECTION, POWDER, FOR SOLUTION INTRAVENOUS at 18:55

## 2022-07-08 RX ADMIN — FIDAXOMICIN 200 MG: 200 TABLET, FILM COATED ORAL at 20:45

## 2022-07-08 RX ADMIN — SODIUM CHLORIDE, PRESERVATIVE FREE 5 ML: 5 INJECTION INTRAVENOUS at 05:51

## 2022-07-08 RX ADMIN — Medication 1 TABLET: at 12:14

## 2022-07-08 RX ADMIN — TRIAMCINOLONE ACETONIDE: 1 CREAM TOPICAL at 20:51

## 2022-07-08 RX ADMIN — RYDAPT 50 MG: 25 CAPSULE, LIQUID FILLED ORAL at 08:37

## 2022-07-08 RX ADMIN — VANCOMYCIN HYDROCHLORIDE 125 MG: 125 CAPSULE ORAL at 08:37

## 2022-07-08 RX ADMIN — MICAFUNGIN 50 MG: 10 INJECTION, POWDER, LYOPHILIZED, FOR SOLUTION INTRAVENOUS at 17:12

## 2022-07-08 RX ADMIN — ACETAMINOPHEN 650 MG: 325 TABLET, FILM COATED ORAL at 17:11

## 2022-07-08 RX ADMIN — ACETAMINOPHEN 650 MG: 325 TABLET, FILM COATED ORAL at 10:09

## 2022-07-08 RX ADMIN — ACYCLOVIR 400 MG: 400 TABLET ORAL at 20:45

## 2022-07-08 RX ADMIN — SODIUM CHLORIDE, PRESERVATIVE FREE 5 ML: 5 INJECTION INTRAVENOUS at 20:46

## 2022-07-08 RX ADMIN — Medication 50 MCG: at 08:37

## 2022-07-08 RX ADMIN — CEFEPIME HYDROCHLORIDE 2 G: 2 INJECTION, POWDER, FOR SOLUTION INTRAVENOUS at 01:50

## 2022-07-08 RX ADMIN — ACETAMINOPHEN 650 MG: 325 TABLET, FILM COATED ORAL at 22:26

## 2022-07-08 RX ADMIN — ACETAMINOPHEN 650 MG: 325 TABLET, FILM COATED ORAL at 05:32

## 2022-07-08 ASSESSMENT — ACTIVITIES OF DAILY LIVING (ADL)
ADLS_ACUITY_SCORE: 18

## 2022-07-08 NOTE — PROVIDER NOTIFICATION
"Pgd MD Hameed at 1758    \"FYI: Pt has temp of 102.1, last bcx at 0656 this morning. Tylenol and ice pack given. Thanks\"    Shira #[id number]  "

## 2022-07-08 NOTE — PLAN OF CARE
"Goal Outcome Evaluation:    /66 (BP Location: Right arm)   Pulse 92   Temp 99.6  F (37.6  C) (Oral)   Resp 18   Ht 1.702 m (5' 7\")   Wt 115.5 kg (254 lb 9.6 oz)   SpO2 97%   BMI 39.88 kg/m        3096-0233: Intermittently tachycardic, OVSS on RA. Denies nausea, SOB and pain.  Hemorrhoid pain controlled by diltiazem cream and scheduled preparation H. Continues having loose stools, on PO vanco.Cytarabine rash still on abdomen. Right shin/calf usama has redness but has not grown outside of marked area.  PLT transfused without incident, gave PRN Tylenol and Benadryl prior to infusion. Continues on IV cefepime - PICC heparin locked between infusions. EKG ordered. Swabs completed to R/O RVP, added droplet precautions pending swab results. Up in chair most of the day. Fair appetite. Continue to monitor.                        "

## 2022-07-08 NOTE — PROVIDER NOTIFICATION
"Brett Smith MD paged     \"Pt tmax 101.6, tylenol given and blood cultures released. Let me know if there is anything else we should do. Thanks    #[phone]\"  "

## 2022-07-08 NOTE — PROGRESS NOTES
Ongoing fevers to 102.1. On Cefepime and PO Vancomycin.    Will follow through with ID recommendations as noted earlier today.    Change PO Vancomycin to Fidaxomicin  Fungal lab work up ordered to be drawn now.    Brett Hameed D.O.  Internal Medicine, Hospitalist  [facility]  Pager: [phone]

## 2022-07-08 NOTE — PLAN OF CARE
[id number]    Goal Outcome Evaluation:    Tmax @ 101.6, blood cultures collected and tylenol given, provider notified. Triggered sepsis, LA @ 0.8. Other vital signs stable on RA. Denies pain, nausea, or shortness of breath. Carafate given for indigestion. Continues on Prep H and Diltiazem. Reports loose stools are improving, on vancomycin. Sleeping between cares, continue to monitor.

## 2022-07-08 NOTE — PROGRESS NOTES
[facility]  Hematology / Oncology Progress Note    Date of Admission: 6/16/2022  Date of Service (when I saw the patient): 07/08/2022     Assessment & Plan   Veda Marinelli is a 37 year old female with PMH significant for h/o COVID19 infection (10/2021), C.diff, and hypothyroidism who presented with leukocytosis and circulating blasts. 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 She initiated induction chemotherapy with 7+3+midostaurin (C1D1=6/22/22). Hospital course complicated by neutropenic fever and C.difficile colitis.    Today:  - D17 of 7+3+midostaurin  - 1U Plts with premeds  - D21 BMBx now scheduled for 7/12 @ 11 AM  - persistent, intermittent neutropenic fevers  - on Cefepime  - CT CAP with potential new findings in RUL +/- ongoing Cdiff concerns (need to adjust treatment or add another agent?) though diarrhea may at this point be multifactorial (high incidence with midostaurin +/- GI mucosal changes from chemo/neutropenia). Will consult ID for assist.  - monitor hemorrhoid, discomfort controlled with topical interventions  - rash improving, now more petechial in nature  - erythematous/tender lesion to right lower shin (outlined on 7/6) stable today, continue to monitor  - EKG to better assess rhythm today      HEME  # AML (FLT3 ITD(low), NPM1, IDH2)  Was in her usual state of health until 03/2022 when she underwent a routine physical with labs. On 3/8/2022, white blood cell count 2.5 (ANC 1.0), Hgb 13.6 with , platelets normal.  On 5/15/2022, she was noted to have further decreasd white blood cell count of 1.6 (ANC 0.28) with hemoglobin 11. platelets were 133. She was ultimately referred to Hematology (Dr. Bob), who she saw on 6/7/22. Further blood workup was recommended and labs done 6/14. CBC revealed WBC 25.3 (ANC 0.8), Hgb 10.1 (), Plt 90K. On the differential and morphology review, 81% blasts were seen concerning for acute leukemia. She was  advised to present to [facility] for further evaluation and management.   - Per verbal report from Heme Path fellow there was concern for Blanca rods. She was started on ATRA 6/16 PM-6/17 AM while awaiting PML-ANNELIESE testing. This was ultimately negative.   - s/p Hydrea (6/17-6/22) while awaiting final diagnostic studies  - 6/16 PB chromosomes: 46, XX. No abnormality.   - 6/16 PB FISH: no rearrangments of MLLT10, NUP98, or KMT2A.   - 6/17 BMBx: markedly hypercellular marrow (85-95% estimated cellularity), with markedly diminished trilineage hematopoiesis, no overt dysplasia in the evaluable elements, and 92% blasts  - FLT3 PCR: positive for FLT3-ITD(low) with allelic ratio 0.21 (corrected from previously reported value of 0.144 per staff message to Lamar Grover from molecular lab personnel)  - NGS panel: NPM1 positive, IDH2, FLT3-ITD  - HLA typing sent, BMT coordinators aware and working on arranging IP consultation (delayed due to insurance change/financial approval)  - receiving induction with 7+3 (cytarabine+daunorubicin PLUS midostaurin (D1= 6/22/22)   - D21 BMBx scheduled on 7/12 at 11 AM    # Pancytopenia secondary to chemotherapy and AML  - Transfuse to maintain Hgb >7, Plt >10K     GYN  # Vaginal bleeding, resolved  Menstrual cycle began inpatient overnight 6/30-7/1. Usually heaviest flow is first ~3 days. Reviewed neutropenic precautions and advised not to use tampons at this time. Resolved as of 7/7.  - back down to Plt parameter 10K  - s/p Provera 10mg daily (7/1-7/6)    ID  # Neutropenic Fever, recurrent  # Recurrent C.diff colitis  (1) Fevered to 101.6F on 6/23. Noted to have chills though no other focal symptoms. Afebrile thereafter.  - 6/16 baseline CT CAP without evidence for infectious source   - 6/23: BCx NG, UA/UCx NGTD, CXR clear  - s/p IV Cefepime (x 6/23-6/27). De-escalated back to ppx Levaquin on 6/27.  (2) Recurrent fever on 6/29 PM. Persistent temps 6/29-6/30, then afebrile since 6/30  afternoon. BCx, UA, CXR negative. C.diff positive on 7/1  - restarted on Cefepime 2g IV q8hr (x 6/29 PM - 7/4 AM)  (3) Recurrent fever 100.4-->101 (7/5-7/6), restarted Cefepime (x 7/5)  - 7/5 BCx NGTD, UCx NG  - 7/6 MRSA swab negative  - 7/7 BCx NGTD, 7/8 BCx pending  - CT CAP (7/7): subtle diffuse mosaic groundglass changes and presumed fibroatelectasis in RUL. No pulm consolidation. Moderate hiatal hernia with mild thickening and streaky density along intrathoracic stomach and distal esophagus, enlarged upper RP LN and mesenteric lymph nodes (infectious vs. Inflammatory).      **Antibiotics:  - Cefepime (6/23-6/27, 6/29-7/4), (x 7/5 - present)  - PO Vancomycin 125mg QID (x 7/1); plan for treatment course at least 10-14 days but will also depend on ongoing systemic IV abx, followed by ongoing maintenance dosing.    # ID PPx  - ACV 400mg BID  - resume Levaquin 250mg daily   - Micafungin 50mg IV daily. Given that she will be on midostaurin, will keep on Micafungin to limit potential interactions with posaconazole/voriconazole.  - when able to transition to PO antifungal, Prior Auth approved for both posaconazole and voriconazole. However, as of  6/29, pt has not met deductible (but should meet it once hospital stay is billed). At present, monthly cost for Posaconazole $2048.66, Voriconazole $176.41.     # h/o COVID-19  Pt is not vaccinated nor interested in being vaccinated. She was admitted from 10/1/2021 - 10/13/2021 for acute hypoxic respiratory failure from COVID-19 pneumonia.  Required IMC, high flow and intermittent CPAP. She was treated with dexamethasone, remdesivir, and baricitinib in addition to azithromycin and ceftriaxone. Recovered symptomatically from this.    GI  # H/o C.diff (10/2021)  # Hemorrhoid  # Recurrent C.diff (7/1)  # Diarrhea  H/o C.diff (10/20/21) and course was complicated by hemorrhoid which does wax/wane and was sometimes painful. She primarily has used witch hazel pads when symptomatic.  Pt reported normal bowels at time of AML diagnosis. Developed intermittent loose stools throughout admission, then increased frequency of loose/watery stools overnight 6/30-7/1. Rechecked C.diff (7/1), positive.   - 6/16 CT A/P negative for perirectal abscess.  - TUCKS PRN, add Prep H (7/4), dilt cream (7/6)  - Avoid PTA probiotic at this time.  - baseline 6/19 C. Diff negative so we deferred prophylactic PO Vancomycin during chemo course  - recurrent/increased loose stools reported on 7/1, checked C.diff and this was positive. Continue PO Vancomycin as above.    # Mucositis, improved  On ~6/27, pt noting inflammatory changes in b/l buccal mucosa and small lesion on right inner lower lip. Not painful or affecting PO intake. Since improved and pt has denied mouth pain/sores recently, eating/drinking well. Now with intermittent blood blisters but no mouth pain/sores.  - MMW PRN, viscous lidocaine PRN    # GERD  - TUMS PRN  - PPI daily     # Distal esophageal discomfort/dysphagia - improved  # Hiatal hernia (seen on 7/7 CT imaging)  On 6/29, patient reported new onset of epigastric discomfort when swallowing solid foods, not with liquids. No radiation to back or other areas of abdomen, no RUQ tenderness. Denies reflux, nausea or emesis. Already on PPI as above. No e/o oral candidiasis, on micafungin ppx. Question relation to GI mucosal changes? Nearly resolved on 7/4. Now with intermittent exacerbations but not nearly what it was previously.   - Trial Carafate before meals and at nighttime --> given improvement, backed off to PRN (x 7/4)  - simethicone PRN, Maalox PRN, GI Cocktail PRN  - Continue to monitor, could consider barium swallow study if persistent    CV  # Irregular rhythm  Pt having occasionally PVCs, noting more in the last couple days and more irregular on 7/8 exam  - EKG      ENDO  # Hypothyroidism  PTA was on Synthroid 125mcg daily. TSH 0.04, T4 Free 1.48 (done 6/24). Per pt, her PCP reviewed and advised  her to reduce her Synthroid to 100mcg daily. Ordered to start on 6/29.     DERM  # Bilateral ear erythema, pruritis - resolved  On 6/29, pt reported bilateral ear lobe erythema and irritation in ear canal. Believed to be related to cytarabine. S/p triamcinolone cream. Resolved early week of 7/4.      # Truncal rash - improving   Noted rash to upper abdomen which progressed to upper torso and back throughout morning of 7/6. Not itchy or bothersome to pt. Suspect possible cytarabine rash. Pt has been on/off Cefepime and previously tolerated but rash to abx is also in differential. Can try triamcinolone cream to rash sites.    # Lesion to R shin - stable   Noted on 7/6; outlined, monitor for worsening cellulitis. On 7/7, slightly improved in degree of erythema and slightly retracted from drawn border, less tender as well. Stable on 7/8.  - MRSA swab negative as above     FEN  - Regular diet as tolerated  - lyte repletion per unit protocol  - regular diet     PPx  - VTE: none due to thrombocytopenia  - GI: PPI     Dispo:  Anticipate 4+ week LOS for management of acute leukemia. Presented under Dr. Won Perkins.     Plan of care discussed with Dr. Pantoja     I spent >50 minutes in the care of this patient today, which included time necessary for review of interval events, obtaining history and physical exam, adjusting medication orders, discussion with interdisciplinary/consult team(s), and documentation time. Over 50% of time was spent face-to-face and/or coordinating care.     Plan of care discussed with Dr. Scarlett Grover PA-C  Hematology/Oncology   pager: [phone]       Interval History   No acute events overnight, recurrent fevers to 101.6 in last 24 hrs. Feels less well with the fevers in general but no shaking chills. Diarrhea is about the same - she thinks it has plateaued. States she is having anywhere from 5-8 stools per day and this is generally unchanged. Stools have a little consistency to them  but mainly loose. Denies associated abd pain or cramping. Denies nausea. She is noticing increased clear rhinorrhea without nasal congestion or sinus pressure/pain. Suspect due to loss of nasal cilia. Also notes a little intermittent throat pain, mid throat. Denies SOB or cough. Denies known aspiration. Rash is improving on trunk, now more petechial nature. Lesion to R shin feels better today. Still feeling PVCs but has denied lightheadedness or dizziness. Getting tired of being here, particularly with ongoing fevers.       Physical Exam   Temp: (!) 101.6  F (38.7  C) Temp src: Oral BP: 117/65 Pulse: 95   Resp: 18 SpO2: 96 % O2 Device: None (Room air)    Vitals:    07/05/22 0900 07/06/22 0819 07/07/22 0744   Weight: 114.8 kg (253 lb 1.6 oz) 115.2 kg (253 lb 14.4 oz) 115.2 kg (254 lb)     Vital Signs with Ranges  Temp:  [98.4  F (36.9  C)-101.6  F (38.7  C)] 101.6  F (38.7  C)  Pulse:  [] 95  Resp:  [14-18] 18  BP: (105-123)/(61-81) 117/65  SpO2:  [96 %-100 %] 96 %  I/O last 3 completed shifts:  In: 760 [P.O.:450; I.V.:310]  Out: -   Constitutional: Awake and conversational. Non-toxic appearing. No acute distress.Up ad qasim in room after showering, moves to chair.    HEENT: NC/AT. Thinning hair, puts on head scarf. Sclerae anicteric.  Respiratory: Breathing comfortable with no increased work on room air. Lungs CTA b/l.  Cardiovascular: RRR with more frequency skipped beats. No murmur appreciated. No peripheral edema.    GI: Normal BS. Abdomen is soft, non-distended, non-tender.   Skin: Skin is clean, dry, intact. Faint pink papular rash to trunk is faded with evolving petechial appearance at this time. Dime size erythematous (less purple) lesion with central dried pustular-like appearance and induration to right lower shin; outlined and remains slightly retracted within borders but stable from 7/7. Less tender today.   Musculoskeletal/ Extremities: Extremities grossly normal in appearance.  Neurologic: Alert and  oriented. Speech normal. Grossly nonfocal.   VAD: PICC line in LUE, c/d/i. She has new ecchymoses proximal to PICC site.     Medications     - MEDICATION INSTRUCTIONS -         acyclovir  400 mg Oral BID     ceFEPIme (MAXIPIME) IV  2 g Intravenous Q8H     diltiazem 2% in PLO gel  0.25-0.5 g Topical TID     levothyroxine  100 mcg Oral QAM AC     micafungin  50 mg Intravenous Q24H     midostaurin  50 mg Oral Q12H     multivitamin w/minerals  1 tablet Oral Daily     pantoprazole  40 mg Oral QAM AC     pramox-pe-glycerin-petrolatum   Rectal TID     triamcinolone   Topical BID     vancomycin  125 mg Oral 4x Daily     vitamin C  1,000 mg Oral Daily     vitamin D3  50 mcg Oral Daily       Data   Results for orders placed or performed during the hospital encounter of 06/16/22 (from the past 24 hour(s))   Prepare red blood cells (unit)   Result Value Ref Range    CROSSMATCH Compatible     UNIT ABO/RH A Neg     Unit Number [id number]     Unit Status Transfused     Blood Component Type Red Blood Cells     Product Code [id number]     CODING SYSTEM [id number]     UNIT TYPE ISBT 0600     ISSUE DATE AND TIME [id number]    Potassium   Result Value Ref Range    Potassium 3.7 3.4 - 5.3 mmol/L   CT Chest/Abdomen/Pelvis w Contrast    Narrative    EXAMINATION: CT CHEST/ABDOMEN/PELVIS W CONTRAST, 7/7/2022 1:22 PM    INDICATION: persistent neutropenic fever    COMPARISON STUDY: Radiograph 6/29/2022. CT 6/15/2022.    TECHNIQUE: CT scan of the chest, abdomen, and pelvis was performed on  multidetector CT scanner using volumetric acquisition technique and  images were reconstructed in multiple planes with variable thickness  and reviewed on dedicated workstations.     CONTRAST: 125 ml isovue 370  injected IV     CT scan radiation dose is optimized to minimum requisite dose using  automated dose modulation techniques.    FINDINGS:    Lines, tubes, devices: Left lower extremity PICC with tip terminating  at the superior cavoatrial  junction.    CHEST:      Lungs: Unchanged 4 mm solid pulmonary nodule of the left upper lobe  laterally (series 5 image 153). Remaining additional sub-4 mm solid  pulmonary nodules are stable compared to prior. Similar subtle diffuse  mosaic groundglass changes and presumed fibroatelectasis of the  posterior aspect of the right upper lobe. The central tracheobronchial  tree is patent. No areas of bronchiectasis or architectural  distortion. No pleural effusion, pulmonary consolidation, or  pneumothorax. Mild bibasilar atelectasis.     Mediastinum: The visualized thyroid is unremarkable.Heart size is  within normal limits. No pericardial effusion. The thoracic aorta and  main pulmonary artery are within normal limits. Standard branching  pattern of the great vessels. No abnormal thoracic lymph nodes.  Moderate hiatal hernia, mild increased streakiness and thickening  along the intrathoracic stomach and distal esophagus.      ABDOMEN/PELVIS:    Liver: No mass. No intrahepatic biliary ductal dilation.  Similar  hypoattenuation along the falciform ligament.    Biliary System: Decompressed appearance of the gallbladder. No  extrahepatic biliary ductal dilation.    Pancreas: No mass or pancreatic ductal dilation.    Adrenal glands: No mass or nodules    Spleen: Normal.    Kidneys: No suspicious mass, obstructing calculus or hydronephrosis.    Gastrointestinal tract :Normal appendix. Normal caliber small bowel.    Mesentery/peritoneum/retroperitoneum: There is hazy central mesenteric  attenuation with numerous subcentimeter lymph nodes in the central  mesentery.    Lymph nodes: Numerous subcentimeter central mesenteric lymph nodes and  several enlarged lymph nodes for example a 1.2 cm short axis left  periaortic lymph node (series 7 image 333).    Vasculature: Patent major abdominal vasculature.  The major portal  vessels are patent.    Pelvis: Urinary bladder is normal.  Myomatous uterus. Ovaries within  normal  limits.    Osseous structures: No aggressive or acute osseous lesion.  Unchanged  opposing endplate degenerative changes L4-L5.      Soft tissues: Small fat-containing umbilical hernia.      Impression    IMPRESSION:      1. Small to moderate hiatal hernia with mild thickening  and streaky  density along the intrathoracic stomach and distal esophagus,  likely  to represent inflammatory/infective etiology.  2. Enlarged upper retroperitoneal especially para-aortic para-aortic  lymph node and mesenteric lymph nodes, compared to prior CT 6/16/2022  concerning for infective/inflammatory etiology. Consider attention on  follow-up  3. Patchy streaky density in the posterior right upper lobe slightly  more conspicuous compared to prior CT, may represent infection or  inflammation.  4. Unchanged subcentimeter pulmonary nodule    CBC with platelets differential    Narrative    The following orders were created for panel order CBC with platelets differential.  Procedure                               Abnormality         Status                     ---------                               -----------         ------                     CBC with platelets and d...[[id number]]  Abnormal            Final result               Manual Differential[[id number]]          Abnormal            Final result                 Please view results for these tests on the individual orders.   Basic metabolic panel   Result Value Ref Range    Creatinine 0.48 (L) 0.51 - 0.95 mg/dL    Sodium 140 136 - 145 mmol/L    Potassium 4.0 3.4 - 5.3 mmol/L    Urea Nitrogen 7.6 6.0 - 20.0 mg/dL    Chloride 107 98 - 107 mmol/L    Carbon Dioxide (CO2) 22 22 - 29 mmol/L    Anion Gap 11 7 - 15 mmol/L    Glucose 111 (H) 70 - 99 mg/dL    GFR Estimate >90 >60 mL/min/1.73m2    Calcium 9.1 8.6 - 10.0 mg/dL   Hepatic panel   Result Value Ref Range    Protein Total 6.6 6.4 - 8.3 g/dL    Albumin 3.7 3.5 - 5.2 g/dL    Bilirubin Total 1.0 <=1.2 mg/dL    Alkaline Phosphatase 49 35 -  "104 U/L    AST 7 (L) 10 - 35 U/L    ALT 10 10 - 35 U/L    Bilirubin Direct 0.29 0.00 - 0.30 mg/dL   Magnesium   Result Value Ref Range    Magnesium 2.0 1.7 - 2.3 mg/dL   Phosphorus   Result Value Ref Range    Phosphorus 2.9 2.5 - 4.5 mg/dL   Lactic Acid STAT   Result Value Ref Range    Lactic Acid 0.8 0.7 - 2.0 mmol/L   CBC with platelets and differential   Result Value Ref Range    WBC Count 0.5 (LL) 4.0 - 11.0 10e3/uL    RBC Count 2.62 (L) 3.80 - 5.20 10e6/uL    Hemoglobin 8.3 (L) 11.7 - 15.7 g/dL    Hematocrit 23.6 (L) 35.0 - 47.0 %    MCV 90 78 - 100 fL    MCH 31.7 26.5 - 33.0 pg    MCHC 35.2 31.5 - 36.5 g/dL    RDW 13.9 10.0 - 15.0 %    Platelet Count 6 (LL) 150 - 450 10e3/uL    Narrative    Previously reported component [ NRBCs ] is no longer reported.\"  Previously reported component [ NRBCs Absolute ] is no longer reported.\"   Manual Differential   Result Value Ref Range    % Neutrophils 0 %    % Lymphocytes 97 %    % Monocytes 0 %    % Eosinophils 0 %    % Basophils 0 %    % Blasts 3 %    Absolute Neutrophils 0.0 (LL) 1.6 - 8.3 10e3/uL    Absolute Lymphocytes 0.5 (L) 0.8 - 5.3 10e3/uL    Absolute Monocytes 0.0 0.0 - 1.3 10e3/uL    Absolute Eosinophils 0.0 0.0 - 0.7 10e3/uL    Absolute Basophils 0.0 0.0 - 0.2 10e3/uL    Absolute Blasts 0.0 <=0.0 10e3/uL    RBC Morphology Confirmed RBC Indices     Platelet Assessment  Automated Count Confirmed. Platelet morphology is normal.     Automated Count Confirmed. Platelet morphology is normal.    [place] Cells Slight (A) None Seen       "

## 2022-07-08 NOTE — CONSULTS
[facility]  Transplant Infectious Disease Consult Note - New Patient     Patient:  Veda Marinelli, Date of birth 1985, Medical record number [id number]  Date of Visit:  07/08/2022  Consult requested by Dr. Doreen Pantoja for evaluation of febrile neutropenia, C.diff         Assessment and Recommendations:   Recommendations:  - Continue empiric Cefepime for coverage of febrile neutropenia for now  - Follow up blood culture results from 7/7 and 7/8  - Continue PO Vancomycin 125mg q6h for C.diff diarrhea  - If lack of response over the next 48 hours, reasonable to switch to Fidaxomicin 200mg q12h for treatment of C.diff that has failed Vancomycin  - Persistent fevers should prompt next line of investigation, including non-invasive fungal biomarkers (Aspergillus GM, BDG, fungal blood culture), workup for endemic fungi (serum/urine Histo, fungal antibody panel)  - Low threshold to get Dermatology evaluation and biopsy for right shin lesion, especially if enlargement, new lesions or pain   - Continue Micafungin and Acyclovir prophylaxis    Thank you very much for this consultation. Transplant Infectious Disease will continue to follow with you.    Dr. Toledo (Staff, pager [phone]) will cover the HSCT/Transplant ID service over the weekend, but will not see this patient unless called with questions.   Dr. Marsh (Staff, pager [phone]) will assume coverage of the service starting Monday, 7/11/22    ILA Guerrero  Staff Physician, Infectious Diseases  Pager [phone]    Assessment:  36 y/o lady, PMHx COVID19 infection (10/2021), C.diff, and hypothyroidism who presented with leukocytosis and circulating blasts. 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 She was initiated on induction chemotherapy with 7+3+midostaurin (C1D1=6/22/22). Hospital course complicated by neutropenic fever and C.difficile colitis- ID consulted for the same    #Febrile Neutropenia:  Has had  intermittent episodes of fever since 6/23 as documented below. Now with more persistent fevers 7/5 onwards in the setting of C.diff. Infectious workuo so far has been negative. Symptom-wise she has negligible symptoms to suggest additional systemic infection, besides the runny nose (for which a RVP is reasonable). The Chest and abdominal imaging have failed to reveal obvious line of workup for an infection - e.g. no nodular opacities or obvious ground glassing. For now, empiric antibiotic coverage reasonable while awaiting diarrheal response, and results of already sent antimicrobial studies. Persistent fevers should prompt next line of investigation, including non-invasive fungal biomarkers, workup for endemic fungi. Beyond that, a karius could be considered    #Right shin lesion:  Small circular lesion on right shin which is erythematous, non-tender. No similar lesions, has been demarcated. Low threshold to get Dermatology evaluation and biopsy, especially if enlargement, new lesions or pain     #C.diff colitis, recurrent:  #Ongoing diarrhea:  Previous episode of C.diff back in Fall 2021. Tested negative on admission, but repeat test on 7/1 in the setting of diarrhea and fever positive. Patient has now been on Vancomycin at appropriate dose for nearly a week without significant improvement, although she does note she is not experiencing abdominal pain or cramps as she had with her previous episode. CT A/P obtained 7/7 failed to show evidence of colitis/bowel wall thickening. Difficult to estimate whether persistent diarrhea represents Vancomycin treatment failure or is a side effect of systemic antibiotics, or more likely, her chemotherapy with Midostaurin (nearly 50% likelihood of diarrhea). No indication to add Metronidazole in the absence of colitis on imaging, however, if diarrhea fails to improve over next 24-48 hours, reasonable to trial Fidaxomicin    Previous Infectious Disease issues include:  - COVID-19  pneumonia - 10/2021. Patient reports she was admitted for 13 days and went home with supplemental O2 for a few more days. She was never intubated but did require Vapotherm for a few days. Received Remdesivir, Dexamethasone and Barictinib. No vaccination records available  - C.diff colitis - episode soon after her hospitalization for COVID. S/p PO vancomycin    - QTc interval: 450 msec 7/8/22  - Bacterial prophylaxis: On cefepime  - Viral serostatus & prophylaxis: CMV -/EBV -/Hep B sAb +/HSV negative, on Acyclovir  - Fungal prophylaxis: Micafungin  - Immunization status: No records available  - Isolation status: Enteric isolation for C.diff, Droplet isolation while she is being ruled out for respiratory infections.         History of Infectious Disease Illness:     36 y/o lady, PMHx COVID19 infection (10/2021), C.diff, and hypothyroidism who presented with leukocytosis and circulating blasts. 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 She was initiated on induction chemotherapy with 7+3+midostaurin (C1D1=6/22/22). Hospital course complicated by neutropenic fever and C.difficile colitis- ID consulted for the same    Course of fever:  1. First febrile to 101.6 F on 6/23. At the time, she reported chills, but not a lot of additional symptoms. Was only febrile that day, defervesced soon after.   - 6/16 baseline CT CAP without no e/o acute/infectious process   - 6/23: BCx NG, UA/UCx NGTD  - s/p IV Cefepime (6/23 - 6/27). De-escalated back to prophylactic Levaquin on 6/27.  2. Recurrent fever on 6/29 PM. Remained febrile for a couple of days (through 6/30), Tmax 101.6F. Again remained hemodynamically stable. IV Cefepime (6/29 PM - 7/4 AM)  - 6/29: Blood Cx negative  3. On 6/30, started diarrhea. Tested positive for C.diff 7/1, started on PO Vancomycin 125mg q6h  4. Febrile again on 7/5 with daily fevers since, Tmax last 24 hours 101.6 F. Restarted Cefepime on 7/5/22. As part of repeat workup - has had negative blood cultures  "(7/5, 7/7 and 7/8), negative urine culture (7/5), negative MRSA swab (7/6). Underwent CT C/A/P on 7/7 which showed \"subtle diffuse mosaic GGO in RUL, no pulm consolidation, moderate hiatal hernia with mild thickening and streaky density along intrathoracic stomach and distal esophagus, enlarged upper RP LN and mesenteric lymph nodes\"    Patient reports feeling well overall, is frustrated by her fevers. Diarrhea (which started with 10 BMs a day on 7/1) has slightly improved, but is still having over 5 BMs daily with mainly loose BMs. Does not have a headache, neck stiffness or visual changes. Reported slight runny nose and a sore throat today. Has a couple of blood blisters in her mouth, but no painful swallowing, does not report thrush. No cough, SOB, is on room air. No nausea, vomiting, cramping abdominal pain. No joint pain. Has a circular, erythematous lesion on right shin which has been demarcated but that is not enlarging, not painful    Review of Systems:  CONSTITUTIONAL:  + daily fevers, occasional chills. No rigors or night sweats. Loss of appetite +  EYES: negative for icterus or acute vision changes  ENT:  negative for acute hearing loss, tinnitus. + runny nose, slight sore throat  RESPIRATORY:  negative for cough, sputum or dyspnea  CARDIOVASCULAR:  negative for chest pain, palpitations  GASTROINTESTINAL:  negative for nausea, vomiting, constipation, + watery diarrhea  GENITOURINARY:  negative for dysuria or hematuria  HEME:  No easy bruising or bleeding  INTEGUMENT:  negative for pruritus, + small circular skin lesion on right shin  NEURO:  Negative for headache or tremor    PMHx:  Hemorrhoids  COVID-19 (10/2021)  C.diff (2021)    Past Surgical History:   Procedure Laterality Date     PICC DOUBLE LUMEN PLACEMENT Left 06/16/2022    46 cm total lateral brachial     No significant family Hx    Social History     Social History Narrative     Not on file     Social History     Tobacco Use     Smoking status: " Never Smoker     Smokeless tobacco: Never Used       Patient Active Problem List   Diagnosis     Leukemia, blast cell (H)            Current Medications & Allergies:       acyclovir  400 mg Oral BID     ceFEPIme (MAXIPIME) IV  2 g Intravenous Q8H     diltiazem 2% in PLO gel  0.25-0.5 g Topical TID     levothyroxine  100 mcg Oral QAM AC     micafungin  50 mg Intravenous Q24H     midostaurin  50 mg Oral Q12H     multivitamin w/minerals  1 tablet Oral Daily     pantoprazole  40 mg Oral QAM AC     pramox-pe-glycerin-petrolatum   Rectal TID     triamcinolone   Topical BID     vancomycin  125 mg Oral 4x Daily     vitamin C  1,000 mg Oral Daily     vitamin D3  50 mcg Oral Daily       Infusions/Drips:      - MEDICATION INSTRUCTIONS -         Allergies   Allergen Reactions     Blood Transfusion Related (Informational Only) Hives     6/29/2022, reaction of hives with platelet transfusion             Physical Exam:     Patient Vitals for the past 24 hrs:   BP Temp Temp src Pulse Resp SpO2 Weight   07/08/22 1441 110/63 99.2  F (37.3  C) Oral 87 18 97 % --   07/08/22 1209 108/66 99.6  F (37.6  C) Oral 92 18 97 % --   07/08/22 1113 117/63 98.6  F (37  C) Oral 75 18 99 % --   07/08/22 1026 111/69 100  F (37.8  C) Oral 87 18 98 % 115.5 kg (254 lb 9.6 oz)   07/08/22 0533 117/65 (!) 101.6  F (38.7  C) Oral 95 18 96 % --   07/08/22 0150 105/61 99  F (37.2  C) Oral 81 14 99 % --   07/07/22 2209 120/69 (!) 100.5  F (38.1  C) Oral 112 18 99 % --     Ranges for vital signs:  Temp:  [98.6  F (37  C)-101.6  F (38.7  C)] 99.2  F (37.3  C)  Pulse:  [] 87  Resp:  [14-18] 18  BP: (105-120)/(61-69) 110/63  SpO2:  [96 %-99 %] 97 %  Vitals:    07/06/22 0819 07/07/22 0744 07/08/22 1026   Weight: 115.2 kg (253 lb 14.4 oz) 115.2 kg (254 lb) 115.5 kg (254 lb 9.6 oz)       Physical Examination:  GENERAL:  well-developed, well-nourished, sitting up in chair in no acute distress.  HEAD:  Head is normocephalic, atraumatic   EYES:  Eyes have  anicteric sclerae without conjunctival injection   ENT:  Oropharynx is moist without exudates, no mucositis, no thrush, has a couple of small blood blisters. Tongue is midline  NECK:  Supple. No cervical lymphadenopathy  LUNGS:  Clear to auscultation bilateral. On room air, no use of accessory muscles of respiration  CARDIOVASCULAR:  Regular rate and rhythm with no murmurs, gallops or rubs.  ABDOMEN:  Normal bowel sounds, soft, nontender. No appreciable hepatosplenomegaly.  SKIN:  Erythematous, circular lesion on right shin, not raised or tender. Left sided PICC in place without any surrounding erythema or exudate.  NEUROLOGIC:  Grossly nonfocal. Active x4 extremities         Laboratory Data:     Metabolic Studies       Recent Labs   Lab Test 07/08/22  0554 07/07/22  1309 07/07/22  0538     --  138   POTASSIUM 4.0 3.7 3.3*   CHLORIDE 107  --  107   CO2 22  --  22   ANIONGAP 11  --  9   BUN 7.6  --  6.8   CR 0.48*  --  0.44*   GFRESTIMATED >90  --  >90   *  --  106*   MICHAEL 9.1  --  8.8   PHOS 2.9  --  3.5   MAG 2.0  --  2.2   LACT 0.8  --  0.4*       Hepatic Studies    Recent Labs   Lab Test 07/08/22  0554 07/06/22  0753 06/26/22  0659 06/25/22  0556 06/24/22  0550 06/14/22  0752 05/13/22  1200   BILITOTAL 1.0 1.2   < > 0.6 0.6   < >  --    DBIL 0.29 0.29   < > <0.20 <0.20   < >  --    ALKPHOS 49 48   < > 35 42   < >  --    PROTTOTAL 6.6 6.4   < > 5.9* 5.9*   < >  --    ALBUMIN 3.7 3.8   < > 4.1 3.6   < >  --    AST 7* 7*   < > 18 31   < >  --    03657  --   --   --   --   --   --  13   ALT 10 10   < > 31 27   < >  --    75888  --   --   --   --   --   --  16   LDH  --   --   --  545* 889*   < >  --     < > = values in this interval not displayed.       Hematology Studies   Recent Labs   Lab Test 07/08/22  0554 07/07/22  0538 07/06/22  0753 07/05/22  0618 06/29/22  0626 06/28/22  0628   WBC 0.5* 0.5* 0.4* 0.8*   < > 1.1*   ABLA 0.0  --   --   --    < >  --    BLST 3  --   --   --    < >  --    ANEU 0.0*  0.0*  --  0.0*   < >  --    ANEUTAUTO  --   --   --   --   --  0.0*   ALYM 0.5* 0.5*  --  0.8   < >  --    ALYMPAUTO  --   --   --   --   --  1.0   GORAN 0.0 0.0  --  0.0   < >  --    AMONOAUTO  --   --   --   --   --  0.0   AEOS 0.0 0.0  --  0.0   < >  --    AEOSAUTO  --   --   --   --   --  0.0   ABSBASO  --   --   --   --   --  0.0   HGB 8.3* 6.3* 6.6* 7.1*   < > 9.3*   HCT 23.6* 18.1* 18.9* 20.5*   < > 26.6*   PLT 6* 12* 23* 11*   < > 14*    < > = values in this interval not displayed.       Clotting Studies    Recent Labs   Lab Test 07/04/22  0638 06/30/22  0341 06/27/22  0620 06/26/22  0659   INR 1.10 1.14 1.09 1.16*   PTT  --   --  31 31       Urine Studies     Recent Labs   Lab Test 06/29/22  2028 06/23/22  1646 06/17/22  0746   URINEPH 7.0 6.0 6.0   NITRITE Negative Negative Negative   LEUKEST Negative Negative Negative   WBCU  --  4 1       Microbiology:    Last Culture results   Culture   Date Value Ref Range Status   07/07/2022 No growth after 1 day  Preliminary   07/07/2022 No growth after 1 day  Preliminary   07/05/2022 No growth after 2 days  Preliminary   07/05/2022 No Growth  Final   07/05/2022 No growth after 2 days  Preliminary   06/29/2022 No Growth  Final   06/29/2022 No Growth  Final   06/23/2022 10,000-50,000 CFU/mL Mixture of urogenital shani  Final   06/23/2022 No Growth  Final   06/23/2022 No Growth  Final   06/17/2022 No Growth  Final         Last check of C difficile  C Difficile Toxin B by PCR   Date Value Ref Range Status   07/01/2022 Positive (A) Negative Final     Comment:     Detection of C. difficile nucleic acid in stools confirms the presence of these organisms in diarrheal patients but may not indicate that C. difficile are the etiologic agents of the diarrhea. Results from the Xpert C. difficile assay should be interpreted in conjunction with other laboratory and clinical data available to the clinician.       Quantiferon testing   Recent Labs   Lab Test 07/08/22  0562  07/07/22  0538   LYMPH 97 98       Virology:  Coronavirus-19 testing    Recent Labs   Lab Test 06/16/22  1349   [id number] Negative       Hepatitis B Testing     Recent Labs   Lab Test 06/16/22  1724   AUSAB 31.11   HBCAB Nonreactive   HEPBANG Nonreactive        Hepatitis C Antibody   Date Value Ref Range Status   06/16/2022 Nonreactive Nonreactive Final       CMV Antibody IgG   Date Value Ref Range Status   06/16/2022 No detectable antibody. No detectable antibody.  Final     EBV Capsid Antibody IgG   Date Value Ref Range Status   06/16/2022 No detectable antibody. No detectable antibody. Final     Herpes Simplex Virus Type 1 IgG Antibody   Date Value Ref Range Status   06/16/2022 No HSV-1 IgG antibodies detected. No HSV-1 IgG antibodies detected Final     Herpes Simplex Virus Type 2 IgG Antibody   Date Value Ref Range Status   06/16/2022 No HSV-2 IgG antibodies detected. No HSV-2 IgG antibodies detected Final       Imaging:  Recent Results (from the past 48 hour(s))   CT Chest/Abdomen/Pelvis w Contrast    Narrative    EXAMINATION: CT CHEST/ABDOMEN/PELVIS W CONTRAST, 7/7/2022 1:22 PM    INDICATION: persistent neutropenic fever    COMPARISON STUDY: Radiograph 6/29/2022. CT 6/15/2022.    TECHNIQUE: CT scan of the chest, abdomen, and pelvis was performed on  multidetector CT scanner using volumetric acquisition technique and  images were reconstructed in multiple planes with variable thickness  and reviewed on dedicated workstations.     CONTRAST: 125 ml isovue 370  injected IV     CT scan radiation dose is optimized to minimum requisite dose using  automated dose modulation techniques.    FINDINGS:    Lines, tubes, devices: Left lower extremity PICC with tip terminating  at the superior cavoatrial junction.    CHEST:      Lungs: Unchanged 4 mm solid pulmonary nodule of the left upper lobe  laterally (series 5 image 153). Remaining additional sub-4 mm solid  pulmonary nodules are stable compared to prior. Similar  subtle diffuse  mosaic groundglass changes and presumed fibroatelectasis of the  posterior aspect of the right upper lobe. The central tracheobronchial  tree is patent. No areas of bronchiectasis or architectural  distortion. No pleural effusion, pulmonary consolidation, or  pneumothorax. Mild bibasilar atelectasis.     Mediastinum: The visualized thyroid is unremarkable.Heart size is  within normal limits. No pericardial effusion. The thoracic aorta and  main pulmonary artery are within normal limits. Standard branching  pattern of the great vessels. No abnormal thoracic lymph nodes.  Moderate hiatal hernia, mild increased streakiness and thickening  along the intrathoracic stomach and distal esophagus.      ABDOMEN/PELVIS:    Liver: No mass. No intrahepatic biliary ductal dilation.  Similar  hypoattenuation along the falciform ligament.    Biliary System: Decompressed appearance of the gallbladder. No  extrahepatic biliary ductal dilation.    Pancreas: No mass or pancreatic ductal dilation.    Adrenal glands: No mass or nodules    Spleen: Normal.    Kidneys: No suspicious mass, obstructing calculus or hydronephrosis.    Gastrointestinal tract :Normal appendix. Normal caliber small bowel.    Mesentery/peritoneum/retroperitoneum: There is hazy central mesenteric  attenuation with numerous subcentimeter lymph nodes in the central  mesentery.    Lymph nodes: Numerous subcentimeter central mesenteric lymph nodes and  several enlarged lymph nodes for example a 1.2 cm short axis left  periaortic lymph node (series 7 image 333).    Vasculature: Patent major abdominal vasculature.  The major portal  vessels are patent.    Pelvis: Urinary bladder is normal.  Myomatous uterus. Ovaries within  normal limits.    Osseous structures: No aggressive or acute osseous lesion.  Unchanged  opposing endplate degenerative changes L4-L5.      Soft tissues: Small fat-containing umbilical hernia.      Impression    IMPRESSION:      1.  Small to moderate hiatal hernia with mild thickening  and streaky  density along the intrathoracic stomach and distal esophagus,  likely  to represent inflammatory/infective etiology.  2. Enlarged upper retroperitoneal especially para-aortic para-aortic  lymph node and mesenteric lymph nodes, compared to prior CT 6/16/2022  concerning for infective/inflammatory etiology. Consider attention on  follow-up  3. Patchy streaky density in the posterior right upper lobe slightly  more conspicuous compared to prior CT, may represent infection or  inflammation.  4. Unchanged subcentimeter pulmonary nodule

## 2022-07-09 LAB
ANION GAP SERPL CALCULATED.3IONS-SCNC: 12 MMOL/L (ref 7–15)
BUN SERPL-MCNC: 6.1 MG/DL (ref 6–20)
CALCIUM SERPL-MCNC: 8.7 MG/DL (ref 8.6–10)
CHLORIDE SERPL-SCNC: 105 MMOL/L (ref 98–107)
CREAT SERPL-MCNC: 0.51 MG/DL (ref 0.51–0.95)
DEPRECATED HCO3 PLAS-SCNC: 21 MMOL/L (ref 22–29)
ERYTHROCYTE [DISTWIDTH] IN BLOOD BY AUTOMATED COUNT: 13.6 % (ref 10–15)
GFR SERPL CREATININE-BSD FRML MDRD: >90 ML/MIN/1.73M2
GLUCOSE SERPL-MCNC: 106 MG/DL (ref 70–99)
HCT VFR BLD AUTO: 22.1 % (ref 35–47)
HGB BLD-MCNC: 7.5 G/DL (ref 11.7–15.7)
LACTATE SERPL-SCNC: 0.6 MMOL/L (ref 0.7–2)
MCH RBC QN AUTO: 31.3 PG (ref 26.5–33)
MCHC RBC AUTO-ENTMCNC: 33.9 G/DL (ref 31.5–36.5)
MCV RBC AUTO: 92 FL (ref 78–100)
PLAT MORPH BLD: NORMAL
PLATELET # BLD AUTO: 14 10E3/UL (ref 150–450)
POTASSIUM SERPL-SCNC: 3.5 MMOL/L (ref 3.4–5.3)
RBC # BLD AUTO: 2.4 10E6/UL (ref 3.8–5.2)
RBC MORPH BLD: NORMAL
SODIUM SERPL-SCNC: 138 MMOL/L (ref 136–145)
WBC # BLD AUTO: 0.4 10E3/UL (ref 4–11)

## 2022-07-09 PROCEDURE — 250N000013 HC RX MED GY IP 250 OP 250 PS 637: Performed by: PHYSICIAN ASSISTANT

## 2022-07-09 PROCEDURE — 250N000011 HC RX IP 250 OP 636: Performed by: PHYSICIAN ASSISTANT

## 2022-07-09 PROCEDURE — 250N000011 HC RX IP 250 OP 636: Performed by: INTERNAL MEDICINE

## 2022-07-09 PROCEDURE — 250N000009 HC RX 250: Performed by: PEDIATRICS

## 2022-07-09 PROCEDURE — 250N000009 HC RX 250: Performed by: PHYSICIAN ASSISTANT

## 2022-07-09 PROCEDURE — 80048 BASIC METABOLIC PNL TOTAL CA: CPT | Performed by: PHYSICIAN ASSISTANT

## 2022-07-09 PROCEDURE — 99233 SBSQ HOSP IP/OBS HIGH 50: CPT | Performed by: STUDENT IN AN ORGANIZED HEALTH CARE EDUCATION/TRAINING PROGRAM

## 2022-07-09 PROCEDURE — 84630 ASSAY OF ZINC: CPT | Performed by: PHYSICIAN ASSISTANT

## 2022-07-09 PROCEDURE — 250N000013 HC RX MED GY IP 250 OP 250 PS 637: Performed by: PEDIATRICS

## 2022-07-09 PROCEDURE — 36592 COLLECT BLOOD FROM PICC: CPT | Performed by: STUDENT IN AN ORGANIZED HEALTH CARE EDUCATION/TRAINING PROGRAM

## 2022-07-09 PROCEDURE — 120N000002 HC R&B MED SURG/OB UMMC

## 2022-07-09 PROCEDURE — 250N000013 HC RX MED GY IP 250 OP 250 PS 637: Performed by: STUDENT IN AN ORGANIZED HEALTH CARE EDUCATION/TRAINING PROGRAM

## 2022-07-09 PROCEDURE — 83605 ASSAY OF LACTIC ACID: CPT | Performed by: STUDENT IN AN ORGANIZED HEALTH CARE EDUCATION/TRAINING PROGRAM

## 2022-07-09 PROCEDURE — 85027 COMPLETE CBC AUTOMATED: CPT | Performed by: PHYSICIAN ASSISTANT

## 2022-07-09 PROCEDURE — 36592 COLLECT BLOOD FROM PICC: CPT | Performed by: PHYSICIAN ASSISTANT

## 2022-07-09 PROCEDURE — 250N000012 HC RX MED GY IP 250 OP 636 PS 637: Performed by: STUDENT IN AN ORGANIZED HEALTH CARE EDUCATION/TRAINING PROGRAM

## 2022-07-09 PROCEDURE — 258N000003 HC RX IP 258 OP 636: Performed by: PHYSICIAN ASSISTANT

## 2022-07-09 PROCEDURE — 87040 BLOOD CULTURE FOR BACTERIA: CPT | Performed by: PHYSICIAN ASSISTANT

## 2022-07-09 RX ORDER — GINSENG 100 MG
CAPSULE ORAL 2 TIMES DAILY
Status: DISCONTINUED | OUTPATIENT
Start: 2022-07-09 | End: 2022-07-15 | Stop reason: HOSPADM

## 2022-07-09 RX ADMIN — BACITRACIN: 500 OINTMENT TOPICAL at 21:06

## 2022-07-09 RX ADMIN — LEVOTHYROXINE SODIUM 100 MCG: 100 TABLET ORAL at 06:20

## 2022-07-09 RX ADMIN — ACETAMINOPHEN 650 MG: 325 TABLET, FILM COATED ORAL at 11:12

## 2022-07-09 RX ADMIN — FIDAXOMICIN 200 MG: 200 TABLET, FILM COATED ORAL at 07:52

## 2022-07-09 RX ADMIN — FIDAXOMICIN 200 MG: 200 TABLET, FILM COATED ORAL at 21:04

## 2022-07-09 RX ADMIN — Medication 1 TABLET: at 11:22

## 2022-07-09 RX ADMIN — GLYCERIN, PETROLATUM, PHENYLEPHRINE HCL, PRAMOXINE HCL: 144; 2.5; 10; 15 CREAM TOPICAL at 14:01

## 2022-07-09 RX ADMIN — TRIAMCINOLONE ACETONIDE: 1 CREAM TOPICAL at 08:00

## 2022-07-09 RX ADMIN — SODIUM CHLORIDE, PRESERVATIVE FREE 5 ML: 5 INJECTION INTRAVENOUS at 03:45

## 2022-07-09 RX ADMIN — OXYCODONE HYDROCHLORIDE AND ACETAMINOPHEN 1000 MG: 500 TABLET ORAL at 07:52

## 2022-07-09 RX ADMIN — ACETAMINOPHEN 650 MG: 325 TABLET, FILM COATED ORAL at 02:47

## 2022-07-09 RX ADMIN — Medication 1 LOZENGE: at 11:22

## 2022-07-09 RX ADMIN — ACETAMINOPHEN 650 MG: 325 TABLET, FILM COATED ORAL at 06:52

## 2022-07-09 RX ADMIN — MICAFUNGIN 50 MG: 10 INJECTION, POWDER, LYOPHILIZED, FOR SOLUTION INTRAVENOUS at 15:58

## 2022-07-09 RX ADMIN — TRIAMCINOLONE ACETONIDE: 1 CREAM TOPICAL at 21:07

## 2022-07-09 RX ADMIN — RYDAPT 50 MG: 25 CAPSULE, LIQUID FILLED ORAL at 07:51

## 2022-07-09 RX ADMIN — ACYCLOVIR 400 MG: 400 TABLET ORAL at 07:51

## 2022-07-09 RX ADMIN — GLYCERIN, PETROLATUM, PHENYLEPHRINE HCL, PRAMOXINE HCL: 144; 2.5; 10; 15 CREAM TOPICAL at 21:00

## 2022-07-09 RX ADMIN — CEFEPIME HYDROCHLORIDE 2 G: 2 INJECTION, POWDER, FOR SOLUTION INTRAVENOUS at 10:35

## 2022-07-09 RX ADMIN — ACYCLOVIR 400 MG: 400 TABLET ORAL at 20:56

## 2022-07-09 RX ADMIN — ACETAMINOPHEN 650 MG: 325 TABLET, FILM COATED ORAL at 16:20

## 2022-07-09 RX ADMIN — PANTOPRAZOLE SODIUM 40 MG: 40 TABLET, DELAYED RELEASE ORAL at 06:20

## 2022-07-09 RX ADMIN — SODIUM CHLORIDE, PRESERVATIVE FREE 5 ML: 5 INJECTION INTRAVENOUS at 21:06

## 2022-07-09 RX ADMIN — RYDAPT 50 MG: 25 CAPSULE, LIQUID FILLED ORAL at 20:56

## 2022-07-09 RX ADMIN — CEFEPIME HYDROCHLORIDE 2 G: 2 INJECTION, POWDER, FOR SOLUTION INTRAVENOUS at 18:19

## 2022-07-09 RX ADMIN — SUCRALFATE 1 G: 1 SUSPENSION ORAL at 16:20

## 2022-07-09 RX ADMIN — TOPICAL ANESTHETIC 0.5 ML: 200 SPRAY DENTAL; PERIODONTAL at 14:00

## 2022-07-09 RX ADMIN — Medication 50 MCG: at 07:51

## 2022-07-09 RX ADMIN — ACETAMINOPHEN 650 MG: 325 TABLET, FILM COATED ORAL at 20:56

## 2022-07-09 RX ADMIN — CEFEPIME HYDROCHLORIDE 2 G: 2 INJECTION, POWDER, FOR SOLUTION INTRAVENOUS at 02:41

## 2022-07-09 RX ADMIN — GLYCERIN, PETROLATUM, PHENYLEPHRINE HCL, PRAMOXINE HCL: 144; 2.5; 10; 15 CREAM TOPICAL at 07:58

## 2022-07-09 ASSESSMENT — ACTIVITIES OF DAILY LIVING (ADL)
ADLS_ACUITY_SCORE: 18

## 2022-07-09 NOTE — PLAN OF CARE
[id number]    Tmax 102.1, tachycardic, provider notified. OVSS, afebrile and on RA. Denies nausea/SOB. Complained of mild throat pain and headache. Gave tylenol x2 and ice packs for fevers. Next bcx due tomorrow at 0700. Discontinued vancomycin, started on fidaxomicin. Good appetite this evening. Pt reporting adequate UOP, had 4+ BM's today. Fungal workup in process. Potentially to be taken off droplet precautions pending respiratory panel workup. Fungal UA collected and sent to lab. Up independently in room. Continue to monitor & w/ POC.

## 2022-07-09 NOTE — PROVIDER NOTIFICATION
"Provider Notification     Brett Smith (#[phone]) paged at 1986:  \"Hi, pt is complaining of a sore throat. Can she have a PRN order for cepacol and hurricane spray? Thanks.\"    Response: PRN cepacol and hurricane spray ordered.       Will continue to monitor.   "

## 2022-07-09 NOTE — PLAN OF CARE
[id number]  Status: Admitted for leukocytosis and circulating blast found to have AML course c/b neutropenic fevers and C.Diff  Vitals: Tmax 103.3F and at times tachy- team aware- all other VSS, on RA.   Neuros: A&Ox4  IV: DL PICC   Labs/Electrolytes: WNL for diagnosis  Resp/trach: Denies SOB LS diminished at bases.   Diet: Regular.   Bowel status: C.Diff positive multiple BMs throughout the day.   : Voids spontaneously without problem.   Skin: Generalized bruising throughout.Hemmroids present- topical creams applied.   Pain: c/o 1/10 sore throat pain, given lozegens and hurricane spray.   Activity: Up ad qasim  Plan: Continue with POC.

## 2022-07-09 NOTE — PROGRESS NOTES
[facility]  Hematology / Oncology Progress Note    Date of Admission: 6/16/2022  Date of Service (when I saw the patient): 07/09/2022     Assessment & Plan   Veda Marinelli is a 37 year old female with PMH significant for h/o COVID19 infection (10/2021), C.diff, and hypothyroidism who presented with leukocytosis and circulating blasts. 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 She initiated induction chemotherapy with 7+3+midostaurin (C1D1=6/22/22). Hospital course complicated by neutropenic fever and C.difficile colitis.    Today:  - D18 of 7+3+midostaurin   - D21 BMBx now scheduled for 7/12 @ 11 AM  - persistent, intermittent neutropenic fevers  - on Cefepime  - changed PO Vancomycin to PO Fidaxomyin 200mg BID (x 7/8 PM) for concern that fevers are related to inadequately treated C.diff in setting of this being her first recurrence.   - fungal workup pending   - if continues to have fevers in next 24 hrs, consider switch from Cefepime to Zosyn to better cover subtle lung pathology, though she remains without respiratory symptoms  - monitor hemorrhoid, no over signs of infection on exam today. Discomfort relatively controlled with topical interventions  - rash improving, now more petechial in nature  - erythematous/tender lesion to right lower shin (outlined on 7/6) stable today, continue to monitor      HEME  # AML (FLT3 ITD(low), NPM1, IDH2)  Was in her usual state of health until 03/2022 when she underwent a routine physical with labs. On 3/8/2022, white blood cell count 2.5 (ANC 1.0), Hgb 13.6 with , platelets normal.  On 5/15/2022, she was noted to have further decreasd white blood cell count of 1.6 (ANC 0.28) with hemoglobin 11. platelets were 133. She was ultimately referred to Hematology (Dr. Bob), who she saw on 6/7/22. Further blood workup was recommended and labs done 6/14. CBC revealed WBC 25.3 (ANC 0.8), Hgb 10.1 (), Plt 90K. On the  differential and morphology review, 81% blasts were seen concerning for acute leukemia. She was advised to present to [facility] for further evaluation and management.   - Per verbal report from Heme Path fellow there was concern for Blanca rods. She was started on ATRA 6/16 PM-6/17 AM while awaiting PML-ANNELIESE testing. This was ultimately negative.   - s/p Hydrea (6/17-6/22) while awaiting final diagnostic studies  - 6/16 PB chromosomes: 46, XX. No abnormality.   - 6/16 PB FISH: no rearrangments of MLLT10, NUP98, or KMT2A.   - 6/17 BMBx: markedly hypercellular marrow (85-95% estimated cellularity), with markedly diminished trilineage hematopoiesis, no overt dysplasia in the evaluable elements, and 92% blasts  - FLT3 PCR: positive for FLT3-ITD(low) with allelic ratio 0.21 (corrected from previously reported value of 0.144 per staff message to Lamar Grover from molecular lab personnel)  - NGS panel: NPM1 positive, IDH2, FLT3-ITD  - HLA typing sent, BMT coordinators aware and working on arranging IP consultation (delayed due to insurance change/financial approval)  - receiving induction with 7+3 (cytarabine+daunorubicin PLUS midostaurin (D1= 6/22/22))  - D21 BMBx scheduled on 7/12 at 11 AM    # Pancytopenia secondary to chemotherapy and AML  - Transfuse to maintain Hgb >7, Plt >10K     ID  # Neutropenic Fever, recurrent  # Recurrent C.diff colitis  (1) Fevered to 101.6F on 6/23. Noted to have chills though no other focal symptoms. Afebrile thereafter.  - 6/16 baseline CT CAP without evidence for infectious source   - 6/23: BCx NG, UA/UCx NGTD, CXR clear  - s/p IV Cefepime (x 6/23-6/27). De-escalated back to ppx Levaquin on 6/27.  (2) Recurrent fever on 6/29 PM. Persistent temps 6/29-6/30, then afebrile since 6/30 afternoon. BCx, UA, CXR negative. C.diff positive on 7/1  - restarted on Cefepime 2g IV q8hr (x 6/29 PM - 7/4 AM)  (3) Recurrent fever 100.4-->101 (7/5-7/6), restarted Cefepime (x 7/5). She continues to have  persistent fevers, Tmax 102.1 in last 24hrs.  Ongoing diarrhea which has plateaued in frequency. New sore throat and clear rhinorrhea without associated nasal/sinus congestion on 7/8. Lactic acids have not been elevated.   - 7/5 BCx NGTD, UCx NG  - 7/6 MRSA swab negative  - 7/7, 7/8 BCx NGTD  - CT CAP (7/7): subtle diffuse mosaic groundglass changes and presumed fibroatelectasis in RUL. No pulm consolidation. Moderate hiatal hernia with mild thickening and streaky density along intrathoracic stomach and distal esophagus, enlarged upper RP LN and mesenteric lymph nodes (infectious vs. Inflammatory).   - 7/8: strep PCR negative, RVP negative  - 7/8: aspergillus, galactomannan, fungal BCx, fungal Abs, urine/serum histo -- pending   - repeat BCx today (7/9)     **Antibiotics:  - Cefepime (6/23-6/27, 6/29-7/4), (x 7/5 - present)  - s/p PO Vancomycin 125mg QID (x 7/1-7/8). Changed to fidaxomicin 200mg PO BID (x7/8 PM) given ongoing fevers to presumably better cover C.diff in its first recurrence.    # ID PPx  - ACV 400mg BID  - holding Levaquin ppx in setting of b/s abx  - Micafungin 50mg IV daily. Given that she will be on midostaurin, will keep on Micafungin to limit potential interactions with posaconazole/voriconazole. Would plan to transition as soon as pt has completed midostaurin.  - Prior Auth approved for both posaconazole and voriconazole. However, as of  6/29, pt has not met deductible (but should meet it once hospital stay is billed). At present, monthly cost for Posaconazole $2048.66, Voriconazole $176.41.     # h/o COVID-19  Pt is not vaccinated nor interested in being vaccinated. She was admitted from 10/1/2021 - 10/13/2021 for acute hypoxic respiratory failure from COVID-19 pneumonia.  Required IMC, high flow and intermittent CPAP. She was treated with dexamethasone, remdesivir, and baricitinib in addition to azithromycin and ceftriaxone. Recovered symptomatically from this.    GI  # H/o C.diff  (10/2021)  # Hemorrhoid  # 1st recurrence of C.diff (7/1)  # Diarrhea  H/o C.diff (10/20/21) and course was complicated by abd pain/cramping and prolonged recovery of stool consistency as well as hemorrhoid which does wax/wane and is sometimes painful. She primarily has used witch hazel pads when symptomatic. Pt reported normal bowels at time of AML diagnosis. Developed intermittent loose stools throughout admission, then increased frequency of loose/watery stools overnight 6/30-7/1. Rechecked C.diff (7/1), positive.   - 6/16 CT A/P negative for perirectal abscess.  - TUCKS PRN, add Prep H (7/4), dilt cream (7/6)  - Avoid PTA probiotic at this time.  - baseline 6/19 C. Diff negative so we deferred prophylactic PO Vancomycin during chemo course  - recurrent/increased loose stools reported on 7/1, checked C.diff and this was positive. S/p PO Vanco --> now on fidaxomicin as above    # Mucositis, improved  On ~6/27, pt noting inflammatory changes in b/l buccal mucosa and small lesion on right inner lower lip. Not painful or affecting PO intake. Since improved and pt has denied mouth pain/sores recently, eating/drinking well. Now with intermittent blood blisters but no mouth pain/sores.  - MMW PRN, viscous lidocaine PRN    # GERD  - TUMS PRN  - PPI daily     # Distal esophageal discomfort/dysphagia - improved  # Hiatal hernia (seen on 7/7 CT imaging)  On 6/29, patient reported new onset of epigastric discomfort when swallowing solid foods, not with liquids. No radiation to back or other areas of abdomen, no RUQ tenderness. Denies reflux, nausea or emesis. Already on PPI as above. No e/o oral candidiasis, on micafungin ppx. Question relation to GI mucosal changes? Nearly resolved on 7/4. Now with intermittent exacerbations but not nearly what it was previously.   - Trial Carafate before meals and at nighttime --> given improvement, backed off to PRN (x 7/4)  - simethicone PRN, Maalox PRN, GI Cocktail PRN  - Continue to  monitor, could consider barium swallow study if persistent    ENT  # Sore throat, intermittent  # Clear rhinorrhea  Pt reported right-sided distal throat soreness on 7/8. Mild posterior OP erythema, no exudates or thrush noted on exam. Negative infectious workup as above, potentially due to mucositis changes. Suspect rhinorrhea may be due to loss of protective nasal cilia.   - 7/8: strep PCR negative, RVP negative  - PRN cepacol, hurricane spray  - could consider swallowing MMW, could also consider Nystatin swallow     CV  # Irregular rhythm  Pt having occasionally PVCs, noting more in the last couple days of ongoing fevers and more irregular on 7/8 exam.  - EKG (7/8) demonstrated sinus rhythm with occasional PVCs. QTc 450.      GYN  # Vaginal bleeding, resolved  Menstrual cycle began inpatient overnight 6/30-7/1. Usually heaviest flow is first ~3 days. Reviewed neutropenic precautions and advised not to use tampons at this time. Resolved as of 7/7.  - back down to Plt parameter 10K  - s/p Provera 10mg daily (7/1-7/6)    ENDO  # Hypothyroidism  PTA was on Synthroid 125mcg daily. TSH 0.04, T4 Free 1.48 (done 6/24). Per pt, her PCP reviewed and advised her to reduce her Synthroid to 100mcg daily. Ordered to start on 6/29.     DERM  # Bilateral ear erythema, pruritis - resolved  On 6/29, pt reported bilateral ear lobe erythema and irritation in ear canal. Believed to be related to cytarabine. S/p triamcinolone cream. Resolved early week of 7/4.      # Truncal rash - improving   Noted rash to upper abdomen which progressed to upper torso and back throughout morning of 7/6. Not itchy or bothersome to pt. Suspect possible cytarabine rash. Pt has been on/off Cefepime and previously tolerated but rash to abx is also in differential. Can try triamcinolone cream to rash sites.    # Lesion to R shin - stable   Noted on 7/6; outlined, monitor for worsening cellulitis. On 7/7, slightly improved in degree of erythema and slightly  retracted from drawn border, less tender as well. However, there is a persistent area of induration/fluctuance. Stable on 7/8. No new lesions per pt.  - MRSA swab negative as above   - if appears to be worsening, or any new lesions, would consult Derm for bx    FEN  - Regular diet as tolerated  - lyte repletion per unit protocol  - regular diet     PPx  - VTE: none due to thrombocytopenia  - GI: PPI     Dispo:  Anticipate 4+ week LOS for management of acute leukemia. Presented under Dr. Won Perkins.     Plan of care discussed with Dr. Pantoja     I spent 45 minutes in the care of this patient today, which included time necessary for review of interval events, obtaining history and physical exam, adjusting medication orders, discussion with interdisciplinary/consult team(s), and documentation time. Over 50% of time was spent face-to-face and/or coordinating care.     Plan of care discussed with Dr. Scarlett Grover PA-C  Hematology/Oncology   pager: [phone]       Interval History   Persistent fevers since yesterday late afternoon. Tmax 102.1. Feeling generally less well with fevers. Was able to sleep but would occasionally wake up drenched. Occasional chills. States diarrhea is about the same; yesterday she reported 5-8 stools per day. Today, she says she has had 3 stools since 02:00 AM. Stools have a little consistency to them but mainly loose. Denies associated abd pain or cramping. Denies nausea. Ongoing sore throat, pointing to R side of throat slightly beneath mandible. Denies SOB or cough. Denies known aspiration. Rash continues to improved on trunk, still more petechial nature. Lesion to R shin remains stable and less tender overall.       Physical Exam   Temp: (!) 100.8  F (38.2  C) Temp src: Oral BP: 107/64 Pulse: 104   Resp: 18 SpO2: 100 % O2 Device: None (Room air)    Vitals:    07/07/22 0744 07/08/22 1026 07/09/22 0810   Weight: 115.2 kg (254 lb) 115.5 kg (254 lb 9.6 oz) 115 kg (253 lb 9.6 oz)      Vital Signs with Ranges  Temp:  [99.2  F (37.3  C)-102.1  F (38.9  C)] 100.8  F (38.2  C)  Pulse:  [] 104  Resp:  [16-18] 18  BP: (102-114)/(62-75) 107/64  SpO2:  [96 %-100 %] 100 %  I/O last 3 completed shifts:  In: 450 [I.V.:450]  Out: -   Constitutional: Awake and conversational. Non-toxic appearing. No acute distress.Up ad qasim in room after showering, moves to chair.    HEENT: NC/AT. Thinning hair, wearing head scarf. Sclerae anicteric.  Respiratory: Breathing comfortable with no increased work on room air. Lungs CTA b/l.  Cardiovascular: RRR with more frequency skipped beats. No murmur appreciated. No peripheral edema.    GI: Normal BS. Abdomen is soft, non-distended, non-tender.   Skin: Skin is clean, dry, intact. Faint pink papular rash to trunk has further faded with evolving petechial appearance at this time. ~Dime size erythematous (less purple) lesion with central dried pustular-like appearance and induration/fluctuance to right lower shin; outlined and remains slightly retracted within borders but stable from 7/7. Less tender today.   Musculoskeletal/ Extremities: Extremities grossly normal in appearance.  Neurologic: Alert and oriented. Speech normal. Grossly nonfocal.   VAD: PICC line in LUE, c/d/i. She has new ecchymoses proximal to PICC site, stable today.         Medications     - MEDICATION INSTRUCTIONS -         acyclovir  400 mg Oral BID     ceFEPIme (MAXIPIME) IV  2 g Intravenous Q8H     diltiazem 2% in PLO gel  0.25-0.5 g Topical TID     fidaxomicin  200 mg Oral BID     levothyroxine  100 mcg Oral QAM AC     micafungin  50 mg Intravenous Q24H     midostaurin  50 mg Oral Q12H     multivitamin w/minerals  1 tablet Oral Daily     pantoprazole  40 mg Oral QAM AC     pramox-pe-glycerin-petrolatum   Rectal TID     triamcinolone   Topical BID     vitamin C  1,000 mg Oral Daily     vitamin D3  50 mcg Oral Daily       Data   Results for orders placed or performed during the hospital encounter  of 06/16/22 (from the past 24 hour(s))   CBC with platelets differential    Narrative    The following orders were created for panel order CBC with platelets differential.  Procedure                               Abnormality         Status                     ---------                               -----------         ------                     CBC with platelets and d...[[id number]]  Abnormal            Final result               RBC and Platelet Morphology[[id number]]                      Final result                 Please view results for these tests on the individual orders.   Basic metabolic panel   Result Value Ref Range    Creatinine 0.51 0.51 - 0.95 mg/dL    Sodium 138 136 - 145 mmol/L    Potassium 3.5 3.4 - 5.3 mmol/L    Urea Nitrogen 6.1 6.0 - 20.0 mg/dL    Chloride 105 98 - 107 mmol/L    Carbon Dioxide (CO2) 21 (L) 22 - 29 mmol/L    Anion Gap 12 7 - 15 mmol/L    Glucose 106 (H) 70 - 99 mg/dL    GFR Estimate >90 >60 mL/min/1.73m2    Calcium 8.7 8.6 - 10.0 mg/dL   Lactic Acid STAT   Result Value Ref Range    Lactic Acid 0.6 (L) 0.7 - 2.0 mmol/L   CBC with platelets and differential   Result Value Ref Range    WBC Count 0.4 (LL) 4.0 - 11.0 10e3/uL    RBC Count 2.40 (L) 3.80 - 5.20 10e6/uL    Hemoglobin 7.5 (L) 11.7 - 15.7 g/dL    Hematocrit 22.1 (L) 35.0 - 47.0 %    MCV 92 78 - 100 fL    MCH 31.3 26.5 - 33.0 pg    MCHC 33.9 31.5 - 36.5 g/dL    RDW 13.6 10.0 - 15.0 %    Platelet Count 14 (LL) 150 - 450 10e3/uL   RBC and Platelet Morphology   Result Value Ref Range    Platelet Assessment  Automated Count Confirmed. Platelet morphology is normal.     Automated Count Confirmed. Platelet morphology is normal.    RBC Morphology Confirmed RBC Indices

## 2022-07-09 NOTE — PROGRESS NOTES
Ongoing fevers this evening, continue APAP prn especially if > 102 F, continue to notify provider for ongoing fevers. Appears to be on appropriate therapy and has had appropriate work up at this time.    Brett Hameed D.O.  Internal Medicine, Hospitalist  [facility]  Pager: [phone]

## 2022-07-09 NOTE — PLAN OF CARE
[id number]    A&Ox4. Intermittently tachy, OVSS on RA. Tmax 101.9. Given ice packs and tylenol x2. Not due for BCs at this time and MD paged. Triggered sepsis, lactic acid pending. Denies pain, nausea, and SOB. C/o sore throat, declined interventions. 2 BMs this shift. Continue with plan of care.

## 2022-07-09 NOTE — PROVIDER NOTIFICATION
"Provider Notification      Brett Smith (#[id number]) paged at [phone]:  \"FYI pt has a temp of 101.9. Tylenol was given. Not due for BCs at this time. Thanks!\"  "

## 2022-07-10 LAB
1,3 BETA GLUCAN SER-MCNC: <31 PG/ML
ANION GAP SERPL CALCULATED.3IONS-SCNC: 13 MMOL/L (ref 7–15)
BACTERIA BLD CULT: NO GROWTH
BACTERIA BLD CULT: NO GROWTH
BACTERIA SPEC CULT: NORMAL
BUN SERPL-MCNC: 5.8 MG/DL (ref 6–20)
CALCIUM SERPL-MCNC: 8.9 MG/DL (ref 8.6–10)
CHLORIDE SERPL-SCNC: 101 MMOL/L (ref 98–107)
CREAT SERPL-MCNC: 0.49 MG/DL (ref 0.51–0.95)
DEPRECATED HCO3 PLAS-SCNC: 20 MMOL/L (ref 22–29)
ELLIPTOCYTES BLD QL SMEAR: SLIGHT
ERYTHROCYTE [DISTWIDTH] IN BLOOD BY AUTOMATED COUNT: 13.5 % (ref 10–15)
GALACTOMANNAN AG SERPL QL IA: NEGATIVE
GALACTOMANNAN AG SPEC IA-ACNC: 0.07
GFR SERPL CREATININE-BSD FRML MDRD: >90 ML/MIN/1.73M2
GLUCOSE SERPL-MCNC: 96 MG/DL (ref 70–99)
HCT VFR BLD AUTO: 21 % (ref 35–47)
HGB BLD-MCNC: 7.1 G/DL (ref 11.7–15.7)
LACTATE SERPL-SCNC: 0.6 MMOL/L (ref 0.7–2)
MCH RBC QN AUTO: 31 PG (ref 26.5–33)
MCHC RBC AUTO-ENTMCNC: 33.8 G/DL (ref 31.5–36.5)
MCV RBC AUTO: 92 FL (ref 78–100)
OBSERVATION IMP: NEGATIVE
PLAT MORPH BLD: ABNORMAL
PLATELET # BLD AUTO: 15 10E3/UL (ref 150–450)
POTASSIUM SERPL-SCNC: 3.3 MMOL/L (ref 3.4–5.3)
POTASSIUM SERPL-SCNC: 3.6 MMOL/L (ref 3.4–5.3)
RBC # BLD AUTO: 2.29 10E6/UL (ref 3.8–5.2)
RBC MORPH BLD: ABNORMAL
SODIUM SERPL-SCNC: 134 MMOL/L (ref 136–145)
WBC # BLD AUTO: 0.4 10E3/UL (ref 4–11)

## 2022-07-10 PROCEDURE — 36415 COLL VENOUS BLD VENIPUNCTURE: CPT | Performed by: PHYSICIAN ASSISTANT

## 2022-07-10 PROCEDURE — 87040 BLOOD CULTURE FOR BACTERIA: CPT | Performed by: PHYSICIAN ASSISTANT

## 2022-07-10 PROCEDURE — 85027 COMPLETE CBC AUTOMATED: CPT | Performed by: PHYSICIAN ASSISTANT

## 2022-07-10 PROCEDURE — 36592 COLLECT BLOOD FROM PICC: CPT | Performed by: STUDENT IN AN ORGANIZED HEALTH CARE EDUCATION/TRAINING PROGRAM

## 2022-07-10 PROCEDURE — 250N000013 HC RX MED GY IP 250 OP 250 PS 637: Performed by: STUDENT IN AN ORGANIZED HEALTH CARE EDUCATION/TRAINING PROGRAM

## 2022-07-10 PROCEDURE — 84132 ASSAY OF SERUM POTASSIUM: CPT | Performed by: STUDENT IN AN ORGANIZED HEALTH CARE EDUCATION/TRAINING PROGRAM

## 2022-07-10 PROCEDURE — 999N000007 HC SITE CHECK

## 2022-07-10 PROCEDURE — 250N000013 HC RX MED GY IP 250 OP 250 PS 637: Performed by: PHYSICIAN ASSISTANT

## 2022-07-10 PROCEDURE — 99233 SBSQ HOSP IP/OBS HIGH 50: CPT | Performed by: STUDENT IN AN ORGANIZED HEALTH CARE EDUCATION/TRAINING PROGRAM

## 2022-07-10 PROCEDURE — 120N000002 HC R&B MED SURG/OB UMMC

## 2022-07-10 PROCEDURE — 258N000003 HC RX IP 258 OP 636: Performed by: PHYSICIAN ASSISTANT

## 2022-07-10 PROCEDURE — 250N000011 HC RX IP 250 OP 636: Performed by: PHYSICIAN ASSISTANT

## 2022-07-10 PROCEDURE — 36592 COLLECT BLOOD FROM PICC: CPT | Performed by: PHYSICIAN ASSISTANT

## 2022-07-10 PROCEDURE — 80048 BASIC METABOLIC PNL TOTAL CA: CPT | Performed by: PHYSICIAN ASSISTANT

## 2022-07-10 PROCEDURE — 250N000012 HC RX MED GY IP 250 OP 636 PS 637: Performed by: STUDENT IN AN ORGANIZED HEALTH CARE EDUCATION/TRAINING PROGRAM

## 2022-07-10 PROCEDURE — 83605 ASSAY OF LACTIC ACID: CPT | Performed by: STUDENT IN AN ORGANIZED HEALTH CARE EDUCATION/TRAINING PROGRAM

## 2022-07-10 PROCEDURE — 999N000044 HC STATISTIC CVC DRESSING CHANGE

## 2022-07-10 PROCEDURE — 250N000011 HC RX IP 250 OP 636: Performed by: INTERNAL MEDICINE

## 2022-07-10 RX ORDER — POTASSIUM CHLORIDE 750 MG/1
40 TABLET, EXTENDED RELEASE ORAL ONCE
Status: COMPLETED | OUTPATIENT
Start: 2022-07-10 | End: 2022-07-10

## 2022-07-10 RX ORDER — CELECOXIB 100 MG/1
100 CAPSULE ORAL
Status: COMPLETED | OUTPATIENT
Start: 2022-07-10 | End: 2022-07-11

## 2022-07-10 RX ORDER — PIPERACILLIN SODIUM, TAZOBACTAM SODIUM 4; .5 G/20ML; G/20ML
4.5 INJECTION, POWDER, LYOPHILIZED, FOR SOLUTION INTRAVENOUS EVERY 6 HOURS
Status: DISCONTINUED | OUTPATIENT
Start: 2022-07-10 | End: 2022-07-14

## 2022-07-10 RX ADMIN — BACITRACIN: 500 OINTMENT TOPICAL at 08:06

## 2022-07-10 RX ADMIN — Medication 1 TABLET: at 11:17

## 2022-07-10 RX ADMIN — SODIUM CHLORIDE, PRESERVATIVE FREE 5 ML: 5 INJECTION INTRAVENOUS at 13:00

## 2022-07-10 RX ADMIN — GLYCERIN, PETROLATUM, PHENYLEPHRINE HCL, PRAMOXINE HCL: 144; 2.5; 10; 15 CREAM TOPICAL at 20:17

## 2022-07-10 RX ADMIN — POTASSIUM CHLORIDE 40 MEQ: 750 TABLET, EXTENDED RELEASE ORAL at 08:32

## 2022-07-10 RX ADMIN — LEVOTHYROXINE SODIUM 100 MCG: 100 TABLET ORAL at 06:22

## 2022-07-10 RX ADMIN — BACITRACIN: 500 OINTMENT TOPICAL at 20:19

## 2022-07-10 RX ADMIN — ACETAMINOPHEN 650 MG: 325 TABLET, FILM COATED ORAL at 20:18

## 2022-07-10 RX ADMIN — SODIUM CHLORIDE, PRESERVATIVE FREE 5 ML: 5 INJECTION INTRAVENOUS at 06:21

## 2022-07-10 RX ADMIN — ACETAMINOPHEN 650 MG: 325 TABLET, FILM COATED ORAL at 02:30

## 2022-07-10 RX ADMIN — PIPERACILLIN AND TAZOBACTAM 4.5 G: 4; .5 INJECTION, POWDER, FOR SOLUTION INTRAVENOUS at 22:31

## 2022-07-10 RX ADMIN — RYDAPT 50 MG: 25 CAPSULE, LIQUID FILLED ORAL at 07:54

## 2022-07-10 RX ADMIN — SUCRALFATE 1 G: 1 SUSPENSION ORAL at 14:46

## 2022-07-10 RX ADMIN — RYDAPT 50 MG: 25 CAPSULE, LIQUID FILLED ORAL at 20:17

## 2022-07-10 RX ADMIN — GLYCERIN, PETROLATUM, PHENYLEPHRINE HCL, PRAMOXINE HCL: 144; 2.5; 10; 15 CREAM TOPICAL at 14:47

## 2022-07-10 RX ADMIN — CEFEPIME HYDROCHLORIDE 2 G: 2 INJECTION, POWDER, FOR SOLUTION INTRAVENOUS at 02:26

## 2022-07-10 RX ADMIN — TRIAMCINOLONE ACETONIDE: 1 CREAM TOPICAL at 07:55

## 2022-07-10 RX ADMIN — ACYCLOVIR 400 MG: 400 TABLET ORAL at 20:17

## 2022-07-10 RX ADMIN — ACYCLOVIR 400 MG: 400 TABLET ORAL at 07:54

## 2022-07-10 RX ADMIN — TRIAMCINOLONE ACETONIDE: 1 CREAM TOPICAL at 20:17

## 2022-07-10 RX ADMIN — FIDAXOMICIN 200 MG: 200 TABLET, FILM COATED ORAL at 20:17

## 2022-07-10 RX ADMIN — SODIUM CHLORIDE, PRESERVATIVE FREE 5 ML: 5 INJECTION INTRAVENOUS at 21:07

## 2022-07-10 RX ADMIN — Medication 50 MCG: at 07:54

## 2022-07-10 RX ADMIN — ACETAMINOPHEN 650 MG: 325 TABLET, FILM COATED ORAL at 06:31

## 2022-07-10 RX ADMIN — MICAFUNGIN 50 MG: 10 INJECTION, POWDER, LYOPHILIZED, FOR SOLUTION INTRAVENOUS at 15:01

## 2022-07-10 RX ADMIN — PIPERACILLIN AND TAZOBACTAM 4.5 G: 4; .5 INJECTION, POWDER, FOR SOLUTION INTRAVENOUS at 16:31

## 2022-07-10 RX ADMIN — SODIUM CHLORIDE 1000 ML: 9 INJECTION, SOLUTION INTRAVENOUS at 07:55

## 2022-07-10 RX ADMIN — PIPERACILLIN AND TAZOBACTAM 4.5 G: 4; .5 INJECTION, POWDER, FOR SOLUTION INTRAVENOUS at 10:43

## 2022-07-10 RX ADMIN — ACETAMINOPHEN 650 MG: 325 TABLET, FILM COATED ORAL at 14:46

## 2022-07-10 RX ADMIN — PANTOPRAZOLE SODIUM 40 MG: 40 TABLET, DELAYED RELEASE ORAL at 06:22

## 2022-07-10 RX ADMIN — SODIUM CHLORIDE, PRESERVATIVE FREE 5 ML: 5 INJECTION INTRAVENOUS at 03:24

## 2022-07-10 RX ADMIN — FIDAXOMICIN 200 MG: 200 TABLET, FILM COATED ORAL at 07:54

## 2022-07-10 RX ADMIN — OXYCODONE HYDROCHLORIDE AND ACETAMINOPHEN 1000 MG: 500 TABLET ORAL at 07:54

## 2022-07-10 ASSESSMENT — ACTIVITIES OF DAILY LIVING (ADL)
ADLS_ACUITY_SCORE: 18

## 2022-07-10 NOTE — PROGRESS NOTES
[facility]  Hematology / Oncology Progress Note    Date of Admission: 6/16/2022  Date of Service (when I saw the patient): 07/10/2022     Assessment & Plan   Veda Marinelli is a 37 year old female with PMH significant for h/o COVID19 infection (10/2021), C.diff, and hypothyroidism who presented with leukocytosis and circulating blasts. 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 She initiated induction chemotherapy with 7+3+midostaurin (C1D1=6/22/22). Hospital course complicated by neutropenic fever and C.difficile colitis.    Today:  - D19 of 7+3+midostaurin   - D21 BMBx scheduled for 7/12 @ 11 AM  - still awaiting BMT Consult to be scheduled (delayed due to their schedule/availability and change in pt's insurance coverage/approval by BMT Finance team)   - persistent neutropenic fevers  - continue PO Fidaxomicin (x 7/8 PM), adjusted from PO Vanco  - change Cefepime to Zosyn  - if continued fevers despite recent anti-infective changes or if e/o HD instability, consider addition of IV Vancomycin   - if ongoing fevers, despite above changes, would check CT sinus to round out imaging workup  - follow-up aspergillus, fungitell, Fungal BCx, Fungal Abs, histo urine/serum -- pending  - keep in mind that pt has also only recently been on IV micafungin for antifungal ppx (given concomitant midostaurin). Again, only subtle lung pathology on recent CT imaging and pt without resp symptoms. However, will have low threshhold to adjust antifungal to posa/vori if ongoing fevers, pending fungal workup as above, and given that she is nearing completion of midostaurin  - other infectious sources to follow:  - R lower shin lesion, stable today. Area of fluctuance underlying but unchanged at present, non-tender. I&D not favored at this time given degree of neutropenia. No new lesions or rashes.   - hemorrhoid -- pain stable, no e/o overt infection on 7/9 exam, CT CAP without focal  findings/perirectal abscess (though she is neutropenic and may not be able to form typical abscess)  - given persistent fevers despite Tylenol, could try dose of Celebrex if fevers not responding to Tylenol.   - 1L NS bolus for support in light of persistent fevers/insensible losses and decreased PO intake yesterday per pt  - pt inquired about integrative therapy/support. We discusses Palliative Care team support and Health Psych. Pt ultimately thought Health Psych could be helpful at this time. Consult placed.       LIZ  # AML (FLT3 ITD(low), NPM1, IDH2)  Was in her usual state of health until 03/2022 when she underwent a routine physical with labs. On 3/8/2022, white blood cell count 2.5 (ANC 1.0), Hgb 13.6 with , platelets normal.  On 5/15/2022, she was noted to have further decreasd white blood cell count of 1.6 (ANC 0.28) with hemoglobin 11. platelets were 133. She was ultimately referred to Hematology (Dr. Bob), who she saw on 6/7/22. Further blood workup was recommended and labs done 6/14. CBC revealed WBC 25.3 (ANC 0.8), Hgb 10.1 (), Plt 90K. On the differential and morphology review, 81% blasts were seen concerning for acute leukemia. She was advised to present to [facility] for further evaluation and management.   - Per verbal report from Heme Path fellow there was concern for Blanca rods. She was started on ATRA 6/16 PM-6/17 AM while awaiting PML-ANNELIESE testing. This was ultimately negative.   - s/p Hydrea (6/17-6/22) while awaiting final diagnostic studies  - 6/16 PB chromosomes: 46, XX. No abnormality.   - 6/16 PB FISH: no rearrangments of MLLT10, NUP98, or KMT2A.   - 6/17 BMBx: markedly hypercellular marrow (85-95% estimated cellularity), with markedly diminished trilineage hematopoiesis, no overt dysplasia in the evaluable elements, and 92% blasts  - FLT3 PCR: positive for FLT3-ITD(low) with allelic ratio 0.21 (corrected from previously reported value of 0.144 per staff message to Lamar  Lenore from molecular lab personnel)  - NGS panel: NPM1 positive, IDH2, FLT3-ITD  - HLA typing sent, BMT coordinators aware and working on arranging IP consultation (delayed due to insurance change/financial approval)  - receiving induction with 7+3 (cytarabine+daunorubicin PLUS midostaurin (D1= 6/22/22))  - D21 BMBx scheduled on 7/12 at 11 AM    # Pancytopenia secondary to chemotherapy and AML  - Transfuse to maintain Hgb >7, Plt >10K     ID  # Neutropenic Fever, recurrent  # Recurrent C.diff colitis  (1) Fevered to 101.6F on 6/23. Noted to have chills though no other focal symptoms. Afebrile thereafter.  - 6/16 baseline CT CAP without evidence for infectious source   - 6/23: BCx NG, UA/UCx NGTD, CXR clear  - s/p IV Cefepime (x 6/23-6/27). De-escalated back to ppx Levaquin on 6/27.  (2) Recurrent fever on 6/29 PM. Persistent temps 6/29-6/30, then afebrile since 6/30 afternoon. BCx, UA, CXR negative. C.diff positive on 7/1  - restarted on Cefepime 2g IV q8hr (x 6/29 PM - 7/4 AM)  (3) Recurrent fever 100.4-->101 (7/5-7/6), restarted Cefepime (x 7/5). She continues to have persistent fevers, Tmax 103.3 in last 24hrs.  Ongoing diarrhea which has plateaued in frequency. New sore throat and clear rhinorrhea without associated nasal/sinus congestion on 7/8, stable at this time. Right shin lesion stable. Lactic acids have not been elevated.   - 7/5 BCx NGTD, UCx NG  - 7/6 MRSA swab negative  - 7/7, 7/8, 7/9 BCx NGTD  - CT CAP (7/7): subtle diffuse mosaic groundglass changes and presumed fibroatelectasis in RUL. No pulm consolidation. Moderate hiatal hernia with mild thickening and streaky density along intrathoracic stomach and distal esophagus, enlarged upper RP LN and mesenteric lymph nodes (infectious vs. Inflammatory).   - 7/8: strep PCR negative, RVP negative  - 7/8: aspergillus, galactomannan, fungal BCx, fungal Abs, urine/serum histo -- pending   - if ongoing fevers, consider CT Sinus to complete imaging  workup  - low threshold to add IV Vanco or increase antifungal coverage     **Antibiotics:  - s/p Cefepime (6/23-6/27, 6/29-7/4), (x 7/5 - 7/10). Changing to Zosyn 4.5g IV q6hr (x 7/10) given persistent fevers  - s/p PO Vancomycin 125mg QID (x 7/1-7/8). Changed to fidaxomicin 200mg PO BID (x7/8 PM) given ongoing fevers to presumably better cover C.diff in its first recurrence.    # ID PPx  - ACV 400mg BID  - holding Levaquin ppx in setting of b/s abx  - Micafungin 50mg IV daily. Given that she will be on midostaurin, will keep on Micafungin to limit potential interactions with posaconazole/voriconazole. Would plan to transition as soon as pt has completed midostaurin or earlier pending fevers/fungal infection workup as above  - Prior Auth approved for both posaconazole and voriconazole. However, as of  6/29, pt has not met deductible (but should meet it once hospital stay is billed). At present, monthly cost for Posaconazole $2048.66, Voriconazole $176.41.     # h/o COVID-19  Pt is not vaccinated nor interested in being vaccinated. She was admitted from 10/1/2021 - 10/13/2021 for acute hypoxic respiratory failure from COVID-19 pneumonia.  Required IMC, high flow and intermittent CPAP. She was treated with dexamethasone, remdesivir, and baricitinib in addition to azithromycin and ceftriaxone. Recovered symptomatically from this.    GI  # H/o C.diff (10/2021)  # Hemorrhoid  # 1st recurrence of C.diff (7/1)  # Diarrhea  H/o C.diff (10/20/21) and course was complicated by abd pain/cramping and prolonged recovery of stool consistency as well as hemorrhoid which does wax/wane and is sometimes painful. She primarily has used witch hazel pads when symptomatic. Pt reported normal bowels at time of AML diagnosis. Developed intermittent loose stools throughout admission, then increased frequency of loose/watery stools overnight 6/30-7/1. Rechecked C.diff (7/1), positive.   - 6/16 CT A/P negative for perirectal abscess.  -  TUCKS PRN, add Prep H (7/4), dilt cream (7/6)  - Avoid PTA probiotic at this time.  - baseline 6/19 C. Diff negative so we deferred prophylactic PO Vancomycin during chemo course  - recurrent/increased loose stools reported on 7/1, checked C.diff and this was positive. S/p PO Vanco --> now on fidaxomicin as above    # Mucositis, improved  On ~6/27, pt noting inflammatory changes in b/l buccal mucosa and small lesion on right inner lower lip. Not painful or affecting PO intake. Since improved and pt has denied mouth pain/sores recently, eating/drinking well. Now with intermittent blood blisters but no mouth pain/sores.  - MMW PRN, viscous lidocaine PRN    # GERD  - TUMS PRN  - PPI daily     # Distal esophageal discomfort/dysphagia - improved  # Hiatal hernia (seen on 7/7 CT imaging)  On 6/29, patient reported new onset of epigastric discomfort when swallowing solid foods, not with liquids. No radiation to back or other areas of abdomen, no RUQ tenderness. Denies reflux, nausea or emesis. Already on PPI as above. No e/o oral candidiasis, on micafungin ppx. Question relation to GI mucosal changes? Nearly resolved on 7/4. Now with intermittent exacerbations but not nearly what it was previously.   - Trial Carafate before meals and at nighttime --> given improvement, backed off to PRN (x 7/4)  - simethicone PRN, Maalox PRN, GI Cocktail PRN  - Continue to monitor, could consider barium swallow study if persistent    ENT  # Sore throat, intermittent  # Clear rhinorrhea  Pt reported right-sided distal throat soreness on 7/8. Mild posterior OP erythema, no exudates or thrush noted on exam. Negative infectious workup as above, potentially due to mucositis changes. Suspect rhinorrhea may be due to loss of protective nasal cilia.   - 7/8: strep PCR negative, RVP negative  - PRN cepacol, hurricane spray  - could consider swallowing MMW, could also consider Nystatin swallow (though no e/o thrush in OP at this time)    CV  #  Irregular rhythm  Pt having occasionally PVCs, noting more in the last couple days of ongoing fevers and more irregular on 7/8 exam. Improved on 7/10 exam.  - EKG (7/8) demonstrated sinus rhythm with occasional PVCs. QTc 450.      GYN  # Vaginal bleeding, resolved  Menstrual cycle began inpatient overnight 6/30-7/1. Usually heaviest flow is first ~3 days. Reviewed neutropenic precautions and advised not to use tampons at this time. Resolved as of 7/7.  - back down to Plt parameter 10K  - s/p Provera 10mg daily (7/1-7/6)    ENDO  # Hypothyroidism  PTA was on Synthroid 125mcg daily. TSH 0.04, T4 Free 1.48 (done 6/24). Per pt, her PCP reviewed and advised her to reduce her Synthroid to 100mcg daily. Ordered to start on 6/29.     DERM  # Bilateral ear erythema, pruritis - resolved  On 6/29, pt reported bilateral ear lobe erythema and irritation in ear canal. Believed to be related to cytarabine. S/p triamcinolone cream. Resolved early week of 7/4.      # Truncal rash - improving   Noted rash to upper abdomen which progressed to upper torso and back throughout morning of 7/6. Not itchy or bothersome to pt. Suspect possible cytarabine rash. Pt has been on/off Cefepime and previously tolerated but rash to abx is also in differential. Can try triamcinolone cream to rash sites. Improving/evolving and now more petechial in nature.     # Lesion to R shin - stable   Noted on 7/6; outlined, monitor for worsening cellulitis. On 7/7, slightly improved in degree of erythema and slightly retracted from drawn border, less tender as well. However, there is a persistent area of induration/fluctuance. Stable 7/8-7/10. No new lesions per pt.  - MRSA swab negative as above. However, if persistent fevers or HD unstable, low threshold to add IV Vanco.    - if appears to be worsening, or any new lesions, would consult Derm for bx  - trial bacitracin oint BID    FEN  - Regular diet as tolerated  - lyte repletion per unit protocol  - regular  diet     PPx  - VTE: none due to thrombocytopenia  - GI: PPI     Dispo:  Anticipate 4+ week LOS for management of acute leukemia. Presented under Dr. Won Perkins.     Plan of care discussed with Dr. Pantoja     I spent 50 minutes in the care of this patient today, which included time necessary for review of interval events, obtaining history and physical exam, adjusting medication orders, discussion with interdisciplinary/consult team(s), and documentation time. Over 50% of time was spent face-to-face and/or coordinating care.     Plan of care discussed with Dr. Scarlett Grover PA-C  Hematology/Oncology   pager: [phone]       Interval History   Ongoing, persistent fevers. Tmax 103.3. Felt chilled a lot of the day yesterday and because of that, spent more time in bed covered up and didn't eat as well yesterday. If it weren't for the fevers, states she would feel pretty well. Diarrhea is maybe a little less - thinks she had 5-6 stools yesterday vs 8. Stools are generally smaller volume and have a little consistency but mainly loose. Denies associated abd pain or cramping. Denies nausea. Ongoing intermittent sore throat (mainly R side of throat slightly beneath mandible) -- has tried the lozenge and hurricane spray but notes these mainly make her tongue numb; hurricane spray works slightly better for the throat). Denies HA, vision changes, or sinus pressure. Denies mouth pain/sores. Denies SOB or cough. No known aspiration. Denies dysuria. Rash continues to improved on trunk, still more petechial nature. Lesion to R shin remains stable and is not painful to pt. Hemorrhoid discomfort is stable. Had head shaved, adjusting to this. No scalp tenderness/itching. Despite everything, she is still working remotely for a few hours each day.       Physical Exam   Temp: 97.5  F (36.4  C) Temp src: Oral BP: 115/72 Pulse: 97   Resp: 16 SpO2: 97 % O2 Device: None (Room air)    Vitals:    07/07/22 0744 07/08/22 1026  07/09/22 0810   Weight: 115.2 kg (254 lb) 115.5 kg (254 lb 9.6 oz) 115 kg (253 lb 9.6 oz)     Vital Signs with Ranges  Temp:  [97.5  F (36.4  C)-103.3  F (39.6  C)] 97.5  F (36.4  C)  Pulse:  [] 97  Resp:  [16-18] 16  BP: (103-120)/(60-74) 115/72  SpO2:  [96 %-100 %] 97 %  I/O last 3 completed shifts:  In: 340 [P.O.:240; I.V.:100]  Out: -   Constitutional: Awake and conversational. Non-toxic appearing. No acute distress.Up ad qasim in room after showering, moves to chair.    HEENT: NC/AT. Wearing head scarf. Sclerae anicteric. OP pink and moist. Less erythema of posterior OP without exudates or thrush. Small area of broken mucosa where former blood blister was on left buccal mucosa. No other lesions or sores.   Respiratory: Breathing comfortable with no increased work on room air. Lungs CTA b/l. No wheezing or crackles.  Cardiovascular: RRR, no skipped beats today. No murmur appreciated. No peripheral edema.    GI: Normal BS. Abdomen is soft, non-distended, non-tender.   : External hemorrhoid without thrombosed appearance, no surrounding erythema or induration on very superficial rectal exam (7/9)  Skin: Skin is clean, dry, intact. Faint pink papular rash to trunk has further faded with evolving petechial appearance at this time (7/9). ~Dime size erythematous (less purple) lesion with central dried pustular-like appearance and underlying induration/fluctuance to right lower shin; outlined and remains slightly retracted within borders but stable from 7/7. Nontender.    Musculoskeletal/ Extremities: Extremities grossly normal in appearance.  Neurologic: Alert and oriented. Speech normal. Grossly nonfocal.   VAD: PICC line in LUE, c/d/i. She has new ecchymoses proximal to PICC site (noted 7/8), stable today.         Medications     - MEDICATION INSTRUCTIONS -         acyclovir  400 mg Oral BID     bacitracin   Topical BID     diltiazem 2% in PLO gel  0.25-0.5 g Topical TID     fidaxomicin  200 mg Oral BID      levothyroxine  100 mcg Oral QAM AC     micafungin  50 mg Intravenous Q24H     midostaurin  50 mg Oral Q12H     multivitamin w/minerals  1 tablet Oral Daily     pantoprazole  40 mg Oral QAM AC     piperacillin-tazobactam  4.5 g Intravenous Q6H     pramox-pe-glycerin-petrolatum   Rectal TID     triamcinolone   Topical BID     vitamin C  1,000 mg Oral Daily     vitamin D3  50 mcg Oral Daily       Data   Results for orders placed or performed during the hospital encounter of 06/16/22 (from the past 24 hour(s))   Blood Culture Arm, Right    Specimen: Arm, Right; Blood   Result Value Ref Range    Culture No growth after 12 hours    Blood Culture Red Lumen    Specimen: Red Lumen; Blood   Result Value Ref Range    Culture No growth after 12 hours    CBC with platelets differential    Narrative    The following orders were created for panel order CBC with platelets differential.  Procedure                               Abnormality         Status                     ---------                               -----------         ------                     CBC with platelets and d...[[id number]]  Abnormal            Final result               RBC and Platelet Morphology[[id number]]  Abnormal            Final result                 Please view results for these tests on the individual orders.   Basic metabolic panel   Result Value Ref Range    Creatinine 0.49 (L) 0.51 - 0.95 mg/dL    Sodium 134 (L) 136 - 145 mmol/L    Potassium 3.3 (L) 3.4 - 5.3 mmol/L    Urea Nitrogen 5.8 (L) 6.0 - 20.0 mg/dL    Chloride 101 98 - 107 mmol/L    Carbon Dioxide (CO2) 20 (L) 22 - 29 mmol/L    Anion Gap 13 7 - 15 mmol/L    Glucose 96 70 - 99 mg/dL    GFR Estimate >90 >60 mL/min/1.73m2    Calcium 8.9 8.6 - 10.0 mg/dL   CBC with platelets and differential   Result Value Ref Range    WBC Count 0.4 (LL) 4.0 - 11.0 10e3/uL    RBC Count 2.29 (L) 3.80 - 5.20 10e6/uL    Hemoglobin 7.1 (L) 11.7 - 15.7 g/dL    Hematocrit 21.0 (L) 35.0 - 47.0 %    MCV 92 78 - 100  fL    MCH 31.0 26.5 - 33.0 pg    MCHC 33.8 31.5 - 36.5 g/dL    RDW 13.5 10.0 - 15.0 %    Platelet Count 15 (LL) 150 - 450 10e3/uL   RBC and Platelet Morphology   Result Value Ref Range    Platelet Assessment  Automated Count Confirmed. Platelet morphology is normal.     Automated Count Confirmed. Platelet morphology is normal.    Elliptocytes Slight (A) None Seen    RBC Morphology Confirmed RBC Indices    Lactic Acid STAT   Result Value Ref Range    Lactic Acid 0.6 (L) 0.7 - 2.0 mmol/L

## 2022-07-10 NOTE — PLAN OF CARE
"[id number]    /67 (BP Location: Right arm)   Pulse 103   Temp (!) 101.4  F (38.6  C) (Oral)   Resp 16   Ht 1.702 m (5' 7\")   Wt 115 kg (253 lb 9.6 oz)   SpO2 98%   BMI 39.72 kg/m       Reason for admission: AML - Day 19 of 7+3 and Rydapt  Activity: UAL in room  Pain: Denies overnight  Neuro: A&Ox4, Neuros intact.   Cardiac: Intermittently tachycardic, T-max 101.4.  Respiratory: Room air, satting at 100%. Denies SOB.  GI/: C-diff positive. Loose stools x2. Voiding without difficulty, denies nausea.  Diet: Regular diet, poor appetite  Lines: R DL PICC, red lumen has sluggish blood return. Both lumens heparin locked.   Wounds: Rash improving  Labs/imaging: Reviewed. See chart.       New changes this shift: Tylenol received x2. Triggered sepsis, lactic drawn, awaiting results. MD notified. Slept between cares.      Continue to follow POC     "

## 2022-07-10 NOTE — PLAN OF CARE
[id number]    Tachycardic, Tmax 102.4. OVSS and on RA. Denies nausea/SOB. Complained of throat pain, did not request additional PRN meds than tylenol given x1 for fever. Pt given ice packs. Assisted pt with shaving hair off. Did not have large appetite this evening. Pt reported having 4-5 loose BM's today. Reporting adequate UOP. Day 18 of 7+3 and Rydapt. Up independently in room. Continue to monitor & w/ POC.

## 2022-07-10 NOTE — PROVIDER NOTIFICATION
7D [address]    [facility] Temperature 101.4. Tylenol given, lactic drawn.     Thanks,  [employer] #[id number]

## 2022-07-10 NOTE — PLAN OF CARE
"[id number]: /71 (BP Location: Right arm)   Pulse 95   Temp 100.3  F (37.9  C)   Resp 16   Ht 1.702 m (5' 7\")   Wt 115 kg (253 lb 9.6 oz)   SpO2 97%   BMI 39.72 kg/m    HR tachy while febrile. Tmax 102.4, tylenol given. LA in AM 0.6. Bcx drawn at 2115. Cefepime switched to zosyn. Cross cover paged this evening for continued fevers, plan to continue to monitor fevers and holding off on vanco currently. 1L NS bolus given for hydration. Fungal panels pending. Denies any pain, nausea or dyspnea.  Right shin with dime sized abscess, covered with bacitracin. Hemorrhoid pain improved, using preporation H. Reported 8 episodes of diarrhea, feels that diarrhea was getting worst tonight. K replaced for 3.3, recheck 3.6. Having adequate po intake. Ate [employer] for lunch. Minerva's parents visited this afternoon. D19 of 7+3, continued with Midosaurin.  D21 bmbx scheduled for 7/12 at 11 AM. Continue POC.   "

## 2022-07-11 PROBLEM — Z86.16 HISTORY OF COVID-19: Status: ACTIVE | Noted: 2021-10-20

## 2022-07-11 PROBLEM — Z79.2 NEED FOR PROPHYLACTIC ANTIBIOTIC: Status: ACTIVE | Noted: 2022-07-11

## 2022-07-11 PROBLEM — A49.8 CLOSTRIDIUM DIFFICILE INFECTION: Status: ACTIVE | Noted: 2021-10-21

## 2022-07-11 PROBLEM — D70.9 NEUTROPENIC FEVER (H): Status: ACTIVE | Noted: 2022-07-11

## 2022-07-11 PROBLEM — R50.81 NEUTROPENIC FEVER (H): Status: ACTIVE | Noted: 2022-07-11

## 2022-07-11 LAB
ABO/RH(D): NORMAL
ALBUMIN SERPL BCG-MCNC: 3.3 G/DL (ref 3.5–5.2)
ALP SERPL-CCNC: 52 U/L (ref 35–104)
ALT SERPL W P-5'-P-CCNC: 16 U/L (ref 10–35)
ANION GAP SERPL CALCULATED.3IONS-SCNC: 14 MMOL/L (ref 7–15)
ANTIBODY SCREEN: NEGATIVE
AST SERPL W P-5'-P-CCNC: 14 U/L (ref 10–35)
ATRIAL RATE - MUSE: 94 BPM
BASOPHILS # BLD MANUAL: 0 10E3/UL (ref 0–0.2)
BASOPHILS NFR BLD MANUAL: 0 %
BILIRUB DIRECT SERPL-MCNC: <0.2 MG/DL (ref 0–0.3)
BILIRUB SERPL-MCNC: 0.5 MG/DL
BLD PROD TYP BPU: NORMAL
BLOOD COMPONENT TYPE: NORMAL
BUN SERPL-MCNC: 5.1 MG/DL (ref 6–20)
C DIFF TOX B STL QL: NEGATIVE
CALCIUM SERPL-MCNC: 8.8 MG/DL (ref 8.6–10)
CHLORIDE SERPL-SCNC: 104 MMOL/L (ref 98–107)
CODING SYSTEM: NORMAL
CREAT SERPL-MCNC: 0.51 MG/DL (ref 0.51–0.95)
CROSSMATCH: NORMAL
DEPRECATED HCO3 PLAS-SCNC: 20 MMOL/L (ref 22–29)
DIASTOLIC BLOOD PRESSURE - MUSE: NORMAL MMHG
EOSINOPHIL # BLD MANUAL: 0 10E3/UL (ref 0–0.7)
EOSINOPHIL NFR BLD MANUAL: 0 %
ERYTHROCYTE [DISTWIDTH] IN BLOOD BY AUTOMATED COUNT: 13.7 % (ref 10–15)
GFR SERPL CREATININE-BSD FRML MDRD: >90 ML/MIN/1.73M2
GLUCOSE SERPL-MCNC: 100 MG/DL (ref 70–99)
H CAPSUL AG UR QL IA: NOT DETECTED
H CAPSUL AG UR-MCNC: NOT DETECTED NG/ML
HCT VFR BLD AUTO: 20.5 % (ref 35–47)
HGB BLD-MCNC: 7 G/DL (ref 11.7–15.7)
INTERPRETATION ECG - MUSE: NORMAL
ISSUE DATE AND TIME: NORMAL
LYMPHOCYTES # BLD MANUAL: 0.6 10E3/UL (ref 0.8–5.3)
LYMPHOCYTES NFR BLD MANUAL: 72 %
MAGNESIUM SERPL-MCNC: 2.1 MG/DL (ref 1.7–2.3)
MCH RBC QN AUTO: 31.7 PG (ref 26.5–33)
MCHC RBC AUTO-ENTMCNC: 34.1 G/DL (ref 31.5–36.5)
MCV RBC AUTO: 93 FL (ref 78–100)
METAMYELOCYTES # BLD MANUAL: 0 10E3/UL
METAMYELOCYTES NFR BLD MANUAL: 1 %
MONOCYTES # BLD MANUAL: 0.1 10E3/UL (ref 0–1.3)
MONOCYTES NFR BLD MANUAL: 7 %
MYELOCYTES # BLD MANUAL: 0 10E3/UL
MYELOCYTES NFR BLD MANUAL: 4 %
NEUTROPHILS # BLD MANUAL: 0 10E3/UL (ref 1.6–8.3)
NEUTROPHILS NFR BLD MANUAL: 4 %
NRBC # BLD AUTO: 0 10E3/UL
NRBC BLD MANUAL-RTO: 1 %
OTHER CELLS # BLD MANUAL: 0.1 10E3/UL
OTHER CELLS NFR BLD MANUAL: 12 %
P AXIS - MUSE: 17 DEGREES
PATH REV: ABNORMAL
PHOSPHATE SERPL-MCNC: 2.6 MG/DL (ref 2.5–4.5)
PLAT MORPH BLD: ABNORMAL
PLATELET # BLD AUTO: 38 10E3/UL (ref 150–450)
POTASSIUM SERPL-SCNC: 3.4 MMOL/L (ref 3.4–5.3)
POTASSIUM SERPL-SCNC: 3.6 MMOL/L (ref 3.4–5.3)
PR INTERVAL - MUSE: 146 MS
PROT SERPL-MCNC: 6.7 G/DL (ref 6.4–8.3)
QRS DURATION - MUSE: 78 MS
QT - MUSE: 360 MS
QTC - MUSE: 450 MS
R AXIS - MUSE: 5 DEGREES
RBC # BLD AUTO: 2.21 10E6/UL (ref 3.8–5.2)
RBC MORPH BLD: ABNORMAL
SODIUM SERPL-SCNC: 138 MMOL/L (ref 136–145)
SPECIMEN EXPIRATION DATE: NORMAL
SYSTOLIC BLOOD PRESSURE - MUSE: NORMAL MMHG
T AXIS - MUSE: -6 DEGREES
UNIT ABO/RH: NORMAL
UNIT NUMBER: NORMAL
UNIT STATUS: NORMAL
UNIT TYPE ISBT: 600
VENTRICULAR RATE- MUSE: 94 BPM
WBC # BLD AUTO: 0.8 10E3/UL (ref 4–11)

## 2022-07-11 PROCEDURE — 250N000013 HC RX MED GY IP 250 OP 250 PS 637: Performed by: PHYSICIAN ASSISTANT

## 2022-07-11 PROCEDURE — 86850 RBC ANTIBODY SCREEN: CPT | Performed by: STUDENT IN AN ORGANIZED HEALTH CARE EDUCATION/TRAINING PROGRAM

## 2022-07-11 PROCEDURE — 86481 TB AG RESPONSE T-CELL SUSP: CPT | Performed by: PHYSICIAN ASSISTANT

## 2022-07-11 PROCEDURE — 258N000003 HC RX IP 258 OP 636: Performed by: PHYSICIAN ASSISTANT

## 2022-07-11 PROCEDURE — 86923 COMPATIBILITY TEST ELECTRIC: CPT | Performed by: PHYSICIAN ASSISTANT

## 2022-07-11 PROCEDURE — 250N000012 HC RX MED GY IP 250 OP 636 PS 637: Performed by: STUDENT IN AN ORGANIZED HEALTH CARE EDUCATION/TRAINING PROGRAM

## 2022-07-11 PROCEDURE — 36592 COLLECT BLOOD FROM PICC: CPT | Performed by: PHYSICIAN ASSISTANT

## 2022-07-11 PROCEDURE — 250N000013 HC RX MED GY IP 250 OP 250 PS 637: Performed by: STUDENT IN AN ORGANIZED HEALTH CARE EDUCATION/TRAINING PROGRAM

## 2022-07-11 PROCEDURE — 250N000011 HC RX IP 250 OP 636: Performed by: PHYSICIAN ASSISTANT

## 2022-07-11 PROCEDURE — 99233 SBSQ HOSP IP/OBS HIGH 50: CPT | Performed by: INTERNAL MEDICINE

## 2022-07-11 PROCEDURE — 99233 SBSQ HOSP IP/OBS HIGH 50: CPT | Performed by: STUDENT IN AN ORGANIZED HEALTH CARE EDUCATION/TRAINING PROGRAM

## 2022-07-11 PROCEDURE — 86901 BLOOD TYPING SEROLOGIC RH(D): CPT | Performed by: STUDENT IN AN ORGANIZED HEALTH CARE EDUCATION/TRAINING PROGRAM

## 2022-07-11 PROCEDURE — 85027 COMPLETE CBC AUTOMATED: CPT | Performed by: PHYSICIAN ASSISTANT

## 2022-07-11 PROCEDURE — 87116 MYCOBACTERIA CULTURE: CPT | Performed by: PHYSICIAN ASSISTANT

## 2022-07-11 PROCEDURE — 36592 COLLECT BLOOD FROM PICC: CPT | Performed by: STUDENT IN AN ORGANIZED HEALTH CARE EDUCATION/TRAINING PROGRAM

## 2022-07-11 PROCEDURE — P9016 RBC LEUKOCYTES REDUCED: HCPCS | Performed by: PHYSICIAN ASSISTANT

## 2022-07-11 PROCEDURE — 83735 ASSAY OF MAGNESIUM: CPT | Performed by: PHYSICIAN ASSISTANT

## 2022-07-11 PROCEDURE — 80053 COMPREHEN METABOLIC PANEL: CPT | Performed by: PHYSICIAN ASSISTANT

## 2022-07-11 PROCEDURE — 84132 ASSAY OF SERUM POTASSIUM: CPT | Performed by: STUDENT IN AN ORGANIZED HEALTH CARE EDUCATION/TRAINING PROGRAM

## 2022-07-11 PROCEDURE — 120N000002 HC R&B MED SURG/OB UMMC

## 2022-07-11 PROCEDURE — 84100 ASSAY OF PHOSPHORUS: CPT | Performed by: PHYSICIAN ASSISTANT

## 2022-07-11 PROCEDURE — 82248 BILIRUBIN DIRECT: CPT | Performed by: PHYSICIAN ASSISTANT

## 2022-07-11 PROCEDURE — 87493 C DIFF AMPLIFIED PROBE: CPT | Performed by: PHYSICIAN ASSISTANT

## 2022-07-11 PROCEDURE — 87338 HPYLORI STOOL AG IA: CPT | Performed by: PHYSICIAN ASSISTANT

## 2022-07-11 PROCEDURE — 85007 BL SMEAR W/DIFF WBC COUNT: CPT | Performed by: PHYSICIAN ASSISTANT

## 2022-07-11 RX ORDER — POTASSIUM CHLORIDE 750 MG/1
40 TABLET, EXTENDED RELEASE ORAL ONCE
Status: COMPLETED | OUTPATIENT
Start: 2022-07-11 | End: 2022-07-11

## 2022-07-11 RX ADMIN — ACYCLOVIR 400 MG: 400 TABLET ORAL at 08:39

## 2022-07-11 RX ADMIN — RYDAPT 50 MG: 25 CAPSULE, LIQUID FILLED ORAL at 19:13

## 2022-07-11 RX ADMIN — SODIUM CHLORIDE, PRESERVATIVE FREE 5 ML: 5 INJECTION INTRAVENOUS at 17:00

## 2022-07-11 RX ADMIN — ACYCLOVIR 400 MG: 400 TABLET ORAL at 19:15

## 2022-07-11 RX ADMIN — BACITRACIN: 500 OINTMENT TOPICAL at 08:42

## 2022-07-11 RX ADMIN — MICAFUNGIN 50 MG: 10 INJECTION, POWDER, LYOPHILIZED, FOR SOLUTION INTRAVENOUS at 16:56

## 2022-07-11 RX ADMIN — PIPERACILLIN AND TAZOBACTAM 4.5 G: 4; .5 INJECTION, POWDER, FOR SOLUTION INTRAVENOUS at 04:42

## 2022-07-11 RX ADMIN — CELECOXIB 100 MG: 100 CAPSULE ORAL at 06:08

## 2022-07-11 RX ADMIN — TRIAMCINOLONE ACETONIDE: 1 CREAM TOPICAL at 19:18

## 2022-07-11 RX ADMIN — GLYCERIN, PETROLATUM, PHENYLEPHRINE HCL, PRAMOXINE HCL: 144; 2.5; 10; 15 CREAM TOPICAL at 08:41

## 2022-07-11 RX ADMIN — LEVOTHYROXINE SODIUM 100 MCG: 100 TABLET ORAL at 06:08

## 2022-07-11 RX ADMIN — Medication 1 TABLET: at 14:48

## 2022-07-11 RX ADMIN — FIDAXOMICIN 200 MG: 200 TABLET, FILM COATED ORAL at 08:39

## 2022-07-11 RX ADMIN — PIPERACILLIN AND TAZOBACTAM 4.5 G: 4; .5 INJECTION, POWDER, FOR SOLUTION INTRAVENOUS at 10:08

## 2022-07-11 RX ADMIN — PIPERACILLIN AND TAZOBACTAM 4.5 G: 4; .5 INJECTION, POWDER, FOR SOLUTION INTRAVENOUS at 21:43

## 2022-07-11 RX ADMIN — BACITRACIN: 500 OINTMENT TOPICAL at 19:18

## 2022-07-11 RX ADMIN — SODIUM CHLORIDE, PRESERVATIVE FREE 5 ML: 5 INJECTION INTRAVENOUS at 14:42

## 2022-07-11 RX ADMIN — RYDAPT 50 MG: 25 CAPSULE, LIQUID FILLED ORAL at 08:44

## 2022-07-11 RX ADMIN — POTASSIUM CHLORIDE 40 MEQ: 750 TABLET, EXTENDED RELEASE ORAL at 12:33

## 2022-07-11 RX ADMIN — OXYCODONE HYDROCHLORIDE AND ACETAMINOPHEN 1000 MG: 500 TABLET ORAL at 08:40

## 2022-07-11 RX ADMIN — ACETAMINOPHEN 650 MG: 325 TABLET, FILM COATED ORAL at 12:33

## 2022-07-11 RX ADMIN — FIDAXOMICIN 200 MG: 200 TABLET, FILM COATED ORAL at 19:15

## 2022-07-11 RX ADMIN — GLYCERIN, PETROLATUM, PHENYLEPHRINE HCL, PRAMOXINE HCL: 144; 2.5; 10; 15 CREAM TOPICAL at 19:16

## 2022-07-11 RX ADMIN — PIPERACILLIN AND TAZOBACTAM 4.5 G: 4; .5 INJECTION, POWDER, FOR SOLUTION INTRAVENOUS at 16:00

## 2022-07-11 RX ADMIN — GLYCERIN, PETROLATUM, PHENYLEPHRINE HCL, PRAMOXINE HCL: 144; 2.5; 10; 15 CREAM TOPICAL at 14:48

## 2022-07-11 RX ADMIN — SODIUM CHLORIDE, PRESERVATIVE FREE 5 ML: 5 INJECTION INTRAVENOUS at 06:19

## 2022-07-11 RX ADMIN — Medication 50 MCG: at 08:39

## 2022-07-11 RX ADMIN — PANTOPRAZOLE SODIUM 40 MG: 40 TABLET, DELAYED RELEASE ORAL at 06:08

## 2022-07-11 ASSESSMENT — ACTIVITIES OF DAILY LIVING (ADL)
ADLS_ACUITY_SCORE: 18

## 2022-07-11 NOTE — PLAN OF CARE
"Goal Outcome Evaluation:    Plan of Care Reviewed With: patient     Overall Patient Progress: improving       Time 0231-7384    /80   Pulse 81   Temp 97.6  F (36.4  C) (Oral)   Resp 18   Ht 1.702 m (5' 7\")   Wt 114.1 kg (251 lb 9.6 oz)   SpO2 98%   BMI 39.41 kg/m      Reason for admission: D20 of 7+3+midostaurin.  Activity: Independent, up ad qasim  Pain: Denies  Neuro: A&Ox4  Cardiac: WDL, HR up to 100  Respiratory: WDL on RA, denies SOB  GI/: Pt reports diarrhea. Voiding not saving.  Diet: Regular  Lines: PICC  Wounds: Abscess on right shin covered with bacitracin and band aid. Using preparation H cream for hemorrhoid.  Labs/imaging: Reviewed      New changes this shift: Tmax 99.8. C-diff negative. Collected stool sample and sent to lab to check for H. pylori.      Plan: BMBx scheduled 7/12/22 at 11 am. Anticipate 4+ week LOS for management of acute leukemia.      Continue to monitor and follow POC     "

## 2022-07-11 NOTE — PROGRESS NOTES
[facility]  Transplant Infectious Disease Progress Note     Patient:  Veda Marinelli, Date of birth 1985, Medical record number [id number]  Date of Visit:  07/11/2022         Assessment and Recommendations:   Recommendations:  - Consider EGD to assess mild thickening along the stomach and distal esophagus.   - Send stool antigen for Helicobacter pylori.  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  - Consider skin biopsy of right leg lesion, sending half for dermatopathology and the other half for fungal culture (specifically looking for Fusarium).  - Consider sending a Karius assay.   - Continue fidaxomicin for treatment of Clostridium difficile infection.  - Continue zosyn for bacterial fevers during neutropenia.  - Continue acyclovir for viral prophylaxis.  - Continue micafungin for fungal prophylaxis.  - If she has a clinical decompensation, would add in voriconazole, since that is the treatment of choice for Fusarium.    Transplant Infectious Disease will continue to follow with you.      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 She was initiated on induction chemotherapy with 7+3+midostaurin (C1D1=6/22/22). Hospital course complicated by neutropenic fever and C.difficile colitis  Infectious Disease issues include:  - Febrile neutropenia. Changed from cefepime to zosyn on 7/10/2022, with some decrease in her overall temperature curve. 7/11/2022 CT imaging shows some mild thickening along the stomach and distal esophagus (DDx includes CMV and HSV but she is seronegative to both, as well as helicobacter). She has some enlarged lymph nodes (DDx includes mycobacteria), as well as a patchy streaky density in the posterior right upper lobe. Workup suggested above, in  recommendations.  - Clostridium difficile infection, recurrent. First episode was in the fall of 2021 (10/20/2021), and she was treated with oral vancomycin. The current episode tested positive on 7/1/2022, in the setting of diarrhea and fever. She had been on oral vancomycin for nearly a week without significant improvement, and diarrhea related to her chemotherapy is in the differential diagnosis (Midostaurin (nearly 50% likelihood of diarrhea)). She was changed over to fidaxomicin on 7/8/2022, but fevers continued on 7/9 and 7/10/2022, so would change was made to her febrile neutropenic coverage. Today on 7/11/2022 she reports that she has ongoing diarrhea, although this is starting to improve.   - Right shin lesion. DDx in a neutropenic patient includes Fusarium, for which the drug of choice is voriconazole. Workup suggested above, in recommendations.  - Hx of COVID-19 pneumonia 10/2021. She was admitted for 13 days and went home with supplemental O2 for a few more days. She was never intubated but did require Vapotherm for a few days. Received Remdesivir, Dexamethasone and Barictinib.  - QTc interval: 450 ms on 7/8/2022  - Bacterial prophylaxis: zosyn  - PCP prophylaxis: none  - Viral serostatus & prophylaxis: CMV neg, EBV neg, HSV1 neg, HSV2 neg. She had chickenpox as a child. She is on acyclovir for viral prophylaxis.  - Fungal prophylaxis: mycafungin  - Immunization status: unknown  - Gamma globulin status: unknown  - Isolation status: Good hand hygiene.    Leigh Marsh MD. Pager [phone]         Interval History:   Since Veda Marinelli was last seen by my infectious disease colleague Dr. Willie Hester on 7/8/2022, she remains neutropenic. Today is my first day seeing this hospital stay. Chart reviewed to date. She is being treated for a C diff infection. Yesterday she had an increase in the number of stools, but this morning, while her stools are still loose, there is less water. She does  not have any pain in her abdomen or cramping. She did take probiotics over the winter to prevent seated from coming after her prior episode. She has a bone marrow biopsy scheduled for 7/12/2022. (She is a registered nurse by training.)    Review of Systems:  CONSTITUTIONAL:  Fever curve is starting to improve over the last two days  EYES: negative for icterus or an acute change in vision. Her eyes have been watering.  ENT:  negative for any acute hearing loss, tinnitus or sore throat  RESPIRATORY:  negative for cough, sputum, dyspnea  CARDIOVASCULAR:  She had more PVCs last week, and when she gets them she can feel them.  GASTROINTESTINAL:  negative for nausea, vomiting, diarrhea or constipation  GENITOURINARY:  negative for dysuria or hematuria  HEME:  No easy bruising or bleeding  INTEGUMENT:  negative for rash or pruritus. The spot under a Band-Aid on her lower right leg is not changing. However, her legs a little bit sore.  NEURO:  Negative for headache, tremor, or dizziness         Current Medications & Allergies:       acyclovir  400 mg Oral BID     bacitracin   Topical BID     diltiazem 2% in PLO gel  0.25-0.5 g Topical TID     fidaxomicin  200 mg Oral BID     levothyroxine  100 mcg Oral QAM AC     micafungin  50 mg Intravenous Q24H     midostaurin  50 mg Oral Q12H     multivitamin w/minerals  1 tablet Oral Daily     pantoprazole  40 mg Oral QAM AC     piperacillin-tazobactam  4.5 g Intravenous Q6H     pramox-pe-glycerin-petrolatum   Rectal TID     triamcinolone   Topical BID     vitamin C  1,000 mg Oral Daily     vitamin D3  50 mcg Oral Daily       Infusions/Drips:    - MEDICATION INSTRUCTIONS -         Allergies   Allergen Reactions     Blood Transfusion Related (Informational Only) Hives     6/29/2022, reaction of hives with platelet transfusion             Physical Exam:     Patient Vitals for the past 24 hrs:   BP Temp Temp src Pulse Resp SpO2 Weight   07/11/22 1449 120/80 97.6  F (36.4  C) Oral 81 18 98  % --   07/11/22 1328 127/81 97  F (36.1  C) Oral 95 18 96 % --   07/11/22 1240 122/82 98.2  F (36.8  C) Oral 88 18 99 % --   07/11/22 1229 129/83 97.9  F (36.6  C) Oral 89 18 94 % --   07/11/22 0944 119/79 98.5  F (36.9  C) Oral 93 18 96 % --   07/11/22 0738 -- -- -- -- -- -- 114.1 kg (251 lb 9.6 oz)   07/11/22 0656 -- 100.4  F (38  C) Oral -- -- -- --   07/11/22 0625 98/62 (!) 101.5  F (38.6  C) Oral 96 18 98 % --   07/11/22 0221 100/57 99.2  F (37.3  C) Oral 95 18 98 % --   07/11/22 0000 -- 98.1  F (36.7  C) Oral -- -- -- --   07/10/22 2006 -- 100.3  F (37.9  C) -- -- -- -- --   07/10/22 1900 -- (!) 100.7  F (38.2  C) Oral -- -- -- --   07/10/22 1758 109/71 100  F (37.8  C) Oral 95 16 97 % --   07/10/22 1637 -- 99.7  F (37.6  C) Oral -- -- -- --     Ranges for vital signs:  Temp:  [97  F (36.1  C)-101.5  F (38.6  C)] 97.6  F (36.4  C)  Pulse:  [81-96] 81  Resp:  [16-18] 18  BP: ()/(57-83) 120/80  SpO2:  [94 %-99 %] 98 %  Vitals:    07/08/22 1026 07/09/22 0810 07/11/22 0738   Weight: 115.5 kg (254 lb 9.6 oz) 115 kg (253 lb 9.6 oz) 114.1 kg (251 lb 9.6 oz)       Physical Examination:  GENERAL:  well-developed, well-nourished woman, in no acute distress.  HEAD:  Head is normocephalic, atraumatic, alopecia   EYES:  Eyes have anicteric sclerae  ENT:  Oropharynx is moist.  NECK:  Supple.   LUNGS:  Clear to auscultation bilateral.   CARDIOVASCULAR:  Regular rate and rhythm with no murmur  ABDOMEN:  Normal bowel sounds, soft, nontender.   SKIN:  No acute rashes. Erythematous, circular lesion on right shin, not raised or tender. Left sided PICC in place without any surrounding erythema or exudate.  NEUROLOGIC:  Grossly nonfocal. Active x4 extremities         Laboratory Data:     Metabolic Studies       Recent Labs   Lab Test 07/11/22  0732 07/10/22  1304 07/10/22  0629 07/10/22  0625 07/09/22  0643 07/08/22  1938     --   --  134* 138  --    POTASSIUM 3.4 3.6  --  3.3* 3.5  --    CHLORIDE 104  --   --  101 105   --    CO2 20*  --   --  20* 21*  --    ANIONGAP 14  --   --  13 12  --    BUN 5.1*  --   --  5.8* 6.1  --    CR 0.51  --   --  0.49* 0.51  --    GFRESTIMATED >90  --   --  >90 >90  --    *  --   --  96 106*  --    MICHAEL 8.8  --   --  8.9 8.7  --    PHOS 2.6  --   --   --   --   --    MAG 2.1  --   --   --   --   --    LACT  --   --  0.6*  --  0.6*  --    FGTL  --   --   --   --   --  <31       Hepatic Studies    Recent Labs   Lab Test 07/11/22  0732 07/08/22  0554 07/06/22  0753 06/26/22  0659 06/25/22  0556 06/24/22  0550   BILITOTAL 0.5 1.0 1.2   < > 0.6 0.6   DBIL <0.20 0.29 0.29   < > <0.20 <0.20   ALKPHOS 52 49 48   < > 35 42   PROTTOTAL 6.7 6.6 6.4   < > 5.9* 5.9*   ALBUMIN 3.3* 3.7 3.8   < > 4.1 3.6   AST 14 7* 7*   < > 18 31   ALT 16 10 10   < > 31 27   LDH  --   --   --   --  545* 889*    < > = values in this interval not displayed.       Pancreatitis testing    Recent Labs   Lab Test 06/30/22  0341 03/08/22  0930   AMYLASE 35  --    LIPASE 23  --    TRIG  --  93       Gout Labs      Recent Labs   Lab Test 06/30/22  0341 06/27/22  0620 06/26/22  0659 06/25/22  0556 06/24/22  1849   URIC 2.0* 3.0 3.7 4.6 4.2       Hematology Studies   Recent Labs   Lab Test 07/11/22  0732 07/10/22  0625 07/09/22  0643 07/08/22  0554 06/29/22  0626 06/28/22  0628   WBC 0.8* 0.4* 0.4* 0.5*   < > 1.1*   ABLA  --   --   --  0.0   < >  --    BLST  --   --   --  3   < >  --    ANEU 0.0*  --   --  0.0*   < >  --    ANEUTAUTO  --   --   --   --   --  0.0*   ALYM 0.6*  --   --  0.5*   < >  --    ALYMPAUTO  --   --   --   --   --  1.0   GORAN 0.1  --   --  0.0   < >  --    AMONOAUTO  --   --   --   --   --  0.0   AEOS 0.0  --   --  0.0   < >  --    AEOSAUTO  --   --   --   --   --  0.0   ABSBASO  --   --   --   --   --  0.0   HGB 7.0* 7.1* 7.5* 8.3*   < > 9.3*   HCT 20.5* 21.0* 22.1* 23.6*   < > 26.6*   PLT 38* 15* 14* 6*   < > 14*    < > = values in this interval not displayed.       Clotting Studies    Recent Labs   Lab Test  07/04/22  0638 06/30/22  0341 06/27/22  0620 06/26/22  0659   INR 1.10 1.14 1.09 1.16*   PTT  --   --  31 31       Iron Testing    Recent Labs   Lab Test 07/11/22  0732 06/16/22  1724 06/16/22  1339 06/16/22  1102 06/14/22  0752   IRON  --   --   --   --  110   FEB  --   --   --   --  279   IRONSAT  --   --   --   --  39   JO-ANN  --   --   --   --  321*   MCV 93   < > 110*   < > 108*   FOLIC  --   --   --   --  18.8   B12  --   --   --   --  607   HAPT  --   --   --   --  154   RETP  --   --  2.4*  --  2.9*   RETICABSCT  --   --  0.063  --  0.080    < > = values in this interval not displayed.       Thyroid Studies     Recent Labs   Lab Test 06/24/22  0550   TSH 0.04*   T4 1.48       Urine Studies     Recent Labs   Lab Test 06/29/22  2028 06/23/22  1646 06/17/22  0746   URINEPH 7.0 6.0 6.0   NITRITE Negative Negative Negative   LEUKEST Negative Negative Negative   WBCU  --  4 1       Microbiology:  Fungal testing  Recent Labs   Lab Test 07/08/22  1938   FGTL <31   FGTLI Negative   ASPGAI 0.07   ASPGAA Negative       Last Culture results   Culture   Date Value Ref Range Status   07/10/2022 No growth after 12 hours  Preliminary   07/10/2022 No growth after 12 hours  Preliminary   07/09/2022 No growth after 1 day  Preliminary   07/09/2022 No growth after 1 day  Preliminary   07/08/2022 No growth after 2 days  Preliminary   07/08/2022 2+ Normal shani  Final   07/08/2022 No growth after 3 days  Preliminary   07/08/2022 No growth after 3 days  Preliminary   07/07/2022 No growth after 4 days  Preliminary   07/07/2022 No growth after 4 days  Preliminary   07/05/2022 No Growth  Final   07/05/2022 No Growth  Final   07/05/2022 No Growth  Final   06/29/2022 No Growth  Final   06/29/2022 No Growth  Final   06/23/2022 10,000-50,000 CFU/mL Mixture of urogenital shani  Final   06/23/2022 No Growth  Final   06/23/2022 No Growth  Final   06/17/2022 No Growth  Final         Last check of C difficile  C Difficile Toxin B by PCR   Date  Value Ref Range Status   07/01/2022 Positive (A) Negative Final     Comment:     Detection of C. difficile nucleic acid in stools confirms the presence of these organisms in diarrheal patients but may not indicate that C. difficile are the etiologic agents of the diarrhea. Results from the Xpert C. difficile assay should be interpreted in conjunction with other laboratory and clinical data available to the clinician.       Virology:  Coronavirus-19 testing    Recent Labs   Lab Test 06/16/22  1349   [id number] Negative       Respiratory virus (non-coronavirus-19) testing    Recent Labs   Lab Test 07/08/22  1220   IFLUA Not Detected   FLUAH1 Not Detected   [id number] Not Detected   FLUAH3 Not Detected   IFLUB Not Detected   PIV1 Not Detected   PIV2 Not Detected   PIV3 Not Detected   PIV4 Not Detected   RSVA Not Detected   RSVB Not Detected   HMPV Not Detected   RHINEV Not Detected   ADENOV Not Detected   BARTHOLOMEW Not Detected       Imaging:  No results found for this or any previous visit (from the past 48 hour(s)).       EXAMINATION: CT CHEST/ABDOMEN/PELVIS W CONTRAST, 7/7/2022 1:22 PM  INDICATION: persistent neutropenic fever  COMPARISON STUDY: Radiograph 6/29/2022. CT 6/15/2022.  FINDINGS:  Lines, tubes, devices: Left lower extremity PICC with tip terminating  at the superior cavoatrial junction.  CHEST: Lungs: Unchanged 4 mm solid pulmonary nodule of the left upper lobe  laterally (series 5 image 153). Remaining additional sub-4 mm solid  pulmonary nodules are stable compared to prior. Similar subtle diffuse  mosaic groundglass changes and presumed fibroatelectasis of the  posterior aspect of the right upper lobe. The central tracheobronchial  tree is patent. No areas of bronchiectasis or architectural  distortion. No pleural effusion, pulmonary consolidation, or  pneumothorax. Mild bibasilar atelectasis.   Mediastinum: The visualized thyroid is unremarkable.Heart size is  within normal limits. No pericardial effusion.  The thoracic aorta and  main pulmonary artery are within normal limits. Standard branching  pattern of the great vessels. No abnormal thoracic lymph nodes.  Moderate hiatal hernia, mild increased streakiness and thickening  along the intrathoracic stomach and distal esophagus.  ABDOMEN/PELVIS:  Liver: No mass. No intrahepatic biliary ductal dilation.  Similar  hypoattenuation along the falciform ligament likely focal fatty infiltration.     Biliary System: Decompressed appearance of the gallbladder. No  extrahepatic biliary ductal dilation.  Pancreas: No mass or pancreatic ductal dilation.   Adrenal glands: No mass or nodules   Spleen: Normal.  Kidneys: No suspicious mass, obstructing calculus or hydronephrosis.   Gastrointestinal tract : No evidence for infiltrate appendix. Normal  caliber small bowel.   Large bowel loop dilatation  Mesentery/peritoneum/retroperitoneum: There is hazy central mesenteric  attenuation with numerous subcentimeter lymph nodes in the central mesentery.    Lymph nodes: Numerous subcentimeter central mesenteric lymph nodes and  several enlarged lymph nodes for example a 1.2 cm short axis left  periaortic lymph node (series 7 image 333).  Vasculature: Patent major abdominal vasculature.  The major portal  vessels are patent.  Pelvis: Urinary bladder is normal.  Myomatous uterus. Ovaries within  normal limits.  Osseous structures: No aggressive or acute osseous lesion.  Unchanged  opposing endplate degenerative changes L4-L5.    Soft tissues: Small fat-containing umbilical hernia.        Impression    IMPRESSION:  1. Small to moderate hiatal hernia with mild thickening  and streaky  density along the intrathoracic stomach and distal esophagus,  likely  to represent inflammatory/infective etiology.  2. Enlarged upper retroperitoneal especially para-aortic para-aortic  lymph node and mesenteric lymph nodes, compared to prior CT 6/16/2022  concerning for infective/inflammatory etiology.  Consider attention on follow-up  3. Patchy streaky density in the posterior right upper lobe slightly  more conspicuous compared to prior CT, and diffuse mosaic attenuation,  may represent infection or inflammation.  4. Unchanged sub-6 mm pulmonary nodules, compared to prior CT 6/16/2022.

## 2022-07-11 NOTE — CONSULTS
Health Psychology                                                                                                                          Gaby Fuentes, Ph.D., L.P [phone]  Yoli Bermeo, Ph.D., L.P. [phone]  Netta Ross, Ph.D, L..P. [phone]  Elli Tinoco, Ph.D., L.P. [phone]  Kirill Arana, Ph.D., A.B.P.P., L.P. [phone]         Rafaela Abdi, Ph.D., L.P. [phone]       Romina Juárez, Ph.D., A.B.P.P., LP [phone]           [facility], 3rd Floor  [address] [place] [address]    Inpatient Health Psychology Consultation    Date of Service:  7/11/22    Consult received and chart reviewed.  Health Psychology to meet with patient tomorrow, 7/12/22.    Netta Ross, PhD, LP  Clinical Health Psychologist  Phone: [phone]  Pager: [phone]

## 2022-07-11 NOTE — PLAN OF CARE
"[id number]    BP 98/62 (BP Location: Right arm)   Pulse 96   Temp 100.4  F (38  C) (Oral)   Resp 18   Ht 1.702 m (5' 7\")   Wt 115 kg (253 lb 9.6 oz)   SpO2 98%   BMI 39.72 kg/m       Reason for admission: AML - Day 20 of 7+3 and Rydapt  Activity: UAL in room  Pain: Denies overnight  Neuro: A&Ox4, Neuros intact.   Cardiac: Intermittently tachycardic, T-max: 101.4  Respiratory: Room air, satting at 97%. Denies SOB.  GI/: C-diff positive. Loose stools x 2-3, reported by patient. Voiding without difficulty, not saving. Denies nausea.  Diet: Regular diet, poor appetite  Lines: R DL PICC HL  Wounds: Rash improving. Dime sized abscess on right shin open to air, covered with bacitracin. Hemorrhoid pain improving, using preparation H cream.   Labs/imaging: Reviewed. See chart.       New changes this shift: Slept between cares. T-max 101.5, administered one time dose of Celebrex to check effectiveness over Tylenol at 06:07. New temp at 06:55 was 100.4.     Plan: D21 bmbx scheduled for 7/12 at 11am.       Continue to follow POC   "

## 2022-07-11 NOTE — PROVIDER NOTIFICATION
7509 ADRIÁN Marinelli Pt with continued fevers, temp 100.7. Cefepime switched to zosyn today. Bcx released. Please advise if there are any changes needed to POC. Thanks, Nica [phone]    Spoke with provider, will continue to monitor and hold off on switching to vanco. Notify if hemodynamically unstable.

## 2022-07-11 NOTE — PLAN OF CARE
Goal Outcome Evaluation:  Afebrile this shift. Denied nausea. She does have some legs aches, but declined pain medications. Given red blood cells without problems. Potassium replaced and recheck ordered for 1630. Up independently.

## 2022-07-11 NOTE — PROGRESS NOTES
[facility]  Hematology / Oncology Progress Note    Date of Admission: 6/16/2022  Date of Service (when I saw the patient): 07/11/2022     Assessment & Plan   Veda Marinelli is a 37 year old female with PMH significant for h/o COVID19 infection (10/2021), C.diff, and hypothyroidism who presented with leukocytosis and circulating blasts. 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 She initiated induction chemotherapy with 7+3+midostaurin (C1D1=6/22/22). Hospital course complicated by neutropenic fever and C.difficile colitis.    Today:  - D20 of 7+3+midostaurin   - D21 BMBx scheduled for 7/12 @ 11 AM  - Awaiting BMT Consult to be scheduled (delayed due to their schedule/availability and change in pt's insurance coverage/approval by BMT Finance team)   - Continues to have persistent neutropenic fevers, T max 101.5F.   - Continue PO Fidaxomicin (x 7/8 PM), adjusted from PO Vanco. Check C diff today  - Changed to Zosyn on 7/10. Fevers continue but curve overall improving today. If patient continues to have high fevers or developed hemodynamic instability will consider addition of IV Vancomycin   - If ongoing fevers, despite above changes, would check CT sinus to round out imaging workup  - Follow-up aspergillus, fungitell, Fungal BCx, Fungal Abs, histo urine/serum -- pending  - R lower shin lesion, continues to remain stable. Dime size area of erythema, not warm. Small area of fluctuance underlying, non-tender. I&D not favored at this time given degree of neutropenia. No new lesions or rashes.   - Hemorrhoid -- pain stable, per previous documentation no e/o overt infection on 7/9 exam, CT CAP without focal findings/perirectal abscess (though she is neutropenic and may not be able to form a typical abscess)  - Health psych consult placed 7/10    HEME  # AML (FLT3 ITD(low), NPM1, IDH2)  Was in her usual state of health until 03/2022 when she underwent a routine physical with  labs. On 3/8/2022, white blood cell count 2.5 (ANC 1.0), Hgb 13.6 with , platelets normal.  On 5/15/2022, she was noted to have further decreasd white blood cell count of 1.6 (ANC 0.28) with hemoglobin 11. platelets were 133. She was ultimately referred to Hematology (Dr. Bob), who she saw on 6/7/22. Further blood workup was recommended and labs done 6/14. CBC revealed WBC 25.3 (ANC 0.8), Hgb 10.1 (), Plt 90K. On the differential and morphology review, 81% blasts were seen concerning for acute leukemia. She was advised to present to [facility] for further evaluation and management.   - Per verbal report from Heme Path fellow there was concern for Blanca rods. She was started on ATRA 6/16 PM-6/17 AM while awaiting PML-ANNELIESE testing. This was ultimately negative.   - s/p Hydrea (6/17-6/22) while awaiting final diagnostic studies  - 6/16 PB chromosomes: 46, XX. No abnormality.   - 6/16 PB FISH: no rearrangments of MLLT10, NUP98, or KMT2A.   - 6/17 BMBx: markedly hypercellular marrow (85-95% estimated cellularity), with markedly diminished trilineage hematopoiesis, no overt dysplasia in the evaluable elements, and 92% blasts  - FLT3 PCR: positive for FLT3-ITD(low) with allelic ratio 0.21 (corrected from previously reported value of 0.144 per staff message to Lamar Grover from molecular lab personnel)  - NGS panel: NPM1 positive, IDH2, FLT3-ITD  - HLA typing sent, BMT coordinators aware and working on arranging IP consultation (delayed due to insurance change/financial approval)  - receiving induction with 7+3 (cytarabine+daunorubicin PLUS midostaurin (D1= 6/22/22))  - D21 BMBx scheduled on 7/12 at 11 AM    # Pancytopenia secondary to chemotherapy and AML  - Transfuse to maintain Hgb >7, Plt >10K     ID  # Neutropenic Fever, recurrent  # Recurrent C.diff colitis  (1) Fevered to 101.6F on 6/23. Noted to have chills though no other focal symptoms. Afebrile thereafter.  - 6/16 baseline CT CAP without  evidence for infectious source   - 6/23: BCx NG, UA/UCx NGTD, CXR clear  - s/p IV Cefepime (x 6/23-6/27). De-escalated back to ppx Levaquin on 6/27.  (2) Recurrent fever on 6/29 PM. Persistent temps 6/29-6/30, then afebrile since 6/30 afternoon. BCx, UA, CXR negative. C.diff positive on 7/1  - restarted on Cefepime 2g IV q8hr (x 6/29 PM - 7/4 AM)  (3) Recurrent fever 100.4-->101 (7/5-7/6), restarted Cefepime (x 7/5). Given ongoing high fevers. Abx switched to Zosyn IV (7/10). Overall now fevers continue but curve overall improving today (7/11). Ongoing diarrhea which has plateaued in frequency and though still loose appear to be less watery. New sore throat and clear rhinorrhea without associated nasal/sinus congestion on 7/8, stable at this time. Right shin lesion stable. Lactic acids have not been elevated.   - 7/5 BCx NGTD, UCx NG  - 7/6 MRSA swab negative  - 7/7, 7/8, 7/9 BCx NGTD  - CT CAP (7/7): subtle diffuse mosaic groundglass changes and presumed fibroatelectasis in RUL. No pulm consolidation. Moderate hiatal hernia with mild thickening and streaky density along intrathoracic stomach and distal esophagus, enlarged upper RP LN and mesenteric lymph nodes (infectious vs. Inflammatory).   - 7/8: strep PCR negative, RVP negative  - 7/8: aspergillus, galactomannan, fungal BCx, fungal Abs, urine/serum histo -- pending   - if ongoing fevers,will consider CT Sinus to complete imaging workup  - given persistent fevers despite Tylenol, could try dose of Celebrex if fevers not responding to Tylenol.   - low threshold to add IV Vanco or increase antifungal coverage     **Antibiotics:  - s/p Cefepime (6/23-6/27, 6/29-7/4), (x 7/5 - 7/10). Changing to Zosyn 4.5g IV q6hr (x 7/10) given persistent fevers  - s/p PO Vancomycin 125mg QID (x 7/1-7/8). Changed to fidaxomicin 200mg PO BID (x7/8 PM) given ongoing fevers to presumably better cover C.diff in its first recurrence.    # ID PPx  - ACV 400mg BID  - holding Levaquin ppx  in setting of b/s abx  - Micafungin 50mg IV daily. Given that she will be on midostaurin, will keep on Micafungin to limit potential interactions with posaconazole/voriconazole. Would plan to transition as soon as pt has completed midostaurin or earlier pending fevers/fungal infection workup as above  - Prior Auth approved for both posaconazole and voriconazole. However, as of  6/29, pt has not met deductible (but should meet it once hospital stay is billed). At present, monthly cost for Posaconazole $2048.66, Voriconazole $176.41.     # h/o COVID-19  Pt is not vaccinated nor interested in being vaccinated. She was admitted from 10/1/2021 - 10/13/2021 for acute hypoxic respiratory failure from COVID-19 pneumonia.  Required IMC, high flow and intermittent CPAP. She was treated with dexamethasone, remdesivir, and baricitinib in addition to azithromycin and ceftriaxone. Recovered symptomatically from this.    GI  # H/o C.diff (10/2021)  # Hemorrhoid  # 1st recurrence of C.diff (7/1)  # Diarrhea  H/o C.diff (10/20/21) and course was complicated by abd pain/cramping and prolonged recovery of stool consistency as well as hemorrhoid which does wax/wane and is sometimes painful. She primarily has used witch hazel pads when symptomatic. Pt reported normal bowels at time of AML diagnosis. Developed intermittent loose stools throughout admission, then increased frequency of loose/watery stools overnight 6/30-7/1. Rechecked C.diff (7/1), positive.   - 6/16 CT A/P negative for perirectal abscess.  - TUCKS PRN, add Prep H (7/4), dilt cream (7/6)  - Avoid PTA probiotic at this time.  - baseline 6/19 C. Diff negative so we deferred prophylactic PO Vancomycin during chemo course  - recurrent/increased loose stools reported on 7/1, checked C.diff and this was positive. S/p PO Vanco --> now on fidaxomicin as above..  - Stools are still loose, but less watery. We order C diff today to assess efficacy of treatments above as patient may  continue to have diarrhea due to oral chemotherapy pill    # Mucositis, improved  On ~6/27, pt noting inflammatory changes in b/l buccal mucosa and small lesion on right inner lower lip. Not painful or affecting PO intake. Since improved and pt has denied mouth pain/sores recently, eating/drinking well. Now with intermittent blood blisters but no mouth pain/sores.  - MMW PRN, viscous lidocaine PRN    # GERD  - TUMS PRN  - PPI daily     # Distal esophageal discomfort/dysphagia - improved  # Hiatal hernia (seen on 7/7 CT imaging)  On 6/29, patient reported new onset of epigastric discomfort when swallowing solid foods, not with liquids. No radiation to back or other areas of abdomen, no RUQ tenderness. Denies reflux, nausea or emesis. Already on PPI as above. No e/o oral candidiasis, on micafungin ppx. Question relation to GI mucosal changes? Nearly resolved on 7/4. Now with intermittent exacerbations but not nearly what it was previously.   - Trial Carafate before meals and at nighttime --> given improvement, backed off to PRN (x 7/4)  - simethicone PRN, Maalox PRN, GI Cocktail PRN  - Continue to monitor, could consider barium swallow study if persistent    ENT  # Sore throat, intermittent  # Clear rhinorrhea  Pt reported right-sided distal throat soreness on 7/8. Mild posterior OP erythema, no exudates or thrush noted on exam. Negative infectious workup as above, potentially due to mucositis changes. Suspect rhinorrhea may be due to loss of protective nasal cilia.   - 7/8: strep PCR negative, RVP negative  - PRN cepacol, hurricane spray  - could consider swallowing MMW, could also consider Nystatin swallow (though no e/o thrush in OP at this time)    CV  # Irregular rhythm  Pt having occasionally PVCs, noting more in the last couple days of ongoing fevers and more irregular on 7/8 exam. Improved on 7/10 exam.  - EKG (7/8) demonstrated sinus rhythm with occasional PVCs. QTc 450.      GYN  # Vaginal bleeding,  resolved  Menstrual cycle began inpatient overnight 6/30-7/1. Usually heaviest flow is first ~3 days. Reviewed neutropenic precautions and advised not to use tampons at this time. Resolved as of 7/7.  - back down to Plt parameter 10K  - s/p Provera 10mg daily (7/1-7/6)    ENDO  # Hypothyroidism  PTA was on Synthroid 125mcg daily. TSH 0.04, T4 Free 1.48 (done 6/24). Per pt, her PCP reviewed and advised her to reduce her Synthroid to 100mcg daily. Ordered to start on 6/29.     DERM  # Bilateral ear erythema, pruritis - resolved  On 6/29, pt reported bilateral ear lobe erythema and irritation in ear canal. Believed to be related to cytarabine. S/p triamcinolone cream. Resolved early week of 7/4.      # Truncal rash - improving   Noted rash to upper abdomen which progressed to upper torso and back throughout morning of 7/6. Not itchy or bothersome to pt. Suspect possible cytarabine rash. Pt has been on/off Cefepime and previously tolerated but rash to abx is also in differential. Can try triamcinolone cream to rash sites. Improving/evolving and now more petechial in nature.     # Lesion to R shin - stable   Noted on 7/6; outlined, monitor for worsening cellulitis. On 7/7, slightly improved in degree of erythema and slightly retracted from drawn border, less tender as well. However, there is a persistent area of induration/fluctuance. Stable 7/8-7/10. No new lesions per pt.  - MRSA swab negative as above. However, if persistent fevers or HD unstable, low threshold to add IV Vanco.    - if appears to be worsening, or any new lesions, would consult Derm for bx  - trial bacitracin oint BID    FEN  - Regular diet as tolerated  - lyte repletion per unit protocol  - regular diet     PPx  - VTE: none due to thrombocytopenia  - GI: PPI     Dispo:  Anticipate 4+ week LOS for management of acute leukemia. Presented under Dr. Won Perkins.     Plan of care discussed with Dr. Scarlett MATAMOROS spent 50 minutes in the care of this patient  "today, which included time necessary for review of interval events, obtaining history and physical exam, adjusting medication orders, discussion with interdisciplinary/consult team(s), and documentation time. Over 50% of time was spent face-to-face and/or coordinating care.     Plan of care discussed with Dr. Scarlett Arteaga PA-C  Hematology/Oncology   pager: [phone]      Interval History   Ongoing, persistent fevers. Tmax 101.5F.  Overall feeling a little better today. Still having fevers but hopeful that they are improving. Up in chair today.  Report her frequency of stools have remained the same, roughy 5-6 times a day. Though they are still loose, patient feels they are much less watery and \"c-diff-y\".  Denies associated abd pain or cramping.   Denies HA, vision changes, or sinus pressure. Denies mouth pain/sores. Denies SOB or cough. Denies dysuria.   Lesion to right shin remains stable per patient. Hemorrhoid discomfort is stable.   Despite everything, she is still working remotely for a few hours each day.     Physical Exam   Temp: 98.2  F (36.8  C) Temp src: Oral BP: 122/82 Pulse: 88   Resp: 18 SpO2: 99 % O2 Device: None (Room air)    Vitals:    07/08/22 1026 07/09/22 0810 07/11/22 0738   Weight: 115.5 kg (254 lb 9.6 oz) 115 kg (253 lb 9.6 oz) 114.1 kg (251 lb 9.6 oz)     Vital Signs with Ranges  Temp:  [97.9  F (36.6  C)-102.4  F (39.1  C)] 98.2  F (36.8  C)  Pulse:  [] 88  Resp:  [16-18] 18  BP: ()/(57-83) 122/82  SpO2:  [94 %-100 %] 99 %  I/O last 3 completed shifts:  In: 820 [P.O.:720; I.V.:100]  Out: -      Constitutional: Pleasant female sitting up in chair. Awake and conversational. Non-toxic appearing. No acute distress.  HEENT: NC/AT. Wearing head scarf. Sclerae anicteric. Moist mucus membranes.   Respiratory: Breathing comfortable with no increased work on room air. Lungs CTA b/l. No wheezing or crackles.  Cardiovascular: RRR, no skipped beats today. No murmur appreciated. " No peripheral edema.    GI: Normal BS. Abdomen is soft, non-distended, non-tender.   : External hemorrhoid without thrombosed appearance, no surrounding erythema or induration on very superficial rectal exam (7/9)  Skin: Skin is clean, dry, intact. Faint pink papular rash to trunk has further faded with evolving petechial appearance at this time (7/9). ~Dime size erythematous (less purple) lesion with central dried pustular-like appearance and underlying induration/fluctuance to right lower shin; outlined and remains slightly retracted within borders but stable from 7/7. Nontender. No warmth  Musculoskeletal/ Extremities: Extremities grossly normal in appearance.  Neurologic: Alert and oriented. Speech normal. Grossly nonfocal.   VAD: PICC line in E, c/d/i. She has new ecchymoses proximal to PICC site (noted 7/8), stable today.         Medications     - MEDICATION INSTRUCTIONS -         acyclovir  400 mg Oral BID     bacitracin   Topical BID     diltiazem 2% in PLO gel  0.25-0.5 g Topical TID     fidaxomicin  200 mg Oral BID     levothyroxine  100 mcg Oral QAM AC     micafungin  50 mg Intravenous Q24H     midostaurin  50 mg Oral Q12H     multivitamin w/minerals  1 tablet Oral Daily     pantoprazole  40 mg Oral QAM AC     piperacillin-tazobactam  4.5 g Intravenous Q6H     pramox-pe-glycerin-petrolatum   Rectal TID     triamcinolone   Topical BID     vitamin C  1,000 mg Oral Daily     vitamin D3  50 mcg Oral Daily       Data   Results for orders placed or performed during the hospital encounter of 06/16/22 (from the past 24 hour(s))   Blood Culture Arm, Right    Specimen: Arm, Right; Blood   Result Value Ref Range    Culture No growth after 12 hours    Blood Culture Red Lumen    Specimen: Red Lumen; Blood   Result Value Ref Range    Culture No growth after 12 hours    CBC with platelets differential    Narrative    The following orders were created for panel order CBC with platelets differential.  Procedure                                Abnormality         Status                     ---------                               -----------         ------                     CBC with platelets and d...[[id number]]  Abnormal            Preliminary result         Manual Differential[[id number]]          Abnormal            Preliminary result           Please view results for these tests on the individual orders.   Basic metabolic panel   Result Value Ref Range    Creatinine 0.51 0.51 - 0.95 mg/dL    Sodium 138 136 - 145 mmol/L    Potassium 3.4 3.4 - 5.3 mmol/L    Urea Nitrogen 5.1 (L) 6.0 - 20.0 mg/dL    Chloride 104 98 - 107 mmol/L    Carbon Dioxide (CO2) 20 (L) 22 - 29 mmol/L    Anion Gap 14 7 - 15 mmol/L    Glucose 100 (H) 70 - 99 mg/dL    GFR Estimate >90 >60 mL/min/1.73m2    Calcium 8.8 8.6 - 10.0 mg/dL   Hepatic panel   Result Value Ref Range    Protein Total 6.7 6.4 - 8.3 g/dL    Albumin 3.3 (L) 3.5 - 5.2 g/dL    Bilirubin Total 0.5 <=1.2 mg/dL    Alkaline Phosphatase 52 35 - 104 U/L    AST 14 10 - 35 U/L    ALT 16 10 - 35 U/L    Bilirubin Direct <0.20 0.00 - 0.30 mg/dL   Magnesium   Result Value Ref Range    Magnesium 2.1 1.7 - 2.3 mg/dL   Phosphorus   Result Value Ref Range    Phosphorus 2.6 2.5 - 4.5 mg/dL   CBC with platelets and differential   Result Value Ref Range    WBC Count 0.8 (LL) 4.0 - 11.0 10e3/uL    RBC Count 2.21 (L) 3.80 - 5.20 10e6/uL    Hemoglobin 7.0 (L) 11.7 - 15.7 g/dL    Hematocrit 20.5 (L) 35.0 - 47.0 %    MCV 93 78 - 100 fL    MCH 31.7 26.5 - 33.0 pg    MCHC 34.1 31.5 - 36.5 g/dL    RDW 13.7 10.0 - 15.0 %    Platelet Count 38 (LL) 150 - 450 10e3/uL    NRBCs per 100 WBC 0 <1 /100    Absolute NRBCs 0.0 10e3/uL   Manual Differential   Result Value Ref Range    % Neutrophils 4 %    % Lymphocytes 72 %    % Monocytes 7 %    % Eosinophils 0 %    % Basophils 0 %    % Metamyelocytes 1 %    % Myelocytes 4 %    % Other Cells 12 %    NRBCs per 100 WBC 1 (H) <=0 %    Absolute Neutrophils 0.0 (LL) 1.6 - 8.3 10e3/uL     Absolute Lymphocytes 0.6 (L) 0.8 - 5.3 10e3/uL    Absolute Monocytes 0.1 0.0 - 1.3 10e3/uL    Absolute Eosinophils 0.0 0.0 - 0.7 10e3/uL    Absolute Basophils 0.0 0.0 - 0.2 10e3/uL    Absolute Metamyelocytes 0.0 <=0.0 10e3/uL    Absolute Myelocytes 0.0 <=0.0 10e3/uL    Absolute Other Cells 0.1 (H) <=0.0 10e3/uL    Absolute NRBCs 0.0 <=0.0 10e3/uL    RBC Morphology Confirmed RBC Indices     Platelet Assessment  Automated Count Confirmed. Platelet morphology is normal.     Automated Count Confirmed. Platelet morphology is normal.    Pathologist Review Comments      Narrative    Sent for Review by Pathologist. Review comments will be entered. Results will be updated after review as applicable.     ABO/Rh type and screen    Narrative    The following orders were created for panel order ABO/Rh type and screen.  Procedure                               Abnormality         Status                     ---------                               -----------         ------                     Adult Type and Screen[[id number]]                            Final result                 Please view results for these tests on the individual orders.   Adult Type and Screen   Result Value Ref Range    ABO/RH(D) A NEG     Antibody Screen Negative Negative    SPECIMEN EXPIRATION DATE [id number]    CONDITIONAL Prepare red blood cells (unit)   Result Value Ref Range    CROSSMATCH Compatible     UNIT ABO/RH A Neg     Unit Number [id number]     Unit Status Issued     Blood Component Type Red Blood Cells     Product Code [id number]     CODING SYSTEM [id number]     UNIT TYPE ISBT 0600     ISSUE DATE AND TIME [id number]

## 2022-07-11 NOTE — PROGRESS NOTES
Pt is having a bmbx tomorrow and plates are at 38,  do you want platelets given to get to 50? Thanks So [phone]    No new orders

## 2022-07-12 ENCOUNTER — APPOINTMENT (OUTPATIENT)
Dept: ULTRASOUND IMAGING | Facility: CLINIC | Age: 37
DRG: 834 | End: 2022-07-12
Attending: STUDENT IN AN ORGANIZED HEALTH CARE EDUCATION/TRAINING PROGRAM
Payer: COMMERCIAL

## 2022-07-12 LAB
ANION GAP SERPL CALCULATED.3IONS-SCNC: 14 MMOL/L (ref 7–15)
BACTERIA BLD CULT: NO GROWTH
BACTERIA BLD CULT: NO GROWTH
BASOPHILS # BLD MANUAL: 0 10E3/UL (ref 0–0.2)
BASOPHILS NFR BLD MANUAL: 0 %
BLASTS # BLD MANUAL: 0.1 10E3/UL
BLASTS NFR BLD MANUAL: 7 %
BLD PROD TYP BPU: NORMAL
BLOOD COMPONENT TYPE: NORMAL
BUN SERPL-MCNC: 5.3 MG/DL (ref 6–20)
CALCIUM SERPL-MCNC: 8.9 MG/DL (ref 8.6–10)
CHLORIDE SERPL-SCNC: 105 MMOL/L (ref 98–107)
CODING SYSTEM: NORMAL
CREAT SERPL-MCNC: 0.49 MG/DL (ref 0.51–0.95)
DEPRECATED HCO3 PLAS-SCNC: 21 MMOL/L (ref 22–29)
EOSINOPHIL # BLD MANUAL: 0 10E3/UL (ref 0–0.7)
EOSINOPHIL NFR BLD MANUAL: 0 %
ERYTHROCYTE [DISTWIDTH] IN BLOOD BY AUTOMATED COUNT: 13.8 % (ref 10–15)
ERYTHROCYTE [DISTWIDTH] IN BLOOD BY AUTOMATED COUNT: 13.8 % (ref 10–15)
GFR SERPL CREATININE-BSD FRML MDRD: >90 ML/MIN/1.73M2
GLUCOSE SERPL-MCNC: 97 MG/DL (ref 70–99)
H PYLORI AG STL QL IA: NEGATIVE
HCT VFR BLD AUTO: 22.3 % (ref 35–47)
HCT VFR BLD AUTO: 23.1 % (ref 35–47)
HGB BLD-MCNC: 7.6 G/DL (ref 11.7–15.7)
HGB BLD-MCNC: 7.8 G/DL (ref 11.7–15.7)
INTERPRETATION: NORMAL
ISSUE DATE AND TIME: NORMAL
LAB DIRECTOR COMMENTS: NORMAL
LAB DIRECTOR DISCLAIMER: NORMAL
LAB DIRECTOR INTERPRETATION: NORMAL
LAB DIRECTOR METHODOLOGY: NORMAL
LAB DIRECTOR RESULTS: NORMAL
LACTATE SERPL-SCNC: 0.6 MMOL/L (ref 0.7–2)
LYMPHOCYTES # BLD MANUAL: 0.8 10E3/UL (ref 0.8–5.3)
LYMPHOCYTES NFR BLD MANUAL: 51 %
MCH RBC QN AUTO: 31.1 PG (ref 26.5–33)
MCH RBC QN AUTO: 31.8 PG (ref 26.5–33)
MCHC RBC AUTO-ENTMCNC: 33.8 G/DL (ref 31.5–36.5)
MCHC RBC AUTO-ENTMCNC: 34.1 G/DL (ref 31.5–36.5)
MCV RBC AUTO: 92 FL (ref 78–100)
MCV RBC AUTO: 93 FL (ref 78–100)
METAMYELOCYTES # BLD MANUAL: 0.1 10E3/UL
METAMYELOCYTES NFR BLD MANUAL: 9 %
MONOCYTES # BLD MANUAL: 0.3 10E3/UL (ref 0–1.3)
MONOCYTES NFR BLD MANUAL: 19 %
MYELOCYTES # BLD MANUAL: 0 10E3/UL
MYELOCYTES NFR BLD MANUAL: 3 %
NEUTROPHILS # BLD MANUAL: 0.2 10E3/UL (ref 1.6–8.3)
NEUTROPHILS NFR BLD MANUAL: 11 %
PLAT MORPH BLD: ABNORMAL
PLATELET # BLD AUTO: 116 10E3/UL (ref 150–450)
PLATELET # BLD AUTO: 86 10E3/UL (ref 150–450)
POTASSIUM SERPL-SCNC: 3.6 MMOL/L (ref 3.4–5.3)
RADIOLOGIST FLAGS: ABNORMAL
RBC # BLD AUTO: 2.39 10E6/UL (ref 3.8–5.2)
RBC # BLD AUTO: 2.51 10E6/UL (ref 3.8–5.2)
RBC MORPH BLD: ABNORMAL
SARS-COV-2 RNA RESP QL NAA+PROBE: NEGATIVE
SIGNIFICANT RESULTS: NORMAL
SODIUM SERPL-SCNC: 140 MMOL/L (ref 136–145)
SPECIMEN DESCRIPTION: NORMAL
SPECIMEN DESCRIPTION: NORMAL
TEST DETAILS, MDL: NORMAL
UNIT ABO/RH: NORMAL
UNIT NUMBER: NORMAL
UNIT STATUS: NORMAL
UNIT TYPE ISBT: 2800
WBC # BLD AUTO: 1.6 10E3/UL (ref 4–11)
WBC # BLD AUTO: 2.5 10E3/UL (ref 4–11)
ZINC SERPL-MCNC: 69.2 UG/DL

## 2022-07-12 PROCEDURE — 88311 DECALCIFY TISSUE: CPT | Mod: TC | Performed by: PHYSICIAN ASSISTANT

## 2022-07-12 PROCEDURE — 36415 COLL VENOUS BLD VENIPUNCTURE: CPT | Performed by: PHYSICIAN ASSISTANT

## 2022-07-12 PROCEDURE — 36592 COLLECT BLOOD FROM PICC: CPT | Performed by: PHYSICIAN ASSISTANT

## 2022-07-12 PROCEDURE — P9037 PLATE PHERES LEUKOREDU IRRAD: HCPCS | Performed by: PEDIATRICS

## 2022-07-12 PROCEDURE — 81450 HL NEO GSAP 5-50DNA/DNA&RNA: CPT | Performed by: PHYSICIAN ASSISTANT

## 2022-07-12 PROCEDURE — 80048 BASIC METABOLIC PNL TOTAL CA: CPT | Performed by: PHYSICIAN ASSISTANT

## 2022-07-12 PROCEDURE — 85027 COMPLETE CBC AUTOMATED: CPT | Performed by: PHYSICIAN ASSISTANT

## 2022-07-12 PROCEDURE — 93971 EXTREMITY STUDY: CPT | Mod: 26 | Performed by: RADIOLOGY

## 2022-07-12 PROCEDURE — 120N000002 HC R&B MED SURG/OB UMMC

## 2022-07-12 PROCEDURE — 250N000012 HC RX MED GY IP 250 OP 636 PS 637: Performed by: STUDENT IN AN ORGANIZED HEALTH CARE EDUCATION/TRAINING PROGRAM

## 2022-07-12 PROCEDURE — 99232 SBSQ HOSP IP/OBS MODERATE 35: CPT | Performed by: STUDENT IN AN ORGANIZED HEALTH CARE EDUCATION/TRAINING PROGRAM

## 2022-07-12 PROCEDURE — 250N000011 HC RX IP 250 OP 636: Performed by: PHYSICIAN ASSISTANT

## 2022-07-12 PROCEDURE — 85027 COMPLETE CBC AUTOMATED: CPT | Performed by: STUDENT IN AN ORGANIZED HEALTH CARE EDUCATION/TRAINING PROGRAM

## 2022-07-12 PROCEDURE — 88271 CYTOGENETICS DNA PROBE: CPT | Performed by: PHYSICIAN ASSISTANT

## 2022-07-12 PROCEDURE — 88184 FLOWCYTOMETRY/ TC 1 MARKER: CPT | Performed by: PHYSICIAN ASSISTANT

## 2022-07-12 PROCEDURE — 250N000013 HC RX MED GY IP 250 OP 250 PS 637: Performed by: PHYSICIAN ASSISTANT

## 2022-07-12 PROCEDURE — 88264 CHROMOSOME ANALYSIS 20-25: CPT | Performed by: PHYSICIAN ASSISTANT

## 2022-07-12 PROCEDURE — 87206 SMEAR FLUORESCENT/ACID STAI: CPT | Performed by: DERMATOLOGY

## 2022-07-12 PROCEDURE — 83605 ASSAY OF LACTIC ACID: CPT | Performed by: STUDENT IN AN ORGANIZED HEALTH CARE EDUCATION/TRAINING PROGRAM

## 2022-07-12 PROCEDURE — U0003 INFECTIOUS AGENT DETECTION BY NUCLEIC ACID (DNA OR RNA); SEVERE ACUTE RESPIRATORY SYNDROME CORONAVIRUS 2 (SARS-COV-2) (CORONAVIRUS DISEASE [COVID-19]), AMPLIFIED PROBE TECHNIQUE, MAKING USE OF HIGH THROUGHPUT TECHNOLOGIES AS DESCRIBED BY CMS-2020-01-R: HCPCS | Performed by: PHYSICIAN ASSISTANT

## 2022-07-12 PROCEDURE — 250N000013 HC RX MED GY IP 250 OP 250 PS 637: Performed by: STUDENT IN AN ORGANIZED HEALTH CARE EDUCATION/TRAINING PROGRAM

## 2022-07-12 PROCEDURE — 250N000011 HC RX IP 250 OP 636: Performed by: STUDENT IN AN ORGANIZED HEALTH CARE EDUCATION/TRAINING PROGRAM

## 2022-07-12 PROCEDURE — 88184 FLOWCYTOMETRY/ TC 1 MARKER: CPT | Performed by: STUDENT IN AN ORGANIZED HEALTH CARE EDUCATION/TRAINING PROGRAM

## 2022-07-12 PROCEDURE — 93971 EXTREMITY STUDY: CPT | Mod: LT

## 2022-07-12 PROCEDURE — 88341 IMHCHEM/IMCYTCHM EA ADD ANTB: CPT | Mod: TC | Performed by: DERMATOLOGY

## 2022-07-12 PROCEDURE — 11105 PUNCH BX SKIN EA SEP/ADDL: CPT | Mod: GC | Performed by: DERMATOLOGY

## 2022-07-12 PROCEDURE — 85007 BL SMEAR W/DIFF WBC COUNT: CPT | Performed by: PHYSICIAN ASSISTANT

## 2022-07-12 PROCEDURE — 99233 SBSQ HOSP IP/OBS HIGH 50: CPT | Mod: 25 | Performed by: STUDENT IN AN ORGANIZED HEALTH CARE EDUCATION/TRAINING PROGRAM

## 2022-07-12 PROCEDURE — 36592 COLLECT BLOOD FROM PICC: CPT | Performed by: STUDENT IN AN ORGANIZED HEALTH CARE EDUCATION/TRAINING PROGRAM

## 2022-07-12 PROCEDURE — 99233 SBSQ HOSP IP/OBS HIGH 50: CPT | Performed by: INTERNAL MEDICINE

## 2022-07-12 PROCEDURE — 07DR3ZX EXTRACTION OF ILIAC BONE MARROW, PERCUTANEOUS APPROACH, DIAGNOSTIC: ICD-10-PCS | Performed by: PHYSICIAN ASSISTANT

## 2022-07-12 PROCEDURE — 88185 FLOWCYTOMETRY/TC ADD-ON: CPT | Performed by: PHYSICIAN ASSISTANT

## 2022-07-12 PROCEDURE — 88189 FLOWCYTOMETRY/READ 16 & >: CPT | Performed by: STUDENT IN AN ORGANIZED HEALTH CARE EDUCATION/TRAINING PROGRAM

## 2022-07-12 PROCEDURE — 88237 TISSUE CULTURE BONE MARROW: CPT | Performed by: PHYSICIAN ASSISTANT

## 2022-07-12 PROCEDURE — 38222 DX BONE MARROW BX & ASPIR: CPT | Performed by: PHYSICIAN ASSISTANT

## 2022-07-12 PROCEDURE — 258N000003 HC RX IP 258 OP 636: Performed by: PHYSICIAN ASSISTANT

## 2022-07-12 PROCEDURE — 87040 BLOOD CULTURE FOR BACTERIA: CPT | Performed by: PHYSICIAN ASSISTANT

## 2022-07-12 PROCEDURE — 11104 PUNCH BX SKIN SINGLE LESION: CPT | Mod: GC | Performed by: DERMATOLOGY

## 2022-07-12 PROCEDURE — 87205 SMEAR GRAM STAIN: CPT | Performed by: DERMATOLOGY

## 2022-07-12 RX ORDER — OXYCODONE HYDROCHLORIDE 5 MG/1
5 TABLET ORAL ONCE
Status: COMPLETED | OUTPATIENT
Start: 2022-07-12 | End: 2022-07-12

## 2022-07-12 RX ORDER — TRIAMCINOLONE ACETONIDE 1 MG/G
CREAM TOPICAL 2 TIMES DAILY PRN
Status: DISCONTINUED | OUTPATIENT
Start: 2022-07-12 | End: 2022-07-15 | Stop reason: HOSPADM

## 2022-07-12 RX ORDER — HEPARIN SODIUM 10000 [USP'U]/100ML
0-5000 INJECTION, SOLUTION INTRAVENOUS CONTINUOUS
Status: DISCONTINUED | OUTPATIENT
Start: 2022-07-12 | End: 2022-07-13

## 2022-07-12 RX ADMIN — PIPERACILLIN AND TAZOBACTAM 4.5 G: 4; .5 INJECTION, POWDER, FOR SOLUTION INTRAVENOUS at 17:23

## 2022-07-12 RX ADMIN — RYDAPT 50 MG: 25 CAPSULE, LIQUID FILLED ORAL at 09:18

## 2022-07-12 RX ADMIN — PANTOPRAZOLE SODIUM 40 MG: 40 TABLET, DELAYED RELEASE ORAL at 05:18

## 2022-07-12 RX ADMIN — FIDAXOMICIN 200 MG: 200 TABLET, FILM COATED ORAL at 09:15

## 2022-07-12 RX ADMIN — MIDAZOLAM 3 MG: 1 INJECTION INTRAMUSCULAR; INTRAVENOUS at 11:01

## 2022-07-12 RX ADMIN — OXYCODONE HYDROCHLORIDE AND ACETAMINOPHEN 1000 MG: 500 TABLET ORAL at 09:15

## 2022-07-12 RX ADMIN — FIDAXOMICIN 200 MG: 200 TABLET, FILM COATED ORAL at 19:43

## 2022-07-12 RX ADMIN — ACETAMINOPHEN 650 MG: 325 TABLET, FILM COATED ORAL at 02:10

## 2022-07-12 RX ADMIN — SODIUM CHLORIDE, PRESERVATIVE FREE 5 ML: 5 INJECTION INTRAVENOUS at 13:19

## 2022-07-12 RX ADMIN — ACYCLOVIR 400 MG: 400 TABLET ORAL at 19:43

## 2022-07-12 RX ADMIN — MICAFUNGIN 50 MG: 10 INJECTION, POWDER, LYOPHILIZED, FOR SOLUTION INTRAVENOUS at 16:07

## 2022-07-12 RX ADMIN — HEPARIN SODIUM AND DEXTROSE 1800 UNITS/HR: 10000; 5 INJECTION INTRAVENOUS at 22:45

## 2022-07-12 RX ADMIN — OXYCODONE HYDROCHLORIDE 5 MG: 5 TABLET ORAL at 10:11

## 2022-07-12 RX ADMIN — ACYCLOVIR 400 MG: 400 TABLET ORAL at 09:16

## 2022-07-12 RX ADMIN — Medication 1 TABLET: at 09:16

## 2022-07-12 RX ADMIN — PIPERACILLIN AND TAZOBACTAM 4.5 G: 4; .5 INJECTION, POWDER, FOR SOLUTION INTRAVENOUS at 05:15

## 2022-07-12 RX ADMIN — PIPERACILLIN AND TAZOBACTAM 4.5 G: 4; .5 INJECTION, POWDER, FOR SOLUTION INTRAVENOUS at 22:19

## 2022-07-12 RX ADMIN — BACITRACIN: 500 OINTMENT TOPICAL at 09:20

## 2022-07-12 RX ADMIN — ACETAMINOPHEN 650 MG: 325 TABLET, FILM COATED ORAL at 19:48

## 2022-07-12 RX ADMIN — DIPHENHYDRAMINE HYDROCHLORIDE 50 MG: 25 CAPSULE ORAL at 04:53

## 2022-07-12 RX ADMIN — RYDAPT 50 MG: 25 CAPSULE, LIQUID FILLED ORAL at 19:44

## 2022-07-12 RX ADMIN — Medication 50 MCG: at 09:16

## 2022-07-12 RX ADMIN — LEVOTHYROXINE SODIUM 100 MCG: 100 TABLET ORAL at 05:18

## 2022-07-12 RX ADMIN — SODIUM CHLORIDE, PRESERVATIVE FREE 5 ML: 5 INJECTION INTRAVENOUS at 07:42

## 2022-07-12 RX ADMIN — PIPERACILLIN AND TAZOBACTAM 4.5 G: 4; .5 INJECTION, POWDER, FOR SOLUTION INTRAVENOUS at 12:20

## 2022-07-12 ASSESSMENT — ACTIVITIES OF DAILY LIVING (ADL)
ADLS_ACUITY_SCORE: 18

## 2022-07-12 NOTE — PROVIDER NOTIFICATION
Paged Dr Byers Her @ [phone]    Pt has a warm, red spot on L forearm it's tender, she is not on anticoagulants and it's below PICC line. Do you want to come look at it? Thanks So [phone]    Sandee HANSEN on return call pt's temp 100.6, released blcx. Will come up and see pt about her arm,

## 2022-07-12 NOTE — PLAN OF CARE
"Goal Outcome Evaluation:    Plan of Care Reviewed With: patient     Overall Patient Progress: improving       Time 8820-2240    /80 (BP Location: Right arm)   Pulse 86   Temp 97.7  F (36.5  C) (Oral)   Resp 16   Ht 1.702 m (5' 7\")   Wt 114.5 kg (252 lb 6.4 oz)   SpO2 99%   BMI 39.53 kg/m      Reason for admission: D21 of 7+3+midostaurin   Activity: Independent, up ad qasim  Pain: Denies  Neuro: A&Ox4  Cardiac: WDL, HR up to 105  Respiratory: WDL on RA, denies SOB  GI/: Loose stools reported by pt. Voiding not saving.  Diet: Regular  Lines: PICC  Wounds: Bx site on shin covered with band aid. BMBx moderate drainage. Using preparation H cream for hemorrhoid.  Labs/imaging: Reviewed. Lactic acid pending.      New changes this shift: Tmax 100.6, blood cultures ordered and Tylenol given x1. Warm, red spot on L forearm that is tender. Provider notified and came to see pt. Ultrasound done and found DVT. Heparin drip started.      Plan: Anticipate 4+ week LOS for management of acute leukemia.      Continue to monitor and follow POC     "

## 2022-07-12 NOTE — PROGRESS NOTES
[facility]  Hematology / Oncology Progress Note    Date of Admission: 6/16/2022  Date of Service (when I saw the patient): 07/12/2022     Assessment & Plan   Veda Marinelli is a 37 year old female with PMH significant for h/o COVID19 infection (10/2021), C.diff, and hypothyroidism who presented with leukocytosis and circulating blasts. 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 She initiated induction chemotherapy with 7+3+midostaurin (C1D1=6/22/22). Hospital course complicated by neutropenic fever and C.difficile colitis.    Today:  - D21 of 7+3+midostaurin   - D21 BMBx completed 7/12. Tolerated well.   - Awaiting BMT Consult to be scheduled (delayed due to their schedule/availability and change in pt's insurance coverage/approval by BMT Finance team)   - Afebrile for 24 hours. T max 100.2F.   - Continue PO Fidaxomicin (x 7/8 PM), adjusted from PO Vanco. Recheck C diff is negative from 7/11. Will consider change fidaxomicin to po vanco per ID recs  - Changed to Zosyn on 7/10. Fevers significantly improving.   - Follow-up aspergillus (neg), fungitell (neg), Fungal BCx (NGTD), Fungal Abs (pending), histo urine/serum (neg)   - R lower shin lesion, continues to remain stable. Appears to have formed more of a pustule in the center. Dime size area of erythema, not warm, non-tender. I&D not favored at this time given degree of neutropenia. No new lesions or rashes.   - Hemorrhoid -- pain stable, per previous documentation no e/o overt infection on 7/9 exam, CT CAP without focal findings/perirectal abscess (though she is neutropenic and may not be able to form a typical abscess)  - pending stool Helicobacter pylori, Quantiferon, AFB blood culture, Karius assay  - Health psych consult placed 7/10    HEME  # AML (FLT3 ITD(low), NPM1, IDH2)  Was in her usual state of health until 03/2022 when she underwent a routine physical with labs. On 3/8/2022, white blood cell count 2.5 (ANC  1.0), Hgb 13.6 with , platelets normal.  On 5/15/2022, she was noted to have further decreasd white blood cell count of 1.6 (ANC 0.28) with hemoglobin 11. platelets were 133. She was ultimately referred to Hematology (Dr. Bob), who she saw on 6/7/22. Further blood workup was recommended and labs done 6/14. CBC revealed WBC 25.3 (ANC 0.8), Hgb 10.1 (), Plt 90K. On the differential and morphology review, 81% blasts were seen concerning for acute leukemia. She was advised to present to [facility] for further evaluation and management.   - Per verbal report from Heme Path fellow there was concern for Blanca rods. She was started on ATRA 6/16 PM-6/17 AM while awaiting PML-ANNELIESE testing. This was ultimately negative.   - s/p Hydrea (6/17-6/22) while awaiting final diagnostic studies  - 6/16 PB chromosomes: 46, XX. No abnormality.   - 6/16 PB FISH: no rearrangments of MLLT10, NUP98, or KMT2A.   - 6/17 BMBx: markedly hypercellular marrow (85-95% estimated cellularity), with markedly diminished trilineage hematopoiesis, no overt dysplasia in the evaluable elements, and 92% blasts  - FLT3 PCR: positive for FLT3-ITD(low) with allelic ratio 0.21 (corrected from previously reported value of 0.144 per staff message to Lamar Grover from molecular lab personnel)  - NGS panel: NPM1 positive, IDH2, FLT3-ITD  - HLA typing sent, BMT coordinators aware and working on arranging IP consultation (delayed due to insurance change/financial approval)  - receiving induction with 7+3 (cytarabine+daunorubicin PLUS midostaurin (D1= 6/22/22))  - D21 BMBx completed 7/12 at 11 AM. Results pending.    # Pancytopenia secondary to chemotherapy and AML  - Transfuse to maintain Hgb >7, Plt >10K     ID  # Neutropenic Fever, recurrent  # Recurrent C.diff colitis  (1) Fevered to 101.6F on 6/23. Noted to have chills though no other focal symptoms. Afebrile thereafter.  - 6/16 baseline CT CAP without evidence for infectious source   - 6/23:  BCx NG, UA/UCx NGTD, CXR clear  - s/p IV Cefepime (x 6/23-6/27). De-escalated back to ppx Levaquin on 6/27.  (2) Recurrent fever on 6/29 PM. Persistent temps 6/29-6/30, then afebrile since 6/30 afternoon. BCx, UA, CXR negative. C.diff positive on 7/1  - restarted on Cefepime 2g IV q8hr (x 6/29 PM - 7/4 AM)  (3) Recurrent fever 100.4-->101 (7/5-7/6), restarted Cefepime (x 7/5). Given ongoing high fevers. Abx switched to Zosyn IV (7/10). With switch to Zosyn, patient is now afebrile for 24 hours. Overall improving today (7/12). Ongoing diarrhea which has plateaued in frequency and though still loose appear to be less watery. New sore throat and clear rhinorrhea without associated nasal/sinus congestion on 7/8, stable at this time. Right shin lesion stable. Lactic acids have not been elevated.   - 7/5 BCx NGTD, UCx NG  - 7/6 MRSA swab negative  - 7/7, 7/8, 7/9 BCx NGTD  - CT CAP (7/7): subtle diffuse mosaic groundglass changes and presumed fibroatelectasis in RUL. No pulm consolidation. Moderate hiatal hernia with mild thickening and streaky density along intrathoracic stomach and distal esophagus, enlarged upper RP LN and mesenteric lymph nodes (infectious vs. Inflammatory).   - 7/8: strep PCR negative, RVP negative  - 7/8: aspergillus (neg), fungitell (neg), Fungal BCx (NGTD), Fungal Abs (pending), histo urine/serum (neg)   - if ongoing fevers,will consider CT Sinus to complete imaging workup  - given persistent fevers despite Tylenol, could try dose of Celebrex if fevers not responding to Tylenol.   - low threshold to add IV Vanco or increase antifungal coverage     **Antibiotics:  - s/p Cefepime (6/23-6/27, 6/29-7/4), (x 7/5 - 7/10). Changing to Zosyn 4.5g IV q6hr (x 7/10) given persistent fevers  - s/p PO Vancomycin 125mg QID (x 7/1-7/8). Changed to fidaxomicin 200mg PO BID (x7/8 PM) given ongoing fevers to presumably better cover C.diff in its first recurrence.    # ID PPx  - ACV 400mg BID  - holding Levaquin ppx  in setting of b/s abx  - Micafungin 50mg IV daily. Given that she will be on midostaurin, will keep on Micafungin to limit potential interactions with posaconazole/voriconazole. Would plan to transition as soon as pt has completed midostaurin or earlier pending fevers/fungal infection workup as above  - Prior Auth approved for both posaconazole and voriconazole. However, as of  6/29, pt has not met deductible (but should meet it once hospital stay is billed). At present, monthly cost for Posaconazole $2048.66, Voriconazole $176.41.     # h/o COVID-19  Pt is not vaccinated nor interested in being vaccinated. She was admitted from 10/1/2021 - 10/13/2021 for acute hypoxic respiratory failure from COVID-19 pneumonia.  Required IMC, high flow and intermittent CPAP. She was treated with dexamethasone, remdesivir, and baricitinib in addition to azithromycin and ceftriaxone. Recovered symptomatically from this.    GI  # H/o C.diff (10/2021)  # Hemorrhoid  # 1st recurrence of C.diff (7/1)  # Diarrhea  H/o C.diff (10/20/21) and course was complicated by abd pain/cramping and prolonged recovery of stool consistency as well as hemorrhoid which does wax/wane and is sometimes painful. She primarily has used witch hazel pads when symptomatic. Pt reported normal bowels at time of AML diagnosis. Developed intermittent loose stools throughout admission, then increased frequency of loose/watery stools overnight 6/30-7/1. Rechecked C.diff (7/1), positive.   - 6/16 CT A/P negative for perirectal abscess.  - TUCKS PRN, Prep H PRN (7/4), dilt cream PRN (7/6)  - Avoid PTA probiotic at this time.  - baseline 6/19 C. Diff negative so we deferred prophylactic PO Vancomycin during chemo course. Recurrent/increased loose stools reported on 7/1, checked C.diff and this was positive. S/p PO Vanco --> now on fidaxomicin as above.  - Stools are still loose, but less watery. Recheck of C diff is negative 7/11. Of note, patient may have diarrhea due  to oral chemotherapy pill  - Will plan to continue PO Fidaxomicin (x 7/8 PM), adjusted from PO Vanco. Will consider change fidaxomicin to po vanco per ID recs  - See ID noted regarding enteric isolation    # Mucositis, improved  On ~6/27, pt noting inflammatory changes in b/l buccal mucosa and small lesion on right inner lower lip. Not painful or affecting PO intake. Since improved and pt has denied mouth pain/sores recently, eating/drinking well. Now with intermittent blood blisters but no mouth pain/sores.  - MMW PRN, viscous lidocaine PRN    # GERD  - TUMS PRN  - PPI daily     # Distal esophageal discomfort/dysphagia - improved  # Hiatal hernia (seen on 7/7 CT imaging)  # Mild thickening and streaky density along the intrathoracic stomach and distal esophagus, likely to represent inflammatory/infective etiology  On 6/29, patient reported new onset of epigastric discomfort when swallowing solid foods, not with liquids. No radiation to back or other areas of abdomen, no RUQ tenderness. Denies reflux, nausea or emesis. Already on PPI as above. No e/o oral candidiasis, on micafungin ppx. Question relation to GI mucosal changes? Nearly resolved on 7/4. Now with intermittent exacerbations but not nearly what it was previously.   - Trial Carafate before meals and at nighttime --> given improvement, backed off to PRN (x 7/4)  - simethicone PRN, Maalox PRN, GI Cocktail PRN  - Continue to monitor, could consider barium swallow study if persistent  - On 7/7 - CT noted mild thickening  and streaky density along the intrathoracic stomach and distal esophagus, likely to represent inflammatory/infective etiology. Will continue to follow by imaging and symptoms. Will consider EGD when patient's counts recover if needed.     ENT  # Sore throat, intermittent  # Clear rhinorrhea  Pt reported right-sided distal throat soreness on 7/8. Mild posterior OP erythema, no exudates or thrush noted on exam. Negative infectious workup as  above, potentially due to mucositis changes. Suspect rhinorrhea may be due to loss of protective nasal cilia.   - 7/8: strep PCR negative, RVP negative  - PRN cepacol, hurricane spray  - could consider swallowing MMW, could also consider Nystatin swallow (though no e/o thrush in OP at this time)    CV  # Irregular rhythm  Pt having occasionally PVCs, noting more in the last couple days of ongoing fevers and more irregular on 7/8 exam. Improved on 7/10 exam.  - EKG (7/8) demonstrated sinus rhythm with occasional PVCs. QTc 450.      GYN  # Vaginal bleeding, resolved  Menstrual cycle began inpatient overnight 6/30-7/1. Usually heaviest flow is first ~3 days. Reviewed neutropenic precautions and advised not to use tampons at this time. Resolved as of 7/7.  - back down to Plt parameter 10K  - s/p Provera 10mg daily (7/1-7/6)    ENDO  # Hypothyroidism  PTA was on Synthroid 125mcg daily. TSH 0.04, T4 Free 1.48 (done 6/24). Per pt, her PCP reviewed and advised her to reduce her Synthroid to 100mcg daily. Ordered to start on 6/29.     DERM  # Bilateral ear erythema, pruritis - resolved  On 6/29, pt reported bilateral ear lobe erythema and irritation in ear canal. Believed to be related to cytarabine. S/p triamcinolone cream. Resolved early week of 7/4.      # Truncal rash - improving   Noted rash to upper abdomen which progressed to upper torso and back throughout morning of 7/6. Not itchy or bothersome to pt. Suspect possible cytarabine rash. Pt has been on/off Cefepime and previously tolerated but rash to abx is also in differential. Can try triamcinolone cream to rash sites. Improving/evolving and now more petechial in nature.     # Lesion to R shin - stable   Noted on 7/6; outlined, monitor for worsening cellulitis. On 7/7, slightly improved in degree of erythema and slightly retracted from drawn border, less tender as well. However, there is a persistent area of induration/fluctuance. Stable 7/8-7/10. No new lesions per  pt.  - MRSA swab negative as above. However, if persistent fevers or HD unstable, low threshold to add IV Vanco.    - if appears to be worsening, or any new lesions, would consult Derm for bx  - trial bacitracin oint BID    FEN  - Regular diet as tolerated  - lyte repletion per unit protocol  - regular diet     PPx  - VTE: none due to thrombocytopenia  - GI: PPI     Dispo:  Anticipate 4+ week LOS for management of acute leukemia. Presented under Dr. Won Perkins.     Plan of care discussed with Dr. Pantoja     I spent 50 minutes in the care of this patient today, which included time necessary for review of interval events, obtaining history and physical exam, adjusting medication orders, discussion with interdisciplinary/consult team(s), and documentation time. Over 50% of time was spent face-to-face and/or coordinating care.     Plan of care discussed with Dr. Scarlett Arteaga PA-C  Hematology/Oncology   pager: [phone]      Interval History   No acute events overnight. Afebrile 24 hours.  Overall continues to feel ok. Very happy to have a day without fevers.   Stools roughly the same today. Will continue to monitor. C diff now negative.   Discussed plan for today. All questions answered. Denies associated abd pain or cramping.   Denies HA, vision changes, or sinus pressure. Denies mouth pain/sores. Denies SOB or cough. Denies dysuria.   Lesion to right shin remains stable per patient. Hemorrhoid discomfort is stable.   Despite everything, she is still working remotely for a few hours each day.     Physical Exam   Temp: 97.7  F (36.5  C) Temp src: Oral BP: 121/80 Pulse: 86   Resp: 16 SpO2: 99 % O2 Device: None (Room air)    Vitals:    07/09/22 0810 07/11/22 0738 07/12/22 1008   Weight: 115 kg (253 lb 9.6 oz) 114.1 kg (251 lb 9.6 oz) 114.5 kg (252 lb 6.4 oz)     Vital Signs with Ranges  Temp:  [97.7  F (36.5  C)-100.2  F (37.9  C)] 97.7  F (36.5  C)  Pulse:  [] 86  Resp:  [16-18] 16  BP:  (104-124)/(70-81) 121/80  SpO2:  [96 %-100 %] 99 %  I/O last 3 completed shifts:  In: 1022 [P.O.:360; I.V.:420]  Out: -      Constitutional: Pleasant female sitting up in chair. Awake and conversational. Non-toxic appearing. No acute distress.  HEENT: NC/AT. Wearing head scarf. Sclerae anicteric. Moist mucus membranes.   Respiratory: Breathing comfortable with no increased work on room air. Lungs CTA b/l. No wheezing or crackles.  Cardiovascular: RRR, no skipped beats today. No murmur appreciated. No peripheral edema.    GI: Normal BS. Abdomen is soft, non-distended, non-tender.   : External hemorrhoid without thrombosed appearance, no surrounding erythema or induration on very superficial rectal exam (7/9)  Skin: Skin is clean, dry, intact. Faint pink papular rash to trunk has further faded with evolving petechial appearance at this time (7/9). ~Dime size erythematous (less purple) lesion with central dried pustular-like appearance and underlying induration/fluctuance to right lower shin; outlined and remains slightly retracted within borders but stable from 7/7. Nontender. No warmth  Musculoskeletal/ Extremities: Extremities grossly normal in appearance.  Neurologic: Alert and oriented. Speech normal. Grossly nonfocal.   VAD: PICC line in LUE, c/d/i. She has new ecchymoses proximal to PICC site (noted 7/8), stable today.         Medications     - MEDICATION INSTRUCTIONS -         acyclovir  400 mg Oral BID     bacitracin   Topical BID     fidaxomicin  200 mg Oral BID     levothyroxine  100 mcg Oral QAM AC     micafungin  50 mg Intravenous Q24H     midostaurin  50 mg Oral Q12H     multivitamin w/minerals  1 tablet Oral Daily     pantoprazole  40 mg Oral QAM AC     piperacillin-tazobactam  4.5 g Intravenous Q6H     vitamin C  1,000 mg Oral Daily     vitamin D3  50 mcg Oral Daily       Data   Results for orders placed or performed during the hospital encounter of 06/16/22 (from the past 24 hour(s))   Potassium    Result Value Ref Range    Potassium 3.6 3.4 - 5.3 mmol/L   Quantiferon-TB Gold Plus    Specimen: Red Lumen; Blood    Narrative    The following orders were created for panel order Quantiferon-TB Gold Plus.  Procedure                               Abnormality         Status                     ---------                               -----------         ------                     Quantiferon TB Gold Plus...[[id number]]                      In process                 Quantiferon TB Gold Plus...[[id number]]                      In process                 Quantiferon TB Gold Plus...[[id number]]                      In process                 Quantiferon TB Gold Plus...[[id number]]                      In process                   Please view results for these tests on the individual orders.   Acid-fast Bacilli (AFB) Blood Culture    Specimen: Line, Other; Blood   Result Value Ref Range    Culture No growth after 12 hours    Helicobacter pylori Antigen Stool   Result Value Ref Range    Helicobacter pylori Antigen Stool Negative Negative   Prepare pheresed platelets (unit)   Result Value Ref Range    UNIT ABO/RH AB Neg     Unit Number [id number]     Unit Status Issued     Blood Component Type Platelets     Product Code K4358O28     CODING SYSTEM [id number]     UNIT TYPE ISBT 2800     ISSUE DATE AND TIME [id number]    CBC with platelets differential    Narrative    The following orders were created for panel order CBC with platelets differential.  Procedure                               Abnormality         Status                     ---------                               -----------         ------                     CBC with platelets and d...[[id number]]  Abnormal            Final result               Manual Differential[[id number]]          Abnormal            Final result                 Please view results for these tests on the individual orders.   Basic metabolic panel   Result Value Ref Range    Creatinine 0.49 (L)  0.51 - 0.95 mg/dL    Sodium 140 136 - 145 mmol/L    Potassium 3.6 3.4 - 5.3 mmol/L    Urea Nitrogen 5.3 (L) 6.0 - 20.0 mg/dL    Chloride 105 98 - 107 mmol/L    Carbon Dioxide (CO2) 21 (L) 22 - 29 mmol/L    Anion Gap 14 7 - 15 mmol/L    Glucose 97 70 - 99 mg/dL    GFR Estimate >90 >60 mL/min/1.73m2    Calcium 8.9 8.6 - 10.0 mg/dL   CBC with platelets and differential   Result Value Ref Range    WBC Count 1.6 (L) 4.0 - 11.0 10e3/uL    RBC Count 2.51 (L) 3.80 - 5.20 10e6/uL    Hemoglobin 7.8 (L) 11.7 - 15.7 g/dL    Hematocrit 23.1 (L) 35.0 - 47.0 %    MCV 92 78 - 100 fL    MCH 31.1 26.5 - 33.0 pg    MCHC 33.8 31.5 - 36.5 g/dL    RDW 13.8 10.0 - 15.0 %    Platelet Count 86 (L) 150 - 450 10e3/uL   Manual Differential   Result Value Ref Range    % Neutrophils 11 %    % Lymphocytes 51 %    % Monocytes 19 %    % Eosinophils 0 %    % Basophils 0 %    % Metamyelocytes 9 %    % Myelocytes 3 %    % Blasts 7 %    Absolute Neutrophils 0.2 (LL) 1.6 - 8.3 10e3/uL    Absolute Lymphocytes 0.8 0.8 - 5.3 10e3/uL    Absolute Monocytes 0.3 0.0 - 1.3 10e3/uL    Absolute Eosinophils 0.0 0.0 - 0.7 10e3/uL    Absolute Basophils 0.0 0.0 - 0.2 10e3/uL    Absolute Metamyelocytes 0.1 (H) <=0.0 10e3/uL    Absolute Myelocytes 0.0 <=0.0 10e3/uL    Absolute Blasts 0.1 (H) <=0.0 10e3/uL    RBC Morphology Confirmed RBC Indices     Platelet Assessment  Automated Count Confirmed. Platelet morphology is normal.     Automated Count Confirmed. Platelet morphology is normal.   Acid-Fast Bacilli Culture and Stain    Specimen: Leg, Below Knee, Right; Tissue    Narrative    The following orders were created for panel order Acid-Fast Bacilli Culture and Stain.  Procedure                               Abnormality         Status                     ---------                               -----------         ------                     Acid-Fast Bacilli Cultur...[[id number]]                                                   Please view results for these tests on the  individual orders.   Acid-Fast Bacilli Culture and Stain    Specimen: Leg, Below Knee, Right; Tissue    Narrative    The following orders were created for panel order Acid-Fast Bacilli Culture and Stain.  Procedure                               Abnormality         Status                     ---------                               -----------         ------                     Acid-Fast Bacilli Cultur...[[id number]]                                                   Please view results for these tests on the individual orders.

## 2022-07-12 NOTE — PLAN OF CARE
Goal Outcome Evaluation:  Afebrile. Denied pain or nausea. She had a bone marrow biopsy with Oxycodone 5 mg and Versed 3 mg prior to it. Up independently.

## 2022-07-12 NOTE — CONSULTS
[facility] Inpatient Consult Dermatology Note    Impression/Plan:  1. Erythematous papulonodule in the setting of neutropenic fevers.  Differential diagnosis is broad: folliculitis vs HSV vs deep fungal or mycobacterial vs other.  Asymptomatic.  Will biopsy for H&E and send for tissue culture.    - Biopsy x2 1 for H&E, one for sterile tissue culture  - Await results of the above, sent for fungal, AFB, aerobic culture   - Suture removal in 14 days July 26th  - Wound cares: vaseline and bandaid change daily     Punch biopsy:  After discussion of benefits and risks including but not limited to bleeding/bruising, pain/swelling, infection, scar, incomplete removal, nerve damage/numbness, recurrence, and non-diagnostic biopsy, written consent, verbal consent and photographs were obtained. Time-out was performed. The area was cleaned with isopropyl alcohol. 0.5mL of 1% lidocaine with epinephrine was injected to obtain adequate anesthesia of the lesion. A 4 mm punch biopsy was performed.  4-0 prolene sutures were utilized to approximate the epidermal edges.  White petroleum jelly/VaselineTM and a bandage was applied to the wound.  Explicit verbal and written wound care instructions were provided.  The patient left the Dermatology Clinic in good condition. The patient was counseled to follow up for suture removal in approximately 14 days days.    Thank you for the dermatology consultation. Please do not hesitate to contact the dermatology resident/faculty on call for any additional questions or concerns. We will continue to follow.     Patient seen and evaluated with attending physician, Dr. John Nieves MD  Dermatology Resident    I have seen and examined this patient and agree with the assessment and plan as documented in the resident's note, and was present for the key elements of all procedures.    Srinivasa Lopez MD  Dermatology Attending      Dermatology Problem List:       Date of  Admission: Jun 16, 2022   Encounter Date: 07/12/2022    Reason for Consultation:   38 yo F with new AML with neutropenic fevers.  Erythematous dime sized lesion with pustule like center.  ID concerned for fusarium.    History of Present Illness:  Ms. Veda Marinelli is a 37 year old female with hypothyroidism who presented 6/16/22 with leukocytosis and was found to have AML.  She initiated induction chemotherapy on 6/22/22.  Dermatology is consulted for erythematous papulonodule on the right leg in the setting of febrile neutropenia.  Infectious disease would like biopsy & tissue culture to rule out fusarium infection.  Patient has had intermittent fevers since induction chemotherapy 6/22 but fevers became more persistent around 7/5.  Current antiinfective regimen includes fidaxomicin for C diff, Zosyn for bacterial fevers during neutropenia, acyclovir for viral ppx and micafungin for fungal ppx.      Patient states the lesion started as a red spot ~3 weeks ago but became more prominent in the past few days.  It is asymptomatic.      Past Medical History:   Patient Active Problem List   Diagnosis     Leukemia, blast cell (H)     History of COVID-19     Clostridium difficile infection     Need for prophylactic antibiotic     Neutropenic fever (H)     No past medical history on file.  Past Surgical History:   Procedure Laterality Date     PICC DOUBLE LUMEN PLACEMENT Left 06/16/2022    46 cm total lateral brachial         Social History:  Patient reports that she has never smoked. She has never used smokeless tobacco.    Family History:  No family history on file.    Medications:  Current Facility-Administered Medications   Medication     0.9% sodium chloride BOLUS     acetaminophen (TYLENOL) tablet 650 mg     acyclovir (ZOVIRAX) tablet 400 mg     albuterol (PROVENTIL HFA/VENTOLIN HFA) inhaler     albuterol (PROVENTIL) neb solution 2.5 mg     bacitracin ointment     benzocaine 20% (HURRICAINE/TOPEX) 20 % spray  0.5-1 mL     benzocaine-menthol (CEPACOL) 15-3.6 MG lozenge 1 lozenge     calcium carbonate chew tablet 750 mg     diltiazem 2% in PLO gel 0.25-0.5 g     diphenhydrAMINE (BENADRYL) capsule 25-50 mg     diphenhydrAMINE (BENADRYL) injection 50 mg     EPINEPHrine PF (ADRENALIN) injection 0.3 mg     famotidine (PEPCID) infusion 20 mg     fidaxomicin (DIFICID) tablet 200 mg     heparin lock flush 10 UNIT/ML injection 5-20 mL     levothyroxine (SYNTHROID/LEVOTHROID) tablet 100 mcg     lidocaine (viscous) (XYLOCAINE) 2 % 15 mL, alum & mag hydroxide-simethicone (MAALOX) 15 mL GI Cocktail     lidocaine (viscous) (XYLOCAINE) 2 % solution 5 mL     LORazepam (ATIVAN) tablet 0.5-1 mg     magic mouthwash suspension (diphenhydramine, lidocaine, aluminum-magnesium & simethicone)     meperidine (DEMEROL) injection 25 mg     methylPREDNISolone sodium succinate (solu-MEDROL) injection 125 mg     micafungin (MYCAMINE) 50 mg in sodium chloride 0.9 % 100 mL intermittent infusion     midostaurin (RYDAPT) capsule CAPS 50 mg     multivitamin w/minerals (THERA-VIT-M) tablet 1 tablet     naloxone (NARCAN) injection 0.2 mg     No rectal suppositories if WBC less than 1000/microliters or platelets less than 50,000/ L     ondansetron (ZOFRAN) injection 8 mg    Or     ondansetron (ZOFRAN ODT) ODT tab 8 mg    Or     ondansetron (ZOFRAN) tablet 8 mg     pantoprazole (PROTONIX) EC tablet 40 mg     piperacillin-tazobactam (ZOSYN) 4.5 g vial to attach to  mL bag     polyethylene glycol (MIRALAX) Packet 17 g     pramox-pe-glycerin-petrolatum (PREPARATION H) cream     prochlorperazine (COMPAZINE) injection 10 mg     prochlorperazine (COMPAZINE) tablet 10 mg     senna-docusate (SENOKOT-S/PERICOLACE) 8.6-50 MG per tablet 1-2 tablet     simethicone (MYLICON) chewable tablet 80 mg     sodium chloride (OCEAN) 0.65 % nasal spray 1 spray     sodium chloride (PF) 0.9% PF flush 10-20 mL     sucralfate (CARAFATE) suspension 1 g     triamcinolone (KENALOG)  "0.1 % cream     vitamin C (ASCORBIC ACID) tablet 1,000 mg     Vitamin D3 (CHOLECALCIFEROL) tablet 50 mcg     witch hazel-glycerin (TUCKS) pad          Allergies   Allergen Reactions     Blood Transfusion Related (Informational Only) Hives     6/29/2022, reaction of hives with platelet transfusion          Review of Systems:  -As per HPI      Physical exam:  Vitals: /80 (BP Location: Right arm)   Pulse 86   Temp 97.7  F (36.5  C) (Oral)   Resp 16   Ht 1.702 m (5' 7\")   Wt 114.5 kg (252 lb 6.4 oz)   SpO2 99%   BMI 39.53 kg/m    GEN: This is a well developed, well-nourished female in no acute distress, in a pleasant mood.    SKIN: Focused examination of the legs was performed.  - 1.5 cm erythematous papulonodule on the right lower leg   - Faint patchy erythema on the right lower leg   - No other lesions of concern on areas examined.                 Laboratory:  Results for orders placed or performed during the hospital encounter of 06/16/22 (from the past 24 hour(s))   Clostridium difficile toxin B PCR    Specimen: Per Rectum; Stool   Result Value Ref Range    C Difficile Toxin B by PCR Negative Negative    Narrative    The [employer] Xpert C. difficile Assay, performed on the [employer]  [employer]ument Systems, is a qualitative in vitro diagnostic test for rapid detection of toxin B gene sequences from unformed (liquid or soft) stool specimens collected from patients suspected of having Clostridioides difficile infection (CDI). The test utilizes automated real-time polymerase chain reaction (PCR) to detect toxin gene sequences associated with toxin producing C. difficile. The Xpert C. difficile Assay is intended as an aid in the diagnosis of CDI.   Potassium   Result Value Ref Range    Potassium 3.6 3.4 - 5.3 mmol/L   Quantiferon-TB Gold Plus    Specimen: Red Lumen; Blood    Narrative    The following orders were created for panel order Quantiferon-TB Gold Plus.  Procedure                               " Abnormality         Status                     ---------                               -----------         ------                     Quantiferon TB Gold Plus...[[id number]]                      In process                 Quantiferon TB Gold Plus...[[id number]]                      In process                 Quantiferon TB Gold Plus...[[id number]]                      In process                 Quantiferon TB Gold Plus...[[id number]]                      In process                   Please view results for these tests on the individual orders.   Acid-fast Bacilli (AFB) Blood Culture    Specimen: Line, Other; Blood   Result Value Ref Range    Culture No growth after 12 hours    Prepare pheresed platelets (unit)   Result Value Ref Range    UNIT ABO/RH AB Neg     Unit Number [id number]     Unit Status Issued     Blood Component Type Platelets     Product Code Q0414Y44     CODING SYSTEM [id number]     UNIT TYPE ISBT 2800     ISSUE DATE AND TIME [id number]    CBC with platelets differential    Narrative    The following orders were created for panel order CBC with platelets differential.  Procedure                               Abnormality         Status                     ---------                               -----------         ------                     CBC with platelets and d...[[id number]]  Abnormal            Final result               Manual Differential[[id number]]          Abnormal            Final result                 Please view results for these tests on the individual orders.   Basic metabolic panel   Result Value Ref Range    Creatinine 0.49 (L) 0.51 - 0.95 mg/dL    Sodium 140 136 - 145 mmol/L    Potassium 3.6 3.4 - 5.3 mmol/L    Urea Nitrogen 5.3 (L) 6.0 - 20.0 mg/dL    Chloride 105 98 - 107 mmol/L    Carbon Dioxide (CO2) 21 (L) 22 - 29 mmol/L    Anion Gap 14 7 - 15 mmol/L    Glucose 97 70 - 99 mg/dL    GFR Estimate >90 >60 mL/min/1.73m2    Calcium 8.9 8.6 - 10.0 mg/dL   CBC with platelets and  differential   Result Value Ref Range    WBC Count 1.6 (L) 4.0 - 11.0 10e3/uL    RBC Count 2.51 (L) 3.80 - 5.20 10e6/uL    Hemoglobin 7.8 (L) 11.7 - 15.7 g/dL    Hematocrit 23.1 (L) 35.0 - 47.0 %    MCV 92 78 - 100 fL    MCH 31.1 26.5 - 33.0 pg    MCHC 33.8 31.5 - 36.5 g/dL    RDW 13.8 10.0 - 15.0 %    Platelet Count 86 (L) 150 - 450 10e3/uL   Manual Differential   Result Value Ref Range    % Neutrophils 11 %    % Lymphocytes 51 %    % Monocytes 19 %    % Eosinophils 0 %    % Basophils 0 %    % Metamyelocytes 9 %    % Myelocytes 3 %    % Blasts 7 %    Absolute Neutrophils 0.2 (LL) 1.6 - 8.3 10e3/uL    Absolute Lymphocytes 0.8 0.8 - 5.3 10e3/uL    Absolute Monocytes 0.3 0.0 - 1.3 10e3/uL    Absolute Eosinophils 0.0 0.0 - 0.7 10e3/uL    Absolute Basophils 0.0 0.0 - 0.2 10e3/uL    Absolute Metamyelocytes 0.1 (H) <=0.0 10e3/uL    Absolute Myelocytes 0.0 <=0.0 10e3/uL    Absolute Blasts 0.1 (H) <=0.0 10e3/uL    RBC Morphology Confirmed RBC Indices     Platelet Assessment  Automated Count Confirmed. Platelet morphology is normal.     Automated Count Confirmed. Platelet morphology is normal.       Dr. CALVERT staffed the patient.    Staff Involved:  Resident/Staff

## 2022-07-12 NOTE — PROCEDURES
Bone Marrow Biopsy Procedure Note  Patient's Name: Minerva Marinelli  : 1985  Date of Procedure: 2022     PROCEDURE:  Unilateral bone marrow biopsy and unilateral aspirate      INDICATION: AML, Day 21 BMBx        PERFORMED BY:  Gaby Mcgill PA-C     CONSENT:  Informed consent was obtained from the patient. The risks and benefits of the procedure were explained. The patient agreed to undergo the procedure. The consent form was signed and placed in the paper chart.      PREMEDICATION: Versed 3mg IV once     PROCEDURE SUMMARY:  The patient's identification was positively verified by ID band. Patient was laid in the prone position. Premedication with Versed 3mg IV once. The right posterior iliac crest (RPIC) was prepped and draped in a sterile manner. The skin, deeper tissues, and periosteum of the RPIC were anesthetized with approximately 25mL 1% lidocaine. Following this, a 3mm incision was made. The trephine needle was advanced into the RPIC bone cavity and a 26 mm core biopsy was obtained. Next, bone marrow aspirates were attempted multiple times from the RPIC. Unable to obtaine any aspirate. The trephine needle was then advanced into the RPIC bone cavity again and an additional 26 mm core biopsy was obtained.  Following the procedure, a sterile pressure dressing was applied to the bone marrow biopsy site.      COMPLICATIONS:  None. The patient tolerated the procedure very well with minimal discomfort.      RECOMMENDATIONS:  The patient was placed in the supine position to maintain pressure on the biopsy site.   The patient was instructed to lie flat for 45-60 minutes and not to remove the dressing or get it wet for 24 hours post-procedure.      TESTS ORDERED:  Morphology  Flow cytometry  Chromosomes  FISH   Molecular (hold)  Unable to obtain aspirate for TTL. Of note patient is consented for TTL for future procedures    Gaby Arteaga PA-C ([place])  Hematology/Oncology  Pager: [phone]

## 2022-07-12 NOTE — PLAN OF CARE
"[id number]    /76 (BP Location: Right arm)   Pulse 84   Temp 98.2  F (36.8  C) (Oral)   Resp 16   Ht 1.702 m (5' 7\")   Wt 114.1 kg (251 lb 9.6 oz)   SpO2 99%   BMI 39.41 kg/m        Reason for admission: AML - Day 21 of 7+3 and Rydapt  Activity: UAL in room  Pain: Denies overnight  Neuro: A&Ox4, Neuros intact.   Cardiac: Intermittently tachycardic, T-max: 101.4  Respiratory: Room air.. Denies SOB.  GI/: C-diff positive. Loose stools x1, reported by patient. Voiding without difficulty, not saving. Denies nausea.  Diet: Regular diet, poor appetite  Lines: R DL PICC HL  Wounds: Rash improving. Dime sized abscess on right shin open to covered with bacitracin and bandaid. Hemorrhoid pain improving, using preparation H cream.   Labs/imaging: Reviewed. See chart.       New changes this shift: Slept between cares. T-max 100.2. Received 1 unit platelets, administered Tylenol x1 and Benadryl x1 to premedicate for platelet transfusion. Tolerated well.      Plan: D21 bmbx scheduled for 7/12 at 11am.     "

## 2022-07-12 NOTE — PROGRESS NOTES
[facility]  Transplant Infectious Disease Progress Note     Patient:  Veda Marinelli, Date of birth 1985, Medical record number [id number]  Date of Visit:  07/12/2022         Assessment and Recommendations:   Recommendations:  - Now that 7/11/2022 Clostridium difficile testing resulted negative, can consider transitioning fidaxomicin to twice daily oral vancomycin for prophylaxis against another recurrence of Clostridium difficile infection.  - Continue enteric isolation per hospital SOP (for 30 days or until hospital discharge; if still inpt by 7/31/2022, could move to new room without precautions assuming there is no new positive test).  - Continue zosyn for bacterial fevers during neutropenia.  - Continue acyclovir for viral prophylaxis.  - Continue micafungin for fungal prophylaxis.  - Will review dermatology consult once it is completed (skin biopsy of right leg lesion, sending half for dermatopathology and the other half for fungal culture (specifically looking for Fusarium)).  - Await pending stool Helicobacter pylori, Quantiferon, AFB blood culture, Karius assay.  - If she has a clinical decompensation, would add in voriconazole, since that is the treatment of choice for Fusarium.    Transplant Infectious Disease will continue to follow with you.      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 She was initiated on induction chemotherapy with 7+3+midostaurin (C1D1=6/22/22). Hospital course complicated by neutropenic fever and C.difficile colitis  Infectious Disease issues include:  - Febrile neutropenia. Onset is distinct from her Clostridium difficile infection. Changed from cefepime to zosyn on 7/10/2022, with some decrease in her overall temperature curve. 7/7/2022 CT imaging showed some mild thickening along the stomach and  distal esophagus (DDx includes CMV and HSV but she is seronegative to both, as well as helicobacter and yeast, but she is too neutropenic for EGD). She has some enlarged lymph nodes (DDx includes mycobacteria), as well as a patchy streaky density in the posterior right upper lobe. Workup ongoing, although fever curve is improving with empiric therapy and improved white blood cell count. 7/12/2022 white blood cell count is up to 1.6.  - Clostridium difficile infection, recurrent. First episode was in the fall of 2021 (10/20/2021), and she was treated with oral vancomycin. The current episode tested positive on 7/1/2022, in the setting of diarrhea and fever. She had been on oral vancomycin for nearly a week without significant improvement, and diarrhea related to her chemotherapy is in the differential diagnosis (Midostaurin (nearly 50% likelihood of diarrhea)). She was changed over to fidaxomicin on 7/8/2022, but fevers continued on 7/9 and 7/10/2022, so would change was made to her febrile neutropenic coverage. Today on 7/12/2022 she reports less diarrhea. Clostridium difficile was tested against 7/11/2022 and it was negative. Now that 7/11/2022 Clostridium difficile testing resulted negative, can consider transitioning fidaxomicin to twice daily oral vancomycin for prophylaxis against another recurrence of Clostridium difficile infection. Continue enteric isolation per hospital SOP (for 30 days or until hospital discharge; if still inpt by 7/31/2022, could move to new room without precautions assuming there is no new positive test).  - Right shin lesion. DDx in a neutropenic patient includes Fusarium, for which the drug of choice is voriconazole. Workup suggested above, in recommendations.  - Hx of COVID-19 pneumonia 10/2021. She was admitted for 13 days and went home with supplemental O2 for a few more days. She was never intubated but did require Vapotherm for a few days. Received Remdesivir, Dexamethasone and  Barictinib.  - QTc interval: 450 ms on 7/8/2022  - Bacterial prophylaxis: zosyn  - PCP prophylaxis: none  - Viral serostatus & prophylaxis: CMV neg, EBV neg, HSV1 neg, HSV2 neg. She had chickenpox as a child. She is on acyclovir for viral prophylaxis.  - Fungal prophylaxis: micafungin  - Immunization status: unknown  - Gamma globulin status: unknown  - Isolation status: Good hand hygiene.    Leigh Marsh MD. Pager [phone]         Interval History:   Since Minerva was last seen by me on 7/11/2022, she has improvement in her white blood cell count. She has improvement in her stools, they are less frequent and less loose. She continues to report that there is no abdominal pain or abdominal cramping, as had occurred with her first Clostridium difficile infection in 10/2021. Overall, her fever curve is much improved, although she did have a maximum temperature of 101.4 F yesterday. On 7/11/2022, a Clostridium difficile test was sent and it was negative; implications of this test and duration of enteric precautions was discussed with her staff doctor, Dr. Pantoja. Appetite is fair. She will have a bone marrow biopsy today.    Review of Systems:  CONSTITUTIONAL:  Fever curve is improving over the last 3 days  EYES: negative for icterus or an acute change in vision. Her eyes have been watering.  ENT:  negative for any acute hearing loss, tinnitus or sore throat  RESPIRATORY:  negative for cough, sputum, dyspnea  CARDIOVASCULAR: intermittent tachycardia  GASTROINTESTINAL:  negative for nausea, vomiting.   GENITOURINARY:  negative for dysuria or hematuria  HEME:  No easy bleeding. She was transfused with platelets.  INTEGUMENT:  negative for rash or pruritus. The spot under a Band-Aid on her lower right leg is not changing. However, her legs a little bit sore.  NEURO:  Negative for headache, tremor, or dizziness         Current Medications & Allergies:       acyclovir  400 mg Oral BID     bacitracin   Topical BID      diltiazem 2% in PLO gel  0.25-0.5 g Topical TID     fidaxomicin  200 mg Oral BID     levothyroxine  100 mcg Oral QAM AC     micafungin  50 mg Intravenous Q24H     midostaurin  50 mg Oral Q12H     multivitamin w/minerals  1 tablet Oral Daily     pantoprazole  40 mg Oral QAM AC     piperacillin-tazobactam  4.5 g Intravenous Q6H     pramox-pe-glycerin-petrolatum   Rectal TID     triamcinolone   Topical BID     vitamin C  1,000 mg Oral Daily     vitamin D3  50 mcg Oral Daily       Infusions/Drips:    - MEDICATION INSTRUCTIONS -         Allergies   Allergen Reactions     Blood Transfusion Related (Informational Only) Hives     6/29/2022, reaction of hives with platelet transfusion             Physical Exam:     Patient Vitals for the past 24 hrs:   BP Temp Temp src Pulse Resp SpO2 Weight   07/12/22 1008 121/80 97.7  F (36.5  C) Oral 86 16 99 % 114.5 kg (252 lb 6.4 oz)   07/12/22 0530 108/76 98.2  F (36.8  C) Oral 84 16 99 % --   07/12/22 0506 104/71 98.1  F (36.7  C) Oral 80 16 98 % --   07/12/22 0200 108/70 100.2  F (37.9  C) Oral 90 16 96 % --   07/11/22 2143 113/81 99.8  F (37.7  C) Oral 100 18 100 % --   07/11/22 1804 124/78 99.1  F (37.3  C) Oral 85 18 97 % --   07/11/22 1449 120/80 97.6  F (36.4  C) Oral 81 18 98 % --   07/11/22 1328 127/81 97  F (36.1  C) Oral 95 18 96 % --   07/11/22 1240 122/82 98.2  F (36.8  C) Oral 88 18 99 % --   07/11/22 1229 129/83 97.9  F (36.6  C) Oral 89 18 94 % --     Ranges for vital signs:  Temp:  [97  F (36.1  C)-100.2  F (37.9  C)] 97.7  F (36.5  C)  Pulse:  [] 86  Resp:  [16-18] 16  BP: (104-129)/(70-83) 121/80  SpO2:  [94 %-100 %] 99 %  Vitals:    07/09/22 0810 07/11/22 0738 07/12/22 1008   Weight: 115 kg (253 lb 9.6 oz) 114.1 kg (251 lb 9.6 oz) 114.5 kg (252 lb 6.4 oz)       Physical Examination:  GENERAL:  well-developed, well-nourished woman, in no acute distress.  HEAD:  Head is normocephalic, atraumatic, alopecia   EYES:  Eyes have anicteric sclerae  ENT:  Oropharynx is  moist.  NECK:  Supple.   LUNGS:  Clear to auscultation bilateral.   CARDIOVASCULAR:  Regular rate and rhythm with no murmur  ABDOMEN:  Normal bowel sounds, soft, nontender.   SKIN:  No acute rashes. Erythematous, circular lesion on right shin, not raised or tender.     Left sided PICC in place without any surrounding erythema or exudate.  NEUROLOGIC:  Grossly nonfocal. Active x4 extremities         Laboratory Data:     Metabolic Studies       Recent Labs   Lab Test 07/12/22  0834 07/11/22  1634 07/11/22  0732 07/10/22  1304 07/10/22  0629 07/10/22  0625 07/09/22  0643 07/08/22  1938     --  138  --   --    < > 138  --    POTASSIUM 3.6 3.6 3.4   < >  --    < > 3.5  --    CHLORIDE 105  --  104  --   --    < > 105  --    CO2 21*  --  20*  --   --    < > 21*  --    ANIONGAP 14  --  14  --   --    < > 12  --    BUN 5.3*  --  5.1*  --   --    < > 6.1  --    CR 0.49*  --  0.51  --   --    < > 0.51  --    GFRESTIMATED >90  --  >90  --   --    < > >90  --    GLC 97  --  100*  --   --    < > 106*  --    MICHAEL 8.9  --  8.8  --   --    < > 8.7  --    PHOS  --   --  2.6  --   --   --   --   --    MAG  --   --  2.1  --   --   --   --   --    LACT  --   --   --   --  0.6*  --  0.6*  --    FGTL  --   --   --   --   --   --   --  <31    < > = values in this interval not displayed.       Hepatic Studies    Recent Labs   Lab Test 07/11/22  0732 07/08/22  0554 07/06/22  0753 06/26/22  0659 06/25/22  0556 06/24/22  0550   BILITOTAL 0.5 1.0 1.2   < > 0.6 0.6   DBIL <0.20 0.29 0.29   < > <0.20 <0.20   ALKPHOS 52 49 48   < > 35 42   PROTTOTAL 6.7 6.6 6.4   < > 5.9* 5.9*   ALBUMIN 3.3* 3.7 3.8   < > 4.1 3.6   AST 14 7* 7*   < > 18 31   ALT 16 10 10   < > 31 27   LDH  --   --   --   --  545* 889*    < > = values in this interval not displayed.       Pancreatitis testing    Recent Labs   Lab Test 06/30/22  0341 03/08/22  0930   AMYLASE 35  --    LIPASE 23  --    TRIG  --  93       Gout Labs      Recent Labs   Lab Test 06/30/22  0341  06/27/22  0620 06/26/22  0659 06/25/22  0556 06/24/22  1849   URIC 2.0* 3.0 3.7 4.6 4.2       Hematology Studies   Recent Labs   Lab Test 07/12/22  0834 07/11/22  0732 07/10/22  0625 07/09/22  0643 06/29/22  0626 06/28/22  0628   WBC 1.6* 0.8* 0.4* 0.4*   < > 1.1*   ABLA 0.1*  --   --   --    < >  --    BLST 7  --   --   --    < >  --    ANEU 0.2* 0.0*  --   --    < >  --    ANEUTAUTO  --   --   --   --   --  0.0*   ALYM 0.8 0.6*  --   --    < >  --    ALYMPAUTO  --   --   --   --   --  1.0   GORAN 0.3 0.1  --   --    < >  --    AMONOAUTO  --   --   --   --   --  0.0   AEOS 0.0 0.0  --   --    < >  --    AEOSAUTO  --   --   --   --   --  0.0   ABSBASO  --   --   --   --   --  0.0   HGB 7.8* 7.0* 7.1* 7.5*   < > 9.3*   HCT 23.1* 20.5* 21.0* 22.1*   < > 26.6*   PLT 86* 38* 15* 14*   < > 14*    < > = values in this interval not displayed.       Clotting Studies    Recent Labs   Lab Test 07/04/22  0638 06/30/22  0341 06/27/22  0620 06/26/22  0659   INR 1.10 1.14 1.09 1.16*   PTT  --   --  31 31       Iron Testing    Recent Labs   Lab Test 07/12/22  0834 06/16/22  1724 06/16/22  1339 06/16/22  1102 06/14/22  0752   IRON  --   --   --   --  110   FEB  --   --   --   --  279   IRONSAT  --   --   --   --  39   JO-ANN  --   --   --   --  321*   MCV 92   < > 110*   < > 108*   FOLIC  --   --   --   --  18.8   B12  --   --   --   --  607   HAPT  --   --   --   --  154   RETP  --   --  2.4*  --  2.9*   RETICABSCT  --   --  0.063  --  0.080    < > = values in this interval not displayed.       Thyroid Studies     Recent Labs   Lab Test 06/24/22  0550   TSH 0.04*   T4 1.48       Urine Studies     Recent Labs   Lab Test 06/29/22  2028 06/23/22  1646 06/17/22  0746   URINEPH 7.0 6.0 6.0   NITRITE Negative Negative Negative   LEUKEST Negative Negative Negative   WBCU  --  4 1       Microbiology:  Fungal testing  Recent Labs   Lab Test 07/08/22  1938   FGTL <31   FGTLI Negative   ASPGAI 0.07   ASPGAA Negative       Last Culture results    Culture   Date Value Ref Range Status   07/11/2022 No growth after 12 hours  Preliminary   07/10/2022 No growth after 1 day  Preliminary   07/10/2022 No growth after 1 day  Preliminary   07/09/2022 No growth after 2 days  Preliminary   07/09/2022 No growth after 2 days  Preliminary   07/08/2022 No growth after 3 days  Preliminary   07/08/2022 2+ Normal shani  Final   07/08/2022 No growth after 4 days  Preliminary   07/08/2022 No growth after 4 days  Preliminary   07/07/2022 No Growth  Final   07/07/2022 No Growth  Final   07/05/2022 No Growth  Final   07/05/2022 No Growth  Final   07/05/2022 No Growth  Final   06/29/2022 No Growth  Final   06/29/2022 No Growth  Final   06/23/2022 10,000-50,000 CFU/mL Mixture of urogenital shani  Final   06/23/2022 No Growth  Final   06/23/2022 No Growth  Final   06/17/2022 No Growth  Final         Last check of C difficile  C Difficile Toxin B by PCR   Date Value Ref Range Status   07/11/2022 Negative Negative Final     Comment:     A negative result does not exclude actual disease due to C. difficile and may be due to improper collection, handling and storage of the specimen or the number of organisms in the specimen is below the detection limit of the assay.       Quantiferon testing   Recent Labs   Lab Test 07/12/22  0834 07/11/22  0732   LYMPH 51 72       Virology:  Coronavirus-19 testing    Recent Labs   Lab Test 06/16/22  1349   [id number] Negative       Respiratory virus (non-coronavirus-19) testing    Recent Labs   Lab Test 07/08/22  1220   IFLUA Not Detected   FLUAH1 Not Detected   [id number] Not Detected   FLUAH3 Not Detected   IFLUB Not Detected   PIV1 Not Detected   PIV2 Not Detected   PIV3 Not Detected   PIV4 Not Detected   RSVA Not Detected   RSVB Not Detected   HMPV Not Detected   RHINEV Not Detected   ADENOV Not Detected   BARTHOLOMEW Not Detected       Virus Serology Table     Recent Labs   Lab Test 06/16/22  1724   L0XGYTA 0.13   H1IGG No HSV-1 IgG antibodies detected.    H2IGG No HSV-2 IgG antibodies detected.   EBVCAGIV <10.0   CMVIGGIV <0.20   CMVIGG No detectable antibody.       Imaging:  No results found for this or any previous visit (from the past 48 hour(s)).       EXAMINATION: CT CHEST/ABDOMEN/PELVIS W CONTRAST, 7/7/2022 1:22 PM  INDICATION: persistent neutropenic fever  COMPARISON STUDY: Radiograph 6/29/2022. CT 6/15/2022.  FINDINGS:  Lines, tubes, devices: Left lower extremity PICC with tip terminating  at the superior cavoatrial junction.  CHEST: Lungs: Unchanged 4 mm solid pulmonary nodule of the left upper lobe  laterally (series 5 image 153). Remaining additional sub-4 mm solid  pulmonary nodules are stable compared to prior. Similar subtle diffuse  mosaic groundglass changes and presumed fibroatelectasis of the  posterior aspect of the right upper lobe. The central tracheobronchial  tree is patent. No areas of bronchiectasis or architectural  distortion. No pleural effusion, pulmonary consolidation, or  pneumothorax. Mild bibasilar atelectasis.   Mediastinum: The visualized thyroid is unremarkable.Heart size is  within normal limits. No pericardial effusion. The thoracic aorta and  main pulmonary artery are within normal limits. Standard branching  pattern of the great vessels. No abnormal thoracic lymph nodes.  Moderate hiatal hernia, mild increased streakiness and thickening  along the intrathoracic stomach and distal esophagus.  ABDOMEN/PELVIS:  Liver: No mass. No intrahepatic biliary ductal dilation.  Similar  hypoattenuation along the falciform ligament likely focal fatty infiltration.     Biliary System: Decompressed appearance of the gallbladder. No  extrahepatic biliary ductal dilation.  Pancreas: No mass or pancreatic ductal dilation.   Adrenal glands: No mass or nodules   Spleen: Normal.  Kidneys: No suspicious mass, obstructing calculus or hydronephrosis.   Gastrointestinal tract : No evidence for infiltrate appendix. Normal  caliber small bowel.   Large  bowel loop dilatation  Mesentery/peritoneum/retroperitoneum: There is hazy central mesenteric  attenuation with numerous subcentimeter lymph nodes in the central mesentery.    Lymph nodes: Numerous subcentimeter central mesenteric lymph nodes and  several enlarged lymph nodes for example a 1.2 cm short axis left  periaortic lymph node (series 7 image 333).  Vasculature: Patent major abdominal vasculature.  The major portal  vessels are patent.  Pelvis: Urinary bladder is normal.  Myomatous uterus. Ovaries within  normal limits.  Osseous structures: No aggressive or acute osseous lesion.  Unchanged  opposing endplate degenerative changes L4-L5.    Soft tissues: Small fat-containing umbilical hernia.        Impression    IMPRESSION:  1. Small to moderate hiatal hernia with mild thickening  and streaky  density along the intrathoracic stomach and distal esophagus,  likely  to represent inflammatory/infective etiology.  2. Enlarged upper retroperitoneal especially para-aortic para-aortic  lymph node and mesenteric lymph nodes, compared to prior CT 6/16/2022  concerning for infective/inflammatory etiology. Consider attention on follow-up  3. Patchy streaky density in the posterior right upper lobe slightly  more conspicuous compared to prior CT, and diffuse mosaic attenuation,  may represent infection or inflammation.  4. Unchanged sub-6 mm pulmonary nodules, compared to prior CT 6/16/2022.

## 2022-07-13 LAB
ALBUMIN SERPL BCG-MCNC: 3.3 G/DL (ref 3.5–5.2)
ALP SERPL-CCNC: 53 U/L (ref 35–104)
ALT SERPL W P-5'-P-CCNC: 21 U/L (ref 10–35)
ANION GAP SERPL CALCULATED.3IONS-SCNC: 12 MMOL/L (ref 7–15)
AST SERPL W P-5'-P-CCNC: 21 U/L (ref 10–35)
BACTERIA BLD CULT: NO GROWTH
BACTERIA BLD CULT: NO GROWTH
BASOPHILS # BLD MANUAL: 0 10E3/UL (ref 0–0.2)
BASOPHILS NFR BLD MANUAL: 0 %
BILIRUB DIRECT SERPL-MCNC: <0.2 MG/DL (ref 0–0.3)
BILIRUB SERPL-MCNC: 0.5 MG/DL
BLASTS # BLD MANUAL: 0.1 10E3/UL
BLASTS NFR BLD MANUAL: 4 %
BUN SERPL-MCNC: 3.7 MG/DL (ref 6–20)
CALCIUM SERPL-MCNC: 8.7 MG/DL (ref 8.6–10)
CHLORIDE SERPL-SCNC: 106 MMOL/L (ref 98–107)
CREAT SERPL-MCNC: 0.46 MG/DL (ref 0.51–0.95)
DACRYOCYTES BLD QL SMEAR: SLIGHT
DEPRECATED HCO3 PLAS-SCNC: 23 MMOL/L (ref 22–29)
EOSINOPHIL # BLD MANUAL: 0 10E3/UL (ref 0–0.7)
EOSINOPHIL NFR BLD MANUAL: 0 %
ERYTHROCYTE [DISTWIDTH] IN BLOOD BY AUTOMATED COUNT: 13.7 % (ref 10–15)
GAMMA INTERFERON BACKGROUND BLD IA-ACNC: 0.47 IU/ML
GFR SERPL CREATININE-BSD FRML MDRD: >90 ML/MIN/1.73M2
GLUCOSE SERPL-MCNC: 107 MG/DL (ref 70–99)
HCT VFR BLD AUTO: 23.2 % (ref 35–47)
HGB BLD-MCNC: 8 G/DL (ref 11.7–15.7)
INTERPRETATION: NORMAL
LYMPHOCYTES # BLD MANUAL: 1.3 10E3/UL (ref 0.8–5.3)
LYMPHOCYTES NFR BLD MANUAL: 41 %
M TB IFN-G BLD-IMP: ABNORMAL
M TB IFN-G CD4+ BCKGRND COR BLD-ACNC: -0.26 IU/ML
MAGNESIUM SERPL-MCNC: 2 MG/DL (ref 1.7–2.3)
MCH RBC QN AUTO: 32 PG (ref 26.5–33)
MCHC RBC AUTO-ENTMCNC: 34.5 G/DL (ref 31.5–36.5)
MCV RBC AUTO: 93 FL (ref 78–100)
METAMYELOCYTES # BLD MANUAL: 0.3 10E3/UL
METAMYELOCYTES NFR BLD MANUAL: 9 %
MITOGEN IGNF BCKGRD COR BLD-ACNC: -0.26 IU/ML
MITOGEN IGNF BCKGRD COR BLD-ACNC: -0.29 IU/ML
MONOCYTES # BLD MANUAL: 0.3 10E3/UL (ref 0–1.3)
MONOCYTES NFR BLD MANUAL: 9 %
MYELOCYTES # BLD MANUAL: 0.4 10E3/UL
MYELOCYTES NFR BLD MANUAL: 14 %
NEUTROPHILS # BLD MANUAL: 0.7 10E3/UL (ref 1.6–8.3)
NEUTROPHILS NFR BLD MANUAL: 21 %
PATH REPORT.COMMENTS IMP SPEC: ABNORMAL
PATH REPORT.COMMENTS IMP SPEC: YES
PATH REPORT.FINAL DX SPEC: ABNORMAL
PATH REPORT.MICROSCOPIC SPEC OTHER STN: ABNORMAL
PATH REPORT.RELEVANT HX SPEC: ABNORMAL
PHOSPHATE SERPL-MCNC: 3.8 MG/DL (ref 2.5–4.5)
PLAT MORPH BLD: ABNORMAL
PLATELET # BLD AUTO: 145 10E3/UL (ref 150–450)
POTASSIUM SERPL-SCNC: 3.3 MMOL/L (ref 3.4–5.3)
POTASSIUM SERPL-SCNC: 3.4 MMOL/L (ref 3.4–5.3)
POTASSIUM SERPL-SCNC: 3.7 MMOL/L (ref 3.4–5.3)
PROMYELOCYTES # BLD MANUAL: 0.1 10E3/UL
PROMYELOCYTES NFR BLD MANUAL: 2 %
PROT SERPL-MCNC: 6.4 G/DL (ref 6.4–8.3)
QUANTIFERON MITOGEN: 0.21 IU/ML
QUANTIFERON NIL TUBE: 0.47 IU/ML
QUANTIFERON TB1 TUBE: 0.21 IU/ML
QUANTIFERON TB2 TUBE: 0.18
RBC # BLD AUTO: 2.5 10E6/UL (ref 3.8–5.2)
RBC MORPH BLD: ABNORMAL
SCANNED LAB RESULT: NORMAL
SODIUM SERPL-SCNC: 141 MMOL/L (ref 136–145)
UFH PPP CHRO-ACNC: 0.18 IU/ML
WBC # BLD AUTO: 3.1 10E3/UL (ref 4–11)

## 2022-07-13 PROCEDURE — 82248 BILIRUBIN DIRECT: CPT | Performed by: PHYSICIAN ASSISTANT

## 2022-07-13 PROCEDURE — 84100 ASSAY OF PHOSPHORUS: CPT | Performed by: PHYSICIAN ASSISTANT

## 2022-07-13 PROCEDURE — 84132 ASSAY OF SERUM POTASSIUM: CPT | Performed by: STUDENT IN AN ORGANIZED HEALTH CARE EDUCATION/TRAINING PROGRAM

## 2022-07-13 PROCEDURE — 250N000011 HC RX IP 250 OP 636: Performed by: PHYSICIAN ASSISTANT

## 2022-07-13 PROCEDURE — 80053 COMPREHEN METABOLIC PANEL: CPT | Performed by: PHYSICIAN ASSISTANT

## 2022-07-13 PROCEDURE — 99207 PR SC NO CHARGE VISIT: CPT | Performed by: INTERNAL MEDICINE

## 2022-07-13 PROCEDURE — 83735 ASSAY OF MAGNESIUM: CPT | Performed by: PHYSICIAN ASSISTANT

## 2022-07-13 PROCEDURE — 36592 COLLECT BLOOD FROM PICC: CPT | Performed by: STUDENT IN AN ORGANIZED HEALTH CARE EDUCATION/TRAINING PROGRAM

## 2022-07-13 PROCEDURE — 85520 HEPARIN ASSAY: CPT | Performed by: STUDENT IN AN ORGANIZED HEALTH CARE EDUCATION/TRAINING PROGRAM

## 2022-07-13 PROCEDURE — 250N000013 HC RX MED GY IP 250 OP 250 PS 637: Performed by: INTERNAL MEDICINE

## 2022-07-13 PROCEDURE — 99233 SBSQ HOSP IP/OBS HIGH 50: CPT | Performed by: INTERNAL MEDICINE

## 2022-07-13 PROCEDURE — 120N000002 HC R&B MED SURG/OB UMMC

## 2022-07-13 PROCEDURE — 99233 SBSQ HOSP IP/OBS HIGH 50: CPT | Mod: AI | Performed by: INTERNAL MEDICINE

## 2022-07-13 PROCEDURE — 250N000011 HC RX IP 250 OP 636: Performed by: STUDENT IN AN ORGANIZED HEALTH CARE EDUCATION/TRAINING PROGRAM

## 2022-07-13 PROCEDURE — 90791 PSYCH DIAGNOSTIC EVALUATION: CPT | Performed by: PSYCHOLOGIST

## 2022-07-13 PROCEDURE — 250N000013 HC RX MED GY IP 250 OP 250 PS 637: Performed by: STUDENT IN AN ORGANIZED HEALTH CARE EDUCATION/TRAINING PROGRAM

## 2022-07-13 PROCEDURE — 85007 BL SMEAR W/DIFF WBC COUNT: CPT | Performed by: PHYSICIAN ASSISTANT

## 2022-07-13 PROCEDURE — 250N000013 HC RX MED GY IP 250 OP 250 PS 637: Performed by: PHYSICIAN ASSISTANT

## 2022-07-13 PROCEDURE — 85014 HEMATOCRIT: CPT | Performed by: PHYSICIAN ASSISTANT

## 2022-07-13 PROCEDURE — 258N000003 HC RX IP 258 OP 636: Performed by: PHYSICIAN ASSISTANT

## 2022-07-13 PROCEDURE — 250N000012 HC RX MED GY IP 250 OP 636 PS 637: Performed by: STUDENT IN AN ORGANIZED HEALTH CARE EDUCATION/TRAINING PROGRAM

## 2022-07-13 PROCEDURE — 36592 COLLECT BLOOD FROM PICC: CPT | Performed by: PHYSICIAN ASSISTANT

## 2022-07-13 RX ORDER — ENOXAPARIN SODIUM 150 MG/ML
1 INJECTION SUBCUTANEOUS 2 TIMES DAILY
Status: DISCONTINUED | OUTPATIENT
Start: 2022-07-13 | End: 2022-07-13

## 2022-07-13 RX ORDER — POTASSIUM CHLORIDE 750 MG/1
40 TABLET, EXTENDED RELEASE ORAL ONCE
Status: COMPLETED | OUTPATIENT
Start: 2022-07-13 | End: 2022-07-13

## 2022-07-13 RX ORDER — VANCOMYCIN HYDROCHLORIDE 125 MG/1
125 CAPSULE ORAL 2 TIMES DAILY
Status: DISCONTINUED | OUTPATIENT
Start: 2022-07-13 | End: 2022-07-15 | Stop reason: HOSPADM

## 2022-07-13 RX ORDER — ENOXAPARIN SODIUM 100 MG/ML
100 INJECTION SUBCUTANEOUS 2 TIMES DAILY
Status: DISCONTINUED | OUTPATIENT
Start: 2022-07-13 | End: 2022-07-15 | Stop reason: HOSPADM

## 2022-07-13 RX ADMIN — ENOXAPARIN SODIUM 100 MG: 100 INJECTION SUBCUTANEOUS at 20:06

## 2022-07-13 RX ADMIN — SODIUM CHLORIDE, PRESERVATIVE FREE 5 ML: 5 INJECTION INTRAVENOUS at 12:48

## 2022-07-13 RX ADMIN — PANTOPRAZOLE SODIUM 40 MG: 40 TABLET, DELAYED RELEASE ORAL at 06:11

## 2022-07-13 RX ADMIN — OXYCODONE HYDROCHLORIDE AND ACETAMINOPHEN 1000 MG: 500 TABLET ORAL at 07:54

## 2022-07-13 RX ADMIN — POTASSIUM CHLORIDE 40 MEQ: 750 TABLET, EXTENDED RELEASE ORAL at 12:46

## 2022-07-13 RX ADMIN — FIDAXOMICIN 200 MG: 200 TABLET, FILM COATED ORAL at 07:54

## 2022-07-13 RX ADMIN — BACITRACIN: 500 OINTMENT TOPICAL at 07:55

## 2022-07-13 RX ADMIN — SODIUM CHLORIDE, PRESERVATIVE FREE 5 ML: 5 INJECTION INTRAVENOUS at 22:15

## 2022-07-13 RX ADMIN — Medication 1 TABLET: at 07:54

## 2022-07-13 RX ADMIN — PIPERACILLIN AND TAZOBACTAM 4.5 G: 4; .5 INJECTION, POWDER, FOR SOLUTION INTRAVENOUS at 05:35

## 2022-07-13 RX ADMIN — POTASSIUM CHLORIDE 40 MEQ: 750 TABLET, EXTENDED RELEASE ORAL at 18:13

## 2022-07-13 RX ADMIN — TRIAMCINOLONE ACETONIDE: 1 CREAM TOPICAL at 20:12

## 2022-07-13 RX ADMIN — ACYCLOVIR 400 MG: 400 TABLET ORAL at 20:06

## 2022-07-13 RX ADMIN — Medication 50 MCG: at 07:53

## 2022-07-13 RX ADMIN — MICAFUNGIN 50 MG: 10 INJECTION, POWDER, LYOPHILIZED, FOR SOLUTION INTRAVENOUS at 17:03

## 2022-07-13 RX ADMIN — PIPERACILLIN AND TAZOBACTAM 4.5 G: 4; .5 INJECTION, POWDER, FOR SOLUTION INTRAVENOUS at 10:14

## 2022-07-13 RX ADMIN — ACYCLOVIR 400 MG: 400 TABLET ORAL at 07:53

## 2022-07-13 RX ADMIN — SODIUM CHLORIDE, PRESERVATIVE FREE 5 ML: 5 INJECTION INTRAVENOUS at 23:22

## 2022-07-13 RX ADMIN — PIPERACILLIN AND TAZOBACTAM 4.5 G: 4; .5 INJECTION, POWDER, FOR SOLUTION INTRAVENOUS at 16:16

## 2022-07-13 RX ADMIN — VANCOMYCIN HYDROCHLORIDE 125 MG: 125 CAPSULE ORAL at 20:06

## 2022-07-13 RX ADMIN — LEVOTHYROXINE SODIUM 100 MCG: 100 TABLET ORAL at 05:35

## 2022-07-13 RX ADMIN — PIPERACILLIN AND TAZOBACTAM 4.5 G: 4; .5 INJECTION, POWDER, FOR SOLUTION INTRAVENOUS at 22:12

## 2022-07-13 RX ADMIN — SODIUM CHLORIDE, PRESERVATIVE FREE 5 ML: 5 INJECTION INTRAVENOUS at 18:40

## 2022-07-13 RX ADMIN — SODIUM CHLORIDE, PRESERVATIVE FREE 5 ML: 5 INJECTION INTRAVENOUS at 12:46

## 2022-07-13 RX ADMIN — ENOXAPARIN SODIUM 100 MG: 100 INJECTION SUBCUTANEOUS at 14:02

## 2022-07-13 RX ADMIN — RYDAPT 50 MG: 25 CAPSULE, LIQUID FILLED ORAL at 07:57

## 2022-07-13 RX ADMIN — HEPARIN SODIUM AND DEXTROSE 1950 UNITS/HR: 10000; 5 INJECTION INTRAVENOUS at 07:48

## 2022-07-13 RX ADMIN — BACITRACIN: 500 OINTMENT TOPICAL at 20:06

## 2022-07-13 ASSESSMENT — ACTIVITIES OF DAILY LIVING (ADL)
ADLS_ACUITY_SCORE: 18

## 2022-07-13 NOTE — PROGRESS NOTES
S: RN paged about warm, red spot on R forearm that is tender. Not on anticoagulation and below PICC line.    O:  Temp: (!) 100.6  F (38.1  C) (nurse notified) Temp src: Oral BP: 116/77 Pulse: 86   Resp: 16 SpO2: 100 % O2 Device: None (Room air)      Pulmonary: no increased work of breathing  MSK: no swelling noted in LUE compared to RUE, pulses intact  Skin: blanching erythematous patch on right forearm, mildly tender to deep palpation            A/P  Neutropenic fever  Rash on left forearm    Unclear etiology. Ddx: DVT (superficial vs dep), drug reaction, thrombophlebitis. Today, she had a skin biopsy of RLE of erythematous papulonodule.  - obtain blood culture  - continue antibiotics, if worsening will consider adding IV vanc  - will obtain venous US of LUE to assess for DVT    Modesta Gross MD on 7/12/2022 at 7:11 PM

## 2022-07-13 NOTE — CONSULTS
"This telehealth service is appropriate and effective for delivering services in light of the necessity for social distancing to mitigate the COVID-19 epidemic and for conservation of PPE.     Patient has agreed to receiving telehealth services after being informed about it: Yes    Time service started: 3:42 PM  Time service ended: 4:00 PM    Mode of transmission: Telephone    Location of originating:  [facility]    Distance site:  Home office of provider for [facility]    The patient has been notified that:  Video visits will be conducted via a call with their psychologist to provide the care they need with a video conversation. Video visits may be billed at different rates depending on insurance coverage.  Patients are advised to please contact their insurance provider with any questions about their health insurance coverage. If during the course of a call the psychologist feels a video visit is not appropriate, patients will not be charged for this service.      Health Psychology                        Confidential Summary of Inpatient Health Psychology Consultation*    Date of Service:  07/13/22    Referring Provider:  Lamar Grover PA-C    Reason for Consultation:  Evaluate and treat as indicated patient's adjustment following recent diagnosis of AML.    Sources of Information:  Information was obtained from a clinical interview with the patient and review of medical record.      History of Present Illness:  Per EMR: \"Veda Marinelli is a 37 year old female with PMH significant for h/o COVID19 infection (10/2021), C.diff, and hypothyroidism who presented with leukocytosis and circulating blasts. 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 She initiated induction chemotherapy with 7+3+midostaurin (C1D1=6/22/22). Hospital course complicated by neutropenic fever and C.difficile colitis.\"    Today, the patient reports feeling well.  Has just received optimistic news regarding the potential for discharge within the " "next few days, although she acknowledges this is dependent on awaiting various results.  She discusses tolerating chemotherapy well, the most notable and tangible change she is experienced is hair loss.  She states that she has not had major changes in energy, appetite or nausea.  She has been able to maintain her employment as she has been working remotely in [facility] health care.  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  She feels as if remaining employed during her prolonged hospitalization has helped her cope effectively as it is given her purpose during the day.  Her employer has been very flexible and supportive of her engagement in work during her treatment.  She reports good family support with her parents driving over 100 miles 1 way most days to visit her.  She also has good support from her siblings and their families.  The most notable thing she is adjusting to is coming to terms with having felt \"completely fine\" prior to identification of cancer to now participating in chemotherapy and receiving treatment for AML.  On review of psychiatric symptoms she reports no persistent down, depressed or hopeless mood, no anhedonia and no persistent worry or anxiety.  She acknowledges occasional anxiety when thinking about various aspects of the disease and its future implications for her life.  She endorses some sense of helplessness, since she continues to participate in therapies to the best degree possible but otherwise has no other tangible way to help manage the situation.      Medical History:  See below lists for past medical history, past surgical history, and current medications    No past medical history on file.    Past Surgical History:   Procedure Laterality Date     PICC DOUBLE LUMEN PLACEMENT Left 06/16/2022    46 cm total lateral brachial       Current Facility-Administered Medications   Medication     0.9% sodium chloride BOLUS     " acetaminophen (TYLENOL) tablet 650 mg     acyclovir (ZOVIRAX) tablet 400 mg     albuterol (PROVENTIL HFA/VENTOLIN HFA) inhaler     albuterol (PROVENTIL) neb solution 2.5 mg     bacitracin ointment     benzocaine 20% (HURRICAINE/TOPEX) 20 % spray 0.5-1 mL     benzocaine-menthol (CEPACOL) 15-3.6 MG lozenge 1 lozenge     calcium carbonate chew tablet 750 mg     diltiazem 2% in PLO gel 0.25-0.5 g     diphenhydrAMINE (BENADRYL) capsule 25-50 mg     diphenhydrAMINE (BENADRYL) injection 50 mg     enoxaparin ANTICOAGULANT (LOVENOX) injection 100 mg     EPINEPHrine PF (ADRENALIN) injection 0.3 mg     famotidine (PEPCID) infusion 20 mg     heparin lock flush 10 UNIT/ML injection 5-20 mL     levothyroxine (SYNTHROID/LEVOTHROID) tablet 100 mcg     lidocaine (viscous) (XYLOCAINE) 2 % 15 mL, alum & mag hydroxide-simethicone (MAALOX) 15 mL GI Cocktail     lidocaine (viscous) (XYLOCAINE) 2 % solution 5 mL     LORazepam (ATIVAN) tablet 0.5-1 mg     magic mouthwash suspension (diphenhydramine, lidocaine, aluminum-magnesium & simethicone)     meperidine (DEMEROL) injection 25 mg     methylPREDNISolone sodium succinate (solu-MEDROL) injection 125 mg     micafungin (MYCAMINE) 50 mg in sodium chloride 0.9 % 100 mL intermittent infusion     multivitamin w/minerals (THERA-VIT-M) tablet 1 tablet     naloxone (NARCAN) injection 0.2 mg     No rectal suppositories if WBC less than 1000/microliters or platelets less than 50,000/ L     ondansetron (ZOFRAN) injection 8 mg    Or     ondansetron (ZOFRAN ODT) ODT tab 8 mg    Or     ondansetron (ZOFRAN) tablet 8 mg     pantoprazole (PROTONIX) EC tablet 40 mg     piperacillin-tazobactam (ZOSYN) 4.5 g vial to attach to  mL bag     polyethylene glycol (MIRALAX) Packet 17 g     pramox-pe-glycerin-petrolatum (PREPARATION H) cream     prochlorperazine (COMPAZINE) injection 10 mg     prochlorperazine (COMPAZINE) tablet 10 mg     senna-docusate (SENOKOT-S/PERICOLACE) 8.6-50 MG per tablet 1-2 tablet      simethicone (MYLICON) chewable tablet 80 mg     sodium chloride (OCEAN) 0.65 % nasal spray 1 spray     sodium chloride (PF) 0.9% PF flush 10-20 mL     sucralfate (CARAFATE) suspension 1 g     triamcinolone (KENALOG) 0.1 % cream     vancomycin (VANCOCIN) capsule 125 mg     vitamin C (ASCORBIC ACID) tablet 1,000 mg     Vitamin D3 (CHOLECALCIFEROL) tablet 50 mcg     witch hazel-glycerin (TUCKS) pad         Psychiatric History:  She reports no pre-existing history of psychiatric illness or treatment.      Social History:  Employed full-time as a registered nurse.  Has a strong support system with her family.    Mental Status/Interview:  The patient was seen by telephone today and no behavioral observations were made.  She appeared to respond honestly to questions about psychosocial functioning.  Cognition and memory appear intact. Speech was clear, logical and coherent, of normal rate, rhythm and volume.  Thoughts were logical and organized.  Mood overall appeared euthymic with situational anxiety.  Insight into status of her cancer appeared good.  No suicidal ideation endorsed.    Impression/Plan  Mrs. Marinelli endorses situational anxiety and worry specific to future outcomes and prognosis of AML.  Given her ability to tolerate chemotherapy well, reports she is functioning to the best of her ability and maintaining full-time employment during this round of chemotherapy.  Has been able to cope well with the prolonged hospitalization due to remaining employed and regular visits by family.  Today she reports benefit from speaking with our service to process the impact of illness but no need for ongoing follow-up.  Provided education about the role of health psychology and ongoing cancer care and encouraged her to reach out and request follow-up should future concerns arise.  She agreed.    Diagnosis:  Adjustment disorder with anxious mood    Elli Tinoco, PhD,   Clinical Health Psychologist      *In accordance with the  Rules of the [place] [employer], it is noted that psychological descriptions and scientific procedures underlying psychological evaluations have limitations.  Absolute predictions cannot be made based on information in this report.    This note was completed using Dragon voice recognition software.  Although reviewed after completion, some word and grammatical errors may occur.

## 2022-07-13 NOTE — PROGRESS NOTES
[facility]  Transplant Infectious Disease Progress Note     Patient:  Veda Marinelli, Date of birth 1985, Medical record number [id number]  Date of Visit:  07/13/2022         Assessment and Recommendations:   Recommendations:  - With improving white blood cell count, perhaps zosyn can be discontinued soon, so that her bowel shani can begin to repopulate.  - When you feel she is ready, consider transitioning fidaxomicin to twice daily oral vancomycin for prophylaxis against another recurrence of Clostridium difficile infection.  - Continue enteric isolation per hospital SOP (for 30 days or until hospital discharge; if still inpt by 7/31/2022, could move to new room without precautions assuming there is no new positive test).  - Continue acyclovir for viral prophylaxis.  - Continue micafungin for fungal prophylaxis. May also be able to be discontinued soon, if white blood cell count stays stable.  - Await pending result of 7/12/2022 skin biopsy of right leg lesion.  - Await pending stool Helicobacter pylori, AFB blood culture, Karius assay.  - If she has a clinical decompensation, would add in voriconazole, since that is the treatment of choice for Fusarium.    Transplant Infectious Disease will continue to follow with you.      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 She was initiated on induction chemotherapy with 7+3+midostaurin (C1D1=6/22/22). Hospital course complicated by neutropenic fever and C.difficile colitis  Infectious Disease issues include:  - Febrile neutropenia, overall improving as her white blood cell count returns to normal. Onset is distinct from her Clostridium difficile infection. Changed from cefepime to zosyn on 7/10/2022, with some decrease in her overall temperature curve. 7/7/2022 CT imaging showed some mild  thickening along the stomach and distal esophagus (DDx includes CMV and HSV but she is seronegative to both, as well as helicobacter and yeast, but she is too neutropenic for EGD). She has some enlarged lymph nodes (DDx includes mycobacteria; Quantiferon indeterminate), as well as a patchy streaky density in the posterior right upper lobe. Workup ongoing, although fever curve is improving with empiric therapy and improved white blood cell count. 7/13/2022 white blood cell count is up to 3.1; see recommendations above.  - Clostridium difficile infection, recurrent. First episode was in the fall of 2021 (10/20/2021), and she was treated with oral vancomycin. The current episode tested positive on 7/1/2022, in the setting of diarrhea and fever. She had been on oral vancomycin for nearly a week without significant improvement, and diarrhea related to her chemotherapy is in the differential diagnosis (Midostaurin (nearly 50% likelihood of diarrhea)). She was changed over to fidaxomicin on 7/8/2022, but fevers continued on 7/9 and 7/10/2022, so would change was made to her febrile neutropenic coverage. Today on 7/12/2022 she reports less diarrhea. Clostridium difficile was tested against 7/11/2022 and it was negative. Now that 7/11/2022 Clostridium difficile testing resulted negative, can consider transitioning fidaxomicin to twice daily oral vancomycin for prophylaxis against another recurrence of Clostridium difficile infection. Continue enteric isolation per hospital SOP (for 30 days or until hospital discharge; if still inpt by 7/31/2022, could move to new room without precautions assuming there is no new positive test).  - Right shin lesion. DDx in a neutropenic patient includes Fusarium, for which the drug of choice is voriconazole. Workup suggested above, in recommendations.  - Hx of COVID-19 pneumonia 10/2021. She was admitted for 13 days and went home with supplemental O2 for a few more days. She was never  intubated but did require Vapotherm for a few days. Received Remdesivir, Dexamethasone and Barictinib.  - QTc interval: 450 ms on 7/8/2022  - Bacterial prophylaxis: zosyn  - PCP prophylaxis: none  - Viral serostatus & prophylaxis: CMV neg, EBV neg, HSV1 neg, HSV2 neg. She had chickenpox as a child. So she is on acyclovir for shingles prophylaxis.  - Fungal prophylaxis: micafungin  - Immunization status: unknown  - Gamma globulin status: unknown  - Isolation status: Good hand hygiene.    Leigh Marsh MD. Pager [phone]         Interval History:   Since Minerva was last seen by me on 7/12/2022, she has improvement in her white blood cell count. She has improvement in her stools. There may be as many as six hours in between bowel movements. However, there can also be an hour where she has three bowel movements. She continues to report that there is no abdominal pain or abdominal cramping, as had occurred with her first Clostridium difficile infection in 10/2021. The area of yesterday's bone marrow biopsy site is a little bit tender. She has a little bit of a macular rash developing on her legs below the knees. A clot was diagnosed near her pic line, and she now is on anticoagulation with heparin.    Review of Systems:  CONSTITUTIONAL:  Maximum temperature overnight was 100.6 F  EYES: negative for icterus or an acute change in vision. Her eyes have been watering.  ENT:  negative for any acute hearing loss, tinnitus or sore throat  RESPIRATORY:  negative for cough, sputum, dyspnea  CARDIOVASCULAR: intermittent tachycardia  GASTROINTESTINAL:  negative for nausea, vomiting.   GENITOURINARY:  negative for dysuria or hematuria  HEME:  No easy bleeding. She was transfused with platelets.  INTEGUMENT:  She had a skin biopsy yesterday, and this skin site is little bit tender.  NEURO:  Negative for headache, tremor, or dizziness         Current Medications & Allergies:       acyclovir  400 mg Oral BID     bacitracin    "Topical BID     fidaxomicin  200 mg Oral BID     levothyroxine  100 mcg Oral QAM AC     micafungin  50 mg Intravenous Q24H     multivitamin w/minerals  1 tablet Oral Daily     pantoprazole  40 mg Oral QAM AC     piperacillin-tazobactam  4.5 g Intravenous Q6H     potassium chloride  40 mEq Oral Once     vitamin C  1,000 mg Oral Daily     vitamin D3  50 mcg Oral Daily       Infusions/Drips:    heparin 1,950 Units/hr (07/13/22 0748)     - MEDICATION INSTRUCTIONS -         Allergies   Allergen Reactions     Blood Transfusion Related (Informational Only) Hives     6/29/2022, reaction of hives with platelet transfusion             Physical Exam:     Patient Vitals for the past 24 hrs:   BP Temp Temp src Pulse Resp SpO2 Height Weight   07/13/22 1005 121/71 97.4  F (36.3  C) Oral 100 16 96 % -- --   07/13/22 0743 -- -- -- -- -- -- 1.702 m (5' 7.01\") 113.9 kg (251 lb 3.2 oz)   07/13/22 0556 131/77 98.6  F (37  C) Oral 87 18 93 % -- --   07/13/22 0206 100/59 98.2  F (36.8  C) Oral 76 18 94 % -- --   07/12/22 2247 107/64 98.1  F (36.7  C) Oral 89 18 94 % -- --   07/12/22 2215 126/84 97.8  F (36.6  C) Oral 105 16 100 % -- --   07/12/22 1841 116/77 (!) 100.6  F (38.1  C) Oral 86 16 100 % -- --     Ranges for vital signs:  Temp:  [97.4  F (36.3  C)-100.6  F (38.1  C)] 97.4  F (36.3  C)  Pulse:  [] 100  Resp:  [16-18] 16  BP: (100-131)/(59-84) 121/71  SpO2:  [93 %-100 %] 96 %  Vitals:    07/11/22 0738 07/12/22 1008 07/13/22 0743   Weight: 114.1 kg (251 lb 9.6 oz) 114.5 kg (252 lb 6.4 oz) 113.9 kg (251 lb 3.2 oz)       Physical Examination:  GENERAL:  well-developed, well-nourished woman, in no acute distress.  HEAD:  Head is normocephalic, atraumatic, alopecia   EYES:  Eyes have anicteric sclerae  ENT:  Oropharynx is moist.  NECK:  Supple.   LUNGS:  Clear to auscultation bilateral.   CARDIOVASCULAR:  Regular rate and rhythm with no murmur  ABDOMEN:  Normal bowel sounds, soft, nontender.   SKIN:  No acute rashes. " Erythematous, circular lesion on right shin with 3 sutures in place, from two skin biopsies, covered with a Band-Aid. Left sided PICC in place with bruising.  NEUROLOGIC:  Grossly nonfocal. Active x4 extremities         Laboratory Data:     Metabolic Studies       Recent Labs   Lab Test 07/13/22  0555 07/12/22  2329 07/12/22  0834 07/10/22  1304 07/10/22  0629 07/09/22  0643 07/08/22  1938     --  140   < >  --    < >  --    POTASSIUM 3.4  --  3.6   < >  --    < >  --    CHLORIDE 106  --  105   < >  --    < >  --    CO2 23  --  21*   < >  --    < >  --    ANIONGAP 12  --  14   < >  --    < >  --    BUN 3.7*  --  5.3*   < >  --    < >  --    CR 0.46*  --  0.49*   < >  --    < >  --    GFRESTIMATED >90  --  >90   < >  --    < >  --    *  --  97   < >  --    < >  --    MICHAEL 8.7  --  8.9   < >  --    < >  --    PHOS 3.8  --   --    < >  --   --   --    MAG 2.0  --   --    < >  --   --   --    LACT  --  0.6*  --   --  0.6*   < >  --    FGTL  --   --   --   --   --   --  <31    < > = values in this interval not displayed.       Hepatic Studies    Recent Labs   Lab Test 07/13/22  0555 07/11/22  0732 07/08/22  0554 06/26/22  0659 06/25/22  0556 06/24/22  0550   BILITOTAL 0.5 0.5 1.0   < > 0.6 0.6   DBIL <0.20 <0.20 0.29   < > <0.20 <0.20   ALKPHOS 53 52 49   < > 35 42   PROTTOTAL 6.4 6.7 6.6   < > 5.9* 5.9*   ALBUMIN 3.3* 3.3* 3.7   < > 4.1 3.6   AST 21 14 7*   < > 18 31   ALT 21 16 10   < > 31 27   LDH  --   --   --   --  545* 889*    < > = values in this interval not displayed.       Pancreatitis testing    Recent Labs   Lab Test 06/30/22  0341 03/08/22  0930   AMYLASE 35  --    LIPASE 23  --    TRIG  --  93       Gout Labs      Recent Labs   Lab Test 06/30/22  0341 06/27/22  0620 06/26/22  0659 06/25/22  0556 06/24/22  1849   URIC 2.0* 3.0 3.7 4.6 4.2       Hematology Studies   Recent Labs   Lab Test 07/13/22  0555 07/12/22  2329 07/12/22  0834 07/11/22  0732 06/29/22  0626 06/28/22  0628   WBC 3.1* 2.5* 1.6*  0.8*   < > 1.1*   ABLA 0.1*  --  0.1*  --    < >  --    BLST 4  --  7  --    < >  --    ANEU 0.7*  --  0.2* 0.0*   < >  --    ANEUTAUTO  --   --   --   --   --  0.0*   ALYM 1.3  --  0.8 0.6*   < >  --    ALYMPAUTO  --   --   --   --   --  1.0   GORAN 0.3  --  0.3 0.1   < >  --    AMONOAUTO  --   --   --   --   --  0.0   AEOS 0.0  --  0.0 0.0   < >  --    AEOSAUTO  --   --   --   --   --  0.0   ABSBASO  --   --   --   --   --  0.0   HGB 8.0* 7.6* 7.8* 7.0*   < > 9.3*   HCT 23.2* 22.3* 23.1* 20.5*   < > 26.6*   * 116* 86* 38*   < > 14*    < > = values in this interval not displayed.       Clotting Studies    Recent Labs   Lab Test 07/04/22  0638 06/30/22  0341 06/27/22  0620 06/26/22  0659   INR 1.10 1.14 1.09 1.16*   PTT  --   --  31 31       Iron Testing    Recent Labs   Lab Test 07/13/22  0555 06/16/22  1724 06/16/22  1339 06/16/22  1102 06/14/22  0752   IRON  --   --   --   --  110   FEB  --   --   --   --  279   IRONSAT  --   --   --   --  39   JO-ANN  --   --   --   --  321*   MCV 93   < > 110*   < > 108*   FOLIC  --   --   --   --  18.8   B12  --   --   --   --  607   HAPT  --   --   --   --  154   RETP  --   --  2.4*  --  2.9*   RETICABSCT  --   --  0.063  --  0.080    < > = values in this interval not displayed.       Thyroid Studies     Recent Labs   Lab Test 06/24/22  0550   TSH 0.04*   T4 1.48       Urine Studies     Recent Labs   Lab Test 06/29/22  2028 06/23/22  1646 06/17/22  0746   URINEPH 7.0 6.0 6.0   NITRITE Negative Negative Negative   LEUKEST Negative Negative Negative   WBCU  --  4 1       Microbiology:  Fungal testing  Recent Labs   Lab Test 07/08/22  1938   FGTL <31   FGTLI Negative   ASPGAI 0.07   ASPGAA Negative       Last Culture results   Culture   Date Value Ref Range Status   07/12/2022 No growth after 12 hours  Preliminary   07/12/2022 No growth after 12 hours  Preliminary   07/11/2022 No growth after 1 day  Preliminary   07/10/2022 No growth after 2 days  Preliminary   07/10/2022 No  growth after 2 days  Preliminary   07/09/2022 No growth after 3 days  Preliminary   07/09/2022 No growth after 3 days  Preliminary   07/08/2022 No growth after 4 days  Preliminary   07/08/2022 2+ Normal shani  Final   07/08/2022 No Growth  Final   07/08/2022 No Growth  Final   07/07/2022 No Growth  Final   07/07/2022 No Growth  Final   07/05/2022 No Growth  Final   07/05/2022 No Growth  Final   07/05/2022 No Growth  Final   06/29/2022 No Growth  Final   06/29/2022 No Growth  Final   06/23/2022 10,000-50,000 CFU/mL Mixture of urogenital shani  Final   06/23/2022 No Growth  Final         Last check of C difficile  C Difficile Toxin B by PCR   Date Value Ref Range Status   07/11/2022 Negative Negative Final     Comment:     A negative result does not exclude actual disease due to C. difficile and may be due to improper collection, handling and storage of the specimen or the number of organisms in the specimen is below the detection limit of the assay.       Quantiferon testing   Recent Labs   Lab Test 07/13/22  0555 07/12/22  0834 07/11/22  1702   TBRES  --   --  Indeterminate*   LYMPH 41 51  --        Virology:  Coronavirus-19 testing    Recent Labs   Lab Test 07/12/22  1449 06/16/22  1349   [id number] Negative Negative       Respiratory virus (non-coronavirus-19) testing    Recent Labs   Lab Test 07/08/22  1220   IFLUA Not Detected   FLUAH1 Not Detected   PH3686 Not Detected   FLUAH3 Not Detected   IFLUB Not Detected   PIV1 Not Detected   PIV2 Not Detected   PIV3 Not Detected   PIV4 Not Detected   RSVA Not Detected   RSVB Not Detected   HMPV Not Detected   RHINEV Not Detected   ADENOV Not Detected   BARTHOLOMEW Not Detected       Virus Serology Table     Recent Labs   Lab Test 06/16/22  1724   I2WUHDR 0.13   H1IGG No HSV-1 IgG antibodies detected.   H2IGG No HSV-2 IgG antibodies detected.   EBVCAGIV <10.0   CMVIGGIV <0.20   CMVIGG No detectable antibody.       Imaging:  Recent Results (from the past 48 hour(s))   US Upper  Extremity Venous Duplex Left   Result Value    Radiologist flags Left arm DVT (Urgent)    Narrative    EXAMINATION: DOPPLER VENOUS ULTRASOUND OF THE LEFT UPPER EXTREMITY,  7/12/2022 8:43 PM     COMPARISON: CT chest abdomen pelvis 7/7/2020 20    HISTORY: Left arm redness, pain    TECHNIQUE:  Gray-scale evaluation with compression, spectral flow and  color Doppler assessment of the deep venous system of the left upper  extremity.    FINDINGS:  Left: Normal blood flow and waveforms are demonstrated in the internal  jugular, innominate, and subclavian veins. Nonocclusive thrombus in  the left axillary vein with occlusive thrombus in one of the paired  left brachial veins of the upper and mid arm. Occlusive thrombus in  the left basilic vein at the upper and mid arm. The left basilic vein  at the proximal forearm is fully compressible with nonocclusive  thrombus in the mid forearm. The cephalic vein is fully compressible.      Impression    IMPRESSION:  1. Nonocclusive deep venous thrombosis in the left axillary vein with  occlusive thrombosis extending through one of the paired left brachial  veins of the upper and mid arm.  2. Occlusive superficial venous thrombosis of the left basilic vein at  the upper and mid arm, as well as in the mid forearm.    [Urgent Result: Left arm DVT]    Finding was identified on 7/12/2022 9:07 PM.     Dr. Gross was contacted by Dr. Henao at 7/12/2022 9:20 PM and  verbalized understanding of the urgent finding.     I have personally reviewed the examination and initial interpretation  and I agree with the findings.    FENG GUZMAN MD         SYSTEM ID:  [id number]          EXAMINATION: CT CHEST/ABDOMEN/PELVIS W CONTRAST, 7/7/2022 1:22 PM  INDICATION: persistent neutropenic fever  COMPARISON STUDY: Radiograph 6/29/2022. CT 6/15/2022.  FINDINGS:  Lines, tubes, devices: Left lower extremity PICC with tip terminating  at the superior cavoatrial junction.  CHEST: Lungs: Unchanged 4 mm solid  pulmonary nodule of the left upper lobe  laterally (series 5 image 153). Remaining additional sub-4 mm solid  pulmonary nodules are stable compared to prior. Similar subtle diffuse  mosaic groundglass changes and presumed fibroatelectasis of the  posterior aspect of the right upper lobe. The central tracheobronchial  tree is patent. No areas of bronchiectasis or architectural  distortion. No pleural effusion, pulmonary consolidation, or  pneumothorax. Mild bibasilar atelectasis.   Mediastinum: The visualized thyroid is unremarkable.Heart size is  within normal limits. No pericardial effusion. The thoracic aorta and  main pulmonary artery are within normal limits. Standard branching  pattern of the great vessels. No abnormal thoracic lymph nodes.  Moderate hiatal hernia, mild increased streakiness and thickening  along the intrathoracic stomach and distal esophagus.  ABDOMEN/PELVIS:  Liver: No mass. No intrahepatic biliary ductal dilation.  Similar  hypoattenuation along the falciform ligament likely focal fatty infiltration.     Biliary System: Decompressed appearance of the gallbladder. No  extrahepatic biliary ductal dilation.  Pancreas: No mass or pancreatic ductal dilation.   Adrenal glands: No mass or nodules   Spleen: Normal.  Kidneys: No suspicious mass, obstructing calculus or hydronephrosis.   Gastrointestinal tract : No evidence for infiltrate appendix. Normal  caliber small bowel.   Large bowel loop dilatation  Mesentery/peritoneum/retroperitoneum: There is hazy central mesenteric  attenuation with numerous subcentimeter lymph nodes in the central mesentery.    Lymph nodes: Numerous subcentimeter central mesenteric lymph nodes and  several enlarged lymph nodes for example a 1.2 cm short axis left  periaortic lymph node (series 7 image 333).  Vasculature: Patent major abdominal vasculature.  The major portal  vessels are patent.  Pelvis: Urinary bladder is normal.  Myomatous uterus. Ovaries within  normal  limits.  Osseous structures: No aggressive or acute osseous lesion.  Unchanged  opposing endplate degenerative changes L4-L5.    Soft tissues: Small fat-containing umbilical hernia.        Impression    IMPRESSION:  1. Small to moderate hiatal hernia with mild thickening  and streaky  density along the intrathoracic stomach and distal esophagus,  likely  to represent inflammatory/infective etiology.  2. Enlarged upper retroperitoneal especially para-aortic para-aortic  lymph node and mesenteric lymph nodes, compared to prior CT 6/16/2022  concerning for infective/inflammatory etiology. Consider attention on follow-up  3. Patchy streaky density in the posterior right upper lobe slightly  more conspicuous compared to prior CT, and diffuse mosaic attenuation,  may represent infection or inflammation.  4. Unchanged sub-6 mm pulmonary nodules, compared to prior CT 6/16/2022.

## 2022-07-13 NOTE — PLAN OF CARE
"Goal Outcome Evaluation:    [id number]    /79 (BP Location: Right arm)   Pulse 92   Temp 98.5  F (36.9  C) (Oral)   Resp 18   Ht 1.702 m (5' 7.01\")   Wt 113.9 kg (251 lb 3.2 oz)   SpO2 97%   BMI 39.33 kg/m      Reason for admission: Presented with leukocytosis and circulating blasts. 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 She initiated induction chemotherapy with 7+3+midostaurin (C1D1=6/22/22). Hospital course complicated by neutropenic fever and C.difficile colitis.  Activity: UAL  Pain: Denies  Neuro: AxOx4. Neuros intact.   Cardiac: WDL. Afebrile.   Respiratory: NLB on RA. O2 sats WDL.   GI/: Voiding not saving, reports WDL. Continued loose BM though pt reports decreasing frequency, reports 3 total on day.   Diet: Regular  Lines: DL PICC intact, HLx2. Site with bruising noted from dressing change.   Wounds: Bmbx site dressing CDI. R shin bx site with bacitracin per POC.   Labs/imaging: Reviewed. See chart. K+ replaced this shift, recheck 3.7.       Continue to monitor and follow POC    "

## 2022-07-13 NOTE — PLAN OF CARE
Goal Outcome Evaluation:  Blood counts increasing. Waiting for bone marrow biopsy results. Denied pain or nausea. Heparin drip discontinued and she was started on Lovenox. Up independently. Afebrile.

## 2022-07-13 NOTE — PROGRESS NOTES
[facility]  Hematology / Oncology Progress Note    Date of Admission: 6/16/2022  Date of Service (when I saw the patient): 07/13/2022     Assessment & Plan   Veda Marinelli is a 37 year old female with PMH significant for h/o COVID19 infection (10/2021), C.diff, and hypothyroidism who presented with leukocytosis and circulating blasts. 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 She initiated induction chemotherapy with 7+3+midostaurin (C1D1=6/22/22). Hospital course complicated by neutropenic fever and C.difficile colitis.    Today:  - D22 of 7+3+midostaurin   - Awaiting D21 BMBx results completed 7/12. Tolerated well.   - BMT Consult scheduled for 7/15/22  - Fevered overnight. T max 100.6F.   - Per ID recs, changed PO Fidaxomicin (7/8 - 7/13) to PO Vanco BID (ppx dosing). Recheck C diff is negative from 7/11.   - Changed to Zosyn on 7/10. Fevers significantly improving.   - Follow-up aspergillus (neg), fungitell (neg), Fungal BCx (NGTD), Fungal Abs (pending), histo urine/serum (neg)   - R lower shin lesion, continues to remain stable. Appears to have formed more of a pustule in the center. Dime size area of erythema, not warm, non-tender. Derm biopsy x2 completed  - Hemorrhoid -- pain stable, per previous documentation no e/o overt infection on 7/9 exam, CT CAP without focal findings/perirectal abscess (though she is neutropenic and may not be able to form a typical abscess)  - pending stool Helicobacter pylori, Quantiferon, AFB blood culture, Karius assay  - New pink macular coalescing rash to legs bilaterally. Minimally itchy. Trial triamcinolone cream PRN  - Health psych consult placed 7/10    HEME  # AML (FLT3 ITD(low), NPM1, IDH2)  Was in her usual state of health until 03/2022 when she underwent a routine physical with labs. On 3/8/2022, white blood cell count 2.5 (ANC 1.0), Hgb 13.6 with , platelets normal.  On 5/15/2022, she was noted to have further  decreasd white blood cell count of 1.6 (ANC 0.28) with hemoglobin 11. platelets were 133. She was ultimately referred to Hematology (Dr. Bob), who she saw on 6/7/22. Further blood workup was recommended and labs done 6/14. CBC revealed WBC 25.3 (ANC 0.8), Hgb 10.1 (), Plt 90K. On the differential and morphology review, 81% blasts were seen concerning for acute leukemia. She was advised to present to [facility] for further evaluation and management.   - Per verbal report from Heme Path fellow there was concern for Blanca rods. She was started on ATRA 6/16 PM-6/17 AM while awaiting PML-ANNELIESE testing. This was ultimately negative.   - s/p Hydrea (6/17-6/22) while awaiting final diagnostic studies  - 6/16 PB chromosomes: 46, XX. No abnormality.   - 6/16 PB FISH: no rearrangments of MLLT10, NUP98, or KMT2A.   - 6/17 BMBx: markedly hypercellular marrow (85-95% estimated cellularity), with markedly diminished trilineage hematopoiesis, no overt dysplasia in the evaluable elements, and 92% blasts  - FLT3 PCR: positive for FLT3-ITD(low) with allelic ratio 0.21 (corrected from previously reported value of 0.144 per staff message to Lamar Grover from molecular lab personnel)  - NGS panel: NPM1 positive, IDH2, FLT3-ITD  - HLA typing sent, BMT coordinators aware and working on arranging IP consultation (delayed due to insurance change/financial approval)  - receiving induction with 7+3 (cytarabine+daunorubicin PLUS midostaurin (D1= 6/22/22))  - D21 BMBx completed 7/12 at 11 AM. Results pending.    # Pancytopenia secondary to chemotherapy and AML  - Transfuse to maintain Hgb >7, Plt >10K     # New DVT in Left Upper Extremity  Left UE US (7/12) shows nonocclusive deep venous thrombosis in the left axillary vein with nocclusive thrombosis extending through one of the paired left brachial  veins of the upper and mid arm.  Additionally noted an occlusive superficial venous thrombosis of the left basilic vein at the upper and  mid arm, as well as in the mid forearm.  - Started on heparin drip. Switched to Lovenox 1mg/kg BID   - Preference would be a DOAC but unfortunately DOACs interact with Voriconazole, a planned antifungal for ppx.  - Will plan to continue treatment of DVT until resolution of thrombus. Will follow up with an US in 1 week. Able to stop Lovenox with resolution of DVT.   - Planning to pull PICC at discharge    ID  # Neutropenic Fever, recurrent  # Recurrent C.diff colitis  (1) Fevered to 101.6F on 6/23. Noted to have chills though no other focal symptoms. Afebrile thereafter.  - 6/16 baseline CT CAP without evidence for infectious source   - 6/23: BCx NG, UA/UCx NGTD, CXR clear  - s/p IV Cefepime (x 6/23-6/27). De-escalated back to ppx Levaquin on 6/27.  (2) Recurrent fever on 6/29 PM. Persistent temps 6/29-6/30, then afebrile since 6/30 afternoon. BCx, UA, CXR negative. C.diff positive on 7/1  - restarted on Cefepime 2g IV q8hr (x 6/29 PM - 7/4 AM)  (3) Recurrent fever 100.4-->101 (7/5-7/6), restarted Cefepime (x 7/5). Given ongoing high fevers. Abx switched to Zosyn IV (7/10). With switch to Zosyn, patient is now afebrile for 24 hours. Overall improving today though did have a breakthrough fever to 100.6F (7/12). Ongoing diarrhea which has plateaued in frequency, though still loose appear to be less watery. New sore throat and clear rhinorrhea without associated nasal/sinus congestion on 7/8, stable at this time. Right shin lesion stable. Lactic acids have not been elevated.   - 7/5 BCx NGTD, UCx NG  - 7/6 MRSA swab negative  - 7/7, 7/8, 7/9 BCx NGTD  - CT CAP (7/7): subtle diffuse mosaic groundglass changes and presumed fibroatelectasis in RUL. No pulm consolidation. Moderate hiatal hernia with mild thickening and streaky density along intrathoracic stomach and distal esophagus, enlarged upper RP LN and mesenteric lymph nodes (infectious vs. Inflammatory).   - 7/8: strep PCR negative, RVP negative  - 7/8: aspergillus  "(neg), fungitell (neg), Fungal BCx (NGTD), Fungal Abs (pending), histo urine/serum (neg)   - if ongoing fevers,will consider CT Sinus to complete imaging workup  - given persistent fevers despite Tylenol, could try dose of Celebrex if fevers not responding to Tylenol.   - low threshold to add IV Vanco or increase antifungal coverage     **Antibiotics:  - s/p Cefepime (6/23-6/27, 6/29-7/4), (x 7/5 - 7/10).   - Zosyn 4.5g IV q6hr (x 7/10) given persistent fevers  - s/p PO Vancomycin 125mg QID (x 7/1-7/8).  - s/p Fidaxomicin 200mg PO BID (x7/8-7/13)   - Restarted PO Vancomycin 125mg PO BID (7/13-x)    # ID PPx  - ACV 400mg BID  - holding Levaquin ppx in setting of b/s abx  - Micafungin 50mg IV daily. Given that she will be on midostaurin, will keep on Micafungin to limit potential interactions with posaconazole/voriconazole. Would plan to transition on Friday to allow patient to \"wash out\" the midostaurin in her system   - Prior Auth approved for both posaconazole and voriconazole. However, as of  6/29, pt has not met deductible (but should meet it once hospital stay is billed). At present, monthly cost for Posaconazole $2048.66, Voriconazole $176.41.     # h/o COVID-19  Pt is not vaccinated nor interested in being vaccinated. She was admitted from 10/1/2021 - 10/13/2021 for acute hypoxic respiratory failure from COVID-19 pneumonia.  Required IMC, high flow and intermittent CPAP. She was treated with dexamethasone, remdesivir, and baricitinib in addition to azithromycin and ceftriaxone. Recovered symptomatically from this.    GI  # H/o C.diff (10/2021)  # Hemorrhoid  # 1st recurrence of C.diff (7/1)  # Diarrhea  H/o C.diff (10/20/21) and course was complicated by abd pain/cramping and prolonged recovery of stool consistency as well as hemorrhoid which does wax/wane and is sometimes painful. She primarily has used witch hazel pads when symptomatic. Pt reported normal bowels at time of AML diagnosis. Developed " intermittent loose stools throughout admission, then increased frequency of loose/watery stools overnight 6/30-7/1. Rechecked C.diff (7/1), positive.   - 6/16 CT A/P negative for perirectal abscess.  - TUCKS PRN, Prep H PRN (7/4), dilt cream PRN (7/6)  - Avoid PTA probiotic at this time.  - baseline 6/19 C. Diff negative so we deferred prophylactic PO Vancomycin during chemo course. Recurrent/increased loose stools reported on 7/1, checked C.diff and this was positive. S/p PO Vanco --> now on fidaxomicin as above.  - Stools are still loose, but less watery. Recheck of C diff is negative 7/11. Of note, patient may have diarrhea due to oral chemotherapy pill  - Will plan to continue PO Fidaxomicin (x 7/8 PM), adjusted from PO Vanco. Will consider change fidaxomicin to po vanco per ID recs  - See ID noted regarding enteric isolation    # Mucositis, improved  On ~6/27, pt noting inflammatory changes in b/l buccal mucosa and small lesion on right inner lower lip. Not painful or affecting PO intake. Since improved and pt has denied mouth pain/sores recently, eating/drinking well. Now with intermittent blood blisters but no mouth pain/sores.  - MMW PRN, viscous lidocaine PRN    # GERD  - TUMS PRN  - PPI daily     # Distal esophageal discomfort/dysphagia - improved  # Hiatal hernia (seen on 7/7 CT imaging)  # Mild thickening and streaky density along the intrathoracic stomach and distal esophagus, likely to represent inflammatory/infective etiology  On 6/29, patient reported new onset of epigastric discomfort when swallowing solid foods, not with liquids. No radiation to back or other areas of abdomen, no RUQ tenderness. Denies reflux, nausea or emesis. Already on PPI as above. No e/o oral candidiasis, on micafungin ppx. Question relation to GI mucosal changes? Nearly resolved on 7/4. Now with intermittent exacerbations but not nearly what it was previously.   - Trial Carafate before meals and at nighttime --> given  improvement, backed off to PRN (x 7/4)  - simethicone PRN, Maalox PRN, GI Cocktail PRN  - Continue to monitor, could consider barium swallow study if persistent  - On 7/7 - CT noted mild thickening  and streaky density along the intrathoracic stomach and distal esophagus, likely to represent inflammatory/infective etiology. Will continue to follow by imaging and symptoms. Will consider EGD when patient's counts recover if needed.     ENT  # Sore throat, intermittent  # Clear rhinorrhea  Pt reported right-sided distal throat soreness on 7/8. Mild posterior OP erythema, no exudates or thrush noted on exam. Negative infectious workup as above, potentially due to mucositis changes. Suspect rhinorrhea may be due to loss of protective nasal cilia.   - 7/8: strep PCR negative, RVP negative  - PRN cepacol, hurricane spray  - could consider swallowing MMW, could also consider Nystatin swallow (though no e/o thrush in OP at this time)    CV  # Irregular rhythm  Pt having occasionally PVCs, noting more in the last couple days of ongoing fevers and more irregular on 7/8 exam. Improved on 7/10 exam.  - EKG (7/8) demonstrated sinus rhythm with occasional PVCs. QTc 450.      GYN  # Vaginal bleeding, resolved  Menstrual cycle began inpatient overnight 6/30-7/1. Usually heaviest flow is first ~3 days. Reviewed neutropenic precautions and advised not to use tampons at this time. Resolved as of 7/7.  - back down to Plt parameter 10K  - s/p Provera 10mg daily (7/1-7/6)    ENDO  # Hypothyroidism  PTA was on Synthroid 125mcg daily. TSH 0.04, T4 Free 1.48 (done 6/24). Per pt, her PCP reviewed and advised her to reduce her Synthroid to 100mcg daily. Ordered to start on 6/29.     DERM  # Bilateral ear erythema, pruritis - resolved  On 6/29, pt reported bilateral ear lobe erythema and irritation in ear canal. Believed to be related to cytarabine. S/p triamcinolone cream. Resolved early week of 7/4.      # Truncal rash - improving   Noted  rash to upper abdomen which progressed to upper torso and back throughout morning of 7/6. Not itchy or bothersome to pt. Suspect possible cytarabine rash. Pt has been on/off Cefepime and previously tolerated but rash to abx is also in differential. Can try triamcinolone cream to rash sites. Improving/evolving and now more petechial in nature.     # Pink Macular Rash to legs bilaterally  Noted to have a coalescing macular rash along legs bilaterally. Minimally itchy.   - trial triamcinolone cream PRN  - Monitor    # Lesion to R shin - stable   Noted on 7/6; outlined, monitor for worsening cellulitis. On 7/7, slightly improved in degree of erythema and slightly retracted from drawn border, less tender as well. However, there is a persistent area of induration/fluctuance. Stable 7/8-7/10. No new lesions per pt.  - MRSA swab negative as above. However, if persistent fevers or HD unstable, low threshold to add IV Vanco.    - if appears to be worsening, or any new lesions, would consult Derm for bx  - trial bacitracin oint BID    FEN  - Regular diet as tolerated  - lyte repletion per unit protocol  - regular diet     PPx  - VTE: none due to thrombocytopenia  - GI: PPI     Dispo:  Anticipate 4+ week LOS for management of acute leukemia. Presented under Dr. Won Perkins.     Plan of care discussed with Dr. Scarlett MATAMOROS spent 50 minutes in the care of this patient today, which included time necessary for review of interval events, obtaining history and physical exam, adjusting medication orders, discussion with interdisciplinary/consult team(s), and documentation time. Over 50% of time was spent face-to-face and/or coordinating care.     Plan of care discussed with Dr. Bahena.    Gaby Arteaga PA-C  Hematology/Oncology   pager: [phone]      Interval History   Febrile to 100.6F  Overall feeling ok. Stool frequency remains the same. Still loose though less watery. States she can go hours without a bowel movement and then  will have 3 within an hour. Stable.  No abdominal pain, nausea, discomfort.   Hemorrhoid stable.  New rash noted on legs bilaterally. Minimally itchy. No pain.  Discussed plan for today. All questions answered.   Denies HA, vision changes, or sinus pressure. Denies mouth pain/sores. Denies SOB or cough. Denies dysuria.   Lesion to right shin remains stable per patient. Hemorrhoid discomfort is stable.   Despite everything, she is still working remotely for a few hours each day.     Physical Exam   Temp: 98.6  F (37  C) Temp src: Oral BP: 131/77 Pulse: 87   Resp: 18 SpO2: 93 % O2 Device: None (Room air)    Vitals:    07/11/22 0738 07/12/22 1008 07/13/22 0743   Weight: 114.1 kg (251 lb 9.6 oz) 114.5 kg (252 lb 6.4 oz) 113.9 kg (251 lb 3.2 oz)     Vital Signs with Ranges  Temp:  [97.7  F (36.5  C)-100.6  F (38.1  C)] 98.6  F (37  C)  Pulse:  [] 87  Resp:  [16-18] 18  BP: (100-131)/(59-84) 131/77  SpO2:  [93 %-100 %] 93 %  I/O last 3 completed shifts:  In: 30 [I.V.:30]  Out: -      Constitutional: Pleasant female sitting up in chair. Awake and conversational. Non-toxic appearing. No acute distress.  HEENT: NC/AT. Wearing head scarf. Sclerae anicteric. Moist mucus membranes.   Respiratory: Breathing comfortable with no increased work on room air. Lungs CTA b/l. No wheezing or crackles.  Cardiovascular: RRR, no skipped beats today. No murmur appreciated. No peripheral edema.    GI: Normal BS. Abdomen is soft, non-distended, non-tender.   : External hemorrhoid without thrombosed appearance, no surrounding erythema or induration on very superficial rectal exam (7/9)  Skin: Skin is clean, dry, intact. Faint pink papular rash to trunk has further faded with evolving petechial appearance at this time (7/9). ~Dime size erythematous (less purple) lesion with central dried pustular-like appearance and underlying induration/fluctuance to right lower shin; outlined and remains slightly retracted within borders but stable  from 7/7. Nontender. No warmth  7/13 - pink macular coalescing rash to legs bilaterally. No open sores.  Musculoskeletal/ Extremities: Extremities grossly normal in appearance.  Neurologic: Alert and oriented. Speech normal. Grossly nonfocal.   VAD: PICC line in LUE, c/d/i. She has new ecchymoses proximal to PICC site (noted 7/8), stable today.         Medications     heparin 1,950 Units/hr (07/13/22 0748)     - MEDICATION INSTRUCTIONS -         acyclovir  400 mg Oral BID     bacitracin   Topical BID     fidaxomicin  200 mg Oral BID     levothyroxine  100 mcg Oral QAM AC     micafungin  50 mg Intravenous Q24H     multivitamin w/minerals  1 tablet Oral Daily     pantoprazole  40 mg Oral QAM AC     piperacillin-tazobactam  4.5 g Intravenous Q6H     vitamin C  1,000 mg Oral Daily     vitamin D3  50 mcg Oral Daily       Data   Results for orders placed or performed during the hospital encounter of 06/16/22 (from the past 24 hour(s))   Asymptomatic COVID-19 Virus (Coronavirus) by PCR Nasopharyngeal    Specimen: Nasopharyngeal; Swab   Result Value Ref Range    SARS CoV2 PCR Negative Negative, Testing sent to reference lab. Results will be returned via unsolicited result    Narrative    Testing was performed using the Xpert Xpress SARS-CoV-2 Assay on the  Cepheid Gene-Xpert Instrument Systems. Additional information about  this Emergency Use Authorization (EUA) assay can be found via the Lab  Guide. This test should be ordered for the detection of SARS-CoV-2 in  individuals who meet SARS-CoV-2 clinical and/or epidemiological  criteria. Test performance is unknown in asymptomatic patients. This  test is for in vitro diagnostic use under the FDA EUA for  laboratories certified under CLIA to perform high complexity testing.  This test has not been FDA cleared or approved. A negative result  does not rule out the presence of PCR inhibitors in the specimen or  target RNA in concentration below the limit of detection for  the  assay. The possibility of a false negative should be considered if  the patient's recent exposure or clinical presentation suggests  COVID-19. This test was validated by the [facility]. This laboratory is certified under  the Clinical Laboratory Improvement Amendments of 1988 (CLIA-88) as  qualified to perform high complexity laboratory testing.     Tissue Aerobic Bacterial Culture Routine with Gram Stain    Specimen: Leg, Below Knee, Right; Tissue   Result Value Ref Range    Gram Stain Result No organisms seen     Gram Stain Result No white blood cells seen     Narrative    This specimen was received on a swab. Results may not be optimal. For maximum sensitivity of detection submit tissue, fluid or needle aspirate.   Acid-Fast Bacilli Culture and Stain    Specimen: Leg, Below Knee, Right; Tissue    Narrative    The following orders were created for panel order Acid-Fast Bacilli Culture and Stain.  Procedure                               Abnormality         Status                     ---------                               -----------         ------                     Acid-Fast Bacilli Cultur...[[id number]]                                                   Please view results for these tests on the individual orders.   Acid-Fast Bacilli Culture and Stain    Specimen: Leg, Below Knee, Right; Tissue    Narrative    The following orders were created for panel order Acid-Fast Bacilli Culture and Stain.  Procedure                               Abnormality         Status                     ---------                               -----------         ------                     Acid-Fast Bacilli Cultur...[[id number]]                      In process                   Please view results for these tests on the individual orders.   US Upper Extremity Venous Duplex Left   Result Value Ref Range    Radiologist flags Left arm DVT (Urgent)     Narrative    EXAMINATION: DOPPLER  VENOUS ULTRASOUND OF THE LEFT UPPER EXTREMITY,  7/12/2022 8:43 PM     COMPARISON: CT chest abdomen pelvis 7/7/2020 20    HISTORY: Left arm redness, pain    TECHNIQUE:  Gray-scale evaluation with compression, spectral flow and  color Doppler assessment of the deep venous system of the left upper  extremity.    FINDINGS:  Left: Normal blood flow and waveforms are demonstrated in the internal  jugular, innominate, and subclavian veins. Nonocclusive thrombus in  the left axillary vein with occlusive thrombus in one of the paired  left brachial veins of the upper and mid arm. Occlusive thrombus in  the left basilic vein at the upper and mid arm. The left basilic vein  at the proximal forearm is fully compressible with nonocclusive  thrombus in the mid forearm. The cephalic vein is fully compressible.      Impression    IMPRESSION:  1. Nonocclusive deep venous thrombosis in the left axillary vein with  occlusive thrombosis extending through one of the paired left brachial  veins of the upper and mid arm.  2. Occlusive superficial venous thrombosis of the left basilic vein at  the upper and mid arm, as well as in the mid forearm.    [Urgent Result: Left arm DVT]    Finding was identified on 7/12/2022 9:07 PM.     Dr. Gross was contacted by Dr. Henao at 7/12/2022 9:20 PM and  verbalized understanding of the urgent finding.     I have personally reviewed the examination and initial interpretation  and I agree with the findings.    FENG GUZMAN MD         SYSTEM ID:  [id number]   CBC with platelets   Result Value Ref Range    WBC Count 2.5 (L) 4.0 - 11.0 10e3/uL    RBC Count 2.39 (L) 3.80 - 5.20 10e6/uL    Hemoglobin 7.6 (L) 11.7 - 15.7 g/dL    Hematocrit 22.3 (L) 35.0 - 47.0 %    MCV 93 78 - 100 fL    MCH 31.8 26.5 - 33.0 pg    MCHC 34.1 31.5 - 36.5 g/dL    RDW 13.8 10.0 - 15.0 %    Platelet Count 116 (L) 150 - 450 10e3/uL   Lactic Acid STAT   Result Value Ref Range    Lactic Acid 0.6 (L) 0.7 - 2.0 mmol/L   CBC with platelets  differential    Narrative    The following orders were created for panel order CBC with platelets differential.  Procedure                               Abnormality         Status                     ---------                               -----------         ------                     CBC with platelets and d...[[id number]]  Abnormal            Final result               Manual Differential[[id number]]          Abnormal            Final result                 Please view results for these tests on the individual orders.   Heparin Unfractionated Anti Xa Level   Result Value Ref Range    Anti Xa Unfractionated Heparin 0.18 For Reference Range, See Comment IU/mL    Narrative    Therapeutic Range: UFH: 0.25-0.50 IU/mL for low intensity dosing,  0.30-0.70 IU/mL for high intensity dosing DVT and PE.  This test is not validated for other direct factor X inhibitors (e.g. rivaroxaban, apixaban, edoxaban, betrixaban, fondaparinux) and should not be used for monitoring of other medications.   Basic metabolic panel   Result Value Ref Range    Creatinine 0.46 (L) 0.51 - 0.95 mg/dL    Sodium 141 136 - 145 mmol/L    Potassium 3.4 3.4 - 5.3 mmol/L    Urea Nitrogen 3.7 (L) 6.0 - 20.0 mg/dL    Chloride 106 98 - 107 mmol/L    Carbon Dioxide (CO2) 23 22 - 29 mmol/L    Anion Gap 12 7 - 15 mmol/L    Glucose 107 (H) 70 - 99 mg/dL    GFR Estimate >90 >60 mL/min/1.73m2    Calcium 8.7 8.6 - 10.0 mg/dL   Hepatic panel   Result Value Ref Range    Protein Total 6.4 6.4 - 8.3 g/dL    Albumin 3.3 (L) 3.5 - 5.2 g/dL    Bilirubin Total 0.5 <=1.2 mg/dL    Alkaline Phosphatase 53 35 - 104 U/L    AST 21 10 - 35 U/L    ALT 21 10 - 35 U/L    Bilirubin Direct <0.20 0.00 - 0.30 mg/dL   Magnesium   Result Value Ref Range    Magnesium 2.0 1.7 - 2.3 mg/dL   Phosphorus   Result Value Ref Range    Phosphorus 3.8 2.5 - 4.5 mg/dL   CBC with platelets and differential   Result Value Ref Range    WBC Count 3.1 (L) 4.0 - 11.0 10e3/uL    RBC Count 2.50 (L) 3.80 -  5.20 10e6/uL    Hemoglobin 8.0 (L) 11.7 - 15.7 g/dL    Hematocrit 23.2 (L) 35.0 - 47.0 %    MCV 93 78 - 100 fL    MCH 32.0 26.5 - 33.0 pg    MCHC 34.5 31.5 - 36.5 g/dL    RDW 13.7 10.0 - 15.0 %    Platelet Count 145 (L) 150 - 450 10e3/uL   Manual Differential   Result Value Ref Range    % Neutrophils 21 %    % Lymphocytes 41 %    % Monocytes 9 %    % Eosinophils 0 %    % Basophils 0 %    % Metamyelocytes 9 %    % Myelocytes 14 %    % Promyelocytes 2 %    % Blasts 4 %    Absolute Neutrophils 0.7 (L) 1.6 - 8.3 10e3/uL    Absolute Lymphocytes 1.3 0.8 - 5.3 10e3/uL    Absolute Monocytes 0.3 0.0 - 1.3 10e3/uL    Absolute Eosinophils 0.0 0.0 - 0.7 10e3/uL    Absolute Basophils 0.0 0.0 - 0.2 10e3/uL    Absolute Metamyelocytes 0.3 (H) <=0.0 10e3/uL    Absolute Myelocytes 0.4 (H) <=0.0 10e3/uL    Absolute Promyelocytes 0.1 (H) <=0.0 10e3/uL    Absolute Blasts 0.1 (H) <=0.0 10e3/uL    RBC Morphology Confirmed RBC Indices     Platelet Assessment  Automated Count Confirmed. Platelet morphology is normal.     Automated Count Confirmed. Platelet morphology is normal.    Teardrop Cells Slight (A) None Seen     I have seen, interviewed, and examined the patient independently.  I have reviewed the vital signs and labs.  This note reflects my assessment and plan.      Veda Marinelli was seen and evaluated today as part of a shared APRN/PA visit. I reviewed today s vital signs, labs, imaging, notes, and other studies.   My key exam findings include: ANC and plts rising. 4% blasts in PB. Skin lesion biopsied. New picc DVT  Key management decisions made by me and carried out under my direction include:  1. Follow BMBx results. 1% blasts can often be seen in recovery in patients with CR but will monitor to make sure these are not rising  2. Appears to be recovering counts, will plan recovery marrow for 1 week after day 21 marrow  3. Skin lesion biopsy results pending   4. Appreciate ID input, will add vori if decompensates  5. New  picc DVT but plts good, will give lovenox (not DOAC due to interaction with vori which she will likely start at discharge)   I spent >40 minutes face-to-face and/or coordinating care. Over 50% of our time on the unit was spent counseling the patient and/or coordinating care regarding: management of AML and complications of therapy for AML    Alexandria Bhaena MD/PhD

## 2022-07-13 NOTE — PLAN OF CARE
[id number]:     A&O x4. UAL. VSS on RA, pt afebrile this shift. Reports mild tenderness at BMBx site (right lower back) and derm biopsy site (right shin) - declined offered pain meds. Pt denies having any N/V or SOB. Last BM: 7/12/22 - pt reports stools continue to be loose, also reports AUOP. Rash on bilateral calfs present, pt denies any discomfort. Heparin gtt infusing @ 1800 units/hour, heparin Xa drawn @ 0600: 0.18 bolus given and rate adjusted per protocol. Continue POC.

## 2022-07-14 ENCOUNTER — DOCUMENTATION ONLY (OUTPATIENT)
Dept: ONCOLOGY | Facility: CLINIC | Age: 37
End: 2022-07-14

## 2022-07-14 ENCOUNTER — APPOINTMENT (OUTPATIENT)
Dept: PHYSICAL THERAPY | Facility: CLINIC | Age: 37
DRG: 834 | End: 2022-07-14
Payer: COMMERCIAL

## 2022-07-14 LAB
ANION GAP SERPL CALCULATED.3IONS-SCNC: 11 MMOL/L (ref 7–15)
ASPERGILLUS AB SER QL ID: NORMAL
B DERMAT AB SER QL ID: NORMAL
BACTERIA BLD CULT: NO GROWTH
BACTERIA BLD CULT: NO GROWTH
BASOPHILS # BLD MANUAL: 0 10E3/UL (ref 0–0.2)
BASOPHILS NFR BLD MANUAL: 0 %
BLASTS # BLD MANUAL: 0.3 10E3/UL
BLASTS NFR BLD MANUAL: 5 %
BUN SERPL-MCNC: 3.7 MG/DL (ref 6–20)
C IMMITIS AB SER QL ID: NOT DETECTED
CALCIUM SERPL-MCNC: 9.1 MG/DL (ref 8.6–10)
CHLORIDE SERPL-SCNC: 105 MMOL/L (ref 98–107)
CREAT SERPL-MCNC: 0.53 MG/DL (ref 0.51–0.95)
DEPRECATED HCO3 PLAS-SCNC: 23 MMOL/L (ref 22–29)
EOSINOPHIL # BLD MANUAL: 0 10E3/UL (ref 0–0.7)
EOSINOPHIL NFR BLD MANUAL: 0 %
ERYTHROCYTE [DISTWIDTH] IN BLOOD BY AUTOMATED COUNT: 14.2 % (ref 10–15)
GFR SERPL CREATININE-BSD FRML MDRD: >90 ML/MIN/1.73M2
GLUCOSE SERPL-MCNC: 105 MG/DL (ref 70–99)
H CAPSUL AB SER QL ID: NORMAL
HCT VFR BLD AUTO: 24.1 % (ref 35–47)
HGB BLD-MCNC: 8.1 G/DL (ref 11.7–15.7)
LYMPHOCYTES # BLD MANUAL: 1.9 10E3/UL (ref 0.8–5.3)
LYMPHOCYTES NFR BLD MANUAL: 29 %
MCH RBC QN AUTO: 31.8 PG (ref 26.5–33)
MCHC RBC AUTO-ENTMCNC: 33.6 G/DL (ref 31.5–36.5)
MCV RBC AUTO: 95 FL (ref 78–100)
METAMYELOCYTES # BLD MANUAL: 0.3 10E3/UL
METAMYELOCYTES NFR BLD MANUAL: 5 %
MONOCYTES # BLD MANUAL: 0.8 10E3/UL (ref 0–1.3)
MONOCYTES NFR BLD MANUAL: 13 %
MYELOCYTES # BLD MANUAL: 0.9 10E3/UL
MYELOCYTES NFR BLD MANUAL: 14 %
NEUTROPHILS # BLD MANUAL: 2.1 10E3/UL (ref 1.6–8.3)
NEUTROPHILS NFR BLD MANUAL: 33 %
PATH REPORT.COMMENTS IMP SPEC: NORMAL
PATH REPORT.COMMENTS IMP SPEC: NORMAL
PATH REPORT.FINAL DX SPEC: NORMAL
PATH REPORT.GROSS SPEC: NORMAL
PATH REPORT.MICROSCOPIC SPEC OTHER STN: NORMAL
PATH REPORT.MICROSCOPIC SPEC OTHER STN: NORMAL
PATH REPORT.RELEVANT HX SPEC: NORMAL
PLAT MORPH BLD: ABNORMAL
PLATELET # BLD AUTO: 253 10E3/UL (ref 150–450)
POTASSIUM SERPL-SCNC: 3.6 MMOL/L (ref 3.4–5.3)
PROMYELOCYTES # BLD MANUAL: 0.1 10E3/UL
PROMYELOCYTES NFR BLD MANUAL: 1 %
RBC # BLD AUTO: 2.55 10E6/UL (ref 3.8–5.2)
RBC MORPH BLD: ABNORMAL
SODIUM SERPL-SCNC: 139 MMOL/L (ref 136–145)
WBC # BLD AUTO: 6.4 10E3/UL (ref 4–11)

## 2022-07-14 PROCEDURE — 88305 TISSUE EXAM BY PATHOLOGIST: CPT | Mod: 26 | Performed by: STUDENT IN AN ORGANIZED HEALTH CARE EDUCATION/TRAINING PROGRAM

## 2022-07-14 PROCEDURE — 250N000013 HC RX MED GY IP 250 OP 250 PS 637: Performed by: STUDENT IN AN ORGANIZED HEALTH CARE EDUCATION/TRAINING PROGRAM

## 2022-07-14 PROCEDURE — 99233 SBSQ HOSP IP/OBS HIGH 50: CPT | Performed by: INTERNAL MEDICINE

## 2022-07-14 PROCEDURE — 88311 DECALCIFY TISSUE: CPT | Mod: 26 | Performed by: STUDENT IN AN ORGANIZED HEALTH CARE EDUCATION/TRAINING PROGRAM

## 2022-07-14 PROCEDURE — 36592 COLLECT BLOOD FROM PICC: CPT | Performed by: PHYSICIAN ASSISTANT

## 2022-07-14 PROCEDURE — 97530 THERAPEUTIC ACTIVITIES: CPT | Mod: GP

## 2022-07-14 PROCEDURE — 250N000011 HC RX IP 250 OP 636: Performed by: PHYSICIAN ASSISTANT

## 2022-07-14 PROCEDURE — 85060 BLOOD SMEAR INTERPRETATION: CPT | Mod: GC | Performed by: STUDENT IN AN ORGANIZED HEALTH CARE EDUCATION/TRAINING PROGRAM

## 2022-07-14 PROCEDURE — 97110 THERAPEUTIC EXERCISES: CPT | Mod: GP

## 2022-07-14 PROCEDURE — 82310 ASSAY OF CALCIUM: CPT | Performed by: PHYSICIAN ASSISTANT

## 2022-07-14 PROCEDURE — 88341 IMHCHEM/IMCYTCHM EA ADD ANTB: CPT | Mod: 26 | Performed by: STUDENT IN AN ORGANIZED HEALTH CARE EDUCATION/TRAINING PROGRAM

## 2022-07-14 PROCEDURE — 258N000003 HC RX IP 258 OP 636: Performed by: PHYSICIAN ASSISTANT

## 2022-07-14 PROCEDURE — 85027 COMPLETE CBC AUTOMATED: CPT | Performed by: PHYSICIAN ASSISTANT

## 2022-07-14 PROCEDURE — 250N000013 HC RX MED GY IP 250 OP 250 PS 637: Performed by: INTERNAL MEDICINE

## 2022-07-14 PROCEDURE — 88313 SPECIAL STAINS GROUP 2: CPT | Mod: 26 | Performed by: STUDENT IN AN ORGANIZED HEALTH CARE EDUCATION/TRAINING PROGRAM

## 2022-07-14 PROCEDURE — 85007 BL SMEAR W/DIFF WBC COUNT: CPT | Performed by: PHYSICIAN ASSISTANT

## 2022-07-14 PROCEDURE — 88342 IMHCHEM/IMCYTCHM 1ST ANTB: CPT | Mod: 26 | Performed by: STUDENT IN AN ORGANIZED HEALTH CARE EDUCATION/TRAINING PROGRAM

## 2022-07-14 PROCEDURE — 120N000002 HC R&B MED SURG/OB UMMC

## 2022-07-14 PROCEDURE — 250N000013 HC RX MED GY IP 250 OP 250 PS 637: Performed by: PHYSICIAN ASSISTANT

## 2022-07-14 RX ORDER — TETRAHYDROZOLINE HCL 0.05 %
1-2 DROPS OPHTHALMIC (EYE) 4 TIMES DAILY PRN
Status: DISCONTINUED | OUTPATIENT
Start: 2022-07-14 | End: 2022-07-15 | Stop reason: HOSPADM

## 2022-07-14 RX ORDER — LEVOFLOXACIN 750 MG/1
750 TABLET, FILM COATED ORAL DAILY
Status: DISCONTINUED | OUTPATIENT
Start: 2022-07-14 | End: 2022-07-15 | Stop reason: HOSPADM

## 2022-07-14 RX ADMIN — PANTOPRAZOLE SODIUM 40 MG: 40 TABLET, DELAYED RELEASE ORAL at 05:27

## 2022-07-14 RX ADMIN — PIPERACILLIN AND TAZOBACTAM 4.5 G: 4; .5 INJECTION, POWDER, FOR SOLUTION INTRAVENOUS at 05:28

## 2022-07-14 RX ADMIN — BACITRACIN: 500 OINTMENT TOPICAL at 19:46

## 2022-07-14 RX ADMIN — VANCOMYCIN HYDROCHLORIDE 125 MG: 125 CAPSULE ORAL at 19:46

## 2022-07-14 RX ADMIN — SODIUM CHLORIDE, PRESERVATIVE FREE 5 ML: 5 INJECTION INTRAVENOUS at 06:36

## 2022-07-14 RX ADMIN — ACYCLOVIR 400 MG: 400 TABLET ORAL at 19:46

## 2022-07-14 RX ADMIN — LEVOTHYROXINE SODIUM 100 MCG: 100 TABLET ORAL at 05:28

## 2022-07-14 RX ADMIN — Medication 1 TABLET: at 08:47

## 2022-07-14 RX ADMIN — OXYCODONE HYDROCHLORIDE AND ACETAMINOPHEN 1000 MG: 500 TABLET ORAL at 08:46

## 2022-07-14 RX ADMIN — ACYCLOVIR 400 MG: 400 TABLET ORAL at 08:47

## 2022-07-14 RX ADMIN — VANCOMYCIN HYDROCHLORIDE 125 MG: 125 CAPSULE ORAL at 08:47

## 2022-07-14 RX ADMIN — Medication 50 MCG: at 08:47

## 2022-07-14 RX ADMIN — SODIUM CHLORIDE, PRESERVATIVE FREE 5 ML: 5 INJECTION INTRAVENOUS at 17:36

## 2022-07-14 RX ADMIN — MICAFUNGIN 50 MG: 10 INJECTION, POWDER, LYOPHILIZED, FOR SOLUTION INTRAVENOUS at 16:06

## 2022-07-14 RX ADMIN — BACITRACIN: 500 OINTMENT TOPICAL at 08:47

## 2022-07-14 RX ADMIN — ENOXAPARIN SODIUM 100 MG: 100 INJECTION SUBCUTANEOUS at 19:46

## 2022-07-14 RX ADMIN — ENOXAPARIN SODIUM 100 MG: 100 INJECTION SUBCUTANEOUS at 08:47

## 2022-07-14 RX ADMIN — LEVOFLOXACIN 750 MG: 750 TABLET, FILM COATED ORAL at 12:07

## 2022-07-14 RX ADMIN — TETRAHYDROZOLINE HCL 2 DROP: 0.05 LIQUID OPHTHALMIC at 19:46

## 2022-07-14 ASSESSMENT — ACTIVITIES OF DAILY LIVING (ADL)
ADLS_ACUITY_SCORE: 18

## 2022-07-14 NOTE — PROGRESS NOTES
[facility]  Hematology / Oncology Progress Note    Date of Admission: 6/16/2022  Date of Service (when I saw the patient): 07/14/2022     Assessment & Plan   Veda Marinelli is a 37 year old female with PMH significant for h/o COVID19 infection (10/2021), C.diff, and hypothyroidism who presented with leukocytosis and circulating blasts. 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 She initiated induction chemotherapy with 7+3+midostaurin (C1D1=6/22/22). Hospital course complicated by neutropenic fever and C.difficile colitis.    Today:  - D23 of 7+3+midostaurin   - Awaiting D21 BMBx results   - Flow Cytometry - Increased myeloid blasts (14%). Of note, the immunophenotype of the blasts in this study is different from the immunophenotype at the time of this patient's diagnosis. These immunophenotypic findings may represent persistent leukemia with antigenic shift versus brisk marrow regeneration. Will await results from morphology. If patient has persistent leukemia, she may need to remain admitted for further chemotherapy.   - Morphology pending   - BMT Consult scheduled for 7/15/22  - Afebrile for >24hours  - Per ID recs, changed PO Fidaxomicin (7/8 - 7/13) to PO Vanco BID (ppx dosing). Recheck C diff is negative from 7/11.   - Changed Zosyn (7/10-7/14) to Levaquin on 7/14. Fevers resolving. Will consider stopping treatment dose abx in the coming days.   - R lower shin lesion, continues to remain stable. Appears to have formed more of a pustule in the center. Dime size area of erythema, not warm, non-tender. Derm biopsy x2 completed  - Hemorrhoid -- pain stable, per previous documentation no e/o overt infection on 7/9 exam, CT CAP without focal findings/perirectal abscess (though she is neutropenic and may not be able to form a typical abscess)  - Negative stool Helicobacter pylori   - Quantiferon indeterminate, AFB blood culture NGTD  - Karius assay pending  - Joel darby  macular coalescing rash to legs bilaterally. Minimally itchy. Trial triamcinolone cream PRN. Somewhat worsening today. Will monitor closely.   - Health psych consulted, patient appreciate visit    HEME  # AML (FLT3 ITD(low), NPM1, IDH2)  Was in her usual state of health until 03/2022 when she underwent a routine physical with labs. On 3/8/2022, white blood cell count 2.5 (ANC 1.0), Hgb 13.6 with , platelets normal.  On 5/15/2022, she was noted to have further decreasd white blood cell count of 1.6 (ANC 0.28) with hemoglobin 11. platelets were 133. She was ultimately referred to Hematology (Dr. Bob), who she saw on 6/7/22. Further blood workup was recommended and labs done 6/14. CBC revealed WBC 25.3 (ANC 0.8), Hgb 10.1 (), Plt 90K. On the differential and morphology review, 81% blasts were seen concerning for acute leukemia. She was advised to present to [facility] for further evaluation and management.   - Per verbal report from Heme Path fellow there was concern for Blanca rods. She was started on ATRA 6/16 PM-6/17 AM while awaiting PML-ANNELIESE testing. This was ultimately negative.   - s/p Hydrea (6/17-6/22) while awaiting final diagnostic studies  - 6/16 PB chromosomes: 46, XX. No abnormality.   - 6/16 PB FISH: no rearrangments of MLLT10, NUP98, or KMT2A.   - 6/17 BMBx: markedly hypercellular marrow (85-95% estimated cellularity), with markedly diminished trilineage hematopoiesis, no overt dysplasia in the evaluable elements, and 92% blasts  - FLT3 PCR: positive for FLT3-ITD(low) with allelic ratio 0.21 (corrected from previously reported value of 0.144 per staff message to Lamar Grover from molecular lab personnel)  - NGS panel: NPM1 positive, IDH2, FLT3-ITD  - HLA typing sent, BMT coordinators aware and working on arranging IP consultation (delayed due to insurance change/financial approval)  - receiving induction with 7+3 (cytarabine+daunorubicin PLUS midostaurin (D1= 6/22/22))  - D21 BMBx  completed 7/12 at 11 AM. Results pending.    # Pancytopenia secondary to chemotherapy and AML  - Transfuse to maintain Hgb >7, Plt >10K     # New DVT in Left Upper Extremity  Left UE US (7/12) shows nonocclusive deep venous thrombosis in the left axillary vein with nocclusive thrombosis extending through one of the paired left brachial  veins of the upper and mid arm.  Additionally noted an occlusive superficial venous thrombosis of the left basilic vein at the upper and mid arm, as well as in the mid forearm.  - Started on heparin drip. Switched to Lovenox 1mg/kg BID   - Preference would be a DOAC but unfortunately DOACs interact with Voriconazole, a planned antifungal for ppx.  - Will plan to continue treatment of DVT until resolution of thrombus.    - Plan for 2 weeks total of lovenox if patient has resolution of thrombus. Plan for a total of 2 week regardless if patient has resolution at the 1 week check   - Will follow up with an US in 1 week. If not yet resolved will check again.  - Planning to pull PICC at discharge    ID  # Neutropenic Fever, recurrent  # Recurrent C.diff colitis  (1) Fevered to 101.6F on 6/23. Noted to have chills though no other focal symptoms. Afebrile thereafter.  - 6/16 baseline CT CAP without evidence for infectious source   - 6/23: BCx NG, UA/UCx NGTD, CXR clear  - s/p IV Cefepime (x 6/23-6/27). De-escalated back to ppx Levaquin on 6/27.  (2) Recurrent fever on 6/29 PM. Persistent temps 6/29-6/30, then afebrile since 6/30 afternoon. BCx, UA, CXR negative. C.diff positive on 7/1  - restarted on Cefepime 2g IV q8hr (x 6/29 PM - 7/4 AM)  (3) Recurrent fever 100.4-->101 (7/5-7/6), restarted Cefepime (x 7/5). Given ongoing high fevers. Abx switched to Zosyn IV (7/10). With switch to Zosyn, patient is now afebrile for 24 hours. Overall improving today though did have a breakthrough fever to 100.6F (7/12). Ongoing diarrhea which has plateaued in frequency, though still loose appear to be  "less watery. New sore throat and clear rhinorrhea without associated nasal/sinus congestion on 7/8, stable at this time. Right shin lesion stable. Lactic acids have not been elevated.   - 7/5 BCx NGTD, UCx NG  - 7/6 MRSA swab negative  - 7/7, 7/8, 7/9 BCx NGTD  - CT CAP (7/7): subtle diffuse mosaic groundglass changes and presumed fibroatelectasis in RUL. No pulm consolidation. Moderate hiatal hernia with mild thickening and streaky density along intrathoracic stomach and distal esophagus, enlarged upper RP LN and mesenteric lymph nodes (infectious vs. Inflammatory).   - 7/8: strep PCR negative, RVP negative  - 7/8: Aspergillus (neg), fungitell (neg), Fungal BCx (NGTD), Fungal Abs (neg), histo urine/serum (neg)   - 7/14: Afebrile for >24 hours. Changed Zosyn (7/10-7/14) to Levaquin on 7/14. Will consider stopping treatment dose abx in the coming days.       **Antibiotics:  - s/p Cefepime (6/23-6/27, 6/29-7/4), (x 7/5 - 7/10).   - Zosyn 4.5g IV q6hr (7/10-7/14) given persistent fevers  - s/p PO Vancomycin 125mg QID (x 7/1-7/8).  - s/p Fidaxomicin 200mg PO BID (x7/8-7/13)   - Restarted PO Vancomycin 125mg PO BID (7/13-x)  - Started PO Levaquin 750 mg daily (7/14-x)    # ID PPx  - ACV 400mg BID  - holding Levaquin ppx in setting of b/s abx  - Micafungin 50mg IV daily. Given that she will be on midostaurin, will keep on Micafungin to limit potential interactions with posaconazole/voriconazole. Would plan to transition on Friday to allow patient to \"wash out\" the midostaurin in her system   - Prior Auth approved for both posaconazole and voriconazole. However, as of  6/29, pt has not met deductible (but should meet it once hospital stay is billed). At present, monthly cost for Posaconazole $2048.66, Voriconazole $176.41.     # h/o COVID-19  Pt is not vaccinated nor interested in being vaccinated. She was admitted from 10/1/2021 - 10/13/2021 for acute hypoxic respiratory failure from COVID-19 pneumonia.  Required IMC, " high flow and intermittent CPAP. She was treated with dexamethasone, remdesivir, and baricitinib in addition to azithromycin and ceftriaxone. Recovered symptomatically from this.    GI  # H/o C.diff (10/2021)  # Hemorrhoid  # 1st recurrence of C.diff (7/1)  # Diarrhea  H/o C.diff (10/20/21) and course was complicated by abd pain/cramping and prolonged recovery of stool consistency as well as hemorrhoid which does wax/wane and is sometimes painful. She primarily has used witch hazel pads when symptomatic. Pt reported normal bowels at time of AML diagnosis. Developed intermittent loose stools throughout admission, then increased frequency of loose/watery stools overnight 6/30-7/1. Rechecked C.diff (7/1), positive.   - 6/16 CT A/P negative for perirectal abscess.  - TUCKS PRN, Prep H PRN (7/4), dilt cream PRN (7/6)  - Avoid PTA probiotic at this time.  - baseline 6/19 C. Diff negative so we deferred prophylactic PO Vancomycin during chemo course. Recurrent/increased loose stools reported on 7/1, checked C.diff and this was positive. S/p PO Vanco --> now on fidaxomicin as above.  - Stools are still loose, but less watery. Recheck of C diff is negative 7/11. Of note, patient may have diarrhea due to oral chemotherapy pill  - Changed PO Fidaxomicin (7/8 - 7/13) to PO Vanco BID (ppx dosing). Recheck C diff is negative from 7/11.   - See ID noted regarding enteric isolation    # Mucositis - resolved  On ~6/27, pt noting inflammatory changes in b/l buccal mucosa and small lesion on right inner lower lip. Not painful or affecting PO intake. Since improved and pt has denied mouth pain/sores recently, eating/drinking well. Now with intermittent blood blisters but no mouth pain/sores.  - MMW PRN, viscous lidocaine PRN    # GERD  - TUMS PRN  - PPI daily     # Distal esophageal discomfort/dysphagia - improved  # Hiatal hernia (seen on 7/7 CT imaging)  # Mild thickening and streaky density along the intrathoracic stomach and distal  esophagus, likely to represent inflammatory/infective etiology  On 6/29, patient reported new onset of epigastric discomfort when swallowing solid foods, not with liquids. No radiation to back or other areas of abdomen, no RUQ tenderness. Denies reflux, nausea or emesis. Already on PPI as above. No e/o oral candidiasis, on micafungin ppx. Question relation to GI mucosal changes? Nearly resolved on 7/4. Now with intermittent exacerbations but not nearly what it was previously.   - Trial Carafate before meals and at nighttime --> given improvement, backed off to PRN (x 7/4)  - simethicone PRN, Maalox PRN, GI Cocktail PRN  - Continue to monitor, could consider barium swallow study if persistent  - On 7/7 - CT noted mild thickening  and streaky density along the intrathoracic stomach and distal esophagus, likely to represent inflammatory/infective etiology. Will continue to follow by imaging and symptoms. Will consider EGD when patient's counts recover if needed.     ENT  # Sore throat, intermittent  # Clear rhinorrhea  Pt reported right-sided distal throat soreness on 7/8. Mild posterior OP erythema, no exudates or thrush noted on exam. Negative infectious workup as above, potentially due to mucositis changes. Suspect rhinorrhea may be due to loss of protective nasal cilia.   - 7/8: strep PCR negative, RVP negative  - PRN cepacol, hurricane spray  - could consider swallowing MMW, could also consider Nystatin swallow (though no e/o thrush in OP at this time)    CV  # Irregular rhythm  Pt having occasionally PVCs, noting more in the last couple days of ongoing fevers and more irregular on 7/8 exam. Improved on 7/10 exam.  - EKG (7/8) demonstrated sinus rhythm with occasional PVCs. QTc 450.      GYN  # Vaginal bleeding, resolved  Menstrual cycle began inpatient overnight 6/30-7/1. Usually heaviest flow is first ~3 days. Reviewed neutropenic precautions and advised not to use tampons at this time. Resolved as of 7/7.  -  back down to Plt parameter 10K  - s/p Provera 10mg daily (7/1-7/6)    ENDO  # Hypothyroidism  PTA was on Synthroid 125mcg daily. TSH 0.04, T4 Free 1.48 (done 6/24). Per pt, her PCP reviewed and advised her to reduce her Synthroid to 100mcg daily. Ordered to start on 6/29.     DERM  # Bilateral ear erythema, pruritis - resolved  On 6/29, pt reported bilateral ear lobe erythema and irritation in ear canal. Believed to be related to cytarabine. S/p triamcinolone cream. Resolved early week of 7/4.      # Truncal rash - improving   Noted rash to upper abdomen which progressed to upper torso and back throughout morning of 7/6. Not itchy or bothersome to pt. Suspect possible cytarabine rash. Pt has been on/off Cefepime and previously tolerated but rash to abx is also in differential. Can try triamcinolone cream to rash sites. Improving/evolving and now more petechial in nature.     # Pink Macular Rash to legs bilaterally  Noted to have a coalescing macular rash along legs bilaterally. Minimally itchy. Concern that it is due to Zosyn given timing of antibiotics. Will stop Zosyn given patient is now afebrile.  - trial triamcinolone cream PRN  - Monitor    # Lesion to R shin - stable   Noted on 7/6; outlined, monitor for worsening cellulitis. On 7/7, slightly improved in degree of erythema and slightly retracted from drawn border, less tender as well. However, there is a persistent area of induration/fluctuance. Stable 7/8-7/10. No new lesions per pt.  - MRSA swab negative as above. However, if persistent fevers or HD unstable, low threshold to add IV Vanco.    - if appears to be worsening, or any new lesions, would consult Derm for bx  - trial bacitracin oint BID    FEN  - Regular diet as tolerated  - lyte repletion per unit protocol  - regular diet     PPx  - VTE: none due to thrombocytopenia  - GI: PPI     Dispo:  Anticipate 4+ week LOS for management of acute leukemia. Presented under Dr. Won Perkins.     Plan of care  discussed with Dr. Pantoja     I spent 50 minutes in the care of this patient today, which included time necessary for review of interval events, obtaining history and physical exam, adjusting medication orders, discussion with interdisciplinary/consult team(s), and documentation time. Over 50% of time was spent face-to-face and/or coordinating care.     Plan of care discussed with Dr. Bahena.    Gaby Arteaga PA-C  Hematology/Oncology   pager: [phone]      Interval History   No acute events overnight.  Patient is overall doing well. Continues to have rash on legs bialterally, extending down to feet. Minimally itchy. Stool frequency remains the same, somewhat less. Still loose though less watery. Feels that they are firming up some. States she can go hours without a bowel movement and then will have 2-3 within an hour. Stable.  No abdominal pain, nausea, discomfort.   Hemorrhoid stable.  Discussed plan for today. All questions answered.   Denies HA, vision changes, or sinus pressure. Denies mouth pain/sores. Denies SOB or cough. Denies dysuria.   Lesion to right shin remains stable per patient. Hemorrhoid discomfort is stable.   Despite everything, she is still working remotely for a few hours each day.     Physical Exam   Temp: 96.8  F (36  C) Temp src: Oral BP: 138/78 Pulse: 101   Resp: 16 SpO2: 100 % O2 Device: None (Room air)    Vitals:    07/12/22 1008 07/13/22 0743 07/14/22 0745   Weight: 114.5 kg (252 lb 6.4 oz) 113.9 kg (251 lb 3.2 oz) 115.4 kg (254 lb 6.4 oz)     Vital Signs with Ranges  Temp:  [96.7  F (35.9  C)-98.5  F (36.9  C)] 96.8  F (36  C)  Pulse:  [] 101  Resp:  [16-18] 16  BP: (108-138)/(65-83) 138/78  SpO2:  [94 %-100 %] 100 %  I/O last 3 completed shifts:  In: 1020 [P.O.:720; I.V.:300]  Out: -      Constitutional: Pleasant female sitting up in chair. Awake and conversational. Non-toxic appearing. No acute distress.  HEENT: NC/AT. Wearing head scarf. Sclerae anicteric. Moist mucus  membranes.   Respiratory: Breathing comfortable with no increased work on room air. Lungs CTA b/l. No wheezing or crackles.  Cardiovascular: RRR, no skipped beats today. No murmur appreciated. No peripheral edema.    GI: Normal BS. Abdomen is soft, non-distended, non-tender.   : External hemorrhoid without thrombosed appearance, no surrounding erythema or induration on very superficial rectal exam (7/9)  Skin: Skin is clean, dry, intact. Faint pink papular rash to trunk has further faded with evolving petechial appearance at this time (7/9). ~Dime size erythematous (less purple) lesion with central dried pustular-like appearance and underlying induration/fluctuance to right lower shin; outlined and remains slightly retracted within borders but stable from 7/7. Nontender. No warmth  7/13 - pink macular coalescing rash to legs bilaterally. No open sores.  Musculoskeletal/ Extremities: Extremities grossly normal in appearance.  Neurologic: Alert and oriented. Speech normal. Grossly nonfocal.   VAD: PICC line in E, c/d/i. She has new ecchymoses proximal to PICC site (noted 7/8), stable today.         Medications     - MEDICATION INSTRUCTIONS -         acyclovir  400 mg Oral BID     bacitracin   Topical BID     enoxaparin ANTICOAGULANT  100 mg Subcutaneous BID     levofloxacin  750 mg Oral Daily     levothyroxine  100 mcg Oral QAM AC     micafungin  50 mg Intravenous Q24H     multivitamin w/minerals  1 tablet Oral Daily     pantoprazole  40 mg Oral QAM AC     vancomycin  125 mg Oral BID     vitamin C  1,000 mg Oral Daily     vitamin D3  50 mcg Oral Daily       Data   Results for orders placed or performed during the hospital encounter of 06/16/22 (from the past 24 hour(s))   Potassium   Result Value Ref Range    Potassium 3.3 (L) 3.4 - 5.3 mmol/L   Potassium   Result Value Ref Range    Potassium 3.7 3.4 - 5.3 mmol/L   CBC with platelets differential    Narrative    The following orders were created for panel order  CBC with platelets differential.  Procedure                               Abnormality         Status                     ---------                               -----------         ------                     CBC with platelets and d...[[id number]]  Abnormal            Final result               Manual Differential[[id number]]          Abnormal            Final result                 Please view results for these tests on the individual orders.   Basic metabolic panel   Result Value Ref Range    Creatinine 0.53 0.51 - 0.95 mg/dL    Sodium 139 136 - 145 mmol/L    Potassium 3.6 3.4 - 5.3 mmol/L    Urea Nitrogen 3.7 (L) 6.0 - 20.0 mg/dL    Chloride 105 98 - 107 mmol/L    Carbon Dioxide (CO2) 23 22 - 29 mmol/L    Anion Gap 11 7 - 15 mmol/L    Glucose 105 (H) 70 - 99 mg/dL    GFR Estimate >90 >60 mL/min/1.73m2    Calcium 9.1 8.6 - 10.0 mg/dL   CBC with platelets and differential   Result Value Ref Range    WBC Count 6.4 4.0 - 11.0 10e3/uL    RBC Count 2.55 (L) 3.80 - 5.20 10e6/uL    Hemoglobin 8.1 (L) 11.7 - 15.7 g/dL    Hematocrit 24.1 (L) 35.0 - 47.0 %    MCV 95 78 - 100 fL    MCH 31.8 26.5 - 33.0 pg    MCHC 33.6 31.5 - 36.5 g/dL    RDW 14.2 10.0 - 15.0 %    Platelet Count 253 150 - 450 10e3/uL   Manual Differential   Result Value Ref Range    % Neutrophils 33 %    % Lymphocytes 29 %    % Monocytes 13 %    % Eosinophils 0 %    % Basophils 0 %    % Metamyelocytes 5 %    % Myelocytes 14 %    % Promyelocytes 1 %    % Blasts 5 %    Absolute Neutrophils 2.1 1.6 - 8.3 10e3/uL    Absolute Lymphocytes 1.9 0.8 - 5.3 10e3/uL    Absolute Monocytes 0.8 0.0 - 1.3 10e3/uL    Absolute Eosinophils 0.0 0.0 - 0.7 10e3/uL    Absolute Basophils 0.0 0.0 - 0.2 10e3/uL    Absolute Metamyelocytes 0.3 (H) <=0.0 10e3/uL    Absolute Myelocytes 0.9 (H) <=0.0 10e3/uL    Absolute Promyelocytes 0.1 (H) <=0.0 10e3/uL    Absolute Blasts 0.3 (H) <=0.0 10e3/uL    RBC Morphology Confirmed RBC Indices     Platelet Assessment  Automated Count Confirmed.  Platelet morphology is normal.     Automated Count Confirmed. Platelet morphology is normal.     I have seen, interviewed, and examined the patient independently.  I have reviewed the vital signs and labs.  This note reflects my assessment and plan.      Veda Marinelli was seen and evaluated today as part of a shared APRN/PA visit. I reviewed today s vital signs, labs, imaging, notes, and other studies.   My key exam findings include: ANC and plts rising. 4% blasts in PB. Skin lesion biopsied. New picc DVT  Key management decisions made by me and carried out under my direction include:  1. Follow BMBx results. blasts can often be seen in recovery in patients with CR but will monitor to make sure these are not rising. Flow with blasts, but these do not look like her leukemia and could represent reconstitution. Will await morphology. ANc and plts not fully recovered.  2. Has fully recovered counts, will plan recovery marrow for 1 week after day 21 marrow  3. Skin lesion biopsy results pending   4. Appreciate ID input, will add vori if decompensates  5. New picc DVT but plts good, will give lovenox (not DOAC due to interaction with vori)   I spent >40 minutes face-to-face and/or coordinating care. Over 50% of our time on the unit was spent counseling the patient and/or coordinating care regarding: management of AML and complications of therapy for AML    Alexandria Bahena MD/PhD

## 2022-07-14 NOTE — PLAN OF CARE
[id number]:      A&O x4. UAL. VSS on RA, pt afebrile this shift. Reports mild tenderness at BMBx site (right lower back) and derm biopsy site (right shin) - declined offered pain meds. Pt denies having any N/V or SOB. Last BM: 7/13/22 - pt reports stools continue to be loose but less frequent, also reports AUOP. Rash on bilateral calfs present, pt denies any discomfort. Continue POC.

## 2022-07-14 NOTE — PROGRESS NOTES
[facility]  Transplant Infectious Disease Progress Note     Patient:  Veda Marinelli, Date of birth 1985, Medical record number [id number]  Date of Visit:  07/14/2022         Assessment and Recommendations:   Recommendations:  - Consider decreasing the dose of Levaquin to a prophylaxis dose, if you feel she needs continued systemic bacterial prophylaxis, so that her bowel shani can begin to repopulate.  - If skin rash decreases as time away from zosyn improves, then we may want to list zosyn as an allergy.  - Continue twice daily oral vancomycin for prophylaxis against another recurrence of Clostridium difficile infection.  - Continue acyclovir for viral prophylaxis.  - Continue micafungin for fungal prophylaxis. May also be able to be discontinued soon, if white blood cell count stays stable.  - Await pending result of 7/12/2022 skin biopsy of right leg lesion.  - If she has a clinical decompensation, would add in voriconazole, since that is the treatment of choice for Fusarium.    Transplant Infectious Disease will continue to follow with you.      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 She was initiated on induction chemotherapy with 7+3+midostaurin (C1D1=6/22/22). Hospital course complicated by neutropenic fever and C.difficile colitis  Infectious Disease issues include:  - Febrile neutropenia, overall improving as her white blood cell count returns to normal. Onset is distinct from her Clostridium difficile infection. Changed from cefepime to zosyn on 7/10/2022, with some decrease in her overall temperature curve. 7/7/2022 CT imaging showed some mild thickening along the stomach and distal esophagus (DDx includes CMV and HSV but she is seronegative to both, as well as helicobacter and yeast, but she is too neutropenic for EGD). She  has some enlarged lymph nodes (DDx includes mycobacteria; Quantiferon indeterminate), as well as a patchy streaky density in the posterior right upper lobe. Workup ongoing, although fever curve is improving with empiric therapy and improved white blood cell count.   - Clostridium difficile infection, recurrent. First episode was in the fall of 2021 (10/20/2021), and she was treated with oral vancomycin. The current episode tested positive on 7/1/2022, in the setting of diarrhea and fever. She had been on oral vancomycin for nearly a week without significant improvement, and diarrhea related to her chemotherapy is in the differential diagnosis (Midostaurin (nearly 50% likelihood of diarrhea)). She was changed over to fidaxomicin on 7/8/2022, but fevers continued on 7/9 and 7/10/2022, so would change was made to her febrile neutropenic coverage. Today on 7/12/2022 she reports less diarrhea. Clostridium difficile was tested against 7/11/2022 and it was negative. Transitioned fidaxomicin to twice daily oral vancomycin for prophylaxis against another recurrence of Clostridium difficile infection 7/13/2022. - Right shin lesion. DDx in a neutropenic patient includes Fusarium, for which the drug of choice is voriconazole. 7/12/2022 skin biopsy pending.  - Hx of COVID-19 pneumonia 10/2021. She was admitted for 13 days and went home with supplemental O2 for a few more days. She was never intubated but did require Vapotherm for a few days. Received Remdesivir, Dexamethasone and Barictinib.  - QTc interval: 450 ms on 7/8/2022  - Bacterial prophylaxis: zosyn  - PCP prophylaxis: none  - Viral serostatus & prophylaxis: CMV neg, EBV neg, HSV1 neg, HSV2 neg. She had chickenpox as a child. So she is on acyclovir for shingles prophylaxis.  - Fungal prophylaxis: micafungin  - Immunization status: unknown  - Gamma globulin status: unknown  - Isolation status: Good hand hygiene. She is an enteric isolation until 7/31/2022, or hospital  discharge.    Leigh Marsh MD. Pager [phone]         Interval History:   Since Minerva was last seen by me on 7/13/2022, she has improvement in her white blood cell count, now up to 6.4. She has improvement in her stooling, did not wake her up overnight. Zosyn was changed over to Levaquin. No change in a rash below her knees, it is a macular minimally papular rash, does not itch, although it has been only today that zosyn was discontinued. No need for supplemental oxygen. Heparin drug was discontinued and she was started on Lovenox.    Review of Systems:  CONSTITUTIONAL:  No fever overnight  EYES: negative for icterus or an acute change in vision.   ENT:  negative for any acute hearing loss, tinnitus or sore throat  RESPIRATORY:  negative for cough, sputum, dyspnea  CARDIOVASCULAR: intermittent tachycardia continues  GASTROINTESTINAL:  negative for nausea, vomiting.   GENITOURINARY:  negative for dysuria or hematuria  HEME:  No easy bleeding. Last transfusion was on 7/12/2022  INTEGUMENT:  She had a skin biopsy 7/12/2022, and this skin site is little bit tender.  NEURO:  Negative for headache, tremor, or dizziness         Current Medications & Allergies:       acyclovir  400 mg Oral BID     bacitracin   Topical BID     enoxaparin ANTICOAGULANT  100 mg Subcutaneous BID     levofloxacin  750 mg Oral Daily     levothyroxine  100 mcg Oral QAM AC     micafungin  50 mg Intravenous Q24H     multivitamin w/minerals  1 tablet Oral Daily     pantoprazole  40 mg Oral QAM AC     vancomycin  125 mg Oral BID     vitamin C  1,000 mg Oral Daily     vitamin D3  50 mcg Oral Daily       Infusions/Drips:    - MEDICATION INSTRUCTIONS -         Allergies   Allergen Reactions     Blood Transfusion Related (Informational Only) Hives     6/29/2022, reaction of hives with platelet transfusion             Physical Exam:     Patient Vitals for the past 24 hrs:   BP Temp Temp src Pulse Resp SpO2 Weight   07/14/22 1308 138/78 96.8  F (36   C) Oral 101 16 100 % --   07/14/22 0923 124/74 (!) 96.7  F (35.9  C) Oral 97 16 99 % --   07/14/22 0745 -- -- -- -- -- -- 115.4 kg (254 lb 6.4 oz)   07/14/22 0533 108/65 97.3  F (36.3  C) Oral 91 18 94 % --   07/13/22 2219 119/79 98.5  F (36.9  C) Oral 92 18 97 % --   07/13/22 1755 124/83 96.9  F (36.1  C) Oral 77 16 98 % --   07/13/22 1440 134/79 98.2  F (36.8  C) Oral 89 18 100 % --     Ranges for vital signs:  Temp:  [96.7  F (35.9  C)-98.5  F (36.9  C)] 96.8  F (36  C)  Pulse:  [] 101  Resp:  [16-18] 16  BP: (108-138)/(65-83) 138/78  SpO2:  [94 %-100 %] 100 %  Vitals:    07/12/22 1008 07/13/22 0743 07/14/22 0745   Weight: 114.5 kg (252 lb 6.4 oz) 113.9 kg (251 lb 3.2 oz) 115.4 kg (254 lb 6.4 oz)       Physical Examination:  GENERAL:  well-developed, well-nourished woman, in no acute distress.  HEAD:  Head is normocephalic, atraumatic, alopecia   EYES:  Eyes have anicteric sclerae  ENT:  Oropharynx is moist.  NECK:  Supple.   LUNGS:  Clear to auscultation bilateral.   CARDIOVASCULAR:  Regular rate and rhythm with no murmur  ABDOMEN:  Normal bowel sounds, soft, nontender.   SKIN:  No acute rashes. Erythematous, circular lesion on right shin with 3 sutures in place, from two skin biopsies, covered with a Band-Aid. Left sided PICC in place with bruising.  NEUROLOGIC:  Grossly nonfocal. Active x4 extremities         Laboratory Data:     Metabolic Studies       Recent Labs   Lab Test 07/14/22  0813 07/13/22  2216 07/13/22  1638 07/13/22  0555 07/12/22  2329 07/10/22  1304 07/10/22  0629 07/09/22  0643 07/08/22  1938     --   --  141  --    < >  --    < >  --    POTASSIUM 3.6 3.7   < > 3.4  --    < >  --    < >  --    CHLORIDE 105  --   --  106  --    < >  --    < >  --    CO2 23  --   --  23  --    < >  --    < >  --    ANIONGAP 11  --   --  12  --    < >  --    < >  --    BUN 3.7*  --   --  3.7*  --    < >  --    < >  --    CR 0.53  --   --  0.46*  --    < >  --    < >  --    GFRESTIMATED >90  --   --   >90  --    < >  --    < >  --    *  --   --  107*  --    < >  --    < >  --    MICHAEL 9.1  --   --  8.7  --    < >  --    < >  --    PHOS  --   --   --  3.8  --    < >  --   --   --    MAG  --   --   --  2.0  --    < >  --   --   --    LACT  --   --   --   --  0.6*  --  0.6*   < >  --    FGTL  --   --   --   --   --   --   --   --  <31    < > = values in this interval not displayed.       Hepatic Studies    Recent Labs   Lab Test 07/13/22  0555 07/11/22  0732 07/08/22  0554 06/26/22  0659 06/25/22  0556 06/24/22  0550   BILITOTAL 0.5 0.5 1.0   < > 0.6 0.6   DBIL <0.20 <0.20 0.29   < > <0.20 <0.20   ALKPHOS 53 52 49   < > 35 42   PROTTOTAL 6.4 6.7 6.6   < > 5.9* 5.9*   ALBUMIN 3.3* 3.3* 3.7   < > 4.1 3.6   AST 21 14 7*   < > 18 31   ALT 21 16 10   < > 31 27   LDH  --   --   --   --  545* 889*    < > = values in this interval not displayed.       Pancreatitis testing    Recent Labs   Lab Test 06/30/22  0341 03/08/22  0930   AMYLASE 35  --    LIPASE 23  --    TRIG  --  93       Gout Labs      Recent Labs   Lab Test 06/30/22  0341 06/27/22  0620 06/26/22  0659 06/25/22  0556 06/24/22  1849   URIC 2.0* 3.0 3.7 4.6 4.2       Hematology Studies   Recent Labs   Lab Test 07/14/22  0813 07/13/22  0555 07/12/22  2329 07/12/22  0834 06/29/22  0626 06/28/22  0628   WBC 6.4 3.1* 2.5* 1.6*   < > 1.1*   ABLA 0.3* 0.1*  --  0.1*   < >  --    BLST 5 4  --  7   < >  --    ANEU 2.1 0.7*  --  0.2*   < >  --    ANEUTAUTO  --   --   --   --   --  0.0*   ALYM 1.9 1.3  --  0.8   < >  --    ALYMPAUTO  --   --   --   --   --  1.0   GORAN 0.8 0.3  --  0.3   < >  --    AMONOAUTO  --   --   --   --   --  0.0   AEOS 0.0 0.0  --  0.0   < >  --    AEOSAUTO  --   --   --   --   --  0.0   ABSBASO  --   --   --   --   --  0.0   HGB 8.1* 8.0* 7.6* 7.8*   < > 9.3*   HCT 24.1* 23.2* 22.3* 23.1*   < > 26.6*    145* 116* 86*   < > 14*    < > = values in this interval not displayed.       Clotting Studies    Recent Labs   Lab Test 07/04/22  0638  06/30/22  0341 06/27/22  0620 06/26/22  0659   INR 1.10 1.14 1.09 1.16*   PTT  --   --  31 31       Iron Testing    Recent Labs   Lab Test 07/14/22  0813 06/16/22  1724 06/16/22  1339 06/16/22  1102 06/14/22  0752   IRON  --   --   --   --  110   FEB  --   --   --   --  279   IRONSAT  --   --   --   --  39   JO-ANN  --   --   --   --  321*   MCV 95   < > 110*   < > 108*   FOLIC  --   --   --   --  18.8   B12  --   --   --   --  607   HAPT  --   --   --   --  154   RETP  --   --  2.4*  --  2.9*   RETICABSCT  --   --  0.063  --  0.080    < > = values in this interval not displayed.       Thyroid Studies     Recent Labs   Lab Test 06/24/22  0550   TSH 0.04*   T4 1.48       Urine Studies     Recent Labs   Lab Test 06/29/22  2028 06/23/22  1646 06/17/22  0746   URINEPH 7.0 6.0 6.0   NITRITE Negative Negative Negative   LEUKEST Negative Negative Negative   WBCU  --  4 1       Microbiology:  Fungal testing  Recent Labs   Lab Test 07/08/22  1938   FGTL <31   FGTLI Negative   ASPGAI 0.07   ASPGAA Negative       Last Culture results   Culture   Date Value Ref Range Status   07/12/2022 No growth after 1 day  Preliminary   07/12/2022 No growth after 1 day  Preliminary   07/12/2022 No growth after 1 day  Preliminary   07/11/2022 No growth after 2 days  Preliminary   07/10/2022 No growth after 3 days  Preliminary   07/10/2022 No growth after 3 days  Preliminary   07/09/2022 No growth after 4 days  Preliminary   07/09/2022 No growth after 4 days  Preliminary   07/08/2022 No growth after 5 days  Preliminary   07/08/2022 2+ Normal shani  Final   07/08/2022 No Growth  Final   07/08/2022 No Growth  Final   07/07/2022 No Growth  Final   07/07/2022 No Growth  Final   07/05/2022 No Growth  Final   07/05/2022 No Growth  Final   07/05/2022 No Growth  Final   06/29/2022 No Growth  Final   06/29/2022 No Growth  Final   06/23/2022 10,000-50,000 CFU/mL Mixture of urogenital shani  Final         Last check of C difficile  C Difficile Toxin B by  PCR   Date Value Ref Range Status   07/11/2022 Negative Negative Final     Comment:     A negative result does not exclude actual disease due to C. difficile and may be due to improper collection, handling and storage of the specimen or the number of organisms in the specimen is below the detection limit of the assay.       Quantiferon testing   Recent Labs   Lab Test 07/14/22  0813 07/13/22  0555 07/12/22  0834 07/11/22  1702   TBRES  --   --   --  Indeterminate*   LYMPH 29 41   < >  --     < > = values in this interval not displayed.       Virology:  Coronavirus-19 testing    Recent Labs   Lab Test 07/12/22  1449 06/16/22  1349   [id number] Negative Negative       Respiratory virus (non-coronavirus-19) testing    Recent Labs   Lab Test 07/08/22  1220   IFLUA Not Detected   FLUAH1 Not Detected   BH7329 Not Detected   FLUAH3 Not Detected   IFLUB Not Detected   PIV1 Not Detected   PIV2 Not Detected   PIV3 Not Detected   PIV4 Not Detected   RSVA Not Detected   RSVB Not Detected   HMPV Not Detected   RHINEV Not Detected   ADENOV Not Detected   BARTHOLOMEW Not Detected       Virus Serology Table     Recent Labs   Lab Test 06/16/22  1724   [id number] 0.13   H1IGG No HSV-1 IgG antibodies detected.   H2IGG No HSV-2 IgG antibodies detected.   EBVCAGIV <10.0   CMVIGGIV <0.20   CMVIGG No detectable antibody.       Imaging:  Recent Results (from the past 48 hour(s))   US Upper Extremity Venous Duplex Left   Result Value    Radiologist flags Left arm DVT (Urgent)    Narrative    EXAMINATION: DOPPLER VENOUS ULTRASOUND OF THE LEFT UPPER EXTREMITY,  7/12/2022 8:43 PM     COMPARISON: CT chest abdomen pelvis 7/7/2020 20    HISTORY: Left arm redness, pain    TECHNIQUE:  Gray-scale evaluation with compression, spectral flow and  color Doppler assessment of the deep venous system of the left upper  extremity.    FINDINGS:  Left: Normal blood flow and waveforms are demonstrated in the internal  jugular, innominate, and subclavian veins.  Nonocclusive thrombus in  the left axillary vein with occlusive thrombus in one of the paired  left brachial veins of the upper and mid arm. Occlusive thrombus in  the left basilic vein at the upper and mid arm. The left basilic vein  at the proximal forearm is fully compressible with nonocclusive  thrombus in the mid forearm. The cephalic vein is fully compressible.      Impression    IMPRESSION:  1. Nonocclusive deep venous thrombosis in the left axillary vein with  occlusive thrombosis extending through one of the paired left brachial  veins of the upper and mid arm.  2. Occlusive superficial venous thrombosis of the left basilic vein at  the upper and mid arm, as well as in the mid forearm.    [Urgent Result: Left arm DVT]    Finding was identified on 7/12/2022 9:07 PM.     Dr. Gross was contacted by Dr. Henao at 7/12/2022 9:20 PM and  verbalized understanding of the urgent finding.     I have personally reviewed the examination and initial interpretation  and I agree with the findings.    FENG GUZMAN MD         SYSTEM ID:  [id number]        EXAMINATION: CT CHEST/ABDOMEN/PELVIS W CONTRAST, 7/7/2022 1:22 PM  INDICATION: persistent neutropenic fever  COMPARISON STUDY: Radiograph 6/29/2022. CT 6/15/2022.  FINDINGS:  Lines, tubes, devices: Left lower extremity PICC with tip terminating  at the superior cavoatrial junction.  CHEST: Lungs: Unchanged 4 mm solid pulmonary nodule of the left upper lobe  laterally (series 5 image 153). Remaining additional sub-4 mm solid  pulmonary nodules are stable compared to prior. Similar subtle diffuse  mosaic groundglass changes and presumed fibroatelectasis of the  posterior aspect of the right upper lobe. The central tracheobronchial  tree is patent. No areas of bronchiectasis or architectural  distortion. No pleural effusion, pulmonary consolidation, or  pneumothorax. Mild bibasilar atelectasis.   Mediastinum: The visualized thyroid is unremarkable.Heart size is  within normal  limits. No pericardial effusion. The thoracic aorta and  main pulmonary artery are within normal limits. Standard branching  pattern of the great vessels. No abnormal thoracic lymph nodes.  Moderate hiatal hernia, mild increased streakiness and thickening  along the intrathoracic stomach and distal esophagus.  ABDOMEN/PELVIS:  Liver: No mass. No intrahepatic biliary ductal dilation.  Similar  hypoattenuation along the falciform ligament likely focal fatty infiltration.     Biliary System: Decompressed appearance of the gallbladder. No  extrahepatic biliary ductal dilation.  Pancreas: No mass or pancreatic ductal dilation.   Adrenal glands: No mass or nodules   Spleen: Normal.  Kidneys: No suspicious mass, obstructing calculus or hydronephrosis.   Gastrointestinal tract : No evidence for infiltrate appendix. Normal  caliber small bowel.   Large bowel loop dilatation  Mesentery/peritoneum/retroperitoneum: There is hazy central mesenteric  attenuation with numerous subcentimeter lymph nodes in the central mesentery.    Lymph nodes: Numerous subcentimeter central mesenteric lymph nodes and  several enlarged lymph nodes for example a 1.2 cm short axis left  periaortic lymph node (series 7 image 333).  Vasculature: Patent major abdominal vasculature.  The major portal  vessels are patent.  Pelvis: Urinary bladder is normal.  Myomatous uterus. Ovaries within  normal limits.  Osseous structures: No aggressive or acute osseous lesion.  Unchanged  opposing endplate degenerative changes L4-L5.    Soft tissues: Small fat-containing umbilical hernia.        Impression    IMPRESSION:  1. Small to moderate hiatal hernia with mild thickening  and streaky  density along the intrathoracic stomach and distal esophagus,  likely  to represent inflammatory/infective etiology.  2. Enlarged upper retroperitoneal especially para-aortic para-aortic  lymph node and mesenteric lymph nodes, compared to prior CT 6/16/2022  concerning for  infective/inflammatory etiology. Consider attention on follow-up  3. Patchy streaky density in the posterior right upper lobe slightly  more conspicuous compared to prior CT, and diffuse mosaic attenuation,  may represent infection or inflammation.  4. Unchanged sub-6 mm pulmonary nodules, compared to prior CT 6/16/2022.

## 2022-07-14 NOTE — PLAN OF CARE
Physical Therapy Discharge Summary    Reason for therapy discharge:    All goals and outcomes met, no further needs identified. Pt declines further IP PT needs, has been able to IND completing walking program and HEP. Hopeful to discharge in the next few days.     Progress towards therapy goal(s). See goals on Care Plan in [facility] electronic health record for goal details.  Goals met    Therapy recommendation(s):    Continue home exercise program.  OP PT cancer rehab referral placed to utilize at pt discretion for endurance, fatigue management, and safe progression of HEP.

## 2022-07-14 NOTE — PLAN OF CARE
Goal Outcome Evaluation:  Doing well. Denied pain or nausea. Afebrile. IV antibiotics discontinued. May discharge soon. Up independently. Had a stool today.

## 2022-07-15 ENCOUNTER — CARE COORDINATION (OUTPATIENT)
Dept: TRANSPLANT | Facility: CLINIC | Age: 37
End: 2022-07-15

## 2022-07-15 ENCOUNTER — ALLIED HEALTH/NURSE VISIT (OUTPATIENT)
Dept: TRANSPLANT | Facility: CLINIC | Age: 37
End: 2022-07-15
Payer: COMMERCIAL

## 2022-07-15 VITALS
OXYGEN SATURATION: 98 % | SYSTOLIC BLOOD PRESSURE: 112 MMHG | HEART RATE: 97 BPM | WEIGHT: 252 LBS | BODY MASS INDEX: 39.55 KG/M2 | DIASTOLIC BLOOD PRESSURE: 81 MMHG | RESPIRATION RATE: 18 BRPM | TEMPERATURE: 97.3 F | HEIGHT: 67 IN

## 2022-07-15 DIAGNOSIS — Z71.9 VISIT FOR COUNSELING: Primary | ICD-10-CM

## 2022-07-15 DIAGNOSIS — I82.A12 ACUTE DEEP VEIN THROMBOSIS (DVT) OF AXILLARY VEIN OF LEFT UPPER EXTREMITY (H): Primary | ICD-10-CM

## 2022-07-15 DIAGNOSIS — C92.00 ACUTE MYELOID LEUKEMIA NOT HAVING ACHIEVED REMISSION (H): Primary | ICD-10-CM

## 2022-07-15 PROBLEM — C95.00 ACUTE LEUKEMIA NOT HAVING ACHIEVED REMISSION (H): Status: ACTIVE | Noted: 2022-07-15

## 2022-07-15 LAB
ALBUMIN SERPL BCG-MCNC: 3.6 G/DL (ref 3.5–5.2)
ALP SERPL-CCNC: 54 U/L (ref 35–104)
ALT SERPL W P-5'-P-CCNC: 24 U/L (ref 10–35)
ANION GAP SERPL CALCULATED.3IONS-SCNC: 14 MMOL/L (ref 7–15)
AST SERPL W P-5'-P-CCNC: 32 U/L (ref 10–35)
BACTERIA BLD CULT: NO GROWTH
BACTERIA BLD CULT: NO GROWTH
BASOPHILS # BLD MANUAL: 0 10E3/UL (ref 0–0.2)
BASOPHILS NFR BLD MANUAL: 0 %
BILIRUB DIRECT SERPL-MCNC: <0.2 MG/DL (ref 0–0.3)
BILIRUB SERPL-MCNC: 0.6 MG/DL
BLASTS # BLD MANUAL: 0.5 10E3/UL
BLASTS NFR BLD MANUAL: 4 %
BUN SERPL-MCNC: 3.5 MG/DL (ref 6–20)
BURR CELLS BLD QL SMEAR: SLIGHT
CALCIUM SERPL-MCNC: 9 MG/DL (ref 8.6–10)
CHLORIDE SERPL-SCNC: 102 MMOL/L (ref 98–107)
CREAT SERPL-MCNC: 0.5 MG/DL (ref 0.51–0.95)
DEPRECATED HCO3 PLAS-SCNC: 21 MMOL/L (ref 22–29)
EOSINOPHIL # BLD MANUAL: 0 10E3/UL (ref 0–0.7)
EOSINOPHIL NFR BLD MANUAL: 0 %
ERYTHROCYTE [DISTWIDTH] IN BLOOD BY AUTOMATED COUNT: 14.4 % (ref 10–15)
GFR SERPL CREATININE-BSD FRML MDRD: >90 ML/MIN/1.73M2
GLUCOSE SERPL-MCNC: 102 MG/DL (ref 70–99)
HCT VFR BLD AUTO: 24.9 % (ref 35–47)
HGB BLD-MCNC: 8.3 G/DL (ref 11.7–15.7)
LMWH PPP CHRO-ACNC: 0.55 IU/ML
LYMPHOCYTES # BLD MANUAL: 1.1 10E3/UL (ref 0.8–5.3)
LYMPHOCYTES NFR BLD MANUAL: 10 %
MAGNESIUM SERPL-MCNC: 2.3 MG/DL (ref 1.7–2.3)
MCH RBC QN AUTO: 31.9 PG (ref 26.5–33)
MCHC RBC AUTO-ENTMCNC: 33.3 G/DL (ref 31.5–36.5)
MCV RBC AUTO: 96 FL (ref 78–100)
METAMYELOCYTES # BLD MANUAL: 0.8 10E3/UL
METAMYELOCYTES NFR BLD MANUAL: 7 %
MONOCYTES # BLD MANUAL: 1.7 10E3/UL (ref 0–1.3)
MONOCYTES NFR BLD MANUAL: 15 %
MYELOCYTES # BLD MANUAL: 2.3 10E3/UL
MYELOCYTES NFR BLD MANUAL: 20 %
NEUTROPHILS # BLD MANUAL: 5 10E3/UL (ref 1.6–8.3)
NEUTROPHILS NFR BLD MANUAL: 44 %
PHOSPHATE SERPL-MCNC: 2.8 MG/DL (ref 2.5–4.5)
PLAT MORPH BLD: ABNORMAL
PLATELET # BLD AUTO: 388 10E3/UL (ref 150–450)
POTASSIUM SERPL-SCNC: 3.4 MMOL/L (ref 3.4–5.3)
POTASSIUM SERPL-SCNC: 4.1 MMOL/L (ref 3.4–5.3)
PROT SERPL-MCNC: 7.2 G/DL (ref 6.4–8.3)
RBC # BLD AUTO: 2.6 10E6/UL (ref 3.8–5.2)
RBC MORPH BLD: ABNORMAL
SODIUM SERPL-SCNC: 137 MMOL/L (ref 136–145)
WBC # BLD AUTO: 11.4 10E3/UL (ref 4–11)

## 2022-07-15 PROCEDURE — 83735 ASSAY OF MAGNESIUM: CPT | Performed by: PHYSICIAN ASSISTANT

## 2022-07-15 PROCEDURE — 99207 PR SC NO CHARGE VISIT: CPT | Performed by: STUDENT IN AN ORGANIZED HEALTH CARE EDUCATION/TRAINING PROGRAM

## 2022-07-15 PROCEDURE — 36592 COLLECT BLOOD FROM PICC: CPT | Performed by: PHYSICIAN ASSISTANT

## 2022-07-15 PROCEDURE — 85520 HEPARIN ASSAY: CPT | Performed by: INTERNAL MEDICINE

## 2022-07-15 PROCEDURE — 99233 SBSQ HOSP IP/OBS HIGH 50: CPT | Performed by: STUDENT IN AN ORGANIZED HEALTH CARE EDUCATION/TRAINING PROGRAM

## 2022-07-15 PROCEDURE — G0452 MOLECULAR PATHOLOGY INTERPR: HCPCS | Mod: 26 | Performed by: PATHOLOGY

## 2022-07-15 PROCEDURE — 84100 ASSAY OF PHOSPHORUS: CPT | Performed by: PHYSICIAN ASSISTANT

## 2022-07-15 PROCEDURE — 250N000011 HC RX IP 250 OP 636: Performed by: PHYSICIAN ASSISTANT

## 2022-07-15 PROCEDURE — 84132 ASSAY OF SERUM POTASSIUM: CPT | Performed by: INTERNAL MEDICINE

## 2022-07-15 PROCEDURE — 82248 BILIRUBIN DIRECT: CPT | Performed by: PHYSICIAN ASSISTANT

## 2022-07-15 PROCEDURE — 99356 PR PROLONGED SERV,INPATIENT,1ST HR: CPT | Performed by: STUDENT IN AN ORGANIZED HEALTH CARE EDUCATION/TRAINING PROGRAM

## 2022-07-15 PROCEDURE — 250N000013 HC RX MED GY IP 250 OP 250 PS 637: Performed by: INTERNAL MEDICINE

## 2022-07-15 PROCEDURE — 250N000013 HC RX MED GY IP 250 OP 250 PS 637: Performed by: PHYSICIAN ASSISTANT

## 2022-07-15 PROCEDURE — 85027 COMPLETE CBC AUTOMATED: CPT | Performed by: PHYSICIAN ASSISTANT

## 2022-07-15 PROCEDURE — 36592 COLLECT BLOOD FROM PICC: CPT | Performed by: INTERNAL MEDICINE

## 2022-07-15 PROCEDURE — 99239 HOSP IP/OBS DSCHRG MGMT >30: CPT | Performed by: STUDENT IN AN ORGANIZED HEALTH CARE EDUCATION/TRAINING PROGRAM

## 2022-07-15 PROCEDURE — 85007 BL SMEAR W/DIFF WBC COUNT: CPT | Performed by: PHYSICIAN ASSISTANT

## 2022-07-15 PROCEDURE — 250N000013 HC RX MED GY IP 250 OP 250 PS 637: Performed by: STUDENT IN AN ORGANIZED HEALTH CARE EDUCATION/TRAINING PROGRAM

## 2022-07-15 PROCEDURE — 80053 COMPREHEN METABOLIC PANEL: CPT | Performed by: PHYSICIAN ASSISTANT

## 2022-07-15 RX ORDER — ACYCLOVIR 400 MG/1
400 TABLET ORAL 2 TIMES DAILY
Qty: 60 TABLET | Refills: 0 | Status: SHIPPED | OUTPATIENT
Start: 2022-07-15 | End: 2022-07-15

## 2022-07-15 RX ORDER — POTASSIUM CHLORIDE 750 MG/1
40 TABLET, EXTENDED RELEASE ORAL ONCE
Status: COMPLETED | OUTPATIENT
Start: 2022-07-15 | End: 2022-07-15

## 2022-07-15 RX ORDER — LEVOTHYROXINE SODIUM 100 UG/1
100 TABLET ORAL
Qty: 60 TABLET | Refills: 0 | Status: SHIPPED | OUTPATIENT
Start: 2022-07-16 | End: 2022-07-15

## 2022-07-15 RX ORDER — HEPARIN SODIUM (PORCINE) LOCK FLUSH IV SOLN 100 UNIT/ML 100 UNIT/ML
5 SOLUTION INTRAVENOUS
Status: CANCELLED | OUTPATIENT
Start: 2022-07-15

## 2022-07-15 RX ORDER — TETRAHYDROZOLINE HCL 0.05 %
1-2 DROPS OPHTHALMIC (EYE) 4 TIMES DAILY PRN
COMMUNITY
Start: 2022-07-15 | End: 2022-07-26

## 2022-07-15 RX ORDER — ENOXAPARIN SODIUM 100 MG/ML
150 INJECTION SUBCUTANEOUS DAILY
Qty: 21 ML | Refills: 0 | Status: SHIPPED | OUTPATIENT
Start: 2022-07-15 | End: 2022-07-29 | Stop reason: ALTCHOICE

## 2022-07-15 RX ORDER — VANCOMYCIN HYDROCHLORIDE 125 MG/1
125 CAPSULE ORAL 2 TIMES DAILY
Qty: 60 CAPSULE | Refills: 0 | Status: SHIPPED | OUTPATIENT
Start: 2022-07-15 | End: 2022-07-15

## 2022-07-15 RX ORDER — ENOXAPARIN SODIUM 100 MG/ML
1.5 INJECTION SUBCUTANEOUS DAILY
Qty: 28 ML | Refills: 0 | Status: SHIPPED | OUTPATIENT
Start: 2022-07-15 | End: 2022-07-15

## 2022-07-15 RX ORDER — ACYCLOVIR 400 MG/1
400 TABLET ORAL 2 TIMES DAILY
Qty: 60 TABLET | Refills: 0 | Status: ON HOLD | OUTPATIENT
Start: 2022-07-15 | End: 2022-08-11

## 2022-07-15 RX ORDER — VANCOMYCIN HYDROCHLORIDE 125 MG/1
125 CAPSULE ORAL 2 TIMES DAILY
Qty: 60 CAPSULE | Refills: 0 | Status: SHIPPED | OUTPATIENT
Start: 2022-07-15 | End: 2022-07-26

## 2022-07-15 RX ORDER — ENOXAPARIN SODIUM 100 MG/ML
150 INJECTION SUBCUTANEOUS DAILY
Qty: 21 ML | Refills: 0 | Status: SHIPPED | OUTPATIENT
Start: 2022-07-15 | End: 2022-07-15

## 2022-07-15 RX ORDER — LEVOTHYROXINE SODIUM 100 UG/1
100 TABLET ORAL
Qty: 60 TABLET | Refills: 0 | Status: SHIPPED | OUTPATIENT
Start: 2022-07-16 | End: 2022-08-30

## 2022-07-15 RX ORDER — TRIAMCINOLONE ACETONIDE 1 MG/G
CREAM TOPICAL 2 TIMES DAILY PRN
Qty: 80 G | Refills: 0 | Status: SHIPPED | OUTPATIENT
Start: 2022-07-15 | End: 2022-07-26

## 2022-07-15 RX ORDER — HEPARIN SODIUM,PORCINE 10 UNIT/ML
5 VIAL (ML) INTRAVENOUS
Status: CANCELLED | OUTPATIENT
Start: 2022-07-15

## 2022-07-15 RX ORDER — TRIAMCINOLONE ACETONIDE 1 MG/G
CREAM TOPICAL 2 TIMES DAILY PRN
Qty: 80 G | Refills: 0 | Status: SHIPPED | OUTPATIENT
Start: 2022-07-15 | End: 2022-07-15

## 2022-07-15 RX ADMIN — PANTOPRAZOLE SODIUM 40 MG: 40 TABLET, DELAYED RELEASE ORAL at 06:00

## 2022-07-15 RX ADMIN — Medication 50 MCG: at 08:31

## 2022-07-15 RX ADMIN — BACITRACIN: 500 OINTMENT TOPICAL at 08:34

## 2022-07-15 RX ADMIN — SODIUM CHLORIDE, PRESERVATIVE FREE 6 ML: 5 INJECTION INTRAVENOUS at 06:53

## 2022-07-15 RX ADMIN — ACYCLOVIR 400 MG: 400 TABLET ORAL at 08:31

## 2022-07-15 RX ADMIN — POTASSIUM CHLORIDE 40 MEQ: 750 TABLET, EXTENDED RELEASE ORAL at 08:31

## 2022-07-15 RX ADMIN — Medication 1 TABLET: at 08:31

## 2022-07-15 RX ADMIN — VANCOMYCIN HYDROCHLORIDE 125 MG: 125 CAPSULE ORAL at 08:31

## 2022-07-15 RX ADMIN — LEVOTHYROXINE SODIUM 100 MCG: 100 TABLET ORAL at 06:00

## 2022-07-15 RX ADMIN — OXYCODONE HYDROCHLORIDE AND ACETAMINOPHEN 1000 MG: 500 TABLET ORAL at 08:31

## 2022-07-15 RX ADMIN — TETRAHYDROZOLINE HCL 1 DROP: 0.05 LIQUID OPHTHALMIC at 08:31

## 2022-07-15 RX ADMIN — ENOXAPARIN SODIUM 100 MG: 100 INJECTION SUBCUTANEOUS at 08:31

## 2022-07-15 ASSESSMENT — ACTIVITIES OF DAILY LIVING (ADL)
ADLS_ACUITY_SCORE: 18

## 2022-07-15 NOTE — PROGRESS NOTES
"Blood and Marrow Transplant   New Transplant Visit with   Clinical     Assessment completed on 7/15/22 in the BMT clinic. Information for this assessment was provided by pt and pt's family's report, consultation with medical team, and medical chart review.      Present:  Patient: Veda \"Minerva\"Yves  Sister: Rosario  Father: Dony Marinelli  : KAREN Oliver, [facility]    Medical Team   Nurse Coordinator: Tyra Cervantes RN  BMT Physician: Srinivasa Garcia MD  Referring Physician: Won Perkins MD    Diagnosis: AML  Diagnosis Date: 6/7/22    Presenting Information:  Pt is a 37 year old female diagnosed with AML who is currently inpatient on 7D at the [facility]. Pt was diagnosed on 6/7/22. Pt presents for an allogeneic stem cell transplant discussion. Per report from Dr. Garcia, BMT is not indicated at this time but information will be discussed in the event that BMT is indicated in the future.    Contact Information:  Cell Phone: [phone]  Pt email: [email or web address]    Special Needs:  No needs identified at this time.     Relocation Requirement:   Pt lives in [place], [place] [facility] Pt will need to relocate and will need local lodging. SW discussed relocation and explained in detail the different lodging options. Pt is in agreement with relocating.     Living Situation:   Pt lives in [place], [place] w/ her dog Sugar. Pt's family all live close-by in [place] as well.    Family Information:   Spouse: N/A  Parents: Dony and Melonie Marinelli  Siblings: 1 sister Rosario & 1 brother Ankit  Children: N/A    Education/Employment:  Currently employed: Yes; Pt has been working remotely during hospital stay.  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    Insurance:   [employer]. No insurance concerns identified at this time. DENNIS provided information regarding the insurance authorization process and the role of the BMT " Financial . DENNIS provided contact info for the BMT Financial  and referred pt to them for future insurance questions.     Finances:   Pt's source of income is payroll. No financial concerns identified at this time.     Caregiver:   SW discussed with the patient and family the caregiver role and expectation at length. Pt is agreeable to having a full time caregiver for the minimum of 100 days until cleared by the BMT Physician. Pt's identified caregivers are likely her parents and family members. Caregiver education and information provided. No caregiver concerns identified.     Healthcare Directive:  Not discussed today    Resources Provided:  -BMT Information Book  -BMT Resources Packet  -Healthcare Directive  -Honoring Choices - Your Rights: Making Your Own Health Care Treatment Decisions  -Caregiver Contract/Description  -Transplant Unit Description and Information   -Lodging Resources    Identified Concerns:  No concerns identified at this time.     Summary:  Pt presents to [facility] regarding an allogeneic stem cell transplant. Pt and pt's family asked good/appropriate questions regarding psychosocial factors related to BMT; all questions were addressed. Pt presented as pleasant, calm, and engaged. Pt's affect was appropriately. Pt's family's affect was also appropriate and supportive. Per Dr. Garcia, BMT is not indicated at this time.     Plan:   SW provided contact information and encouraged pt to contact SW with any additional questions, concerns, resources and/or for support. SW will continue to follow pt to provide support and guidance with resources as needed.     KAREN Oliver, [facility]  Adult Blood & Marrow Transplant   Phone: [phone]  Pager: [phone]

## 2022-07-15 NOTE — PLAN OF CARE
[id number]:  Afebrile.  VSS on room air.  Denies pain, nausea, vomiting, chest pain and SOB.  BMT consult done.  PICC removed per protocol, no s/sx respiratory distress and pressure dressing C/D/I.  No complaints and/or acute events.      Discharge  D: Orders for discharge and outpatient medications written.    I: Home medications and return to clinic schedule reviewed with patient. Discharge instructions and parameters for calling Health Care Provider reviewed:  [facility] clinic triage line at [phone] for temp > or = 100.4, uncontrolled nausea/vomiting/diarrhea/constipation, unrelieved pain, bleeding not relieved with pressure, dizziness, chest pain, shortness of breath, loss of consciousness, and any new or concerning symptoms. . Patient left at 1311 accompanied by family and all personal belongings and medication taken home by patient.     A: Patient/family verbalized understanding and was ready for discharge.     P: Patient instructed to  medications in Pharmacy. Follow up as scheduled Video Visit July 26 at 12:45 PM.

## 2022-07-15 NOTE — DISCHARGE SUMMARY
"[facility]    Discharge Summary  Hematology / Oncology    Date of Admission:  6/16/2022  Date of Discharge:  7/15/2022  1:11 PM  Discharging Provider: Gaby Arteaga PA-C  Date of Service (when I saw the patient): 07/15/22    Discharge Diagnoses   # AML (FLT3 ITD(low), NPM1, IDH2)  # New DVT in Left Upper Extremity  # Neutropenic Fever, recurrent (resolved)  # Recurrent C.diff colitis (improving)  # Hemorrhoid  # GERD  # Distal esophageal discomfort/dysphagia (improved)  # Hiatal hernia (seen on 7/7 CT imaging)  # Mild thickening and streaky density along the intrathoracic stomach and distal esophagus, likely to represent inflammatory/infective etiology  # Hypothyroidism    Recommendations for Outpatient:  Summary of Hospitalization:   Veda Marinelli is a 37 year old female with PMH significant for h/o COVID19 infection (10/2021), C.diff, and hypothyroidism who presented with leukocytosis and circulating blasts. 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 She initiated induction chemotherapy with 7+3+midostaurin (C1D1=6/22/22). Hospital course complicated by neutropenic fever and C.difficile colitis.    Discharged on D24 from 7+3+midostaurin. Patient was in count recovery at time of discharge with ANC of 5.1 and platelets 388K. Day 21 BMBx completed. Morph shows \"hypocellular marrow (40-50% estimated cellularity) with diminished erythroid maturation, markedly left-shifted granulopoiesis, increased megakaryopoeisis, and 11% blasts. Flow cytometry on a concurrent specimen showed increased myeloid blasts (14%); the immunophenotype of the blasts was different from that observed with the patient's diagnostic specimen. By morphology, blasts are increased in the marrow, but no Blanca rods are seen (Blanca rods characterized the patient's leukemic blasts at diagnosis). Overall, the morphologic and immunophenotypic findings are favored to represent brisk marrow regeneration following " "chemotherapy, although the possibility of residual acute myeloid leukemia cannot be entirely ruled out.\" Overall plan is to repeat Bone marrow biopsy in 1 week.   BMT consult completed 7/15. BMT at this time is not recommend and patient can likely be treated with consolidation chemotherapy alone pending count recovery BMBx results    Hospital stay complicated by neutropenic fever without a source. Now resolved at discharge. Additionally complicated by recurrent C diff. Overall stools significantly improving, c diff negative on 7/11. Patient now changed PO Fidaxomicin (7/8 - 7/13) to PO Vanco BID (ppx dosing). Recheck C diff is negative from 7/11.   With neutropenic fever work up , patient had developed a R lower shin lesion that was a Dime size area of erythema, not warm, non-tender with a pustule-like center. Pending Derm biopsy x2 results.  Prior to discharge patient developed a new pink macular coalescing rash to legs bilaterally. Minimally itchy. Improvement with triamcinolone cream PRN and discontinuation of Zosyn.   Additionally found to have a PICC associated DVT in LUE. Will anticoagulate with lovenox 150mg subcutaneous daily. OK to stop with resolution of DVT.    * Please follow up US of LUE. Patient is able to stop the lovenox per discussion with pharmacy once the DVT has resolved given that this is a PICC associated DVT    New/changed medications:    - ACV 400mg BID   - Lovenox 150mg subQ daily   - Vanco 125mg po BID for C diff ppx   - Topical diltiazem for hemmorrhoids prn    Follow-Up:   - VLAD visit in 1 week to check in    - Twice weekly labs with possible transfusions   - BMBx in 1 week (count recovery marrow)   - Follow up with Dr Earl 7/26   - US LUE in 1 week     History of Present Illness   Minerva is feeling well at time of discharge. Looking forward to getting home. Eyes continue to be itchy, improving with eye drops. Stools improving, less frequent and firming up.   Appetite intact.   Denies " fever, chills, mouth sores, SOB, cough, abdominal pain, diarrhea, constipation, nausea, vomiting, dysuria, hematuria, numbness, tingling, swelling    Hospital Course   Veda Marinelli was admitted on 6/16/2022.  The following problems were addressed during her hospitalization:    LIZ  # AML (FLT3 ITD(low), NPM1, IDH2)  Was in her usual state of health until 03/2022 when she underwent a routine physical with labs. On 3/8/2022, white blood cell count 2.5 (ANC 1.0), Hgb 13.6 with , platelets normal.  On 5/15/2022, she was noted to have further decreasd white blood cell count of 1.6 (ANC 0.28) with hemoglobin 11. platelets were 133. She was ultimately referred to Hematology (Dr. Bob), who she saw on 6/7/22. Further blood workup was recommended and labs done 6/14. CBC revealed WBC 25.3 (ANC 0.8), Hgb 10.1 (), Plt 90K. On the differential and morphology review, 81% blasts were seen concerning for acute leukemia. She was advised to present to [facility] for further evaluation and management.   - Per verbal report from Heme Path fellow there was concern for Blanca rods. She was started on ATRA 6/16 PM-6/17 AM while awaiting PML-ANNELIESE testing. This was ultimately negative.   - s/p Hydrea (6/17-6/22) while awaiting final diagnostic studies  - 6/16 PB chromosomes: 46, XX. No abnormality.   - 6/16 PB FISH: no rearrangments of MLLT10, NUP98, or KMT2A.   - 6/17 BMBx: markedly hypercellular marrow (85-95% estimated cellularity), with markedly diminished trilineage hematopoiesis, no overt dysplasia in the evaluable elements, and 92% blasts  - FLT3 PCR: positive for FLT3-ITD(low) with allelic ratio 0.21 (corrected from previously reported value of 0.144 per staff message to Lamar Grover from molecular lab personnel)  - NGS panel: NPM1 positive, IDH2, FLT3-ITD  - HLA typing sent, BMT coordinators aware and working on arranging IP consultation (delayed due to insurance change/financial approval)  - receiving  "induction with 7+3 (cytarabine+daunorubicin PLUS midostaurin (D1= 6/22/22))  - Day 21 BMBx completed. Morph shows \"hypocellular marrow (40-50% estimated cellularity) with diminished erythroid maturation, markedly left-shifted granulopoiesis, increased megakaryopoeisis, and 11% blasts. Flow cytometry on a concurrent specimen showed increased myeloid blasts (14%); the immunophenotype of the blasts was different from that observed with the patient's diagnostic specimen. By morphology, blasts are increased in the marrow, but no Blanca rods are seen (Blanca rods characterized the patient's leukemic blasts at diagnosis). Overall, the morphologic and immunophenotypic findings are favored to represent brisk marrow regeneration following chemotherapy, although the possibility of residual acute myeloid leukemia cannot be entirely ruled out.\" Overall plan is to repeat Bone marrow biopsy in 1 week.   - BMT consult completed 7/15. BMT at this time is not recommend and patient can likely be treated with consolidation chemotherapy alone pending count recovery BMBx results       # Pancytopenia secondary to chemotherapy and AML  - Transfuse to maintain Hgb >7, Plt >10K      # New DVT in Left Upper Extremity  Left UE US (7/12) shows nonocclusive deep venous thrombosis in the left axillary vein with nocclusive thrombosis extending through one of the paired left brachial  veins of the upper and mid arm.  Additionally noted an occlusive superficial venous thrombosis of the left basilic vein at the upper and mid arm, as well as in the mid forearm.  - Started on heparin drip. Switched to Lovenox 1mg/kg BID.    - Plan for Lovenox 150mg daily (max one time dose to match 1.5mg/kg once daily dosing) at home   - Priced checked Lovenox ($0) vs eliquis (~$250)  - Will plan to continue treatment of DVT until resolution of thrombus.                - Plan for 2 weeks total of lovenox. If patient has resolution of thrombus, will stop anticoagulation     "           - Will follow up with an US in 1 week. If not yet resolved will check again in another week.  - PICC pulled at discharge     ID  # Neutropenic Fever, recurrent  # Recurrent C.diff colitis  (1) Fevered to 101.6F on 6/23. Noted to have chills though no other focal symptoms. Afebrile thereafter.  - 6/16 baseline CT CAP without evidence for infectious source   - 6/23: BCx NG, UA/UCx NGTD, CXR clear  - s/p IV Cefepime (x 6/23-6/27). De-escalated back to ppx Levaquin on 6/27.  (2) Recurrent fever on 6/29 PM. Persistent temps 6/29-6/30, then afebrile since 6/30 afternoon. BCx, UA, CXR negative. C.diff positive on 7/1  - restarted on Cefepime 2g IV q8hr (x 6/29 PM - 7/4 AM)  (3) Recurrent fever 100.4-->101 (7/5-7/6), restarted Cefepime (x 7/5). Given ongoing high fevers. Abx switched to Zosyn IV (7/10). With switch to Zosyn, patient is now afebrile for 24 hours. Overall improving today though did have a breakthrough fever to 100.6F (7/12). Ongoing diarrhea which has plateaued in frequency, though still loose appear to be less watery. New sore throat and clear rhinorrhea without associated nasal/sinus congestion on 7/8, stable at this time. Right shin lesion stable. Lactic acids have not been elevated.   - 7/5 BCx NGTD, UCx NG  - 7/6 MRSA swab negative  - 7/7, 7/8, 7/9 BCx NGTD  - CT CAP (7/7): subtle diffuse mosaic groundglass changes and presumed fibroatelectasis in RUL. No pulm consolidation. Moderate hiatal hernia with mild thickening and streaky density along intrathoracic stomach and distal esophagus, enlarged upper RP LN and mesenteric lymph nodes (infectious vs. Inflammatory).   - 7/8: strep PCR negative, RVP negative  - 7/8: Aspergillus (neg), fungitell (neg), Fungal BCx (NGTD), Fungal Abs (neg), histo urine/serum (neg)   - 7/15: Afebrile for >48 hours. Stopped abx given resolution of fevers and recovery of neutrophils.      **Antibiotics:  - s/p Cefepime (6/23-6/27, 6/29-7/4), (x 7/5 - 7/10).   - Zosyn  4.5g IV q6hr (7/10-7/14) given persistent fevers  - s/p PO Vancomycin 125mg QID (x 7/1-7/8).  - s/p Fidaxomicin 200mg PO BID (x7/8-7/13)   - Restarted PO Vancomycin 125mg PO BID (7/13-x)  - Stopped PO Levaquin 750 mg daily (7/14-7/15)     # ID PPx  - ACV 400mg BID  - No abx ppx indicated given count recovery  - Micafungin 50mg IV daily. Did not discharge on antifungal ppx given count recovery  - Prior Auth approved for both posaconazole and voriconazole. However, as of  6/29, pt has not met deductible (but should meet it once hospital stay is billed). At present, monthly cost for Posaconazole $2048.66, Voriconazole $176.41.     # h/o COVID-19  Pt is not vaccinated nor interested in being vaccinated. She was admitted from 10/1/2021 - 10/13/2021 for acute hypoxic respiratory failure from COVID-19 pneumonia.  Required IMC, high flow and intermittent CPAP. She was treated with dexamethasone, remdesivir, and baricitinib in addition to azithromycin and ceftriaxone. Recovered symptomatically from this.     GI  # H/o C.diff (10/2021)  # Hemorrhoid  # 1st recurrence of C.diff (7/1)  # Diarrhea  H/o C.diff (10/20/21) and course was complicated by abd pain/cramping and prolonged recovery of stool consistency as well as hemorrhoid which does wax/wane and is sometimes painful. She primarily has used witch hazel pads when symptomatic. Pt reported normal bowels at time of AML diagnosis. Developed intermittent loose stools throughout admission, then increased frequency of loose/watery stools overnight 6/30-7/1. Rechecked C.diff (7/1), positive.   - 6/16 CT A/P negative for perirectal abscess.  - TUCKS PRN, Prep H PRN (7/4), dilt cream PRN (7/6)  - Avoid PTA probiotic at this time.  - baseline 6/19 C. Diff negative so we deferred prophylactic PO Vancomycin during chemo course. Recurrent/increased loose stools reported on 7/1, checked C.diff and this was positive. S/p PO Vanco --> now on fidaxomicin as above.  - Stools are still  loose, but less watery. Recheck of C diff is negative 7/11. Of note, patient may have diarrhea due to oral chemotherapy pill  - Changed PO Fidaxomicin (7/8 - 7/13) to PO Vanco BID (ppx dosing). Recheck C diff is negative from 7/11.   - See ID noted regarding enteric isolation     # Mucositis - resolved  On ~6/27, pt noting inflammatory changes in b/l buccal mucosa and small lesion on right inner lower lip. Not painful or affecting PO intake. Since improved and pt has denied mouth pain/sores recently, eating/drinking well. Now with intermittent blood blisters but no mouth pain/sores.  - MMW PRN, viscous lidocaine PRN     # GERD  - TUMS PRN  - PPI daily. Stopped at discharge per patient preference     # Distal esophageal discomfort/dysphagia - improved  # Hiatal hernia (seen on 7/7 CT imaging)  # Mild thickening and streaky density along the intrathoracic stomach and distal esophagus, likely to represent inflammatory/infective etiology  On 6/29, patient reported new onset of epigastric discomfort when swallowing solid foods, not with liquids. No radiation to back or other areas of abdomen, no RUQ tenderness. Denies reflux, nausea or emesis. Already on PPI as above. No e/o oral candidiasis, on micafungin ppx. Question relation to GI mucosal changes? Nearly resolved on 7/4. Now with intermittent exacerbations but not nearly what it was previously.   - Trial Carafate before meals and at nighttime --> given improvement, backed off to PRN (x 7/4)  - simethicone PRN, Maalox PRN, GI Cocktail PRN  - Continue to monitor, could consider barium swallow study if persistent  - On 7/7 - CT noted mild thickening  and streaky density along the intrathoracic stomach and distal esophagus, likely to represent inflammatory/infective etiology. Will continue to follow by imaging and symptoms. Will consider EGD when patient's counts recover if needed.      ENT  # Sore throat, intermittent - resolved  # Clear rhinorrhea  - resolved  Pt  reported right-sided distal throat soreness on 7/8. Mild posterior OP erythema, no exudates or thrush noted on exam. Negative infectious workup as above, potentially due to mucositis changes. Suspect rhinorrhea may be due to loss of protective nasal cilia.   - 7/8: strep PCR negative, RVP negative  - PRN cepacol, hurricane spray  - could consider swallowing MMW, could also consider Nystatin swallow (though no e/o thrush in OP at this time)  - Resolved at time of discharge      CV  # Irregular rhythm  Pt having occasionally PVCs, noting more in the last couple days of ongoing fevers and more irregular on 7/8 exam. Improved on 7/10 exam.  - EKG (7/8) demonstrated sinus rhythm with occasional PVCs. QTc 450.      GYN  # Vaginal bleeding, resolved  Menstrual cycle began inpatient overnight 6/30-7/1. Usually heaviest flow is first ~3 days. Reviewed neutropenic precautions and advised not to use tampons at this time. Resolved as of 7/7.  - back down to Plt parameter 10K  - s/p Provera 10mg daily (7/1-7/6)     ENDO  # Hypothyroidism  PTA was on Synthroid 125mcg daily. TSH 0.04, T4 Free 1.48 (done 6/24). Per pt, her PCP reviewed and advised her to reduce her Synthroid to 100mcg daily. Ordered to start on 6/29.      DERM  # Bilateral ear erythema, pruritis - resolved  On 6/29, pt reported bilateral ear lobe erythema and irritation in ear canal. Believed to be related to cytarabine. S/p triamcinolone cream. Resolved early week of 7/4.      # Truncal rash - improving   Noted rash to upper abdomen which progressed to upper torso and back throughout morning of 7/6. Not itchy or bothersome to pt. Suspect possible cytarabine rash. Pt has been on/off Cefepime and previously tolerated but rash to abx is also in differential. Can try triamcinolone cream to rash sites. Improving/evolving and now more petechial in nature.      # Pink Macular Rash to legs bilaterally - improving  Noted to have a coalescing macular rash along legs  bilaterally. Minimally itchy. Concern that it is due to Zosyn given timing of antibiotics. Will stop Zosyn given patient is now afebrile.  - Trial triamcinolone cream PRN  - Monitor     # Lesion to R shin - stable   Noted on 7/6; outlined, monitor for worsening cellulitis. On 7/7, slightly improved in degree of erythema and slightly retracted from drawn border, less tender as well. However, there is a persistent area of induration/fluctuance. Stable 7/8-7/10. No new lesions per pt.  - MRSA swab negative as above. However, if persistent fevers or HD unstable, low threshold to add IV Vanco.    - Pending Derm biopsy results       Plan of care discussed with Dr Palacios.    Gaby Arteaga (Artem), BREEZY  Hematology/Oncology    Significant Results and Procedures   See above    Pending Results   These results will be followed up by outpatient team  Unresulted Labs Ordered in the Past 30 Days of this Admission     Date and Time Order Name Status Description    7/15/2022  8:04 AM Potassium In process     7/15/2022  6:01 AM Low Molecular Weight Heparin Anti Xa Level In process     7/14/2022 11:30 PM Hepatic panel In process     7/12/2022  6:50 PM Blood Culture Purple Lumen Preliminary     7/12/2022  6:50 PM Blood Culture Arm, Right Preliminary     7/12/2022  2:51 PM Tissue Aerobic Bacterial Culture Routine with Gram Stain Preliminary     7/12/2022  2:51 PM Acid-Fast Bacilli Culture and Stain In process     7/12/2022  2:51 PM Dermatological Path Order and Indications In process     7/11/2022  4:38 PM Acid-fast Bacilli (AFB) Blood Culture Preliminary     7/11/2022  4:21 PM CHROMOSOME ANALYSIS, BONE MARROW, DIAGNOSIS/RELAPSE With Professional Interpretation In process     7/11/2022  4:21 PM FISH With Professional Interpretation In process     7/10/2022  7:01 PM Blood Culture Red Lumen Preliminary     7/10/2022  7:01 PM Blood Culture Arm, Right Preliminary     7/8/2022  6:07 PM Fungal or Yeast Culture Routine Preliminary      6/29/2022  9:59 AM Prepare pheresed platelets (unit) Preliminary     6/29/2022  7:38 AM CONDITIONAL Prepare pheresed platelets (unit) Preliminary           Code Status   Full Code    Primary Care Physician   TATA NELSON    Physical Exam   Temp: 97.3  F (36.3  C) Temp src: Oral BP: 112/81 Pulse: 97   Resp: 18 SpO2: 98 % O2 Device: None (Room air)    Vitals:    07/13/22 0743 07/14/22 0745 07/15/22 0734   Weight: 113.9 kg (251 lb 3.2 oz) 115.4 kg (254 lb 6.4 oz) 114.3 kg (252 lb)     Vital Signs with Ranges  Temp:  [96.9  F (36.1  C)-97.3  F (36.3  C)] 97.3  F (36.3  C)  Pulse:  [79-97] 97  Resp:  [18] 18  BP: (112-119)/(70-83) 112/81  SpO2:  [98 %-100 %] 98 %  I/O last 3 completed shifts:  In: 830 [P.O.:720; I.V.:110]  Out: -     Constitutional: Pleasant female sitting up in chair. Awake and conversational. Non-toxic appearing. No acute distress.  HEENT: NC/AT. Wearing head scarf. Sclerae anicteric. Moist mucus membranes.   Respiratory: Breathing comfortable with no increased work on room air. Lungs CTA b/l. No wheezing or crackles.  Cardiovascular: RRR, no skipped beats today. No murmur appreciated. No peripheral edema.    GI: Normal BS. Abdomen is soft, non-distended, non-tender.   : External hemorrhoid without thrombosed appearance, no surrounding erythema or induration on very superficial rectal exam (7/9)  Skin: Skin is clean, dry, intact. Faint pink papular rash to trunk has further faded with evolving petechial appearance at this time (7/9). ~Dime size erythematous (less purple) lesion with central dried pustular-like appearance and underlying induration/fluctuance to right lower shin; outlined and remains slightly retracted within borders but stable from 7/7. Nontender. No warmth  7/13 - pink macular coalescing rash to legs bilaterally. No open sores.  Musculoskeletal/ Extremities: Extremities grossly normal in appearance.  Neurologic: Alert and oriented. Speech normal. Grossly nonfocal.      Time  Spent on this Encounter   I, Gaby Arteaga PA-C, personally saw the patient today and spent greater than 30 minutes discharging this patient.    Discharge Disposition   Discharged to home  Condition at discharge: Stable    Consultations This Hospital Stay   VASCULAR ACCESS FOR PICC PLACEMENT ADULT IP CONSULT  PHYSICAL THERAPY ADULT IP CONSULT  NURSING TO CONSULT FOR VASCULAR ACCESS CARE IP CONSULT  CARE MANAGEMENT / SOCIAL WORK IP CONSULT  SPIRITUAL HEALTH SERVICES IP CONSULT  CARE MANAGEMENT / SOCIAL WORK IP CONSULT  NURSING TO CONSULT FOR VASCULAR ACCESS CARE IP CONSULT  INFECTIOUS DISEASE TRANSPLANT HSCT/ HEME MALIG ADULT IP CONSULT  PSYCHOLOGY ADULT IP CONSULT  DERMATOLOGY IP CONSULT  PHARMACY IP CONSULT  PHARMACY IP CONSULT    Discharge Orders      FOLLOW UP BMT GENERIC       Adult Oncology/Hematology  Referral      Reason for your hospital stay    You were admitted with new AML found to be FLT3 positive. You received induction chemotherapy with 7+3+midostaurin. You overall tolerated this well with some complications suchs as recurrent c difficile infection, neutropenic fever and PICC associated DVT     Activity    Your activity upon discharge: activity as tolerated     Adult [facility] Follow-up and recommended labs and tests    Follow up that has been Requested (though not necessarily scheduled yet)  - Bone Marrow Biopsy early next week  - US dimas JARAMILLO next week  - VLAD follow up next week  - Twice weekly labs with possible transfusions   - Follow up with Dr Earl on 7/26     Appointments on [place] and/or [facility] (with [facility] or [facility] provider or service). Call [phone] if you haven't heard regarding these appointments within 7 days of discharge.     When to contact your care team    [facility] cancer clinic triage line at [phone] for temp > or = 100.4, uncontrolled nausea/vomiting/diarrhea/constipation, unrelieved pain, bleeding not relieved with pressure, dizziness, chest pain,  shortness of breath, loss of consciousness, and any new or concerning symptoms.    Please use this line if you have any questions.     Discharge Instructions    Continue to take your anticoagulation until the clot in your left arm resolved. We will assess this with an US of the LIVANEILEEN.     Diet    Follow this diet upon discharge: Orders Placed This Encounter      Combination Diet Regular Diet; Safe Tray - with utensils     Check Out Appointment Request    Please schedule:  * Twice weekly labs and possible transfusion starting 7/18 at [facility]   * Left upper extremity ultrasound on 7/20/22 or 7[place]21[place]22 [place] [facility] [place]   * Bone marrow biopsy to be scheduled at [facility] on 7/19/22   * MD visit with oncologist ~1 week after BMBx (~7/25) - She has established with Dr Perkins and Dr Earl during her admission     MODIFIED Check Out Appointment Request    Please also schedule a virtual visit with an VLAD next week either 7/20 or 7/21     Discharge Medications   Current Discharge Medication List      START taking these medications    Details   acyclovir (ZOVIRAX) 400 MG tablet Take 1 tablet (400 mg) by mouth 2 times daily  Qty: 60 tablet, Refills: 0    Associated Diagnoses: Neutropenic fever (H)      diltiazem 2% in PLO gel Apply 1-2 clicks (0.25-0.5 g) topically 3 times daily as needed (Apply superficially to rectal area for hemorrhoid pain)  Qty: 30 g, Refills: 0    Comments: Dispense in dosing applicator. 1 click = 0.25g of product.  Associated Diagnoses: External hemorrhoids      enoxaparin ANTICOAGULANT (LOVENOX) 100 MG/ML syringe Inject 1.5 mLs (150 mg) Subcutaneous daily  Qty: 21 mL, Refills: 0    Associated Diagnoses: Acute deep vein thrombosis (DVT) of axillary vein of left upper extremity (H)      tetrahydrozoline (VISINE) 0.05 % ophthalmic solution Place 1-2 drops into both eyes 4 times daily as needed (Dry eyes, irritated eyes,)      triamcinolone (KENALOG) 0.1 % external cream Apply topically 2 times  daily as needed for irritation (rash)  Qty: 80 g, Refills: 0    Associated Diagnoses: Rash      vancomycin (VANCOCIN) 125 MG capsule Take 1 capsule (125 mg) by mouth 2 times daily  Qty: 60 capsule, Refills: 0    Associated Diagnoses: Clostridium difficile infection         CONTINUE these medications which have CHANGED    Details   levothyroxine (SYNTHROID/LEVOTHROID) 100 MCG tablet Take 1 tablet (100 mcg) by mouth every morning (before breakfast)  Qty: 60 tablet, Refills: 0    Associated Diagnoses: Hypothyroidism, unspecified type         CONTINUE these medications which have NOT CHANGED    Details   Bacillus Coagulans-Inulin (PROBIOTIC) 1-250 BILLION-MG CAPS Take 1 capsule by mouth daily      magnesium 250 MG tablet Take 250 mg by mouth daily      Multiple Vitamins-Minerals (WOMENS MULTIVITAMIN) TABS Take 1 tablet by mouth daily      Quercetin 50 MG TABS Take 50 mg by mouth daily      vitamin C (ASCORBIC ACID) 1000 MG TABS Take 2,000 mg by mouth daily      vitamin D3 (CHOLECALCIFEROL) 50 mcg (2000 units) tablet Take 2 tablets by mouth daily      zinc gluconate 50 MG tablet Take 50 mg by mouth daily           Allergies   Allergies   Allergen Reactions     Blood Transfusion Related (Informational Only) Hives     6/29/2022, reaction of hives with platelet transfusion      Data   Most Recent 3 CBC's:Recent Labs   Lab Test 07/15/22  0659 07/14/22  0813 07/13/22  0555   WBC 11.4* 6.4 3.1*   HGB 8.3* 8.1* 8.0*   MCV 96 95 93    253 145*      Most Recent 3 BMP's:  Recent Labs   Lab Test 07/15/22  1146 07/15/22  0659 07/14/22  0813 07/13/22  1638 07/13/22  0555   NA  --  137 139  --  141   POTASSIUM 4.1 3.4 3.6   < > 3.4   CHLORIDE  --  102 105  --  106   CO2  --  21* 23  --  23   BUN  --  3.5* 3.7*  --  3.7*   CR  --  0.50* 0.53  --  0.46*   ANIONGAP  --  14 11  --  12   MICHAEL  --  9.0 9.1  --  8.7   GLC  --  102* 105*  --  107*    < > = values in this interval not displayed.     Most Recent 2 LFT's:  Recent Labs    Lab Test 07/15/22  0659 07/13/22  0555   AST 32 21   ALT 24 21   ALKPHOS 54 53   BILITOTAL 0.6 0.5     Most Recent INR's and Anticoagulation Dosing History:  Anticoagulation Dose History     Recent Dosing and Labs Latest Ref Rng & Units 6/24/2022 6/24/2022 6/25/2022 6/26/2022 6/27/2022 6/30/2022 7/4/2022    INR 0.85 - 1.15 1.61(H) 1.41(H) 1.29(H) 1.16(H) 1.09 1.14 1.10        Most Recent 3 Troponin's:No lab results found.  Most Recent Cholesterol Panel:  Recent Labs   Lab Test 03/08/22  0930   TRIG 93     Most Recent 6 Bacteria Isolates From Any Culture (See EPIC Reports for Culture Details):No lab results found.  Most Recent TSH, T4 and A1c Labs:  Recent Labs   Lab Test 06/24/22  0550   TSH 0.04*   T4 1.48     Results for orders placed or performed during the hospital encounter of 06/16/22   CT Chest/Abdomen/Pelvis w Contrast    Narrative    EXAMINATION: CT CHEST/ABDOMEN/PELVIS W CONTRAST, 6/16/2022 4:35 PM    TECHNIQUE:  Helical CT images from the thoracic inlet through the  symphysis pubis were obtained  with contrast. Contrast dose: 135 ml  isovue 370     COMPARISON: None.    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 Pt also with  h/o Cdiff and resultant hemorrhoid. Please eval for possible  perirectal abscess in setting of profound neutropenia    FINDINGS:    Chest:   Thyroid is unremarkable. Heart is not enlarged. No pericardial  effusion. Normal caliber thoracic aorta and main pulmonary artery.  Conventional great arch anatomy. No pathologically enlarged thoracic  lymph nodes.    Central tracheobronchial tree is patent. There is subtle diffuse  mosaic groundglass changes. Minor basal atelectasis. No focal  consolidative opacity, pleural effusion, or pneumothorax. 4 mm nodule  in the left lower lobe (series 5, image 164).    Abdomen and pelvis: Focal fatty infiltration along the falciform. No  other focal hepatic lesion. Normal gallbladder and spleen. Adrenal  glands and pancreas are  unremarkable. Symmetric enhancement of the  kidneys without evidence of hydronephrosis or nephrolithiasis.  Visualized ureters and bladder appear normal. Left simple cyst versus  corpus luteal cyst. There may be a few small uterine fibroids best  visualized on sagittal imaging. No abnormal pelvic mass. Possible  small hiatal hernia. Normal caliber small and large bowel without  evidence of obstruction. Appendix is not visualized but there is no  evidence of acute appendicitis. No free air or fluid in the abdomen or  pelvis.    Abdominal aorta and its major branches are patent. No pathologically  enlarged lymph nodes in the abdomen or pelvis.    Bones and soft tissues: No acute or suspicious osseous lesions.      Impression    IMPRESSION:   1. No acute findings. No perirectal abscess.  2. Small 4 mm nodule in the left lower lobe. Recommend attention on  follow-up.    I have personally reviewed the examination and initial interpretation  and I agree with the findings.    KIAN BEDOLLA MD         SYSTEM ID:  [id number]   XR Chest 2 Views    Narrative    EXAM: XR CHEST 2 VW 6/23/2022 4:52 PM    HISTORY: neutropenic fever.    COMPARISON: CT of the chest and pelvis is 6/16/2022.    TECHNIQUE: Upright frontal and lateral views of the chest.    FINDINGS: Left-sided PICC with tip in the low SVC. Trachea is midline.  Cardiac and mediastinal silhouette is within normal limits. No focal  pulmonary opacities. No pleural effusions. No pneumothoraces. No acute  osseous abnormalities.      Impression    IMPRESSION: No focal pulmonary opacities.    I have personally reviewed the examination and initial interpretation  and I agree with the findings.    VALERIE ARNOLD MD         SYSTEM ID:  [id number]   XR Chest Port 1 View    Narrative    EXAM: XR CHEST PORT 1 VIEW  6/29/2022 7:19 PM     HISTORY:  neutropenic fever, concern for PNA       COMPARISON:  Chest radiograph 6/23/2022    FINDINGS: Single view of the chest. Left upper  extremity PICC tip  projects over the low SVC.     Trachea is midline. The cardiomediastinal silhouette is within normal  limits. No significant pleural effusion or pneumothorax. Linear  opacity at the left lung consistent with a skinfold. No acute focal  airspace opacity. The visualized upper abdomen is unremarkable.      Impression    IMPRESSION: No focal airspace opacity. If high clinical concern  consider a CT chest which is more sensitive.    I have personally reviewed the examination and initial interpretation  and I agree with the findings.    YANELY BLEVINS MD         SYSTEM ID:  [id number]   CT Chest/Abdomen/Pelvis w Contrast    Narrative    EXAMINATION: CT CHEST/ABDOMEN/PELVIS W CONTRAST, 7/7/2022 1:22 PM    INDICATION: persistent neutropenic fever    COMPARISON STUDY: Radiograph 6/29/2022. CT 6/15/2022.    TECHNIQUE: CT scan of the chest, abdomen, and pelvis was performed on  multidetector CT scanner using volumetric acquisition technique and  images were reconstructed in multiple planes with variable thickness  and reviewed on dedicated workstations.     CONTRAST: 125 ml isovue 370  injected IV     CT scan radiation dose is optimized to minimum requisite dose using  automated dose modulation techniques.    FINDINGS:    Lines, tubes, devices: Left lower extremity PICC with tip terminating  at the superior cavoatrial junction.    CHEST:      Lungs: Unchanged 4 mm solid pulmonary nodule of the left upper lobe  laterally (series 5 image 153). Remaining additional sub-4 mm solid  pulmonary nodules are stable compared to prior. Similar subtle diffuse  mosaic groundglass changes and presumed fibroatelectasis of the  posterior aspect of the right upper lobe. The central tracheobronchial  tree is patent. No areas of bronchiectasis or architectural  distortion. No pleural effusion, pulmonary consolidation, or  pneumothorax. Mild bibasilar atelectasis.     Mediastinum: The visualized thyroid is  unremarkable.Heart size is  within normal limits. No pericardial effusion. The thoracic aorta and  main pulmonary artery are within normal limits. Standard branching  pattern of the great vessels. No abnormal thoracic lymph nodes.  Moderate hiatal hernia, mild increased streakiness and thickening  along the intrathoracic stomach and distal esophagus.      ABDOMEN/PELVIS:    Liver: No mass. No intrahepatic biliary ductal dilation.  Similar  hypoattenuation along the falciform ligament likely focal fatty  infiltration.       Biliary System: Decompressed appearance of the gallbladder. No  extrahepatic biliary ductal dilation.    Pancreas: No mass or pancreatic ductal dilation.     Adrenal glands: No mass or nodules     Spleen: Normal.    Kidneys: No suspicious mass, obstructing calculus or hydronephrosis.     Gastrointestinal tract : No evidence for infiltrate appendix. Normal  caliber small bowel.   Large bowel loop dilatation    Mesentery/peritoneum/retroperitoneum: There is hazy central mesenteric  attenuation with numerous subcentimeter lymph nodes in the central  mesentery.      Lymph nodes: Numerous subcentimeter central mesenteric lymph nodes and  several enlarged lymph nodes for example a 1.2 cm short axis left  periaortic lymph node (series 7 image 333).    Vasculature: Patent major abdominal vasculature.  The major portal  vessels are patent.    Pelvis: Urinary bladder is normal.  Myomatous uterus. Ovaries within  normal limits.    Osseous structures: No aggressive or acute osseous lesion.  Unchanged  opposing endplate degenerative changes L4-L5.      Soft tissues: Small fat-containing umbilical hernia.          Impression    IMPRESSION:      1. Small to moderate hiatal hernia with mild thickening  and streaky  density along the intrathoracic stomach and distal esophagus,  likely  to represent inflammatory/infective etiology.  2. Enlarged upper retroperitoneal especially para-aortic para-aortic  lymph node  and mesenteric lymph nodes, compared to prior CT 6/16/2022  concerning for infective/inflammatory etiology. Consider attention on  follow-up  3. Patchy streaky density in the posterior right upper lobe slightly  more conspicuous compared to prior CT, and diffuse mosaic attenuation,  may represent infection or inflammation.  4. Unchanged sub-6 mm pulmonary nodules, compared to prior CT  6/16/2022.    I have personally reviewed the examination and initial interpretation  and I agree with the findings.    VALERIE ARNLOD MD         SYSTEM ID:  [id number]   US Upper Extremity Venous Duplex Left     Value    Radiologist flags Left arm DVT (Urgent)    Narrative    EXAMINATION: DOPPLER VENOUS ULTRASOUND OF THE LEFT UPPER EXTREMITY,  7/12/2022 8:43 PM     COMPARISON: CT chest abdomen pelvis 7/7/2020 20    HISTORY: Left arm redness, pain    TECHNIQUE:  Gray-scale evaluation with compression, spectral flow and  color Doppler assessment of the deep venous system of the left upper  extremity.    FINDINGS:  Left: Normal blood flow and waveforms are demonstrated in the internal  jugular, innominate, and subclavian veins. Nonocclusive thrombus in  the left axillary vein with occlusive thrombus in one of the paired  left brachial veins of the upper and mid arm. Occlusive thrombus in  the left basilic vein at the upper and mid arm. The left basilic vein  at the proximal forearm is fully compressible with nonocclusive  thrombus in the mid forearm. The cephalic vein is fully compressible.      Impression    IMPRESSION:  1. Nonocclusive deep venous thrombosis in the left axillary vein with  occlusive thrombosis extending through one of the paired left brachial  veins of the upper and mid arm.  2. Occlusive superficial venous thrombosis of the left basilic vein at  the upper and mid arm, as well as in the mid forearm.    [Urgent Result: Left arm DVT]    Finding was identified on 7/12/2022 9:07 PM.     Dr. Gross was contacted by Dr. Henao  at 2022 9:20 PM and  verbalized understanding of the urgent finding.     I have personally reviewed the examination and initial interpretation  and I agree with the findings.    FENG GUZMAN MD         SYSTEM ID:  [id number]   Echocardiogram Complete     Value    LVEF  55-60%    3D LVEF 58%    [facility]    [id number]  [id number]  [id number]  [id number]^SELMA^VIDHI^DAMIAN     [facility],[place]  Echocardiography Laboratory  [address] [place] [id number]     Name: YAO GEIGER  MRN: [id number]  : 1985  Study Date: 2022 03:33 PM  Age: 37 yrs  Gender: Female  Patient Location: [facility]  Reason For Study: Chemo  Ordering Physician: VIDHI HAHN  Performed By: Nany Frazier     BSA: 2.3 m2  Height: 67 in  Weight: 259 lb  HR: 88  BP: 152/100 mmHg  ______________________________________________________________________________  Procedure  Complete Portable Echo Adult. Echocardiogram with two-dimensional, color and  spectral Doppler performed.  ______________________________________________________________________________  Interpretation Summary  Global and regional left ventricular function is normal with an EF of 55-60%.  3D LVEF volumetric analysis is 58%. Global peak LV longitudinal strain is  averaged at -23.8%. This is within reported normal limits (normal <-18%).  No significant valvular abnormalities were noted.  No pericardial effusion is present.  Previous study not available for comparison.  ______________________________________________________________________________  Left Ventricle  Global and regional left ventricular function is normal with an EF of 55-60%.  3D LVEF volumetric analysis is 58%. Left ventricular size is normal. Left  ventricular wall thickness is normal. Global peak LV longitudinal strain is  averaged at -23.8%. This is within reported normal limits (normal <-18%). Left  ventricular diastolic function is normal. Diastolic  Doppler findings (E/E'  ratio and/or other parameters) suggest left ventricular filling pressures are  normal.     Right Ventricle  Right ventricular function, chamber size, wall motion, and thickness are  normal.     Atria  Both atria appear normal.     Mitral Valve  The mitral valve is normal.     Aortic Valve  Aortic valve is normal in structure and function. The aortic valve is  tricuspid.     Tricuspid Valve  The tricuspid valve is normal. Trace tricuspid insufficiency is present. The  peak velocity of the tricuspid regurgitant jet is not obtainable. Pulmonary  artery systolic pressure cannot be assessed.     Pulmonic Valve  The pulmonic valve is normal.     Vessels  The aorta root is normal. The thoracic aorta is normal. The pulmonary artery  cannot be assessed. IVC diameter and respiratory changes fall into an  intermediate range suggesting an RA pressure of 8 mmHg.     Pericardium  No pericardial effusion is present.     Compared to Previous Study  Previous study not available for comparison.  ______________________________________________________________________________  MMode/2D Measurements & Calculations     IVSd: 0.78 cm  LVIDd: 5.9 cm  LVIDs: 4.2 cm  LVPWd: 0.69 cm  FS: 29.4 %  LV mass(C)d: 163.6 grams  LV mass(C)dI: 72.5 grams/m2  Ao root diam: 3.4 cm  asc Aorta Diam: 3.2 cm  LVOT diam: 2.5 cm  LVOT area: 4.9 cm2  LA Volume (BP): 80.0 ml  LA Volume Index (BP): 35.4 ml/m2  RWT: 0.23     Doppler Measurements & Calculations  MV E max chris: 80.6 cm/sec  MV A max chris: 63.3 cm/sec  MV E/A: 1.3  MV dec slope: 315.0 cm/sec2  PA acc time: 0.11 sec  E/E' av.6  Lateral E/e': 5.3  Medial E/e': 10.0     QLAB 3DQ Advanced  Stroke Vol: 97.8 ml  10_EDV(3DQA): 168.5 ml  10_ESV(3DQA): 70.7 ml  10_EF(3DQA): 58.0 %  10_Tmsv 3-6: -5.0 msec  10_Tmsv 3-5: -1.0 msec     10_Tmsv 3-6 (%): -0.72 %  10_Tmsv 3-5 (%): -0.20 %  ______________________________________________________________________________  Report approved by:  Abundio Mcneal 06/16/2022 05:02 PM

## 2022-07-15 NOTE — PROGRESS NOTES
Care Management Discharge Note    Discharge Date: 07/15/2022     Discharge Disposition: Home    Discharge Services: [facility]patient [facility]    Discharge DME: None    Discharge Transportation: Car, family or friend will provide    Private pay costs discussed: Not applicable    PAS Confirmation Code: N/A  Patient/family educated on Medicare website which has current facility and service quality ratings: N/A    Education Provided on the Discharge Plan: Yes   Persons Notified of Discharge Plans: Patient, bedside RN, SW  Patient/Family in Agreement with the Plan: Yes    Handoff Referral Completed: Yes    Additional Information:    Per team, patient will discharge home today, following BMT consult with OP follow-up as scheduled.     PT is recommending OP PT upon discharge.     No RNCC/SW needs identified for discharge.     Xiomara Toledo, RN, BSN, PHN  7D RN Care Coordinator   Phn: [phone]  Fax: [phone]

## 2022-07-15 NOTE — PROGRESS NOTES
BMT / Cell Therapy Consultation      Veda Marinelli is a 37 year old female referred by Dr. Won Bailey for acute myeloid leukemia.      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  Notable labs at diagnosis: WBC 44.7, 88% peripheral blasts, 92% blasts in the bone marrow, no cytogenetic abnormalities by FISH.    Date Treatment Name Response Side Effects / Toxicities   6/22/22 7+3 + midostaurin  Course complicated by neutropenic fevers, LUE DVT          HPI:  Please see my entry above for hematologic history.  Minerva is a previously healthy 37-year-old with history of COVID infection October 2021 and history of recurrent C. difficile colitis.  In March 2022 she underwent a routine physical with labs due to concern regarding hair loss.  At that time she was noted to have a leukopenia with a white count of 2.5 and ANC of 1.  She was also noted to have macrocytosis with MCV of 103.  Platelets unremarkable.  On 5/15/2020 2 repeat blood work showed white count of 1.6 with ANC 8.28, hemoglobin 11, , platelets 133.  She was then referred to hematology with work-up revealing acute myeloid leukemia as above.  Minerva notes that during this work-up she experiences fatigue and shortness of breath with activity but did not think this was significantly abnormal at the time.  She did not have any other associated symptoms.  She did recently start a new job with a [employer] organization and has been keeping active with that.  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      ASSESSMENT AND PLAN:  Today I discussed the above diagnosis with Veda Marinelli. We discussed the natural history of the disease and treatment to date. We reviewed all the available diagnostic information. We talked about general  indications of transplant that include patient, disease and donor factors.  Based on her disease profile, Minerva has favorable risk disease and therefore consolidation chemotherapy agent in CR1 is recommended.  I did discuss that depending on her restaging and disease course allogenic stem cell transplant may still be recommended and we therefore reviewed the process of transplant in detail today.  Minerva has 2 siblings, a 39-year-old sister and a 28-year-old brother who are both healthy and willing to be donors.     I described the process of work up of a potential recipient to confirm good organ function and reserve, reassess disease and decide on risks/benefit. We also discussed in great detail the process of the actual transplant itself, with discussion of different donor stem cell options. We discussed the role of the preparative regimen to clear any residual disease and to ablate the bone marrow, followed by infusion of the HSC graft. We discussed the expected toxicities and side effects, including organ toxicity, expected pancytopenia and need for transfusion support, risk of infection or bleeding, possibility of delayed or non-engraftment, and the risk of treatment related mortality (10-20%). We also discussed the risk of acute or chronic GVHD (overall ~50%, 10-20% for severe GVH) and the need for immunosuppressionn within the first 100 days and perhaps beyond, depending on GVHD status then. I also mentioned the possibility of relapsed which I would estimate at 30-40%. We discussed supportive care, needing a line with associated complications, and the critical need for a caregiver and proximity to [facility].    All of their questions were answered to their satisfaction.    Summary: Follow-up restaging studies; if in CR consolidation chemotherapy is recommended, if not in CR will re-evaluate recommendation for transplant based on disease course    I spent 80 minutes in the care of this patient today, which included  "time necessary for preparation for the visit, obtaining history, ordering medications/tests/procedures as medically indicated, review of pertinent medical literature, counseling of the patient, communication of recommendations to the care team, and documentation time.    Srinivasa Garcia MD      ROS:    10 point ROS neg other than the symptoms noted above in the HPI.      Past medical history reviewed today and is notable for history of COVID infection 10/2021 and recurrent c diff colitis as above.    Past Medical History:   Diagnosis Date     Leukemia, blast cell (H) 6/16/2022     Neutropenic fever (H) 7/11/2022       Past Surgical History:   Procedure Laterality Date     PICC DOUBLE LUMEN PLACEMENT Left 06/16/2022    46 cm total lateral brachial       Family history: 39 year sister and 28 year old brother as above.  No known family history malignancy.    Social History     Tobacco Use     Smoking status: Never Smoker     Smokeless tobacco: Never Used     Works in [employer]; was previously an ICU nurse for several years.      Allergies   Allergen Reactions     Blood Transfusion Related (Informational Only) Hives     6/29/2022, reaction of hives with platelet transfusion         Outpatient medications reviewed; she takes levothyroxine and OTC dietary supplements.      Physical Exam:     Vital Signs: /81 (BP Location: Right arm)   Pulse 97   Temp 97.3  F (36.3  C) (Oral)   Resp 18   Ht 1.702 m (5' 7.01\")   Wt 114.3 kg (252 lb)   SpO2 98%   BMI 39.46 kg/m        KPS:  90    General Appearance: alert and no distress  Eyes: PERRL, conjunctiva and lids normal, sclera nonicteric  Ears/Nose/M/Throat: Oral mucosa and posterior oropharynx normal, moist mucous membranes  Neck supple, non-tender, free range of motion, no adenopathy  Cardio/Vascular:regular rate and rhythm, normal S1 and S2, no murmur  Resp Effort And Auscultation: Normal - Clear to auscultation without rales, rhonchi, or wheezing.  GI: soft, " nontender, bowel sounds present in all four quadrants, no hepatosplenomegaly  Lymphatics:no significant enlargement of lymph nodes globally   Musculoskeletal: Musculoskeletal normal  Edema: none  Skin: Skin color, texture, turgor normal. No rashes or lesions.  Neurologic: Sensation grossly WNL.  Psych/Affect: Mood and affect are appropriate.          Blood Counts       Recent Labs   Lab Test 07/15/22  0659 07/14/22  0813 07/13/22  0555 06/29/22  0626 06/28/22  0628   HGB 8.3* 8.1* 8.0*   < > 9.3*   HCT 24.9* 24.1* 23.2*   < > 26.6*   WBC 11.4* 6.4 3.1*   < > 1.1*   ANEUTAUTO  --   --   --   --  0.0*   ALYMPAUTO  --   --   --   --  1.0   AMONOAUTO  --   --   --   --  0.0   AEOSAUTO  --   --   --   --  0.0   ABSBASO  --   --   --   --  0.0   NRBCMAN  --   --   --   --  0.0   ABLA 0.5* 0.3* 0.1*   < >  --     253 145*   < > 14*    < > = values in this interval not displayed.       ABO/RH    Recent Labs   Lab Test 07/11/22  0732   ABORH A NEG         Chemistries     Basic Panel  Recent Labs   Lab Test 07/15/22  0659 07/14/22  0813 07/13/22  2216 07/13/22  1638 07/13/22  0555    139  --   --  141   POTASSIUM 3.4 3.6 3.7   < > 3.4   CHLORIDE 102 105  --   --  106   CO2 21* 23  --   --  23   BUN 3.5* 3.7*  --   --  3.7*   CR 0.50* 0.53  --   --  0.46*   * 105*  --   --  107*    < > = values in this interval not displayed.        Calcium, Magnesium, Phosphorus  Recent Labs   Lab Test 07/15/22  0659 07/14/22  0813 07/13/22  0555 07/12/22  0834 07/11/22  0732   MICHAEL 9.0 9.1 8.7   < > 8.8   MAG 2.3  --  2.0  --  2.1   PHOS 2.8  --  3.8  --  2.6    < > = values in this interval not displayed.        LFTs  Recent Labs   Lab Test 07/13/22  0555 07/11/22  0732 07/08/22  0554   BILITOTAL 0.5 0.5 1.0   ALKPHOS 53 52 49   AST 21 14 7*   ALT 21 16 10   ALBUMIN 3.3* 3.3* 3.7       LDH  Recent Labs   Lab Test 06/25/22  0556 06/24/22  0550 06/23/22  0559   * 889* 503*       Infectious Disease Markers        Hepatitis and HIV    Recent Labs   Lab Test 06/16/22  1724   HEPBANG Nonreactive   HBCAB Nonreactive   AUSAB 31.11   HCVAB Nonreactive   HIAGAB Nonreactive         CMV  Recent Labs   Lab Test 06/16/22  1724   CMVIGG No detectable antibody.         EBV    Recent Labs   Lab Test 06/16/22  1724   EBVCAG No detectable antibody.       Bone Marrow Biopsy       Morphology    Results for orders placed or performed during the hospital encounter of 06/16/22 (from the past 8760 hour(s))   Bone marrow biopsy   Result Value    Final Diagnosis      Bone marrow, posterior iliac crest, right decalcified trephine biopsy and touch imprint; right and peripheral blood smear:    - Hypocellular marrow (40-50% estimated cellularity) with diminished erythroid maturation, markedly left-shifted granulopoiesis, increased megakaryopoeisis, and 11% blasts  - Peripheral blood showing moderate normocytic, normochromic anemia; marked leukopenia with left-shifted neutropenia; moderate thrombocytopenia; 7% circulating blasts  - See comment.      Comment        Flow cytometry on a concurrent specimen ([id number]) showed increased myeloid blasts (14%); the immunophenotype of the blasts was different from that observed with the patient's diagnostic specimen. By morphology, blasts are increased in the marrow, but no Blanca rods are seen (Blanca rods characterized the patient's leukemic blasts at diagnosis).     Overall, the morphologic and immunophenotypic findings are favored to represent brisk marrow regeneration following chemotherapy, although the possibility of residual acute myeloid leukemia cannot be entirely ruled out. Please correlate these findings with pending genetic studies. If clinically appropriate, re-biopsy in about one week could be helpful.    Select slides were reviewed by Dr. Clifford Andres. Dr. Rena Villar notified Dr. Alexandria Bahena of these findings by Epic message on 7/14/22 in the evening.      Clinical Information      Per Epic  records:  37-year-old woman with a prior diagnosis of FLT3+ AML ([id number], collected 6/17/2022) who was treated with 7+3+midostaurin and now presents for day 21 evaluation      Peripheral Hematologic Data      CBC WITH MANUAL DIFFERENTIAL (07/12/2022 08:57 AM CDT):     RESULT VALUE REF. RANGE UNITS    WBC Count    Hemoglobin    Hematocrit    Platelet Count    RBC Count   MCV  MCH  MCHC  RDW  1.6  ( L )     7.8  ( L )      23.1  ( L )   86  ( L )   2.51  ( L )       92 (NORMAL)     31.1 (NORMAL)     33.8 (NORMAL)     13.8 (NORMAL) 4.0-11.0  11.7-15.7  35.0-47.0  150-450  3.80-5.20    26.5-33.0  31.5-36.5  10.0-15.0 10e3/uL  g/dL  %  10e3/uL  10e6/uL  fL  pg  g/dL  %   % Neutrophils  % Lymphocytes  % Monocytes  % Eosinophils  % Basophils  % Metamyelocytes  % Myelocytes  % Promyelocytes  % Blasts  % Plasma Cells  % Other Cells   Absolute Neutrophils   Absolute Lymphocytes  Absolute Monocytes  Absolute Eosinophils  Absolute Basophils   Absolute Metamyelocytes   Absolute Myelocytes  Absolute Promyelocytes   Absolute Blasts   Absolute Plasma Cells  Absolute Other Cells  NRBCs per 100 WBC  Absolute NRBCs 11  51  19  0  0  9  3    7        0.2  ( LL )     0.8 (NORMAL)     0.3 (NORMAL)     0.0 (NORMAL)     0.0 (NORMAL)   0.1  ( H )  0.0 (NORMAL)   ()       0.1  ( H )   ()   ()   ()      () N/A  N/A  N/A  N/A  N/A  N/A  N/A  N/A  N/A  N/A  N/A  1.6-8.3  0.8-5.3  0.0-1.3  0.0-0.7  0.0-0.2  <=0.0  <=0.0  <=0.0  <=0.0  <=0.0  <=0.0  <=0  <=0.0 %  %  %  %  %  %  %  %  %  %  %  10e3/uL  10e3/uL  10e3/uL  10e3/uL  10e3/uL  10e3/uL  10e3/uL  10e3/uL  10e3/uL  10e3/uL  10e3/uL  %  10e3/uL         Microscopic Description      PERIPHERAL BLOOD SMEAR MORPHOLOGY:  The red blood cells appear normochromic.  Poikilocytosis includes rare dacrycocytes.  Polychromasia is not increased.  Rouleaux formation is not increased. Lymphocyte morphology is polymorphous. Neutrophils are left-shifted and include both mature segmented neutrophils  and immature forms. The morphology of the platelets is normal.  Blasts are increased and show small to intermediate size, oval nuclei, dispersed chromatin, small nucleoli, blue grey cytoplasm, scant granulation, and no Blanca rods.    100-cell manual differential count by AKB: 7% circulating blasts    Bone marrow trephine core biopsy touch imprints are reviewed.    BONE MARROW DIFFERENTIAL (500 cells on bone marrow biopsy touch imprints )  Percent Cell (reference range)  11      Blasts (0 - 1)  0        Neutrophil promyelocytes (2 - 4)  68      Neutrophils and precursors (54 - 63)  9        Erythroid precursors (18 - 24)  1        Monocytes (1 - 1.5)  0        Eosinophils (1 - 3)  0        Basophils (0 - 1)  8      Lymphocytes (8 - 12)  3        Plasma cells (0 - 1.5)    The aspirate smears are not submitted for review.    Blasts show similar morphology to that described in the peripheral blood; again, no Blanca rods are seen.  Granulocytes are adequate in number, but are markedly left shifted - mature segmented neutrophils are rare to absent.  Erythrocytes are decreased, but with complete maturation; no overt dysplasia is seen. Megakaryocytes are present and show overall unremarkable morphology, although a rare form is seen with widely spaced nuclear lobes.     TREPHINE SECTIONS:  Hematoxylin and eosin stains are reviewed. The trephine core biopsy is adequate. The marrow cellularity is variable, overall estimated at 40-50%. The cellular composition reflects the touch imprint differential. Megakaryocytes are increased, and include cells with unremarkable morphology as well as cells with hyperchromatic nuclei.      SPECIAL STAINS  Reticulin stain is performed on the core biopsy section with appropriately reactive control tissues. There is no increase in reticulin fibrosis (MF-0 of 3).    IMMUNOHISTOCHEMISTRY:  Immunohistochemical stains are performed on the paraffin-embedded trephine core with appropriate  controls.    CD34 highlights rare scattered immature cells; no aggregates or clusters of CD34 positive cells seen.   highlights rare scattered mast cells (strong intensity) and an increased proportion of hematopoietic precursors (weak intensity), which are scattered and form clusters.  CD61 highlights increased megakaryocytes with spectrum of sizes and a scattered and clustered distribution.    Note: These immunohistochemical stains are deemed medically necessary. Some of the antigens may also be evaluated by flow cytometry. Concurrent evaluation by immunohistochemistry on clot and/or trephine sections is indicated in this case in order to correlate immunophenotype with cell morphology and determine extent of involvement, spatial pattern, and focality of potential disease distribution.    Resident/Fellow Review by:  Dr. Fabián Em    A resident/fellow in an [employer]CG[employer] accredited training program was involved in the initial review, preparation, and/or interpretation of this case. I, as the senior physician, attest that I: (i) reviewed the patient clinical records if indicated; (ii) reviewed the relevant lab test results;(iii) examined the relevant preparation(s) for the specimen(s); and (iv) agree with the report, diagnosis(es), and interpretation as documented by the resident/fellow and edited/confirmed by me.      Gross Description      Procedure/Gross Description   Aspirate(s) and trephine(s) procured by [facility]    Specimen sent for Special Studies:         Flow Cytometry: right core        Cytogenetics: right core        Molecular Diagnostics: right core          Biopsy aspiration site: unable to obtain                                                        Trephine biopsy site: right posterior iliac crest    Designated right posterior iliac crest is 1 cylinder of gritty tissue, labeled with the patient's name and hospital number, obtained with 8 gauge needle and a length of 26 mm; entirely submitted in 1  cassette; acetic zinc formalin fixed, decalcified, processed, and stained for hematoxylin and eosin per laboratory protocol.        Performing Labs      The technical component of this testing was completed at [facility] and [facility]             Flow Cytometry   Contains abnormal data Flow Cytometry Bone Marrow: [id number]  Order: [id number]   Collected 6/17/2022 11:05 AM       Status: Final result       Visible to patient: Yes (seen)      Specimen Information: Iliac Crest, Bone Marrow Aspirate, Left         0 Result Notes      Component Ref Range & Units    Case Report   Flow Cytometry Report                             Case: [id number]                                   Authorizing Provider:  Lamar Grover           Collected:           06/17/2022 11:05 AM                                  BREEZY Ortiz                                                               Ordering Location:     [facility]     Received:            06/17/2022 11:33 AM                                  Emergency Department                                                          Pathologist:           Harini Patel MD                                                         Specimen:    Iliac Crest, Bone Marrow Aspirate, Left                                                    Flow Interpretation   A. Iliac Crest, Bone Marrow Aspirate, Left:  -Increased abnormal myeloid blasts (85%)  See comment       Electronically signed by Harini Patel MD on 6/20/2022 at  3:33 PM   Comment     The immunophenotypic findings are consistent with acute myeloid leukemia, the blats show similar immunophenotype as in the recent peripheral blood flow cytometry, please see prior case for more complete immunophenotype of the blasts.  FISH for PML-ANNELIESE performed on the peripheral blood is negative, excluding a diagnosis of acute promyelocytic leukemia. Please correlate with all other  ancillary studies.   Flow Phenotypic Data     Unless otherwise indicated, percentages reported below are based on the total number of CD45 positive viable leukocytes. If applicable, percentage of plasma cells is from total viable nucleated cells.     85% blasts with the following immunophenotype:  Positive for CD33, CD38 (partial) , CD45 (dim),   (partial)   Negative for CD3, CD7, CD10, CD11b, CD13 (predominantly negative, dim on a small subset), CD14, CD15, CD19, CD34, CD56 (predominantly negative, 6% of the blasts are positive), CD64, HLA-DR     0.06% CD34 positive blasts      99% of the blasts are positive for CD33 (clone P67.6 in APC-R700, relative to background lymphocytes).     Note spelling: nuclear terminal deoxynucleotidyl transferase (TdT),  cytoplasmic myeloperoxidase     Fellow Review by:  Dr. Sylvester Dominguez     A resident/fellow in an [employer]CG[employer] accredited training program was involved in the selection of testing, review of flow scattergrams, and/or interpretation of this case.  I, as the senior physician, attest that I: (i) confirmed appropriate testing, (ii) examined the relevant flow scattergrams for the specimen(s); and (ii) rendered or confirmed the interpretation(s).     HISTORY: new acute myeloid leukemia            Molecular Studies  AML Expanded NGS Panel Blood BM: [id number]  Order: [id number]   Collected 6/17/2022 11:05 AM     Status: Edited Result - FINAL     Visible to patient: Yes (seen)     0 Result Notes    Component    Significant Results    Detected Alterations of Known or Potential Pathogenicity: IDH2 R140Q, NPM1 W288fs, FLT3 ITD     Detected Alterations of Uncertain Significance: None     Genes with No Detected Clinically Significant Alterations: ASXL1, BCOR, CBL, CEBPA, DNMT3A, GATA1, IDH1, KIT, KMT2A, KRAS, NRAS, PDGFRA, PHF6, ANNELIESE, RUNX1, TET2, TP53, WT1   Interpretation    A pathogenic FLT3 mutation (ITD, internal tandem duplication), a pathogenic frameshift NPM1 mutation and IDH2  R140Q were identified.      The presence of a FLT3 ITD predicts responsiveness to FDA-approved FLT3 kinase inhibitors.     FLT3-ITD and NPM1 mutations are also prognostic biomarkers. Of note, the FLT3-ITD was not detected by NGS but only by separately performed PCR analysis which demonstrated a low FLT3-ITD allelic ratio (see separate report for details).       Final prognostic categorization of NPM-mutant, FLT3-ITD AML is also dependent on additional cytogenetic and genomic findings. In the absence of adverse risk factors by cytogenetic studies, NPM1 and low allelic ratio FLT3-ITD is associated with favorable risk. Clinical management should not be based on this assay and interpretation alone. Correlation with clinical information, histology and other diagnostic tests is indicated.        The R140Q mutation in IDH2 is a well-characterized gain-of-function mutation that leads to abnormal production of the oncometabolite alpha-hydroxyglutarate. This leads to dysregulation of numerous biologic functions, including methylation and metabolism. IDH2 mutations are found in a variety of solid and hematologic malignancies; FDA-approved targeted therapies are available depending on the specific cancer type.           FLT3 ITD/TKD PCR: [id number]  Order: [id number]   Collected 6/17/2022 11:05 AM     Status: Edited Result - FINAL     Visible to patient: Yes (seen)     0 Result Notes    Component    Specimen Description    Bone Marrow: ACD Syringe   RESULTS VC    Result corrected 06/27/22.  Allelic ratio changed from 0.144 to 0.21. No additional changes to results or interpretation.    INTERNAL TANDEM REPEAT (ITD):      Mutant Allele:   Present     Normal Allele:   Present     Allelic ratio: 0.210     D835 MUTATION:     Mutant Allele:   Absent     Normal Allele:   Present   Comment: Corrected result: Previously reported as [Previous value contains rich text formatting which cannot be displayed here] (see Result History) on  6/21/2022 at  9:34 PM CDT.   INTERPRETATION    Molecular testing performed on submitted Bone Marrow.     An internal tandem duplication was detected within exon14 of the FLT3 gene. No evidence was found of the D835 mutation(TKD) in exon20 of the FLT3 gene.   METHODOLOGY    Genomic DNA was extracted from above specimen and amplified by PCR using a series of fluorescently labeled oligonucleotide primers specific for the regions of FLT3 gene (exon 14 at the site of internal tandem duplications and the exon 20 tyrosine kinase domain). The amplified products were digested with restriction enzyme EcoRV to detect the D835 mutation in exon 20. The PCR product and digest product were then analyzed on a Model 3130xl/Genescan system, ([employer]). (Emiliano BABIN, 2003, Journal of molecular diagnostics, Vol.5: 96). If applicable, the FLT3-ITD allelic ratio is determined as the ratio of the mutant product peak height to the wild-type product peak height.    COMMENTS    FLT3-ITD mutations, as seen in this case, have been reported in about 30% of cases of AML. FLT3-ITD mutations have been associated with a worse prognosis which is lessened by the presence of a concurrent NPM1 mutation. (NCCN Guidelines. Acute Myeloid Leukemia v.2.2018). This poor prognosis is especially noted with a high FLT3-ITD high allelic ratio (>/=0.5); while a low FLT3-ITD allelic ratio (<0.5) may behave more like FLT3-ITD negative AML. The allelic ratio only has prognostic significance in the diagnostic setting. Midostaurin therapy has demonstrated improved survival for patients with FLT3-mutation positive AML. Of note, as gain or loss of FLT3 mutations has been reported with disease progression, future testing may be considered if clinically indicated. Please correlate with pending NGS study for final interpretation.              Chest CT without Contrast   EXAMINATION: CT CHEST/ABDOMEN/PELVIS W CONTRAST, 6/16/2022 4:35 PM     TECHNIQUE:  Helical CT  images from the thoracic inlet through the  symphysis pubis were obtained  with contrast. Contrast dose: 135 ml  isovue 370      COMPARISON: None.     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 Pt also with  h/o Cdiff and resultant hemorrhoid. Please eval for possible  perirectal abscess in setting of profound neutropenia     FINDINGS:     Chest:   Thyroid is unremarkable. Heart is not enlarged. No pericardial  effusion. Normal caliber thoracic aorta and main pulmonary artery.  Conventional great arch anatomy. No pathologically enlarged thoracic  lymph nodes.     Central tracheobronchial tree is patent. There is subtle diffuse  mosaic groundglass changes. Minor basal atelectasis. No focal  consolidative opacity, pleural effusion, or pneumothorax. 4 mm nodule  in the left lower lobe (series 5, image 164).     Abdomen and pelvis: Focal fatty infiltration along the falciform. No  other focal hepatic lesion. Normal gallbladder and spleen. Adrenal  glands and pancreas are unremarkable. Symmetric enhancement of the  kidneys without evidence of hydronephrosis or nephrolithiasis.  Visualized ureters and bladder appear normal. Left simple cyst versus  corpus luteal cyst. There may be a few small uterine fibroids best  visualized on sagittal imaging. No abnormal pelvic mass. Possible  small hiatal hernia. Normal caliber small and large bowel without  evidence of obstruction. Appendix is not visualized but there is no  evidence of acute appendicitis. No free air or fluid in the abdomen or  pelvis.     Abdominal aorta and its major branches are patent. No pathologically  enlarged lymph nodes in the abdomen or pelvis.     Bones and soft tissues: No acute or suspicious osseous lesions.                                                                      IMPRESSION:   1. No acute findings. No perirectal abscess.  2. Small 4 mm nodule in the left lower lobe. Recommend attention on  follow-up.      Results for orders  placed during the hospital encounter of 22    ECHOCARDIOGRAM COMPLETE    Status: Normal 2022    [facility]  [id number]  [id number]  [id number]  [id number]^SELMA^VIDHI^DAMIAN    [facility],[place]  Echocardiography Laboratory  [address] [place] [address]    Name: YAO GEIGER  MRN: [id number]  : 1985  Study Date: 2022 03:33 PM  Age: 37 yrs  Gender: Female  Patient Location: [facility]  Reason For Study: Chemo  Ordering Physician: VIDHI HAHN  Performed By: aNny Frazier    BSA: 2.3 m2  Height: 67 in  Weight: 259 lb  HR: 88  BP: 152/100 mmHg  ______________________________________________________________________________  Procedure  Complete Portable Echo Adult. Echocardiogram with two-dimensional, color and  spectral Doppler performed.  ______________________________________________________________________________  Interpretation Summary  Global and regional left ventricular function is normal with an EF of 55-60%.  3D LVEF volumetric analysis is 58%. Global peak LV longitudinal strain is  averaged at -23.8%. This is within reported normal limits (normal <-18%).  No significant valvular abnormalities were noted.  No pericardial effusion is present.  Previous study not available for comparison.  ______________________________________________________________________________  Left Ventricle  Global and regional left ventricular function is normal with an EF of 55-60%.  3D LVEF volumetric analysis is 58%. Left ventricular size is normal. Left  ventricular wall thickness is normal. Global peak LV longitudinal strain is  averaged at -23.8%. This is within reported normal limits (normal <-18%). Left  ventricular diastolic function is normal. Diastolic Doppler findings (E/E'  ratio and/or other parameters) suggest left ventricular filling pressures are  normal.    Right Ventricle  Right ventricular function, chamber size, wall motion, and  thickness are  normal.    Atria  Both atria appear normal.    Mitral Valve  The mitral valve is normal.    Aortic Valve  Aortic valve is normal in structure and function. The aortic valve is  tricuspid.    Tricuspid Valve  The tricuspid valve is normal. Trace tricuspid insufficiency is present. The  peak velocity of the tricuspid regurgitant jet is not obtainable. Pulmonary  artery systolic pressure cannot be assessed.    Pulmonic Valve  The pulmonic valve is normal.    Vessels  The aorta root is normal. The thoracic aorta is normal. The pulmonary artery  cannot be assessed. IVC diameter and respiratory changes fall into an  intermediate range suggesting an RA pressure of 8 mmHg.    Pericardium  No pericardial effusion is present.    Compared to Previous Study  Previous study not available for comparison.  ______________________________________________________________________________  MMode/2D Measurements & Calculations    IVSd: 0.78 cm  LVIDd: 5.9 cm  LVIDs: 4.2 cm  LVPWd: 0.69 cm  FS: 29.4 %  LV mass(C)d: 163.6 grams  LV mass(C)dI: 72.5 grams/m2  Ao root diam: 3.4 cm  asc Aorta Diam: 3.2 cm  LVOT diam: 2.5 cm  LVOT area: 4.9 cm2  LA Volume (BP): 80.0 ml  LA Volume Index (BP): 35.4 ml/m2  RWT: 0.23    Doppler Measurements & Calculations  MV E max chris: 80.6 cm/sec  MV A max chris: 63.3 cm/sec  MV E/A: 1.3  MV dec slope: 315.0 cm/sec2  PA acc time: 0.11 sec  E/E' av.6  Lateral E/e': 5.3  Medial E/e': 10.0    QLAB 3DQ Advanced  Stroke Vol: 97.8 ml  10_EDV(3DQA): 168.5 ml  10_ESV(3DQA): 70.7 ml  10_EF(3DQA): 58.0 %  10_Tmsv 3-6: -5.0 msec  10_Tmsv 3-5: -1.0 msec    10_Tmsv 3-6 (%): -0.72 %  10_Tmsv 3-5 (%): -0.20 %  ______________________________________________________________________________  Report approved by: Abundio Mcneal 2022 05:02 PM    Veda understood the above assessment and recommendations.  Multiple questions answered.  No barriers to learning identified.      Known issues that I take  into account for medical decisions, with salient changes to the plan considering these complexities noted above.    Patient Active Problem List   Diagnosis     Leukemia, blast cell (H)     History of COVID-19     Clostridium difficile infection     Need for prophylactic antibiotic     Neutropenic fever (H)       ------------------------------------------------------------------------------------------------------------------------------------------------    Patient Care Team       Relationship Specialty Notifications Start End    Maria Eugenia Villar APRN [facility] PCP - General Family Medicine  5/24/22     Phone: [phone] Fax: [phone]         [address], [address] [facility] [address]    Nyla Bob MD Assigned Cancer Care Provider   6/11/22     Phone: [phone] Fax: [phone]         [address] [facility] [address]

## 2022-07-15 NOTE — PLAN OF CARE
"Goal Outcome Evaluation:    4732-2134    /70 (BP Location: Right arm)   Pulse 89   Temp 96.9  F (36.1  C) (Oral)   Resp 18   Ht 1.702 m (5' 7.01\")   Wt 115.4 kg (254 lb 6.4 oz)   SpO2 98%   BMI 39.84 kg/m      Reason for admission: Presented with leukocytosis and circulating blasts. 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 She initiated induction chemotherapy with 7+3+midostaurin (C1D1=6/22/22). Hospital course complicated by neutropenic fever and C.difficile colitis.  Activity: UAL  Pain: Denies  Neuro: AxOx4. Neuros intact.   Cardiac: WDL. Afebrile.   Respiratory: NLB on RA. O2 sats WDL.   GI/: Voiding not saving, reports WDL. Reports BM x1 total on day.   Diet: Regular  Lines: DL PICC intact, HLx2. Site with bruising noted from dressing change.   Wounds: Bmbx site with small bruising, removed dressing placed bandaid. R shin bx site with bacitracin per POC.   Labs/imaging: Reviewed. See chart.     Plan: BMT consult tomorrow 7/15 at 0900.     Continue to monitor and follow POC  "

## 2022-07-15 NOTE — PLAN OF CARE
[id number]: Refused 0200 VS. 0600 VSS on RA. Denies pain, nausea, and SOB. Plan for BMT consult at 0900. Hoping to discharge after.

## 2022-07-17 LAB
BACTERIA BLD CULT: NO GROWTH
BACTERIA BLD CULT: NO GROWTH
BACTERIA TISS BX CULT: NO GROWTH
GRAM STAIN RESULT: NORMAL
GRAM STAIN RESULT: NORMAL

## 2022-07-18 ENCOUNTER — ONCOLOGY VISIT (OUTPATIENT)
Dept: ONCOLOGY | Facility: CLINIC | Age: 37
End: 2022-07-18
Attending: NURSE PRACTITIONER
Payer: COMMERCIAL

## 2022-07-18 ENCOUNTER — LAB (OUTPATIENT)
Dept: LAB | Facility: CLINIC | Age: 37
End: 2022-07-18
Payer: COMMERCIAL

## 2022-07-18 VITALS
TEMPERATURE: 98.5 F | SYSTOLIC BLOOD PRESSURE: 157 MMHG | DIASTOLIC BLOOD PRESSURE: 81 MMHG | WEIGHT: 249.2 LBS | BODY MASS INDEX: 39.02 KG/M2

## 2022-07-18 DIAGNOSIS — I82.622 ACUTE DEEP VEIN THROMBOSIS (DVT) OF OTHER VEIN OF LEFT UPPER EXTREMITY (H): ICD-10-CM

## 2022-07-18 DIAGNOSIS — C95.00 ACUTE LEUKEMIA NOT HAVING ACHIEVED REMISSION (H): ICD-10-CM

## 2022-07-18 DIAGNOSIS — A49.8 CLOSTRIDIUM DIFFICILE INFECTION: Primary | ICD-10-CM

## 2022-07-18 DIAGNOSIS — C92.00 ACUTE MYELOID LEUKEMIA NOT HAVING ACHIEVED REMISSION (H): Primary | ICD-10-CM

## 2022-07-18 PROBLEM — D70.9 NEUTROPENIC FEVER (H): Status: RESOLVED | Noted: 2022-07-11 | Resolved: 2022-07-18

## 2022-07-18 PROBLEM — R50.81 NEUTROPENIC FEVER (H): Status: RESOLVED | Noted: 2022-07-11 | Resolved: 2022-07-18

## 2022-07-18 PROBLEM — I82.629 DEEP VEIN THROMBOSIS (DVT) OF UPPER EXTREMITY (H): Status: ACTIVE | Noted: 2022-07-18

## 2022-07-18 LAB
ALBUMIN SERPL-MCNC: 3.2 G/DL (ref 3.4–5)
ALP SERPL-CCNC: 58 U/L (ref 40–150)
ALT SERPL W P-5'-P-CCNC: 52 U/L (ref 0–50)
ANION GAP SERPL CALCULATED.3IONS-SCNC: 7 MMOL/L (ref 3–14)
AST SERPL W P-5'-P-CCNC: 20 U/L (ref 0–45)
BASOPHILS # BLD MANUAL: 0 10E3/UL (ref 0–0.2)
BASOPHILS NFR BLD MANUAL: 0 %
BILIRUB SERPL-MCNC: 0.7 MG/DL (ref 0.2–1.3)
BUN SERPL-MCNC: 7 MG/DL (ref 7–30)
CALCIUM SERPL-MCNC: 9.3 MG/DL (ref 8.5–10.1)
CHLORIDE BLD-SCNC: 108 MMOL/L (ref 94–109)
CO2 SERPL-SCNC: 26 MMOL/L (ref 20–32)
CREAT SERPL-MCNC: 0.55 MG/DL (ref 0.52–1.04)
EOSINOPHIL # BLD MANUAL: 0 10E3/UL (ref 0–0.7)
EOSINOPHIL NFR BLD MANUAL: 0 %
ERYTHROCYTE [DISTWIDTH] IN BLOOD BY AUTOMATED COUNT: 16 % (ref 10–15)
GFR SERPL CREATININE-BSD FRML MDRD: >90 ML/MIN/1.73M2
GLUCOSE BLD-MCNC: 108 MG/DL (ref 70–99)
HCT VFR BLD AUTO: 27.4 % (ref 35–47)
HGB BLD-MCNC: 8.9 G/DL (ref 11.7–15.7)
LYMPHOCYTES # BLD MANUAL: 2.3 10E3/UL (ref 0.8–5.3)
LYMPHOCYTES NFR BLD MANUAL: 12 %
MCH RBC QN AUTO: 32.1 PG (ref 26.5–33)
MCHC RBC AUTO-ENTMCNC: 32.5 G/DL (ref 31.5–36.5)
MCV RBC AUTO: 99 FL (ref 78–100)
METAMYELOCYTES # BLD MANUAL: 1.9 10E3/UL
METAMYELOCYTES NFR BLD MANUAL: 10 %
MONOCYTES # BLD MANUAL: 1.7 10E3/UL (ref 0–1.3)
MONOCYTES NFR BLD MANUAL: 9 %
MYELOCYTES # BLD MANUAL: 0.2 10E3/UL
MYELOCYTES NFR BLD MANUAL: 1 %
NEUTROPHILS # BLD MANUAL: 13.1 10E3/UL (ref 1.6–8.3)
NEUTROPHILS NFR BLD MANUAL: 68 %
NRBC # BLD AUTO: 0.2 10E3/UL
NRBC BLD MANUAL-RTO: 1 %
PLAT MORPH BLD: ABNORMAL
PLATELET # BLD AUTO: 578 10E3/UL (ref 150–450)
POLYCHROMASIA BLD QL SMEAR: SLIGHT
POTASSIUM BLD-SCNC: 3.7 MMOL/L (ref 3.4–5.3)
PROT SERPL-MCNC: 7.9 G/DL (ref 6.8–8.8)
RBC # BLD AUTO: 2.77 10E6/UL (ref 3.8–5.2)
RBC MORPH BLD: ABNORMAL
SODIUM SERPL-SCNC: 141 MMOL/L (ref 133–144)
WBC # BLD AUTO: 19.2 10E3/UL (ref 4–11)

## 2022-07-18 PROCEDURE — 85014 HEMATOCRIT: CPT

## 2022-07-18 PROCEDURE — 99215 OFFICE O/P EST HI 40 MIN: CPT | Performed by: NURSE PRACTITIONER

## 2022-07-18 PROCEDURE — 80053 COMPREHEN METABOLIC PANEL: CPT

## 2022-07-18 PROCEDURE — 36415 COLL VENOUS BLD VENIPUNCTURE: CPT

## 2022-07-18 PROCEDURE — G0463 HOSPITAL OUTPT CLINIC VISIT: HCPCS

## 2022-07-18 PROCEDURE — 85007 BL SMEAR W/DIFF WBC COUNT: CPT

## 2022-07-18 NOTE — PROGRESS NOTES
"Oncology Rooming Note    July 18, 2022 9:24 AM   Veda Marinelli is a 37 year old female who presents for:    No chief complaint on file.    Initial Vitals: There were no vitals taken for this visit. Estimated body mass index is 39.46 kg/m  as calculated from the following:    Height as of 7/13/22: 1.702 m (5' 7.01\").    Weight as of 7/15/22: 114.3 kg (252 lb). There is no height or weight on file to calculate BSA.  Data Unavailable Comment: Data Unavailable   No LMP recorded.  Allergies reviewed: Yes  Medications reviewed: Yes    Medications: Medication refills not needed today.  Pharmacy name entered into [employer]: [id number] [address]    Clinical concerns: Eyes painful to the touch, were watering more. Swollen ankles, rash slightly better on legs. Blister from PICC line dressing {PROVIDER NOTIFIED?:[id number]}      Bernadine Benson RN                "

## 2022-07-18 NOTE — LETTER
7/18/2022         RE: Veda Marinelli  [address] [address] [place] [address]        Dear Colleague,    Thank you for referring your patient, Veda Marinelli, to the [facility]. Please see a copy of my visit note below.    [facility] Hematology and Oncology Outpatient Progress Note    Patient: Veda Marinelli  MRN: [id number]  Date of Service: Jul 18, 2022          Reason for Visit    1. AML  2. Transfusion mgmt    Primary Hematologist Mady ([facility])      Assessment/Plan  1. AML, FLT3+  S/p 1 cycle induction 7+3 (cytarabine+daunorubicin PLUS midostaurin) - day 26 today.   Managed with Hem/BMT at [facility].     Plan:  -Follow-up BMbx 7/21 and return visit with Dr. Earl 7/26 for recommendations on BMT vs consolidative chemo.    2. Pancytopenias, secondary to AML + chemo  Anemia stable/improved 8.9. Platelets up at 578.  WBC an ANC elevated 19.2 and 13, no reported blasts in peripheral blood.     Plan:  -Transfuse for hgb <7.0 and platelets <10K. Informed consent signed today to have as needed  -Transfusion support at [facility] as needed (she is about an hour from this location)  -Today, no transfusion necessary  -She is not scheduled for routine labs. Will check if Dr. Earl would like checked early next week ahead of his virtual visit with her. She prefers not driving all the way to [place] for labs only if not necessary, not sure if she could get her lab checked/faxed at external clinic closer to home ([place] or [place])     3. Acute LUE DVT  7/12 US showed non-occlusive DVT in L axillary vein extending through onf of the paired L brachial veins of upper and mid arm. Additionally, occlusive superficial venous thrombosis L basilic vein at upper and mid arm + mid forearm. On Lovenox 150 mg daily (1.5 mg/kg daily dosing). PICC pulled.     Plan:  -Lovenox to total 2 weeks, then repeat US (scheduled 7/20). If resolution of thrombus, planning to discontinue  anticoagulation    4. Neutropenic fever  Outside of c diff, no other clear source infection identified. Treated with broad-spectrum IV abx through 7/15 with recovery of neutrophils and afebrile x 48 hr. She has had a few low grade ~99 temps in evenings, nothing >100. Emergency for fever 100.5/greater reviewed.     5. Recurrent C diff colitis  On oral Vanco QID 7/13 - indefinitely for now  Diarrhea is improved - less frequency and a bit more formed (loose consistency); avg 2/day currently    6. ID prophylaxis  - ACV 400mg BID  - No abx ppx indicated given count recovery  - Did not discharge on antifungal ppx given count recovery  - Prior Auth approved for both posaconazole and voriconazole. However, as of  6/29, pt has not met deductible (but should meet it once hospital stay is billed). At present, monthly cost for Posaconazole $2048.66, Voriconazole $176.41.    7. GERD  8. Hiatal hernia  Carafate before meals and at nighttime --> given improvement, backed off to PRN    Continue to monitor, could consider barium swallow study if persistent  - On 7/7 - CT noted mild thickening  and streaky density along the intrathoracic stomach and distal esophagus, likely to represent inflammatory/infective etiology. Will continue to follow by imaging and symptoms. Will consider EGD when patient's counts recover if needed.     9. Hypothyroidism  On Synthroid replacement. Recently reduce to 100 mcg (for TSH 0.04). Managed by PCP.    10. Right shin lesion  Noted 7/6 and being monitored for cellulitis due to area of induration/tenderness. MRSA swab was negative. Derm biopsied. Low threshold for Vanco if progressive. No changes on exam today.    11. Truncal rash, resolved  12. Leg rash, improving  Improving with topical steroids. Continue    13. Left arm dermatitis/blister  Intact blister from Tegaderm (PICC site). No signs infection. If it pops, instructed to keep clean with water/gentle soap and cover with gauze. Monitor closely for  signs of infection (redness, warmth, drainage).     14. Eye discomfort/tearing  Ongoing since post-chemo and likely side effect. No signs of infection.    15. Mild LE edema  Elevated. Compression stockings as able    16. Covid prevention  Pt is not vaccinated nor interested in being vaccinated. She was admitted from 10/1/2021 - 10/13/2021 for acute hypoxic respiratory failure from COVID-19 pneumonia.  Required IMC, high flow and intermittent CPAP. She was treated with dexamethasone, remdesivir, and baricitinib in addition to azithromycin and ceftriaxone. Recovered symptomatically from this.    ______________________________________________________________________________    History of Present Illness/ Interval History    Ms. Veda Marinelli  is a 37 year old diagnosed with AML last month. She was hospitalized at [facility] for cycle 1 induction chemo and just dismissed last Friday. She's doing well at home over weekend with no acute concerns. Feels well. Eating/drinking well. Minimal fatigue.     On Lovenox for LUE DVT. No arm pain, swelling. No bleeding.     LE macular rash noted in hospital is improving. Treating with triamcinolone cream. No signs of cellulitis.   At the site where her PICC was removed, she has some blistering where Tegaderm was. No redness, drainage or swelling.     Eyes are tearing and a bit sensitive to light. No itching, erythema or drainage. This has been present for a few weeks, felt to be a chemo side effect and no changes since leaving hospital.     Low gr temp 99 in evenings. No chills/rigors.     LE edema, mild, last few days. No calf pain, dyspnea, chest pain.     Diarrhea improved. More formed (loose/soft rather than liquid) and less frequent (avt 2/day). On oral Vanco. No abd pain/cramping.     No mouth/throat pain or sores.      ECOG Performance    0        Oncology History/Treatment  Diagnosis/Stage:   6/2022: AML (FLT3+)  -3/2022: routine physical noted leukopenia (2.5), neutropenia  (1.0), macroctyosis (103), normal hgb and platelets  -5/2022: follow up labs noted progressive leukpenia (1.6), neutropenia (0.28), anemia (hgb 11.0) and thrombocytopenia (133)  -6/7/2022: Hem consult. 6/14/2022 WBC leukocytosis (25.3), ANC 0.8, macrocytic anemia (10.1, 108), platelets 90. 81% blasts seen, concerning for acute leukemia. Concern (verbal/prelim report) for Blanca rods; later PML-ANNELIESE testing returned as negative. Admitted.  -PB chromosomes: 46, XX. No abnormality  -PB FISH: no rearrangement of MLLT10, NUP98, or KMT2A  -6/17/2022 bone marrow biopsy: markedly hypercellular marrow (85-95% estimated cellularity), with markedly diminished trilineage hematopoiesis, no overt dysplasia in evaluable elements. 92% blasts  ---FLT3 PCT: positive for FLT3-ITD (low) with allelic ratio 0.21  ---NGS panel: NPM1 positive, IDH2, FLT3-ITD  -HLA typing sent, delayed due to insurance changes/financial approval       Treatment:  6/16 - 6/17/2022: ATRA (for possible Blanca rods). Stopped once PML-ANNELIESE testing negative    6/17 - 6/22/2022: Hydrea cytoreduction while awaiting final diagnostic studies    6/22/2022 (induction chemo day 1): 7+3 (cytarabine+daunorubicin PLUS midostaurin)  ----inpatient course complicated by recurrent neutropenic fever and C diff colitis. Treated with IV broad-spectrum abx through 7/15 (afebrile x 48 hr + recovery of ANC). Treated with oral Vanco for C diff  ----Day 21 BMBx: hypocellular marrow (40-50% estimated cellularity) with diminished erythroid maturation, markedly left-shifted granulopoiesis, increased megakaryopoeisis, and 11% blasts. Flow cytometry on a concurrent specimen showed increased myeloid blasts (14%); the immunophenotype of the blasts was different from that observed with the patient's diagnostic specimen. By morphology, blasts are increased in the marrow, but no Blanca rods are seen (Blanca rods characterized the patient's leukemic blasts at diagnosis). Overall, the morphologic and  "immunophenotypic findings are favored to represent brisk marrow regeneration following chemotherapy, although the possibility of residual acute myeloid leukemia cannot be entirely ruled out.\" Overall plan is to repeat Bone marrow biopsy in 1 week.     7/15/2022 BMT consult: BMT at this time not recommended. Pt can likely be treated with consolidation chemo alone, pending count recovery BMbx results      Physical Exam    GENERAL: Alert and oriented to time place and person. Seated comfortably. In no distress. Alone  HEAD: Atraumatic and normocephalic. Complete alopecia.  EYES: BACILIO, EOMI. No erythema. No icterus.  ORAL CAVITY: Moist. No mucosal lesion or tonsillar enlargement.  LYMPH NODES: No palpable supraclavicular, cervical lymphadenopathy.  CHEST: clear to auscultation bilaterally. Resonant to percussion throughout bilaterally. Symmetrical breath movements bilaterally.  CVS: RRR  ABDOMEN: Soft. Not tender. Not distended. No palpable hepatomegaly or splenomegaly. No other mass palpable. Bowel sounds present.  EXTREMITIES: Warm. Trace to 1+ peripheral edema (ankles/feet).  SKIN: faint pink macular rash bilateral shins; no erythema. No petechiae. Left proximal inner arm blister intact, no erythema nor drainage.   NEURO: No gross deficit noted. Non-antalgic gait.      Lab Results    Recent Results (from the past 168 hour(s))   Clostridium difficile toxin B PCR    Specimen: Per Rectum; Stool   Result Value Ref Range    C Difficile Toxin B by PCR Negative Negative   Potassium   Result Value Ref Range    Potassium 3.6 3.4 - 5.3 mmol/L   Acid-fast Bacilli (AFB) Blood Culture    Specimen: Line, Other; Blood   Result Value Ref Range    Culture No growth after 6 days    Quantiferon TB Gold Plus Grey Tube    Specimen: Red Lumen; Blood   Result Value Ref Range    Quantiferon Nil Tube 0.47 IU/mL   Quantiferon TB Gold Plus Green Tube    Specimen: Red Lumen; Blood   Result Value Ref Range    Quantiferon TB1 Tube 0.21 IU/mL "   Quantiferon TB Gold Plus Yellow Tube    Specimen: Red Lumen; Blood   Result Value Ref Range    Quantiferon TB2 Tube 0.18    Quantiferon TB Gold Plus Purple Tube    Specimen: Red Lumen; Blood   Result Value Ref Range    Quantiferon Mitogen 0.21 IU/mL   Quantiferon TB Gold Plus    Specimen: Red Lumen; Blood   Result Value Ref Range    Quantiferon-TB Gold Plus Indeterminate (A) Negative    TB1 Ag minus Nil Value -0.26 IU/mL    TB2 Ag minus Nil Value -0.29 IU/mL    Mitogen minus Nil Result -0.26 IU/mL    Nil Result 0.47 IU/mL   Helicobacter pylori Antigen Stool   Result Value Ref Range    Helicobacter pylori Antigen Stool Negative Negative   Prepare pheresed platelets (unit)   Result Value Ref Range    UNIT ABO/RH AB Neg     Unit Number [id number]     Unit Status Transfused     Blood Component Type Platelets     Product Code [id number]     CODING SYSTEM [id number]     UNIT TYPE ISBT 2800     ISSUE DATE AND TIME [id number]    Basic metabolic panel   Result Value Ref Range    Creatinine 0.49 (L) 0.51 - 0.95 mg/dL    Sodium 140 136 - 145 mmol/L    Potassium 3.6 3.4 - 5.3 mmol/L    Urea Nitrogen 5.3 (L) 6.0 - 20.0 mg/dL    Chloride 105 98 - 107 mmol/L    Carbon Dioxide (CO2) 21 (L) 22 - 29 mmol/L    Anion Gap 14 7 - 15 mmol/L    Glucose 97 70 - 99 mg/dL    GFR Estimate >90 >60 mL/min/1.73m2    Calcium 8.9 8.6 - 10.0 mg/dL   CBC with platelets and differential   Result Value Ref Range    WBC Count 1.6 (L) 4.0 - 11.0 10e3/uL    RBC Count 2.51 (L) 3.80 - 5.20 10e6/uL    Hemoglobin 7.8 (L) 11.7 - 15.7 g/dL    Hematocrit 23.1 (L) 35.0 - 47.0 %    MCV 92 78 - 100 fL    MCH 31.1 26.5 - 33.0 pg    MCHC 33.8 31.5 - 36.5 g/dL    RDW 13.8 10.0 - 15.0 %    Platelet Count 86 (L) 150 - 450 10e3/uL   Manual Differential   Result Value Ref Range    % Neutrophils 11 %    % Lymphocytes 51 %    % Monocytes 19 %    % Eosinophils 0 %    % Basophils 0 %    % Metamyelocytes 9 %    % Myelocytes 3 %    % Blasts 7 %    Absolute Neutrophils 0.2  (LL) 1.6 - 8.3 10e3/uL    Absolute Lymphocytes 0.8 0.8 - 5.3 10e3/uL    Absolute Monocytes 0.3 0.0 - 1.3 10e3/uL    Absolute Eosinophils 0.0 0.0 - 0.7 10e3/uL    Absolute Basophils 0.0 0.0 - 0.2 10e3/uL    Absolute Metamyelocytes 0.1 (H) <=0.0 10e3/uL    Absolute Myelocytes 0.0 <=0.0 10e3/uL    Absolute Blasts 0.1 (H) <=0.0 10e3/uL    RBC Morphology Confirmed RBC Indices     Platelet Assessment  Automated Count Confirmed. Platelet morphology is normal.     Automated Count Confirmed. Platelet morphology is normal.   Bone marrow biopsy   Result Value Ref Range    Final Diagnosis       Bone marrow, posterior iliac crest, right decalcified trephine biopsy and touch imprint; right and peripheral blood smear:    - Hypocellular marrow (40-50% estimated cellularity) with diminished erythroid maturation, markedly left-shifted granulopoiesis, increased megakaryopoeisis, and 11% blasts  - Peripheral blood showing moderate normocytic, normochromic anemia; marked leukopenia with left-shifted neutropenia; moderate thrombocytopenia; 7% circulating blasts  - See comment.      Comment         Flow cytometry on a concurrent specimen ([id number]) showed increased myeloid blasts (14%); the immunophenotype of the blasts was different from that observed with the patient's diagnostic specimen. By morphology, blasts are increased in the marrow, but no Blanca rods are seen (Blanca rods characterized the patient's leukemic blasts at diagnosis).     Overall, the morphologic and immunophenotypic findings are favored to represent brisk marrow regeneration following chemotherapy, although the possibility of residual acute myeloid leukemia cannot be entirely ruled out. Please correlate these findings with pending genetic studies. If clinically appropriate, re-biopsy in about one week could be helpful.    Select slides were reviewed by Dr. Clifford Andres. Dr. Rena Villar notified Dr. Alexandria Bahena of these findings by Epic message on 7/14/22 in the  evening.      Clinical Information       Per Epic records:  37-year-old woman with a prior diagnosis of FLT3+ AML ([id number], collected 6/17/2022) who was treated with 7+3+midostaurin and now presents for day 21 evaluation      Peripheral Hematologic Data       CBC WITH MANUAL DIFFERENTIAL (07/12/2022 08:57 AM CDT):     RESULT VALUE REF. RANGE UNITS    WBC Count    Hemoglobin    Hematocrit    Platelet Count    RBC Count   MCV  MCH  MCHC  RDW  1.6  ( L )     7.8  ( L )      23.1  ( L )   86  ( L )   2.51  ( L )       92 (NORMAL)     31.1 (NORMAL)     33.8 (NORMAL)     13.8 (NORMAL) 4.0-11.0  11.7-15.7  35.0-47.0  150-450  3.80-5.20    26.5-33.0  31.5-36.5  10.0-15.0 10e3/uL  g/dL  %  10e3/uL  10e6/uL  fL  pg  g/dL  %   % Neutrophils  % Lymphocytes  % Monocytes  % Eosinophils  % Basophils  % Metamyelocytes  % Myelocytes  % Promyelocytes  % Blasts  % Plasma Cells  % Other Cells   Absolute Neutrophils   Absolute Lymphocytes  Absolute Monocytes  Absolute Eosinophils  Absolute Basophils   Absolute Metamyelocytes   Absolute Myelocytes  Absolute Promyelocytes   Absolute Blasts   Absolute Plasma Cells  Absolute Other Cells  NRBCs per 100 WBC  Absolute NRBCs 11  51  19  0  0  9  3    7        0.2  ( LL )     0.8 (NORMAL)     0.3 (NORMAL)     0.0 (NORMAL)     0.0 (NORMAL)   0.1  ( H )  0.0 (NORMAL)   ()       0.1  ( H )   ()   ()   ()      () N/A  N/A  N/A  N/A  N/A  N/A  N/A  N/A  N/A  N/A  N/A  1.6-8.3  0.8-5.3  0.0-1.3  0.0-0.7  0.0-0.2  <=0.0  <=0.0  <=0.0  <=0.0  <=0.0  <=0.0  <=0  <=0.0 %  %  %  %  %  %  %  %  %  %  %  10e3/uL  10e3/uL  10e3/uL  10e3/uL  10e3/uL  10e3/uL  10e3/uL  10e3/uL  10e3/uL  10e3/uL  10e3/uL  %  10e3/uL         Microscopic Description       PERIPHERAL BLOOD SMEAR MORPHOLOGY:  The red blood cells appear normochromic.  Poikilocytosis includes rare dacrycocytes.  Polychromasia is not increased.  Rouleaux formation is not increased. Lymphocyte morphology is polymorphous. Neutrophils are  left-shifted and include both mature segmented neutrophils and immature forms. The morphology of the platelets is normal.  Blasts are increased and show small to intermediate size, oval nuclei, dispersed chromatin, small nucleoli, blue grey cytoplasm, scant granulation, and no Blanca rods.    100-cell manual differential count by AKB: 7% circulating blasts    Bone marrow trephine core biopsy touch imprints are reviewed.    BONE MARROW DIFFERENTIAL (500 cells on bone marrow biopsy touch imprints )  Percent Cell (reference range)  11      Blasts (0 - 1)  0        Neutrophil promyelocytes (2 - 4)  68      Neutrophils and precursors (54 - 63)  9        Erythroid precursors (18 - 24)  1        Monocytes (1 - 1.5)  0        Eosinophils (1 - 3)  0        Basophils (0 - 1)  8      Lymphocytes (8 - 12)  3        Plasma cells (0 - 1.5)    The aspirate smears are not submitted for review.    Blasts show similar morphology to that described in the peripheral blood; again, no Blanca rods are seen.  Granulocytes are adequate in number, but are markedly left shifted - mature segmented neutrophils are rare to absent.  Erythrocytes are decreased, but with complete maturation; no overt dysplasia is seen. Megakaryocytes are present and show overall unremarkable morphology, although a rare form is seen with widely spaced nuclear lobes.     TREPHINE SECTIONS:  Hematoxylin and eosin stains are reviewed. The trephine core biopsy is adequate. The marrow cellularity is variable, overall estimated at 40-50%. The cellular composition reflects the touch imprint differential. Megakaryocytes are increased, and include cells with unremarkable morphology as well as cells with hyperchromatic nuclei.      SPECIAL STAINS  Reticulin stain is performed on the core biopsy section with appropriately reactive control tissues. There is no increase in reticulin fibrosis (MF-0 of 3).    IMMUNOHISTOCHEMISTRY:  Immunohistochemical stains are performed on the  paraffin-embedded trephine core with appropriate controls.    CD34 highlights rare scattered immature cells; no aggregates or clusters of CD34 positive cells seen.   highlights rare scattered mast cells (strong intensity) and an increased proportion of hematopoietic precursors (weak intensity), which are scattered and form clusters.  CD61 highlights increased megakaryocytes with spectrum of sizes and a scattered and clustered distribution.    Note: These immunohistochemical stains are deemed medically necessary. Some of the antigens may also be evaluated by flow cytometry. Concurrent evaluation by immunohistochemistry on clot and/or trephine sections is indicated in this case in order to correlate immunophenotype with cell morphology and determine extent of involvement, spatial pattern, and focality of potential disease distribution.    Resident/Fellow Review by:  Dr. Fabián Em    A resident/fellow in an ACGME accredited training program was involved in the initial review, preparation, and/or interpretation of this case. I, as the senior physician, attest that I: (i) reviewed the patient clinical records if indicated; (ii) reviewed the relevant lab test results;(iii) examined the relevant preparation(s) for the specimen(s); and (iv) agree with the report, diagnosis(es), and interpretation as documented by the resident/fellow and edited/confirmed by me.      Gross Description       Procedure/Gross Description   Aspirate(s) and trephine(s) procured by [facility]    Specimen sent for Special Studies:         Flow Cytometry: right core        Cytogenetics: right core        Molecular Diagnostics: right core          Biopsy aspiration site: unable to obtain                                                        Trephine biopsy site: right posterior iliac crest    Designated right posterior iliac crest is 1 cylinder of gritty tissue, labeled with the patient's name and hospital number, obtained with 8 gauge needle  and a length of 26 mm; entirely submitted in 1 cassette; acetic zinc formalin fixed, decalcified, processed, and stained for hematoxylin and eosin per laboratory protocol.        Performing Labs       The technical component of this testing was completed at [facility] and [facility]     Flow Cytometry Bone Marrow    Specimen: Iliac Crest, Bone Marrow Core, Right   Result Value Ref Range    Case Report       Flow Cytometry Report                             Case: [id number]                                  Authorizing Provider:  Gaby Arteaga PA-C      Collected:           07/12/2022 11:35 AM          Ordering Location:     [facility]     Received:            07/12/2022 12:56 PM                                 [address] [place]                                                            Pathologist:           Rena Villar MD                                                    Specimen:    Iliac Crest, Bone Marrow Core, Right                                                       Flow Interpretation       A. Iliac Crest, Bone Marrow Core, Right:  -Increased myeloid blasts (14%)  -See comment      Comment         The immunophenotype of the blasts in this study is different from the immunophenotype at the time of this patient's diagnosis ([id number], 6/17/22) - for example, the blasts at diagnosis were negative for CD13, CD34, and HLA-DR, but the blasts in the current study are positive or partially positive for these markers. These immunophenotypic findings may represent persistent leukemia with antigenic shift versus brisk marrow regeneration. Final interpretation requires correlation with the results of other ancillary studies and the morphologic and clinical features.    A blast count by flow cytometry may differ from a morphologic blast count for various reasons including lysis of erythroid precursors, the presence of other cells in the  blast gate, and sampling differences.  For classification and clinical decision making purposes, the morphologic blast count should be used.       Flow Phenotypic Data       Unless otherwise indicated, percentages reported below are based on the total number of CD45 positive viable leukocytes. If applicable, percentage of plasma cells is from total viable nucleated cells.    14% blasts with the following immunophenotype:    Positive for CD13 (partial), CD15 (partial), CD33, CD34 (partial), CD38, CD45 (dim), CD56 (partial, 14% of the blasts are positive), CD64 (partial, predominantly negative),  (partial), HLA-DR    Negative for CD3, CD7, CD10, CD11b, CD14, CD16, and CD19       8% CD34 positive blasts     90% of the blasts are positive for CD33 (clone P67.6 in APC-R700, relative to background lymphocytes).      Flow Processing Information       Multi-color flow analysis is performed for the following markers: CD3, CD7, CD10, CD11b, CD13, CD14, CD15, CD16, CD19, CD33, CD34, CD38, CD45, CD56, CD64, , and HLA-DR. Cells are gated to isolate populations (CD45 versus side scatter and forward scatter versus side scatter), to exclude debris (forward scatter versus side scatter) and to exclude cell doublets (forward scatter height versus forward scatter width and side scatter height versus side scatter width). Forward scatter varies with cell size. Side scatter varies with the amount of cytoplasmic granules. Intensity for CD45 usually increases as hematolymphoid cells mature.        Clinical Information       37 yr old female with acute myeloid leukemia       FDA Disclaimer       This test was developed and its performance characteristics determined by the [facility], [facility]. It has not been cleared or approved by the US Food and Drug Administration.  FDA does not require this test to go through premarket FDA review. This test is used for clinical purposes and  should not be regarded as investigational or for research. This laboratory is certified under the Clinical Laboratory Improvement Amendments (CLIA) as qualified to perform high complexity clinical laboratory testing.      MCRS Yes (A) N/A    Performing Labs       The technical component of this testing was completed at [facility]     Process and hold DNA   Result Value Ref Range    Interpretation       DNA processing completed. The sample is archived for future use.       FLT3 ITD/TKD PCR   Result Value Ref Range    Specimen Description       Bone Marrow Core      RESULTS       INTERNAL TANDEM REPEAT (ITD):     Mutant Allele:   Absent    Normal Allele:   Present    D835 MUTATION:    Mutant Allele:   Absent    Normal Allele:   Present      INTERPRETATION       Molecular testing performed on submitted Bone Marrow.    No evidence was found of either an internal tandem duplication within exon 14 or of a D835(TKD) point mutation within exon 20 of the FLT3 gene.      METHODOLOGY       Genomic DNA was extracted from above specimen and amplified by PCR using a series of fluorescently labeled oligonucleotide primers specific for the regions of FLT3 gene (exon 14 at the site of internal tandem duplications and the exon 20 tyrosine kinase domain). The amplified products were digested with restriction enzyme EcoRV to detect the D835 mutation in exon 20. The PCR product and digest product were then analyzed on a Model 3130xl/Genescan system, ([employer]). (Emiliano BABIN, 2003, Journal of molecular diagnostics, Vol.5: 96). If applicable, the FLT3-ITD allelic ratio is determined as the ratio of the mutant product peak height to the wild-type product peak height.       COMMENTS       The prognostic significance of wild type FLT3 is dependent on other molecular genetic findings.  There is no indication for targeted therapy based on these results. These findings do not  rule out the presence of mutations that would not be detected by the primers or restriction enzyme used in this assay. Of note, as gain or loss of FLT3 mutations has been reported with disease progression, future testing may be considered if clinically indicated. Please correlate with pending NGS study for final interpretation.         Of note, as gain or loss of FLT3 mutations has been reported with disease progression, future testing may be considered if clinically indicated.      DISCLAIMER       This test was developed and its performance characteristics determined by [facility]. It has not been cleared or approved by the FDA. The laboratory is regulated under CLIA as qualified to perform high-complexity testing. This test is used for clinical purposes. It should not be regarded as investigational or for research.       Asymptomatic COVID-19 Virus (Coronavirus) by PCR Nasopharyngeal    Specimen: Nasopharyngeal; Swab   Result Value Ref Range    SARS CoV2 PCR Negative Negative, Testing sent to reference lab. Results will be returned via unsolicited result   Tissue Aerobic Bacterial Culture Routine with Gram Stain    Specimen: Leg, Below Knee, Right; Tissue   Result Value Ref Range    Culture No Growth     Gram Stain Result No organisms seen     Gram Stain Result No white blood cells seen    Acid-Fast Bacilli Culture and Stain    Specimen: Leg, Below Knee, Right; Tissue   Result Value Ref Range    Acid Fast Stain No acid fast bacilli seen     Acid Fast Stain No acid fast bacilli seen    Blood Culture Arm, Right    Specimen: Arm, Right; Blood   Result Value Ref Range    Culture No Growth    Blood Culture Purple Lumen    Specimen: Purple Lumen; Blood   Result Value Ref Range    Culture No Growth    US Upper Extremity Venous Duplex Left   Result Value Ref Range    Radiologist flags Left arm DVT (Urgent)    CBC with platelets   Result Value Ref Range    WBC Count 2.5 (L) 4.0 -  11.0 10e3/uL    RBC Count 2.39 (L) 3.80 - 5.20 10e6/uL    Hemoglobin 7.6 (L) 11.7 - 15.7 g/dL    Hematocrit 22.3 (L) 35.0 - 47.0 %    MCV 93 78 - 100 fL    MCH 31.8 26.5 - 33.0 pg    MCHC 34.1 31.5 - 36.5 g/dL    RDW 13.8 10.0 - 15.0 %    Platelet Count 116 (L) 150 - 450 10e3/uL   Lactic Acid STAT   Result Value Ref Range    Lactic Acid 0.6 (L) 0.7 - 2.0 mmol/L   Heparin Unfractionated Anti Xa Level   Result Value Ref Range    Anti Xa Unfractionated Heparin 0.18 For Reference Range, See Comment IU/mL   Basic metabolic panel   Result Value Ref Range    Creatinine 0.46 (L) 0.51 - 0.95 mg/dL    Sodium 141 136 - 145 mmol/L    Potassium 3.4 3.4 - 5.3 mmol/L    Urea Nitrogen 3.7 (L) 6.0 - 20.0 mg/dL    Chloride 106 98 - 107 mmol/L    Carbon Dioxide (CO2) 23 22 - 29 mmol/L    Anion Gap 12 7 - 15 mmol/L    Glucose 107 (H) 70 - 99 mg/dL    GFR Estimate >90 >60 mL/min/1.73m2    Calcium 8.7 8.6 - 10.0 mg/dL   Hepatic panel   Result Value Ref Range    Protein Total 6.4 6.4 - 8.3 g/dL    Albumin 3.3 (L) 3.5 - 5.2 g/dL    Bilirubin Total 0.5 <=1.2 mg/dL    Alkaline Phosphatase 53 35 - 104 U/L    AST 21 10 - 35 U/L    ALT 21 10 - 35 U/L    Bilirubin Direct <0.20 0.00 - 0.30 mg/dL   Magnesium   Result Value Ref Range    Magnesium 2.0 1.7 - 2.3 mg/dL   Phosphorus   Result Value Ref Range    Phosphorus 3.8 2.5 - 4.5 mg/dL   CBC with platelets and differential   Result Value Ref Range    WBC Count 3.1 (L) 4.0 - 11.0 10e3/uL    RBC Count 2.50 (L) 3.80 - 5.20 10e6/uL    Hemoglobin 8.0 (L) 11.7 - 15.7 g/dL    Hematocrit 23.2 (L) 35.0 - 47.0 %    MCV 93 78 - 100 fL    MCH 32.0 26.5 - 33.0 pg    MCHC 34.5 31.5 - 36.5 g/dL    RDW 13.7 10.0 - 15.0 %    Platelet Count 145 (L) 150 - 450 10e3/uL   Manual Differential   Result Value Ref Range    % Neutrophils 21 %    % Lymphocytes 41 %    % Monocytes 9 %    % Eosinophils 0 %    % Basophils 0 %    % Metamyelocytes 9 %    % Myelocytes 14 %    % Promyelocytes 2 %    % Blasts 4 %    Absolute  Neutrophils 0.7 (L) 1.6 - 8.3 10e3/uL    Absolute Lymphocytes 1.3 0.8 - 5.3 10e3/uL    Absolute Monocytes 0.3 0.0 - 1.3 10e3/uL    Absolute Eosinophils 0.0 0.0 - 0.7 10e3/uL    Absolute Basophils 0.0 0.0 - 0.2 10e3/uL    Absolute Metamyelocytes 0.3 (H) <=0.0 10e3/uL    Absolute Myelocytes 0.4 (H) <=0.0 10e3/uL    Absolute Promyelocytes 0.1 (H) <=0.0 10e3/uL    Absolute Blasts 0.1 (H) <=0.0 10e3/uL    RBC Morphology Confirmed RBC Indices     Platelet Assessment  Automated Count Confirmed. Platelet morphology is normal.     Automated Count Confirmed. Platelet morphology is normal.    [place] Cells Slight (A) None Seen   Potassium   Result Value Ref Range    Potassium 3.3 (L) 3.4 - 5.3 mmol/L   Potassium   Result Value Ref Range    Potassium 3.7 3.4 - 5.3 mmol/L   Basic metabolic panel   Result Value Ref Range    Creatinine 0.53 0.51 - 0.95 mg/dL    Sodium 139 136 - 145 mmol/L    Potassium 3.6 3.4 - 5.3 mmol/L    Urea Nitrogen 3.7 (L) 6.0 - 20.0 mg/dL    Chloride 105 98 - 107 mmol/L    Carbon Dioxide (CO2) 23 22 - 29 mmol/L    Anion Gap 11 7 - 15 mmol/L    Glucose 105 (H) 70 - 99 mg/dL    GFR Estimate >90 >60 mL/min/1.73m2    Calcium 9.1 8.6 - 10.0 mg/dL   CBC with platelets and differential   Result Value Ref Range    WBC Count 6.4 4.0 - 11.0 10e3/uL    RBC Count 2.55 (L) 3.80 - 5.20 10e6/uL    Hemoglobin 8.1 (L) 11.7 - 15.7 g/dL    Hematocrit 24.1 (L) 35.0 - 47.0 %    MCV 95 78 - 100 fL    MCH 31.8 26.5 - 33.0 pg    MCHC 33.6 31.5 - 36.5 g/dL    RDW 14.2 10.0 - 15.0 %    Platelet Count 253 150 - 450 10e3/uL   Manual Differential   Result Value Ref Range    % Neutrophils 33 %    % Lymphocytes 29 %    % Monocytes 13 %    % Eosinophils 0 %    % Basophils 0 %    % Metamyelocytes 5 %    % Myelocytes 14 %    % Promyelocytes 1 %    % Blasts 5 %    Absolute Neutrophils 2.1 1.6 - 8.3 10e3/uL    Absolute Lymphocytes 1.9 0.8 - 5.3 10e3/uL    Absolute Monocytes 0.8 0.0 - 1.3 10e3/uL    Absolute Eosinophils 0.0 0.0 - 0.7 10e3/uL     Absolute Basophils 0.0 0.0 - 0.2 10e3/uL    Absolute Metamyelocytes 0.3 (H) <=0.0 10e3/uL    Absolute Myelocytes 0.9 (H) <=0.0 10e3/uL    Absolute Promyelocytes 0.1 (H) <=0.0 10e3/uL    Absolute Blasts 0.3 (H) <=0.0 10e3/uL    RBC Morphology Confirmed RBC Indices     Platelet Assessment  Automated Count Confirmed. Platelet morphology is normal.     Automated Count Confirmed. Platelet morphology is normal.   Basic metabolic panel   Result Value Ref Range    Creatinine 0.50 (L) 0.51 - 0.95 mg/dL    Sodium 137 136 - 145 mmol/L    Potassium 3.4 3.4 - 5.3 mmol/L    Urea Nitrogen 3.5 (L) 6.0 - 20.0 mg/dL    Chloride 102 98 - 107 mmol/L    Carbon Dioxide (CO2) 21 (L) 22 - 29 mmol/L    Anion Gap 14 7 - 15 mmol/L    Glucose 102 (H) 70 - 99 mg/dL    GFR Estimate >90 >60 mL/min/1.73m2    Calcium 9.0 8.6 - 10.0 mg/dL   Hepatic panel   Result Value Ref Range    Protein Total 7.2 6.4 - 8.3 g/dL    Albumin 3.6 3.5 - 5.2 g/dL    Bilirubin Total 0.6 <=1.2 mg/dL    Alkaline Phosphatase 54 35 - 104 U/L    AST 32 10 - 35 U/L    ALT 24 10 - 35 U/L    Bilirubin Direct <0.20 0.00 - 0.30 mg/dL   Magnesium   Result Value Ref Range    Magnesium 2.3 1.7 - 2.3 mg/dL   Phosphorus   Result Value Ref Range    Phosphorus 2.8 2.5 - 4.5 mg/dL   CBC with platelets and differential   Result Value Ref Range    WBC Count 11.4 (H) 4.0 - 11.0 10e3/uL    RBC Count 2.60 (L) 3.80 - 5.20 10e6/uL    Hemoglobin 8.3 (L) 11.7 - 15.7 g/dL    Hematocrit 24.9 (L) 35.0 - 47.0 %    MCV 96 78 - 100 fL    MCH 31.9 26.5 - 33.0 pg    MCHC 33.3 31.5 - 36.5 g/dL    RDW 14.4 10.0 - 15.0 %    Platelet Count 388 150 - 450 10e3/uL   Manual Differential   Result Value Ref Range    % Neutrophils 44 %    % Lymphocytes 10 %    % Monocytes 15 %    % Eosinophils 0 %    % Basophils 0 %    % Metamyelocytes 7 %    % Myelocytes 20 %    % Blasts 4 %    Absolute Neutrophils 5.0 1.6 - 8.3 10e3/uL    Absolute Lymphocytes 1.1 0.8 - 5.3 10e3/uL    Absolute Monocytes 1.7 (H) 0.0 - 1.3  10e3/uL    Absolute Eosinophils 0.0 0.0 - 0.7 10e3/uL    Absolute Basophils 0.0 0.0 - 0.2 10e3/uL    Absolute Metamyelocytes 0.8 (H) <=0.0 10e3/uL    Absolute Myelocytes 2.3 (H) <=0.0 10e3/uL    Absolute Blasts 0.5 (H) <=0.0 10e3/uL    RBC Morphology Confirmed RBC Indices     Platelet Assessment  Automated Count Confirmed. Platelet morphology is normal.     Automated Count Confirmed. Platelet morphology is normal.    [place] Cells Slight (A) None Seen   Low Molecular Weight Heparin Anti Xa Level   Result Value Ref Range    Anti Xa Low Molecular Weight 0.55 For Reference Range, See Comment IU/mL   Potassium   Result Value Ref Range    Potassium 4.1 3.4 - 5.3 mmol/L       Imaging    CT Chest/Abdomen/Pelvis w Contrast    Result Date: 7/11/2022  EXAMINATION: CT CHEST/ABDOMEN/PELVIS W CONTRAST, 7/7/2022 1:22 PM INDICATION: persistent neutropenic fever COMPARISON STUDY: Radiograph 6/29/2022. CT 6/15/2022. TECHNIQUE: CT scan of the chest, abdomen, and pelvis was performed on multidetector CT scanner using volumetric acquisition technique and images were reconstructed in multiple planes with variable thickness and reviewed on dedicated workstations. CONTRAST: 125 ml isovue 370  injected IV CT scan radiation dose is optimized to minimum requisite dose using automated dose modulation techniques. FINDINGS: Lines, tubes, devices: Left lower extremity PICC with tip terminating at the superior cavoatrial junction. CHEST: Lungs: Unchanged 4 mm solid pulmonary nodule of the left upper lobe laterally (series 5 image 153). Remaining additional sub-4 mm solid pulmonary nodules are stable compared to prior. Similar subtle diffuse mosaic groundglass changes and presumed fibroatelectasis of the posterior aspect of the right upper lobe. The central tracheobronchial tree is patent. No areas of bronchiectasis or architectural distortion. No pleural effusion, pulmonary consolidation, or pneumothorax. Mild bibasilar atelectasis. Mediastinum: The  visualized thyroid is unremarkable.Heart size is within normal limits. No pericardial effusion. The thoracic aorta and main pulmonary artery are within normal limits. Standard branching pattern of the great vessels. No abnormal thoracic lymph nodes. Moderate hiatal hernia, mild increased streakiness and thickening along the intrathoracic stomach and distal esophagus. ABDOMEN/PELVIS: Liver: No mass. No intrahepatic biliary ductal dilation.  Similar hypoattenuation along the falciform ligament likely focal fatty infiltration.   Biliary System: Decompressed appearance of the gallbladder. No extrahepatic biliary ductal dilation. Pancreas: No mass or pancreatic ductal dilation. Adrenal glands: No mass or nodules Spleen: Normal. Kidneys: No suspicious mass, obstructing calculus or hydronephrosis. Gastrointestinal tract : No evidence for infiltrate appendix. Normal caliber small bowel.   Large bowel loop dilatation Mesentery/peritoneum/retroperitoneum: There is hazy central mesenteric attenuation with numerous subcentimeter lymph nodes in the central mesentery.  Lymph nodes: Numerous subcentimeter central mesenteric lymph nodes and several enlarged lymph nodes for example a 1.2 cm short axis left periaortic lymph node (series 7 image 333). Vasculature: Patent major abdominal vasculature.  The major portal vessels are patent. Pelvis: Urinary bladder is normal.  Myomatous uterus. Ovaries within normal limits. Osseous structures: No aggressive or acute osseous lesion.  Unchanged opposing endplate degenerative changes L4-L5.   Soft tissues: Small fat-containing umbilical hernia.        IMPRESSION: 1. Small to moderate hiatal hernia with mild thickening  and streaky density along the intrathoracic stomach and distal esophagus,  likely to represent inflammatory/infective etiology. 2. Enlarged upper retroperitoneal especially para-aortic para-aortic lymph node and mesenteric lymph nodes, compared to prior CT 6/16/2022 concerning  for infective/inflammatory etiology. Consider attention on follow-up 3. Patchy streaky density in the posterior right upper lobe slightly more conspicuous compared to prior CT, and diffuse mosaic attenuation, may represent infection or inflammation. 4. Unchanged sub-6 mm pulmonary nodules, compared to prior CT 6/16/2022. I have personally reviewed the examination and initial interpretation and I agree with the findings. VALERIE ARNOLD MD   SYSTEM ID:  [id number]    XR Chest 2 Views    Result Date: 6/23/2022  EXAM: XR CHEST 2 VW 6/23/2022 4:52 PM HISTORY: neutropenic fever. COMPARISON: CT of the chest and pelvis is 6/16/2022. TECHNIQUE: Upright frontal and lateral views of the chest. FINDINGS: Left-sided PICC with tip in the low SVC. Trachea is midline. Cardiac and mediastinal silhouette is within normal limits. No focal pulmonary opacities. No pleural effusions. No pneumothoraces. No acute osseous abnormalities.     IMPRESSION: No focal pulmonary opacities. I have personally reviewed the examination and initial interpretation and I agree with the findings. VALERIE ARNOLD MD   SYSTEM ID:  [id number]    US Upper Extremity Venous Duplex Left    Result Date: 7/12/2022  EXAMINATION: DOPPLER VENOUS ULTRASOUND OF THE LEFT UPPER EXTREMITY, 7/12/2022 8:43 PM COMPARISON: CT chest abdomen pelvis 7/7/2020 20 HISTORY: Left arm redness, pain TECHNIQUE:  Gray-scale evaluation with compression, spectral flow and color Doppler assessment of the deep venous system of the left upper extremity. FINDINGS: Left: Normal blood flow and waveforms are demonstrated in the internal jugular, innominate, and subclavian veins. Nonocclusive thrombus in the left axillary vein with occlusive thrombus in one of the paired left brachial veins of the upper and mid arm. Occlusive thrombus in the left basilic vein at the upper and mid arm. The left basilic vein at the proximal forearm is fully compressible with nonocclusive thrombus in the mid forearm.  The cephalic vein is fully compressible.     IMPRESSION: 1. Nonocclusive deep venous thrombosis in the left axillary vein with occlusive thrombosis extending through one of the paired left brachial veins of the upper and mid arm. 2. Occlusive superficial venous thrombosis of the left basilic vein at the upper and mid arm, as well as in the mid forearm. [Urgent Result: Left arm DVT] Finding was identified on 7/12/2022 9:07 PM. Dr. Gross was contacted by Dr. Henao at 7/12/2022 9:20 PM and verbalized understanding of the urgent finding. I have personally reviewed the examination and initial interpretation and I agree with the findings. FENG GUZMAN MD   SYSTEM ID:  [id number]    XR Chest Port 1 View    Result Date: 6/29/2022  EXAM: XR CHEST PORT 1 VIEW  6/29/2022 7:19 PM HISTORY:  neutropenic fever, concern for PNA   COMPARISON:  Chest radiograph 6/23/2022 FINDINGS: Single view of the chest. Left upper extremity PICC tip projects over the low SVC. Trachea is midline. The cardiomediastinal silhouette is within normal limits. No significant pleural effusion or pneumothorax. Linear opacity at the left lung consistent with a skinfold. No acute focal airspace opacity. The visualized upper abdomen is unremarkable.     IMPRESSION: No focal airspace opacity. If high clinical concern consider a CT chest which is more sensitive. I have personally reviewed the examination and initial interpretation and I agree with the findings. YANELY BLEVINS MD   SYSTEM ID:  [id number]      Billing  Total time 40 minutes, to include face to face visit, review of EMR, ordering, documentation and coordination of care on date of service    Signed by: Natividad Ackerman NP        Again, thank you for allowing me to participate in the care of your patient.        Sincerely,        Natividad Ackerman NP

## 2022-07-18 NOTE — PROGRESS NOTES
[facility] Hematology and Oncology Outpatient Progress Note    Patient: Veda Marinelli  MRN: [id number]  Date of Service: Jul 18, 2022          Reason for Visit    1. AML  2. Transfusion mgmt    Primary Hematologist Mady ([facility])      Assessment/Plan  1. AML, FLT3+  S/p 1 cycle induction 7+3 (cytarabine+daunorubicin PLUS midostaurin) - day 26 today.   Managed with Hem/BMT at [facility].     Plan:  -Follow-up BMbx 7/21 and return visit with Dr. Earl 7/26 for recommendations on BMT vs consolidative chemo.    2. Pancytopenias, secondary to AML + chemo  Anemia stable/improved 8.9. Platelets up at 578.  WBC an ANC elevated 19.2 and 13, no reported blasts in peripheral blood.     Plan:  -Transfuse for hgb <7.0 and platelets <10K. Informed consent signed today to have as needed  -Transfusion support at [facility] as needed (she is about an hour from this location)  -Today, no transfusion necessary  -She is not scheduled for routine labs. Will check if Dr. Earl would like checked early next week ahead of his virtual visit with her. She prefers not driving all the way to [place] for labs only if not necessary, not sure if she could get her lab checked/faxed at external clinic closer to home ([place] or [place])     3. Acute LUE DVT  7/12 US showed non-occlusive DVT in L axillary vein extending through onf of the paired L brachial veins of upper and mid arm. Additionally, occlusive superficial venous thrombosis L basilic vein at upper and mid arm + mid forearm. On Lovenox 150 mg daily (1.5 mg/kg daily dosing). PICC pulled.     Plan:  -Lovenox to total 2 weeks, then repeat US (scheduled 7/20). If resolution of thrombus, planning to discontinue anticoagulation    4. Neutropenic fever  Outside of c diff, no other clear source infection identified. Treated with broad-spectrum IV abx through 7/15 with recovery of neutrophils and afebrile x 48 hr. She has had a few low grade ~99 temps in evenings, nothing >100.  Emergency for fever 100.5/greater reviewed.     5. Recurrent C diff colitis  On oral Vanco QID 7/13 - indefinitely for now  Diarrhea is improved - less frequency and a bit more formed (loose consistency); avg 2/day currently    6. ID prophylaxis  - ACV 400mg BID  - No abx ppx indicated given count recovery  - Did not discharge on antifungal ppx given count recovery  - Prior Auth approved for both posaconazole and voriconazole. However, as of  6/29, pt has not met deductible (but should meet it once hospital stay is billed). At present, monthly cost for Posaconazole $2048.66, Voriconazole $176.41.    7. GERD  8. Hiatal hernia  Carafate before meals and at nighttime --> given improvement, backed off to PRN    Continue to monitor, could consider barium swallow study if persistent  - On 7/7 - CT noted mild thickening  and streaky density along the intrathoracic stomach and distal esophagus, likely to represent inflammatory/infective etiology. Will continue to follow by imaging and symptoms. Will consider EGD when patient's counts recover if needed.     9. Hypothyroidism  On Synthroid replacement. Recently reduce to 100 mcg (for TSH 0.04). Managed by PCP.    10. Right shin lesion  Noted 7/6 and being monitored for cellulitis due to area of induration/tenderness. MRSA swab was negative. Derm biopsied. Low threshold for Vanco if progressive. No changes on exam today.    11. Truncal rash, resolved  12. Leg rash, improving  Improving with topical steroids. Continue    13. Left arm dermatitis/blister  Intact blister from Tegaderm (PICC site). No signs infection. If it pops, instructed to keep clean with water/gentle soap and cover with gauze. Monitor closely for signs of infection (redness, warmth, drainage).     14. Eye discomfort/tearing  Ongoing since post-chemo and likely side effect. No signs of infection.    15. Mild LE edema  Elevated. Compression stockings as able    16. Covid prevention  Pt is not vaccinated nor  interested in being vaccinated. She was admitted from 10/1/2021 - 10/13/2021 for acute hypoxic respiratory failure from COVID-19 pneumonia.  Required IMC, high flow and intermittent CPAP. She was treated with dexamethasone, remdesivir, and baricitinib in addition to azithromycin and ceftriaxone. Recovered symptomatically from this.    ______________________________________________________________________________    History of Present Illness/ Interval History    Ms. Veda Marinelli  is a 37 year old diagnosed with AML last month. She was hospitalized at [facility] for cycle 1 induction chemo and just dismissed last Friday. She's doing well at home over weekend with no acute concerns. Feels well. Eating/drinking well. Minimal fatigue.     On Lovenox for LUE DVT. No arm pain, swelling. No bleeding.     LE macular rash noted in hospital is improving. Treating with triamcinolone cream. No signs of cellulitis.   At the site where her PICC was removed, she has some blistering where Tegaderm was. No redness, drainage or swelling.     Eyes are tearing and a bit sensitive to light. No itching, erythema or drainage. This has been present for a few weeks, felt to be a chemo side effect and no changes since leaving hospital.     Low gr temp 99 in evenings. No chills/rigors.     LE edema, mild, last few days. No calf pain, dyspnea, chest pain.     Diarrhea improved. More formed (loose/soft rather than liquid) and less frequent (avt 2/day). On oral Vanco. No abd pain/cramping.     No mouth/throat pain or sores.      ECOG Performance    0        Oncology History/Treatment  Diagnosis/Stage:   6/2022: AML (FLT3+)  -3/2022: routine physical noted leukopenia (2.5), neutropenia (1.0), macroctyosis (103), normal hgb and platelets  -5/2022: follow up labs noted progressive leukpenia (1.6), neutropenia (0.28), anemia (hgb 11.0) and thrombocytopenia (133)  -6/7/2022: Hem consult. 6/14/2022 WBC leukocytosis (25.3), ANC 0.8, macrocytic anemia  "(10.1, 108), platelets 90. 81% blasts seen, concerning for acute leukemia. Concern (verbal/prelim report) for Blanca rods; later PML-ANNELIESE testing returned as negative. Admitted.  -PB chromosomes: 46, XX. No abnormality  -PB FISH: no rearrangement of MLLT10, NUP98, or KMT2A  -6/17/2022 bone marrow biopsy: markedly hypercellular marrow (85-95% estimated cellularity), with markedly diminished trilineage hematopoiesis, no overt dysplasia in evaluable elements. 92% blasts  ---FLT3 PCT: positive for FLT3-ITD (low) with allelic ratio 0.21  ---NGS panel: NPM1 positive, IDH2, FLT3-ITD  -HLA typing sent, delayed due to insurance changes/financial approval       Treatment:  6/16 - 6/17/2022: ATRA (for possible Blanca rods). Stopped once PML-ANNELIESE testing negative    6/17 - 6/22/2022: Hydrea cytoreduction while awaiting final diagnostic studies    6/22/2022 (induction chemo day 1): 7+3 (cytarabine+daunorubicin PLUS midostaurin)  ----inpatient course complicated by recurrent neutropenic fever and C diff colitis. Treated with IV broad-spectrum abx through 7/15 (afebrile x 48 hr + recovery of ANC). Treated with oral Vanco for C diff  ----Day 21 BMBx: hypocellular marrow (40-50% estimated cellularity) with diminished erythroid maturation, markedly left-shifted granulopoiesis, increased megakaryopoeisis, and 11% blasts. Flow cytometry on a concurrent specimen showed increased myeloid blasts (14%); the immunophenotype of the blasts was different from that observed with the patient's diagnostic specimen. By morphology, blasts are increased in the marrow, but no Blanca rods are seen (Blanca rods characterized the patient's leukemic blasts at diagnosis). Overall, the morphologic and immunophenotypic findings are favored to represent brisk marrow regeneration following chemotherapy, although the possibility of residual acute myeloid leukemia cannot be entirely ruled out.\" Overall plan is to repeat Bone marrow biopsy in 1 week.     7/15/2022 BMT " consult: BMT at this time not recommended. Pt can likely be treated with consolidation chemo alone, pending count recovery BMbx results      Physical Exam    GENERAL: Alert and oriented to time place and person. Seated comfortably. In no distress. Alone  HEAD: Atraumatic and normocephalic. Complete alopecia.  EYES: BACILIO, EOMI. No erythema. No icterus.  ORAL CAVITY: Moist. No mucosal lesion or tonsillar enlargement.  LYMPH NODES: No palpable supraclavicular, cervical lymphadenopathy.  CHEST: clear to auscultation bilaterally. Resonant to percussion throughout bilaterally. Symmetrical breath movements bilaterally.  CVS: RRR  ABDOMEN: Soft. Not tender. Not distended. No palpable hepatomegaly or splenomegaly. No other mass palpable. Bowel sounds present.  EXTREMITIES: Warm. Trace to 1+ peripheral edema (ankles/feet).  SKIN: faint pink macular rash bilateral shins; no erythema. No petechiae. Left proximal inner arm blister intact, no erythema nor drainage.   NEURO: No gross deficit noted. Non-antalgic gait.      Lab Results    Recent Results (from the past 168 hour(s))   Clostridium difficile toxin B PCR    Specimen: Per Rectum; Stool   Result Value Ref Range    C Difficile Toxin B by PCR Negative Negative   Potassium   Result Value Ref Range    Potassium 3.6 3.4 - 5.3 mmol/L   Acid-fast Bacilli (AFB) Blood Culture    Specimen: Line, Other; Blood   Result Value Ref Range    Culture No growth after 6 days    Quantiferon TB Gold Plus Grey Tube    Specimen: Red Lumen; Blood   Result Value Ref Range    Quantiferon Nil Tube 0.47 IU/mL   Quantiferon TB Gold Plus Green Tube    Specimen: Red Lumen; Blood   Result Value Ref Range    Quantiferon TB1 Tube 0.21 IU/mL   Quantiferon TB Gold Plus Yellow Tube    Specimen: Red Lumen; Blood   Result Value Ref Range    Quantiferon TB2 Tube 0.18    Quantiferon TB Gold Plus Purple Tube    Specimen: Red Lumen; Blood   Result Value Ref Range    Quantiferon Mitogen 0.21 IU/mL   Quantiferon TB  Gold Plus    Specimen: Red Lumen; Blood   Result Value Ref Range    Quantiferon-TB Gold Plus Indeterminate (A) Negative    TB1 Ag minus Nil Value -0.26 IU/mL    TB2 Ag minus Nil Value -0.29 IU/mL    Mitogen minus Nil Result -0.26 IU/mL    Nil Result 0.47 IU/mL   Helicobacter pylori Antigen Stool   Result Value Ref Range    Helicobacter pylori Antigen Stool Negative Negative   Prepare pheresed platelets (unit)   Result Value Ref Range    UNIT ABO/RH AB Neg     Unit Number [id number]     Unit Status Transfused     Blood Component Type Platelets     Product Code [id number]     CODING SYSTEM [id number]     UNIT TYPE ISBT 2800     ISSUE DATE AND TIME [id number]    Basic metabolic panel   Result Value Ref Range    Creatinine 0.49 (L) 0.51 - 0.95 mg/dL    Sodium 140 136 - 145 mmol/L    Potassium 3.6 3.4 - 5.3 mmol/L    Urea Nitrogen 5.3 (L) 6.0 - 20.0 mg/dL    Chloride 105 98 - 107 mmol/L    Carbon Dioxide (CO2) 21 (L) 22 - 29 mmol/L    Anion Gap 14 7 - 15 mmol/L    Glucose 97 70 - 99 mg/dL    GFR Estimate >90 >60 mL/min/1.73m2    Calcium 8.9 8.6 - 10.0 mg/dL   CBC with platelets and differential   Result Value Ref Range    WBC Count 1.6 (L) 4.0 - 11.0 10e3/uL    RBC Count 2.51 (L) 3.80 - 5.20 10e6/uL    Hemoglobin 7.8 (L) 11.7 - 15.7 g/dL    Hematocrit 23.1 (L) 35.0 - 47.0 %    MCV 92 78 - 100 fL    MCH 31.1 26.5 - 33.0 pg    MCHC 33.8 31.5 - 36.5 g/dL    RDW 13.8 10.0 - 15.0 %    Platelet Count 86 (L) 150 - 450 10e3/uL   Manual Differential   Result Value Ref Range    % Neutrophils 11 %    % Lymphocytes 51 %    % Monocytes 19 %    % Eosinophils 0 %    % Basophils 0 %    % Metamyelocytes 9 %    % Myelocytes 3 %    % Blasts 7 %    Absolute Neutrophils 0.2 (LL) 1.6 - 8.3 10e3/uL    Absolute Lymphocytes 0.8 0.8 - 5.3 10e3/uL    Absolute Monocytes 0.3 0.0 - 1.3 10e3/uL    Absolute Eosinophils 0.0 0.0 - 0.7 10e3/uL    Absolute Basophils 0.0 0.0 - 0.2 10e3/uL    Absolute Metamyelocytes 0.1 (H) <=0.0 10e3/uL    Absolute  Myelocytes 0.0 <=0.0 10e3/uL    Absolute Blasts 0.1 (H) <=0.0 10e3/uL    RBC Morphology Confirmed RBC Indices     Platelet Assessment  Automated Count Confirmed. Platelet morphology is normal.     Automated Count Confirmed. Platelet morphology is normal.   Bone marrow biopsy   Result Value Ref Range    Final Diagnosis       Bone marrow, posterior iliac crest, right decalcified trephine biopsy and touch imprint; right and peripheral blood smear:    - Hypocellular marrow (40-50% estimated cellularity) with diminished erythroid maturation, markedly left-shifted granulopoiesis, increased megakaryopoeisis, and 11% blasts  - Peripheral blood showing moderate normocytic, normochromic anemia; marked leukopenia with left-shifted neutropenia; moderate thrombocytopenia; 7% circulating blasts  - See comment.      Comment         Flow cytometry on a concurrent specimen ([id number]) showed increased myeloid blasts (14%); the immunophenotype of the blasts was different from that observed with the patient's diagnostic specimen. By morphology, blasts are increased in the marrow, but no Blanca rods are seen (Blanca rods characterized the patient's leukemic blasts at diagnosis).     Overall, the morphologic and immunophenotypic findings are favored to represent brisk marrow regeneration following chemotherapy, although the possibility of residual acute myeloid leukemia cannot be entirely ruled out. Please correlate these findings with pending genetic studies. If clinically appropriate, re-biopsy in about one week could be helpful.    Select slides were reviewed by Dr. Clifford Andres. Dr. Rena Villar notified Dr. Alexandria Bahena of these findings by Epic message on 7/14/22 in the evening.      Clinical Information       Per Epic records:  37-year-old woman with a prior diagnosis of FLT3+ AML ([id number], collected 6/17/2022) who was treated with 7+3+midostaurin and now presents for day 21 evaluation      Peripheral Hematologic Data       CBC WITH  MANUAL DIFFERENTIAL (07/12/2022 08:57 AM CDT):     RESULT VALUE REF. RANGE UNITS    WBC Count    Hemoglobin    Hematocrit    Platelet Count    RBC Count   MCV  MCH  MCHC  RDW  1.6  ( L )     7.8  ( L )      23.1  ( L )   86  ( L )   2.51  ( L )       92 (NORMAL)     31.1 (NORMAL)     33.8 (NORMAL)     13.8 (NORMAL) 4.0-11.0  11.7-15.7  35.0-47.0  150-450  3.80-5.20    26.5-33.0  31.5-36.5  10.0-15.0 10e3/uL  g/dL  %  10e3/uL  10e6/uL  fL  pg  g/dL  %   % Neutrophils  % Lymphocytes  % Monocytes  % Eosinophils  % Basophils  % Metamyelocytes  % Myelocytes  % Promyelocytes  % Blasts  % Plasma Cells  % Other Cells   Absolute Neutrophils   Absolute Lymphocytes  Absolute Monocytes  Absolute Eosinophils  Absolute Basophils   Absolute Metamyelocytes   Absolute Myelocytes  Absolute Promyelocytes   Absolute Blasts   Absolute Plasma Cells  Absolute Other Cells  NRBCs per 100 WBC  Absolute NRBCs 11  51  19  0  0  9  3    7        0.2  ( LL )     0.8 (NORMAL)     0.3 (NORMAL)     0.0 (NORMAL)     0.0 (NORMAL)   0.1  ( H )  0.0 (NORMAL)   ()       0.1  ( H )   ()   ()   ()      () N/A  N/A  N/A  N/A  N/A  N/A  N/A  N/A  N/A  N/A  N/A  1.6-8.3  0.8-5.3  0.0-1.3  0.0-0.7  0.0-0.2  <=0.0  <=0.0  <=0.0  <=0.0  <=0.0  <=0.0  <=0  <=0.0 %  %  %  %  %  %  %  %  %  %  %  10e3/uL  10e3/uL  10e3/uL  10e3/uL  10e3/uL  10e3/uL  10e3/uL  10e3/uL  10e3/uL  10e3/uL  10e3/uL  %  10e3/uL         Microscopic Description       PERIPHERAL BLOOD SMEAR MORPHOLOGY:  The red blood cells appear normochromic.  Poikilocytosis includes rare dacrycocytes.  Polychromasia is not increased.  Rouleaux formation is not increased. Lymphocyte morphology is polymorphous. Neutrophils are left-shifted and include both mature segmented neutrophils and immature forms. The morphology of the platelets is normal.  Blasts are increased and show small to intermediate size, oval nuclei, dispersed chromatin, small nucleoli, blue grey cytoplasm, scant granulation, and no  Blanca rods.    100-cell manual differential count by AKB: 7% circulating blasts    Bone marrow trephine core biopsy touch imprints are reviewed.    BONE MARROW DIFFERENTIAL (500 cells on bone marrow biopsy touch imprints )  Percent Cell (reference range)  11      Blasts (0 - 1)  0        Neutrophil promyelocytes (2 - 4)  68      Neutrophils and precursors (54 - 63)  9        Erythroid precursors (18 - 24)  1        Monocytes (1 - 1.5)  0        Eosinophils (1 - 3)  0        Basophils (0 - 1)  8      Lymphocytes (8 - 12)  3        Plasma cells (0 - 1.5)    The aspirate smears are not submitted for review.    Blasts show similar morphology to that described in the peripheral blood; again, no Blanca rods are seen.  Granulocytes are adequate in number, but are markedly left shifted - mature segmented neutrophils are rare to absent.  Erythrocytes are decreased, but with complete maturation; no overt dysplasia is seen. Megakaryocytes are present and show overall unremarkable morphology, although a rare form is seen with widely spaced nuclear lobes.     TREPHINE SECTIONS:  Hematoxylin and eosin stains are reviewed. The trephine core biopsy is adequate. The marrow cellularity is variable, overall estimated at 40-50%. The cellular composition reflects the touch imprint differential. Megakaryocytes are increased, and include cells with unremarkable morphology as well as cells with hyperchromatic nuclei.      SPECIAL STAINS  Reticulin stain is performed on the core biopsy section with appropriately reactive control tissues. There is no increase in reticulin fibrosis (MF-0 of 3).    IMMUNOHISTOCHEMISTRY:  Immunohistochemical stains are performed on the paraffin-embedded trephine core with appropriate controls.    CD34 highlights rare scattered immature cells; no aggregates or clusters of CD34 positive cells seen.   highlights rare scattered mast cells (strong intensity) and an increased proportion of hematopoietic precursors  (weak intensity), which are scattered and form clusters.  CD61 highlights increased megakaryocytes with spectrum of sizes and a scattered and clustered distribution.    Note: These immunohistochemical stains are deemed medically necessary. Some of the antigens may also be evaluated by flow cytometry. Concurrent evaluation by immunohistochemistry on clot and/or trephine sections is indicated in this case in order to correlate immunophenotype with cell morphology and determine extent of involvement, spatial pattern, and focality of potential disease distribution.    Resident/Fellow Review by:  Dr. Fabián Em    A resident/fellow in an ACGME accredited training program was involved in the initial review, preparation, and/or interpretation of this case. I, as the senior physician, attest that I: (i) reviewed the patient clinical records if indicated; (ii) reviewed the relevant lab test results;(iii) examined the relevant preparation(s) for the specimen(s); and (iv) agree with the report, diagnosis(es), and interpretation as documented by the resident/fellow and edited/confirmed by me.      Gross Description       Procedure/Gross Description   Aspirate(s) and trephine(s) procured by [facility]    Specimen sent for Special Studies:         Flow Cytometry: right core        Cytogenetics: right core        Molecular Diagnostics: right core          Biopsy aspiration site: unable to obtain                                                        Trephine biopsy site: right posterior iliac crest    Designated right posterior iliac crest is 1 cylinder of gritty tissue, labeled with the patient's name and hospital number, obtained with 8 gauge needle and a length of 26 mm; entirely submitted in 1 cassette; acetic zinc formalin fixed, decalcified, processed, and stained for hematoxylin and eosin per laboratory protocol.        Performing Labs       The technical component of this testing was completed at [facility]  [facility]     Flow Cytometry Bone Marrow    Specimen: Iliac Crest, Bone Marrow Core, Right   Result Value Ref Range    Case Report       Flow Cytometry Report                             Case: [id number]                                  Authorizing Provider:  Gaby Arteaga PA-C      Collected:           07/12/2022 11:35 AM          Ordering Location:     [facility]     Received:            07/12/2022 12:56 PM                                 [address] [place] [address]                                                            Pathologist:           Rena Villar MD                                                    Specimen:    Iliac Crest, Bone Marrow Core, Right                                                       Flow Interpretation       A. Iliac Crest, Bone Marrow Core, Right:  -Increased myeloid blasts (14%)  -See comment      Comment         The immunophenotype of the blasts in this study is different from the immunophenotype at the time of this patient's diagnosis ([id number], 6/17/22) - for example, the blasts at diagnosis were negative for CD13, CD34, and HLA-DR, but the blasts in the current study are positive or partially positive for these markers. These immunophenotypic findings may represent persistent leukemia with antigenic shift versus brisk marrow regeneration. Final interpretation requires correlation with the results of other ancillary studies and the morphologic and clinical features.    A blast count by flow cytometry may differ from a morphologic blast count for various reasons including lysis of erythroid precursors, the presence of other cells in the blast gate, and sampling differences.  For classification and clinical decision making purposes, the morphologic blast count should be used.       Flow Phenotypic Data       Unless otherwise indicated, percentages reported below are based on the total number of CD45 positive  viable leukocytes. If applicable, percentage of plasma cells is from total viable nucleated cells.    14% blasts with the following immunophenotype:    Positive for CD13 (partial), CD15 (partial), CD33, CD34 (partial), CD38, CD45 (dim), CD56 (partial, 14% of the blasts are positive), CD64 (partial, predominantly negative),  (partial), HLA-DR    Negative for CD3, CD7, CD10, CD11b, CD14, CD16, and CD19       8% CD34 positive blasts     90% of the blasts are positive for CD33 (clone P67.6 in APC-R700, relative to background lymphocytes).      Flow Processing Information       Multi-color flow analysis is performed for the following markers: CD3, CD7, CD10, CD11b, CD13, CD14, CD15, CD16, CD19, CD33, CD34, CD38, CD45, CD56, CD64, , and HLA-DR. Cells are gated to isolate populations (CD45 versus side scatter and forward scatter versus side scatter), to exclude debris (forward scatter versus side scatter) and to exclude cell doublets (forward scatter height versus forward scatter width and side scatter height versus side scatter width). Forward scatter varies with cell size. Side scatter varies with the amount of cytoplasmic granules. Intensity for CD45 usually increases as hematolymphoid cells mature.        Clinical Information       37 yr old female with acute myeloid leukemia       FDA Disclaimer       This test was developed and its performance characteristics determined by the [facility]. It has not been cleared or approved by the US Food and Drug Administration.  FDA does not require this test to go through premarket FDA review. This test is used for clinical purposes and should not be regarded as investigational or for research. This laboratory is certified under the Clinical Laboratory Improvement Amendments (CLIA) as qualified to perform high complexity clinical laboratory testing.      MCRS Yes (A) N/A    Performing Labs       The technical  component of this testing was completed at [facility]     Process and hold DNA   Result Value Ref Range    Interpretation       DNA processing completed. The sample is archived for future use.       FLT3 ITD/TKD PCR   Result Value Ref Range    Specimen Description       Bone Marrow Core      RESULTS       INTERNAL TANDEM REPEAT (ITD):     Mutant Allele:   Absent    Normal Allele:   Present    D835 MUTATION:    Mutant Allele:   Absent    Normal Allele:   Present      INTERPRETATION       Molecular testing performed on submitted Bone Marrow.    No evidence was found of either an internal tandem duplication within exon 14 or of a D835(TKD) point mutation within exon 20 of the FLT3 gene.      METHODOLOGY       Genomic DNA was extracted from above specimen and amplified by PCR using a series of fluorescently labeled oligonucleotide primers specific for the regions of FLT3 gene (exon 14 at the site of internal tandem duplications and the exon 20 tyrosine kinase domain). The amplified products were digested with restriction enzyme EcoRV to detect the D835 mutation in exon 20. The PCR product and digest product were then analyzed on a Model 3130xl/Genescan system, ([employer]). (Emiliano BABIN, 2003, Journal of molecular diagnostics, Vol.5: 96). If applicable, the FLT3-ITD allelic ratio is determined as the ratio of the mutant product peak height to the wild-type product peak height.       COMMENTS       The prognostic significance of wild type FLT3 is dependent on other molecular genetic findings.  There is no indication for targeted therapy based on these results. These findings do not rule out the presence of mutations that would not be detected by the primers or restriction enzyme used in this assay. Of note, as gain or loss of FLT3 mutations has been reported with disease progression, future testing may be considered if clinically indicated. Please  correlate with pending NGS study for final interpretation.         Of note, as gain or loss of FLT3 mutations has been reported with disease progression, future testing may be considered if clinically indicated.      DISCLAIMER       This test was developed and its performance characteristics determined by [facility]. It has not been cleared or approved by the FDA. The laboratory is regulated under CLIA as qualified to perform high-complexity testing. This test is used for clinical purposes. It should not be regarded as investigational or for research.       Asymptomatic COVID-19 Virus (Coronavirus) by PCR Nasopharyngeal    Specimen: Nasopharyngeal; Swab   Result Value Ref Range    SARS CoV2 PCR Negative Negative, Testing sent to reference lab. Results will be returned via unsolicited result   Tissue Aerobic Bacterial Culture Routine with Gram Stain    Specimen: Leg, Below Knee, Right; Tissue   Result Value Ref Range    Culture No Growth     Gram Stain Result No organisms seen     Gram Stain Result No white blood cells seen    Acid-Fast Bacilli Culture and Stain    Specimen: Leg, Below Knee, Right; Tissue   Result Value Ref Range    Acid Fast Stain No acid fast bacilli seen     Acid Fast Stain No acid fast bacilli seen    Blood Culture Arm, Right    Specimen: Arm, Right; Blood   Result Value Ref Range    Culture No Growth    Blood Culture Purple Lumen    Specimen: Purple Lumen; Blood   Result Value Ref Range    Culture No Growth    US Upper Extremity Venous Duplex Left   Result Value Ref Range    Radiologist flags Left arm DVT (Urgent)    CBC with platelets   Result Value Ref Range    WBC Count 2.5 (L) 4.0 - 11.0 10e3/uL    RBC Count 2.39 (L) 3.80 - 5.20 10e6/uL    Hemoglobin 7.6 (L) 11.7 - 15.7 g/dL    Hematocrit 22.3 (L) 35.0 - 47.0 %    MCV 93 78 - 100 fL    MCH 31.8 26.5 - 33.0 pg    MCHC 34.1 31.5 - 36.5 g/dL    RDW 13.8 10.0 - 15.0 %    Platelet Count 116 (L) 150 - 450  10e3/uL   Lactic Acid STAT   Result Value Ref Range    Lactic Acid 0.6 (L) 0.7 - 2.0 mmol/L   Heparin Unfractionated Anti Xa Level   Result Value Ref Range    Anti Xa Unfractionated Heparin 0.18 For Reference Range, See Comment IU/mL   Basic metabolic panel   Result Value Ref Range    Creatinine 0.46 (L) 0.51 - 0.95 mg/dL    Sodium 141 136 - 145 mmol/L    Potassium 3.4 3.4 - 5.3 mmol/L    Urea Nitrogen 3.7 (L) 6.0 - 20.0 mg/dL    Chloride 106 98 - 107 mmol/L    Carbon Dioxide (CO2) 23 22 - 29 mmol/L    Anion Gap 12 7 - 15 mmol/L    Glucose 107 (H) 70 - 99 mg/dL    GFR Estimate >90 >60 mL/min/1.73m2    Calcium 8.7 8.6 - 10.0 mg/dL   Hepatic panel   Result Value Ref Range    Protein Total 6.4 6.4 - 8.3 g/dL    Albumin 3.3 (L) 3.5 - 5.2 g/dL    Bilirubin Total 0.5 <=1.2 mg/dL    Alkaline Phosphatase 53 35 - 104 U/L    AST 21 10 - 35 U/L    ALT 21 10 - 35 U/L    Bilirubin Direct <0.20 0.00 - 0.30 mg/dL   Magnesium   Result Value Ref Range    Magnesium 2.0 1.7 - 2.3 mg/dL   Phosphorus   Result Value Ref Range    Phosphorus 3.8 2.5 - 4.5 mg/dL   CBC with platelets and differential   Result Value Ref Range    WBC Count 3.1 (L) 4.0 - 11.0 10e3/uL    RBC Count 2.50 (L) 3.80 - 5.20 10e6/uL    Hemoglobin 8.0 (L) 11.7 - 15.7 g/dL    Hematocrit 23.2 (L) 35.0 - 47.0 %    MCV 93 78 - 100 fL    MCH 32.0 26.5 - 33.0 pg    MCHC 34.5 31.5 - 36.5 g/dL    RDW 13.7 10.0 - 15.0 %    Platelet Count 145 (L) 150 - 450 10e3/uL   Manual Differential   Result Value Ref Range    % Neutrophils 21 %    % Lymphocytes 41 %    % Monocytes 9 %    % Eosinophils 0 %    % Basophils 0 %    % Metamyelocytes 9 %    % Myelocytes 14 %    % Promyelocytes 2 %    % Blasts 4 %    Absolute Neutrophils 0.7 (L) 1.6 - 8.3 10e3/uL    Absolute Lymphocytes 1.3 0.8 - 5.3 10e3/uL    Absolute Monocytes 0.3 0.0 - 1.3 10e3/uL    Absolute Eosinophils 0.0 0.0 - 0.7 10e3/uL    Absolute Basophils 0.0 0.0 - 0.2 10e3/uL    Absolute Metamyelocytes 0.3 (H) <=0.0 10e3/uL    Absolute  Myelocytes 0.4 (H) <=0.0 10e3/uL    Absolute Promyelocytes 0.1 (H) <=0.0 10e3/uL    Absolute Blasts 0.1 (H) <=0.0 10e3/uL    RBC Morphology Confirmed RBC Indices     Platelet Assessment  Automated Count Confirmed. Platelet morphology is normal.     Automated Count Confirmed. Platelet morphology is normal.    Teardrop Cells Slight (A) None Seen   Potassium   Result Value Ref Range    Potassium 3.3 (L) 3.4 - 5.3 mmol/L   Potassium   Result Value Ref Range    Potassium 3.7 3.4 - 5.3 mmol/L   Basic metabolic panel   Result Value Ref Range    Creatinine 0.53 0.51 - 0.95 mg/dL    Sodium 139 136 - 145 mmol/L    Potassium 3.6 3.4 - 5.3 mmol/L    Urea Nitrogen 3.7 (L) 6.0 - 20.0 mg/dL    Chloride 105 98 - 107 mmol/L    Carbon Dioxide (CO2) 23 22 - 29 mmol/L    Anion Gap 11 7 - 15 mmol/L    Glucose 105 (H) 70 - 99 mg/dL    GFR Estimate >90 >60 mL/min/1.73m2    Calcium 9.1 8.6 - 10.0 mg/dL   CBC with platelets and differential   Result Value Ref Range    WBC Count 6.4 4.0 - 11.0 10e3/uL    RBC Count 2.55 (L) 3.80 - 5.20 10e6/uL    Hemoglobin 8.1 (L) 11.7 - 15.7 g/dL    Hematocrit 24.1 (L) 35.0 - 47.0 %    MCV 95 78 - 100 fL    MCH 31.8 26.5 - 33.0 pg    MCHC 33.6 31.5 - 36.5 g/dL    RDW 14.2 10.0 - 15.0 %    Platelet Count 253 150 - 450 10e3/uL   Manual Differential   Result Value Ref Range    % Neutrophils 33 %    % Lymphocytes 29 %    % Monocytes 13 %    % Eosinophils 0 %    % Basophils 0 %    % Metamyelocytes 5 %    % Myelocytes 14 %    % Promyelocytes 1 %    % Blasts 5 %    Absolute Neutrophils 2.1 1.6 - 8.3 10e3/uL    Absolute Lymphocytes 1.9 0.8 - 5.3 10e3/uL    Absolute Monocytes 0.8 0.0 - 1.3 10e3/uL    Absolute Eosinophils 0.0 0.0 - 0.7 10e3/uL    Absolute Basophils 0.0 0.0 - 0.2 10e3/uL    Absolute Metamyelocytes 0.3 (H) <=0.0 10e3/uL    Absolute Myelocytes 0.9 (H) <=0.0 10e3/uL    Absolute Promyelocytes 0.1 (H) <=0.0 10e3/uL    Absolute Blasts 0.3 (H) <=0.0 10e3/uL    RBC Morphology Confirmed RBC Indices      Platelet Assessment  Automated Count Confirmed. Platelet morphology is normal.     Automated Count Confirmed. Platelet morphology is normal.   Basic metabolic panel   Result Value Ref Range    Creatinine 0.50 (L) 0.51 - 0.95 mg/dL    Sodium 137 136 - 145 mmol/L    Potassium 3.4 3.4 - 5.3 mmol/L    Urea Nitrogen 3.5 (L) 6.0 - 20.0 mg/dL    Chloride 102 98 - 107 mmol/L    Carbon Dioxide (CO2) 21 (L) 22 - 29 mmol/L    Anion Gap 14 7 - 15 mmol/L    Glucose 102 (H) 70 - 99 mg/dL    GFR Estimate >90 >60 mL/min/1.73m2    Calcium 9.0 8.6 - 10.0 mg/dL   Hepatic panel   Result Value Ref Range    Protein Total 7.2 6.4 - 8.3 g/dL    Albumin 3.6 3.5 - 5.2 g/dL    Bilirubin Total 0.6 <=1.2 mg/dL    Alkaline Phosphatase 54 35 - 104 U/L    AST 32 10 - 35 U/L    ALT 24 10 - 35 U/L    Bilirubin Direct <0.20 0.00 - 0.30 mg/dL   Magnesium   Result Value Ref Range    Magnesium 2.3 1.7 - 2.3 mg/dL   Phosphorus   Result Value Ref Range    Phosphorus 2.8 2.5 - 4.5 mg/dL   CBC with platelets and differential   Result Value Ref Range    WBC Count 11.4 (H) 4.0 - 11.0 10e3/uL    RBC Count 2.60 (L) 3.80 - 5.20 10e6/uL    Hemoglobin 8.3 (L) 11.7 - 15.7 g/dL    Hematocrit 24.9 (L) 35.0 - 47.0 %    MCV 96 78 - 100 fL    MCH 31.9 26.5 - 33.0 pg    MCHC 33.3 31.5 - 36.5 g/dL    RDW 14.4 10.0 - 15.0 %    Platelet Count 388 150 - 450 10e3/uL   Manual Differential   Result Value Ref Range    % Neutrophils 44 %    % Lymphocytes 10 %    % Monocytes 15 %    % Eosinophils 0 %    % Basophils 0 %    % Metamyelocytes 7 %    % Myelocytes 20 %    % Blasts 4 %    Absolute Neutrophils 5.0 1.6 - 8.3 10e3/uL    Absolute Lymphocytes 1.1 0.8 - 5.3 10e3/uL    Absolute Monocytes 1.7 (H) 0.0 - 1.3 10e3/uL    Absolute Eosinophils 0.0 0.0 - 0.7 10e3/uL    Absolute Basophils 0.0 0.0 - 0.2 10e3/uL    Absolute Metamyelocytes 0.8 (H) <=0.0 10e3/uL    Absolute Myelocytes 2.3 (H) <=0.0 10e3/uL    Absolute Blasts 0.5 (H) <=0.0 10e3/uL    RBC Morphology Confirmed RBC Indices      Platelet Assessment  Automated Count Confirmed. Platelet morphology is normal.     Automated Count Confirmed. Platelet morphology is normal.    Guido Cells Slight (A) None Seen   Low Molecular Weight Heparin Anti Xa Level   Result Value Ref Range    Anti Xa Low Molecular Weight 0.55 For Reference Range, See Comment IU/mL   Potassium   Result Value Ref Range    Potassium 4.1 3.4 - 5.3 mmol/L       Imaging    CT Chest/Abdomen/Pelvis w Contrast    Result Date: 7/11/2022  EXAMINATION: CT CHEST/ABDOMEN/PELVIS W CONTRAST, 7/7/2022 1:22 PM INDICATION: persistent neutropenic fever COMPARISON STUDY: Radiograph 6/29/2022. CT 6/15/2022. TECHNIQUE: CT scan of the chest, abdomen, and pelvis was performed on multidetector CT scanner using volumetric acquisition technique and images were reconstructed in multiple planes with variable thickness and reviewed on dedicated workstations. CONTRAST: 125 ml isovue 370  injected IV CT scan radiation dose is optimized to minimum requisite dose using automated dose modulation techniques. FINDINGS: Lines, tubes, devices: Left lower extremity PICC with tip terminating at the superior cavoatrial junction. CHEST: Lungs: Unchanged 4 mm solid pulmonary nodule of the left upper lobe laterally (series 5 image 153). Remaining additional sub-4 mm solid pulmonary nodules are stable compared to prior. Similar subtle diffuse mosaic groundglass changes and presumed fibroatelectasis of the posterior aspect of the right upper lobe. The central tracheobronchial tree is patent. No areas of bronchiectasis or architectural distortion. No pleural effusion, pulmonary consolidation, or pneumothorax. Mild bibasilar atelectasis. Mediastinum: The visualized thyroid is unremarkable.Heart size is within normal limits. No pericardial effusion. The thoracic aorta and main pulmonary artery are within normal limits. Standard branching pattern of the great vessels. No abnormal thoracic lymph nodes. Moderate hiatal hernia,  mild increased streakiness and thickening along the intrathoracic stomach and distal esophagus. ABDOMEN/PELVIS: Liver: No mass. No intrahepatic biliary ductal dilation.  Similar hypoattenuation along the falciform ligament likely focal fatty infiltration.   Biliary System: Decompressed appearance of the gallbladder. No extrahepatic biliary ductal dilation. Pancreas: No mass or pancreatic ductal dilation. Adrenal glands: No mass or nodules Spleen: Normal. Kidneys: No suspicious mass, obstructing calculus or hydronephrosis. Gastrointestinal tract : No evidence for infiltrate appendix. Normal caliber small bowel.   Large bowel loop dilatation Mesentery/peritoneum/retroperitoneum: There is hazy central mesenteric attenuation with numerous subcentimeter lymph nodes in the central mesentery.  Lymph nodes: Numerous subcentimeter central mesenteric lymph nodes and several enlarged lymph nodes for example a 1.2 cm short axis left periaortic lymph node (series 7 image 333). Vasculature: Patent major abdominal vasculature.  The major portal vessels are patent. Pelvis: Urinary bladder is normal.  Myomatous uterus. Ovaries within normal limits. Osseous structures: No aggressive or acute osseous lesion.  Unchanged opposing endplate degenerative changes L4-L5.   Soft tissues: Small fat-containing umbilical hernia.        IMPRESSION: 1. Small to moderate hiatal hernia with mild thickening  and streaky density along the intrathoracic stomach and distal esophagus,  likely to represent inflammatory/infective etiology. 2. Enlarged upper retroperitoneal especially para-aortic para-aortic lymph node and mesenteric lymph nodes, compared to prior CT 6/16/2022 concerning for infective/inflammatory etiology. Consider attention on follow-up 3. Patchy streaky density in the posterior right upper lobe slightly more conspicuous compared to prior CT, and diffuse mosaic attenuation, may represent infection or inflammation. 4. Unchanged sub-6 mm  pulmonary nodules, compared to prior CT 6/16/2022. I have personally reviewed the examination and initial interpretation and I agree with the findings. VALERIE ARNOLD MD   SYSTEM ID:  [id number]    XR Chest 2 Views    Result Date: 6/23/2022  EXAM: XR CHEST 2 VW 6/23/2022 4:52 PM HISTORY: neutropenic fever. COMPARISON: CT of the chest and pelvis is 6/16/2022. TECHNIQUE: Upright frontal and lateral views of the chest. FINDINGS: Left-sided PICC with tip in the low SVC. Trachea is midline. Cardiac and mediastinal silhouette is within normal limits. No focal pulmonary opacities. No pleural effusions. No pneumothoraces. No acute osseous abnormalities.     IMPRESSION: No focal pulmonary opacities. I have personally reviewed the examination and initial interpretation and I agree with the findings. VALERIE ARNOLD MD   SYSTEM ID:  [id number]    US Upper Extremity Venous Duplex Left    Result Date: 7/12/2022  EXAMINATION: DOPPLER VENOUS ULTRASOUND OF THE LEFT UPPER EXTREMITY, 7/12/2022 8:43 PM COMPARISON: CT chest abdomen pelvis 7/7/2020 20 HISTORY: Left arm redness, pain TECHNIQUE:  Gray-scale evaluation with compression, spectral flow and color Doppler assessment of the deep venous system of the left upper extremity. FINDINGS: Left: Normal blood flow and waveforms are demonstrated in the internal jugular, innominate, and subclavian veins. Nonocclusive thrombus in the left axillary vein with occlusive thrombus in one of the paired left brachial veins of the upper and mid arm. Occlusive thrombus in the left basilic vein at the upper and mid arm. The left basilic vein at the proximal forearm is fully compressible with nonocclusive thrombus in the mid forearm. The cephalic vein is fully compressible.     IMPRESSION: 1. Nonocclusive deep venous thrombosis in the left axillary vein with occlusive thrombosis extending through one of the paired left brachial veins of the upper and mid arm. 2. Occlusive superficial venous thrombosis  of the left basilic vein at the upper and mid arm, as well as in the mid forearm. [Urgent Result: Left arm DVT] Finding was identified on 7/12/2022 9:07 PM. Dr. Gross was contacted by Dr. Henao at 7/12/2022 9:20 PM and verbalized understanding of the urgent finding. I have personally reviewed the examination and initial interpretation and I agree with the findings. FENG GUZMAN MD   SYSTEM ID:  [id number]    XR Chest Port 1 View    Result Date: 6/29/2022  EXAM: XR CHEST PORT 1 VIEW  6/29/2022 7:19 PM HISTORY:  neutropenic fever, concern for PNA   COMPARISON:  Chest radiograph 6/23/2022 FINDINGS: Single view of the chest. Left upper extremity PICC tip projects over the low SVC. Trachea is midline. The cardiomediastinal silhouette is within normal limits. No significant pleural effusion or pneumothorax. Linear opacity at the left lung consistent with a skinfold. No acute focal airspace opacity. The visualized upper abdomen is unremarkable.     IMPRESSION: No focal airspace opacity. If high clinical concern consider a CT chest which is more sensitive. I have personally reviewed the examination and initial interpretation and I agree with the findings. YANELY BLEVINS MD   SYSTEM ID:  [id number]      Billing  Total time 40 minutes, to include face to face visit, review of EMR, ordering, documentation and coordination of care on date of service    Signed by: Natividad Ackerman NP

## 2022-07-19 ENCOUNTER — PATIENT OUTREACH (OUTPATIENT)
Dept: ONCOLOGY | Facility: CLINIC | Age: 37
End: 2022-07-19

## 2022-07-19 DIAGNOSIS — C92.00 AML (ACUTE MYELOGENOUS LEUKEMIA) (H): ICD-10-CM

## 2022-07-19 DIAGNOSIS — Z76.82 BONE MARROW TRANSPLANT CANDIDATE: Primary | ICD-10-CM

## 2022-07-19 NOTE — PROGRESS NOTES
Spoke with Minerva, sister-Rosario, and father-Dony, following new transplant visit with Dr. Garcia. Reviewed plan of care per NT conversation for Stem Cell Transplant. Explained role of the Nurse Coordinator throughout the BMT process as well as general time line and expectations for transplant. Discussed necessity of caregiver and program's proximity requirements. All questions were answered.     Plan: No transplant plan at this time d/t favorable risk; however, will pursue donor search in the event that BMT is indicated in the future    Contact information provided for :  yes    HLA typing drawn:  Ordered for next lab draw (7/21)    PRA typing drawn:  Ordered for next lab draw (7/21)    CMV-IgG and ABO-Rh drawn or in record:  No    Contact information provided for :  yes    Financial Release for URD search obtained:  yes    [id number] Consent Signed: NA    EOC Reason updated: no      Tyra Cervantes, RN, BSN  RN Care Coordinator - BMT  Ph [phone]   [phone]

## 2022-07-19 NOTE — PROGRESS NOTES
[facility]: Cancer Care                                                                                          Minerva called and instructed to hold her Thursday am dose of Lovenox for her upcoming BMB this Thursday, 7/21/22. On daily dosing so will take her Wednesday am dose and hold after that.    Minerva expressed good understanding of the above.     Signature:  Anuja Tripp RN

## 2022-07-20 LAB
PATH REPORT.COMMENTS IMP SPEC: NORMAL
PATH REPORT.FINAL DX SPEC: NORMAL
PATH REPORT.GROSS SPEC: NORMAL
PATH REPORT.MICROSCOPIC SPEC OTHER STN: NORMAL
PATH REPORT.RELEVANT HX SPEC: NORMAL

## 2022-07-20 PROCEDURE — 88341 IMHCHEM/IMCYTCHM EA ADD ANTB: CPT | Mod: 26 | Performed by: DERMATOLOGY

## 2022-07-20 PROCEDURE — 88305 TISSUE EXAM BY PATHOLOGIST: CPT | Mod: 26 | Performed by: DERMATOLOGY

## 2022-07-20 PROCEDURE — 88312 SPECIAL STAINS GROUP 1: CPT | Mod: 26 | Performed by: DERMATOLOGY

## 2022-07-20 PROCEDURE — 88342 IMHCHEM/IMCYTCHM 1ST ANTB: CPT | Mod: 26 | Performed by: DERMATOLOGY

## 2022-07-20 NOTE — PROGRESS NOTES
BMT ONC Adult Bone Marrow Biopsy Procedure Note  July 20, 2022  /82 (BP Location: Right arm, Patient Position: Sitting)   Pulse 101   Temp 98.3  F (36.8  C) (Oral)   Resp 16   Wt 113 kg (249 lb 3.2 oz)   SpO2 100%   BMI 39.02 kg/m       Learning needs assessment complete within 12 months? YES    DIAGNOSIS: AML s/p induction therapy     PROCEDURE: Unilateral Bone Marrow Biopsy and Unilateral Aspirate    LOCATION: [facility] 2nd Floor    Patient s identification was positively verified by verbal identification and invasive procedure safety checklist was completed. Informed consent was obtained. Following the administration of Midazolam as pre-medication, patient was placed in the prone position and prepped and draped in a sterile manner. Approximately 15 cc of 1% Lidocaine was used over the left posterior iliac spine. Following this a 3 mm incision was made. Trephine bone marrow core(s) was (were) obtained from the [facility]. Bone marrow aspirates were obtained from the [facility]. Aspirates were sent for morphology, immunophenotyping, cytogenetics, molecular diagnostics gene rearrangement and Process/HOLD DNA and research studies (Tissue Bank). A total of approximately 30 ml of marrow was aspirated. Following this procedure a sterile dressing was applied to the bone marrow biopsy site(s). The patient was placed in the supine position to maintain pressure on the biopsy site. Post-procedure wound care instructions were given.     Complications: NO    Pre-procedural pain: 0 out of 10 on the numeric pain rating scale.     Procedural pain: 1 out of 10 on the numeric pain rating scale.     Post-procedural pain assessment: 0 out of 10 on the numeric pain rating scale.     Interventions: NO    Length of procedure:20 minutes or less      Procedure performed by: Dora Bethea PA-C

## 2022-07-20 NOTE — PROGRESS NOTES
Request from [facility] 7D team to facilitate clinic visit: video visit Dr Earl- [facility] - 7/26 1pm.   7/26/2022  1:00 PM Ortiz Earl MD [facility]

## 2022-07-21 ENCOUNTER — OFFICE VISIT (OUTPATIENT)
Dept: ONCOLOGY | Facility: CLINIC | Age: 37
End: 2022-07-21
Attending: PHYSICIAN ASSISTANT
Payer: COMMERCIAL

## 2022-07-21 ENCOUNTER — ANCILLARY PROCEDURE (OUTPATIENT)
Dept: ULTRASOUND IMAGING | Facility: CLINIC | Age: 37
End: 2022-07-21
Attending: PHYSICIAN ASSISTANT
Payer: COMMERCIAL

## 2022-07-21 ENCOUNTER — LAB (OUTPATIENT)
Dept: LAB | Facility: CLINIC | Age: 37
End: 2022-07-21
Attending: PHYSICIAN ASSISTANT
Payer: COMMERCIAL

## 2022-07-21 VITALS
DIASTOLIC BLOOD PRESSURE: 82 MMHG | RESPIRATION RATE: 16 BRPM | SYSTOLIC BLOOD PRESSURE: 116 MMHG | WEIGHT: 249.2 LBS | HEART RATE: 101 BPM | OXYGEN SATURATION: 100 % | BODY MASS INDEX: 39.02 KG/M2 | TEMPERATURE: 98.3 F

## 2022-07-21 DIAGNOSIS — Z76.82 BONE MARROW TRANSPLANT CANDIDATE: ICD-10-CM

## 2022-07-21 DIAGNOSIS — I82.A12 ACUTE DEEP VEIN THROMBOSIS (DVT) OF AXILLARY VEIN OF LEFT UPPER EXTREMITY (H): ICD-10-CM

## 2022-07-21 DIAGNOSIS — Z00.6 EXAMINATION OF PARTICIPANT IN CLINICAL TRIAL: Primary | ICD-10-CM

## 2022-07-21 DIAGNOSIS — C92.00 AML (ACUTE MYELOGENOUS LEUKEMIA) (H): ICD-10-CM

## 2022-07-21 DIAGNOSIS — C92.00 ACUTE MYELOID LEUKEMIA NOT HAVING ACHIEVED REMISSION (H): Primary | ICD-10-CM

## 2022-07-21 LAB
ALBUMIN SERPL-MCNC: 3.7 G/DL (ref 3.4–5)
ALP SERPL-CCNC: 59 U/L (ref 40–150)
ALT SERPL W P-5'-P-CCNC: 55 U/L (ref 0–50)
ANION GAP SERPL CALCULATED.3IONS-SCNC: 10 MMOL/L (ref 3–14)
AST SERPL W P-5'-P-CCNC: 31 U/L (ref 0–45)
BASOPHILS # BLD MANUAL: 0 10E3/UL (ref 0–0.2)
BASOPHILS NFR BLD MANUAL: 0 %
BILIRUB SERPL-MCNC: 0.8 MG/DL (ref 0.2–1.3)
BUN SERPL-MCNC: 13 MG/DL (ref 7–30)
CALCIUM SERPL-MCNC: 10.3 MG/DL (ref 8.5–10.1)
CHLORIDE BLD-SCNC: 103 MMOL/L (ref 94–109)
CO2 SERPL-SCNC: 24 MMOL/L (ref 20–32)
CREAT SERPL-MCNC: 0.59 MG/DL (ref 0.52–1.04)
EOSINOPHIL # BLD MANUAL: 0 10E3/UL (ref 0–0.7)
EOSINOPHIL NFR BLD MANUAL: 0 %
ERYTHROCYTE [DISTWIDTH] IN BLOOD BY AUTOMATED COUNT: 18.1 % (ref 10–15)
GFR SERPL CREATININE-BSD FRML MDRD: >90 ML/MIN/1.73M2
GLUCOSE BLD-MCNC: 98 MG/DL (ref 70–99)
HCT VFR BLD AUTO: 31.7 % (ref 35–47)
HGB BLD-MCNC: 10 G/DL (ref 11.7–15.7)
INTERPRETATION: NORMAL
LAB DIRECTOR COMMENTS: NORMAL
LAB DIRECTOR DISCLAIMER: NORMAL
LAB DIRECTOR INTERPRETATION: NORMAL
LAB DIRECTOR METHODOLOGY: NORMAL
LAB DIRECTOR RESULTS: NORMAL
LDH SERPL L TO P-CCNC: 388 U/L (ref 81–234)
LYMPHOCYTES # BLD MANUAL: 1.5 10E3/UL (ref 0.8–5.3)
LYMPHOCYTES NFR BLD MANUAL: 12 %
MCH RBC QN AUTO: 31.6 PG (ref 26.5–33)
MCHC RBC AUTO-ENTMCNC: 31.5 G/DL (ref 31.5–36.5)
MCV RBC AUTO: 100 FL (ref 78–100)
METAMYELOCYTES # BLD MANUAL: 0.2 10E3/UL
METAMYELOCYTES NFR BLD MANUAL: 2 %
MONOCYTES # BLD MANUAL: 2.5 10E3/UL (ref 0–1.3)
MONOCYTES NFR BLD MANUAL: 20 %
MYELOCYTES # BLD MANUAL: 0.2 10E3/UL
MYELOCYTES NFR BLD MANUAL: 2 %
NEUTROPHILS # BLD MANUAL: 7.9 10E3/UL (ref 1.6–8.3)
NEUTROPHILS NFR BLD MANUAL: 64 %
NRBC # BLD AUTO: 0.9 10E3/UL
NRBC BLD MANUAL-RTO: 7 %
PLAT MORPH BLD: ABNORMAL
PLATELET # BLD AUTO: 541 10E3/UL (ref 150–450)
POTASSIUM BLD-SCNC: 4 MMOL/L (ref 3.4–5.3)
PROT SERPL-MCNC: 8.5 G/DL (ref 6.8–8.8)
RBC # BLD AUTO: 3.16 10E6/UL (ref 3.8–5.2)
RBC MORPH BLD: ABNORMAL
SIGNIFICANT RESULTS: NORMAL
SODIUM SERPL-SCNC: 137 MMOL/L (ref 133–144)
SPECIMEN DESCRIPTION: NORMAL
SPECIMEN DESCRIPTION: NORMAL
TEST DETAILS, MDL: NORMAL
WBC # BLD AUTO: 12.4 10E3/UL (ref 4–11)

## 2022-07-21 PROCEDURE — 96374 THER/PROPH/DIAG INJ IV PUSH: CPT | Mod: 59

## 2022-07-21 PROCEDURE — 86833 HLA CLASS II HIGH DEFIN QUAL: CPT | Performed by: PHYSICIAN ASSISTANT

## 2022-07-21 PROCEDURE — 84999 UNLISTED CHEMISTRY PROCEDURE: CPT | Performed by: PHYSICIAN ASSISTANT

## 2022-07-21 PROCEDURE — 86832 HLA CLASS I HIGH DEFIN QUAL: CPT | Performed by: PHYSICIAN ASSISTANT

## 2022-07-21 PROCEDURE — 88305 TISSUE EXAM BY PATHOLOGIST: CPT | Mod: TC | Performed by: INTERNAL MEDICINE

## 2022-07-21 PROCEDURE — 88185 FLOWCYTOMETRY/TC ADD-ON: CPT | Mod: XU | Performed by: INTERNAL MEDICINE

## 2022-07-21 PROCEDURE — 86828 HLA CLASS I&II ANTIBODY QUAL: CPT | Mod: XU | Performed by: PHYSICIAN ASSISTANT

## 2022-07-21 PROCEDURE — 81450 HL NEO GSAP 5-50DNA/DNA&RNA: CPT | Performed by: PHYSICIAN ASSISTANT

## 2022-07-21 PROCEDURE — 250N000011 HC RX IP 250 OP 636: Performed by: PHYSICIAN ASSISTANT

## 2022-07-21 PROCEDURE — 88237 TISSUE CULTURE BONE MARROW: CPT | Performed by: PHYSICIAN ASSISTANT

## 2022-07-21 PROCEDURE — 83615 LACTATE (LD) (LDH) ENZYME: CPT | Performed by: PHYSICIAN ASSISTANT

## 2022-07-21 PROCEDURE — 85027 COMPLETE CBC AUTOMATED: CPT | Performed by: PHYSICIAN ASSISTANT

## 2022-07-21 PROCEDURE — 85007 BL SMEAR W/DIFF WBC COUNT: CPT | Performed by: PHYSICIAN ASSISTANT

## 2022-07-21 PROCEDURE — 88189 FLOWCYTOMETRY/READ 16 & >: CPT | Performed by: STUDENT IN AN ORGANIZED HEALTH CARE EDUCATION/TRAINING PROGRAM

## 2022-07-21 PROCEDURE — 36415 COLL VENOUS BLD VENIPUNCTURE: CPT | Performed by: PHYSICIAN ASSISTANT

## 2022-07-21 PROCEDURE — 38222 DX BONE MARROW BX & ASPIR: CPT | Performed by: PHYSICIAN ASSISTANT

## 2022-07-21 PROCEDURE — 80053 COMPREHEN METABOLIC PANEL: CPT | Performed by: PHYSICIAN ASSISTANT

## 2022-07-21 PROCEDURE — 36415 COLL VENOUS BLD VENIPUNCTURE: CPT

## 2022-07-21 PROCEDURE — 999N001098 HLA ANTIBODY (PRA) CLASS II SCREEN: Performed by: PHYSICIAN ASSISTANT

## 2022-07-21 PROCEDURE — 88311 DECALCIFY TISSUE: CPT | Mod: TC | Performed by: INTERNAL MEDICINE

## 2022-07-21 PROCEDURE — 93971 EXTREMITY STUDY: CPT | Mod: LT | Performed by: RADIOLOGY

## 2022-07-21 PROCEDURE — 81310 NPM1 GENE: CPT | Performed by: PHYSICIAN ASSISTANT

## 2022-07-21 RX ADMIN — MIDAZOLAM HYDROCHLORIDE 2 MG: 1 INJECTION, SOLUTION INTRAMUSCULAR; INTRAVENOUS at 15:13

## 2022-07-21 ASSESSMENT — PAIN SCALES - GENERAL: PAINLEVEL: NO PAIN (0)

## 2022-07-21 NOTE — NURSING NOTE
Patient supine for 30 minutes following biopsy. After 30 minutes, dressing clean, dry and intact. Vital signs stable and PIV removed. See DOC flow sheets for details. Left ambulatory with family member.    Clinic RN instructed by PRETTY Bethea to draw research labs. No research tubes were provided but tube type was listed on form. Nurse chaitanya tubes and put patient labels on tubes as no other patient identifier was given to nurse for tubes. Labs were sent to [facility] main lab along with research form that was not filled out by research team or provider. Nurse labeled form with collection date, time, and location. Nurse initialed labels on tubes and form. Labs given to .     Cat Bynum RN

## 2022-07-21 NOTE — NURSING NOTE
"Oncology Rooming Note    July 21, 2022 2:27 PM   Veda Marinelli is a 37 year old female who presents for:    Chief Complaint   Patient presents with     Bone Marrow Biopsy     BMBX r/t AML     Initial Vitals: /82 (BP Location: Right arm, Patient Position: Sitting)   Pulse 101   Temp 98.3  F (36.8  C) (Oral)   Resp 16   Wt 113 kg (249 lb 3.2 oz)   SpO2 100%   BMI 39.02 kg/m   Estimated body mass index is 39.02 kg/m  as calculated from the following:    Height as of 7/13/22: 1.702 m (5' 7.01\").    Weight as of this encounter: 113 kg (249 lb 3.2 oz). Body surface area is 2.31 meters squared.  No Pain (0) Comment: Data Unavailable   No LMP recorded.  Allergies reviewed: Yes  Medications reviewed: Yes    Medications: Medication refills not needed today.  Pharmacy name entered into [facility]: [id number] - [place], [place] - [address]    Clinical concerns: VSS.     Cat Bynum RN              "

## 2022-07-21 NOTE — LETTER
7/21/2022         RE: Veda Marinelli  [id number] [facility] [address]        Dear Colleague,    Thank you for referring your patient, Veda Marinelli, to the [facility]. Please see a copy of my visit note below.    BMT ONC Adult Bone Marrow Biopsy Procedure Note  July 20, 2022  /82 (BP Location: Right arm, Patient Position: Sitting)   Pulse 101   Temp 98.3  F (36.8  C) (Oral)   Resp 16   Wt 113 kg (249 lb 3.2 oz)   SpO2 100%   BMI 39.02 kg/m       Learning needs assessment complete within 12 months? YES    DIAGNOSIS: AML s/p induction therapy     PROCEDURE: Unilateral Bone Marrow Biopsy and Unilateral Aspirate    LOCATION: [facility] 2nd Floor    Patient s identification was positively verified by verbal identification and invasive procedure safety checklist was completed. Informed consent was obtained. Following the administration of Midazolam as pre-medication, patient was placed in the prone position and prepped and draped in a sterile manner. Approximately 15 cc of 1% Lidocaine was used over the left posterior iliac spine. Following this a 3 mm incision was made. Trephine bone marrow core(s) was (were) obtained from the [facility]. Bone marrow aspirates were obtained from the IC. Aspirates were sent for morphology, immunophenotyping, cytogenetics, molecular diagnostics gene rearrangement and Process/HOLD DNA and research studies (Tissue Bank). A total of approximately 30 ml of marrow was aspirated. Following this procedure a sterile dressing was applied to the bone marrow biopsy site(s). The patient was placed in the supine position to maintain pressure on the biopsy site. Post-procedure wound care instructions were given.     Complications: NO    Pre-procedural pain: 0 out of 10 on the numeric pain rating scale.     Procedural pain: 1 out of 10 on the numeric pain rating scale.     Post-procedural pain assessment: 0 out of 10 on the numeric pain rating scale.      Interventions: NO    Length of procedure:20 minutes or less      Procedure performed by: Dora Bethea PA-C         Again, thank you for allowing me to participate in the care of your patient.        Sincerely,        Dora Bethea PA-C

## 2022-07-21 NOTE — LETTER
Date:July 22, 2022      Provider requested that no letter be sent. Do not send.       [facility]

## 2022-07-21 NOTE — NURSING NOTE
BMT Teaching Flowsheet   Teaching Topic: post biopsy instructions    Person(s) involved in teaching: Patient  Motivation Level  Asks Questions: Yes  Eager to Learn: Yes  Cooperative: Yes  Receptive (willing/able to accept information): Yes    Patient demonstrates understanding of the following:   - Reason for the appointment, diagnosis and treatment plan: Yes  - Knowledge of proper use of medications and conditions for which they are ordered (with special attention to potential side effects or drug interactions): Yes  - Which situations necessitate calling provider and whom to contact: Yes    Teaching concerns addressed: reviewed activity restrictions if received premeds, potential for bleeding and actions to take if develops any of the issues below    Proper use and care of (medical equipment, care aids, etc.) Yes  Pain management techniques: Yes  Patient instructed on hand hygiene: Yes  How and/when to access community resources: Yes    Infection Control:  Patient demonstrates understanding of the following:   Surgical procedure site care taught NA  Signs and symptoms of infection taught Yes  Wound care taught Yes  Central venous catheter care taught NA    Instructional Materials Used/Given: verbal, print out of post biopsy instructions.     Pt arrived to clinic with spouse. Wt and vitals obtained. Meds and allergies reviewed. PIV placed for labs and versed. Provider order received to administer Versed 2 mg IVP as premed for BMBX. Procedural consent discussed and pt's signature obtained.  Allergies reviewed.  PT currently alert and oriented to plan of care.  Pt lying prone in stretcher.  Call light w/in reach.  Provider and  at bedside.    Pt given post BMBX instruction sheet to take home.     Cat Bynum RN

## 2022-07-22 ENCOUNTER — PATIENT OUTREACH (OUTPATIENT)
Dept: ONCOLOGY | Facility: CLINIC | Age: 37
End: 2022-07-22

## 2022-07-22 LAB
Lab: NORMAL
PATH REPORT.COMMENTS IMP SPEC: NORMAL
PATH REPORT.FINAL DX SPEC: NORMAL
PATH REPORT.MICROSCOPIC SPEC OTHER STN: NORMAL
PATH REPORT.RELEVANT HX SPEC: NORMAL
PERFORMING LABORATORY: NORMAL
SPECIMEN STATUS: NORMAL
TEST NAME: NORMAL

## 2022-07-22 PROCEDURE — 85060 BLOOD SMEAR INTERPRETATION: CPT | Mod: GC | Performed by: PATHOLOGY

## 2022-07-22 PROCEDURE — 88305 TISSUE EXAM BY PATHOLOGIST: CPT | Mod: 26 | Performed by: PATHOLOGY

## 2022-07-22 PROCEDURE — 88341 IMHCHEM/IMCYTCHM EA ADD ANTB: CPT | Mod: 26 | Performed by: PATHOLOGY

## 2022-07-22 PROCEDURE — 85097 BONE MARROW INTERPRETATION: CPT | Mod: GC | Performed by: PATHOLOGY

## 2022-07-22 PROCEDURE — 88311 DECALCIFY TISSUE: CPT | Mod: 26 | Performed by: PATHOLOGY

## 2022-07-22 PROCEDURE — 88342 IMHCHEM/IMCYTCHM 1ST ANTB: CPT | Mod: 26 | Performed by: PATHOLOGY

## 2022-07-22 NOTE — PROGRESS NOTES
[facility]: Cancer Care                                                                                          U/S upper extremity show no DVT. Question is if she should stay on her Lovenox for the two week period(has completed one week from discharge/two weeks from first U/S) as in notes. She will continue until verified by Doctor Mady to stop.    Minerva expressed good understanding of the above.     Signature:  Anuja Tripp RN    7/25/2022-Received IB message from Doctor Mady late Friday stating he would like her to stay on the Lovenox until after he has met with her this week.    Signature:  Anuja Tripp RN

## 2022-07-25 PROBLEM — C92.01 ACUTE MYELOID LEUKEMIA IN REMISSION (H): Status: ACTIVE | Noted: 2022-06-16

## 2022-07-25 PROBLEM — Z86.19 HISTORY OF CLOSTRIDIOIDES DIFFICILE COLITIS: Status: ACTIVE | Noted: 2021-10-21

## 2022-07-25 LAB
CULTURE HARVEST COMPLETE DATE: NORMAL
INTERPRETATION: NORMAL
ISCN: NORMAL
METHODS: NORMAL
PATH REPORT.ADDENDUM SPEC: NORMAL
PATH REPORT.COMMENTS IMP SPEC: NORMAL
PATH REPORT.COMMENTS IMP SPEC: NORMAL
PATH REPORT.FINAL DX SPEC: NORMAL
PATH REPORT.GROSS SPEC: NORMAL
PATH REPORT.MICROSCOPIC SPEC OTHER STN: NORMAL
PATH REPORT.MICROSCOPIC SPEC OTHER STN: NORMAL
PATH REPORT.RELEVANT HX SPEC: NORMAL
SA 1 CELL: NORMAL
SA 1 TEST METHOD: NORMAL
SA 2 CELL: NORMAL
SA 2 TEST METHOD: NORMAL
SA1 HI RISK ABY: NORMAL
SA1 MOD RISK ABY: NORMAL
SA2 HI RISK ABY: NORMAL
SA2 MOD RISK ABY: NORMAL
SCR 1 TEST METHOD: NORMAL
SCR1 CELL: NORMAL
SCR1 RESULT: NORMAL
SCR2 CELL: NORMAL
SCR2 RESULT: NORMAL
SCR2 TEST METHOD: NORMAL
ZZZSA 1  COMMENTS: NORMAL
ZZZSA 2 COMMENTS: NORMAL
ZZZSCR1 COMMENTS: NORMAL
ZZZSCR2 COMMENTS: NORMAL

## 2022-07-25 PROCEDURE — 88291 CYTO/MOLECULAR REPORT: CPT | Performed by: MEDICAL GENETICS

## 2022-07-25 NOTE — PROGRESS NOTES
"Minerva is a 37 year old who is being evaluated via a billable video visit.      If the video visit is dropped, the invitation should be resent by: Send to e-mail at: [email or web address]  Will anyone else be joining your video visit? Yes: Both parents of Pt.. How would they like to receive their invitation? Text to cell phone: n/a        Video-Visit Details    Type of service:  Video Visit    Originating Location (pt. Location): Home    Distant Location (provider location):  [facility] CANCER [facility]     Platform used for Video Visit: [employer]        REASON FOR VISIT:  Management of acute myeloid leukemia (AML)    HISTORY OF PRESENT ILLNESS:  Ms. Marinelli is a 37 year old woman with acute myeloid leukemia (AML).  To summarize her course, she was noted to have low WBC count on routine labs in 03/2022.  Follow up labs in 05/2022 showed further decrease in WBC as well as anemia and thrombocytopenia.  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  She has met with the BMT Team .  She was treated with cytarabine and daunorubicin \"7+3\" induction chemotherapy with midostaurin on Day 8-21.  Bone marrow biopsy around Day 21 was hypocellular but consistent with recovering bone marrow without definite AML.  Her course was complicated by recurrent C diff and upper extremity DVT.  She had prompt blood cell count recovery and was discharge with ongoing outpatient follow-up.  She just had a post-treatment bone marrow biopsy that showed 70-80% cellular marrow with slightly increased blasts that are favored to represent robust regnerating marrow with molecular studies and NPM1 MRD PCR testing pending.  Visit to review the results and discuss ongoing management of AML.      She is with her mom Melonie and dad Rosalee.  Energy level is good.  No fevers, night sweats, or " unintentional weight loss.  No headache or focal weakness or sensory changes.  No dyspnea, cough, or chest pain.  Normal bowel function.  No urinary complaints.  No bleeding or unusual bruising.  Glad to be at home.    KEY PAST MEDICAL HISTORY:  History of COVID-19, recurrent C diff, hypothyroidism    MEDICATIONS:  Current Outpatient Medications   Medication     acyclovir (ZOVIRAX) 400 MG tablet     Bacillus Coagulans-Inulin (PROBIOTIC) 1-250 BILLION-MG CAPS     diltiazem 2% in PLO gel     enoxaparin ANTICOAGULANT (LOVENOX) 100 MG/ML syringe     levothyroxine (SYNTHROID/LEVOTHROID) 100 MCG tablet     magnesium 250 MG tablet     Multiple Vitamins-Minerals (WOMENS MULTIVITAMIN) TABS     Quercetin 50 MG TABS     tetrahydrozoline (VISINE) 0.05 % ophthalmic solution     triamcinolone (KENALOG) 0.1 % external cream     vancomycin (VANCOCIN) 125 MG capsule     vitamin C (ASCORBIC ACID) 1000 MG TABS     vitamin D3 (CHOLECALCIFEROL) 50 mcg (2000 units) tablet     zinc gluconate 50 MG tablet     No current facility-administered medications for this visit.     SOCIAL HISTORY:  Was working as RN    PHYSICAL EXAMINATION:  There were no vitals taken for this visit.  Wt Readings from Last 5 Encounters:   07/21/22 113 kg (249 lb 3.2 oz)   07/18/22 113 kg (249 lb 3.2 oz)   07/15/22 114.3 kg (252 lb)     General: Well appearing.  HEENT: Sclerae anicteric.  Lungs: Breathing comfortably. No cough.  MSK: Grossly normal movement.  Neuro: Grossly non-focal.  Skin/access: Normal skin tone.  Psych: Alert and oriented. No distress.  Performance status: ECOG 1    LABORATORY DATA: Reviewed by me  Recent Labs   Lab Test 07/21/22  1439 07/18/22  0942 07/15/22  0659 07/14/22  0813 07/13/22  0555 06/29/22  0626 06/28/22  0628 06/16/22  1724 06/16/22  1339 06/16/22  1102 06/14/22  0752   WBC 12.4* 19.2* 11.4* 6.4 3.1*   < > 1.1*   < > 41.0*   < > 25.3*   HGB 10.0* 8.9* 8.3* 8.1* 8.0*   < > 9.3*   < > 9.5*   < > 10.1*   * 578* 388 253 145*    < > 14*   < > 72*   < > 90*   ANEU 7.9 13.1* 5.0 2.1 0.7*   < >  --    < > 0.8*   < > 0.8*   ANEUTAUTO  --   --   --   --   --   --  0.0*  --   --   --   --    ABLA  --   --  0.5* 0.3* 0.1*   < >  --    < > 37.3*  --  20.5*   ALYM 1.5 2.3 1.1 1.9 1.3   < >  --    < > 2.5   < > 3.3   ALYMPAUTO  --   --   --   --   --   --  1.0  --   --   --   --    RETICABSCT  --   --   --   --   --   --   --   --  0.063  --  0.080    < > = values in this interval not displayed.     Recent Labs   Lab Test 07/21/22  1439 07/18/22  0942 07/15/22  1146 07/15/22  0659 07/13/22  1638 07/13/22  0555 07/11/22  1634 07/11/22  0732 07/01/22  0705 06/30/22  0341 06/28/22  0628 06/27/22  0620 06/26/22  0659 06/25/22  0556 06/24/22  1849 06/24/22  0550    141  --  137   < > 141   < > 138   < > 135*   < > 134* 137 138   < > 138   POTASSIUM 4.0 3.7 4.1 3.4   < > 3.4   < > 3.4   < > 3.9   < > 4.0 3.9 4.2   < > 4.1   CHLORIDE 103 108  --  102   < > 106   < > 104   < > 104   < > 104 108* 108*   < > 106   CO2 24 26  --  21*   < > 23   < > 20*   < > 22   < > 22 21* 21*   < > 21*   BUN 13 7  --  3.5*   < > 3.7*   < > 5.1*   < > 9.0   < > 12.2 14.6 12.4   < > 14.2   CR 0.59 0.55  --  0.50*   < > 0.46*   < > 0.51   < > 0.44*   < > 0.44* 0.46* 0.39*   < > 0.45*   MICHAEL 10.3* 9.3  --  9.0   < > 8.7   < > 8.8   < > 9.0   < > 9.0 8.2* 8.9   < > 8.8   MAG  --   --   --  2.3  --  2.0  --  2.1   < > 2.0   < > 2.2 2.0 2.6*  --  3.2*   PHOS  --   --   --  2.8  --  3.8  --  2.6   < > 3.0   < > 3.3 3.8 3.5   < > 3.5   URIC  --   --   --   --   --   --   --   --   --  2.0*  --  3.0 3.7 4.6   < > 5.6   *  --   --   --   --   --   --   --   --   --   --   --   --  545*  --  889*    < > = values in this interval not displayed.     Recent Labs   Lab Test 07/21/22  1439 07/18/22  0942 07/15/22  0659 07/06/22  0753 07/04/22  0638 07/01/22  0705 06/30/22  0341 06/29/22  0626 06/27/22  0620   AST 31 20 32   < > 10   < > 10   < > 9*   ALT 55* 52* 24   < > 10   < >  15   < > 21   ALKPHOS 59 58 54   < > 44   < > 43   < > 37   BILITOTAL 0.8 0.7 0.6   < > 1.2   < > 1.7*   < > 1.2   INR  --   --   --   --  1.10  --  1.14  --  1.09    < > = values in this interval not displayed.     IMPRESSION AND PLAN:  Ms. Marinelli is a 37 year old woman with acute myeloid leukemia (AML).    We reviewed her course and post induction bone marrow biopsy that is consistent with a complete response - MRD testing is in process.  Based on her workup that suggests a favorable risk profile for her AML and excellent response to induction chemotherapy, I recommended that we proceed with chemotherapy based on consolidation treatment with high-dose cytarabine (HiDAC) with midostaurin based on the RATIFY trial (N Engl J Med 2017;377:454-64.).  We discussed the decision to continue chemotherapy-based consolidation with 3-4 rounds of HiDAC and midostaurin will depend on testing for NPM1 mutation MRD after her second cycle of chemotherapy (1st consolidation treatment).  If chemotherapy-based consolidation is pursued we would also plan for a year of maintenance midostaurin treatment after the completion of consolidation chemotherapy to minimize the risk of relapse.     I reviewed the typical schedule for HiDAC and midostaurin consolidation with HiDAC on Day 1-3 in the hospital and midostaurin on Days 8-21 of each consolidation cycle.  We discussed possible side effects and toxicities including but not limited to fatigue, alopecia, bone marrow suppression, infertility, allergic reaction, GI issues, infection, bleeding, damage to heart/lung/liver/kidneys, cystitis, rash.  While complications can be life threatening, the greatest risk is the AML.  We also discussed the possibility of poor response or relapse of disease despite the planned therapy and need to consider alternative treatments.  She would like to review fertility options given the tight timeframe of chemo and possible infertility so we will try to arrange  a visit ASAP.  They asked good questions and agree with the plan.    She has no active ID issues.  Her C diff resolved.  She will continue acyclovir and suppressive oral vancomycin twice daily as well as  levofloxacin and voriconazole during periods of neutropenia.  We discussed the importance of following up to date local and federal health agency guidance for immunocompromised individuals regarding measures to reduce the risk of COVID-19.  I recommended Evusheld and will readdress COVID vaccination after completion of chemotherapy.    Talked to patient and evaluated by me. We discussed the risks and benefits of receiving EVUSHELD, as well as alternative treatments. The Emergency Use Administration (EUA) was provided to the patient for review and an opportunity for questions was provided. Patient will let us know if she wishes to proceed with EVUSHELD.    I indicated that standard of care is to continue anticoagulation for her upper extremity DVT for at least 3 months and she was open to this.  We will look into coverage for apixaban 5 mg BID or rivaroxaban 20 mg daily and will plan to hold anytime if platelets are less than 50 x 10^9/L.  We will keep her PCP Maria Eugenia Villar NP in the loop.    We will arrange port placement and hospital admit in the next week to start consolidation treatment as well as lab monitoring and transfusion support after treatment.  I provided contact information for our clinic and advised that she call if questions, concerns, or new issues come up between visits.    Video visit start time: 1:15 PM  Video visit end time: 2:04 PM  Video visit duration: 49 minutes    Total of 80 minutes on patient visit, reviewing records, interpreting test results, placing orders, and documentation on the day of service.    Ortiz Earl MD, PhD  Attending Physician, [facility]   of Medicine, [facility]  Division of Hematology, Oncology, and  Transplantation  [phone] clinic

## 2022-07-26 ENCOUNTER — VIRTUAL VISIT (OUTPATIENT)
Dept: ONCOLOGY | Facility: CLINIC | Age: 37
End: 2022-07-26
Attending: PHYSICIAN ASSISTANT
Payer: COMMERCIAL

## 2022-07-26 DIAGNOSIS — A49.8 CLOSTRIDIUM DIFFICILE INFECTION: ICD-10-CM

## 2022-07-26 DIAGNOSIS — Z86.19 HISTORY OF CLOSTRIDIOIDES DIFFICILE COLITIS: ICD-10-CM

## 2022-07-26 DIAGNOSIS — C95.00 ACUTE LEUKEMIA NOT HAVING ACHIEVED REMISSION (H): ICD-10-CM

## 2022-07-26 DIAGNOSIS — I82.622 ACUTE DEEP VEIN THROMBOSIS (DVT) OF OTHER VEIN OF LEFT UPPER EXTREMITY (H): ICD-10-CM

## 2022-07-26 DIAGNOSIS — Z79.2 NEED FOR PROPHYLACTIC ANTIBIOTIC: ICD-10-CM

## 2022-07-26 DIAGNOSIS — C92.01 ACUTE MYELOID LEUKEMIA IN REMISSION (H): Primary | ICD-10-CM

## 2022-07-26 LAB — INTERPRETATION: NORMAL

## 2022-07-26 PROCEDURE — G0463 HOSPITAL OUTPT CLINIC VISIT: HCPCS | Mod: PN,RTG | Performed by: INTERNAL MEDICINE

## 2022-07-26 PROCEDURE — 99215 OFFICE O/P EST HI 40 MIN: CPT | Mod: GT | Performed by: INTERNAL MEDICINE

## 2022-07-26 PROCEDURE — G0452 MOLECULAR PATHOLOGY INTERPR: HCPCS | Mod: 26 | Performed by: STUDENT IN AN ORGANIZED HEALTH CARE EDUCATION/TRAINING PROGRAM

## 2022-07-26 RX ORDER — MEPERIDINE HYDROCHLORIDE 25 MG/ML
25 INJECTION INTRAMUSCULAR; INTRAVENOUS; SUBCUTANEOUS EVERY 30 MIN PRN
Status: CANCELLED | OUTPATIENT
Start: 2022-08-05

## 2022-07-26 RX ORDER — LORAZEPAM 0.5 MG/1
.5-1 TABLET ORAL EVERY 6 HOURS PRN
Status: CANCELLED
Start: 2022-08-05

## 2022-07-26 RX ORDER — DIPHENHYDRAMINE HYDROCHLORIDE 50 MG/ML
50 INJECTION INTRAMUSCULAR; INTRAVENOUS
Status: CANCELLED
Start: 2022-08-05

## 2022-07-26 RX ORDER — ALBUTEROL SULFATE 0.83 MG/ML
2.5 SOLUTION RESPIRATORY (INHALATION)
Status: CANCELLED | OUTPATIENT
Start: 2022-08-05

## 2022-07-26 RX ORDER — ONDANSETRON 8 MG/1
8 TABLET, FILM COATED ORAL EVERY 12 HOURS
Status: CANCELLED
Start: 2022-08-05

## 2022-07-26 RX ORDER — PROCHLORPERAZINE MALEATE 10 MG
10 TABLET ORAL EVERY 6 HOURS PRN
Status: CANCELLED
Start: 2022-08-05

## 2022-07-26 RX ORDER — VANCOMYCIN HYDROCHLORIDE 125 MG/1
125 CAPSULE ORAL 2 TIMES DAILY
Qty: 60 CAPSULE | Refills: 4 | Status: SHIPPED | OUTPATIENT
Start: 2022-07-26 | End: 2023-01-10

## 2022-07-26 RX ORDER — DEXAMETHASONE 4 MG/1
12 TABLET ORAL EVERY 24 HOURS
Status: CANCELLED
Start: 2022-08-05

## 2022-07-26 RX ORDER — METHYLPREDNISOLONE SODIUM SUCCINATE 125 MG/2ML
125 INJECTION, POWDER, LYOPHILIZED, FOR SOLUTION INTRAMUSCULAR; INTRAVENOUS
Status: CANCELLED
Start: 2022-08-05

## 2022-07-26 RX ORDER — LORAZEPAM 2 MG/ML
.5-1 INJECTION INTRAMUSCULAR EVERY 6 HOURS PRN
Status: CANCELLED | OUTPATIENT
Start: 2022-08-05

## 2022-07-26 RX ORDER — ALBUTEROL SULFATE 90 UG/1
1-2 AEROSOL, METERED RESPIRATORY (INHALATION)
Status: CANCELLED
Start: 2022-08-05

## 2022-07-26 RX ORDER — DEXAMETHASONE 4 MG/1
8 TABLET ORAL EVERY MORNING
Status: CANCELLED
Start: 2022-08-08

## 2022-07-26 RX ORDER — VORICONAZOLE 200 MG/1
200 TABLET, FILM COATED ORAL 2 TIMES DAILY
Qty: 60 TABLET | Refills: 4 | Status: ON HOLD | OUTPATIENT
Start: 2022-07-26 | End: 2022-08-09 | Stop reason: ALTCHOICE

## 2022-07-26 RX ORDER — EPINEPHRINE 1 MG/ML
0.3 INJECTION, SOLUTION INTRAMUSCULAR; SUBCUTANEOUS EVERY 5 MIN PRN
Status: CANCELLED | OUTPATIENT
Start: 2022-08-05

## 2022-07-26 RX ORDER — PREDNISOLONE ACETATE 10 MG/ML
2 SUSPENSION/ DROPS OPHTHALMIC 4 TIMES DAILY
Status: CANCELLED | OUTPATIENT
Start: 2022-08-05

## 2022-07-26 RX ORDER — LEVOFLOXACIN 250 MG/1
250 TABLET, FILM COATED ORAL DAILY
Qty: 30 TABLET | Refills: 4 | Status: SHIPPED | OUTPATIENT
Start: 2022-07-26 | End: 2023-01-10

## 2022-07-26 NOTE — LETTER
"    7/26/2022         RE: Veda Marinelli  [address] [facility] [address]        Dear Colleague,    Thank you for referring your patient, Veda Marinelli, to the [facility]. Please see a copy of my visit note below.    Minerva is a 37 year old who is being evaluated via a billable video visit.      If the video visit is dropped, the invitation should be resent by: Send to e-mail at: [email or web address]  Will anyone else be joining your video visit? Yes: Both parents of Pt.. How would they like to receive their invitation? Text to cell phone: n/a        Video-Visit Details    Type of service:  Video Visit    Originating Location (pt. Location): Home    Distant Location (provider location):  [facility]     Platform used for Video Visit: GetMyBoat        REASON FOR VISIT:  Management of acute myeloid leukemia (AML)    HISTORY OF PRESENT ILLNESS:  Ms. Marinelli is a 37 year old woman with acute myeloid leukemia (AML).  To summarize her course, she was noted to have low WBC count on routine labs in 03/2022.  Follow up labs in 05/2022 showed further decrease in WBC as well as anemia and thrombocytopenia.  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  She has met with the BMT Team .  She was treated with cytarabine and daunorubicin \"7+3\" induction chemotherapy with midostaurin on Day 8-21.  Bone marrow biopsy around Day 21 was hypocellular but consistent with recovering bone marrow without definite AML.  Her course was complicated by recurrent C diff and upper extremity DVT.  She had prompt blood cell count recovery and was discharge with ongoing outpatient follow-up.  She just had a post-treatment bone marrow biopsy that showed 70-80% cellular marrow with slightly increased blasts that are favored " to represent robust regnerating marrow with molecular studies and NPM1 MRD PCR testing pending.  Visit to review the results and discuss ongoing management of AML.      She is with her mom Melonie and dad Rosalee.  Energy level is good.  No fevers, night sweats, or unintentional weight loss.  No headache or focal weakness or sensory changes.  No dyspnea, cough, or chest pain.  Normal bowel function.  No urinary complaints.  No bleeding or unusual bruising.  Glad to be at home.    KEY PAST MEDICAL HISTORY:  History of COVID-19, recurrent C diff, hypothyroidism    MEDICATIONS:  Current Outpatient Medications   Medication     acyclovir (ZOVIRAX) 400 MG tablet     Bacillus Coagulans-Inulin (PROBIOTIC) 1-250 BILLION-MG CAPS     diltiazem 2% in PLO gel     enoxaparin ANTICOAGULANT (LOVENOX) 100 MG/ML syringe     levothyroxine (SYNTHROID/LEVOTHROID) 100 MCG tablet     magnesium 250 MG tablet     Multiple Vitamins-Minerals (WOMENS MULTIVITAMIN) TABS     Quercetin 50 MG TABS     tetrahydrozoline (VISINE) 0.05 % ophthalmic solution     triamcinolone (KENALOG) 0.1 % external cream     vancomycin (VANCOCIN) 125 MG capsule     vitamin C (ASCORBIC ACID) 1000 MG TABS     vitamin D3 (CHOLECALCIFEROL) 50 mcg (2000 units) tablet     zinc gluconate 50 MG tablet     No current facility-administered medications for this visit.     SOCIAL HISTORY:  Was working as RN    PHYSICAL EXAMINATION:  There were no vitals taken for this visit.  Wt Readings from Last 5 Encounters:   07/21/22 113 kg (249 lb 3.2 oz)   07/18/22 113 kg (249 lb 3.2 oz)   07/15/22 114.3 kg (252 lb)     General: Well appearing.  HEENT: Sclerae anicteric.  Lungs: Breathing comfortably. No cough.  MSK: Grossly normal movement.  Neuro: Grossly non-focal.  Skin/access: Normal skin tone.  Psych: Alert and oriented. No distress.  Performance status: ECOG 1    LABORATORY DATA: Reviewed by me  Recent Labs   Lab Test 07/21/22  1439 07/18/22  0942 07/15/22  0659 07/14/22  0813  07/13/22  0555 06/29/22  0626 06/28/22  0628 06/16/22  1724 06/16/22  1339 06/16/22  1102 06/14/22  0752   WBC 12.4* 19.2* 11.4* 6.4 3.1*   < > 1.1*   < > 41.0*   < > 25.3*   HGB 10.0* 8.9* 8.3* 8.1* 8.0*   < > 9.3*   < > 9.5*   < > 10.1*   * 578* 388 253 145*   < > 14*   < > 72*   < > 90*   ANEU 7.9 13.1* 5.0 2.1 0.7*   < >  --    < > 0.8*   < > 0.8*   ANEUTAUTO  --   --   --   --   --   --  0.0*  --   --   --   --    ABLA  --   --  0.5* 0.3* 0.1*   < >  --    < > 37.3*  --  20.5*   ALYM 1.5 2.3 1.1 1.9 1.3   < >  --    < > 2.5   < > 3.3   ALYMPAUTO  --   --   --   --   --   --  1.0  --   --   --   --    RETICABSCT  --   --   --   --   --   --   --   --  0.063  --  0.080    < > = values in this interval not displayed.     Recent Labs   Lab Test 07/21/22  1439 07/18/22  0942 07/15/22  1146 07/15/22  0659 07/13/22  1638 07/13/22  0555 07/11/22  1634 07/11/22  0732 07/01/22  0705 06/30/22  0341 06/28/22  0628 06/27/22  0620 06/26/22  0659 06/25/22  0556 06/24/22  1849 06/24/22  0550    141  --  137   < > 141   < > 138   < > 135*   < > 134* 137 138   < > 138   POTASSIUM 4.0 3.7 4.1 3.4   < > 3.4   < > 3.4   < > 3.9   < > 4.0 3.9 4.2   < > 4.1   CHLORIDE 103 108  --  102   < > 106   < > 104   < > 104   < > 104 108* 108*   < > 106   CO2 24 26  --  21*   < > 23   < > 20*   < > 22   < > 22 21* 21*   < > 21*   BUN 13 7  --  3.5*   < > 3.7*   < > 5.1*   < > 9.0   < > 12.2 14.6 12.4   < > 14.2   CR 0.59 0.55  --  0.50*   < > 0.46*   < > 0.51   < > 0.44*   < > 0.44* 0.46* 0.39*   < > 0.45*   MICHAEL 10.3* 9.3  --  9.0   < > 8.7   < > 8.8   < > 9.0   < > 9.0 8.2* 8.9   < > 8.8   MAG  --   --   --  2.3  --  2.0  --  2.1   < > 2.0   < > 2.2 2.0 2.6*  --  3.2*   PHOS  --   --   --  2.8  --  3.8  --  2.6   < > 3.0   < > 3.3 3.8 3.5   < > 3.5   URIC  --   --   --   --   --   --   --   --   --  2.0*  --  3.0 3.7 4.6   < > 5.6   *  --   --   --   --   --   --   --   --   --   --   --   --  545*  --  889*    < > =  values in this interval not displayed.     Recent Labs   Lab Test 07/21/22  1439 07/18/22  0942 07/15/22  0659 07/06/22  0753 07/04/22  0638 07/01/22  0705 06/30/22  0341 06/29/22  0626 06/27/22  0620   AST 31 20 32   < > 10   < > 10   < > 9*   ALT 55* 52* 24   < > 10   < > 15   < > 21   ALKPHOS 59 58 54   < > 44   < > 43   < > 37   BILITOTAL 0.8 0.7 0.6   < > 1.2   < > 1.7*   < > 1.2   INR  --   --   --   --  1.10  --  1.14  --  1.09    < > = values in this interval not displayed.     IMPRESSION AND PLAN:  Ms. Marinelli is a 37 year old woman with acute myeloid leukemia (AML).    We reviewed her course and post induction bone marrow biopsy that is consistent with a complete response - MRD testing is in process.  Based on her workup that suggests a favorable risk profile for her AML and excellent response to induction chemotherapy, I recommended that we proceed with chemotherapy based on consolidation treatment with high-dose cytarabine (HiDAC) with midostaurin based on the RATIFY trial (N Engl J Med 2017;377:454-64.).  We discussed the decision to continue chemotherapy-based consolidation with 3-4 rounds of HiDAC and midostaurin will depend on testing for NPM1 mutation MRD after her second cycle of chemotherapy (1st consolidation treatment).  If chemotherapy-based consolidation is pursued we would also plan for a year of maintenance midostaurin treatment after the completion of consolidation chemotherapy to minimize the risk of relapse.     I reviewed the typical schedule for HiDAC and midostaurin consolidation with HiDAC on Day 1-3 in the hospital and midostaurin on Days 8-21 of each consolidation cycle.  We discussed possible side effects and toxicities including but not limited to fatigue, alopecia, bone marrow suppression, infertility, allergic reaction, GI issues, infection, bleeding, damage to heart/lung/liver/kidneys, cystitis, rash.  While complications can be life threatening, the greatest risk is the AML.   We also discussed the possibility of poor response or relapse of disease despite the planned therapy and need to consider alternative treatments.  She would like to review fertility options given the tight timeframe of chemo and possible infertility so we will try to arrange a visit ASAP.  They asked good questions and agree with the plan.    She has no active ID issues.  Her C diff resolved.  She will continue acyclovir and suppressive oral vancomycin twice daily as well as  levofloxacin and voriconazole during periods of neutropenia.  We discussed the importance of following up to date local and federal health agency guidance for immunocompromised individuals regarding measures to reduce the risk of COVID-19.  I recommended Evusheld and will readdress COVID vaccination after completion of chemotherapy.    Talked to patient and evaluated by me. We discussed the risks and benefits of receiving EVUSHELD, as well as alternative treatments. The Emergency Use Administration (EUA) was provided to the patient for review and an opportunity for questions was provided. Patient will let us know if she wishes to proceed with EVUSHELD.    I indicated that standard of care is to continue anticoagulation for her upper extremity DVT for at least 3 months and she was open to this.  We will look into coverage for apixaban 5 mg BID or rivaroxaban 20 mg daily and will plan to hold anytime if platelets are less than 50 x 10^9/L.  We will keep her PCP Maria Eugenia Villar NP in the loop.    We will arrange port placement and hospital admit in the next week to start consolidation treatment as well as lab monitoring and transfusion support after treatment.  I provided contact information for our clinic and advised that she call if questions, concerns, or new issues come up between visits.    Video visit start time: 1:15 PM  Video visit end time: 2:04 PM  Video visit duration: 49 minutes    Total of 80 minutes on patient visit, reviewing  records, interpreting test results, placing orders, and documentation on the day of service.        Again, thank you for allowing me to participate in the care of your patient.      Sincerely,    Ortiz Earl MD

## 2022-07-27 ENCOUNTER — TELEPHONE (OUTPATIENT)
Dept: ONCOLOGY | Facility: CLINIC | Age: 37
End: 2022-07-27

## 2022-07-27 LAB
CULTURE HARVEST COMPLETE DATE: NORMAL
INTERPRETATION: NORMAL
INTERPRETATION: NORMAL

## 2022-07-27 PROCEDURE — G0452 MOLECULAR PATHOLOGY INTERPR: HCPCS | Mod: 26 | Performed by: STUDENT IN AN ORGANIZED HEALTH CARE EDUCATION/TRAINING PROGRAM

## 2022-07-27 NOTE — TELEPHONE ENCOUNTER
PA Initiation    Medication: Cresemba PA  Insurance Company: Other (see comments)  Pharmacy Filling the Rx:    Filling Pharmacy Phone:    Filling Pharmacy Fax:    Start Date: 7/27/2022          Farida Pichardo  Oncology Pharmacy Liaison II  [email or web address]  Phone: [phone]  Fax: [phone]

## 2022-07-27 NOTE — TELEPHONE ENCOUNTER
Farida Pichardo  Oncology Pharmacy Liaison II  [email or web address]  Phone: [phone]  Fax: [phone]

## 2022-07-27 NOTE — NURSING NOTE
Called and talked to Minerva regarding fertility referral. Able to get her at [facility] this Friday, 7/29. She is OK with this location. Referral changed to external location and Minerva gave verbal permission to send needed records to the doctor at [facility]. Minerva reports that she will be OOT from this Saturday through 8/5 so will be unable to do anything during this time.Should be able to receive calls.  Will also need port and admission for HIDAC when back.     Has writers name and resource number to call if needed.     Progress notes times two, referral and chemotherapy drug log faxed to [phone]    [facility] will call Minerva to set up the appointment and time for this Friday.

## 2022-07-28 ENCOUNTER — TELEPHONE (OUTPATIENT)
Dept: ONCOLOGY | Facility: CLINIC | Age: 37
End: 2022-07-28

## 2022-07-28 NOTE — TELEPHONE ENCOUNTER
Drug exclusion per Jaimee    I have reviewed and agree to the information submitted for this Free Drug Application.     Aria Anand, PharmD, BCACP  Oral Chemotherapy Monitoring Program  [facility]  [phone]  July 28, 2022    Free Drug Application Initiated  Medication:  Rydapt  Sponsor: [employer]  Phone #: [phone]  Fax #: [phone]  Additional Information: faxed application to [phone] 7/28          [facility]  Oncology Pharmacy Liaison II  [email or web address]  Phone: [phone]  Fax: [phone]

## 2022-07-29 ENCOUNTER — TRANSFERRED RECORDS (OUTPATIENT)
Dept: ONCOLOGY | Facility: CLINIC | Age: 37
End: 2022-07-29

## 2022-07-29 DIAGNOSIS — C92.01 ACUTE MYELOID LEUKEMIA IN REMISSION (H): Primary | ICD-10-CM

## 2022-07-29 DIAGNOSIS — I82.622 ACUTE DEEP VEIN THROMBOSIS (DVT) OF OTHER VEIN OF LEFT UPPER EXTREMITY (H): ICD-10-CM

## 2022-08-01 NOTE — TELEPHONE ENCOUNTER
Still pending per Ines at [facility] [phone].    Thank you,    Farida Pichardo  Oncology Pharmacy Liaison II  [email or web address]  Phone: [phone]  Fax: [phone]

## 2022-08-01 NOTE — TELEPHONE ENCOUNTER
Still pending per Aarti at [facility] [phone].    Farida Pichardo  Oncology Pharmacy Liaison II  [email or web address]  Phone: [phone]  Fax: [phone]

## 2022-08-02 PROCEDURE — G0452 MOLECULAR PATHOLOGY INTERPR: HCPCS | Mod: 26 | Performed by: PATHOLOGY

## 2022-08-02 NOTE — TELEPHONE ENCOUNTER
Farida Pichardo  Oncology Pharmacy Liaison II  [email or web address]  Phone: [phone]  Fax: [phone]

## 2022-08-03 NOTE — TELEPHONE ENCOUNTER
Faxed additional information to [phone]          Farida Pichardo  Oncology Pharmacy Liaison II  [email or web address]  Phone: [phone]  Fax: [phone]

## 2022-08-04 LAB — MISCELLANEOUS TEST 1 (ARUP): NORMAL

## 2022-08-04 NOTE — TELEPHONE ENCOUNTER
Fax was received and attached case per Jaciel at [phone].     Farida Pichardo  Oncology Pharmacy Liaison II  [email or web address]  Phone: [phone]  Fax: [phone]

## 2022-08-05 ENCOUNTER — MYC MEDICAL ADVICE (OUTPATIENT)
Dept: INTERVENTIONAL RADIOLOGY/VASCULAR | Facility: CLINIC | Age: 37
End: 2022-08-05

## 2022-08-05 LAB — BACTERIA BLD CULT: NO GROWTH

## 2022-08-05 NOTE — TELEPHONE ENCOUNTER
[facility] Staff confirm that patient should plan on admission post PORT placement on 8/9 on [place].  Patient confimrs understanding and has plans to arrange for a COVID PCR test in anticipation of admission.  No other questions from patient at this time.    Minerva Pettit RN on 8/5/2022 at 2:35 PM

## 2022-08-05 NOTE — TELEPHONE ENCOUNTER
Benefit investigation complete -- being forwarded to [employer] to determine if pt qualifies for free drug  Thank You!  Chen Yoder CPhT  Lead Oral Oncology/Transplant Liaison  [facility]   Phone# [phone]  Fax# [phone]

## 2022-08-08 ENCOUNTER — TELEPHONE (OUTPATIENT)
Dept: ONCOLOGY | Facility: CLINIC | Age: 37
End: 2022-08-08

## 2022-08-08 NOTE — TELEPHONE ENCOUNTER
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          Appeal Information: see fax above    Thank you,    Farida Pichardo  Oncology Pharmacy Liaison II  [email or web address]  Phone: [phone]  Fax: [phone]

## 2022-08-08 NOTE — TELEPHONE ENCOUNTER
Free Drug Application Approved  Effective Dates: 8/8/2022-12/31/2022  Patient notified: yes   Additional Information: ALVINA Pichardo  Oncology Pharmacy Liaison II  [email or web address].[email or web address]  Phone: [phone]  Fax: [phone]

## 2022-08-08 NOTE — TELEPHONE ENCOUNTER
Per Beckie at  [phone] ext. [phone], they case has been sent to [facility] and should have a decision in 1-2 business days.       Farida Pichardo  Oncology Pharmacy Liaison II  [email or web address]  Phone: [phone]  Fax: [phone]

## 2022-08-08 NOTE — TELEPHONE ENCOUNTER
I have reviewed and agree to the information submitted for this Free Drug Application.     Prisca Rodriguez, PharmD  Oral Chemotherapy Monitoring Program  [facility]  [phone]  August 8, 2022      Free Drug Application Initiated  Medication: Cresemba  Sponsor: [employer]  Phone #: [phone]  Fax #: [phone]  Additional Information: faxed sarah to [phone] 8/9         Farida Márquezoya  Oncology Pharmacy Liaison II  [email or web address]  Phone: [phone]  Fax: [phone]

## 2022-08-08 NOTE — ORAL ONC MGMT
"Oral Chemotherapy Monitoring Program    Lab Monitoring Plan  IB sent to Dr Earl for lab/EKG plan  Subjective/Objective:  Veda Marinelli is a 37 year old female contacted by phone for an initial visit for oral chemotherapy education.  Plan for hospital admission 8/9/22, anticipated day 1 of cycle to be 8/9 vs 8/10/22.    ORAL CHEMOTHERAPY 7/26/2022 8/8/2022   Assessment Type Initial Work up New Teach   Diagnosis Code Acute Myeloid Leukemia (AML) Acute Myeloid Leukemia (AML)   Providers Dr. Mady Earl   [facility]   Drug Name Rydapt (midostaurin) Rydapt (midostaurin)   Dose 50 mg 50 mg   Current Schedule Daily Daily   Cycle Details 2 weeks on, 2 weeks off 2 weeks on, 2 weeks off   Planned next cycle start date - 8/16/2022       Last PHQ-2 Score on record: No flowsheet data found.    Vitals:  BP:   BP Readings from Last 1 Encounters:   07/21/22 116/82     Wt Readings from Last 1 Encounters:   07/21/22 113 kg (249 lb 3.2 oz)     Estimated body surface area is 2.31 meters squared as calculated from the following:    Height as of 7/13/22: 1.702 m (5' 7.01\").    Weight as of 7/21/22: 113 kg (249 lb 3.2 oz).    Labs:  _  Result Component Current Result Ref Range   Sodium 137 (7/21/2022) 133 - 144 mmol/L     Result Component Current Result Ref Range   Potassium 4.0 (7/21/2022) 3.4 - 5.3 mmol/L     Result Component Current Result Ref Range   Calcium 10.3 (H) (7/21/2022) 8.5 - 10.1 mg/dL     Result Component Current Result Ref Range   Magnesium 2.3 (7/15/2022) 1.7 - 2.3 mg/dL     Result Component Current Result Ref Range   Phosphorus 2.8 (7/15/2022) 2.5 - 4.5 mg/dL     Result Component Current Result Ref Range   Albumin 3.7 (7/21/2022) 3.4 - 5.0 g/dL     Result Component Current Result Ref Range   Urea Nitrogen 13 (7/21/2022) 7 - 30 mg/dL     Result Component Current Result Ref Range   Creatinine 0.59 (7/21/2022) 0.52 - 1.04 mg/dL     Result Component Current Result Ref Range   AST " 31 (7/21/2022) 0 - 45 U/L     Result Component Current Result Ref Range   ALT 55 (H) (7/21/2022) 0 - 50 U/L     Result Component Current Result Ref Range   Bilirubin Total 0.8 (7/21/2022) 0.2 - 1.3 mg/dL     Result Component Current Result Ref Range   WBC Count 12.4 (H) (7/21/2022) 4.0 - 11.0 10e3/uL     Result Component Current Result Ref Range   Hemoglobin 10.0 (L) (7/21/2022) 11.7 - 15.7 g/dL     Result Component Current Result Ref Range   Platelet Count 541 (H) (7/21/2022) 150 - 450 10e3/uL     Result Component Current Result Ref Range   Absolute Neutrophils 7.9 (7/21/2022) 1.6 - 8.3 10e3/uL       Assessment:  Patient is appropriate to start therapy.    Plan:  Basic chemotherapy teaching was reviewed with the patient including indication, start date of therapy, dose, administration, adverse effects, missed doses, food and drug interactions, monitoring, side effect management, office contact information, and safe handling. Written materials were provided and all questions answered.    Follow-Up:  1 week following initial start of therapy    Prisca Rodriguez PharmD  Oral Chemotherapy Monitoring Program  [facility]  [phone]  August 8, 2022

## 2022-08-09 ENCOUNTER — APPOINTMENT (OUTPATIENT)
Dept: MEDSURG UNIT | Facility: CLINIC | Age: 37
DRG: 829 | End: 2022-08-09
Payer: COMMERCIAL

## 2022-08-09 ENCOUNTER — HOSPITAL ENCOUNTER (INPATIENT)
Facility: CLINIC | Age: 37
LOS: 3 days | Discharge: HOME | DRG: 829 | End: 2022-08-12
Payer: COMMERCIAL

## 2022-08-09 ENCOUNTER — APPOINTMENT (OUTPATIENT)
Dept: INTERVENTIONAL RADIOLOGY/VASCULAR | Facility: CLINIC | Age: 37
DRG: 829 | End: 2022-08-09
Attending: INTERNAL MEDICINE
Payer: COMMERCIAL

## 2022-08-09 DIAGNOSIS — I82.622 ACUTE DEEP VEIN THROMBOSIS (DVT) OF OTHER VEIN OF LEFT UPPER EXTREMITY (H): ICD-10-CM

## 2022-08-09 DIAGNOSIS — C92.01 ACUTE MYELOID LEUKEMIA IN REMISSION (H): Primary | ICD-10-CM

## 2022-08-09 DIAGNOSIS — D70.9 NEUTROPENIC FEVER (H): ICD-10-CM

## 2022-08-09 DIAGNOSIS — R50.81 NEUTROPENIC FEVER (H): ICD-10-CM

## 2022-08-09 DIAGNOSIS — Z79.2 NEED FOR PROPHYLACTIC ANTIBIOTIC: ICD-10-CM

## 2022-08-09 PROBLEM — U07.1 COVID-19 VIRUS INFECTION: Status: ACTIVE | Noted: 2021-10-19

## 2022-08-09 LAB
ALBUMIN SERPL BCG-MCNC: 4.3 G/DL (ref 3.5–5.2)
ALP SERPL-CCNC: 52 U/L (ref 35–104)
ALT SERPL W P-5'-P-CCNC: 57 U/L (ref 10–35)
ANION GAP SERPL CALCULATED.3IONS-SCNC: 10 MMOL/L (ref 7–15)
AST SERPL W P-5'-P-CCNC: 26 U/L (ref 10–35)
BASOPHILS # BLD AUTO: 0.2 10E3/UL (ref 0–0.2)
BASOPHILS NFR BLD AUTO: 2 %
BILIRUB SERPL-MCNC: 0.7 MG/DL
BUN SERPL-MCNC: 11.9 MG/DL (ref 6–20)
CALCIUM SERPL-MCNC: 9.4 MG/DL (ref 8.6–10)
CHLORIDE SERPL-SCNC: 106 MMOL/L (ref 98–107)
CREAT SERPL-MCNC: 0.61 MG/DL (ref 0.51–0.95)
DEPRECATED HCO3 PLAS-SCNC: 24 MMOL/L (ref 22–29)
EOSINOPHIL # BLD AUTO: 0.6 10E3/UL (ref 0–0.7)
EOSINOPHIL NFR BLD AUTO: 7 %
ERYTHROCYTE [DISTWIDTH] IN BLOOD BY AUTOMATED COUNT: 15.9 % (ref 10–15)
GFR SERPL CREATININE-BSD FRML MDRD: >90 ML/MIN/1.73M2
GLUCOSE SERPL-MCNC: 112 MG/DL (ref 70–99)
HCG UR QL: NEGATIVE
HCT VFR BLD AUTO: 38.4 % (ref 35–47)
HGB BLD-MCNC: 12.1 G/DL (ref 11.7–15.7)
IMM GRANULOCYTES # BLD: 0.1 10E3/UL
IMM GRANULOCYTES NFR BLD: 1 %
INR PPP: 0.98 (ref 0.85–1.15)
LDH SERPL L TO P-CCNC: 216 U/L (ref 0–250)
LYMPHOCYTES # BLD AUTO: 1.5 10E3/UL (ref 0.8–5.3)
LYMPHOCYTES NFR BLD AUTO: 17 %
MAGNESIUM SERPL-MCNC: 2.1 MG/DL (ref 1.7–2.3)
MCH RBC QN AUTO: 31.8 PG (ref 26.5–33)
MCHC RBC AUTO-ENTMCNC: 31.5 G/DL (ref 31.5–36.5)
MCV RBC AUTO: 101 FL (ref 78–100)
MONOCYTES # BLD AUTO: 1.1 10E3/UL (ref 0–1.3)
MONOCYTES NFR BLD AUTO: 12 %
NEUTROPHILS # BLD AUTO: 5.4 10E3/UL (ref 1.6–8.3)
NEUTROPHILS NFR BLD AUTO: 61 %
NRBC # BLD AUTO: 0 10E3/UL
NRBC BLD AUTO-RTO: 0 /100
PHOSPHATE SERPL-MCNC: 3.7 MG/DL (ref 2.5–4.5)
PLATELET # BLD AUTO: 195 10E3/UL (ref 150–450)
POTASSIUM SERPL-SCNC: 3.6 MMOL/L (ref 3.4–5.3)
PROT SERPL-MCNC: 7 G/DL (ref 6.4–8.3)
RBC # BLD AUTO: 3.81 10E6/UL (ref 3.8–5.2)
SARS-COV-2 RNA RESP QL NAA+PROBE: NEGATIVE
SODIUM SERPL-SCNC: 140 MMOL/L (ref 136–145)
URATE SERPL-MCNC: 5.7 MG/DL (ref 2.4–5.7)
WBC # BLD AUTO: 8.9 10E3/UL (ref 4–11)

## 2022-08-09 PROCEDURE — 250N000009 HC RX 250: Performed by: INTERNAL MEDICINE

## 2022-08-09 PROCEDURE — 84100 ASSAY OF PHOSPHORUS: CPT | Performed by: PHYSICIAN ASSISTANT

## 2022-08-09 PROCEDURE — 83735 ASSAY OF MAGNESIUM: CPT | Performed by: PHYSICIAN ASSISTANT

## 2022-08-09 PROCEDURE — 80053 COMPREHEN METABOLIC PANEL: CPT | Performed by: PHYSICIAN ASSISTANT

## 2022-08-09 PROCEDURE — 76937 US GUIDE VASCULAR ACCESS: CPT | Mod: 26 | Performed by: PHYSICIAN ASSISTANT

## 2022-08-09 PROCEDURE — 250N000009 HC RX 250: Performed by: PHYSICIAN ASSISTANT

## 2022-08-09 PROCEDURE — 999N000142 HC STATISTIC PROCEDURE PREP ONLY

## 2022-08-09 PROCEDURE — 258N000003 HC RX IP 258 OP 636: Performed by: NURSE PRACTITIONER

## 2022-08-09 PROCEDURE — 272N000602 HC WOUND GLUE CR1

## 2022-08-09 PROCEDURE — 3E04305 INTRODUCTION OF OTHER ANTINEOPLASTIC INTO CENTRAL VEIN, PERCUTANEOUS APPROACH: ICD-10-PCS

## 2022-08-09 PROCEDURE — 36415 COLL VENOUS BLD VENIPUNCTURE: CPT | Performed by: NURSE PRACTITIONER

## 2022-08-09 PROCEDURE — U0005 INFEC AGEN DETEC AMPLI PROBE: HCPCS

## 2022-08-09 PROCEDURE — 99152 MOD SED SAME PHYS/QHP 5/>YRS: CPT | Performed by: PHYSICIAN ASSISTANT

## 2022-08-09 PROCEDURE — 272N000504 HC NEEDLE CR4

## 2022-08-09 PROCEDURE — 84550 ASSAY OF BLOOD/URIC ACID: CPT | Performed by: PHYSICIAN ASSISTANT

## 2022-08-09 PROCEDURE — 250N000011 HC RX IP 250 OP 636: Performed by: PHYSICIAN ASSISTANT

## 2022-08-09 PROCEDURE — 250N000011 HC RX IP 250 OP 636: Performed by: INTERNAL MEDICINE

## 2022-08-09 PROCEDURE — 85025 COMPLETE CBC W/AUTO DIFF WBC: CPT | Performed by: PHYSICIAN ASSISTANT

## 2022-08-09 PROCEDURE — 76937 US GUIDE VASCULAR ACCESS: CPT

## 2022-08-09 PROCEDURE — C1788 PORT, INDWELLING, IMP: HCPCS

## 2022-08-09 PROCEDURE — 81025 URINE PREGNANCY TEST: CPT | Performed by: NURSE PRACTITIONER

## 2022-08-09 PROCEDURE — 77001 FLUOROGUIDE FOR VEIN DEVICE: CPT | Mod: 26 | Performed by: PHYSICIAN ASSISTANT

## 2022-08-09 PROCEDURE — 36591 DRAW BLOOD OFF VENOUS DEVICE: CPT | Performed by: PHYSICIAN ASSISTANT

## 2022-08-09 PROCEDURE — 83615 LACTATE (LD) (LDH) ENZYME: CPT | Performed by: PHYSICIAN ASSISTANT

## 2022-08-09 PROCEDURE — 99152 MOD SED SAME PHYS/QHP 5/>YRS: CPT

## 2022-08-09 PROCEDURE — 250N000011 HC RX IP 250 OP 636: Performed by: NURSE PRACTITIONER

## 2022-08-09 PROCEDURE — 0JH60WZ INSERTION OF TOTALLY IMPLANTABLE VASCULAR ACCESS DEVICE INTO CHEST SUBCUTANEOUS TISSUE AND FASCIA, OPEN APPROACH: ICD-10-PCS | Performed by: PHYSICIAN ASSISTANT

## 2022-08-09 PROCEDURE — 36561 INSERT TUNNELED CV CATH: CPT | Performed by: PHYSICIAN ASSISTANT

## 2022-08-09 PROCEDURE — 36561 INSERT TUNNELED CV CATH: CPT

## 2022-08-09 PROCEDURE — 250N000013 HC RX MED GY IP 250 OP 250 PS 637

## 2022-08-09 PROCEDURE — 02H633Z INSERTION OF INFUSION DEVICE INTO RIGHT ATRIUM, PERCUTANEOUS APPROACH: ICD-10-PCS | Performed by: PHYSICIAN ASSISTANT

## 2022-08-09 PROCEDURE — 999N000132 HC STATISTIC PP CARE STAGE 1

## 2022-08-09 PROCEDURE — 120N000002 HC R&B MED SURG/OB UMMC

## 2022-08-09 PROCEDURE — 99153 MOD SED SAME PHYS/QHP EA: CPT

## 2022-08-09 PROCEDURE — 258N000003 HC RX IP 258 OP 636: Performed by: INTERNAL MEDICINE

## 2022-08-09 PROCEDURE — 250N000013 HC RX MED GY IP 250 OP 250 PS 637: Performed by: PHYSICIAN ASSISTANT

## 2022-08-09 PROCEDURE — 85610 PROTHROMBIN TIME: CPT | Performed by: NURSE PRACTITIONER

## 2022-08-09 PROCEDURE — C1769 GUIDE WIRE: HCPCS

## 2022-08-09 RX ORDER — VITAMIN B COMPLEX
100 TABLET ORAL DAILY
Status: DISCONTINUED | OUTPATIENT
Start: 2022-08-10 | End: 2022-08-12 | Stop reason: HOSPADM

## 2022-08-09 RX ORDER — MAGNESIUM OXIDE 400 MG/1
400 TABLET ORAL EVERY EVENING
Status: DISCONTINUED | OUTPATIENT
Start: 2022-08-09 | End: 2022-08-12 | Stop reason: HOSPADM

## 2022-08-09 RX ORDER — EPINEPHRINE 1 MG/ML
0.3 INJECTION, SOLUTION, CONCENTRATE INTRAVENOUS EVERY 5 MIN PRN
Status: DISCONTINUED | OUTPATIENT
Start: 2022-08-09 | End: 2022-08-12 | Stop reason: HOSPADM

## 2022-08-09 RX ORDER — PROCHLORPERAZINE MALEATE 10 MG
10 TABLET ORAL EVERY 6 HOURS PRN
Status: DISCONTINUED | OUTPATIENT
Start: 2022-08-09 | End: 2022-08-09

## 2022-08-09 RX ORDER — LORAZEPAM 0.5 MG/1
.5-1 TABLET ORAL EVERY 6 HOURS PRN
Status: DISCONTINUED | OUTPATIENT
Start: 2022-08-09 | End: 2022-08-12 | Stop reason: HOSPADM

## 2022-08-09 RX ORDER — DEXAMETHASONE 4 MG/1
8 TABLET ORAL EVERY MORNING
Status: DISCONTINUED | OUTPATIENT
Start: 2022-08-12 | End: 2022-08-12 | Stop reason: HOSPADM

## 2022-08-09 RX ORDER — CEFAZOLIN SODIUM 2 G/100ML
2 INJECTION, SOLUTION INTRAVENOUS
Status: COMPLETED | OUTPATIENT
Start: 2022-08-09 | End: 2022-08-09

## 2022-08-09 RX ORDER — NALOXONE HYDROCHLORIDE 0.4 MG/ML
0.4 INJECTION, SOLUTION INTRAMUSCULAR; INTRAVENOUS; SUBCUTANEOUS
Status: DISCONTINUED | OUTPATIENT
Start: 2022-08-09 | End: 2022-08-12 | Stop reason: HOSPADM

## 2022-08-09 RX ORDER — HEPARIN SODIUM,PORCINE 10 UNIT/ML
5 VIAL (ML) INTRAVENOUS
Status: COMPLETED | OUTPATIENT
Start: 2022-08-09 | End: 2022-08-09

## 2022-08-09 RX ORDER — NALOXONE HYDROCHLORIDE 0.4 MG/ML
0.2 INJECTION, SOLUTION INTRAMUSCULAR; INTRAVENOUS; SUBCUTANEOUS
Status: DISCONTINUED | OUTPATIENT
Start: 2022-08-09 | End: 2022-08-09 | Stop reason: HOSPADM

## 2022-08-09 RX ORDER — METHYLPREDNISOLONE SODIUM SUCCINATE 125 MG/2ML
125 INJECTION, POWDER, LYOPHILIZED, FOR SOLUTION INTRAMUSCULAR; INTRAVENOUS
Status: DISCONTINUED | OUTPATIENT
Start: 2022-08-09 | End: 2022-08-12 | Stop reason: HOSPADM

## 2022-08-09 RX ORDER — ALBUTEROL SULFATE 90 UG/1
1-2 AEROSOL, METERED RESPIRATORY (INHALATION)
Status: DISCONTINUED | OUTPATIENT
Start: 2022-08-09 | End: 2022-08-12 | Stop reason: HOSPADM

## 2022-08-09 RX ORDER — NALOXONE HYDROCHLORIDE 0.4 MG/ML
0.2 INJECTION, SOLUTION INTRAMUSCULAR; INTRAVENOUS; SUBCUTANEOUS
Status: DISCONTINUED | OUTPATIENT
Start: 2022-08-09 | End: 2022-08-12 | Stop reason: HOSPADM

## 2022-08-09 RX ORDER — ACYCLOVIR 400 MG/1
400 TABLET ORAL 2 TIMES DAILY
Status: DISCONTINUED | OUTPATIENT
Start: 2022-08-09 | End: 2022-08-12 | Stop reason: HOSPADM

## 2022-08-09 RX ORDER — VANCOMYCIN HYDROCHLORIDE 125 MG/1
125 CAPSULE ORAL 2 TIMES DAILY
Status: DISCONTINUED | OUTPATIENT
Start: 2022-08-09 | End: 2022-08-12 | Stop reason: HOSPADM

## 2022-08-09 RX ORDER — DEXAMETHASONE 4 MG/1
12 TABLET ORAL
Status: COMPLETED | OUTPATIENT
Start: 2022-08-09 | End: 2022-08-11

## 2022-08-09 RX ORDER — MULTIPLE VITAMINS W/ MINERALS TAB 9MG-400MCG
1 TAB ORAL EVERY EVENING
Status: DISCONTINUED | OUTPATIENT
Start: 2022-08-10 | End: 2022-08-12 | Stop reason: HOSPADM

## 2022-08-09 RX ORDER — LORAZEPAM 2 MG/ML
.5-1 INJECTION INTRAMUSCULAR EVERY 6 HOURS PRN
Status: DISCONTINUED | OUTPATIENT
Start: 2022-08-09 | End: 2022-08-12 | Stop reason: HOSPADM

## 2022-08-09 RX ORDER — ZINC SULFATE 50(220)MG
220 CAPSULE ORAL DAILY
Status: DISCONTINUED | OUTPATIENT
Start: 2022-08-10 | End: 2022-08-09

## 2022-08-09 RX ORDER — NALOXONE HYDROCHLORIDE 0.4 MG/ML
0.4 INJECTION, SOLUTION INTRAMUSCULAR; INTRAVENOUS; SUBCUTANEOUS
Status: DISCONTINUED | OUTPATIENT
Start: 2022-08-09 | End: 2022-08-09 | Stop reason: HOSPADM

## 2022-08-09 RX ORDER — ACETAMINOPHEN 325 MG/1
650 TABLET ORAL EVERY 4 HOURS PRN
Status: DISCONTINUED | OUTPATIENT
Start: 2022-08-09 | End: 2022-08-12 | Stop reason: HOSPADM

## 2022-08-09 RX ORDER — MAGNESIUM OXIDE 400 MG/1
400 TABLET ORAL DAILY
Status: DISCONTINUED | OUTPATIENT
Start: 2022-08-10 | End: 2022-08-09

## 2022-08-09 RX ORDER — HEPARIN SODIUM,PORCINE 10 UNIT/ML
5-10 VIAL (ML) INTRAVENOUS
Status: DISCONTINUED | OUTPATIENT
Start: 2022-08-09 | End: 2022-08-12 | Stop reason: HOSPADM

## 2022-08-09 RX ORDER — ASCORBIC ACID 500 MG
2000 TABLET ORAL EVERY EVENING
Status: DISCONTINUED | OUTPATIENT
Start: 2022-08-09 | End: 2022-08-12 | Stop reason: HOSPADM

## 2022-08-09 RX ORDER — ONDANSETRON 8 MG/1
8 TABLET, FILM COATED ORAL EVERY 8 HOURS PRN
Status: DISCONTINUED | OUTPATIENT
Start: 2022-08-09 | End: 2022-08-12 | Stop reason: HOSPADM

## 2022-08-09 RX ORDER — PROCHLORPERAZINE MALEATE 5 MG
5 TABLET ORAL EVERY 6 HOURS PRN
Status: DISCONTINUED | OUTPATIENT
Start: 2022-08-09 | End: 2022-08-12 | Stop reason: HOSPADM

## 2022-08-09 RX ORDER — LIDOCAINE 40 MG/G
CREAM TOPICAL
Status: DISCONTINUED | OUTPATIENT
Start: 2022-08-09 | End: 2022-08-09 | Stop reason: HOSPADM

## 2022-08-09 RX ORDER — DIPHENHYDRAMINE HYDROCHLORIDE 50 MG/ML
50 INJECTION INTRAMUSCULAR; INTRAVENOUS
Status: DISCONTINUED | OUTPATIENT
Start: 2022-08-09 | End: 2022-08-12 | Stop reason: HOSPADM

## 2022-08-09 RX ORDER — ONDANSETRON 8 MG/1
8 TABLET, ORALLY DISINTEGRATING ORAL EVERY 8 HOURS PRN
Status: DISCONTINUED | OUTPATIENT
Start: 2022-08-09 | End: 2022-08-12 | Stop reason: HOSPADM

## 2022-08-09 RX ORDER — MEPERIDINE HYDROCHLORIDE 25 MG/ML
25 INJECTION INTRAMUSCULAR; INTRAVENOUS; SUBCUTANEOUS EVERY 30 MIN PRN
Status: DISCONTINUED | OUTPATIENT
Start: 2022-08-09 | End: 2022-08-12 | Stop reason: HOSPADM

## 2022-08-09 RX ORDER — ONDANSETRON 8 MG/1
8 TABLET, FILM COATED ORAL EVERY 12 HOURS
Status: COMPLETED | OUTPATIENT
Start: 2022-08-09 | End: 2022-08-12

## 2022-08-09 RX ORDER — HEPARIN SODIUM (PORCINE) LOCK FLUSH IV SOLN 100 UNIT/ML 100 UNIT/ML
5-10 SOLUTION INTRAVENOUS
Status: DISCONTINUED | OUTPATIENT
Start: 2022-08-09 | End: 2022-08-12 | Stop reason: HOSPADM

## 2022-08-09 RX ORDER — BACILLUS COAGULANS/INULIN 1B-250 MG
1 CAPSULE ORAL DAILY
Status: DISCONTINUED | OUTPATIENT
Start: 2022-08-10 | End: 2022-08-10

## 2022-08-09 RX ORDER — FLUMAZENIL 0.1 MG/ML
0.2 INJECTION, SOLUTION INTRAVENOUS
Status: DISCONTINUED | OUTPATIENT
Start: 2022-08-09 | End: 2022-08-09 | Stop reason: HOSPADM

## 2022-08-09 RX ORDER — ONDANSETRON 2 MG/ML
8 INJECTION INTRAMUSCULAR; INTRAVENOUS EVERY 8 HOURS PRN
Status: DISCONTINUED | OUTPATIENT
Start: 2022-08-09 | End: 2022-08-12 | Stop reason: HOSPADM

## 2022-08-09 RX ORDER — SODIUM CHLORIDE 9 MG/ML
INJECTION, SOLUTION INTRAVENOUS CONTINUOUS
Status: DISCONTINUED | OUTPATIENT
Start: 2022-08-09 | End: 2022-08-09 | Stop reason: HOSPADM

## 2022-08-09 RX ORDER — ALBUTEROL SULFATE 0.83 MG/ML
2.5 SOLUTION RESPIRATORY (INHALATION)
Status: DISCONTINUED | OUTPATIENT
Start: 2022-08-09 | End: 2022-08-12 | Stop reason: HOSPADM

## 2022-08-09 RX ORDER — ZINC SULFATE 50(220)MG
220 CAPSULE ORAL EVERY EVENING
Status: DISCONTINUED | OUTPATIENT
Start: 2022-08-09 | End: 2022-08-12 | Stop reason: HOSPADM

## 2022-08-09 RX ORDER — LEVOTHYROXINE SODIUM 100 UG/1
100 TABLET ORAL
Status: DISCONTINUED | OUTPATIENT
Start: 2022-08-10 | End: 2022-08-12 | Stop reason: HOSPADM

## 2022-08-09 RX ORDER — PREDNISOLONE ACETATE 10 MG/ML
2 SUSPENSION/ DROPS OPHTHALMIC 4 TIMES DAILY
Status: DISCONTINUED | OUTPATIENT
Start: 2022-08-09 | End: 2022-08-12 | Stop reason: HOSPADM

## 2022-08-09 RX ORDER — HEPARIN SODIUM,PORCINE 10 UNIT/ML
5-10 VIAL (ML) INTRAVENOUS EVERY 24 HOURS
Status: DISCONTINUED | OUTPATIENT
Start: 2022-08-09 | End: 2022-08-09 | Stop reason: CLARIF

## 2022-08-09 RX ORDER — FENTANYL CITRATE 50 UG/ML
25-50 INJECTION, SOLUTION INTRAMUSCULAR; INTRAVENOUS EVERY 5 MIN PRN
Status: DISCONTINUED | OUTPATIENT
Start: 2022-08-09 | End: 2022-08-09 | Stop reason: HOSPADM

## 2022-08-09 RX ADMIN — ACYCLOVIR 400 MG: 400 TABLET ORAL at 19:36

## 2022-08-09 RX ADMIN — DEXAMETHASONE 12 MG: 4 TABLET ORAL at 17:52

## 2022-08-09 RX ADMIN — MIDAZOLAM 1 MG: 1 INJECTION INTRAMUSCULAR; INTRAVENOUS at 11:49

## 2022-08-09 RX ADMIN — Medication 400 MG: at 19:36

## 2022-08-09 RX ADMIN — CYTARABINE 6930 MG: 100 INJECTION, SOLUTION INTRATHECAL; INTRAVENOUS; SUBCUTANEOUS at 20:07

## 2022-08-09 RX ADMIN — CEFAZOLIN SODIUM 2 G: 2 INJECTION, SOLUTION INTRAVENOUS at 10:22

## 2022-08-09 RX ADMIN — MIDAZOLAM 0.5 MG: 1 INJECTION INTRAMUSCULAR; INTRAVENOUS at 12:09

## 2022-08-09 RX ADMIN — PREDNISOLONE ACETATE 2 DROP: 10 SUSPENSION/ DROPS OPHTHALMIC at 19:36

## 2022-08-09 RX ADMIN — APIXABAN 5 MG: 5 TABLET, FILM COATED ORAL at 19:36

## 2022-08-09 RX ADMIN — ONDANSETRON HYDROCHLORIDE 8 MG: 8 TABLET, FILM COATED ORAL at 17:52

## 2022-08-09 RX ADMIN — OXYCODONE HYDROCHLORIDE AND ACETAMINOPHEN 2000 MG: 500 TABLET ORAL at 19:36

## 2022-08-09 RX ADMIN — FENTANYL CITRATE 50 MCG: 50 INJECTION, SOLUTION INTRAMUSCULAR; INTRAVENOUS at 11:50

## 2022-08-09 RX ADMIN — SODIUM CHLORIDE: 9 INJECTION, SOLUTION INTRAVENOUS at 10:04

## 2022-08-09 RX ADMIN — LIDOCAINE HYDROCHLORIDE 15 ML: 10 INJECTION, SOLUTION EPIDURAL; INFILTRATION; INTRACAUDAL; PERINEURAL at 12:14

## 2022-08-09 RX ADMIN — VANCOMYCIN HYDROCHLORIDE 125 MG: 125 CAPSULE ORAL at 19:36

## 2022-08-09 RX ADMIN — FENTANYL CITRATE 25 MCG: 50 INJECTION, SOLUTION INTRAMUSCULAR; INTRAVENOUS at 12:09

## 2022-08-09 RX ADMIN — Medication 5 ML: at 12:24

## 2022-08-09 RX ADMIN — ZINC SULFATE 220 MG (50 MG) CAPSULE 220 MG: CAPSULE at 19:36

## 2022-08-09 ASSESSMENT — ACTIVITIES OF DAILY LIVING (ADL)
ADLS_ACUITY_SCORE: 18
ADLS_ACUITY_SCORE: 18
ADLS_ACUITY_SCORE: 35
ADLS_ACUITY_SCORE: 18
ADLS_ACUITY_SCORE: 35

## 2022-08-09 NOTE — LETTER
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]  Admit Date/Time: 2022  9:18 AM  Discharge Date: 2022  Payor Source: Payor: [employer] / Plan: [employer] / Product Type: HMO /           Reason for Communication Hand-off Referral: discharge to home, follow up cares    Discharge Plan:  Outpatient labs and PRN transfusions     Concern for non-adherence with plan of care:   no  Discharge Needs Assessment:  Needs    Flowsheet Row Most Recent Value   Equipment Currently Used at Home none        Follow-up plan:    Future Appointments   Date Time Provider Department Center   2022  1:00 PM [facility] ONC INFUSION NURSE [facility]   2022  7:30 AM [facility] DRAW [facility]   2022  8:00 AM [facility] ONC INFUSION NURSE [facility]   2022 10:30 AM [facility] FAST TRACK LAB [facility]   2022 11:00 AM [facility] CANCER INFUSION NURSE [facility]   2022  7:30 AM [facility] DRAW [facility]   2022  8:00 AM Angelica Kingsley PA-C [facility]   2022  9:00 AM [facility] ONC INFUSION NURSE [facility]   2022  7:30 AM [facility] DRAW [facility]   2022  8:00 AM [facility] ONC INFUSION NURSE [facility]   2022  7:30 AM [facility] ONC INFUSION NURSE [facility]   2022 10:00 AM [facility] ONC INFUSION NURSE [facility]       Any outstanding tests or procedures:        Referrals     Future Labs/Procedures    Home Infusion Referral     Comments:    Please see order for Labs, Port Care, and PRN transfusions.            Lacey Raza RN    AVS/Discharge Summary is the source of truth; this is a helpful guide for improved communication of patient story

## 2022-08-09 NOTE — PROCEDURES
[facility]    Procedure: IR Procedure Note    Date/Time: 8/9/2022 12:31 PM  Performed by: Chema Dominguez PA-C  Authorized by: Chema Dominguez PA-C       UNIVERSAL PROTOCOL   Site Marked: NA  Prior Images Obtained and Reviewed:  Yes  Required items: Required blood products, implants, devices and special equipment available    Patient identity confirmed:  Verbally with patient, arm band, provided demographic data and hospital-assigned identification number  Patient was reevaluated immediately before administering moderate or deep sedation or anesthesia  Confirmation Checklist:  Patient's identity using two indicators, relevant allergies, procedure was appropriate and matched the consent or emergent situation and correct equipment/implants were available  Time out: Immediately prior to the procedure a time out was called    Universal Protocol: the Joint Commission Universal Protocol was followed    Preparation: Patient was prepped and draped in usual sterile fashion       ANESTHESIA    Anesthesia: Local infiltration  Local Anesthetic:  Lidocaine 1% without epinephrine  Anesthetic Total (mL):  15      SEDATION  Patient Sedated: Yes    Sedation Type:  Moderate (conscious) sedation  Sedation:  Fentanyl and midazolam  Vital signs: Vital signs monitored during sedation    Fluoroscopy Time: 0 minute(s)  See dictated procedure note for full details.  Findings: Moderate sedation for Port placement - 1.5 mg of midazolam and 75 mcg of fentanyl. Patient tolerated the procedure well.     Specimens: none    Complications: None    Condition: Stable    Plan: 1 hour bedrest      PROCEDURE  Describe Procedure: Completed image-guided placement of 6 Divehi 23.5 cm single lumen power-injectable central venous port via right IJ. Aspirates and flushes freely, heparin locked and is ready for immediate use.    Patient Tolerance:  Patient tolerated the procedure well with no immediate  complications  Length of time physician/provider present for 1:1 monitoring during sedation: 30

## 2022-08-09 NOTE — TELEPHONE ENCOUNTER
Medication Appeal Initiation    We have initiated an appeal for the requested medication:  Medication: Cresemba PA Denied- Appeal submitted  Appeal Start Date:  8/9/2022  Insurance Company:    Comments:  Jaimee [phone], fax- [phone]    Appeal faxed to [phone]      Farida Pichardo  Oncology Pharmacy Liaison II  [email or web address]  Phone: [phone]  Fax: [phone]

## 2022-08-09 NOTE — PRE-PROCEDURE
GENERAL PRE-PROCEDURE:   Procedure:  Port Placement  Date/Time:  8/9/2022 11:03 AM    Verbal consent obtained?: Yes    Written consent obtained?: Yes    Risks and benefits: Risks, benefits and alternatives were discussed    Consent given by:  Patient  Patient states understanding of procedure being performed: Yes    Patient's understanding of procedure matches consent: Yes    Procedure consent matches procedure scheduled: Yes    Expected level of sedation:  Moderate  Appropriately NPO:  Yes  Mallampati  :  Grade 2- soft palate, base of uvula, tonsillar pillars, and portion of posterior pharyngeal wall visible  Lungs:  Lungs clear with good breath sounds bilaterally  Heart:  Normal heart sounds and rate  History & Physical reviewed:  History and physical reviewed and no updates needed  Statement of review:  I have reviewed the lab findings, diagnostic data, medications, and the plan for sedation

## 2022-08-09 NOTE — PROGRESS NOTES
Pt prepped for port line placement.PIV placed and labs sent and NS started and ABX started.Pt stated that she did hibiclens shower yesterday and again this morning.Consent signed and questions answered.COVID PCR resent since unable to get result from outside hospital.

## 2022-08-09 NOTE — PHARMACY-ADMISSION MEDICATION HISTORY
Admission Medication History Completed by Pharmacy    See [facility] Admission Navigator for allergy information, preferred outpatient pharmacy, prior to admission medications and immunization status.     Medication History Sources:     Patient and family    SureScripts    Changes made to PTA medication list (reason):    Added: None    Deleted: None    Changed: None    Additional Information:    Patient is likely to start on Eliquis, Cresemba, and midostaurin (D8-21) during this admission/discharge. Pharmacy will confirm financial coverage has been secured.     Prior to Admission medications    Medication Sig Last Dose Taking? Auth Provider Long Term End Date   acyclovir (ZOVIRAX) 400 MG tablet Take 1 tablet (400 mg) by mouth 2 times daily 8/9/2022 at Unknown time Yes Gaby Arteaga PA-C Yes    apixaban ANTICOAGULANT (ELIQUIS) 5 MG tablet Take 1 tablet (5 mg) by mouth 2 times daily Unknown at Unknown time Yes Ortiz Earl MD     Bacillus Coagulans-Inulin (PROBIOTIC) 1-250 BILLION-MG CAPS Take 1 capsule by mouth daily 8/9/2022 at Unknown time Yes Reported, Patient     diltiazem 2% in PLO gel Apply 1-2 clicks (0.25-0.5 g) topically 3 times daily as needed (Apply superficially to rectal area for hemorrhoid pain) More than a month at Unknown time Yes Gaby Arteaga PA-C     levofloxacin (LEVAQUIN) 250 MG tablet Take 1 tablet (250 mg) by mouth daily When absolute neutrophils are less than 1.- x 10^9/L More than a month at Unknown time Yes Ortiz Earl MD     levothyroxine (SYNTHROID/LEVOTHROID) 100 MCG tablet Take 1 tablet (100 mcg) by mouth every morning (before breakfast) 8/9/2022 at Unknown time Yes Gaby Arteaga PA-C Yes    magnesium 250 MG tablet Take 250 mg by mouth daily 8/8/2022 at Unknown time Yes Reported, Patient     midostaurin (RYDAPT) 25 MG capsule Take 2 capsules (50 mg) by mouth every 12 hours . Take with food. Unknown at Unknown time Yes Ortiz Earl MD     Multiple  Vitamins-Minerals (WOMENS MULTIVITAMIN) TABS Take 1 tablet by mouth daily 8/8/2022 at Unknown time Yes Reported, Patient     Quercetin 50 MG TABS Take 50 mg by mouth daily 8/8/2022 at Unknown time Yes Unknown, Entered By History     vancomycin (VANCOCIN) 125 MG capsule Take 1 capsule (125 mg) by mouth 2 times daily 8/9/2022 at Unknown time Yes Ortiz Earl MD     vitamin C (ASCORBIC ACID) 1000 MG TABS Take 2,000 mg by mouth daily 8/8/2022 at Unknown time Yes Reported, Patient     vitamin D3 (CHOLECALCIFEROL) 50 mcg (2000 units) tablet Take 2 tablets by mouth daily 8/8/2022 at Unknown time Yes Reported, Patient     zinc gluconate 50 MG tablet Take 50 mg by mouth daily 8/8/2022 at Unknown time Yes Reported, Patient     isavuconazonium Sulfate (CRESEMBA) 186 MG capsule Take 2 capsules (372 mg) by mouth daily When absolute neutrophils are less than 1.0 x 10^9/L   Ortiz Earl MD     midostaurin (RYDAPT) 25 MG capsule Take 2 capsules (50 mg) by mouth 2 times daily for 14 days . Take with food on Days 8 through 21.   Ortiz Earl MD  8/23/22       Date completed: 08/09/22    Medication history completed by: Aureliano Valverde, PharmD

## 2022-08-09 NOTE — PROGRESS NOTES
Called 7D and gave report to the RN who will be taking over care for this pt. Samia Moreno RN will be transporting pt to room 505.

## 2022-08-09 NOTE — PROGRESS NOTES
Patient tolerated recovery stage well. VSS, right neck and chest port site clean/dry/intact, no hematoma, and denies pain and was accessed with a needle and nothing was infusing while here. Patient tolerated PO food and fluids. Teaching was done and discharge instructions were given. Patient ambulated, voided, and PIV was left in place for future use upstairs. Patient was transferred upstairs to 7D for further care.Her booklet about the port and discharge instructions were sent with pt.All of her belongings were brought up with her and her mom went to get the rest of her belongings to bring upstrairs  To 7D.

## 2022-08-09 NOTE — DISCHARGE INSTRUCTIONS
[facility]        Interventional Radiology  Discharge Instructions  Following Port Placement    Your Port has been closed with glue  Derma Marin (Skin Glue)  Do not apply any ointments over site  Do not cover with any dressing  This thin layer will slough off in 7-10 days  May gently remove Derma Marin in 10 days if still present    If there is any oozing or bleeding from the site, apply direct pressure for 5-10 minutes with a gauze pad.  If bleeding continues after 10 minutes call your primary doctor.  If bleeding cannot be controlled with direct pressure, call 911.    Call your Doctor if:  Bleeding as above  Swelling in your neck or arm  Sudden onset of Shortness of Breath, Lightheadedness, Palpitations.  Fever greater than 100.5  F  Other signs of infection such as, redness, tenderness, or drainage from the wound    If you were given sedation:  We recommend an adult stay with you for the first 24 hours.  No driving or alcoholic beverages for 24 hours.    Discharge Booklet given    ADDITIONAL INSTRUCTIONS: May use ice packs 3-4 times a day for 15 minutes for minor swelling or pain  No heavy lifting greater than 10 lbs. for one week  No tub bath, hot tub, or swimming until Derma Marin (Skin Glue) is removed  It is OK to shower and get the incision wet but immediately pat it dry  No Emla Cream to Port site for 1 week.  If you are on Coumadin, restart tonight.  Follow up with your Coumadin Clinic or Primary Care MD to have your INR rechecked.    [facility] INTERVENTIONAL RADIOLOGY DEPARTMENT  Procedure Physician: Chema OLSEN   Date of procedure: August 9, 2022  Telephone Numbers: [phone]      Monday-Friday 7:30 am to 4:00 pm  [phone] After 4:00 pm Monday-Friday, Weekends & Holidays.   Ask for the Interventional Radiologist on call.  Someone is on call 24 hrs/day  [facility] toll free number: [phone] Monday-Friday 8:00 am to 4:30 pm  [facility] Emergency Dept: [phone]    [facility]  [facility]  [address], [place] [address]  Phone: [phone] / Fax: [phone]  Pharmacy: [phone] / Fax: [phone]  Mon-Fri: 8 a.m. - 4:30 p.m. Toll-Free: [phone]  Fax: [phone]

## 2022-08-09 NOTE — IR NOTE
Patient Name: Veda Marinelli  Medical Record Number: [id number]  Today's Date: 8/9/2022    Procedure: image guided chest port placement  Proceduralist: TALYA Dominguez PA-C    Sedation start time: 1150  Sedation end time: 1225  Sedation medications administered: 1.5 mg versed, 75 mcg fentanyl  Total sedation time: 35 min  Sedation Notes: none     Procedure start time: 1150  Procedure end time: 1225    Report given to: Gemini BRENNAN 2A  : none    Other Notes: Pt arrived to IR room 2 from . Consent reviewed, pt confirmed. Pt denies any questions or concerns regarding procedure. Pt positioned supine and monitored per protocol. Right chest and neck sites cleansed and dressed per protocol. Heparin locked per MAR. Pt tolerated procedure without any noted complications. Pt transferred back to 2A awaiting admission to .

## 2022-08-09 NOTE — PROGRESS NOTES
Pt returned from IR via liter accompanied by nurse at 1230.Right neck and right chest port site are glued and the port is accessed and flushed with heparin and ready for use.Pt is tolerating food and fluids.She is waiting for a bed on 7th  Floor.

## 2022-08-09 NOTE — H&P
[facility]    Hematology / Oncology  Admission History & Physical     Date of Admission:  08/09/22  Date of Service: 08/09/22  Primary Hematologist/Oncologist: Dr. 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     HEME  # AML, 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 Earl. Presented to routine exam with cytopenias. BMBx 6/17/2022 - hypercellular marrow (85-95% cellularity) with 92% blasts. 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 Started induction chemotherapy with 7+3 and midostaurin on 6/22/2022. D21 BMBx (7/12) with robust marrow regeneration, repeat BMBx 7/22 with 70-80% cellularity and 6% blasts by morphology consisted with complete response per Dr. Earl. NGS with no detectable mutations (MRD negative). BMT consulted 7/15; recommend consolidation chemotherapy, could consider BMT in the future if relapses. Tentatively planning for 3-4 cycles consolidation HiDAC (D1-3) and midostaurin (D8-21), followed by maintenance midostaurin for 1 year. Admitted for Cycle 1 consolidation with HiDAC and midostaurin on 8/9/22.   - Free drug coverage approved for midostaurin through [employer]  - Port placed 8/9 with IR  - Patient in MRD negative CR, no indication for allopurinol or TLS monitoring.   - Plan to recheck NPM1 mutation after C1 consolidation HiDAC and midostaurin.      Treatment Plan: HiDAC +Midostaurin C1D1=8/9/22   - Cytarabine 3 g/m2 (6930mg) q12h x6 doses - D1,2,3   - Midostaurin 50mg BID x14 days - D8-21   - Supportive medications;     Pred forte eye drops QID D1-5     Dexamethasone  12mg PO q24h D1-3    Zofran 8mg q12h x6 doses D1-3    Neulasta 6mg - requested outpatient on ~8/13    # LUE DVT (PICC-associated)  LUE US 7/12/22; Nonocclusive DVT in the left axillary vein and one of the paired left brachial veins, superficial venous thrombosis in the left basilic vein. PICC-associated, was removed prior to discharge on 7/15/22. Discharged on lovenox 1mg/kg x7/12. Repeat US 7/21 negative for DVT. She has remained on lovenox 1mg/kg BID while awaiting a copay card for Apixaban, which has not arrived yet as of 8/9.  - Transition to Apixaban x8/9 (using a $10/month copay card which will be given to patient this admission).   - Per Dr. Earl, continue Apixaban for 3 months (through ~10/13/2022), hold if plt <50k    ID  # ID PPX  - ACV 400mg BID   - No indication for antibacterial or antifungal ppx with ANC >1000.  - Note; if she were to need antifungal ppx in the future will reach out to pharmacy liaison because prior to meeting her deductible the cost was $2048/month for posaconazole and $176/month for Voriconazole.     # History of recurrent Cdiff colitis  - PTA ppx Vancomycin 125mg BID PO     # h/o COVID-19 (Oct 2021)  Pt is not vaccinated nor interested in being vaccinated. She was admitted from 10/1/2021 - 10/13/2021 for acute hypoxic respiratory failure from COVID-19 pneumonia.  Required IMC, high flow and intermittent CPAP. She was treated with dexamethasone, remdesivir, and baricitinib in addition to azithromycin and ceftriaxone. Recovered symptomatically.  - Covid neg 8/9  - Consider Evusheld outpatient    GI  # Hiatal hernia - Asymptomatic. Tums PRN, PPI discontinued during previous admission.     CV  # Occasional PVCs- noted on EKG. QTc 450 on 7/8/22.     GYN  # H/o Menstrual bleeding   Patient had a menstrual cycle during previous admission 6/2022 which was heavy. Resolved with provera 10mg daily (7/1-7/6) and plt >10k.     ENDO  # Hypothyroidism - PTA Synthroid 100 mcg daily    FEN:  -  IVF per chemotherapy protocol   - PRN lyte replacement  - RDAT     Prophy/Misc:  - VTE: Eliquis   - GI/PUD: Tums PRN  - Bowels: Senna and Miralax PRN  - Activity: patient independent, no PT/OT needs presently     Code: Full code, confirmed with patient on admission  Disposition: Admitted to [facility] for scheduled chemotherapy, anticipate discharge on D4 barring other clinical complications.    Follow up:  - requested labs, neulasta on 8/13  - Once weekly labs, poss transfusions 8/19-9/9  - VLAD appt 8/30    Patient was seen and plan of care was discussed with attending physician Dr. Rodriguez.  I spent 60 minutes face-to-face and/or coordinating or discussing care plan. Over 50% of our time on the unit was spent counseling the patient and/or coordinating care.      Zaira Vasquez PA-C  Hematology/Oncology  Pager # [phone]    Chief Complaint   AML  - History obtained from the patient and through chart review.    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     Patient reports feeling well, no complaints today. Appetite and energy have been normal. No recent fevers, chills, chest pain, dyspnea, N/V. Has some bruising on her abdomen from lovenox injections but they are resolving. A comprehensive review of systems was reviewed with the patient and the pertinent positives are listed in the HPI above. Discussed plan of care and questions were answered at bedside with her parents present.       Past Medical History    I have reviewed this patient's medical history and updated it with pertinent information if needed.   Past Medical History:   Diagnosis Date     Leukemia, blast cell (H) 6/16/2022     Neutropenic fever (H)  7/11/2022       Past Surgical History   I have reviewed this patient's surgical history and updated it with pertinent information if needed.  Past Surgical History:   Procedure Laterality Date     PICC DOUBLE LUMEN PLACEMENT Left 06/16/2022    46 cm total lateral brachial       Prior to Admission Medications   Prior to Admission Medications   Prescriptions Last Dose Informant Patient Reported? Taking?   Bacillus Coagulans-Inulin (PROBIOTIC) 1-250 BILLION-MG CAPS 8/9/2022 at Unknown time Self Yes Yes   Sig: Take 1 capsule by mouth daily   Multiple Vitamins-Minerals (WOMENS MULTIVITAMIN) TABS 8/8/2022 at Unknown time Self Yes Yes   Sig: Take 1 tablet by mouth daily   Quercetin 50 MG TABS 8/8/2022 at Unknown time Self Yes Yes   Sig: Take 50 mg by mouth daily   acyclovir (ZOVIRAX) 400 MG tablet 8/9/2022 at Unknown time Self No Yes   Sig: Take 1 tablet (400 mg) by mouth 2 times daily   apixaban ANTICOAGULANT (ELIQUIS) 5 MG tablet Unknown at Unknown time Self No Yes   Sig: Take 1 tablet (5 mg) by mouth 2 times daily   diltiazem 2% in PLO gel  Self No No   Sig: Apply 1-2 clicks (0.25-0.5 g) topically 3 times daily as needed (Apply superficially to rectal area for hemorrhoid pain)   isavuconazonium Sulfate (CRESEMBA) 186 MG capsule Unknown at Unknown time Self Yes Yes   Sig: Take 2 capsules (372 mg) by mouth daily   levofloxacin (LEVAQUIN) 250 MG tablet Unknown at Unknown time Self No Yes   Sig: Take 1 tablet (250 mg) by mouth daily When absolute neutrophils are less than 1.- x 10^9/L   levothyroxine (SYNTHROID/LEVOTHROID) 100 MCG tablet 8/9/2022 at Unknown time Self No Yes   Sig: Take 1 tablet (100 mcg) by mouth every morning (before breakfast)   magnesium 250 MG tablet 8/8/2022 at Unknown time Self Yes Yes   Sig: Take 250 mg by mouth daily   midostaurin (RYDAPT) 25 MG capsule Unknown at Unknown time Self Yes Yes   Sig: Take 2 capsules (50 mg) by mouth every 12 hours . Take with food.   vancomycin (VANCOCIN) 125 MG capsule  8/9/2022 at Unknown time Self No Yes   Sig: Take 1 capsule (125 mg) by mouth 2 times daily   vitamin C (ASCORBIC ACID) 1000 MG TABS 8/8/2022 at Unknown time Self Yes Yes   Sig: Take 2,000 mg by mouth daily   vitamin D3 (CHOLECALCIFEROL) 50 mcg (2000 units) tablet 8/8/2022 at Unknown time Self Yes Yes   Sig: Take 2 tablets by mouth daily   voriconazole (VFEND) 200 MG tablet Unknown at Unknown time Self No Yes   Sig: Take 1 tablet (200 mg) by mouth 2 times daily When absolute neutrophils are less than 1.- x 10^9/L   zinc gluconate 50 MG tablet 8/8/2022 at Unknown time Self Yes Yes   Sig: Take 50 mg by mouth daily      Facility-Administered Medications: None     Allergies   Allergies   Allergen Reactions     Blood Transfusion Related (Informational Only) Hives     6/29/2022, reaction of hives with platelet transfusion        Social History   Social History     Socioeconomic History     Marital status: Single     Spouse name: Not on file     Number of children: Not on file     Years of education: Not on file     Highest education level: Not on file   Occupational History     Not on file   Tobacco Use     Smoking status: Never Smoker     Smokeless tobacco: Never Used   Substance and Sexual Activity     Alcohol use: Not Currently     Drug use: Not Currently     Sexual activity: Not on file   Other Topics Concern     Not on file   Social History Narrative     Not on file     Social Determinants of Health     Financial Resource Strain: Not on file   Food Insecurity: Not on file   Transportation Needs: Not on file   Physical Activity: Not on file   Stress: Not on file   Social Connections: Not on file   Intimate Partner Violence: Not on file   Housing Stability: Not on file       Family History   I have reviewed this patient's family history and updated it with pertinent information if needed.   History reviewed. No pertinent family history.    Review of Systems   A comprehensive ROS was performed with the patient and was  found to be negative or non-contributory with the exception of that noted in the HPI above.    Physical Exam   Temp:  [97.9  F (36.6  C)] 97.9  F (36.6  C)  Pulse:  [61] 61  Resp:  [18] 18  BP: (115)/(81) 115/81  SpO2:  [97 %] 97 %  CONSTITUTIONAL: Alert and interactive. Sitting up in chair, no acute distress.  HEENT: NC/AT, EOMI, anicteric sclera, oropharynx is pink and moist without erythema/exudates/lesions/thrush.  NECK: Supple, full ROM.  LYMPHATIC: No palpable mandibular/cervical/supra/infraclavicular lymph nodes.  CARDIOVASCULAR: RRR. Normal S1/S2. No murmurs. 2+ equal and bilateral radial and pedal pulses.   RESPIRATORY: CTAB. No wheezes appreciated. Normal respiratory effort on ambient air.  GASTROINTESTINAL: Soft, non-tender, non-distended, normoactive bowel sounds.  MUSCULOSKELETAL: No joint swelling or tenderness.  EXTREMITIES: No lower extremity edema.   SKIN: Warm and intact. No concerning lesions or rashes on exposed skin surfaces. No jaundice.  NEUROLOGIC: Alert and fully oriented, appropriately responsive during interview.   VASCULAR ACCESS: Port C/D/I, non-tender.    Data   I have personally reviewed the following labs/imaging:  Results for orders placed or performed during the hospital encounter of 08/09/22 (from the past 24 hour(s))   INR   Result Value Ref Range    INR 0.98 0.85 - 1.15

## 2022-08-09 NOTE — TELEPHONE ENCOUNTER
Does not qualify due to commercial insurance.    Thank you,    Farida Pichardo  Oncology Pharmacy Liaison II  [email or web address]  Phone: [phone]  Fax: [phone]

## 2022-08-10 LAB
ALBUMIN SERPL BCG-MCNC: 4.2 G/DL (ref 3.5–5.2)
ALP SERPL-CCNC: 53 U/L (ref 35–104)
ALT SERPL W P-5'-P-CCNC: 53 U/L (ref 10–35)
ANION GAP SERPL CALCULATED.3IONS-SCNC: 11 MMOL/L (ref 7–15)
AST SERPL W P-5'-P-CCNC: 22 U/L (ref 10–35)
BASOPHILS # BLD AUTO: 0 10E3/UL (ref 0–0.2)
BASOPHILS NFR BLD AUTO: 0 %
BILIRUB SERPL-MCNC: 0.7 MG/DL
BUN SERPL-MCNC: 13.8 MG/DL (ref 6–20)
CALCIUM SERPL-MCNC: 9.7 MG/DL (ref 8.6–10)
CHLORIDE SERPL-SCNC: 105 MMOL/L (ref 98–107)
CREAT SERPL-MCNC: 0.53 MG/DL (ref 0.51–0.95)
DEPRECATED HCO3 PLAS-SCNC: 23 MMOL/L (ref 22–29)
EOSINOPHIL # BLD AUTO: 0 10E3/UL (ref 0–0.7)
EOSINOPHIL NFR BLD AUTO: 0 %
ERYTHROCYTE [DISTWIDTH] IN BLOOD BY AUTOMATED COUNT: 15.7 % (ref 10–15)
GFR SERPL CREATININE-BSD FRML MDRD: >90 ML/MIN/1.73M2
GLUCOSE SERPL-MCNC: 141 MG/DL (ref 70–99)
HCT VFR BLD AUTO: 38 % (ref 35–47)
HGB BLD-MCNC: 12.4 G/DL (ref 11.7–15.7)
IMM GRANULOCYTES # BLD: 0.1 10E3/UL
IMM GRANULOCYTES NFR BLD: 1 %
LYMPHOCYTES # BLD AUTO: 0.6 10E3/UL (ref 0.8–5.3)
LYMPHOCYTES NFR BLD AUTO: 6 %
MAGNESIUM SERPL-MCNC: 2.2 MG/DL (ref 1.7–2.3)
MCH RBC QN AUTO: 32.8 PG (ref 26.5–33)
MCHC RBC AUTO-ENTMCNC: 32.6 G/DL (ref 31.5–36.5)
MCV RBC AUTO: 101 FL (ref 78–100)
MONOCYTES # BLD AUTO: 0.3 10E3/UL (ref 0–1.3)
MONOCYTES NFR BLD AUTO: 2 %
NEUTROPHILS # BLD AUTO: 10.2 10E3/UL (ref 1.6–8.3)
NEUTROPHILS NFR BLD AUTO: 91 %
NRBC # BLD AUTO: 0 10E3/UL
NRBC BLD AUTO-RTO: 0 /100
PHOSPHATE SERPL-MCNC: 3.9 MG/DL (ref 2.5–4.5)
PLATELET # BLD AUTO: 187 10E3/UL (ref 150–450)
POTASSIUM SERPL-SCNC: 4.5 MMOL/L (ref 3.4–5.3)
PROT SERPL-MCNC: 7 G/DL (ref 6.4–8.3)
RBC # BLD AUTO: 3.78 10E6/UL (ref 3.8–5.2)
SODIUM SERPL-SCNC: 139 MMOL/L (ref 136–145)
WBC # BLD AUTO: 11.3 10E3/UL (ref 4–11)

## 2022-08-10 PROCEDURE — 250N000013 HC RX MED GY IP 250 OP 250 PS 637

## 2022-08-10 PROCEDURE — 99356 PR PROLONGED SERV,INPATIENT,1ST HR: CPT | Performed by: PHYSICIAN ASSISTANT

## 2022-08-10 PROCEDURE — 83735 ASSAY OF MAGNESIUM: CPT

## 2022-08-10 PROCEDURE — 250N000011 HC RX IP 250 OP 636: Performed by: PHYSICIAN ASSISTANT

## 2022-08-10 PROCEDURE — 250N000011 HC RX IP 250 OP 636: Performed by: INTERNAL MEDICINE

## 2022-08-10 PROCEDURE — 99207 PR SC NO CHARGE VISIT: CPT | Performed by: INTERNAL MEDICINE

## 2022-08-10 PROCEDURE — 120N000002 HC R&B MED SURG/OB UMMC

## 2022-08-10 PROCEDURE — 85025 COMPLETE CBC W/AUTO DIFF WBC: CPT | Performed by: PHYSICIAN ASSISTANT

## 2022-08-10 PROCEDURE — 80053 COMPREHEN METABOLIC PANEL: CPT | Performed by: PHYSICIAN ASSISTANT

## 2022-08-10 PROCEDURE — 250N000013 HC RX MED GY IP 250 OP 250 PS 637: Performed by: PHYSICIAN ASSISTANT

## 2022-08-10 PROCEDURE — 36591 DRAW BLOOD OFF VENOUS DEVICE: CPT | Performed by: PHYSICIAN ASSISTANT

## 2022-08-10 PROCEDURE — 84100 ASSAY OF PHOSPHORUS: CPT | Performed by: PHYSICIAN ASSISTANT

## 2022-08-10 PROCEDURE — 99233 SBSQ HOSP IP/OBS HIGH 50: CPT | Performed by: PHYSICIAN ASSISTANT

## 2022-08-10 PROCEDURE — 258N000003 HC RX IP 258 OP 636: Performed by: INTERNAL MEDICINE

## 2022-08-10 RX ADMIN — CYTARABINE 6930 MG: 100 INJECTION, SOLUTION INTRATHECAL; INTRAVENOUS; SUBCUTANEOUS at 06:31

## 2022-08-10 RX ADMIN — APIXABAN 5 MG: 5 TABLET, FILM COATED ORAL at 19:44

## 2022-08-10 RX ADMIN — LEVOTHYROXINE SODIUM 100 MCG: 100 TABLET ORAL at 06:38

## 2022-08-10 RX ADMIN — SODIUM CHLORIDE, PRESERVATIVE FREE 5 ML: 5 INJECTION INTRAVENOUS at 10:09

## 2022-08-10 RX ADMIN — ONDANSETRON HYDROCHLORIDE 8 MG: 8 TABLET, FILM COATED ORAL at 05:58

## 2022-08-10 RX ADMIN — DEXAMETHASONE 12 MG: 4 TABLET ORAL at 16:08

## 2022-08-10 RX ADMIN — ZINC SULFATE 220 MG (50 MG) CAPSULE 220 MG: CAPSULE at 19:44

## 2022-08-10 RX ADMIN — APIXABAN 5 MG: 5 TABLET, FILM COATED ORAL at 07:50

## 2022-08-10 RX ADMIN — Medication 100 MCG: at 07:50

## 2022-08-10 RX ADMIN — Medication 400 MG: at 19:44

## 2022-08-10 RX ADMIN — PREDNISOLONE ACETATE 2 DROP: 10 SUSPENSION/ DROPS OPHTHALMIC at 16:08

## 2022-08-10 RX ADMIN — ACYCLOVIR 400 MG: 400 TABLET ORAL at 07:50

## 2022-08-10 RX ADMIN — PREDNISOLONE ACETATE 2 DROP: 10 SUSPENSION/ DROPS OPHTHALMIC at 19:47

## 2022-08-10 RX ADMIN — PREDNISOLONE ACETATE 2 DROP: 10 SUSPENSION/ DROPS OPHTHALMIC at 11:15

## 2022-08-10 RX ADMIN — SODIUM CHLORIDE, PRESERVATIVE FREE 5 ML: 5 INJECTION INTRAVENOUS at 22:03

## 2022-08-10 RX ADMIN — VANCOMYCIN HYDROCHLORIDE 125 MG: 125 CAPSULE ORAL at 07:50

## 2022-08-10 RX ADMIN — Medication 1 TABLET: at 19:50

## 2022-08-10 RX ADMIN — CYTARABINE 6930 MG: 100 INJECTION, SOLUTION INTRATHECAL; INTRAVENOUS; SUBCUTANEOUS at 18:13

## 2022-08-10 RX ADMIN — OXYCODONE HYDROCHLORIDE AND ACETAMINOPHEN 2000 MG: 500 TABLET ORAL at 19:44

## 2022-08-10 RX ADMIN — PREDNISOLONE ACETATE 2 DROP: 10 SUSPENSION/ DROPS OPHTHALMIC at 07:50

## 2022-08-10 RX ADMIN — ACYCLOVIR 400 MG: 400 TABLET ORAL at 19:44

## 2022-08-10 RX ADMIN — VANCOMYCIN HYDROCHLORIDE 125 MG: 125 CAPSULE ORAL at 19:44

## 2022-08-10 RX ADMIN — ONDANSETRON HYDROCHLORIDE 8 MG: 8 TABLET, FILM COATED ORAL at 17:14

## 2022-08-10 ASSESSMENT — ACTIVITIES OF DAILY LIVING (ADL)
ADLS_ACUITY_SCORE: 18

## 2022-08-10 NOTE — PROGRESS NOTES
Nursing Focus: Chemotherapy    D: Positive blood return via Port. Insertion site is clean/dry/intact, dressing intact with no complaints of pain.  Urine output is recorded in intake in Doc Flowsheet.      I: Premedications given per order (see electronic medical administration record). Cerebellar Neurotoxicity Assessment unremarkable. Dose #3 of HD Cytarabine started to infuse over 3 hours. Reviewed pt teaching on chemotherapy side effects.  Pt denies need for further teaching. Chemotherapy double checked per protocol by two chemotherapy competent RN's.     A: Tolerating chemotherapy well. Denies nausea and or pain.     P: Continue to monitor urine output and symptoms of nausea. Screen for symptoms of toxicity.

## 2022-08-10 NOTE — PLAN OF CARE
[id number]    Goal Outcome Evaluation:    VSS on RA, denies pain nausea or shortness of breath. Dose 2 of cytarabine infusing into port w/good blood return, cerebellar assessment intact. Up ad qasim, sleeping between cares.

## 2022-08-10 NOTE — PROGRESS NOTES
Nursing Focus: Chemotherapy    D: Positive blood return via Port. Insertion site is clean/dry/intact, dressing intact with no complaints of pain.  Urine output is recorded in intake in Doc Flowsheet.      I: Premedications given per order (see electronic medical administration record). Cerebellar Toxicity Assessment unremarkable. Dose #1 of HD Cytarabine started to infuse over 3 hours. Reviewed pt teaching on chemotherapy side effects.  Pt denies need for further teaching. Chemotherapy double checked per protocol by two chemotherapy competent RN's.     A: Tolerating chemotherapy well. Denies nausea and or pain.     P: Continue to monitor urine output and symptoms of nausea. Screen for symptoms of toxicity.

## 2022-08-10 NOTE — PROGRESS NOTES
Labs and Transfusion orders:  Date: August 10, 2022    Patient: Veda Marinelli  : 1985  Diagnosis: AML C92.01    LABS:  [ x ] Check CBC with differential, CMP, and Type and Cross twice per week starting ~ (except for ,,and ) through     TRANSFUSION PARAMETERS:   [ x ] Please transfuse 1 (one) units PRBCs if Hgb is less than or equal to 7.  [ x ] Please transfuse 1 (one) unit platelets if plt count less than or equal to 10k.   [ x ] If patient experiences transfusion reaction during transfusion:   [ x ] 25-50 mg benadryl IV x once PRN   [ x ] Tylenol 1000 mg PO x once PRN   [ x ] Hydrocortisone 100 mg IV x once PRN   [ x ] Zantac 150 mg IV x once PRN   [ x ] Notify provider covering infusion clinic per protocol    [ x ] Port Cares per Home Infusion        Please call the [facility] at [phone] for any questions, and ask to speak with the care coordinator for Dr. Earl (patient's primary oncologist).    Thank you,         Zaira Vasquez PA-C  Hematology/Oncology  Pager # [phone]

## 2022-08-10 NOTE — PLAN OF CARE
"Goal Outcome Evaluation:    Plan of Care Reviewed With: patient     Time [id number]    /73 (BP Location: Right arm)   Pulse 71   Temp (!) 96.4  F (35.8  C) (Oral)   Resp 18   Ht 1.702 m (5' 7.01\")   Wt 112.6 kg (248 lb 4.8 oz)   SpO2 97%   BMI 38.88 kg/m      Reason for admission: Cycle 1 consolidation HiDAC with midostaurin.   Activity: Independent  Pain: Denies  Neuro: A&Ox4  Cardiac: WDL  Respiratory: WDL, denies SOB  GI/: WDL  Diet: Regular  Lines: Port  Labs/imaging: Reviewed.      New changes this shift: D1 Cytarabine tolerating well. No acute events.     Plan: Anticipate discharge on D4 barring other clinical complications.        Continue to monitor and follow POC    "

## 2022-08-10 NOTE — PLAN OF CARE
"[id number]    /78 (BP Location: Right arm)   Pulse 68   Temp 97  F (36.1  C) (Oral)   Resp 18   Ht 1.702 m (5' 7.01\")   Wt 112.5 kg (248 lb 0.3 oz)   SpO2 98%   BMI 38.84 kg/m      Day 2 of HiDAC, D#3 started infusing this evening.    VSS. Afebrile. Denies pain, nausea and SOB. Had 3 BMs, normal per patient. Voiding spontaneously without saving. UpAdLib. Able to make needs known. Continue with POC.     "

## 2022-08-10 NOTE — PROGRESS NOTES
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     Plan for today  - Day 2 HiDAC, anticipate discharge 8/12 AM  - [place] [facility] [place]   - Pt requesting labs/transfusions at [facility], requested twice weekly 8/19-8/26    # AML, 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 Earl. Presented to routine exam with cytopenias. BMBx 6/17/2022 - hypercellular marrow (85-95% cellularity) with 92% blasts. 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 Started induction chemotherapy with 7+3 and midostaurin on 6/22/2022. D21 BMBx (7/12) with robust marrow regeneration, repeat BMBx 7/22 with 70-80% cellularity and 6% blasts by morphology consisted with complete response per Dr. Earl. NGS with no detectable mutations (MRD negative). BMT consulted 7/15; recommend consolidation chemotherapy, could consider BMT in the future if relapses. Tentatively planning for 3-4 cycles consolidation HiDAC (D1-3) and midostaurin (D8-21), followed by maintenance midostaurin for 1 year. Admitted for Cycle 1 consolidation with HiDAC and midostaurin on 8/9/22.   - Free drug coverage approved for midostaurin through [employer] - will be delivered to her home on 8/15  - Port placed 8/9 with IR  - Patient in MRD negative CR, no indication for allopurinol or TLS monitoring.   - Plan to recheck NPM1 mutation after C1 consolidation HiDAC and midostaurin.                   Treatment Plan: HiDAC  +Midostaurin C1D1=8/9/22               - Cytarabine 3 g/m2 (6930mg) q12h x6 doses - D1,2,3               - Midostaurin 50mg BID x14 days - D8-21 (8/16-through 8/29)               - Supportive medications;     Pred forte eye drops QID D1-5 -- will need rx on discharge    Dexamethasone 12mg PO q24h D1-3    Zofran 8mg q12h x6 doses D1-3    Neulasta 6mg - requested outpatient on ~8/13     # LUE DVT (PICC-associated)  LUE US 7/12/22; Nonocclusive DVT in the left axillary vein and one of the paired left brachial veins, superficial venous thrombosis in the left basilic vein. PICC-associated, was removed prior to discharge on 7/15/22. Discharged on lovenox 1mg/kg x7/12. Repeat US 7/21 negative for DVT. She has remained on lovenox 1mg/kg BID while awaiting a copay card for Apixaban, which has not arrived yet as of 8/9.  - Transition to Apixaban x8/9 (using a $10/month copay card which was given to patient this admission) -- will need to fill rx on day of discharge  - Per Dr. Earl, continue Apixaban for 3 months (through ~10/13/2022), hold if plt <50k     ID  # ID PPX  - ACV 400mg BID -- needs refills on discharge  - No indication for antibacterial or antifungal ppx with ANC >1000.  - Note; if she were to need antifungal ppx in the future will reach out to pharmacy liaison because prior to meeting her deductible the cost was $2048/month for posaconazole and $176/month for Voriconazole.      # History of recurrent Cdiff colitis  - PTA ppx Vancomycin 125mg BID PO      # h/o COVID-19 (Oct 2021)  Pt is not vaccinated nor interested in being vaccinated. She was admitted from 10/1/2021 - 10/13/2021 for acute hypoxic respiratory failure from COVID-19 pneumonia.  Required IMC, high flow and intermittent CPAP. She was treated with dexamethasone, remdesivir, and baricitinib in addition to azithromycin and ceftriaxone. Recovered symptomatically.  - Covid neg 8/9  - Consider Chavo outpatient, previously discussed in  "clinic     GI  # Hiatal hernia - Asymptomatic. Tums PRN, PPI discontinued during previous admission.      CV  # Occasional PVCs- noted on EKG. QTc 450 on 7/8/22.      GYN  # H/o Menstrual bleeding   Patient had a heavy menstrual cycle during previous admission 6/2022. Resolved with PO provera 10mg daily (7/1-7/6) and plt >10k. Continues to have menstrual cycles, but are regular and off of provera.      ENDO  # Hypothyroidism - PTA Synthroid 100 mcg daily     FEN:  - IVF per chemotherapy protocol   - PRN lyte replacement  - RDAT     Prophy/Misc:  - VTE: Eliquis   - GI/PUD: Tums PRN  - Bowels: Senna and Miralax PRN  - Activity: patient independent, no PT/OT needs presently      Code: Full code, confirmed with patient on admission  Disposition: Admitted to [facility] for scheduled chemotherapy, anticipate discharge on D4 barring other clinical complications.    Follow up:  - requested labs, neulasta on 8/13  - requested 2x weekly labs, poss transfusions 8/19-9/9  - VLAD appt 8/30     Patient was seen and plan of care was discussed with attending physician Dr. Leroy  I spent 45 minutes face-to-face and/or coordinating or discussing care plan. Over 50% of our time on the unit was spent counseling the patient and/or coordinating care.       Zaira Vasquez PA-C  Hematology/Oncology  Pager # [phone]  ___________________________________________________________________    Subjective & Interval History:    No acute events noted overnight. Doing well, no complaints. Denies N/V. Appetite and energy are good. Wondering if some of her appts can be done at [facility] [place]. Will try to arrange. A comprehensive review of systems was reviewed with the patient and the pertinent positives are listed in the HPI above.      Physical Exam:    Blood pressure 118/72, pulse 88, temperature 97.9  F (36.6  C), temperature source Temporal, resp. rate 16, height 1.702 m (5' 7.01\"), weight 112.5 kg (248 lb 0.3 oz), SpO2 " 97 %.    General: alert and cooperative, lying in bed, no acute distress  HEENT: sclera anicteric, EOMI, MMM  Neck: supple, normal ROM  CV: RRR, normal S1/S2, no m/r/g  Resp: CTAB, no wheezing/crackles, normal respiratory effort on ambient air  GI: soft, non-tender, non-distended, bowel sounds present and normoactive  MSK: warm and well-perfused, no edema  Skin: no rashes on limited exam, no jaundice  Neuro: AOx3, moves all extremities equally, no focal deficits    Labs & Studies: I personally reviewed the following studies:  ROUTINE LABS (Last four results):  CMP  Recent Labs   Lab 08/10/22  0533 08/09/22  1502    140   POTASSIUM 4.5 3.6   CHLORIDE 105 106   CO2 23 24   ANIONGAP 11 10   * 112*   BUN 13.8 11.9   CR 0.53 0.61   GFRESTIMATED >90 >90   MICHAEL 9.7 9.4   MAG 2.2 2.1   PHOS 3.9 3.7   PROTTOTAL 7.0 7.0   ALBUMIN 4.2 4.3   BILITOTAL 0.7 0.7   ALKPHOS 53 52   AST 22 26   ALT 53* 57*     CBC  Recent Labs   Lab 08/10/22  0533 08/09/22  1502   WBC 11.3* 8.9   RBC 3.78* 3.81   HGB 12.4 12.1   HCT 38.0 38.4   * 101*   MCH 32.8 31.8   MCHC 32.6 31.5   RDW 15.7* 15.9*    195     INR  Recent Labs   Lab 08/09/22  1000   INR 0.98       Microbiology  No results for input(s): LACT in the last 168 hours.    Pertinent Imaging    Medications list for reference:  Current Facility-Administered Medications   Medication     0.9% sodium chloride BOLUS     acetaminophen (TYLENOL) tablet 650 mg     acyclovir (ZOVIRAX) tablet 400 mg     albuterol (PROVENTIL HFA/VENTOLIN HFA) inhaler     albuterol (PROVENTIL) neb solution 2.5 mg     apixaban ANTICOAGULANT (ELIQUIS) tablet 5 mg     cytarabine (PF) (CYTOSAR) 6,930 mg in D5W 344 mL infusion     dexamethasone (DECADRON) tablet 12 mg     [START ON 8/12/2022] dexamethasone (DECADRON) tablet 8 mg     diphenhydrAMINE (BENADRYL) injection 50 mg     EPINEPHrine PF (ADRENALIN) injection 0.3 mg     famotidine (PEPCID) infusion 20 mg     heparin 100 UNIT/ML injection  5-10 mL     heparin lock flush 10 UNIT/ML injection 5-10 mL     levothyroxine (SYNTHROID/LEVOTHROID) tablet 100 mcg     LORazepam (ATIVAN) injection 0.5-1 mg     LORazepam (ATIVAN) tablet 0.5-1 mg     magnesium oxide (MAG-OX) tablet 400 mg     meperidine (DEMEROL) injection 25 mg     methylPREDNISolone sodium succinate (solu-MEDROL) injection 125 mg     multivitamin w/minerals (THERA-VIT-M) tablet 1 tablet     naloxone (NARCAN) injection 0.2 mg    Or     naloxone (NARCAN) injection 0.4 mg    Or     naloxone (NARCAN) injection 0.2 mg    Or     naloxone (NARCAN) injection 0.4 mg     No rectal suppositories if WBC less than 1000/microliters or platelets less than 50,000/ L     ondansetron (ZOFRAN) injection 8 mg    Or     ondansetron (ZOFRAN ODT) ODT tab 8 mg    Or     ondansetron (ZOFRAN) tablet 8 mg     ondansetron (ZOFRAN) tablet 8 mg     prednisoLONE acetate (PRED FORTE) 1 % ophthalmic susp 2 drop     prochlorperazine (COMPAZINE) tablet 5 mg    Or     prochlorperazine (COMPAZINE) injection 5 mg     sodium chloride (PF) 0.9% PF flush 10-20 mL     sodium chloride (PF) 0.9% PF flush 10-20 mL     sodium chloride (PF) 0.9% PF flush 10-20 mL     vancomycin (VANCOCIN) capsule 125 mg     vitamin C (ASCORBIC ACID) tablet 2,000 mg     Vitamin D3 (CHOLECALCIFEROL) tablet 100 mcg     zinc sulfate (ZINCATE) capsule 220 mg

## 2022-08-10 NOTE — PROGRESS NOTES
Care Management Follow Up    Length of Stay (days): 1    Expected Discharge Date: 08/12/2022     Concerns to be Addressed:  OP labs & PRN infusion appointments   Patient plan of care discussed at interdisciplinary rounds: Yes    Anticipated Discharge Disposition:   home       Anticipated Discharge Services:  OP labs & infusions       Anticipated Discharge DME: none noted     Patient/family educated on Medicare website which has current facility and service quality ratings:  n/a  Education Provided on the Discharge Plan:  yes  Patient/Family in Agreement with the Plan:  yes    Referrals Placed by CM/SW:  [facility]  Private pay costs discussed: Not applicable    Additional Information:  Per provider, patient will need outpatient labs and PRN infusions done.   Writer spoke with Dusty at [facility]. Dusty explained that a nurse may be able to come out to patient's home and do labs and PRN infusions, or patient could come into their clinic. Dusty stated he will first need to verify patient is covered by insurance. Dusty also requested that insurance will need an order for port care.    Writer informed provider of updates.    RNCC/Social Work team will continue to follow patient for any discharge needs.     New order, including the port cares, has been faxed.      [facility] ([facility] [place])  [address] [place] [address], [place], [place]  phone: [phone]  fax number: [phone]    Lacey Raza RN, BSN  Care Coordinator 7D  Office: [phone]  Pager: [phone]    To contact the weekend RNCC  [place] (0800 - 1630) Saturday and Sunday    Units: 4A, 4C, 4E, 5A and 5B- Pager 1: [phone]    Units: 6A, 6B, 6C, 6D- Pager 2: [phone]    Units: 7A, 7B, 7C, 7D, and 5C-Pager 3: [phone]

## 2022-08-10 NOTE — PROGRESS NOTES
Nursing Focus: Chemotherapy     D: Positive blood return via Port. Insertion site is clean/dry/intact, dressing intact with no complaints of pain.  Urine output is recorded in intake in Doc Flowsheet.       I: Premedications given per order (see electronic medical administration record). Cerebellar Toxicity Assessment unremarkable. Dose #2 of HD Cytarabine started to infuse over 3 hours. Reviewed pt teaching on chemotherapy side effects.  Pt denies need for further teaching. Chemotherapy double checked per protocol by two chemotherapy competent RN's.      A: Tolerating chemotherapy well. Denies nausea and or pain.      P: Continue to monitor urine output and symptoms of nausea. Screen for symptoms of toxicity.

## 2022-08-11 ENCOUNTER — HOME INFUSION (PRE-WILLOW HOME INFUSION) (OUTPATIENT)
Dept: PHARMACY | Facility: CLINIC | Age: 37
End: 2022-08-11

## 2022-08-11 LAB
ALBUMIN SERPL BCG-MCNC: 4.3 G/DL (ref 3.5–5.2)
ALP SERPL-CCNC: 53 U/L (ref 35–104)
ALT SERPL W P-5'-P-CCNC: 45 U/L (ref 10–35)
ANION GAP SERPL CALCULATED.3IONS-SCNC: 10 MMOL/L (ref 7–15)
AST SERPL W P-5'-P-CCNC: 15 U/L (ref 10–35)
BASOPHILS # BLD AUTO: 0 10E3/UL (ref 0–0.2)
BASOPHILS NFR BLD AUTO: 0 %
BILIRUB SERPL-MCNC: 0.9 MG/DL
BUN SERPL-MCNC: 16.6 MG/DL (ref 6–20)
CALCIUM SERPL-MCNC: 9.4 MG/DL (ref 8.6–10)
CHLORIDE SERPL-SCNC: 105 MMOL/L (ref 98–107)
CREAT SERPL-MCNC: 0.51 MG/DL (ref 0.51–0.95)
DEPRECATED HCO3 PLAS-SCNC: 22 MMOL/L (ref 22–29)
EOSINOPHIL # BLD AUTO: 0 10E3/UL (ref 0–0.7)
EOSINOPHIL NFR BLD AUTO: 0 %
ERYTHROCYTE [DISTWIDTH] IN BLOOD BY AUTOMATED COUNT: 15.5 % (ref 10–15)
GFR SERPL CREATININE-BSD FRML MDRD: >90 ML/MIN/1.73M2
GLUCOSE SERPL-MCNC: 144 MG/DL (ref 70–99)
HCT VFR BLD AUTO: 37.3 % (ref 35–47)
HGB BLD-MCNC: 12.2 G/DL (ref 11.7–15.7)
IMM GRANULOCYTES # BLD: 0.1 10E3/UL
IMM GRANULOCYTES NFR BLD: 1 %
LYMPHOCYTES # BLD AUTO: 0.3 10E3/UL (ref 0.8–5.3)
LYMPHOCYTES NFR BLD AUTO: 3 %
MAGNESIUM SERPL-MCNC: 2.2 MG/DL (ref 1.7–2.3)
MCH RBC QN AUTO: 32.4 PG (ref 26.5–33)
MCHC RBC AUTO-ENTMCNC: 32.7 G/DL (ref 31.5–36.5)
MCV RBC AUTO: 99 FL (ref 78–100)
MONOCYTES # BLD AUTO: 0.2 10E3/UL (ref 0–1.3)
MONOCYTES NFR BLD AUTO: 2 %
NEUTROPHILS # BLD AUTO: 9.2 10E3/UL (ref 1.6–8.3)
NEUTROPHILS NFR BLD AUTO: 94 %
NRBC # BLD AUTO: 0 10E3/UL
NRBC BLD AUTO-RTO: 0 /100
PHOSPHATE SERPL-MCNC: 3.8 MG/DL (ref 2.5–4.5)
PLATELET # BLD AUTO: 168 10E3/UL (ref 150–450)
POTASSIUM SERPL-SCNC: 4.1 MMOL/L (ref 3.4–5.3)
PROT SERPL-MCNC: 7.1 G/DL (ref 6.4–8.3)
RBC # BLD AUTO: 3.76 10E6/UL (ref 3.8–5.2)
SODIUM SERPL-SCNC: 137 MMOL/L (ref 136–145)
WBC # BLD AUTO: 9.8 10E3/UL (ref 4–11)

## 2022-08-11 PROCEDURE — 250N000011 HC RX IP 250 OP 636: Performed by: PHYSICIAN ASSISTANT

## 2022-08-11 PROCEDURE — 250N000011 HC RX IP 250 OP 636: Performed by: INTERNAL MEDICINE

## 2022-08-11 PROCEDURE — 120N000002 HC R&B MED SURG/OB UMMC

## 2022-08-11 PROCEDURE — 99356 PR PROLONGED SERV,INPATIENT,1ST HR: CPT | Performed by: PHYSICIAN ASSISTANT

## 2022-08-11 PROCEDURE — 80053 COMPREHEN METABOLIC PANEL: CPT | Performed by: PHYSICIAN ASSISTANT

## 2022-08-11 PROCEDURE — 99233 SBSQ HOSP IP/OBS HIGH 50: CPT | Performed by: PHYSICIAN ASSISTANT

## 2022-08-11 PROCEDURE — 85025 COMPLETE CBC W/AUTO DIFF WBC: CPT | Performed by: PHYSICIAN ASSISTANT

## 2022-08-11 PROCEDURE — 83735 ASSAY OF MAGNESIUM: CPT | Performed by: PHYSICIAN ASSISTANT

## 2022-08-11 PROCEDURE — 250N000013 HC RX MED GY IP 250 OP 250 PS 637: Performed by: PHYSICIAN ASSISTANT

## 2022-08-11 PROCEDURE — 36591 DRAW BLOOD OFF VENOUS DEVICE: CPT | Performed by: PHYSICIAN ASSISTANT

## 2022-08-11 PROCEDURE — 82040 ASSAY OF SERUM ALBUMIN: CPT | Performed by: PHYSICIAN ASSISTANT

## 2022-08-11 PROCEDURE — 250N000013 HC RX MED GY IP 250 OP 250 PS 637

## 2022-08-11 PROCEDURE — 84100 ASSAY OF PHOSPHORUS: CPT | Performed by: PHYSICIAN ASSISTANT

## 2022-08-11 PROCEDURE — 258N000003 HC RX IP 258 OP 636: Performed by: INTERNAL MEDICINE

## 2022-08-11 RX ORDER — ACYCLOVIR 400 MG/1
400 TABLET ORAL 2 TIMES DAILY
Qty: 60 TABLET | Refills: 0 | Status: SHIPPED | OUTPATIENT
Start: 2022-08-11 | End: 2022-09-12

## 2022-08-11 RX ADMIN — APIXABAN 5 MG: 5 TABLET, FILM COATED ORAL at 20:31

## 2022-08-11 RX ADMIN — Medication 400 MG: at 20:31

## 2022-08-11 RX ADMIN — ONDANSETRON HYDROCHLORIDE 8 MG: 8 TABLET, FILM COATED ORAL at 05:28

## 2022-08-11 RX ADMIN — ONDANSETRON HYDROCHLORIDE 8 MG: 8 TABLET, FILM COATED ORAL at 17:24

## 2022-08-11 RX ADMIN — DEXAMETHASONE 12 MG: 4 TABLET ORAL at 14:44

## 2022-08-11 RX ADMIN — SODIUM CHLORIDE, PRESERVATIVE FREE 5 ML: 5 INJECTION INTRAVENOUS at 09:36

## 2022-08-11 RX ADMIN — PREDNISOLONE ACETATE 2 DROP: 10 SUSPENSION/ DROPS OPHTHALMIC at 21:09

## 2022-08-11 RX ADMIN — ZINC SULFATE 220 MG (50 MG) CAPSULE 220 MG: CAPSULE at 20:30

## 2022-08-11 RX ADMIN — ACYCLOVIR 400 MG: 400 TABLET ORAL at 08:25

## 2022-08-11 RX ADMIN — VANCOMYCIN HYDROCHLORIDE 125 MG: 125 CAPSULE ORAL at 08:25

## 2022-08-11 RX ADMIN — SODIUM CHLORIDE, PRESERVATIVE FREE 5 ML: 5 INJECTION INTRAVENOUS at 21:50

## 2022-08-11 RX ADMIN — LEVOTHYROXINE SODIUM 100 MCG: 100 TABLET ORAL at 05:28

## 2022-08-11 RX ADMIN — CYTARABINE 6930 MG: 100 INJECTION, SOLUTION INTRATHECAL; INTRAVENOUS; SUBCUTANEOUS at 05:59

## 2022-08-11 RX ADMIN — PREDNISOLONE ACETATE 2 DROP: 10 SUSPENSION/ DROPS OPHTHALMIC at 16:04

## 2022-08-11 RX ADMIN — VANCOMYCIN HYDROCHLORIDE 125 MG: 125 CAPSULE ORAL at 20:31

## 2022-08-11 RX ADMIN — PREDNISOLONE ACETATE 2 DROP: 10 SUSPENSION/ DROPS OPHTHALMIC at 08:24

## 2022-08-11 RX ADMIN — PREDNISOLONE ACETATE 2 DROP: 10 SUSPENSION/ DROPS OPHTHALMIC at 12:00

## 2022-08-11 RX ADMIN — Medication 1 TABLET: at 20:30

## 2022-08-11 RX ADMIN — CYTARABINE 6930 MG: 100 INJECTION, SOLUTION INTRATHECAL; INTRAVENOUS; SUBCUTANEOUS at 18:06

## 2022-08-11 RX ADMIN — OXYCODONE HYDROCHLORIDE AND ACETAMINOPHEN 2000 MG: 500 TABLET ORAL at 20:31

## 2022-08-11 RX ADMIN — Medication 100 MCG: at 08:25

## 2022-08-11 RX ADMIN — APIXABAN 5 MG: 5 TABLET, FILM COATED ORAL at 08:25

## 2022-08-11 RX ADMIN — ACYCLOVIR 400 MG: 400 TABLET ORAL at 20:31

## 2022-08-11 ASSESSMENT — ACTIVITIES OF DAILY LIVING (ADL)
ADLS_ACUITY_SCORE: 18

## 2022-08-11 NOTE — PROGRESS NOTES
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     Plan for today  - Day 2 HiDAC, anticipate discharge 8/12 AM  - Arranged labs, neulasta at the [facility] [place] on 8/13. RNCC is working on arranging [facility] to come to her house for labs, then to receive transfusion support if parameters met at [place] (request pending, keeping other appts as is for now)    # AML, 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 Earl. Presented to routine exam with cytopenias. BMBx 6/17/2022 - hypercellular marrow (85-95% cellularity) with 92% blasts. 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 Started induction chemotherapy with 7+3 and midostaurin on 6/22/2022. D21 BMBx (7/12) with robust marrow regeneration, repeat BMBx 7/22 with 70-80% cellularity and 6% blasts by morphology consisted with complete response per Dr. Earl. NGS with no detectable mutations (MRD negative). BMT consulted 7/15; recommend consolidation chemotherapy, could consider BMT in the future if relapses. Tentatively planning for 3-4 cycles consolidation HiDAC (D1-3) and midostaurin (D8-21), followed by maintenance midostaurin for 1 year. Admitted for Cycle 1 consolidation with HiDAC and midostaurin on 8/9/22.   - Free drug coverage approved for midostaurin through [employer] - will be delivered to her home on 8/15  - Port placed 8/9 with IR  - Patient in MRD negative CR, no indication for allopurinol or TLS monitoring.   - Plan to recheck NPM1  mutation after C1 consolidation HiDAC and midostaurin.                   Treatment Plan: HiDAC +Midostaurin C1D1=8/9/22               - Cytarabine 3 g/m2 (6930mg) q12h x6 doses - D1,2,3               - Midostaurin 50mg BID x14 days - D8-21 (8/16-through 8/29)               - Supportive medications;     Pred forte eye drops QID D1-5 -- will need rx on discharge    Dexamethasone 12mg PO q24h D1-3    Zofran 8mg q12h x6 doses D1-3    Neulasta 6mg - scheduled outpatient on 8/13     # LUE DVT (PICC-associated)  LUE US 7/12/22; Nonocclusive DVT in the left axillary vein and one of the paired left brachial veins, superficial venous thrombosis in the left basilic vein. PICC-associated, was removed prior to discharge on 7/15/22. Discharged on lovenox 1mg/kg x7/12. Repeat US 7/21 negative for DVT. She has remained on lovenox 1mg/kg BID while awaiting a copay card for Apixaban, which has not arrived yet as of 8/9.  - Transition to Apixaban x8/9 (using a $10/month copay card which was given to patient this admission) -- will need to fill rx on day of discharge (ordered)  - Per Dr. Earl, continue Apixaban for 3 months (through ~10/13/2022), hold if plt <50k     ID  # ID PPX  - ACV 400mg BID -- needs refills on discharge (ordered)  - No indication for antibacterial or antifungal ppx with ANC >1000.  - Note; if she were to need antifungal ppx in the future will reach out to pharmacy liaison because prior to meeting her deductible the cost was $2048/month for posaconazole and $176/month for Voriconazole.      # History of recurrent Cdiff colitis  - PTA ppx Vancomycin 125mg BID PO      # h/o COVID-19 (Oct 2021)  Pt is not vaccinated nor interested in being vaccinated. She was admitted from 10/1/2021 - 10/13/2021 for acute hypoxic respiratory failure from COVID-19 pneumonia.  Required IMC, high flow and intermittent CPAP. She was treated with dexamethasone, remdesivir, and baricitinib in addition to azithromycin and  ceftriaxone. Recovered symptomatically.  - Covid neg 8/9  - Consider Chavo outpatient, previously discussed in clinic     GI  # Hiatal hernia - Asymptomatic. Tums PRN, PPI discontinued during previous admission.      CV  # Occasional PVCs- noted on EKG. QTc 450 on 7/8/22.      GYN  # H/o Menstrual bleeding   Patient had a heavy menstrual cycle during previous admission 6/2022. Resolved with PO provera 10mg daily (7/1-7/6) and plt >10k. Continues to have menstrual cycles, but are regular and off of provera.      ENDO  # Hypothyroidism - PTA Synthroid 100 mcg daily. Recheck TSH 8/12 AM.      FEN:  - IVF per chemotherapy protocol   - PRN lyte replacement  - RDAT     Prophy/Misc:  - VTE: Eliquis   - GI/PUD: Tums PRN  - Bowels: Senna and Miralax PRN  - Activity: patient independent, no PT/OT needs presently      Code: Full code, confirmed with patient on admission  Disposition: Admitted to [facility] for scheduled chemotherapy, anticipate discharge on D4 barring other clinical complications.    Follow up:  - Labs, neulasta on 8/13 at [facility] [place] [place]  - Pt requested to go to [facility] for labs/possible transfusions, but they were too booked so [facility] [place] [place] has supplemented. [facility] only able to schedule on 8/25 and 8/26. Inpt RNCC is looking into availability for home infusion to follow her twice weekly (pending) requested 2x weekly labs  - VLAD appt 8/30 -- will likely change video visit if able to get labs closer to home     Patient was seen and plan of care was discussed with attending physician Dr. Leroy  I spent 45 minutes face-to-face and/or coordinating or discussing care plan. Over 50% of our time on the unit was spent counseling the patient and/or coordinating care.       Zaira Vasquez PA-C  Hematology/Oncology  Pager # [phone]  ___________________________________________________________________    Subjective & Interval History:    No acute events noted overnight. Doing well, no complaints. Denies N/V. Appetite and  "energy are good. Up walking around independently. A comprehensive review of systems was reviewed with the patient and the pertinent positives are listed in the HPI above.      Physical Exam:    Blood pressure 119/69, pulse 90, temperature 96.8  F (36  C), temperature source Oral, resp. rate 18, height 1.702 m (5' 7.01\"), weight 112.2 kg (247 lb 5.7 oz), SpO2 100 %.    General: pleasant female, alert and cooperative, sitting up in chair, no acute distress  HEENT: sclera anicteric, EOMI, MMM  Neck: supple, normal ROM  CV: RRR, normal S1/S2, no m/r/g  Resp: CTAB, no wheezing/crackles, normal respiratory effort on ambient air  GI: soft, non-tender, non-distended, bowel sounds present and normoactive  MSK: warm and well-perfused, no edema  Skin: no rashes on limited exam, no jaundice  Neuro: AOx3, moves all extremities equally, no focal deficits    Labs & Studies: I personally reviewed the following studies:  ROUTINE LABS (Last four results):  CMP  Recent Labs   Lab 08/11/22  0702 08/10/22  0533 08/09/22  1502    139 140   POTASSIUM 4.1 4.5 3.6   CHLORIDE 105 105 106   CO2 22 23 24   ANIONGAP 10 11 10   * 141* 112*   BUN 16.6 13.8 11.9   CR 0.51 0.53 0.61   GFRESTIMATED >90 >90 >90   MICHAEL 9.4 9.7 9.4   MAG 2.2 2.2 2.1   PHOS 3.8 3.9 3.7   PROTTOTAL 7.1 7.0 7.0   ALBUMIN 4.3 4.2 4.3   BILITOTAL 0.9 0.7 0.7   ALKPHOS 53 53 52   AST 15 22 26   ALT 45* 53* 57*     CBC  Recent Labs   Lab 08/11/22  0702 08/10/22  0533 08/09/22  1502   WBC 9.8 11.3* 8.9   RBC 3.76* 3.78* 3.81   HGB 12.2 12.4 12.1   HCT 37.3 38.0 38.4   MCV 99 101* 101*   MCH 32.4 32.8 31.8   MCHC 32.7 32.6 31.5   RDW 15.5* 15.7* 15.9*    187 195     INR  Recent Labs   Lab 08/09/22  1000   INR 0.98       Microbiology  No results for input(s): LACT in the last 168 hours.      Medications list for reference:  Current Facility-Administered Medications   Medication     0.9% sodium chloride BOLUS     acetaminophen (TYLENOL) tablet 650 mg     " acyclovir (ZOVIRAX) tablet 400 mg     albuterol (PROVENTIL HFA/VENTOLIN HFA) inhaler     albuterol (PROVENTIL) neb solution 2.5 mg     apixaban ANTICOAGULANT (ELIQUIS) tablet 5 mg     cytarabine (PF) (CYTOSAR) 6,930 mg in D5W 344 mL infusion     [START ON 8/12/2022] dexamethasone (DECADRON) tablet 8 mg     diphenhydrAMINE (BENADRYL) injection 50 mg     EPINEPHrine PF (ADRENALIN) injection 0.3 mg     famotidine (PEPCID) infusion 20 mg     heparin 100 UNIT/ML injection 5-10 mL     heparin lock flush 10 UNIT/ML injection 5-10 mL     levothyroxine (SYNTHROID/LEVOTHROID) tablet 100 mcg     LORazepam (ATIVAN) injection 0.5-1 mg     LORazepam (ATIVAN) tablet 0.5-1 mg     magnesium oxide (MAG-OX) tablet 400 mg     meperidine (DEMEROL) injection 25 mg     methylPREDNISolone sodium succinate (solu-MEDROL) injection 125 mg     multivitamin w/minerals (THERA-VIT-M) tablet 1 tablet     naloxone (NARCAN) injection 0.2 mg    Or     naloxone (NARCAN) injection 0.4 mg    Or     naloxone (NARCAN) injection 0.2 mg    Or     naloxone (NARCAN) injection 0.4 mg     No rectal suppositories if WBC less than 1000/microliters or platelets less than 50,000/ L     ondansetron (ZOFRAN) injection 8 mg    Or     ondansetron (ZOFRAN ODT) ODT tab 8 mg    Or     ondansetron (ZOFRAN) tablet 8 mg     ondansetron (ZOFRAN) tablet 8 mg     prednisoLONE acetate (PRED FORTE) 1 % ophthalmic susp 2 drop     prochlorperazine (COMPAZINE) tablet 5 mg    Or     prochlorperazine (COMPAZINE) injection 5 mg     sodium chloride (PF) 0.9% PF flush 10-20 mL     sodium chloride (PF) 0.9% PF flush 10-20 mL     sodium chloride (PF) 0.9% PF flush 10-20 mL     vancomycin (VANCOCIN) capsule 125 mg     vitamin C (ASCORBIC ACID) tablet 2,000 mg     Vitamin D3 (CHOLECALCIFEROL) tablet 100 mcg     zinc sulfate (ZINCATE) capsule 220 mg

## 2022-08-11 NOTE — PLAN OF CARE
Goal Outcome Evaluation:    A&O x4, AVSS on RA. Denies pain, N/V, or SOB. Dose #5 of cytarabine hung at 1800, last dose will be at 0600 and then patient will discharge before 11am. Cerebellar exam unchanged. Up independently, family visited today. LBM 8/10.

## 2022-08-11 NOTE — PROGRESS NOTES
Therapy: Portcare and Lab Draws  Insurance: [facility]  Ded: $3200  Met: $3200    The patient should be covered at 100% for Portcare and Lab draws.       Please contact Intake with any questions, [phone] or In Basket pool, [facility] ([phone]).

## 2022-08-11 NOTE — PROGRESS NOTES
Labs and Transfusion orders:  Date: 2022    Patient: Veda Marinelli  : 1985    Medication administration:  [ x ] Neulasta (pegfilgastim) injection 6 mg x1 subcutaneous on 22.    LABS:  [ x ] Check CBC with differential and CMP on 22    Please call the [facility] at [phone] for any questions, and ask to speak with the care coordinator for Dr. Earl (patient's primary oncologist).    Thank you,         Zaira Vasquez PA-C  Hematology/Oncology  Pager # [phone]

## 2022-08-11 NOTE — TELEPHONE ENCOUNTER
[employer] appeal in review [facility] [phone].      Farida Pichardo  Oncology Pharmacy Liaison II  [email or web address]  Phone: [phone]  Fax: [phone]

## 2022-08-11 NOTE — PROGRESS NOTES
Care Management Follow Up    Writer spoke with [facility] liaison, Katherine. Katherine confirmed that the labs and transfusions this patient needs are not covered under home care services.    Katherine stated if the patient has a provider with privileges at [facility] or [facility] in [place], the patient could likely have her labs and PRN transfusions done there.      Writer left message at [facility], [facility], to verify if patient's PCP, Maria Eugenia Villar has privileges at either of these locations.    Writer notified provider.     RNCC team will continue to follow this patient for discharge planning.     [facility]  PCP: Maria Eugenia Villar  Phone: [phone]  Fax: [phone]    Lacey Raza RN, BSN  Care Coordinator 7D  Office: [phone]  Pager: [phone]    To contact the weekend RNCC  [place] (0800 - 1630) Saturday and Sunday    Units: 4A, 4C, 4E, 5A and 5B- Pager 1: [phone]    Units: 6A, 6B, 6C, 6D- Pager 2: [phone]    Units: 7A, 7B, 7C, 7D, and 5C-Pager 3: [phone]

## 2022-08-11 NOTE — PROGRESS NOTES
Nursing Focus: Chemotherapy     D: Positive blood return via Port. Insertion site is clean/dry/intact, dressing intact with no complaints of pain.  Urine output is recorded in intake in Doc Flowsheet.       I: Premedications given per order (see electronic medical administration record). Cerebellar Neurotoxicity Assessment unremarkable. Dose #4 of HD Cytarabine started to infuse over 3 hours. Reviewed pt teaching on chemotherapy side effects.  Pt denies need for further teaching. Chemotherapy double checked per protocol by two chemotherapy competent RN's.      A: Tolerating chemotherapy well. Denies nausea and or pain.      P: Continue to monitor urine output and symptoms of nausea. Screen for symptoms of toxicity.

## 2022-08-11 NOTE — PLAN OF CARE
"Goal Outcome Evaluation:  [id number]  /63 (BP Location: Right arm)   Pulse 64   Temp (!) 96.1  F (35.6  C) (Oral)   Resp 18   Ht 1.702 m (5' 7.01\")   Wt 112.2 kg (247 lb 5.7 oz)   SpO2 97%   BMI 38.73 kg/m    Denies pain, nausea, SOB. Dose #3 cytarabine completed without incident. Dose #4 infusing now. Good BR via Port. No acute events.      "

## 2022-08-11 NOTE — PROGRESS NOTES
Care Management Follow Up    Length of Stay (days): 2    Expected Discharge Date: 08/12/2022     Concerns to be Addressed: appointments  Patient plan of care discussed at interdisciplinary rounds: Yes    Anticipated Discharge Disposition: Home     Anticipated Discharge Services:  FAHEEM Barbosa RN, labs, and PRN infusions  Anticipated Discharge DME: none noted    Patient/family educated on Medicare website which has current facility and service quality ratings: yes   Education Provided on the Discharge Plan:  yes  Patient/Family in Agreement with the Plan: yes      Referrals Placed by CM/SW:  [facility]  Private pay costs discussed: Not applicable    Additional Information:  Writer spoke with Ezio at [facility] in [place]. Ezio stated patient is covered and has met the deductable with her insurance company. Ezio stated either herself or Dusty will refer the order to scheduling department, and will contact me today with appointments, per order.  Dusty said they can often send a nurse to the patient's home to do the port cares and labs.   Writer updated provider and patient.     Writer faxed over new orders to [facility] per request. Fax: [phone]    RNCC team will continue to follow this patient to assist with discharge planning.    [facility] ([facility], [employer])  [address] [place] [address], [place], [place]  phone: [phone]  fax number: [phone]     Lacey Raza RN, BSN  Care Coordinator 7D  Office: [phone]  Pager: [phone]    To contact the weekend RNCC  [place] (0800 - 1630) Saturday and Sunday    Units: 4A, 4C, 4E, 5A and 5B- Pager 1: [phone]    Units: 6A, 6B, 6C, 6D- Pager 2: [phone]    Units: 7A, 7B, 7C, 7D, and 5C-Pager 3: [phone]

## 2022-08-12 VITALS
BODY MASS INDEX: 38.82 KG/M2 | WEIGHT: 247.36 LBS | SYSTOLIC BLOOD PRESSURE: 95 MMHG | OXYGEN SATURATION: 98 % | HEART RATE: 54 BPM | HEIGHT: 67 IN | RESPIRATION RATE: 18 BRPM | DIASTOLIC BLOOD PRESSURE: 46 MMHG | TEMPERATURE: 96.1 F

## 2022-08-12 LAB
ALBUMIN SERPL BCG-MCNC: 4.2 G/DL (ref 3.5–5.2)
ALP SERPL-CCNC: 46 U/L (ref 35–104)
ALT SERPL W P-5'-P-CCNC: 41 U/L (ref 10–35)
ANION GAP SERPL CALCULATED.3IONS-SCNC: 12 MMOL/L (ref 7–15)
AST SERPL W P-5'-P-CCNC: 20 U/L (ref 10–35)
BASOPHILS # BLD MANUAL: 0 10E3/UL (ref 0–0.2)
BASOPHILS NFR BLD MANUAL: 0 %
BILIRUB SERPL-MCNC: 1 MG/DL
BUN SERPL-MCNC: 14.2 MG/DL (ref 6–20)
CALCIUM SERPL-MCNC: 9.2 MG/DL (ref 8.6–10)
CHLORIDE SERPL-SCNC: 105 MMOL/L (ref 98–107)
CREAT SERPL-MCNC: 0.46 MG/DL (ref 0.51–0.95)
DEPRECATED HCO3 PLAS-SCNC: 21 MMOL/L (ref 22–29)
EOSINOPHIL # BLD MANUAL: 0.1 10E3/UL (ref 0–0.7)
EOSINOPHIL NFR BLD MANUAL: 1 %
ERYTHROCYTE [DISTWIDTH] IN BLOOD BY AUTOMATED COUNT: 15.3 % (ref 10–15)
GFR SERPL CREATININE-BSD FRML MDRD: >90 ML/MIN/1.73M2
GLUCOSE SERPL-MCNC: 115 MG/DL (ref 70–99)
HCT VFR BLD AUTO: 36.1 % (ref 35–47)
HGB BLD-MCNC: 11.6 G/DL (ref 11.7–15.7)
LYMPHOCYTES # BLD MANUAL: 0.1 10E3/UL (ref 0.8–5.3)
LYMPHOCYTES NFR BLD MANUAL: 1 %
MCH RBC QN AUTO: 32.3 PG (ref 26.5–33)
MCHC RBC AUTO-ENTMCNC: 32.1 G/DL (ref 31.5–36.5)
MCV RBC AUTO: 101 FL (ref 78–100)
MONOCYTES # BLD MANUAL: 0 10E3/UL (ref 0–1.3)
MONOCYTES NFR BLD MANUAL: 0 %
NEUTROPHILS # BLD MANUAL: 6.5 10E3/UL (ref 1.6–8.3)
NEUTROPHILS NFR BLD MANUAL: 98 %
PHOSPHATE SERPL-MCNC: 3.1 MG/DL (ref 2.5–4.5)
PLAT MORPH BLD: ABNORMAL
PLATELET # BLD AUTO: 144 10E3/UL (ref 150–450)
POTASSIUM SERPL-SCNC: 4.2 MMOL/L (ref 3.4–5.3)
PROT SERPL-MCNC: 6.8 G/DL (ref 6.4–8.3)
RBC # BLD AUTO: 3.59 10E6/UL (ref 3.8–5.2)
RBC MORPH BLD: ABNORMAL
SODIUM SERPL-SCNC: 138 MMOL/L (ref 136–145)
T4 FREE SERPL-MCNC: 1.51 NG/DL (ref 0.9–1.7)
TSH SERPL DL<=0.005 MIU/L-ACNC: 0.12 UIU/ML (ref 0.3–4.2)
WBC # BLD AUTO: 6.6 10E3/UL (ref 4–11)

## 2022-08-12 PROCEDURE — 36591 DRAW BLOOD OFF VENOUS DEVICE: CPT | Performed by: PHYSICIAN ASSISTANT

## 2022-08-12 PROCEDURE — 80053 COMPREHEN METABOLIC PANEL: CPT | Performed by: PHYSICIAN ASSISTANT

## 2022-08-12 PROCEDURE — 250N000011 HC RX IP 250 OP 636: Performed by: PHYSICIAN ASSISTANT

## 2022-08-12 PROCEDURE — 99239 HOSP IP/OBS DSCHRG MGMT >30: CPT | Performed by: PHYSICIAN ASSISTANT

## 2022-08-12 PROCEDURE — 84439 ASSAY OF FREE THYROXINE: CPT | Performed by: PHYSICIAN ASSISTANT

## 2022-08-12 PROCEDURE — 84443 ASSAY THYROID STIM HORMONE: CPT | Performed by: PHYSICIAN ASSISTANT

## 2022-08-12 PROCEDURE — 258N000003 HC RX IP 258 OP 636: Performed by: INTERNAL MEDICINE

## 2022-08-12 PROCEDURE — 84100 ASSAY OF PHOSPHORUS: CPT | Performed by: PHYSICIAN ASSISTANT

## 2022-08-12 PROCEDURE — 250N000013 HC RX MED GY IP 250 OP 250 PS 637: Performed by: PHYSICIAN ASSISTANT

## 2022-08-12 PROCEDURE — 85027 COMPLETE CBC AUTOMATED: CPT | Performed by: PHYSICIAN ASSISTANT

## 2022-08-12 PROCEDURE — 250N000011 HC RX IP 250 OP 636: Performed by: INTERNAL MEDICINE

## 2022-08-12 PROCEDURE — 85007 BL SMEAR W/DIFF WBC COUNT: CPT | Performed by: PHYSICIAN ASSISTANT

## 2022-08-12 RX ORDER — PREDNISOLONE ACETATE 10 MG/ML
2 SUSPENSION/ DROPS OPHTHALMIC 4 TIMES DAILY
Status: ON HOLD | COMMUNITY
Start: 2022-08-12 | End: 2022-09-12

## 2022-08-12 RX ORDER — DEXAMETHASONE 4 MG/1
8 TABLET ORAL EVERY MORNING
Qty: 2 TABLET | Refills: 0 | Status: SHIPPED | OUTPATIENT
Start: 2022-08-13 | End: 2022-09-12

## 2022-08-12 RX ADMIN — APIXABAN 5 MG: 5 TABLET, FILM COATED ORAL at 09:04

## 2022-08-12 RX ADMIN — DEXAMETHASONE 8 MG: 4 TABLET ORAL at 09:04

## 2022-08-12 RX ADMIN — PREDNISOLONE ACETATE 2 DROP: 10 SUSPENSION/ DROPS OPHTHALMIC at 09:05

## 2022-08-12 RX ADMIN — SODIUM CHLORIDE, PRESERVATIVE FREE 3 ML: 5 INJECTION INTRAVENOUS at 07:16

## 2022-08-12 RX ADMIN — ONDANSETRON HYDROCHLORIDE 8 MG: 8 TABLET, FILM COATED ORAL at 05:06

## 2022-08-12 RX ADMIN — Medication 5 ML: at 09:32

## 2022-08-12 RX ADMIN — CYTARABINE 6930 MG: 100 INJECTION, SOLUTION INTRATHECAL; INTRAVENOUS; SUBCUTANEOUS at 05:33

## 2022-08-12 RX ADMIN — ACYCLOVIR 400 MG: 400 TABLET ORAL at 09:04

## 2022-08-12 RX ADMIN — VANCOMYCIN HYDROCHLORIDE 125 MG: 125 CAPSULE ORAL at 09:04

## 2022-08-12 RX ADMIN — Medication 100 MCG: at 09:04

## 2022-08-12 RX ADMIN — LEVOTHYROXINE SODIUM 100 MCG: 100 TABLET ORAL at 05:33

## 2022-08-12 ASSESSMENT — ACTIVITIES OF DAILY LIVING (ADL)
ADLS_ACUITY_SCORE: 18

## 2022-08-12 NOTE — DISCHARGE SUMMARY
[facility]    Discharge Summary  Hematology / Oncology    Date of Admission:  8/9/2022  Date of Discharge:  8/12/2022 10:30 AM  Discharging Provider: Zaira Vasquez PA-C  Date of Service (when I saw the patient): 08/12/22    Discharge Diagnoses   Patient Active Problem List   Diagnosis     Acute myeloid leukemia in remission (H)     History of COVID-19     History of Clostridioides difficile colitis     Need for prophylactic antibiotic     Leukemia, blast cell (H)     Deep vein thrombosis (DVT) of upper extremity (H)     COVID-19 virus infection       History of Present Illness   Veda Marinelli is a 37 year old female with pertinent PMHx of recurrent Cdiff, LUE DVT (7/12/22), and 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     She received her last dose of cytarabine (6 out of 6) this morning. Overall tolerated well with no complications. No complaints today and overall feeling well. Up independently, appetite and energy are . A comprehensive review of systems was reviewed with the patient and the pertinent positives are listed in the HPI above.      Outpatient Plan:   - She will start taking Midostaurin on Day 8 and continue through Day 21 (8/16-8/29), this is being delivered to her home on 8/15.   - Dex 8mg once on 8/13 (then stop) and continue pred forte eye drops for 48 hours.   - Scheduled for Neulasta on 8/13 at [facility].   - Arranged twice weekly labs, possible transfusions to maintain plt >30k to decrease risk of bleed per Dr. Leroy. Note, she is also on Eliquis for h/o DVT, plan to hold if plt <50k.   - Messaged outpatient RNCC to monitor CBC, and notify pt if plt <50k to hold Eliquis.   - Follow ANC, if <1000 please start ppx levaquin and voriconazole (pt hasn't filled vori yet due to  high cost of about $170/month, but will be able to if warranted)  - Plan to recheck NPM1 mutation after C1 consolidation HiDAC and midostaurin.   - Tentatively planning for 3-4 cycles consolidation HiDAC (D1-3) and midostaurin (D8-21), followed by maintenance midostaurin for 1 year.     Complex Care Coordination: Patient lives several hours North of the [facility], closest [place] location with transfusion support is [facility] however they had limited availability. [place] also does labs, but doesn't have transfusions capabilities, so she will have labs on 8/16 at [place] and if meeting parameters then requested to have labs/transfusions at [facility] on 8/17 (request pending). Pt prefers to be home rather than stay at [facility], but if her transfusion needs are too high then the outpatient team will be recommending she stay at [facility] for more frequent labs/transfusions. Her case was discussed with Anuja Tripp RNCC outpatient and she will continue to follow up on this.       Hospital Course   Veda Marinelli was admitted on 8/9/2022.  The following problems were addressed during her hospitalization:    # AML, 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 Earl. Presented to routine exam with cytopenias. BMBx 6/17/2022 - hypercellular marrow (85-95% cellularity) with 92% blasts. 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 Started induction chemotherapy with 7+3 and midostaurin on 6/22/2022. D21 BMBx (7/12) with robust marrow regeneration, repeat BMBx 7/22 with 70-80% cellularity and 6% blasts by morphology consisted with complete response per Dr. Earl. NGS with no detectable mutations (MRD negative). BMT consulted 7/15; recommend consolidation chemotherapy, could consider BMT in the future if relapses. Tentatively planning for 3-4 cycles consolidation HiDAC (D1-3) and midostaurin (D8-21), followed by maintenance  midostaurin for 1 year. Admitted for Cycle 1 consolidation with HiDAC and midostaurin on 8/9/22.   - Free drug coverage approved for midostaurin through [employer] - will be delivered to her home on 8/15  - Port placed 8/9 with IR - healing well.   - Patient in MRD negative CR, no indication for allopurinol or TLS monitoring.   - Plan to recheck NPM1 mutation after C1 consolidation HiDAC and midostaurin.                   Treatment Plan: HiDAC +Midostaurin C1D1=8/9/22               - Cytarabine 3 g/m2 (6930mg) q12h x6 doses - D1,2,3               - Midostaurin 50mg BID x14 days - D8-21 (8/16-through 8/29)               - Supportive medications;     Pred forte eye drops QID D1-5 -- continue on discharge    Dexamethasone 12mg PO q24h D1-3, 8mg D4-5 -- rx for dex 8mg once on D5 sent on discharge to take on 8/13    Zofran 8mg q12h x6 doses D1-3    Neulasta 6mg - scheduled outpatient on 8/13     # LUE DVT (PICC-associated)  LUE US 7/12/22; Nonocclusive DVT in the left axillary vein and one of the paired left brachial veins, superficial venous thrombosis in the left basilic vein. PICC-associated, was removed prior to discharge on 7/15/22. Discharged on lovenox 1mg/kg x7/12. Repeat US 7/21 negative for DVT. She has remained on lovenox 1mg/kg BID while awaiting a copay card for Apixaban, which has not arrived yet as of 8/9.  - Transition to Apixaban x8/9 (using a $10/month copay card which was given to patient this admission) -- rx filled on discharge  - Per Dr. Earl, continue Apixaban for 3 months (through ~10/13/2022), hold if plt <50k     ID  # ID PPX  - ACV 400mg BID -- refilled on day of discharge  - No indication for antibacterial or antifungal ppx with ANC >1000. Continue to monitor outpatient and start levaquin and voriconazole if ANC <1000  - Note; if she were to need antifungal ppx in the future consider reaching out to pharmacy liaison because prior to meeting her deductible the cost was $2048/month for  posaconazole and $176/month for Voriconazole.      # History of recurrent Cdiff colitis  - PTA ppx Vancomycin 125mg BID PO      # h/o COVID-19 (Oct 2021)  Pt is not vaccinated nor interested in being vaccinated. She was admitted from 10/1/2021 - 10/13/2021 for acute hypoxic respiratory failure from COVID-19 pneumonia.  Required IMC, high flow and intermittent CPAP. She was treated with dexamethasone, remdesivir, and baricitinib in addition to azithromycin and ceftriaxone. Recovered symptomatically.  - Covid neg 8/9  - Consider Ev[facility]elito outpatient, previously discussed in clinic     GI  # Hiatal hernia - Asymptomatic. Tums PRN, PPI discontinued during previous admission.      CV  # Occasional PVCs- noted on EKG. QTc 450 on 7/8/22.      GYN  # H/o Menstrual bleeding   Patient had a heavy menstrual cycle during previous admission 6/2022. Resolved with PO provera 10mg daily (7/1-7/6) and plt >10k. Continues to have menstrual cycles, but are regular and off of provera.      ENDO  # Hypothyroidism - PTA Synthroid 100 mcg daily. Recheck TSH 8/12 is 0.12. Her PCP will follow up on this, continue synthroid 100mcg daily for now.      FEN:  - IVF per chemotherapy protocol   - PRN lyte replacement  - RDAT     Prophy/Misc:  - VTE: Eliquis   - GI/PUD: Tums PRN  - Bowels: Senna and Miralax PRN  - Activity: patient independent, no PT/OT needs presently      Code: Full code, confirmed with patient on admission  Disposition: Admitted to [facility] for scheduled chemotherapy, discharge home 8/12.     I spent 90 minutes face-to-face and/or coordinating or discussing care plan. Over 50% of our time on the unit was spent counseling the patient and/or coordinating care.      Zaira Reyes PA-C  Hematology/Oncology  Pager # [phone]    Significant Results and Procedures   None    Pending Results   These results will be followed up by N/A  Unresulted Labs Ordered in the Past 30 Days of this Admission     Date and Time Order Name  Status Description    7/12/2022  2:51 PM Acid-Fast Bacilli Culture and Stain In process     7/11/2022  4:38 PM Acid-fast Bacilli (AFB) Blood Culture Preliminary     6/29/2022  9:59 AM Prepare pheresed platelets (unit) Preliminary     6/29/2022  7:38 AM CONDITIONAL Prepare pheresed platelets (unit) Preliminary           Code Status   Full Code    Primary Care Physician   TATA NELSON    General: pleasant female, alert and cooperative, sitting up in chair, no acute distress  HEENT: sclera anicteric, EOMI, MMM  Neck: supple, normal ROM  CV: RRR, normal S1/S2, no m/r/g  Resp: CTAB, no wheezing/crackles, normal respiratory effort on ambient air  GI: soft, non-tender, non-distended, bowel sounds present and normoactive  MSK: warm and well-perfused, no edema  Skin: no rashes on limited exam, no jaundice  Neuro: AOx3, moves all extremities equally, no focal deficits  Port dressing with dried blood. No active bleeding, tenderness, or erythema.     Time Spent on this Encounter   IZaira PA-C, personally saw the patient today and spent greater than 30 minutes discharging this patient.    Discharge Disposition   Discharged to home  Condition at discharge: Stable    Consultations This Hospital Stay   None    Discharge Orders      Home Infusion Referral      Reason for your hospital stay    Scheduled consolidation chemotherapy.     Activity    Your activity upon discharge: activity as tolerated     Follow Up and recommended labs and tests    Labs,neulasta on 8/13 at [facility].  We are working on scheduling 2x weekly labs, possible transfusions for a few weeks.     Discharge Instructions    Take dexamethasone 8mg once on 8/13, then stop. Use eye drops for 48 hours after last scheduled chemotherapy (ie through 8/14 AM). If your neutrophils drop <1000 will start levaquin and voriconazole, your counts will be followed by the outpatient team.Follow up with your PCP for ongoing synthroid management (TSH on 8/12 is  0.12)     Diet    Follow this diet upon discharge: regular     Check Out Appointment Request    Please arrange labs, neulasta on 8/13 at [facility]     MODIFIED Check Out Appointment Request    [facility]- Patient is going to have labs at [facility] on 8/16 at 2pm but they can't do transfusions and [facility] was booked, please arrange possible transfusion appt on 8/17 but please cancel if hgb >7 and plt >30k     Discharge Medications   Discharge Medication List as of 8/12/2022  9:45 AM      START taking these medications    Details   dexamethasone (DECADRON) 4 MG tablet Take 2 tablets (8 mg) by mouth every morning For one day (8/13) then stop, Disp-2 tablet, R-0, E-Prescribe      prednisoLONE acetate (PRED FORTE) 1 % ophthalmic suspension Place 2 drops into both eyes 4 times daily Continue through 8/14 (last drops to be taken 8/14 AM), Historical         CONTINUE these medications which have CHANGED    Details   acyclovir (ZOVIRAX) 400 MG tablet Take 1 tablet (400 mg) by mouth 2 times daily, Disp-60 tablet, R-0, E-Prescribe      apixaban ANTICOAGULANT (ELIQUIS) 5 MG tablet Take 1 tablet (5 mg) by mouth 2 times daily, Disp-60 tablet, R-2, E-Prescribe      midostaurin (RYDAPT) 25 MG capsule Take 2 capsules (50 mg) by mouth 2 times daily . Take with food on Days 8 through 21. On 8/16 through 8/29, then stop, Disp-56 capsule, R-0, E-Prescribe         CONTINUE these medications which have NOT CHANGED    Details   Bacillus Coagulans-Inulin (PROBIOTIC) 1-250 BILLION-MG CAPS Take 1 capsule by mouth daily, Historical      diltiazem 2% in PLO gel Apply 1-2 clicks (0.25-0.5 g) topically 3 times daily as needed (Apply superficially to rectal area for hemorrhoid pain), Disp-30 g, R-0, E-PrescribeDispense in dosing applicator. 1 click = 0.25g of product.      levofloxacin (LEVAQUIN) 250 MG tablet Take 1 tablet (250 mg) by mouth daily When absolute neutrophils are less than 1.- x 10^9/L, Disp-30 tablet, R-4, E-Prescribe       levothyroxine (SYNTHROID/LEVOTHROID) 100 MCG tablet Take 1 tablet (100 mcg) by mouth every morning (before breakfast), Disp-60 tablet, R-0, E-Prescribe      magnesium 250 MG tablet Take 250 mg by mouth daily, Historical      Multiple Vitamins-Minerals (WOMENS MULTIVITAMIN) TABS Take 1 tablet by mouth daily, Historical      Quercetin 50 MG TABS Take 50 mg by mouth daily, Historical      vancomycin (VANCOCIN) 125 MG capsule Take 1 capsule (125 mg) by mouth 2 times daily, Disp-60 capsule, R-4, E-Prescribe      vitamin C (ASCORBIC ACID) 1000 MG TABS Take 2,000 mg by mouth daily, Historical      vitamin D3 (CHOLECALCIFEROL) 50 mcg (2000 units) tablet Take 2 tablets by mouth daily, Historical      zinc gluconate 50 MG tablet Take 50 mg by mouth daily, Historical         STOP taking these medications       isavuconazonium Sulfate (CRESEMBA) 186 MG capsule Comments:   Reason for Stopping:             Allergies   Allergies   Allergen Reactions     Blood Transfusion Related (Informational Only) Hives     6/29/2022, reaction of hives with platelet transfusion      Data   Most Recent 3 CBC's:Recent Labs   Lab Test 08/12/22  0724 08/11/22  0702 08/10/22  0533   WBC 6.6 9.8 11.3*   HGB 11.6* 12.2 12.4   * 99 101*   * 168 187      Most Recent 3 BMP's:  Recent Labs   Lab Test 08/12/22  0724 08/11/22  0702 08/10/22  0533    137 139   POTASSIUM 4.2 4.1 4.5   CHLORIDE 105 105 105   CO2 21* 22 23   BUN 14.2 16.6 13.8   CR 0.46* 0.51 0.53   ANIONGAP 12 10 11   MICHAEL 9.2 9.4 9.7   * 144* 141*     Most Recent 2 LFT's:  Recent Labs   Lab Test 08/12/22  0724 08/11/22  0702   AST 20 15   ALT 41* 45*   ALKPHOS 46 53   BILITOTAL 1.0 0.9     Most Recent INR's and Anticoagulation Dosing History:  Anticoagulation Dose History     Recent Dosing and Labs Latest Ref Rng & Units 6/24/2022 6/25/2022 6/26/2022 6/27/2022 6/30/2022 7/4/2022 8/9/2022    INR 0.85 - 1.15 1.41(H) 1.29(H) 1.16(H) 1.09 1.14 1.10 0.98        Most  Recent 3 Troponin's:No lab results found.  Most Recent Cholesterol Panel:  Recent Labs   Lab Test 03/08/22  0930   TRIG 93     Most Recent 6 Bacteria Isolates From Any Culture (See EPIC Reports for Culture Details):No lab results found.  Most Recent TSH, T4 and A1c Labs:  Recent Labs   Lab Test 08/12/22  0724   TSH 0.12*   T4 1.51     Results for orders placed or performed in visit on 07/21/22   US Upper Extremity Venous Duplex Left    Narrative    Exam: Duplex Doppler ultrasound assessment of the left upper  extremities veins 7/21/2022 2:14 PM    Clinical information: prior dvt assess for resolution    Ordering provider: LORENZO MEEK    Comparison: 7/12/22    Technique: B-mode (grayscale) and duplex Doppler ultrasound of the  upper extremity veins, including compressibility when feasible.  Velocity measurements obtained with angle correct at or less than 60  degrees.     Findings:     Right upper extremity:    Internal jugular vein: Thrombus: No, Phasic: Yes    Subclavian vein proximal: Thrombus: No, Phasic: Yes    Right upper extremity veins demonstrate normal compressibility,  phasicity, and pulsatility.    Left upper extremity:    Internal jugular vein: Thrombus: No, Phasic: Yes    Innominate vein: Thrombus: No, Phasic: Yes    Subclavian vein proximal: Thrombus: No, Phasic: Yes  Subclavian vein mid: Thrombus: No, Phasic: Yes  Subclavian vein lateral: Thrombus: No, Phasic: Yes    Axillary vein: Thrombus: No, Phasic: Yes    Brachial vein: Thrombus: No    Cephalic vein: Thrombus: No  Basilic vein: Thrombus: No    Left upper extremity veins demonstrate normal compressibility,  phasicity, and pulsatility.      Impression    Impression:    Left upper extremity: No DVT    I have personally reviewed the examination and initial interpretation  and I agree with the findings.    MARCIE HOBSON MD         SYSTEM ID:  [id number]

## 2022-08-12 NOTE — PROGRESS NOTES
Care Management Discharge Note    Discharge Date: 08/12/2022       Discharge Disposition: Home    Discharge Services:  OP labs & transfusions    Discharge DME: none     Discharge Transportation: home    Private pay costs discussed: Not applicable    PAS Confirmation Code:  n/a  Patient/family educated on Medicare website which has current facility and service quality ratings:  n/a    Education Provided on the Discharge Plan:  yes  Persons Notified of Discharge Plans:   Provider, bedside nurse, charge nurse, RNCC/SW  Patient/Family in Agreement with the Plan:  yes    Handoff Referral Completed: Yes    Additional Information:    Writer spoke with Tamika MCDONNELL at [facility] in [place], who is working on appointment for labs and transfusions ordered by provider. Tamika will call patient to set up.     Writer spoke with patient and encouraged her to set up a primary provider at [facility] or that has privileges at [facility], which would likely allow her to be able to do labs & transfusions there, ultimately being closer to home.    Appointments have been set up, and are in Epic.      Minerva stated she has no further discharge questions or needs at this time.      Lacey Raza RN, BSN  Care Coordinator 7D  Office: [phone]  Pager: [phone]    To contact the weekend RNCC  [place] (0800 - 1630) Saturday and Sunday    Units: 4A, 4C, 4E, 5A and 5B- Pager 1: [phone]    Units: 6A, 6B, 6C, 6D- Pager 2: [phone]    Units: 7A, 7B, 7C, 7D, and 5C-Pager 3: [phone]

## 2022-08-12 NOTE — PROGRESS NOTES
spoke with provider, who requested one more lab appointment be set up for Aug. 16th.  spoke with Tamika from [facility] again to attempt to set up the lab appointment at [facility] or [facility]. Unfortunately, these locations are full, and unable to accept any more appointments. Tamika is going to contact the [facility] to attempt to set up lab appointment on Aug 16, per request, and Tamika will call patient with appointment information.

## 2022-08-12 NOTE — PLAN OF CARE
Goal Outcome Evaluation:    VSS on RA. Denies pain, nausea, and SOB. Dose #5 cytarabine completed without incident. Dose #6 infusing now. Good BR via Port. No acute events. Pt will discharge when dose #6 is completed.

## 2022-08-12 NOTE — PROGRESS NOTES
Nursing Focus: Chemotherapy     D: Positive blood return via Port. Insertion site is clean/dry/intact, dressing intact with no complaints of pain.  Urine output is recorded in intake in Doc Flowsheet.       I: Premedications given per order (see electronic medical administration record). Cerebellar Neurotoxicity Assessment unremarkable. Dose #6 of HD Cytarabine started to infuse over 3 hours. Reviewed pt teaching on chemotherapy side effects.  Pt denies need for further teaching. Chemotherapy double checked per protocol by two chemotherapy competent RN's.      A: Tolerating chemotherapy well. Denies nausea and or pain.      P: Continue to monitor urine output and symptoms of nausea. Screen for symptoms of toxicity.

## 2022-08-12 NOTE — PLAN OF CARE
Goal Outcome Evaluation:      D/I:  Patient has completed Cycle 1 HiDAC and is ready for discharge.  Heparin instilled into port needle before it was removed.  RN reviewed discharge papers with patient; Minerva denied the need for further instruction.  All of patient's personal belongings taken with her upon discharge.    A:  Patient appears to be ready to discharge to home.    P:  Patient discharged to home.

## 2022-08-12 NOTE — PROGRESS NOTES
Labs and Transfusion orders:  Date: August 10, 2022     Patient: Veda Marinelli  : 1985  Diagnosis: AML C92.01     LABS:  [ x ] Check CBC with differential, CMP, and Type and Cross once per week starting  (except for ,,and ) through     [ x ] Port Cares per Home Infusion          Please call the [facility] at [phone] for any questions, and ask to speak with the care coordinator for Dr. Earl (patient's primary oncologist).     Thank you,            Zaira Vasquez PA-C  Hematology/Oncology  Pager # [phone]

## 2022-08-13 ENCOUNTER — INFUSION THERAPY VISIT (OUTPATIENT)
Dept: ONCOLOGY | Facility: CLINIC | Age: 37
End: 2022-08-13
Attending: INTERNAL MEDICINE
Payer: COMMERCIAL

## 2022-08-13 VITALS
RESPIRATION RATE: 16 BRPM | SYSTOLIC BLOOD PRESSURE: 120 MMHG | HEART RATE: 91 BPM | TEMPERATURE: 98 F | DIASTOLIC BLOOD PRESSURE: 86 MMHG | OXYGEN SATURATION: 98 %

## 2022-08-13 DIAGNOSIS — Z71.89 OTHER SPECIFIED COUNSELING: ICD-10-CM

## 2022-08-13 DIAGNOSIS — C92.01 ACUTE MYELOID LEUKEMIA IN REMISSION (H): Primary | ICD-10-CM

## 2022-08-13 PROCEDURE — 96372 THER/PROPH/DIAG INJ SC/IM: CPT | Performed by: INTERNAL MEDICINE

## 2022-08-13 PROCEDURE — 250N000011 HC RX IP 250 OP 636: Performed by: INTERNAL MEDICINE

## 2022-08-13 RX ADMIN — PEGFILGRASTIM 6 MG: 6 INJECTION SUBCUTANEOUS at 13:15

## 2022-08-13 ASSESSMENT — PAIN SCALES - GENERAL: PAINLEVEL: NO PAIN (0)

## 2022-08-13 NOTE — PROGRESS NOTES
Infusion Nursing Note:  Veda Marinelli presents today for Cycle 1 Day 5 neulasta.    Patient seen by provider today: No   present during visit today: Not Applicable.    Note: Minerva presents today feeling well. Denies pain or nausea/vomiting. Denies fevers/chills. Offers no concerns since discharge from the hospital yesterday after chemo.     Intravenous Access:  No Intravenous access/labs at this visit.    Treatment Conditions:  Not Applicable.    Post Infusion Assessment:  Patient tolerated injection to L posterior arm without incident.  Site patent and intact, free from redness, edema or discomfort.  No evidence of extravasations.     Discharge Plan:   Patient declined prescription refills.  Discharge instructions reviewed with: Patient.  Patient and/or family verbalized understanding of discharge instructions and all questions answered.  AVS to patient via [employer].  Patient will return 08/19 for next infusion appointment. Labs scheduled on 08/16.  Patient discharged in stable condition accompanied by: self.  Departure Mode: Ambulatory.      Sabine Dunlap RN

## 2022-08-13 NOTE — PATIENT INSTRUCTIONS
[facility] Triage and after hours / weekends / holidays:  [phone]    Please call the triage or after hours line if you experience a temperature greater than or equal to 100.4, shaking chills, have uncontrolled nausea, vomiting and/or diarrhea, dizziness, shortness of breath, chest pain, bleeding, unexplained bruising, or if you have any other new/concerning symptoms, questions or concerns.      If you are having any concerning symptoms or wish to speak to a provider before your next infusion visit, please call your care coordinator or triage to notify them so we can adequately serve you.     If you need a refill on a narcotic prescription or other medication, please call before your infusion appointment.

## 2022-08-14 ENCOUNTER — PATIENT OUTREACH (OUTPATIENT)
Dept: CARE COORDINATION | Facility: CLINIC | Age: 37
End: 2022-08-14

## 2022-08-14 NOTE — PROGRESS NOTES
Clinic Care Coordination Contact  [facility]: Post-Discharge Note  SITUATION                                                      Admission:    Admission Date: 08/09/22   Reason for Admission: Scheduled consolidation chemotherapy.  Discharge:   Discharge Date: 08/12/22  Discharge Diagnosis: Scheduled consolidation chemotherapy.    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      She received her last dose of cytarabine (6 out of 6) this morning. Overall tolerated well with no complications. No complaints today and overall feeling well. Up independently, appetite and energy are . A comprehensive review of systems was reviewed with the patient and the pertinent positives are listed in the HPI above.    ASSESSMENT      Enrollment  Primary Care Care Coordination Status: Not a Candidate    Discharge Assessment  How are you doing now that you are home?: I am doing really good.  How are your symptoms? (Red Flag symptoms escalate to triage hotline per guidelines): Improved  Do you feel your condition is stable enough to be safe at home until your provider visit?: Yes  Does the patient have their discharge instructions? : Yes  Does the patient have questions regarding their discharge instructions? : No  Were you started on any new medications or were there changes to any of your previous medications? : Yes  Does the patient have all of their medications?: Yes  Do you have questions regarding any of your medications? : No  Do you have all of your needed medical supplies or equipment (DME)?  (i.e. oxygen tank, CPAP, cane,  etc.): Yes  Discharge follow-up appointment scheduled within 14 calendar days? : Yes  Discharge Follow Up Appointment Date: 08/16/22  Discharge Follow Up Appointment Scheduled with?: Specialty Care Provider                  PLAN                                                      Outpatient Plan: Labs,neulasta on 8/13 at [facility].  We are working on scheduling 2x weekly labs, possible transfusions for  a few weeks.    Future Appointments   Date Time Provider Department Center   8/16/2022  2:00 PM [facility] LAB [facility]   8/19/2022  7:30 AM [facility] LAB DRAW [facility]   8/19/2022  8:00 AM  ONC INFUSION NURSE [facility]   8/22/2022  9:00 AM [facility] FAST TRACK LAB [facility]   8/22/2022  1:30 PM Angelica Kingsley PA-C [facility]   8/23/2022  8:00 AM [facility] CANCER INFUSION NURSE [facility]   8/25/2022 10:30 AM [facility] FAST TRACK LAB [facility]   8/26/2022 11:00 AM [facility] CANCER INFUSION NURSE [facility]   8/30/2022  7:30 AM [facility] LAB DRAW [facility]   8/30/2022  8:00 AM Angelica Kingsley PA-C [facility]   8/30/2022  9:00 AM  ONC INFUSION NURSE [facility]   9/1/2022  8:30 AM [facility] TRACK LAB [facility]   9/2/2022  9:00 AM [facility] CANCER INFUSION NURSE [facility]   9/6/2022  7:30 AM  ONC INFUSION NURSE [facility]   9/8/2022  9:00 AM [facility] FA[facility] TRACK LAB [facility]   9/9/2022  8:30 AM [facility] CANCER INFUSION NURSE [facility]   9/12/2022 10:00 AM [facility] TRACK LAB [facility]   9/13/2022  1:00 PM [facility] CANCER INFUSION NURSE [facility]   9/15/2022 10:00 AM [facility] FA[facility] TRACK LAB [facility]   9/16/2022  9:30 AM [facility] CANCER INFUSION NURSE [facility]         For any urgent concerns, please contact our 24 hour nurse triage line: [phone] ([phone])         YOSHI Jeffrey  [phone]  [facility]

## 2022-08-16 ENCOUNTER — TELEPHONE (OUTPATIENT)
Dept: ONCOLOGY | Facility: CLINIC | Age: 37
End: 2022-08-16

## 2022-08-16 ENCOUNTER — LAB (OUTPATIENT)
Dept: LAB | Facility: CLINIC | Age: 37
End: 2022-08-16
Payer: COMMERCIAL

## 2022-08-16 DIAGNOSIS — C92.00 ACUTE MYELOID LEUKEMIA NOT HAVING ACHIEVED REMISSION (H): ICD-10-CM

## 2022-08-16 LAB
ALBUMIN SERPL-MCNC: 3.8 G/DL (ref 3.4–5)
ALP SERPL-CCNC: 64 U/L (ref 40–150)
ALT SERPL W P-5'-P-CCNC: 59 U/L (ref 0–50)
ANION GAP SERPL CALCULATED.3IONS-SCNC: 6 MMOL/L (ref 3–14)
AST SERPL W P-5'-P-CCNC: 17 U/L (ref 0–45)
BASOPHILS # BLD AUTO: 0.1 10E3/UL (ref 0–0.2)
BASOPHILS NFR BLD AUTO: 1 %
BILIRUB SERPL-MCNC: 2.2 MG/DL (ref 0.2–1.3)
BUN SERPL-MCNC: 21 MG/DL (ref 7–30)
CALCIUM SERPL-MCNC: 9.1 MG/DL (ref 8.5–10.1)
CHLORIDE BLD-SCNC: 107 MMOL/L (ref 94–109)
CO2 SERPL-SCNC: 28 MMOL/L (ref 20–32)
CREAT SERPL-MCNC: 0.53 MG/DL (ref 0.52–1.04)
DACRYOCYTES BLD QL SMEAR: SLIGHT
EOSINOPHIL # BLD AUTO: 0.1 10E3/UL (ref 0–0.7)
EOSINOPHIL NFR BLD AUTO: 3 %
ERYTHROCYTE [DISTWIDTH] IN BLOOD BY AUTOMATED COUNT: 14.3 % (ref 10–15)
GFR SERPL CREATININE-BSD FRML MDRD: >90 ML/MIN/1.73M2
GLUCOSE BLD-MCNC: 107 MG/DL (ref 70–99)
HCT VFR BLD AUTO: 36.1 % (ref 35–47)
HGB BLD-MCNC: 12.1 G/DL (ref 11.7–15.7)
IMM GRANULOCYTES # BLD: 0 10E3/UL
IMM GRANULOCYTES NFR BLD: 1 %
LDH SERPL L TO P-CCNC: 127 U/L (ref 81–234)
LYMPHOCYTES # BLD AUTO: 0.7 10E3/UL (ref 0.8–5.3)
LYMPHOCYTES NFR BLD AUTO: 20 %
MCH RBC QN AUTO: 32.9 PG (ref 26.5–33)
MCHC RBC AUTO-ENTMCNC: 33.5 G/DL (ref 31.5–36.5)
MCV RBC AUTO: 98 FL (ref 78–100)
MONOCYTES # BLD AUTO: 0 10E3/UL (ref 0–1.3)
MONOCYTES NFR BLD AUTO: 0 %
NEUTROPHILS # BLD AUTO: 2.6 10E3/UL (ref 1.6–8.3)
NEUTROPHILS NFR BLD AUTO: 75 %
NRBC # BLD AUTO: 0 10E3/UL
NRBC BLD AUTO-RTO: 0 /100
PLAT MORPH BLD: ABNORMAL
PLATELET # BLD AUTO: 39 10E3/UL (ref 150–450)
POTASSIUM BLD-SCNC: 4.3 MMOL/L (ref 3.4–5.3)
PROT SERPL-MCNC: 7.4 G/DL (ref 6.8–8.8)
RBC # BLD AUTO: 3.68 10E6/UL (ref 3.8–5.2)
RBC MORPH BLD: ABNORMAL
SODIUM SERPL-SCNC: 141 MMOL/L (ref 133–144)
WBC # BLD AUTO: 3.5 10E3/UL (ref 4–11)

## 2022-08-16 PROCEDURE — 85025 COMPLETE CBC W/AUTO DIFF WBC: CPT

## 2022-08-16 PROCEDURE — 36415 COLL VENOUS BLD VENIPUNCTURE: CPT

## 2022-08-16 PROCEDURE — 83615 LACTATE (LD) (LDH) ENZYME: CPT

## 2022-08-16 PROCEDURE — 80053 COMPREHEN METABOLIC PANEL: CPT

## 2022-08-16 NOTE — TELEPHONE ENCOUNTER
Cresemba appeal is still pending per Ruthy at [facility] [phone].    Thank you,    Farida Pichardo  Oncology Pharmacy Liaison II  [email or web address]  Phone: [phone]  Fax: [phone]

## 2022-08-16 NOTE — TELEPHONE ENCOUNTER
DATE: 8/16/22      TIME OF RECEIPT FROM LAB:  14:10    LAB TEST: Platelets 39 Hgb 12.1  Wbc 3.6 ANC 2.79   Therapy plan in place to transfuse if platelets are less than or equal to 30.     RESULTS PAGED TO (PROVIDER):    14:13 Message sent to Dora Bethea   Per Dora Bethea should page the on call provider   14:23 Paged Dr Leroy     TIME LAB VALUE REPORTED TO PROVIDER: 14:39 Dr Maakaron called back and acknowledges critical result.     RECOMMENDATIONS: Patient has lab recheck on 8/19 with possible transfusion appointment.

## 2022-08-18 ENCOUNTER — LAB (OUTPATIENT)
Dept: INFUSION THERAPY | Facility: CLINIC | Age: 37
End: 2022-08-18
Attending: INTERNAL MEDICINE
Payer: COMMERCIAL

## 2022-08-18 DIAGNOSIS — C95.01 ACUTE LEUKEMIA IN REMISSION (H): Primary | ICD-10-CM

## 2022-08-18 LAB
ABO/RH(D): NORMAL
ANTIBODY SCREEN: NEGATIVE
BASOPHILS # BLD MANUAL: 0 10E3/UL (ref 0–0.2)
BASOPHILS NFR BLD MANUAL: 8 %
BLD PROD TYP BPU: NORMAL
BLD PROD TYP BPU: NORMAL
BLOOD COMPONENT TYPE: NORMAL
BLOOD COMPONENT TYPE: NORMAL
CODING SYSTEM: NORMAL
CODING SYSTEM: NORMAL
EOSINOPHIL # BLD MANUAL: 0.1 10E3/UL (ref 0–0.7)
EOSINOPHIL NFR BLD MANUAL: 12 %
ERYTHROCYTE [DISTWIDTH] IN BLOOD BY AUTOMATED COUNT: 13.5 % (ref 10–15)
HCT VFR BLD AUTO: 30.3 % (ref 35–47)
HGB BLD-MCNC: 10.2 G/DL (ref 11.7–15.7)
ISSUE DATE AND TIME: NORMAL
ISSUE DATE AND TIME: NORMAL
LYMPHOCYTES # BLD MANUAL: 0.4 10E3/UL (ref 0.8–5.3)
LYMPHOCYTES NFR BLD MANUAL: 72 %
MCH RBC QN AUTO: 32.5 PG (ref 26.5–33)
MCHC RBC AUTO-ENTMCNC: 33.7 G/DL (ref 31.5–36.5)
MCV RBC AUTO: 97 FL (ref 78–100)
MONOCYTES # BLD MANUAL: 0 10E3/UL (ref 0–1.3)
MONOCYTES NFR BLD MANUAL: 3 %
NEUTROPHILS # BLD MANUAL: 0 10E3/UL (ref 1.6–8.3)
NEUTROPHILS NFR BLD MANUAL: 5 %
PLAT MORPH BLD: ABNORMAL
PLATELET # BLD AUTO: 10 10E3/UL (ref 150–450)
RBC # BLD AUTO: 3.14 10E6/UL (ref 3.8–5.2)
RBC MORPH BLD: ABNORMAL
SPECIMEN EXPIRATION DATE: NORMAL
UNIT ABO/RH: NORMAL
UNIT ABO/RH: NORMAL
UNIT NUMBER: NORMAL
UNIT NUMBER: NORMAL
UNIT STATUS: NORMAL
UNIT STATUS: NORMAL
UNIT TYPE ISBT: 600
UNIT TYPE ISBT: 600
VARIANT LYMPHS BLD QL SMEAR: PRESENT
WBC # BLD AUTO: 0.6 10E3/UL (ref 4–11)

## 2022-08-18 PROCEDURE — 85027 COMPLETE CBC AUTOMATED: CPT | Performed by: PHYSICIAN ASSISTANT

## 2022-08-18 PROCEDURE — 36591 DRAW BLOOD OFF VENOUS DEVICE: CPT

## 2022-08-18 PROCEDURE — 250N000011 HC RX IP 250 OP 636

## 2022-08-18 PROCEDURE — 86901 BLOOD TYPING SEROLOGIC RH(D): CPT | Performed by: PHYSICIAN ASSISTANT

## 2022-08-18 PROCEDURE — 86850 RBC ANTIBODY SCREEN: CPT | Performed by: PHYSICIAN ASSISTANT

## 2022-08-18 PROCEDURE — 85007 BL SMEAR W/DIFF WBC COUNT: CPT | Performed by: PHYSICIAN ASSISTANT

## 2022-08-18 RX ORDER — HEPARIN SODIUM,PORCINE 10 UNIT/ML
5 VIAL (ML) INTRAVENOUS
Status: CANCELLED | OUTPATIENT
Start: 2022-08-18

## 2022-08-18 RX ORDER — HEPARIN SODIUM (PORCINE) LOCK FLUSH IV SOLN 100 UNIT/ML 100 UNIT/ML
SOLUTION INTRAVENOUS
Status: COMPLETED
Start: 2022-08-18 | End: 2022-08-18

## 2022-08-18 RX ORDER — HEPARIN SODIUM (PORCINE) LOCK FLUSH IV SOLN 100 UNIT/ML 100 UNIT/ML
5 SOLUTION INTRAVENOUS
Status: CANCELLED | OUTPATIENT
Start: 2022-08-18

## 2022-08-18 RX ADMIN — HEPARIN 500 UNITS: 100 SYRINGE at 10:16

## 2022-08-18 NOTE — PROGRESS NOTES
PAC labs drawn without difficulty via site protocol. Patient tolerated well.    Bernadine Benson RN on 8/18/2022 at 10:30 AM

## 2022-08-18 NOTE — PROGRESS NOTES
DATE:  8/18/2022   TIME OF RECEIPT FROM LAB:  11:14am  LAB TEST:  WBC, Plt, ANC  LAB VALUE:  WBC 0.6, ANC 0.0, Platelets 10  RESULTS GIVEN WITH READ-BACK TO (PROVIDER):  Bernadine Benson RN in infusion. Spoke with Anuja Tripp RNQUYNH and gave critical values. Anuja will be reaching out to patient  TIME LAB VALUE REPORTED TO PROVIDER:   11:15am

## 2022-08-19 ENCOUNTER — INFUSION THERAPY VISIT (OUTPATIENT)
Dept: INFUSION THERAPY | Facility: CLINIC | Age: 37
End: 2022-08-19
Attending: INTERNAL MEDICINE
Payer: COMMERCIAL

## 2022-08-19 VITALS
HEART RATE: 104 BPM | DIASTOLIC BLOOD PRESSURE: 88 MMHG | RESPIRATION RATE: 14 BRPM | SYSTOLIC BLOOD PRESSURE: 133 MMHG | TEMPERATURE: 98.5 F

## 2022-08-19 DIAGNOSIS — Z79.2 NEED FOR PROPHYLACTIC ANTIBIOTIC: Primary | ICD-10-CM

## 2022-08-19 DIAGNOSIS — C92.00 ACUTE MYELOID LEUKEMIA NOT HAVING ACHIEVED REMISSION (H): ICD-10-CM

## 2022-08-19 DIAGNOSIS — C95.01 ACUTE LEUKEMIA IN REMISSION (H): Primary | ICD-10-CM

## 2022-08-19 LAB
ABO/RH(D): NORMAL
ANTIBODY SCREEN: NEGATIVE
BASOPHILS # BLD MANUAL: 0.1 10E3/UL (ref 0–0.2)
BASOPHILS NFR BLD MANUAL: 6 %
EOSINOPHIL # BLD MANUAL: 0.1 10E3/UL (ref 0–0.7)
EOSINOPHIL NFR BLD MANUAL: 7 %
ERYTHROCYTE [DISTWIDTH] IN BLOOD BY AUTOMATED COUNT: 13.2 % (ref 10–15)
HCT VFR BLD AUTO: 28.6 % (ref 35–47)
HGB BLD-MCNC: 10 G/DL (ref 11.7–15.7)
LYMPHOCYTES # BLD MANUAL: 0.7 10E3/UL (ref 0.8–5.3)
LYMPHOCYTES NFR BLD MANUAL: 78 %
MCH RBC QN AUTO: 32.8 PG (ref 26.5–33)
MCHC RBC AUTO-ENTMCNC: 35 G/DL (ref 31.5–36.5)
MCV RBC AUTO: 94 FL (ref 78–100)
MONOCYTES # BLD MANUAL: 0 10E3/UL (ref 0–1.3)
MONOCYTES NFR BLD MANUAL: 4 %
NEUTROPHILS # BLD MANUAL: 0 10E3/UL (ref 1.6–8.3)
NEUTROPHILS NFR BLD MANUAL: 5 %
PLAT MORPH BLD: ABNORMAL
PLATELET # BLD AUTO: 4 10E3/UL (ref 150–450)
RBC # BLD AUTO: 3.05 10E6/UL (ref 3.8–5.2)
RBC MORPH BLD: ABNORMAL
SPECIMEN EXPIRATION DATE: NORMAL
VARIANT LYMPHS BLD QL SMEAR: PRESENT
WBC # BLD AUTO: 0.9 10E3/UL (ref 4–11)

## 2022-08-19 PROCEDURE — 85027 COMPLETE CBC AUTOMATED: CPT | Performed by: INTERNAL MEDICINE

## 2022-08-19 PROCEDURE — 86901 BLOOD TYPING SEROLOGIC RH(D): CPT | Performed by: PHYSICIAN ASSISTANT

## 2022-08-19 PROCEDURE — P9037 PLATE PHERES LEUKOREDU IRRAD: HCPCS

## 2022-08-19 PROCEDURE — 36430 TRANSFUSION BLD/BLD COMPNT: CPT

## 2022-08-19 PROCEDURE — 85007 BL SMEAR W/DIFF WBC COUNT: CPT | Performed by: INTERNAL MEDICINE

## 2022-08-19 PROCEDURE — 250N000011 HC RX IP 250 OP 636: Performed by: PHYSICIAN ASSISTANT

## 2022-08-19 PROCEDURE — 36591 DRAW BLOOD OFF VENOUS DEVICE: CPT

## 2022-08-19 RX ORDER — HEPARIN SODIUM (PORCINE) LOCK FLUSH IV SOLN 100 UNIT/ML 100 UNIT/ML
5 SOLUTION INTRAVENOUS
Status: CANCELLED | OUTPATIENT
Start: 2022-08-19

## 2022-08-19 RX ORDER — HEPARIN SODIUM,PORCINE 10 UNIT/ML
5 VIAL (ML) INTRAVENOUS
Status: CANCELLED | OUTPATIENT
Start: 2022-08-19

## 2022-08-19 RX ORDER — HEPARIN SODIUM,PORCINE 10 UNIT/ML
5 VIAL (ML) INTRAVENOUS
Status: DISCONTINUED | OUTPATIENT
Start: 2022-08-19 | End: 2022-08-19 | Stop reason: HOSPADM

## 2022-08-19 RX ORDER — FLUCONAZOLE 200 MG/1
200 TABLET ORAL DAILY
Qty: 30 TABLET | Refills: 0 | Status: ON HOLD | OUTPATIENT
Start: 2022-08-19 | End: 2022-09-14

## 2022-08-19 RX ORDER — HEPARIN SODIUM (PORCINE) LOCK FLUSH IV SOLN 100 UNIT/ML 100 UNIT/ML
5 SOLUTION INTRAVENOUS
Status: DISCONTINUED | OUTPATIENT
Start: 2022-08-19 | End: 2022-08-19 | Stop reason: HOSPADM

## 2022-08-19 RX ADMIN — HEPARIN 5 ML: 100 SYRINGE at 15:49

## 2022-08-19 NOTE — NURSING NOTE
8/18/22-Called Minerva, reviewed labs. ANC 0.0, Plt 10. Hgb 10.0    Talked to [place], they will do her transfusion of pelt tomorrow(instead of the U) and change her to same day labs and transfusions for safety and travel reasons. Comes for [place], [place]/78 miles from [facility].     Reviewed neutropenic/bleeding precautions, including good handwashing, staying away from sick people/crowds and calling temperature over 100.4 day or night. States her parents are the ones that usually are her drivers. Good understanding of the above. Denies any fevers or bleeding. Will go to the nearest ER is any bleeding/fevers or other problems.     Started on Levaquin, Cresemba not approved yet. Already taking ACV.    8/19/2022-script for Fluconazole sent to the [place] pharmacy to start until Cresemba can be obtained. Minerva will  and start today.     Next labs Monday 8/22/2022

## 2022-08-19 NOTE — PROGRESS NOTES
Infusion Nursing Note:  Veda Marinelli presents today for 2 units platelets.    Patient seen by provider today: No   present during visit today: Not Applicable.    Note: N/A.    Intravenous Access:  Implanted Port.    Treatment Conditions:  Lab Results   Component Value Date    HGB 10.0 (L) 08/19/2022    WBC 0.9 (LL) 08/19/2022    ANEU 0.0 (LL) 08/19/2022    ANEUTAUTO 2.6 08/16/2022    PLT 4 (LL) 08/19/2022      Results reviewed, labs MET treatment parameters, ok to proceed with treatment.    Post Infusion Assessment:  Patient tolerated infusion without incident.  Blood return noted pre and post infusion.  Site patent and intact, free from redness, edema or discomfort.  No evidence of extravasations.  Access discontinued per protocol.     Discharge Plan:   Copy of AVS reviewed with patient and/or family.  Patient will return 8/22/22 for next appointment.  Patient discharged in stable condition accompanied by: self.  Departure Mode: Ambulatory.      Bernadine Benson RN

## 2022-08-19 NOTE — TELEPHONE ENCOUNTER
Appeal is still in process with no estimated date to be done per Aamir at [facility] [phone].    Thank you,    Farida Pichardo  Oncology Pharmacy Liaison II  [email or web address]  Phone: [phone]  Fax: [phone]

## 2022-08-21 NOTE — PROGRESS NOTES
"Minerva is a 37 year old who is being evaluated via a billable video visit.      How would you like to obtain your AVS? MyChart  If the video visit is dropped, the invitation should be resent by: Text to cell phone: [phone]  Will anyone else be joining your video visit? No        Video-Visit Details    Video Start Time: 1:46 PM    Type of service:  Video Visit    Video End Time:2:03 PM    Originating Location (pt. Location): [facility]     Distant Location (provider location):  [facility]     Platform used for Video Visit: Silvina Soni      [facility]  Date of Visit: Aug 22, 2022   Oncologist: Dr. Ortiz Hill      REASON FOR VISIT:  acute myeloid leukemia (AML), hospital f/up    HISTORY OF PRESENT ILLNESS:  Ms. Marinelli is a 37 year old woman with acute myeloid leukemia (AML).  To summarize her course, she was noted to have low WBC count on routine labs in 03/2022.  Follow up labs in 05/2022 showed further decrease in WBC as well as anemia and thrombocytopenia.  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  She has met with the BMT Team .  She was treated with cytarabine and daunorubicin \"7+3\" induction chemotherapy with midostaurin on Day 8-21.  Bone marrow biopsy around Day 21 was hypocellular but consistent with recovering bone marrow without definite AML.  Her course was complicated by recurrent C diff and upper extremity DVT.  She had prompt blood cell count recovery and was discharge with ongoing outpatient follow-up.  She just had a post-treatment bone marrow biopsy that showed 70-80% cellular marrow with slightly increased blasts that are favored to represent robust regnerating marrow with molecular studies and NPM1 MRD PCR testing not detected.    Admitted " 8/9-8/12 for Cycle 1 consolidation HiDAC with midostaurin (C1D1 = 8/9/22). Overall tolerated well with no complications. Received neulasta on 8/13.     INTERVAL HISTORY:  Minerva presents for hospital follow-up visit today via video. She is doing well. She is currently at [facility] receiving 2 units of platelet transfusions. She denies bleeding. Has some bruising, but reports nothing significant. She continues on midastaurin, today is day 14. Energy and appetite are good. Normal bowel function.   No fevers, chills, NS.   Her breathing is good. No CP.   No skin rash. No nausea/vomiting.   No new lumps or bumps.   No weakness or neuropathy.   She is working with fertility clinic.   ROS is otherwise negative.       KEY PAST MEDICAL HISTORY:  History of COVID-19, recurrent C diff, hypothyroidism    MEDICATIONS:  Current Outpatient Medications   Medication     acyclovir (ZOVIRAX) 400 MG tablet     apixaban ANTICOAGULANT (ELIQUIS) 5 MG tablet     Bacillus Coagulans-Inulin (PROBIOTIC) 1-250 BILLION-MG CAPS     dexamethasone (DECADRON) 4 MG tablet     diltiazem 2% in PLO gel     fluconazole (DIFLUCAN) 200 MG tablet     levofloxacin (LEVAQUIN) 250 MG tablet     levothyroxine (SYNTHROID/LEVOTHROID) 100 MCG tablet     magnesium 250 MG tablet     midostaurin (RYDAPT) 25 MG capsule     Multiple Vitamins-Minerals (WOMENS MULTIVITAMIN) TABS     prednisoLONE acetate (PRED FORTE) 1 % ophthalmic suspension     Quercetin 50 MG TABS     vancomycin (VANCOCIN) 125 MG capsule     vitamin C (ASCORBIC ACID) 1000 MG TABS     vitamin D3 (CHOLECALCIFEROL) 50 mcg (2000 units) tablet     zinc gluconate 50 MG tablet     No current facility-administered medications for this visit.     Facility-Administered Medications Ordered in Other Visits   Medication     heparin 100 UNIT/ML injection 5 mL     SOCIAL HISTORY:  Was working as RN    Video physical exam  General: Patient appears well in no acute distress.   Skin: No visualized rash or  lesions on visualized skin  Resp: Appears to be breathing comfortably without accessory muscle usage, speaking in full sentences, no cough  MSK: Appears to have normal range of motion based on visualized movements  Neurologic: No apparent tremors, facial movements symmetric  Psych: affect normal, alert and oriented     LABORATORY DATA: Reviewed by me   Latest Reference Range & Units 08/22/22 09:17   WBC 4.0 - 11.0 10e3/uL 31.2 (H)   Hemoglobin 11.7 - 15.7 g/dL 9.5 (L)   Hematocrit 35.0 - 47.0 % 28.2 (L)   Platelet Count 150 - 450 10e3/uL 5 (LL)   RBC Count 3.80 - 5.20 10e6/uL 2.95 (L)   MCV 78 - 100 fL 96   MCH 26.5 - 33.0 pg 32.2   MCHC 31.5 - 36.5 g/dL 33.7   RDW 10.0 - 15.0 % 13.7   % Neutrophils % 58   % Lymphocytes % 8   % Monocytes % 2   % Eosinophils % 0   % Basophils % 0   % Myelocytes % 32   Absolute Basophils 0.0 - 0.2 10e3/uL 0.0   Absolute Neutrophil 1.6 - 8.3 10e3/uL 18.1 (H)   Absolute Lymphocytes 0.8 - 5.3 10e3/uL 2.5   Absolute Monocytes 0.0 - 1.3 10e3/uL 0.6   Absolute Eosinophils 0.0 - 0.7 10e3/uL 0.0   Absolute Myelocytes <=0.0 10e3/uL 10.0 (H)   RBC Morphology  Confirmed RBC Indices   Platelet Morphology Automated Count Confirmed. Platelet morphology is normal.  Automated Count Confirmed. Platelet morphology is normal.       IMPRESSION AND PLAN:  Ms. Marinelli is a 37 year old woman with acute myeloid leukemia (AML).    Previously, Dr. Earl reviewed her course and post induction bone marrow biopsy that is consistent with a complete response - MRD negative.  Based on her workup that suggests a favorable risk profile for her AML and excellent response to induction chemotherapy, she was recommended to proceed with chemotherapy based on consolidation treatment with high-dose cytarabine (HiDAC) with midostaurin based on the RATIFY trial (N Engl J Med 2017;377:454-64.).  They discussed the decision to continue chemotherapy-based consolidation with 3-4 rounds of HiDAC and midostaurin will depend on  testing for NPM1 mutation MRD after her second cycle of chemotherapy (1st consolidation treatment).  If chemotherapy-based consolidation is pursued we would also plan for a year of maintenance midostaurin treatment after the completion of consolidation chemotherapy to minimize the risk of relapse.     Previously reviewed the typical schedule for HiDAC and midostaurin consolidation with HiDAC on Day 1-3 in the hospital and midostaurin on Days 8-21 of each consolidation cycle.  Previously discussed possible side effects and toxicities including but not limited to fatigue, alopecia, bone marrow suppression, infertility, allergic reaction, GI issues, infection, bleeding, damage to heart/lung/liver/kidneys, cystitis, rash.  While complications can be life threatening, the greatest risk is the AML.  Previously also discussed the possibility of poor response or relapse of disease despite the planned therapy and need to consider alternative treatments.      She is currently working with fertility clinic.     She was recently admitted 8/9-8/12/22 for Cycle 1 consolidation with HiDAC and midostaurin, started on 8/9/22. Tentatively planning for 3-4 cycles of consolidation HiDAC (D1-3) and midostaurin (D8-21), followed by maintenance midostaurin for 1 year. She had port placed on 8/9 with IR. Plan to recheck NPM1 mutation after C1 consolidation HiDAC and midostaurin. S/p Neulasta on 8/13. Elevated WBC likely secondary to Neulasta. No signs/sxs of infection. She is thrombocytopenia, getting 2 units of platelet transfusion today.     She has no active ID issues.  Her C diff resolved. She will continue acyclovir and suppressive oral vancomycin twice daily as well as levofloxacin and fluconazole during periods of neutropenia.  Can stop levo/fluc now since ANC >1; restart when ANC <1. We discussed the importance of following up to date local and federal health agency guidance for immunocompromised individuals regarding measures to  reduce the risk of COVID-19.       # h/o COVID-19 (Oct 2021)  Pt is not vaccinated nor interested in being vaccinated. She was admitted from 10/1/2021 - 10/13/2021 for acute hypoxic respiratory failure from COVID-19 pneumonia.  Required IMC, high flow and intermittent CPAP. She was treated with dexamethasone, remdesivir, and baricitinib in addition to azithromycin and ceftriaxone. Recovered symptomatically.  - Covid neg 8/9  - Discussed janet and she is not interested at this time. She will let us know if she changes her mind.     # LUE DVT (PICC-associated)  LUE US 7/12/22; Nonocclusive DVT in the left axillary vein and one of the paired left brachial veins, superficial venous thrombosis in the left basilic vein. PICC-associated, was removed prior to discharge on 7/15/22. Discharged on lovenox 1mg/kg x7/12. Repeat US 7/21 negative for DVT. She has remained on lovenox 1mg/kg BID while awaiting a copay card for Apixaban, which has not arrived yet as of 8/9.  - Transition to Apixaban x8/9 (using a $10/month copay card which was given to patient this admission) -- rx filled on discharge  - Per Dr. Earl, continue Apixaban for 3 months (through ~10/13/2022), hold if plt <50k  - Apixaban currently on hold due to thrombocytopenia.     We will keep her PCP Maria Eugenia Villar NP in the loop.    I provided contact information for our clinic and advised that she call if questions, concerns, or new issues come up between visits.    Continue twice weekly labs and transfusions PRN. RTC for f/up with me on 8/30.     Transition Care Management Services  Admission Date: 8/9/2022    Discharge Date: 8/12/2022    Discharge Diagnosis: Scheduled consolidation chemotherapy.    Face-to-face visit date: 8/22/2022        Medical complexity decision making: Moderate complexity ([id number])    50 minutes spent on the date of the encounter doing chart review, review of test results, interpretation of tests, patient visit and documentation      Angelica Kingsley PA-C  [facility]  [address], [place] [address] [phone]

## 2022-08-22 ENCOUNTER — VIRTUAL VISIT (OUTPATIENT)
Dept: ONCOLOGY | Facility: CLINIC | Age: 37
End: 2022-08-22
Attending: PHYSICIAN ASSISTANT
Payer: COMMERCIAL

## 2022-08-22 ENCOUNTER — LAB (OUTPATIENT)
Dept: INFUSION THERAPY | Facility: CLINIC | Age: 37
End: 2022-08-22
Attending: PHYSICIAN ASSISTANT
Payer: COMMERCIAL

## 2022-08-22 VITALS — DIASTOLIC BLOOD PRESSURE: 78 MMHG | SYSTOLIC BLOOD PRESSURE: 133 MMHG | HEART RATE: 93 BPM | TEMPERATURE: 98.5 F

## 2022-08-22 DIAGNOSIS — C95.01 ACUTE LEUKEMIA IN REMISSION (H): Primary | ICD-10-CM

## 2022-08-22 LAB
BACTERIA BLD CULT: NO GROWTH
BASOPHILS # BLD MANUAL: 0 10E3/UL (ref 0–0.2)
BASOPHILS NFR BLD MANUAL: 0 %
BLD PROD TYP BPU: NORMAL
BLD PROD TYP BPU: NORMAL
BLOOD COMPONENT TYPE: NORMAL
BLOOD COMPONENT TYPE: NORMAL
CODING SYSTEM: NORMAL
CODING SYSTEM: NORMAL
EOSINOPHIL # BLD MANUAL: 0 10E3/UL (ref 0–0.7)
EOSINOPHIL NFR BLD MANUAL: 0 %
ERYTHROCYTE [DISTWIDTH] IN BLOOD BY AUTOMATED COUNT: 13.7 % (ref 10–15)
HCT VFR BLD AUTO: 28.2 % (ref 35–47)
HGB BLD-MCNC: 9.5 G/DL (ref 11.7–15.7)
ISSUE DATE AND TIME: NORMAL
ISSUE DATE AND TIME: NORMAL
LYMPHOCYTES # BLD MANUAL: 2.5 10E3/UL (ref 0.8–5.3)
LYMPHOCYTES NFR BLD MANUAL: 8 %
MCH RBC QN AUTO: 32.2 PG (ref 26.5–33)
MCHC RBC AUTO-ENTMCNC: 33.7 G/DL (ref 31.5–36.5)
MCV RBC AUTO: 96 FL (ref 78–100)
MONOCYTES # BLD MANUAL: 0.6 10E3/UL (ref 0–1.3)
MONOCYTES NFR BLD MANUAL: 2 %
MYELOCYTES # BLD MANUAL: 10 10E3/UL
MYELOCYTES NFR BLD MANUAL: 32 %
NEUTROPHILS # BLD MANUAL: 18.1 10E3/UL (ref 1.6–8.3)
NEUTROPHILS NFR BLD MANUAL: 58 %
PLAT MORPH BLD: ABNORMAL
PLATELET # BLD AUTO: 5 10E3/UL (ref 150–450)
RBC # BLD AUTO: 2.95 10E6/UL (ref 3.8–5.2)
RBC MORPH BLD: ABNORMAL
UNIT ABO/RH: NORMAL
UNIT ABO/RH: NORMAL
UNIT NUMBER: NORMAL
UNIT NUMBER: NORMAL
UNIT STATUS: NORMAL
UNIT STATUS: NORMAL
UNIT TYPE ISBT: 600
UNIT TYPE ISBT: 600
WBC # BLD AUTO: 31.2 10E3/UL (ref 4–11)

## 2022-08-22 PROCEDURE — 250N000011 HC RX IP 250 OP 636: Performed by: PHYSICIAN ASSISTANT

## 2022-08-22 PROCEDURE — 85014 HEMATOCRIT: CPT | Performed by: PHYSICIAN ASSISTANT

## 2022-08-22 PROCEDURE — P9037 PLATE PHERES LEUKOREDU IRRAD: HCPCS | Performed by: PHYSICIAN ASSISTANT

## 2022-08-22 PROCEDURE — 85007 BL SMEAR W/DIFF WBC COUNT: CPT | Performed by: PHYSICIAN ASSISTANT

## 2022-08-22 PROCEDURE — 99495 TRANSJ CARE MGMT MOD F2F 14D: CPT | Mod: GT | Performed by: PHYSICIAN ASSISTANT

## 2022-08-22 PROCEDURE — 36430 TRANSFUSION BLD/BLD COMPNT: CPT

## 2022-08-22 RX ORDER — HEPARIN SODIUM (PORCINE) LOCK FLUSH IV SOLN 100 UNIT/ML 100 UNIT/ML
5 SOLUTION INTRAVENOUS
Status: DISCONTINUED | OUTPATIENT
Start: 2022-08-22 | End: 2022-08-22 | Stop reason: HOSPADM

## 2022-08-22 RX ADMIN — HEPARIN 5 ML: 100 SYRINGE at 14:09

## 2022-08-22 NOTE — LETTER
"    8/22/2022         RE: Veda Marinelli  [address] [address] [place] [address]        [facility]  Date of Visit: Aug 22, 2022   Oncologist: Dr. Ortiz Hill      REASON FOR VISIT:  acute myeloid leukemia (AML), hospital f/up    HISTORY OF PRESENT ILLNESS:  Ms. Marinelli is a 37 year old woman with acute myeloid leukemia (AML).  To summarize her course, she was noted to have low WBC count on routine labs in 03/2022.  Follow up labs in 05/2022 showed further decrease in WBC as well as anemia and thrombocytopenia.  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  She has met with the BMT Team .  She was treated with cytarabine and daunorubicin \"7+3\" induction chemotherapy with midostaurin on Day 8-21.  Bone marrow biopsy around Day 21 was hypocellular but consistent with recovering bone marrow without definite AML.  Her course was complicated by recurrent C diff and upper extremity DVT.  She had prompt blood cell count recovery and was discharge with ongoing outpatient follow-up.  She just had a post-treatment bone marrow biopsy that showed 70-80% cellular marrow with slightly increased blasts that are favored to represent robust regnerating marrow with molecular studies and NPM1 MRD PCR testing not detected.    Admitted 8/9-8/12 for Cycle 1 consolidation HiDAC with midostaurin (C1D1 = 8/9/22). Overall tolerated well with no complications. Received neulasta on 8/13.     INTERVAL HISTORY:  Minerav presents for hospital follow-up visit today via video. She is doing well. She is currently at [facility] receiving 2 units of platelet transfusions. She denies bleeding. Has some bruising, but reports nothing significant. She continues on midastaurin, today is day 14. Energy and appetite are good. Normal bowel function.   No " fevers, chills, NS.   Her breathing is good. No CP.   No skin rash. No nausea/vomiting.   No new lumps or bumps.   No weakness or neuropathy.   She is working with fertility clinic.   ROS is otherwise negative.       KEY PAST MEDICAL HISTORY:  History of COVID-19, recurrent C diff, hypothyroidism    MEDICATIONS:  Current Outpatient Medications   Medication     acyclovir (ZOVIRAX) 400 MG tablet     apixaban ANTICOAGULANT (ELIQUIS) 5 MG tablet     Bacillus Coagulans-Inulin (PROBIOTIC) 1-250 BILLION-MG CAPS     dexamethasone (DECADRON) 4 MG tablet     diltiazem 2% in PLO gel     fluconazole (DIFLUCAN) 200 MG tablet     levofloxacin (LEVAQUIN) 250 MG tablet     levothyroxine (SYNTHROID/LEVOTHROID) 100 MCG tablet     magnesium 250 MG tablet     midostaurin (RYDAPT) 25 MG capsule     Multiple Vitamins-Minerals (WOMENS MULTIVITAMIN) TABS     prednisoLONE acetate (PRED FORTE) 1 % ophthalmic suspension     Quercetin 50 MG TABS     vancomycin (VANCOCIN) 125 MG capsule     vitamin C (ASCORBIC ACID) 1000 MG TABS     vitamin D3 (CHOLECALCIFEROL) 50 mcg (2000 units) tablet     zinc gluconate 50 MG tablet     No current facility-administered medications for this visit.     Facility-Administered Medications Ordered in Other Visits   Medication     heparin 100 UNIT/ML injection 5 mL     SOCIAL HISTORY:  Was working as RN    Video physical exam  General: Patient appears well in no acute distress.   Skin: No visualized rash or lesions on visualized skin  Resp: Appears to be breathing comfortably without accessory muscle usage, speaking in full sentences, no cough  MSK: Appears to have normal range of motion based on visualized movements  Neurologic: No apparent tremors, facial movements symmetric  Psych: affect normal, alert and oriented     LABORATORY DATA: Reviewed by me   Latest Reference Range & Units 08/22/22 09:17   WBC 4.0 - 11.0 10e3/uL 31.2 (H)   Hemoglobin 11.7 - 15.7 g/dL 9.5 (L)   Hematocrit 35.0 - 47.0 % 28.2 (L)    Platelet Count 150 - 450 10e3/uL 5 (LL)   RBC Count 3.80 - 5.20 10e6/uL 2.95 (L)   MCV 78 - 100 fL 96   MCH 26.5 - 33.0 pg 32.2   MCHC 31.5 - 36.5 g/dL 33.7   RDW 10.0 - 15.0 % 13.7   % Neutrophils % 58   % Lymphocytes % 8   % Monocytes % 2   % Eosinophils % 0   % Basophils % 0   % Myelocytes % 32   Absolute Basophils 0.0 - 0.2 10e3/uL 0.0   Absolute Neutrophil 1.6 - 8.3 10e3/uL 18.1 (H)   Absolute Lymphocytes 0.8 - 5.3 10e3/uL 2.5   Absolute Monocytes 0.0 - 1.3 10e3/uL 0.6   Absolute Eosinophils 0.0 - 0.7 10e3/uL 0.0   Absolute Myelocytes <=0.0 10e3/uL 10.0 (H)   RBC Morphology  Confirmed RBC Indices   Platelet Morphology Automated Count Confirmed. Platelet morphology is normal.  Automated Count Confirmed. Platelet morphology is normal.       IMPRESSION AND PLAN:  Ms. Marinelli is a 37 year old woman with acute myeloid leukemia (AML).    Previously, Dr. Earl reviewed her course and post induction bone marrow biopsy that is consistent with a complete response - MRD negative.  Based on her workup that suggests a favorable risk profile for her AML and excellent response to induction chemotherapy, she was recommended to proceed with chemotherapy based on consolidation treatment with high-dose cytarabine (HiDAC) with midostaurin based on the RATIFY trial (N Engl J Med 2017;377:454-64.).  They discussed the decision to continue chemotherapy-based consolidation with 3-4 rounds of HiDAC and midostaurin will depend on testing for NPM1 mutation MRD after her second cycle of chemotherapy (1st consolidation treatment).  If chemotherapy-based consolidation is pursued we would also plan for a year of maintenance midostaurin treatment after the completion of consolidation chemotherapy to minimize the risk of relapse.     Previously reviewed the typical schedule for HiDAC and midostaurin consolidation with HiDAC on Day 1-3 in the hospital and midostaurin on Days 8-21 of each consolidation cycle.  Previously discussed possible  side effects and toxicities including but not limited to fatigue, alopecia, bone marrow suppression, infertility, allergic reaction, GI issues, infection, bleeding, damage to heart/lung/liver/kidneys, cystitis, rash.  While complications can be life threatening, the greatest risk is the AML.  Previously also discussed the possibility of poor response or relapse of disease despite the planned therapy and need to consider alternative treatments.      She is currently working with fertility clinic.     She was recently admitted 8/9-8/12/22 for Cycle 1 consolidation with HiDAC and midostaurin, started on 8/9/22. Tentatively planning for 3-4 cycles of consolidation HiDAC (D1-3) and midostaurin (D8-21), followed by maintenance midostaurin for 1 year. She had port placed on 8/9 with IR. Plan to recheck NPM1 mutation after C1 consolidation HiDAC and midostaurin. S/p Neulasta on 8/13. Elevated WBC likely secondary to Neulasta. No signs/sxs of infection. She is thrombocytopenia, getting 2 units of platelet transfusion today.     She has no active ID issues.  Her C diff resolved. She will continue acyclovir and suppressive oral vancomycin twice daily as well as levofloxacin and fluconazole during periods of neutropenia.  Can stop levo/fluc now since ANC >1; restart when ANC <1. We discussed the importance of following up to date local and federal health agency guidance for immunocompromised individuals regarding measures to reduce the risk of COVID-19.       # h/o COVID-19 (Oct 2021)  Pt is not vaccinated nor interested in being vaccinated. She was admitted from 10/1/2021 - 10/13/2021 for acute hypoxic respiratory failure from COVID-19 pneumonia.  Required IMC, high flow and intermittent CPAP. She was treated with dexamethasone, remdesivir, and baricitinib in addition to azithromycin and ceftriaxone. Recovered symptomatically.  - Covid neg 8/9  - Discussed janet and she is not interested at this time. She will let us know  if she changes her mind.     # LUE DVT (PICC-associated)  LUE US 7/12/22; Nonocclusive DVT in the left axillary vein and one of the paired left brachial veins, superficial venous thrombosis in the left basilic vein. PICC-associated, was removed prior to discharge on 7/15/22. Discharged on lovenox 1mg/kg x7/12. Repeat US 7/21 negative for DVT. She has remained on lovenox 1mg/kg BID while awaiting a copay card for Apixaban, which has not arrived yet as of 8/9.  - Transition to Apixaban x8/9 (using a $10/month copay card which was given to patient this admission) -- rx filled on discharge  - Per Dr. Earl, continue Apixaban for 3 months (through ~10/13/2022), hold if plt <50k  - Apixaban currently on hold due to thrombocytopenia.     We will keep her PCP Maria Eugenia Villar NP in the loop.    I provided contact information for our clinic and advised that she call if questions, concerns, or new issues come up between visits.    Continue twice weekly labs and transfusions PRN. RTC for f/up with me on 8/30.     Transition Care Management Services  Admission Date: 8/9/2022    Discharge Date: 8/12/2022    Discharge Diagnosis: Scheduled consolidation chemotherapy.    Face-to-face visit date: 8/22/2022    Medical complexity decision making: Moderate complexity ([id number])    50 minutes spent on the date of the encounter doing chart review, review of test results, interpretation of tests, patient visit and documentation       Angelica Kingsley PA-C  [facility] [address] [place] [address]  [phone]

## 2022-08-22 NOTE — PROGRESS NOTES
PAC labs drawn without difficulty via site protocol. Patient tolerated well.    Bernadine Benson RN on 8/22/2022 at 9:29 AM

## 2022-08-22 NOTE — PROGRESS NOTES
Infusion Nursing Note:  Veda Marinelli presents today for 2 units platelets.    Patient seen by provider today: No   present during visit today: Not Applicable.    Note: N/A.    Intravenous Access:  Implanted Port.    Treatment Conditions:  Lab Results   Component Value Date    HGB 9.5 (L) 08/22/2022    WBC 31.2 (H) 08/22/2022    ANEU 18.1 (H) 08/22/2022    ANEUTAUTO 2.6 08/16/2022    PLT 5 (LL) 08/22/2022      Results reviewed, labs MET treatment parameters, ok to proceed with treatment.    Post Infusion Assessment:  Patient tolerated infusion without incident.  Blood return noted pre and post infusion.  Site patent and intact, free from redness, edema or discomfort.  No evidence of extravasations.  Access discontinued per protocol.     Discharge Plan:   Copy of AVS reviewed with patient and/or family.  Patient will return 8/25/22 for next appointment.  Patient discharged in stable condition accompanied by: self.  Departure Mode: Ambulatory.      Bernadine Benson RN

## 2022-08-23 ENCOUNTER — TELEPHONE (OUTPATIENT)
Dept: ONCOLOGY | Facility: CLINIC | Age: 37
End: 2022-08-23

## 2022-08-23 ENCOUNTER — MEDICAL CORRESPONDENCE (OUTPATIENT)
Dept: ONCOLOGY | Facility: CLINIC | Age: 37
End: 2022-08-23

## 2022-08-23 NOTE — TELEPHONE ENCOUNTER
Oral Chemotherapy Monitoring Program     Placed call to Veda Marinelli in follow up of midostaurin oral chemotherapy. This was a follow up phone call for an initial 1 week assessment.     Left a message requesting a call back. No drug names were mentioned in the voicemail. Will update when response is received.      Aria Anand, PharmD, BCACP  Oral Chemotherapy Monitoring Program  [facility]  [phone]  August 23, 2022

## 2022-08-24 ENCOUNTER — MEDICAL CORRESPONDENCE (OUTPATIENT)
Dept: ONCOLOGY | Facility: CLINIC | Age: 37
End: 2022-08-24

## 2022-08-24 LAB
BLD PROD TYP BPU: NORMAL
BLOOD COMPONENT TYPE: NORMAL
CODING SYSTEM: NORMAL
UNIT ABO/RH: NORMAL
UNIT ABO/RH: NORMAL
UNIT NUMBER: NORMAL
UNIT STATUS: NORMAL
UNIT TYPE ISBT: 600
UNIT TYPE ISBT: 600

## 2022-08-24 RX ORDER — HEPARIN SODIUM (PORCINE) LOCK FLUSH IV SOLN 100 UNIT/ML 100 UNIT/ML
5 SOLUTION INTRAVENOUS
Status: CANCELLED | OUTPATIENT
Start: 2022-08-24

## 2022-08-24 NOTE — TELEPHONE ENCOUNTER
MEDICATION APPEAL DENIED    Medication: Cresemba PA Denied- Appeal Denied    Denial Date: 8/23/2022     Denial Rational:         Second Level Appeal Information: Info not given.          Thank you,    Rosalia Aj  Oncology/Transplant Float [email or web address]  Phone:[phone]  Fax:[phone]      Second level appeals will be managed by the clinic staff and provider. Please contact the [employer] Prior Authorization Team if additional information about the denial is needed.

## 2022-08-25 ENCOUNTER — INFUSION THERAPY VISIT (OUTPATIENT)
Dept: INFUSION THERAPY | Facility: CLINIC | Age: 37
End: 2022-08-25
Attending: INTERNAL MEDICINE
Payer: COMMERCIAL

## 2022-08-25 ENCOUNTER — TELEPHONE (OUTPATIENT)
Dept: ONCOLOGY | Facility: CLINIC | Age: 37
End: 2022-08-25

## 2022-08-25 VITALS — DIASTOLIC BLOOD PRESSURE: 81 MMHG | TEMPERATURE: 98.1 F | SYSTOLIC BLOOD PRESSURE: 125 MMHG

## 2022-08-25 DIAGNOSIS — C95.01 ACUTE LEUKEMIA IN REMISSION (H): Primary | ICD-10-CM

## 2022-08-25 DIAGNOSIS — C92.00 ACUTE MYELOID LEUKEMIA NOT HAVING ACHIEVED REMISSION (H): ICD-10-CM

## 2022-08-25 LAB
ABO/RH(D): NORMAL
ALBUMIN SERPL-MCNC: 4 G/DL (ref 3.4–5)
ALP SERPL-CCNC: 122 U/L (ref 40–150)
ALT SERPL W P-5'-P-CCNC: 80 U/L (ref 0–50)
ANION GAP SERPL CALCULATED.3IONS-SCNC: 7 MMOL/L (ref 3–14)
ANTIBODY SCREEN: NEGATIVE
AST SERPL W P-5'-P-CCNC: 30 U/L (ref 0–45)
BASOPHILS # BLD MANUAL: 0 10E3/UL (ref 0–0.2)
BASOPHILS NFR BLD MANUAL: 0 %
BILIRUB SERPL-MCNC: 0.6 MG/DL (ref 0.2–1.3)
BUN SERPL-MCNC: 17 MG/DL (ref 7–30)
CALCIUM SERPL-MCNC: 9.6 MG/DL (ref 8.5–10.1)
CHLORIDE BLD-SCNC: 110 MMOL/L (ref 94–109)
CO2 SERPL-SCNC: 26 MMOL/L (ref 20–32)
CREAT SERPL-MCNC: 0.6 MG/DL (ref 0.52–1.04)
EOSINOPHIL # BLD MANUAL: 0 10E3/UL (ref 0–0.7)
EOSINOPHIL NFR BLD MANUAL: 0 %
ERYTHROCYTE [DISTWIDTH] IN BLOOD BY AUTOMATED COUNT: 14.1 % (ref 10–15)
GFR SERPL CREATININE-BSD FRML MDRD: >90 ML/MIN/1.73M2
GLUCOSE BLD-MCNC: 110 MG/DL (ref 70–99)
HCT VFR BLD AUTO: 28.6 % (ref 35–47)
HGB BLD-MCNC: 9.5 G/DL (ref 11.7–15.7)
LYMPHOCYTES # BLD MANUAL: 1.9 10E3/UL (ref 0.8–5.3)
LYMPHOCYTES NFR BLD MANUAL: 4 %
MCH RBC QN AUTO: 32.1 PG (ref 26.5–33)
MCHC RBC AUTO-ENTMCNC: 33.2 G/DL (ref 31.5–36.5)
MCV RBC AUTO: 97 FL (ref 78–100)
METAMYELOCYTES # BLD MANUAL: 1 10E3/UL
METAMYELOCYTES NFR BLD MANUAL: 2 %
MONOCYTES # BLD MANUAL: 0.5 10E3/UL (ref 0–1.3)
MONOCYTES NFR BLD MANUAL: 1 %
MYELOCYTES # BLD MANUAL: 4.9 10E3/UL
MYELOCYTES NFR BLD MANUAL: 10 %
NEUTROPHILS # BLD MANUAL: 40.3 10E3/UL (ref 1.6–8.3)
NEUTROPHILS NFR BLD MANUAL: 83 %
PLAT MORPH BLD: ABNORMAL
PLATELET # BLD AUTO: 103 10E3/UL (ref 150–450)
POTASSIUM BLD-SCNC: 4 MMOL/L (ref 3.4–5.3)
PROT SERPL-MCNC: 7.9 G/DL (ref 6.8–8.8)
RBC # BLD AUTO: 2.96 10E6/UL (ref 3.8–5.2)
RBC MORPH BLD: ABNORMAL
SODIUM SERPL-SCNC: 143 MMOL/L (ref 133–144)
SPECIMEN EXPIRATION DATE: NORMAL
WBC # BLD AUTO: 48.5 10E3/UL (ref 4–11)

## 2022-08-25 PROCEDURE — 85027 COMPLETE CBC AUTOMATED: CPT | Performed by: PHYSICIAN ASSISTANT

## 2022-08-25 PROCEDURE — 86901 BLOOD TYPING SEROLOGIC RH(D): CPT | Performed by: PHYSICIAN ASSISTANT

## 2022-08-25 PROCEDURE — 96523 IRRIG DRUG DELIVERY DEVICE: CPT

## 2022-08-25 PROCEDURE — 36591 DRAW BLOOD OFF VENOUS DEVICE: CPT

## 2022-08-25 PROCEDURE — 82040 ASSAY OF SERUM ALBUMIN: CPT | Performed by: INTERNAL MEDICINE

## 2022-08-25 PROCEDURE — 80053 COMPREHEN METABOLIC PANEL: CPT | Performed by: INTERNAL MEDICINE

## 2022-08-25 PROCEDURE — 250N000011 HC RX IP 250 OP 636

## 2022-08-25 PROCEDURE — 85007 BL SMEAR W/DIFF WBC COUNT: CPT | Performed by: PHYSICIAN ASSISTANT

## 2022-08-25 PROCEDURE — 86850 RBC ANTIBODY SCREEN: CPT | Performed by: PHYSICIAN ASSISTANT

## 2022-08-25 RX ORDER — HEPARIN SODIUM (PORCINE) LOCK FLUSH IV SOLN 100 UNIT/ML 100 UNIT/ML
5 SOLUTION INTRAVENOUS ONCE
Status: COMPLETED | OUTPATIENT
Start: 2022-08-25 | End: 2022-08-25

## 2022-08-25 RX ORDER — HEPARIN SODIUM (PORCINE) LOCK FLUSH IV SOLN 100 UNIT/ML 100 UNIT/ML
5 SOLUTION INTRAVENOUS
Status: CANCELLED | OUTPATIENT
Start: 2022-08-25

## 2022-08-25 RX ORDER — HEPARIN SODIUM (PORCINE) LOCK FLUSH IV SOLN 100 UNIT/ML 100 UNIT/ML
SOLUTION INTRAVENOUS
Status: COMPLETED
Start: 2022-08-25 | End: 2022-08-25

## 2022-08-25 RX ADMIN — HEPARIN 5 ML: 100 SYRINGE at 11:18

## 2022-08-25 RX ADMIN — HEPARIN SODIUM (PORCINE) LOCK FLUSH IV SOLN 100 UNIT/ML 5 ML: 100 SOLUTION at 11:18

## 2022-08-25 NOTE — ORAL ONC MGMT
"Oral Chemotherapy Monitoring Program    Subjective/Objective:  Veda Marinelli is a 37 year old female contacted by phone for a follow-up visit for oral chemotherapy.  Minerva confirms taking midostaurin 50 mg BID on days 8-21 of her chemo cycle. She started this on 8/16. She is tolerating this well, with no noticeable adverse effects. Denies medication changes since starting midostaurin. She is aware to take her levofloxacin and fluconazole when her ANC is <1. She feels comfortable with tracking these labs.    ORAL CHEMOTHERAPY 7/26/2022 8/8/2022 8/23/2022 8/25/2022   Assessment Type Initial Work up New Teach Left Voicemail Initial Follow up   Diagnosis Code Acute Myeloid Leukemia (AML) Acute Myeloid Leukemia (AML) Acute Myeloid Leukemia (AML) Acute Myeloid Leukemia (AML)   Providers Dr. Mady Earl   [facility]   Drug Name Rydapt (midostaurin) Rydapt (midostaurin) Rydapt (midostaurin) Rydapt (midostaurin)   Dose 50 mg 50 mg 50 mg 50 mg   Current Schedule Daily Daily Daily Daily   Cycle Details 2 weeks on, 2 weeks off 2 weeks on, 2 weeks off 2 weeks on, 2 weeks off Other   Start Date of Last Cycle - - 8/16/2022 8/16/2022   Planned next cycle start date - 8/16/2022 - 9/13/2022   Doses missed in last 2 weeks - - - 0   Adherence Assessment - - - Adherent   Adverse Effects - - - No AE identified during assessment   Any new drug interactions? - - - No   Is the dose as ordered appropriate for the patient? - - - Yes       Last PHQ-2 Score on record: No flowsheet data found.    Vitals:  BP:   BP Readings from Last 1 Encounters:   08/22/22 133/78     Wt Readings from Last 1 Encounters:   08/11/22 112.2 kg (247 lb 5.7 oz)     Estimated body surface area is 2.3 meters squared as calculated from the following:    Height as of 8/9/22: 1.702 m (5' 7.01\").    Weight as of 8/11/22: 112.2 kg (247 lb 5.7 oz).    Labs:  _  Result Component Current Result " Ref Range   Sodium 141 (8/16/2022) 133 - 144 mmol/L     _  Result Component Current Result Ref Range   Potassium 4.3 (8/16/2022) 3.4 - 5.3 mmol/L     _  Result Component Current Result Ref Range   Calcium 9.1 (8/16/2022) 8.5 - 10.1 mg/dL     _  Result Component Current Result Ref Range   Magnesium 2.2 (8/11/2022) 1.7 - 2.3 mg/dL     _  Result Component Current Result Ref Range   Phosphorus 3.1 (8/12/2022) 2.5 - 4.5 mg/dL     _  Result Component Current Result Ref Range   Albumin 3.8 (8/16/2022) 3.4 - 5.0 g/dL     _  Result Component Current Result Ref Range   Urea Nitrogen 21 (8/16/2022) 7 - 30 mg/dL     _  Result Component Current Result Ref Range   Creatinine 0.53 (8/16/2022) 0.52 - 1.04 mg/dL     _  Result Component Current Result Ref Range   AST 17 (8/16/2022) 0 - 45 U/L     _  Result Component Current Result Ref Range   ALT 59 (H) (8/16/2022) 0 - 50 U/L     _  Result Component Current Result Ref Range   Bilirubin Total 2.2 (H) (8/16/2022) 0.2 - 1.3 mg/dL     _  Result Component Current Result Ref Range   WBC Count 31.2 (H) (8/22/2022) 4.0 - 11.0 10e3/uL     _  Result Component Current Result Ref Range   Hemoglobin 9.5 (L) (8/22/2022) 11.7 - 15.7 g/dL     _  Result Component Current Result Ref Range   Platelet Count 5 (LL) (8/22/2022) 150 - 450 10e3/uL     _  Result Component Current Result Ref Range   Absolute Neutrophils 18.1 (H) (8/22/2022) 1.6 - 8.3 10e3/uL     _  Result Component Current Result Ref Range   Absolute Neutrophils 2.6 (8/16/2022) 1.6 - 8.3 10e3/uL          Assessment/Plan:  Minerva started midostaurin on 8/16 (day 8 of her cycle), and is tolerating well. Continue current therapy.    Follow-Up:  8/30: labs and clinic appt    Refill Due:  9/13    Natividad Chavez, PharmD, BCOP  Hematology/Oncology Clinical Pharmacist  [facility]  [facility]  [phone]

## 2022-08-25 NOTE — PROGRESS NOTES
"[facility]  Date of Visit: Aug 30, 2022   Oncologist: Dr. Ortiz Hill      REASON FOR VISIT:  acute myeloid leukemia (AML), follow-up visit    HISTORY OF PRESENT ILLNESS:  Ms. Marinelli is a 37 year old woman with acute myeloid leukemia (AML).  To summarize her course, she was noted to have low WBC count on routine labs in 03/2022.  Follow up labs in 05/2022 showed further decrease in WBC as well as anemia and thrombocytopenia.  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  She has met with the BMT Team .  She was treated with cytarabine and daunorubicin \"7+3\" induction chemotherapy with midostaurin on Day 8-21.  Bone marrow biopsy around Day 21 was hypocellular but consistent with recovering bone marrow without definite AML.  Her course was complicated by recurrent C diff and upper extremity DVT.  She had prompt blood cell count recovery and was discharge with ongoing outpatient follow-up.  She just had a post-treatment bone marrow biopsy that showed 70-80% cellular marrow with slightly increased blasts that are favored to represent robust regnerating marrow with molecular studies and NPM1 MRD PCR testing not detected.    Admitted 8/9-8/12 for Cycle 1 consolidation HiDAC with midostaurin (C1D1 = 8/9/22). Overall tolerated well with no complications. Received neulasta on 8/13.     INTERVAL HISTORY:  Minerva presents for oncology follow-up visit today. She continues to feel well since last visit a week ago. She offers no specific physical complaints today. Her platelet counts have no normalized since receiving 2 units last week. She denies bleeding. Today is her last day of midastaurin. Energy and appetite are good. Normal bowel function.   No fevers, chills, NS.   Her breathing is good. No CP.   No skin rash. No " nausea/vomiting.   No new lumps or bumps.   No weakness or neuropathy.   She is working with fertility clinic. She has f/up visit today and will likely undergo egg harvest later this week.   ROS is otherwise negative.       KEY PAST MEDICAL HISTORY:  History of COVID-19, recurrent C diff, hypothyroidism    MEDICATIONS:  Current Outpatient Medications   Medication     acyclovir (ZOVIRAX) 400 MG tablet     apixaban ANTICOAGULANT (ELIQUIS) 5 MG tablet     Bacillus Coagulans-Inulin (PROBIOTIC) 1-250 BILLION-MG CAPS     levothyroxine (SYNTHROID/LEVOTHROID) 125 MCG tablet     magnesium 250 MG tablet     midostaurin (RYDAPT) 25 MG capsule     Multiple Vitamins-Minerals (WOMENS MULTIVITAMIN) TABS     Quercetin 50 MG TABS     vancomycin (VANCOCIN) 125 MG capsule     vitamin C (ASCORBIC ACID) 1000 MG TABS     vitamin D3 (CHOLECALCIFEROL) 50 mcg (2000 units) tablet     zinc gluconate 50 MG tablet     dexamethasone (DECADRON) 4 MG tablet     fluconazole (DIFLUCAN) 200 MG tablet     levofloxacin (LEVAQUIN) 250 MG tablet     prednisoLONE acetate (PRED FORTE) 1 % ophthalmic suspension     Current Facility-Administered Medications   Medication     heparin 100 UNIT/ML injection 500 Units     SOCIAL HISTORY:  Was working as RN    Physical Exam:   BP (!) 143/85 (BP Location: Right arm)   Pulse 92   Temp 98.3  F (36.8  C) (Oral)   Resp 16   Wt 111 kg (244 lb 12.8 oz)   SpO2 95%   BMI 38.33 kg/m     General: well appearing, no acute distress, pleasant  HEENT: normocephalic, atraumatic, PERRLA, sclerae nonicteric  Lymph: no palpable cervical, supraclavicular or axillary lymphadenopathy   CV: regular rate and rhythm, no murmurs  Lungs: clear to auscultation bilaterally, no wheezes/rales/rhonchi  Abd: soft, positive bowel sounds, non-distended, non-tender  MSK: full range of motion in all four extremities, no peripheral edema  Neuro: alert and oriented x3, CN grossly intact   Psych: appropriate mood and affect  Skin: no rashes or  lesions      LABORATORY DATA: Reviewed by me.    Latest Reference Range & Units 08/30/22 07:37   Sodium 133 - 144 mmol/L 141   Potassium 3.4 - 5.3 mmol/L 3.8   Chloride 94 - 109 mmol/L 107   Carbon Dioxide 20 - 32 mmol/L 26   Urea Nitrogen 7 - 30 mg/dL 17   Creatinine 0.52 - 1.04 mg/dL 0.52   GFR Estimate >60 mL/min/1.73m2 >90   Calcium 8.5 - 10.1 mg/dL 9.8   Anion Gap 3 - 14 mmol/L 8   Albumin 3.4 - 5.0 g/dL 4.0   Protein Total 6.8 - 8.8 g/dL 7.6   Alkaline Phosphatase 40 - 150 U/L 74   ALT 0 - 50 U/L 65 (H)   AST 0 - 45 U/L 28   Bilirubin Total 0.2 - 1.3 mg/dL 1.0   Lactate Dehydrogenase 81 - 234 U/L 436 (H)   Glucose 70 - 99 mg/dL 103 (H)   WBC 4.0 - 11.0 10e3/uL 16.7 (H)   Hemoglobin 11.7 - 15.7 g/dL 10.7 (L)   Hematocrit 35.0 - 47.0 % 32.5 (L)   Platelet Count 150 - 450 10e3/uL 349   RBC Count 3.80 - 5.20 10e6/uL 3.26 (L)   MCV 78 - 100 fL 100   MCH 26.5 - 33.0 pg 32.8   MCHC 31.5 - 36.5 g/dL 32.9   RDW 10.0 - 15.0 % 17.5 (H)   % Neutrophils % 80   % Lymphocytes % 10   % Monocytes % 5   % Eosinophils % 0   % Basophils % 0   % Metamyelocytes % 4   % Myelocytes % 1   Absolute Basophils 0.0 - 0.2 10e3/uL 0.0   NRBC/W <=0 % 5 (H)   Absolute Neutrophil 1.6 - 8.3 10e3/uL 13.4 (H)   Absolute Lymphocytes 0.8 - 5.3 10e3/uL 1.7   Absolute Monocytes 0.0 - 1.3 10e3/uL 0.8   Absolute Eosinophils 0.0 - 0.7 10e3/uL 0.0   Absolute Metamyelocytes <=0.0 10e3/uL 0.7 (H)   Absolute Myelocytes <=0.0 10e3/uL 0.2 (H)   Absolute NRBCs <=0.0 10e3/uL 0.8 (H)   RBC Morphology  Confirmed RBC Indices   Platelet Morphology Automated Count Confirmed. Platelet morphology is normal.  Automated Count Confirmed. Platelet morphology is normal.   Polychromasia None Seen  Slight !         IMPRESSION AND PLAN:  Ms. Marinelli is a 37 year old woman with acute myeloid leukemia (AML).    Previously, Dr. Earl reviewed her course and post induction bone marrow biopsy that is consistent with a complete response - MRD negative.  Based on her workup that  suggests a favorable risk profile for her AML and excellent response to induction chemotherapy, she was recommended to proceed with chemotherapy based on consolidation treatment with high-dose cytarabine (HiDAC) with midostaurin based on the RATIFY trial (N Engl J Med 2017;377:454-64.).  They discussed the decision to continue chemotherapy-based consolidation with 3-4 rounds of HiDAC and midostaurin will depend on testing for NPM1 mutation MRD after her second cycle of chemotherapy (1st consolidation treatment).  If chemotherapy-based consolidation is pursued we would also plan for a year of maintenance midostaurin treatment after the completion of consolidation chemotherapy to minimize the risk of relapse.     Previously reviewed the typical schedule for HiDAC and midostaurin consolidation with HiDAC on Day 1-3 in the hospital and midostaurin on Days 8-21 of each consolidation cycle.  Previously discussed possible side effects and toxicities including but not limited to fatigue, alopecia, bone marrow suppression, infertility, allergic reaction, GI issues, infection, bleeding, damage to heart/lung/liver/kidneys, cystitis, rash.  While complications can be life threatening, the greatest risk is the AML.  Previously also discussed the possibility of poor response or relapse of disease despite the planned therapy and need to consider alternative treatments.      She is currently working with fertility clinic and will likely undergo egg harvest procedure sometime later this week.     She was admitted 8/9-8/12/22 for Cycle 1 consolidation with HiDAC and midostaurin, started on 8/9/22. Tentatively planning for 3-4 cycles of consolidation HiDAC (D1-3) and midostaurin (D8-21), followed by maintenance midostaurin for 1 year. She had port placed on 8/9 with IR. Plan to recheck NPM1 PCR mutation from blood just prior to Cycle 2 of consolidation HiDAC and midostaurin. S/p Neulasta on 8/13. Elevated WBC likely secondary to  Neulasta, now trending back down. No signs/sxs of infection. Her platelet count has normalized. Hemoglobin improved to 10.7 today. No transfusion needs today. If counts remain stable on 9/2, can do once week lab draws.   - Due for Cycle 2 consolidation with HiDAC and midostaurin on 9/6. However, given that patient is likely undergoing egg harvest later this week, she would prefer to delay chemotherapy by 1 week if possible to allow time to recover and to have a small break from appointments. Will confirm this is OK with Dr. Earl.   - We will monitor slightly elevated ALT, likely related to midostaurin. Today is day 21.     She has no active ID issues.  Her C diff resolved. She will continue acyclovir and suppressive oral vancomycin twice daily as well as levofloxacin and fluconazole during periods of neutropenia.  Can stop levo/fluc now since ANC >1; restart when ANC <1. We discussed the importance of following up to date local and federal health agency guidance for immunocompromised individuals regarding measures to reduce the risk of COVID-19.       # h/o COVID-19 (Oct 2021)  Pt is not vaccinated nor interested in being vaccinated. She was admitted from 10/1/2021 - 10/13/2021 for acute hypoxic respiratory failure from COVID-19 pneumonia.  Required IMC, high flow and intermittent CPAP. She was treated with dexamethasone, remdesivir, and baricitinib in addition to azithromycin and ceftriaxone. Recovered symptomatically.  - Covid neg 8/9  - Discussed janet and she is not interested at this time. She will let us know if she changes her mind.     # LUE DVT (PICC-associated)  LUE US 7/12/22; Nonocclusive DVT in the left axillary vein and one of the paired left brachial veins, superficial venous thrombosis in the left basilic vein. PICC-associated, was removed prior to discharge on 7/15/22. Discharged on lovenox 1mg/kg x7/12. Repeat US 7/21 negative for DVT. She has remained on lovenox 1mg/kg BID while awaiting a  copay card for Apixaban.  - Transitioned to Apixaban x8/9 (using a $10/month copay card which was given to patient this admission) -- rx filled on discharge  - Per Dr. Earl, continue Apixaban for 3 months (through ~10/13/2022), hold if plt <50k  - Apixaban was previously on hold due to thrombocytopenia. Can restart now that platelet has recovered to 349 today. Advised patient to d/w fertility clinic regarding when to hold anticoagulation for egg harvest procedure. She verbalized understanding and will discuss this at her appt later today.     We will keep her PCP Maria Eugenia Villar NP in the loop.    I provided contact information for our clinic and advised that she call if questions, concerns, or new issues come up between visits.    She will have labs on Friday 9/2; if counts remain stable and no transfusion needed, we can go down to once weekly labs and possible transfusion. Will d/w Dr. Earl regarding tentative date of admission on 9/12 for Cycle 2 consolidation chemotherapy.       35 minutes spent on the date of the encounter doing chart review, review of test results, interpretation of tests, patient visit and documentation     Angelica Kingsley PA-C  [facility]  [address] [place] [address] [phone]

## 2022-08-25 NOTE — PROGRESS NOTES
Infusion Nursing Note:  Veda Marinelli presents today for possible platelets/PRBC.    Patient seen by provider today: No   present during visit today: Not Applicable.    Note: Patient did not qualify for platelets or PRBC today.    Intravenous Access:  Implanted Port.    Treatment Conditions:  Lab Results   Component Value Date    HGB 9.5 (L) 08/25/2022    WBC 31.2 (H) 08/22/2022    ANEU 18.1 (H) 08/22/2022    ANEUTAUTO 2.6 08/16/2022     (L) 08/25/2022      Results reviewed, labs did NOT meet treatment parameters: Plt 103, hemoglobin 9.5.    Post Infusion Assessment:  N/A.     Discharge Plan:   Copy of AVS reviewed with patient and/or family.  Patient will return 9/2/22 for next appointment.  Patient discharged in stable condition accompanied by: self.  Departure Mode: Ambulatory.      Bernadine Benson RN

## 2022-08-25 NOTE — PROGRESS NOTES
PAC labs drawn via site protocol. Patient tolerated well.    Bernadine Benson RN on 8/25/2022 at 10:42 AM

## 2022-08-30 ENCOUNTER — ONCOLOGY VISIT (OUTPATIENT)
Dept: ONCOLOGY | Facility: CLINIC | Age: 37
End: 2022-08-30
Attending: INTERNAL MEDICINE
Payer: COMMERCIAL

## 2022-08-30 ENCOUNTER — APPOINTMENT (OUTPATIENT)
Dept: LAB | Facility: CLINIC | Age: 37
End: 2022-08-30
Attending: INTERNAL MEDICINE
Payer: COMMERCIAL

## 2022-08-30 VITALS
TEMPERATURE: 98.3 F | WEIGHT: 244.8 LBS | BODY MASS INDEX: 38.33 KG/M2 | DIASTOLIC BLOOD PRESSURE: 85 MMHG | SYSTOLIC BLOOD PRESSURE: 143 MMHG | RESPIRATION RATE: 16 BRPM | OXYGEN SATURATION: 95 % | HEART RATE: 92 BPM

## 2022-08-30 DIAGNOSIS — C92.00 ACUTE MYELOID LEUKEMIA NOT HAVING ACHIEVED REMISSION (H): Primary | ICD-10-CM

## 2022-08-30 LAB
ALBUMIN SERPL-MCNC: 4 G/DL (ref 3.4–5)
ALP SERPL-CCNC: 74 U/L (ref 40–150)
ALT SERPL W P-5'-P-CCNC: 65 U/L (ref 0–50)
ANION GAP SERPL CALCULATED.3IONS-SCNC: 8 MMOL/L (ref 3–14)
AST SERPL W P-5'-P-CCNC: 28 U/L (ref 0–45)
BASOPHILS # BLD MANUAL: 0 10E3/UL (ref 0–0.2)
BASOPHILS NFR BLD MANUAL: 0 %
BILIRUB SERPL-MCNC: 1 MG/DL (ref 0.2–1.3)
BUN SERPL-MCNC: 17 MG/DL (ref 7–30)
CALCIUM SERPL-MCNC: 9.8 MG/DL (ref 8.5–10.1)
CHLORIDE BLD-SCNC: 107 MMOL/L (ref 94–109)
CO2 SERPL-SCNC: 26 MMOL/L (ref 20–32)
CREAT SERPL-MCNC: 0.52 MG/DL (ref 0.52–1.04)
EOSINOPHIL # BLD MANUAL: 0 10E3/UL (ref 0–0.7)
EOSINOPHIL NFR BLD MANUAL: 0 %
ERYTHROCYTE [DISTWIDTH] IN BLOOD BY AUTOMATED COUNT: 17.5 % (ref 10–15)
GFR SERPL CREATININE-BSD FRML MDRD: >90 ML/MIN/1.73M2
GLUCOSE BLD-MCNC: 103 MG/DL (ref 70–99)
HCT VFR BLD AUTO: 32.5 % (ref 35–47)
HGB BLD-MCNC: 10.7 G/DL (ref 11.7–15.7)
LDH SERPL L TO P-CCNC: 436 U/L (ref 81–234)
LYMPHOCYTES # BLD MANUAL: 1.7 10E3/UL (ref 0.8–5.3)
LYMPHOCYTES NFR BLD MANUAL: 10 %
MCH RBC QN AUTO: 32.8 PG (ref 26.5–33)
MCHC RBC AUTO-ENTMCNC: 32.9 G/DL (ref 31.5–36.5)
MCV RBC AUTO: 100 FL (ref 78–100)
METAMYELOCYTES # BLD MANUAL: 0.7 10E3/UL
METAMYELOCYTES NFR BLD MANUAL: 4 %
MONOCYTES # BLD MANUAL: 0.8 10E3/UL (ref 0–1.3)
MONOCYTES NFR BLD MANUAL: 5 %
MYELOCYTES # BLD MANUAL: 0.2 10E3/UL
MYELOCYTES NFR BLD MANUAL: 1 %
NEUTROPHILS # BLD MANUAL: 13.4 10E3/UL (ref 1.6–8.3)
NEUTROPHILS NFR BLD MANUAL: 80 %
NRBC # BLD AUTO: 0.8 10E3/UL
NRBC BLD MANUAL-RTO: 5 %
PLAT MORPH BLD: ABNORMAL
PLATELET # BLD AUTO: 349 10E3/UL (ref 150–450)
POLYCHROMASIA BLD QL SMEAR: SLIGHT
POTASSIUM BLD-SCNC: 3.8 MMOL/L (ref 3.4–5.3)
PROT SERPL-MCNC: 7.6 G/DL (ref 6.8–8.8)
RBC # BLD AUTO: 3.26 10E6/UL (ref 3.8–5.2)
RBC MORPH BLD: ABNORMAL
SODIUM SERPL-SCNC: 141 MMOL/L (ref 133–144)
WBC # BLD AUTO: 16.7 10E3/UL (ref 4–11)

## 2022-08-30 PROCEDURE — 36591 DRAW BLOOD OFF VENOUS DEVICE: CPT

## 2022-08-30 PROCEDURE — 83615 LACTATE (LD) (LDH) ENZYME: CPT | Performed by: PHYSICIAN ASSISTANT

## 2022-08-30 PROCEDURE — G0463 HOSPITAL OUTPT CLINIC VISIT: HCPCS

## 2022-08-30 PROCEDURE — 80053 COMPREHEN METABOLIC PANEL: CPT | Performed by: PHYSICIAN ASSISTANT

## 2022-08-30 PROCEDURE — 99214 OFFICE O/P EST MOD 30 MIN: CPT | Performed by: PHYSICIAN ASSISTANT

## 2022-08-30 PROCEDURE — 85027 COMPLETE CBC AUTOMATED: CPT

## 2022-08-30 PROCEDURE — 85007 BL SMEAR W/DIFF WBC COUNT: CPT

## 2022-08-30 PROCEDURE — 82040 ASSAY OF SERUM ALBUMIN: CPT | Performed by: PHYSICIAN ASSISTANT

## 2022-08-30 RX ORDER — HEPARIN SODIUM (PORCINE) LOCK FLUSH IV SOLN 100 UNIT/ML 100 UNIT/ML
500 SOLUTION INTRAVENOUS ONCE
Status: DISCONTINUED | OUTPATIENT
Start: 2022-08-30 | End: 2022-08-30 | Stop reason: HOSPADM

## 2022-08-30 RX ORDER — LEVOTHYROXINE SODIUM 88 UG/1
88 TABLET ORAL DAILY
COMMUNITY
Start: 2022-05-16 | End: 2022-11-14

## 2022-08-30 ASSESSMENT — PAIN SCALES - GENERAL: PAINLEVEL: NO PAIN (0)

## 2022-08-30 NOTE — NURSING NOTE
"Oncology Rooming Note    August 30, 2022 7:55 AM   Veda Marinelli is a 37 year old female who presents for:    Chief Complaint   Patient presents with     Port Draw     Labs drawn via port by RN. Vitals taken.     Oncology Clinic Visit     AML      Initial Vitals: BP (!) 143/85 (BP Location: Right arm)   Pulse 92   Temp 98.3  F (36.8  C) (Oral)   Resp 16   Wt 111 kg (244 lb 12.8 oz)   SpO2 95%   BMI 38.33 kg/m   Estimated body mass index is 38.33 kg/m  as calculated from the following:    Height as of 8/9/22: 1.702 m (5' 7.01\").    Weight as of this encounter: 111 kg (244 lb 12.8 oz). Body surface area is 2.29 meters squared.  No Pain (0) Comment: Data Unavailable   No LMP recorded.  Allergies reviewed: Yes  Medications reviewed: Yes    Medications: Medication refills not needed today.  Pharmacy name entered into EPIC:    [id number] - [place], [place] - [address] [facility] - [place], [place] - [address] [facility] [place], [place] - [address] [place] [address]    Clinical concerns: No new concerns per pt,.        Catalina Mir, [facility]            "

## 2022-08-30 NOTE — NURSING NOTE
"Chief Complaint   Patient presents with     Port Draw     Labs drawn via port by RN. Vitals taken.     Port accessed with 20G 3/4\" flat needle by RN, labs collected, line flushed with saline and heparin.  Vitals taken. Pt checked in for appointment(s).    Tanya Knox RN    "

## 2022-08-30 NOTE — LETTER
"    8/30/2022         RE: Veda Marinelli  [address] [facility] [address]      [facility]  Date of Visit: Aug 30, 2022   Oncologist: Dr. Ortiz Hill      REASON FOR VISIT:  acute myeloid leukemia (AML), follow-up visit    HISTORY OF PRESENT ILLNESS:  Ms. Marinelli is a 37 year old woman with acute myeloid leukemia (AML).  To summarize her course, she was noted to have low WBC count on routine labs in 03/2022.  Follow up labs in 05/2022 showed further decrease in WBC as well as anemia and thrombocytopenia.  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  She has met with the BMT Team .  She was treated with cytarabine and daunorubicin \"7+3\" induction chemotherapy with midostaurin on Day 8-21.  Bone marrow biopsy around Day 21 was hypocellular but consistent with recovering bone marrow without definite AML.  Her course was complicated by recurrent C diff and upper extremity DVT.  She had prompt blood cell count recovery and was discharge with ongoing outpatient follow-up.  She just had a post-treatment bone marrow biopsy that showed 70-80% cellular marrow with slightly increased blasts that are favored to represent robust regnerating marrow with molecular studies and NPM1 MRD PCR testing not detected.    Admitted 8/9-8/12 for Cycle 1 consolidation HiDAC with midostaurin (C1D1 = 8/9/22). Overall tolerated well with no complications. Received neulasta on 8/13.     INTERVAL HISTORY:  Minerva presents for oncology follow-up visit today. She continues to feel well since last visit a week ago. She offers no specific physical complaints today. Her platelet counts have no normalized since receiving 2 units last week. She denies bleeding. Today is her last day of midastaurin. Energy and appetite are good. Normal bowel " function.   No fevers, chills, NS.   Her breathing is good. No CP.   No skin rash. No nausea/vomiting.   No new lumps or bumps.   No weakness or neuropathy.   She is working with fertility clinic. She has f/up visit today and will likely undergo egg harvest later this week.   ROS is otherwise negative.       KEY PAST MEDICAL HISTORY:  History of COVID-19, recurrent C diff, hypothyroidism    MEDICATIONS:  Current Outpatient Medications   Medication     acyclovir (ZOVIRAX) 400 MG tablet     apixaban ANTICOAGULANT (ELIQUIS) 5 MG tablet     Bacillus Coagulans-Inulin (PROBIOTIC) 1-250 BILLION-MG CAPS     levothyroxine (SYNTHROID/LEVOTHROID) 125 MCG tablet     magnesium 250 MG tablet     midostaurin (RYDAPT) 25 MG capsule     Multiple Vitamins-Minerals (WOMENS MULTIVITAMIN) TABS     Quercetin 50 MG TABS     vancomycin (VANCOCIN) 125 MG capsule     vitamin C (ASCORBIC ACID) 1000 MG TABS     vitamin D3 (CHOLECALCIFEROL) 50 mcg (2000 units) tablet     zinc gluconate 50 MG tablet     dexamethasone (DECADRON) 4 MG tablet     fluconazole (DIFLUCAN) 200 MG tablet     levofloxacin (LEVAQUIN) 250 MG tablet     prednisoLONE acetate (PRED FORTE) 1 % ophthalmic suspension     Current Facility-Administered Medications   Medication     heparin 100 UNIT/ML injection 500 Units     SOCIAL HISTORY:  Was working as RN    Physical Exam:   BP (!) 143/85 (BP Location: Right arm)   Pulse 92   Temp 98.3  F (36.8  C) (Oral)   Resp 16   Wt 111 kg (244 lb 12.8 oz)   SpO2 95%   BMI 38.33 kg/m     General: well appearing, no acute distress, pleasant  HEENT: normocephalic, atraumatic, PERRLA, sclerae nonicteric  Lymph: no palpable cervical, supraclavicular or axillary lymphadenopathy   CV: regular rate and rhythm, no murmurs  Lungs: clear to auscultation bilaterally, no wheezes/rales/rhonchi  Abd: soft, positive bowel sounds, non-distended, non-tender  MSK: full range of motion in all four extremities, no peripheral edema  Neuro: alert and  oriented x3, CN grossly intact   Psych: appropriate mood and affect  Skin: no rashes or lesions      LABORATORY DATA: Reviewed by me.    Latest Reference Range & Units 08/30/22 07:37   Sodium 133 - 144 mmol/L 141   Potassium 3.4 - 5.3 mmol/L 3.8   Chloride 94 - 109 mmol/L 107   Carbon Dioxide 20 - 32 mmol/L 26   Urea Nitrogen 7 - 30 mg/dL 17   Creatinine 0.52 - 1.04 mg/dL 0.52   GFR Estimate >60 mL/min/1.73m2 >90   Calcium 8.5 - 10.1 mg/dL 9.8   Anion Gap 3 - 14 mmol/L 8   Albumin 3.4 - 5.0 g/dL 4.0   Protein Total 6.8 - 8.8 g/dL 7.6   Alkaline Phosphatase 40 - 150 U/L 74   ALT 0 - 50 U/L 65 (H)   AST 0 - 45 U/L 28   Bilirubin Total 0.2 - 1.3 mg/dL 1.0   Lactate Dehydrogenase 81 - 234 U/L 436 (H)   Glucose 70 - 99 mg/dL 103 (H)   WBC 4.0 - 11.0 10e3/uL 16.7 (H)   Hemoglobin 11.7 - 15.7 g/dL 10.7 (L)   Hematocrit 35.0 - 47.0 % 32.5 (L)   Platelet Count 150 - 450 10e3/uL 349   RBC Count 3.80 - 5.20 10e6/uL 3.26 (L)   MCV 78 - 100 fL 100   MCH 26.5 - 33.0 pg 32.8   MCHC 31.5 - 36.5 g/dL 32.9   RDW 10.0 - 15.0 % 17.5 (H)   % Neutrophils % 80   % Lymphocytes % 10   % Monocytes % 5   % Eosinophils % 0   % Basophils % 0   % Metamyelocytes % 4   % Myelocytes % 1   Absolute Basophils 0.0 - 0.2 10e3/uL 0.0   NRBC/W <=0 % 5 (H)   Absolute Neutrophil 1.6 - 8.3 10e3/uL 13.4 (H)   Absolute Lymphocytes 0.8 - 5.3 10e3/uL 1.7   Absolute Monocytes 0.0 - 1.3 10e3/uL 0.8   Absolute Eosinophils 0.0 - 0.7 10e3/uL 0.0   Absolute Metamyelocytes <=0.0 10e3/uL 0.7 (H)   Absolute Myelocytes <=0.0 10e3/uL 0.2 (H)   Absolute NRBCs <=0.0 10e3/uL 0.8 (H)   RBC Morphology  Confirmed RBC Indices   Platelet Morphology Automated Count Confirmed. Platelet morphology is normal.  Automated Count Confirmed. Platelet morphology is normal.   Polychromasia None Seen  Slight !         IMPRESSION AND PLAN:  Ms. Marinelli is a 37 year old woman with acute myeloid leukemia (AML).    Previously, Dr. Earl reviewed her course and post induction bone marrow  biopsy that is consistent with a complete response - MRD negative.  Based on her workup that suggests a favorable risk profile for her AML and excellent response to induction chemotherapy, she was recommended to proceed with chemotherapy based on consolidation treatment with high-dose cytarabine (HiDAC) with midostaurin based on the RATIFY trial (N Engl J Med 2017;377:454-64.).  They discussed the decision to continue chemotherapy-based consolidation with 3-4 rounds of HiDAC and midostaurin will depend on testing for NPM1 mutation MRD after her second cycle of chemotherapy (1st consolidation treatment).  If chemotherapy-based consolidation is pursued we would also plan for a year of maintenance midostaurin treatment after the completion of consolidation chemotherapy to minimize the risk of relapse.     Previously reviewed the typical schedule for HiDAC and midostaurin consolidation with HiDAC on Day 1-3 in the hospital and midostaurin on Days 8-21 of each consolidation cycle.  Previously discussed possible side effects and toxicities including but not limited to fatigue, alopecia, bone marrow suppression, infertility, allergic reaction, GI issues, infection, bleeding, damage to heart/lung/liver/kidneys, cystitis, rash.  While complications can be life threatening, the greatest risk is the AML.  Previously also discussed the possibility of poor response or relapse of disease despite the planned therapy and need to consider alternative treatments.      She is currently working with fertility clinic and will likely undergo egg harvest procedure sometime later this week.     She was admitted 8/9-8/12/22 for Cycle 1 consolidation with HiDAC and midostaurin, started on 8/9/22. Tentatively planning for 3-4 cycles of consolidation HiDAC (D1-3) and midostaurin (D8-21), followed by maintenance midostaurin for 1 year. She had port placed on 8/9 with IR. Plan to recheck NPM1 PCR mutation from blood just prior to Cycle 2 of  consolidation HiDAC and midostaurin. S/p Neulasta on 8/13. Elevated WBC likely secondary to Neulasta, now trending back down. No signs/sxs of infection. Her platelet count has normalized. Hemoglobin improved to 10.7 today. No transfusion needs today. If counts remain stable on 9/2, can do once week lab draws.   - Due for Cycle 2 consolidation with HiDAC and midostaurin on 9/6. However, given that patient is likely undergoing egg harvest later this week, she would prefer to delay chemotherapy by 1 week if possible to allow time to recover and to have a small break from appointments. Will confirm this is OK with Dr. Earl.   - We will monitor slightly elevated ALT, likely related to midostaurin. Today is day 21.     She has no active ID issues.  Her C diff resolved. She will continue acyclovir and suppressive oral vancomycin twice daily as well as levofloxacin and fluconazole during periods of neutropenia.  Can stop levo/fluc now since ANC >1; restart when ANC <1. We discussed the importance of following up to date local and federal health agency guidance for immunocompromised individuals regarding measures to reduce the risk of COVID-19.       # h/o COVID-19 (Oct 2021)  Pt is not vaccinated nor interested in being vaccinated. She was admitted from 10/1/2021 - 10/13/2021 for acute hypoxic respiratory failure from COVID-19 pneumonia.  Required IMC, high flow and intermittent CPAP. She was treated with dexamethasone, remdesivir, and baricitinib in addition to azithromycin and ceftriaxone. Recovered symptomatically.  - Covid neg 8/9  - Discussed janet and she is not interested at this time. She will let us know if she changes her mind.     # LUE DVT (PICC-associated)  LUE US 7/12/22; Nonocclusive DVT in the left axillary vein and one of the paired left brachial veins, superficial venous thrombosis in the left basilic vein. PICC-associated, was removed prior to discharge on 7/15/22. Discharged on lovenox 1mg/kg  x7/12. Repeat US 7/21 negative for DVT. She has remained on lovenox 1mg/kg BID while awaiting a copay card for Apixaban.  - Transitioned to Apixaban x8/9 (using a $10/month copay card which was given to patient this admission) -- rx filled on discharge  - Per Dr. Earl, continue Apixaban for 3 months (through ~10/13/2022), hold if plt <50k  - Apixaban was previously on hold due to thrombocytopenia. Can restart now that platelet has recovered to 349 today. Advised patient to d/w fertility clinic regarding when to hold anticoagulation for egg harvest procedure. She verbalized understanding and will discuss this at her appt later today.     We will keep her PCP Maria Eugenia Villar NP in the loop.    I provided contact information for our clinic and advised that she call if questions, concerns, or new issues come up between visits.    She will have labs on Friday 9/2; if counts remain stable and no transfusion needed, we can go down to once weekly labs and possible transfusion. Will d/w Dr. Earl regarding tentative date of admission on 9/12 for Cycle 2 consolidation chemotherapy.       35 minutes spent on the date of the encounter doing chart review, review of test results, interpretation of tests, patient visit and documentation         Angelica Kingsley PA-C

## 2022-09-02 ENCOUNTER — LAB (OUTPATIENT)
Dept: INFUSION THERAPY | Facility: CLINIC | Age: 37
End: 2022-09-02
Attending: PHYSICIAN ASSISTANT
Payer: COMMERCIAL

## 2022-09-02 DIAGNOSIS — C95.01 ACUTE LEUKEMIA IN REMISSION (H): Primary | ICD-10-CM

## 2022-09-02 DIAGNOSIS — C92.00 ACUTE MYELOID LEUKEMIA NOT HAVING ACHIEVED REMISSION (H): ICD-10-CM

## 2022-09-02 DIAGNOSIS — C92.01 ACUTE MYELOID LEUKEMIA IN REMISSION (H): Primary | ICD-10-CM

## 2022-09-02 LAB
ALBUMIN SERPL BCG-MCNC: 4.7 G/DL (ref 3.5–5.2)
ALP SERPL-CCNC: 62 U/L (ref 35–104)
ALT SERPL W P-5'-P-CCNC: 40 U/L (ref 10–35)
ANION GAP SERPL CALCULATED.3IONS-SCNC: 11 MMOL/L (ref 7–15)
AST SERPL W P-5'-P-CCNC: 20 U/L (ref 10–35)
BASOPHILS # BLD AUTO: 0 10E3/UL (ref 0–0.2)
BASOPHILS NFR BLD AUTO: 1 %
BILIRUB SERPL-MCNC: 0.8 MG/DL
BUN SERPL-MCNC: 16 MG/DL (ref 6–20)
CALCIUM SERPL-MCNC: 10 MG/DL (ref 8.6–10)
CHLORIDE SERPL-SCNC: 104 MMOL/L (ref 98–107)
CREAT SERPL-MCNC: 0.61 MG/DL (ref 0.51–0.95)
DEPRECATED HCO3 PLAS-SCNC: 23 MMOL/L (ref 22–29)
EOSINOPHIL # BLD AUTO: 0 10E3/UL (ref 0–0.7)
EOSINOPHIL NFR BLD AUTO: 0 %
ERYTHROCYTE [DISTWIDTH] IN BLOOD BY AUTOMATED COUNT: 18.7 % (ref 10–15)
GFR SERPL CREATININE-BSD FRML MDRD: >90 ML/MIN/1.73M2
GLUCOSE SERPL-MCNC: 96 MG/DL (ref 70–99)
HCT VFR BLD AUTO: 33.5 % (ref 35–47)
HGB BLD-MCNC: 11.2 G/DL (ref 11.7–15.7)
IMM GRANULOCYTES # BLD: 0.1 10E3/UL
IMM GRANULOCYTES NFR BLD: 1 %
LDH SERPL L TO P-CCNC: 310 U/L (ref 0–250)
LYMPHOCYTES # BLD AUTO: 0.8 10E3/UL (ref 0.8–5.3)
LYMPHOCYTES NFR BLD AUTO: 12 %
MCH RBC QN AUTO: 33.4 PG (ref 26.5–33)
MCHC RBC AUTO-ENTMCNC: 33.4 G/DL (ref 31.5–36.5)
MCV RBC AUTO: 100 FL (ref 78–100)
MONOCYTES # BLD AUTO: 0.8 10E3/UL (ref 0–1.3)
MONOCYTES NFR BLD AUTO: 11 %
NEUTROPHILS # BLD AUTO: 5.2 10E3/UL (ref 1.6–8.3)
NEUTROPHILS NFR BLD AUTO: 75 %
NRBC # BLD AUTO: 0.1 10E3/UL
NRBC BLD AUTO-RTO: 1 /100
PLATELET # BLD AUTO: 278 10E3/UL (ref 150–450)
POTASSIUM SERPL-SCNC: 4.1 MMOL/L (ref 3.4–5.3)
PROT SERPL-MCNC: 7.2 G/DL (ref 6.4–8.3)
RBC # BLD AUTO: 3.35 10E6/UL (ref 3.8–5.2)
SODIUM SERPL-SCNC: 138 MMOL/L (ref 136–145)
WBC # BLD AUTO: 6.9 10E3/UL (ref 4–11)

## 2022-09-02 PROCEDURE — 36591 DRAW BLOOD OFF VENOUS DEVICE: CPT

## 2022-09-02 PROCEDURE — 250N000011 HC RX IP 250 OP 636: Performed by: PHYSICIAN ASSISTANT

## 2022-09-02 PROCEDURE — 85004 AUTOMATED DIFF WBC COUNT: CPT | Performed by: INTERNAL MEDICINE

## 2022-09-02 PROCEDURE — 80053 COMPREHEN METABOLIC PANEL: CPT | Performed by: INTERNAL MEDICINE

## 2022-09-02 PROCEDURE — 83615 LACTATE (LD) (LDH) ENZYME: CPT | Performed by: INTERNAL MEDICINE

## 2022-09-02 RX ORDER — EPINEPHRINE 1 MG/ML
0.3 INJECTION, SOLUTION INTRAMUSCULAR; SUBCUTANEOUS EVERY 5 MIN PRN
Status: CANCELLED | OUTPATIENT
Start: 2022-09-12

## 2022-09-02 RX ORDER — DEXAMETHASONE 4 MG/1
12 TABLET ORAL EVERY 24 HOURS
Status: CANCELLED
Start: 2022-09-12

## 2022-09-02 RX ORDER — HEPARIN SODIUM (PORCINE) LOCK FLUSH IV SOLN 100 UNIT/ML 100 UNIT/ML
5 SOLUTION INTRAVENOUS
Status: CANCELLED | OUTPATIENT
Start: 2022-09-02

## 2022-09-02 RX ORDER — MEPERIDINE HYDROCHLORIDE 25 MG/ML
25 INJECTION INTRAMUSCULAR; INTRAVENOUS; SUBCUTANEOUS EVERY 30 MIN PRN
Status: CANCELLED | OUTPATIENT
Start: 2022-09-12

## 2022-09-02 RX ORDER — DIPHENHYDRAMINE HYDROCHLORIDE 50 MG/ML
50 INJECTION INTRAMUSCULAR; INTRAVENOUS
Status: CANCELLED
Start: 2022-09-12

## 2022-09-02 RX ORDER — ONDANSETRON 8 MG/1
8 TABLET, FILM COATED ORAL EVERY 12 HOURS
Status: CANCELLED
Start: 2022-09-12

## 2022-09-02 RX ORDER — PREDNISOLONE ACETATE 10 MG/ML
2 SUSPENSION/ DROPS OPHTHALMIC 4 TIMES DAILY
Status: CANCELLED | OUTPATIENT
Start: 2022-09-12

## 2022-09-02 RX ORDER — ALBUTEROL SULFATE 0.83 MG/ML
2.5 SOLUTION RESPIRATORY (INHALATION)
Status: CANCELLED | OUTPATIENT
Start: 2022-09-12

## 2022-09-02 RX ORDER — ALBUTEROL SULFATE 90 UG/1
1-2 AEROSOL, METERED RESPIRATORY (INHALATION)
Status: CANCELLED
Start: 2022-09-12

## 2022-09-02 RX ORDER — DEXAMETHASONE 4 MG/1
8 TABLET ORAL EVERY MORNING
Status: CANCELLED
Start: 2022-09-15

## 2022-09-02 RX ORDER — METHYLPREDNISOLONE SODIUM SUCCINATE 125 MG/2ML
125 INJECTION, POWDER, LYOPHILIZED, FOR SOLUTION INTRAMUSCULAR; INTRAVENOUS
Status: CANCELLED
Start: 2022-09-12

## 2022-09-02 RX ORDER — LORAZEPAM 2 MG/ML
.5-1 INJECTION INTRAMUSCULAR EVERY 6 HOURS PRN
Status: CANCELLED | OUTPATIENT
Start: 2022-09-12

## 2022-09-02 RX ORDER — PROCHLORPERAZINE MALEATE 10 MG
10 TABLET ORAL EVERY 6 HOURS PRN
Status: CANCELLED
Start: 2022-09-12

## 2022-09-02 RX ORDER — LORAZEPAM 0.5 MG/1
.5-1 TABLET ORAL EVERY 6 HOURS PRN
Status: CANCELLED
Start: 2022-09-12

## 2022-09-02 RX ORDER — HEPARIN SODIUM (PORCINE) LOCK FLUSH IV SOLN 100 UNIT/ML 100 UNIT/ML
5 SOLUTION INTRAVENOUS
Status: DISCONTINUED | OUTPATIENT
Start: 2022-09-02 | End: 2022-09-02 | Stop reason: HOSPADM

## 2022-09-02 RX ADMIN — HEPARIN 5 ML: 100 SYRINGE at 08:49

## 2022-09-02 NOTE — PROGRESS NOTES
Infusion Nursing Note:  Veda Marinelli presents today for possible transfusions.    Patient seen by provider today: No   present during visit today: Not Applicable.    Note: N/A.    Intravenous Access:  Implanted Port.    Treatment Conditions:  Results reviewed, labs did NOT meet treatment parameters: Hgb & Plt above tx parameters..    Post Infusion Assessment:  Site patent and intact, free from redness, edema or discomfort.  No evidence of extravasations.  Access discontinued per protocol.     Discharge Plan:   Patient discharged in stable condition accompanied by: self.  Departure Mode: Ambulatory.      Dulce Ace RN

## 2022-09-06 NOTE — PROGRESS NOTES
"[facility]  Date of Visit: Sep 12, 2022   Oncologist: Dr. Ortiz Hill      REASON FOR VISIT:  acute myeloid leukemia (AML), follow-up visit    HISTORY OF PRESENT ILLNESS:  Ms. Marinelli is a 37 year old woman with acute myeloid leukemia (AML).  To summarize her course, she was noted to have low WBC count on routine labs in 03/2022.  Follow up labs in 05/2022 showed further decrease in WBC as well as anemia and thrombocytopenia.  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  She has met with the BMT Team .  She was treated with cytarabine and daunorubicin \"7+3\" induction chemotherapy with midostaurin on Day 8-21.  Bone marrow biopsy around Day 21 was hypocellular but consistent with recovering bone marrow without definite AML.  Her course was complicated by recurrent C diff and upper extremity DVT.  She had prompt blood cell count recovery and was discharge with ongoing outpatient follow-up.  She just had a post-treatment bone marrow biopsy that showed 70-80% cellular marrow with slightly increased blasts that are favored to represent robust regnerating marrow with molecular studies and NPM1 MRD PCR testing not detected.    Admitted 8/9-8/12 for Cycle 1 consolidation HiDAC with midostaurin (C1D1 = 8/9/22). Overall tolerated well with no complications. Received neulasta on 8/13.     INTERVAL HISTORY:  Minerva presents for oncology follow-up visit today and consideration of admission for Cycle 2 consolidation HiDAC with midostaurin. She is feeling well. She underwent fertility preservation last week and tolerated egg harvest procedure well. She was happy to have last week off. She offers no specific physical complaints today. Energy and appetite have been good. Normal bowel function. No urinary issues to report. No fevers, " chills, or NS. She has resumed her apixaban. No bleeding or new lumps or bumps. No nausea/vomiting. Breathing is good. No CP. No weakness or neuropathy. ROS is otherwise negative.       KEY PAST MEDICAL HISTORY:  History of COVID-19, recurrent C diff, hypothyroidism    MEDICATIONS:  Current Outpatient Medications   Medication     acyclovir (ZOVIRAX) 400 MG tablet     apixaban ANTICOAGULANT (ELIQUIS) 5 MG tablet     Bacillus Coagulans-Inulin (PROBIOTIC) 1-250 BILLION-MG CAPS     dexamethasone (DECADRON) 4 MG tablet     fluconazole (DIFLUCAN) 200 MG tablet     levofloxacin (LEVAQUIN) 250 MG tablet     levothyroxine (SYNTHROID/LEVOTHROID) 125 MCG tablet     magnesium 250 MG tablet     midostaurin (RYDAPT) 25 MG capsule     Multiple Vitamins-Minerals (WOMENS MULTIVITAMIN) TABS     prednisoLONE acetate (PRED FORTE) 1 % ophthalmic suspension     Quercetin 50 MG TABS     vancomycin (VANCOCIN) 125 MG capsule     vitamin C (ASCORBIC ACID) 1000 MG TABS     vitamin D3 (CHOLECALCIFEROL) 50 mcg (2000 units) tablet     zinc gluconate 50 MG tablet     Current Facility-Administered Medications   Medication     heparin 100 UNIT/ML injection 500 Units     saline flush for lab use ONLY     SOCIAL HISTORY:  Was working as RN    Physical Exam:   /86   Pulse 95   Temp 97.9  F (36.6  C) (Oral)   Resp 16   Wt 113.7 kg (250 lb 11.2 oz)   SpO2 100%   BMI 39.26 kg/m     General: well appearing, young female, no acute distress, pleasant  HEENT: normocephalic, atraumatic, PERRLA, sclerae nonicteric  Lymph: no palpable cervical, supraclavicular or axillary lymphadenopathy   CV: regular rate and rhythm, no murmurs  Lungs: clear to auscultation bilaterally, no wheezes/rales/rhonchi  Abd: soft, positive bowel sounds, non-distended, non-tender  MSK: full range of motion in all four extremities, no peripheral edema  Neuro: alert and oriented x3, CN grossly intact   Psych: appropriate mood and affect  Skin: no rashes or lesions  Access:  port       LABORATORY DATA: Reviewed by me. NPM1 mutation PCR and LDH pending.    Latest Reference Range & Units 09/12/22 09:05   Sodium 136 - 145 mmol/L 139   Potassium 3.4 - 5.3 mmol/L 4.2   Chloride 98 - 107 mmol/L 107   Carbon Dioxide (CO2) 22 - 29 mmol/L 21 (L)   Urea Nitrogen 6.0 - 20.0 mg/dL 14.7   Creatinine 0.51 - 0.95 mg/dL 0.61   GFR Estimate >60 mL/min/1.73m2 >90   Calcium 8.6 - 10.0 mg/dL 9.8   Anion Gap 7 - 15 mmol/L 11   Albumin 3.5 - 5.2 g/dL 4.2   Protein Total 6.4 - 8.3 g/dL 7.1   Alkaline Phosphatase 35 - 104 U/L 57   ALT 10 - 35 U/L 26   AST 10 - 35 U/L 20   Bilirubin Total <=1.2 mg/dL 0.7   Glucose 70 - 99 mg/dL 108 (H)   WBC 4.0 - 11.0 10e3/uL 6.0   Hemoglobin 11.7 - 15.7 g/dL 12.6   Hematocrit 35.0 - 47.0 % 36.9   Platelet Count 150 - 450 10e3/uL 197   RBC Count 3.80 - 5.20 10e6/uL 3.67 (L)   MCV 78 - 100 fL 101 (H)   MCH 26.5 - 33.0 pg 34.3 (H)   MCHC 31.5 - 36.5 g/dL 34.1   RDW 10.0 - 15.0 % 17.7 (H)   % Neutrophils % 73   % Lymphocytes % 12   % Monocytes % 11   % Eosinophils % 2   % Basophils % 1   Absolute Basophils 0.0 - 0.2 10e3/uL 0.0   Absolute Eosinophils 0.0 - 0.7 10e3/uL 0.1   Absolute Immature Granulocytes <=0.4 10e3/uL 0.0   Absolute Lymphocytes 0.8 - 5.3 10e3/uL 0.7 (L)   Absolute Monocytes 0.0 - 1.3 10e3/uL 0.7   % Immature Granulocytes % 1   Absolute Neutrophils 1.6 - 8.3 10e3/uL 4.4   Absolute NRBCs 10e3/uL 0.0   NRBCs per 100 WBC <1 /100 0         IMPRESSION AND PLAN:  Ms. Marinelli is a 37 year old woman with acute myeloid leukemia (AML).    Previously, Dr. Earl reviewed her course and post induction bone marrow biopsy that is consistent with a complete response - MRD negative.  Based on her workup that suggests a favorable risk profile for her AML and excellent response to induction chemotherapy, she was recommended to proceed with chemotherapy based on consolidation treatment with high-dose cytarabine (HiDAC) with midostaurin based on the RATIFY trial (N Engl J Med  2017;377:454-64.).  They discussed the decision to continue chemotherapy-based consolidation with 3-4 rounds of HiDAC and midostaurin will depend on testing for NPM1 mutation MRD after her second cycle of chemotherapy (1st consolidation treatment).  If chemotherapy-based consolidation is pursued we would also plan for a year of maintenance midostaurin treatment after the completion of consolidation chemotherapy to minimize the risk of relapse.     Previously reviewed the typical schedule for HiDAC and midostaurin consolidation with HiDAC on Day 1-3 in the hospital and midostaurin on Days 8-21 of each consolidation cycle.  Previously discussed possible side effects and toxicities including but not limited to fatigue, alopecia, bone marrow suppression, infertility, allergic reaction, GI issues, infection, bleeding, damage to heart/lung/liver/kidneys, cystitis, rash.  While complications can be life threatening, the greatest risk is the AML.  Previously also discussed the possibility of poor response or relapse of disease despite the planned therapy and need to consider alternative treatments.      She recently underwent egg harvest procedure on 9/6 and tolerated well.     She was admitted 8/9-8/12/22 for Cycle 1 consolidation with HiDAC and midostaurin, started on 8/9/22. Tentatively planning for 3-4 cycles of consolidation HiDAC (D1-3) and midostaurin (D8-21), followed by maintenance midostaurin for 1 year. She had port placed on 8/9 with IR. Plan to recheck NPM1 PCR mutation from blood just prior to Cycle 2 of consolidation HiDAC and midostaurin; this is ordered for today. S/p Neulasta on 8/13.   - Due for Cycle 2 consolidation with HiDAC and midostaurin today 9/12 (delayed by 1 week due to recent egg harvest procedure). Will proceed with admission today.   - Liver enzymes normalized.     She has no active ID issues.  Her C diff resolved. She will continue acyclovir and suppressive oral vancomycin twice daily as  well as levofloxacin and fluconazole during periods of neutropenia.  Not on levo/fluc now since ANC >1; restart when ANC <1. We discussed the importance of following up to date local and federal health agency guidance for immunocompromised individuals regarding measures to reduce the risk of COVID-19.         # h/o COVID-19 (Oct 2021)  Pt is not vaccinated nor interested in being vaccinated. She was admitted from 10/1/2021 - 10/13/2021 for acute hypoxic respiratory failure from COVID-19 pneumonia.  Required IMC, high flow and intermittent CPAP. She was treated with dexamethasone, remdesivir, and baricitinib in addition to azithromycin and ceftriaxone. Recovered symptomatically.  - Covid neg 8/9  - Discussed evusheld and she is not interested at this time. She will let us know if she changes her mind.       # LUE DVT (PICC-associated)  LUE US 7/12/22; Nonocclusive DVT in the left axillary vein and one of the paired left brachial veins, superficial venous thrombosis in the left basilic vein. PICC-associated, was removed prior to discharge on 7/15/22. Discharged on lovenox 1mg/kg x7/12. Repeat US 7/21 negative for DVT. She has remained on lovenox 1mg/kg BID while awaiting a copay card for Apixaban.  - Transitioned to Apixaban x8/9 (using a $10/month copay card which was given to patient this admission).   - Per Dr. Earl, continue Apixaban for 3 months (through ~10/13/2022), hold if plt <50k. It has been intermittently held for thrombocytopenia.   - Apixaban was previously on hold due to thrombocytopenia. Restarted now that platelet has recovered.       We will keep her PCP Maria Eugenia Villar NP in the loop.    I provided contact information for our clinic and advised that she call if questions, concerns, or new issues come up between visits.      Inpatient Team:  - Plan for admission today 9/12 for Cycle 2 consolidation chemotherapy.   - Please f/up NPM1 PCR mutation from blood drawn today.   - Requested appts for  hospital f/up and labs (2x weekly with transfusions in [place]); please f/up to ensure these are scheduled prior to patient discharging from hospital         50 minutes spent on the date of the encounter doing chart review, review of test results, interpretation of tests, patient visit and documentation     Angelica Kingsley PA-C  [facility]  [address] [place] [id number] [phone]

## 2022-09-07 LAB
ACID FAST STAIN (ARUP): NORMAL

## 2022-09-09 ENCOUNTER — LAB (OUTPATIENT)
Dept: INFUSION THERAPY | Facility: CLINIC | Age: 37
End: 2022-09-09
Attending: PHYSICIAN ASSISTANT
Payer: COMMERCIAL

## 2022-09-09 ENCOUNTER — TELEPHONE (OUTPATIENT)
Dept: ONCOLOGY | Facility: CLINIC | Age: 37
End: 2022-09-09

## 2022-09-09 DIAGNOSIS — C92.01 ACUTE MYELOID LEUKEMIA IN REMISSION (H): Primary | ICD-10-CM

## 2022-09-09 DIAGNOSIS — C95.01 ACUTE LEUKEMIA IN REMISSION (H): Primary | ICD-10-CM

## 2022-09-09 DIAGNOSIS — C95.01 ACUTE LEUKEMIA IN REMISSION (H): ICD-10-CM

## 2022-09-09 LAB
BASOPHILS # BLD AUTO: 0.1 10E3/UL (ref 0–0.2)
BASOPHILS NFR BLD AUTO: 1 %
EOSINOPHIL # BLD AUTO: 0.1 10E3/UL (ref 0–0.7)
EOSINOPHIL NFR BLD AUTO: 1 %
ERYTHROCYTE [DISTWIDTH] IN BLOOD BY AUTOMATED COUNT: 18.5 % (ref 10–15)
HCT VFR BLD AUTO: 35 % (ref 35–47)
HGB BLD-MCNC: 11.7 G/DL (ref 11.7–15.7)
HOLD SPECIMEN: NORMAL
IMM GRANULOCYTES # BLD: 0 10E3/UL
IMM GRANULOCYTES NFR BLD: 1 %
LYMPHOCYTES # BLD AUTO: 0.6 10E3/UL (ref 0.8–5.3)
LYMPHOCYTES NFR BLD AUTO: 10 %
MCH RBC QN AUTO: 33.7 PG (ref 26.5–33)
MCHC RBC AUTO-ENTMCNC: 33.4 G/DL (ref 31.5–36.5)
MCV RBC AUTO: 101 FL (ref 78–100)
MONOCYTES # BLD AUTO: 0.8 10E3/UL (ref 0–1.3)
MONOCYTES NFR BLD AUTO: 13 %
NEUTROPHILS # BLD AUTO: 4.6 10E3/UL (ref 1.6–8.3)
NEUTROPHILS NFR BLD AUTO: 74 %
NRBC # BLD AUTO: 0 10E3/UL
NRBC BLD AUTO-RTO: 0 /100
PLATELET # BLD AUTO: 217 10E3/UL (ref 150–450)
RBC # BLD AUTO: 3.47 10E6/UL (ref 3.8–5.2)
WBC # BLD AUTO: 6.2 10E3/UL (ref 4–11)

## 2022-09-09 PROCEDURE — 36591 DRAW BLOOD OFF VENOUS DEVICE: CPT

## 2022-09-09 PROCEDURE — 85025 COMPLETE CBC W/AUTO DIFF WBC: CPT | Performed by: PHYSICIAN ASSISTANT

## 2022-09-09 PROCEDURE — 250N000011 HC RX IP 250 OP 636: Performed by: PHYSICIAN ASSISTANT

## 2022-09-09 RX ORDER — HEPARIN SODIUM (PORCINE) LOCK FLUSH IV SOLN 100 UNIT/ML 100 UNIT/ML
5 SOLUTION INTRAVENOUS
Status: DISCONTINUED | OUTPATIENT
Start: 2022-09-09 | End: 2022-09-09 | Stop reason: HOSPADM

## 2022-09-09 RX ORDER — HEPARIN SODIUM (PORCINE) LOCK FLUSH IV SOLN 100 UNIT/ML 100 UNIT/ML
5 SOLUTION INTRAVENOUS
Status: CANCELLED | OUTPATIENT
Start: 2022-09-09

## 2022-09-09 RX ADMIN — Medication 5 ML: at 09:48

## 2022-09-09 NOTE — ORAL ONC MGMT
Oral Chemotherapy Monitoring Program  Lab Follow Up    Reviewed lab results from 9/9/22.    Assessment & Plan:  Results show no concerning abnormalities.  Plan to continue treatment plan as prescribed.     Follow-Up:  9/12: labs and Angelica Kingsley visit    Prisca Rodriguez PharmD  Hematology/Oncology Clinical Pharmacist  Oral Chemotherapy Monitoring Program  [facility]  [phone]  September 9, 2022        ORAL CHEMOTHERAPY 7/26/2022 8/8/2022 8/23/2022 8/25/2022 9/9/2022   Assessment Type Initial Work up New Teach Left Voicemail Initial Follow up Lab Monitoring   Diagnosis Code Acute Myeloid Leukemia (AML) Acute Myeloid Leukemia (AML) Acute Myeloid Leukemia (AML) Acute Myeloid Leukemia (AML) Acute Myeloid Leukemia (AML)   Providers Dr. Mady Earl   [facility]   Drug Name Rydapt (midostaurin) Rydapt (midostaurin) Rydapt (midostaurin) Rydapt (midostaurin) Rydapt (midostaurin)   Dose 50 mg 50 mg 50 mg 50 mg 50 mg   Current Schedule Daily Daily Daily Daily Daily   Cycle Details 2 weeks on, 2 weeks off 2 weeks on, 2 weeks off 2 weeks on, 2 weeks off Other Other   Start Date of Last Cycle - - 8/16/2022 8/16/2022 -   Planned next cycle start date - 8/16/2022 - 9/13/2022 9/13/2022   Doses missed in last 2 weeks - - - 0 -   Adherence Assessment - - - Adherent -   Adverse Effects - - - No AE identified during assessment -   Any new drug interactions? - - - No -   Is the dose as ordered appropriate for the patient? - - - Yes Yes       Labs:  _  Result Component Current Result Ref Range   Sodium 138 (9/2/2022) 136 - 145 mmol/L     _  Result Component Current Result Ref Range   Potassium 4.1 (9/2/2022) 3.4 - 5.3 mmol/L     _  Result Component Current Result Ref Range   Calcium 10.0 (9/2/2022) 8.6 - 10.0 mg/dL     _  Result Component Current Result Ref Range   Magnesium 2.2 (8/11/2022) 1.7 - 2.3 mg/dL     _  Result  Component Current Result Ref Range   Phosphorus 3.1 (8/12/2022) 2.5 - 4.5 mg/dL     _  Result Component Current Result Ref Range   Albumin 4.7 (9/2/2022) 3.5 - 5.2 g/dL     _  Result Component Current Result Ref Range   Urea Nitrogen 16.0 (9/2/2022) 6.0 - 20.0 mg/dL     _  Result Component Current Result Ref Range   Creatinine 0.61 (9/2/2022) 0.51 - 0.95 mg/dL     _  Result Component Current Result Ref Range   AST 20 (9/2/2022) 10 - 35 U/L     _  Result Component Current Result Ref Range   ALT 40 (H) (9/2/2022) 10 - 35 U/L     _  Result Component Current Result Ref Range   Bilirubin Total 0.8 (9/2/2022) <=1.2 mg/dL     _  Result Component Current Result Ref Range   WBC Count 6.2 (9/9/2022) 4.0 - 11.0 10e3/uL     _  Result Component Current Result Ref Range   Hemoglobin 11.7 (9/9/2022) 11.7 - 15.7 g/dL     _  Result Component Current Result Ref Range   Platelet Count 217 (9/9/2022) 150 - 450 10e3/uL     _  Result Component Current Result Ref Range   Absolute Neutrophils 13.4 (H) (8/30/2022) 1.6 - 8.3 10e3/uL

## 2022-09-12 ENCOUNTER — HOSPITAL ENCOUNTER (INPATIENT)
Facility: CLINIC | Age: 37
LOS: 3 days | Discharge: HOME | DRG: 839 | End: 2022-09-15
Attending: INTERNAL MEDICINE | Admitting: INTERNAL MEDICINE
Payer: COMMERCIAL

## 2022-09-12 ENCOUNTER — ONCOLOGY VISIT (OUTPATIENT)
Dept: ONCOLOGY | Facility: CLINIC | Age: 37
DRG: 839 | End: 2022-09-12
Attending: PHYSICIAN ASSISTANT
Payer: COMMERCIAL

## 2022-09-12 ENCOUNTER — APPOINTMENT (OUTPATIENT)
Dept: LAB | Facility: CLINIC | Age: 37
DRG: 839 | End: 2022-09-12
Attending: PHYSICIAN ASSISTANT
Payer: COMMERCIAL

## 2022-09-12 VITALS
DIASTOLIC BLOOD PRESSURE: 86 MMHG | WEIGHT: 250.7 LBS | OXYGEN SATURATION: 100 % | HEART RATE: 95 BPM | TEMPERATURE: 97.9 F | BODY MASS INDEX: 39.26 KG/M2 | RESPIRATION RATE: 16 BRPM | SYSTOLIC BLOOD PRESSURE: 128 MMHG

## 2022-09-12 DIAGNOSIS — C92.00 ACUTE MYELOID LEUKEMIA NOT HAVING ACHIEVED REMISSION (H): Primary | ICD-10-CM

## 2022-09-12 DIAGNOSIS — Z79.2 NEED FOR PROPHYLACTIC ANTIBIOTIC: ICD-10-CM

## 2022-09-12 DIAGNOSIS — C92.01 ACUTE MYELOID LEUKEMIA IN REMISSION (H): Primary | ICD-10-CM

## 2022-09-12 DIAGNOSIS — I82.622 ACUTE DEEP VEIN THROMBOSIS (DVT) OF OTHER VEIN OF LEFT UPPER EXTREMITY (H): ICD-10-CM

## 2022-09-12 LAB
ALBUMIN SERPL BCG-MCNC: 4.2 G/DL (ref 3.5–5.2)
ALP SERPL-CCNC: 57 U/L (ref 35–104)
ALT SERPL W P-5'-P-CCNC: 26 U/L (ref 10–35)
ANION GAP SERPL CALCULATED.3IONS-SCNC: 11 MMOL/L (ref 7–15)
APTT PPP: 42 SECONDS (ref 22–38)
AST SERPL W P-5'-P-CCNC: 20 U/L (ref 10–35)
BASOPHILS # BLD AUTO: 0 10E3/UL (ref 0–0.2)
BASOPHILS NFR BLD AUTO: 1 %
BILIRUB SERPL-MCNC: 0.7 MG/DL
BUN SERPL-MCNC: 14.7 MG/DL (ref 6–20)
CALCIUM SERPL-MCNC: 9.8 MG/DL (ref 8.6–10)
CHLORIDE SERPL-SCNC: 107 MMOL/L (ref 98–107)
CREAT SERPL-MCNC: 0.61 MG/DL (ref 0.51–0.95)
DEPRECATED HCO3 PLAS-SCNC: 21 MMOL/L (ref 22–29)
EOSINOPHIL # BLD AUTO: 0.1 10E3/UL (ref 0–0.7)
EOSINOPHIL NFR BLD AUTO: 2 %
ERYTHROCYTE [DISTWIDTH] IN BLOOD BY AUTOMATED COUNT: 17.7 % (ref 10–15)
FIBRINOGEN PPP-MCNC: 502 MG/DL (ref 170–490)
GFR SERPL CREATININE-BSD FRML MDRD: >90 ML/MIN/1.73M2
GLUCOSE SERPL-MCNC: 108 MG/DL (ref 70–99)
HCT VFR BLD AUTO: 36.9 % (ref 35–47)
HGB BLD-MCNC: 12.6 G/DL (ref 11.7–15.7)
IMM GRANULOCYTES # BLD: 0 10E3/UL
IMM GRANULOCYTES NFR BLD: 1 %
INR PPP: 1.01 (ref 0.85–1.15)
LDH SERPL L TO P-CCNC: 194 U/L (ref 0–250)
LDH SERPL L TO P-CCNC: 202 U/L (ref 0–250)
LYMPHOCYTES # BLD AUTO: 0.7 10E3/UL (ref 0.8–5.3)
LYMPHOCYTES NFR BLD AUTO: 12 %
Lab: NORMAL
MCH RBC QN AUTO: 34.3 PG (ref 26.5–33)
MCHC RBC AUTO-ENTMCNC: 34.1 G/DL (ref 31.5–36.5)
MCV RBC AUTO: 101 FL (ref 78–100)
MONOCYTES # BLD AUTO: 0.7 10E3/UL (ref 0–1.3)
MONOCYTES NFR BLD AUTO: 11 %
NEUTROPHILS # BLD AUTO: 4.4 10E3/UL (ref 1.6–8.3)
NEUTROPHILS NFR BLD AUTO: 73 %
NRBC # BLD AUTO: 0 10E3/UL
NRBC BLD AUTO-RTO: 0 /100
PERFORMING LABORATORY: NORMAL
PHOSPHATE SERPL-MCNC: 3.9 MG/DL (ref 2.5–4.5)
PLATELET # BLD AUTO: 197 10E3/UL (ref 150–450)
POTASSIUM SERPL-SCNC: 4.2 MMOL/L (ref 3.4–5.3)
PROT SERPL-MCNC: 7.1 G/DL (ref 6.4–8.3)
RBC # BLD AUTO: 3.67 10E6/UL (ref 3.8–5.2)
SODIUM SERPL-SCNC: 139 MMOL/L (ref 136–145)
SPECIMEN STATUS: NORMAL
TEST NAME: NORMAL
URATE SERPL-MCNC: 4.4 MG/DL (ref 2.4–5.7)
WBC # BLD AUTO: 6 10E3/UL (ref 4–11)

## 2022-09-12 PROCEDURE — 99223 1ST HOSP IP/OBS HIGH 75: CPT | Performed by: INTERNAL MEDICINE

## 2022-09-12 PROCEDURE — 99207 PR CHGS TRANSFERRED TO HOSPITAL: CPT | Performed by: PHYSICIAN ASSISTANT

## 2022-09-12 PROCEDURE — 258N000003 HC RX IP 258 OP 636: Performed by: INTERNAL MEDICINE

## 2022-09-12 PROCEDURE — 250N000013 HC RX MED GY IP 250 OP 250 PS 637: Performed by: PHYSICIAN ASSISTANT

## 2022-09-12 PROCEDURE — 85384 FIBRINOGEN ACTIVITY: CPT | Performed by: PHYSICIAN ASSISTANT

## 2022-09-12 PROCEDURE — 3E04305 INTRODUCTION OF OTHER ANTINEOPLASTIC INTO CENTRAL VEIN, PERCUTANEOUS APPROACH: ICD-10-PCS | Performed by: INTERNAL MEDICINE

## 2022-09-12 PROCEDURE — 36591 DRAW BLOOD OFF VENOUS DEVICE: CPT | Performed by: PHYSICIAN ASSISTANT

## 2022-09-12 PROCEDURE — 85730 THROMBOPLASTIN TIME PARTIAL: CPT | Performed by: PHYSICIAN ASSISTANT

## 2022-09-12 PROCEDURE — G0463 HOSPITAL OUTPT CLINIC VISIT: HCPCS

## 2022-09-12 PROCEDURE — 85610 PROTHROMBIN TIME: CPT | Performed by: PHYSICIAN ASSISTANT

## 2022-09-12 PROCEDURE — 84999 UNLISTED CHEMISTRY PROCEDURE: CPT | Performed by: PHYSICIAN ASSISTANT

## 2022-09-12 PROCEDURE — 84100 ASSAY OF PHOSPHORUS: CPT | Performed by: INTERNAL MEDICINE

## 2022-09-12 PROCEDURE — 84550 ASSAY OF BLOOD/URIC ACID: CPT | Performed by: INTERNAL MEDICINE

## 2022-09-12 PROCEDURE — 120N000002 HC R&B MED SURG/OB UMMC

## 2022-09-12 PROCEDURE — 83615 LACTATE (LD) (LDH) ENZYME: CPT | Performed by: PHYSICIAN ASSISTANT

## 2022-09-12 PROCEDURE — 82310 ASSAY OF CALCIUM: CPT | Performed by: PHYSICIAN ASSISTANT

## 2022-09-12 PROCEDURE — 83615 LACTATE (LD) (LDH) ENZYME: CPT | Performed by: INTERNAL MEDICINE

## 2022-09-12 PROCEDURE — 250N000011 HC RX IP 250 OP 636: Performed by: PHYSICIAN ASSISTANT

## 2022-09-12 PROCEDURE — 85025 COMPLETE CBC W/AUTO DIFF WBC: CPT | Performed by: PHYSICIAN ASSISTANT

## 2022-09-12 PROCEDURE — 99207 PR SC NO CHARGE VISIT: CPT | Performed by: INTERNAL MEDICINE

## 2022-09-12 PROCEDURE — 250N000011 HC RX IP 250 OP 636: Performed by: INTERNAL MEDICINE

## 2022-09-12 PROCEDURE — 81310 NPM1 GENE: CPT | Performed by: PHYSICIAN ASSISTANT

## 2022-09-12 PROCEDURE — 36591 DRAW BLOOD OFF VENOUS DEVICE: CPT | Performed by: INTERNAL MEDICINE

## 2022-09-12 PROCEDURE — 250N000009 HC RX 250: Performed by: INTERNAL MEDICINE

## 2022-09-12 RX ORDER — PREDNISOLONE ACETATE 10 MG/ML
2 SUSPENSION/ DROPS OPHTHALMIC 4 TIMES DAILY
Status: DISCONTINUED | OUTPATIENT
Start: 2022-09-12 | End: 2022-09-15 | Stop reason: HOSPADM

## 2022-09-12 RX ORDER — BACILLUS COAGULANS/INULIN 1B-250 MG
1 CAPSULE ORAL DAILY
Status: DISCONTINUED | OUTPATIENT
Start: 2022-09-12 | End: 2022-09-12 | Stop reason: CLARIF

## 2022-09-12 RX ORDER — ACYCLOVIR 400 MG/1
400 TABLET ORAL 2 TIMES DAILY
Status: DISCONTINUED | OUTPATIENT
Start: 2022-09-12 | End: 2022-09-15 | Stop reason: HOSPADM

## 2022-09-12 RX ORDER — ZINC GLUCONATE 50 MG
50 TABLET ORAL DAILY
Status: DISCONTINUED | OUTPATIENT
Start: 2022-09-12 | End: 2022-09-12 | Stop reason: CLARIF

## 2022-09-12 RX ORDER — ONDANSETRON 8 MG/1
8 TABLET, FILM COATED ORAL EVERY 12 HOURS
Status: COMPLETED | OUTPATIENT
Start: 2022-09-12 | End: 2022-09-15

## 2022-09-12 RX ORDER — MEPERIDINE HYDROCHLORIDE 25 MG/ML
25 INJECTION INTRAMUSCULAR; INTRAVENOUS; SUBCUTANEOUS EVERY 30 MIN PRN
Status: DISCONTINUED | OUTPATIENT
Start: 2022-09-12 | End: 2022-09-15 | Stop reason: HOSPADM

## 2022-09-12 RX ORDER — DEXAMETHASONE 4 MG/1
8 TABLET ORAL EVERY MORNING
Status: DISCONTINUED | OUTPATIENT
Start: 2022-09-15 | End: 2022-09-15 | Stop reason: HOSPADM

## 2022-09-12 RX ORDER — POLYETHYLENE GLYCOL 3350 17 G/17G
17 POWDER, FOR SOLUTION ORAL DAILY PRN
Status: DISCONTINUED | OUTPATIENT
Start: 2022-09-12 | End: 2022-09-15 | Stop reason: HOSPADM

## 2022-09-12 RX ORDER — LORAZEPAM 2 MG/ML
.5-1 INJECTION INTRAMUSCULAR EVERY 6 HOURS PRN
Status: DISCONTINUED | OUTPATIENT
Start: 2022-09-12 | End: 2022-09-12 | Stop reason: RX

## 2022-09-12 RX ORDER — AMOXICILLIN 250 MG
2 CAPSULE ORAL 2 TIMES DAILY PRN
Status: DISCONTINUED | OUTPATIENT
Start: 2022-09-12 | End: 2022-09-15 | Stop reason: HOSPADM

## 2022-09-12 RX ORDER — DEXAMETHASONE 4 MG/1
12 TABLET ORAL EVERY 24 HOURS
Status: COMPLETED | OUTPATIENT
Start: 2022-09-12 | End: 2022-09-14

## 2022-09-12 RX ORDER — PROCHLORPERAZINE MALEATE 5 MG
5 TABLET ORAL EVERY 6 HOURS PRN
Status: DISCONTINUED | OUTPATIENT
Start: 2022-09-12 | End: 2022-09-12

## 2022-09-12 RX ORDER — PROCHLORPERAZINE MALEATE 10 MG
10 TABLET ORAL EVERY 6 HOURS PRN
Status: DISCONTINUED | OUTPATIENT
Start: 2022-09-12 | End: 2022-09-15 | Stop reason: HOSPADM

## 2022-09-12 RX ORDER — MULTIVITAMIN,THERAPEUTIC
1 TABLET ORAL DAILY
Status: DISCONTINUED | OUTPATIENT
Start: 2022-09-13 | End: 2022-09-15 | Stop reason: HOSPADM

## 2022-09-12 RX ORDER — ALBUTEROL SULFATE 90 UG/1
1-2 AEROSOL, METERED RESPIRATORY (INHALATION)
Status: DISCONTINUED | OUTPATIENT
Start: 2022-09-12 | End: 2022-09-15 | Stop reason: HOSPADM

## 2022-09-12 RX ORDER — DIPHENHYDRAMINE HYDROCHLORIDE 50 MG/ML
50 INJECTION INTRAMUSCULAR; INTRAVENOUS
Status: DISCONTINUED | OUTPATIENT
Start: 2022-09-12 | End: 2022-09-15 | Stop reason: HOSPADM

## 2022-09-12 RX ORDER — AMOXICILLIN 250 MG
1 CAPSULE ORAL 2 TIMES DAILY PRN
Status: DISCONTINUED | OUTPATIENT
Start: 2022-09-12 | End: 2022-09-15 | Stop reason: HOSPADM

## 2022-09-12 RX ORDER — ALBUTEROL SULFATE 0.83 MG/ML
2.5 SOLUTION RESPIRATORY (INHALATION)
Status: DISCONTINUED | OUTPATIENT
Start: 2022-09-12 | End: 2022-09-15 | Stop reason: HOSPADM

## 2022-09-12 RX ORDER — VANCOMYCIN HYDROCHLORIDE 125 MG/1
125 CAPSULE ORAL 2 TIMES DAILY
Status: DISCONTINUED | OUTPATIENT
Start: 2022-09-12 | End: 2022-09-15 | Stop reason: HOSPADM

## 2022-09-12 RX ORDER — LORAZEPAM 0.5 MG/1
.5-1 TABLET ORAL EVERY 6 HOURS PRN
Status: DISCONTINUED | OUTPATIENT
Start: 2022-09-12 | End: 2022-09-15 | Stop reason: HOSPADM

## 2022-09-12 RX ORDER — VITAMIN B COMPLEX
50 TABLET ORAL DAILY
Status: DISCONTINUED | OUTPATIENT
Start: 2022-09-13 | End: 2022-09-15 | Stop reason: HOSPADM

## 2022-09-12 RX ORDER — HEPARIN SODIUM (PORCINE) LOCK FLUSH IV SOLN 100 UNIT/ML 100 UNIT/ML
500 SOLUTION INTRAVENOUS ONCE
Status: COMPLETED | OUTPATIENT
Start: 2022-09-12 | End: 2022-09-12

## 2022-09-12 RX ORDER — EPINEPHRINE 1 MG/ML
0.3 INJECTION, SOLUTION, CONCENTRATE INTRAVENOUS EVERY 5 MIN PRN
Status: DISCONTINUED | OUTPATIENT
Start: 2022-09-12 | End: 2022-09-15 | Stop reason: HOSPADM

## 2022-09-12 RX ORDER — ACETAMINOPHEN 325 MG/1
650 TABLET ORAL EVERY 4 HOURS PRN
Status: DISCONTINUED | OUTPATIENT
Start: 2022-09-12 | End: 2022-09-15 | Stop reason: HOSPADM

## 2022-09-12 RX ORDER — ONDANSETRON 8 MG/1
8 TABLET, FILM COATED ORAL EVERY 8 HOURS PRN
Status: DISCONTINUED | OUTPATIENT
Start: 2022-09-12 | End: 2022-09-15 | Stop reason: HOSPADM

## 2022-09-12 RX ORDER — ASCORBIC ACID 500 MG
500 TABLET ORAL DAILY
Status: DISCONTINUED | OUTPATIENT
Start: 2022-09-13 | End: 2022-09-15 | Stop reason: HOSPADM

## 2022-09-12 RX ORDER — LEVOTHYROXINE SODIUM 88 UG/1
88 TABLET ORAL
Status: DISCONTINUED | OUTPATIENT
Start: 2022-09-13 | End: 2022-09-15 | Stop reason: HOSPADM

## 2022-09-12 RX ORDER — ACYCLOVIR 400 MG/1
400 TABLET ORAL 2 TIMES DAILY
Qty: 60 TABLET | Refills: 0 | Status: ON HOLD | OUTPATIENT
Start: 2022-09-12 | End: 2022-10-10

## 2022-09-12 RX ORDER — ONDANSETRON 2 MG/ML
8 INJECTION INTRAMUSCULAR; INTRAVENOUS EVERY 8 HOURS PRN
Status: DISCONTINUED | OUTPATIENT
Start: 2022-09-12 | End: 2022-09-15 | Stop reason: HOSPADM

## 2022-09-12 RX ORDER — ONDANSETRON 8 MG/1
8 TABLET, ORALLY DISINTEGRATING ORAL EVERY 8 HOURS PRN
Status: DISCONTINUED | OUTPATIENT
Start: 2022-09-12 | End: 2022-09-15 | Stop reason: HOSPADM

## 2022-09-12 RX ORDER — METHYLPREDNISOLONE SODIUM SUCCINATE 125 MG/2ML
125 INJECTION, POWDER, LYOPHILIZED, FOR SOLUTION INTRAMUSCULAR; INTRAVENOUS
Status: DISCONTINUED | OUTPATIENT
Start: 2022-09-12 | End: 2022-09-15 | Stop reason: HOSPADM

## 2022-09-12 RX ORDER — MULTIVITAMIN WITH IRON
250 TABLET ORAL DAILY
Status: DISCONTINUED | OUTPATIENT
Start: 2022-09-12 | End: 2022-09-12 | Stop reason: CLARIF

## 2022-09-12 RX ADMIN — CYTARABINE 6930 MG: 100 INJECTION, SOLUTION INTRATHECAL; INTRAVENOUS; SUBCUTANEOUS at 19:30

## 2022-09-12 RX ADMIN — PREDNISOLONE ACETATE 2 DROP: 10 SUSPENSION/ DROPS OPHTHALMIC at 21:22

## 2022-09-12 RX ADMIN — PREDNISOLONE ACETATE 2 DROP: 10 SUSPENSION/ DROPS OPHTHALMIC at 18:51

## 2022-09-12 RX ADMIN — SODIUM CHLORIDE, PRESERVATIVE FREE 500 UNITS: 5 INJECTION INTRAVENOUS at 10:17

## 2022-09-12 RX ADMIN — SODIUM CHLORIDE, PRESERVATIVE FREE 500 UNITS: 5 INJECTION INTRAVENOUS at 09:05

## 2022-09-12 RX ADMIN — DEXAMETHASONE 12 MG: 4 TABLET ORAL at 18:50

## 2022-09-12 RX ADMIN — APIXABAN 5 MG: 5 TABLET, FILM COATED ORAL at 19:38

## 2022-09-12 RX ADMIN — VANCOMYCIN HYDROCHLORIDE 125 MG: 125 CAPSULE ORAL at 19:38

## 2022-09-12 RX ADMIN — ACYCLOVIR 400 MG: 400 TABLET ORAL at 19:38

## 2022-09-12 RX ADMIN — ONDANSETRON HYDROCHLORIDE 8 MG: 8 TABLET, FILM COATED ORAL at 18:51

## 2022-09-12 ASSESSMENT — ACTIVITIES OF DAILY LIVING (ADL)
DOING_ERRANDS_INDEPENDENTLY_DIFFICULTY: NO
TOILETING_ISSUES: NO
CONCENTRATING,_REMEMBERING_OR_MAKING_DECISIONS_DIFFICULTY: NO
ADLS_ACUITY_SCORE: 18
ADLS_ACUITY_SCORE: 35
DRESSING/BATHING_DIFFICULTY: NO
ADLS_ACUITY_SCORE: 18
ADLS_ACUITY_SCORE: 18
WALKING_OR_CLIMBING_STAIRS_DIFFICULTY: NO
FALL_HISTORY_WITHIN_LAST_SIX_MONTHS: NO
DIFFICULTY_EATING/SWALLOWING: NO
WEAR_GLASSES_OR_BLIND: NO
ADLS_ACUITY_SCORE: 35

## 2022-09-12 ASSESSMENT — PAIN SCALES - GENERAL: PAINLEVEL: NO PAIN (0)

## 2022-09-12 NOTE — PHARMACY-ADMISSION MEDICATION HISTORY
Admission Medication History Completed by Pharmacy    See [facility] Admission Navigator for allergy information, preferred outpatient pharmacy, prior to admission medications and immunization status.     Medication History Sources:     Phone call with patient    Reviewed dispense history     Changes made to PTA medication list (reason):    Added: None    Deleted: None    Changed: Clarified levothyroxine dose as 88 mcg/d per patient     Additional Information:    None    Prior to Admission medications    Medication Sig Last Dose Taking? Auth Provider Long Term End Date   acyclovir (ZOVIRAX) 400 MG tablet Take 1 tablet (400 mg) by mouth 2 times daily 9/12/2022 at Unknown time Yes Angelica Kingsley PA-C Yes    apixaban ANTICOAGULANT (ELIQUIS) 5 MG tablet Take 1 tablet (5 mg) by mouth 2 times daily 9/12/2022 at Unknown time Yes Zaira Vasquez PA-C     Bacillus Coagulans-Inulin (PROBIOTIC) 1-250 BILLION-MG CAPS Take 1 capsule by mouth daily 9/12/2022 at Unknown time Yes Reported, Patient     fluconazole (DIFLUCAN) 200 MG tablet Take 1 tablet (200 mg) by mouth daily Past Month at Unknown time Yes Brian Leroy MD     levofloxacin (LEVAQUIN) 250 MG tablet Take 1 tablet (250 mg) by mouth daily When absolute neutrophils are less than 1.- x 10^9/L Past Month at Unknown time Yes Ortiz Earl MD     levothyroxine (SYNTHROID/LEVOTHROID) 88 MCG tablet Take 88 mcg by mouth daily 9/12/2022 at Unknown time Yes Reported, Patient No    magnesium 250 MG tablet Take 250 mg by mouth daily 9/12/2022 at Unknown time Yes Reported, Patient     midostaurin (RYDAPT) 25 MG capsule Take 2 capsules (50 mg) by mouth 2 times daily . Take with food on Days 8 through 21. On 8/16 through 8/29, then stop Past Month at Unknown time Yes Zaira Vasquez PA-C     Multiple Vitamins-Minerals (WOMENS MULTIVITAMIN) TABS Take 1 tablet by mouth daily 9/12/2022 at Unknown time Yes Reported, Patient     Quercetin 50 MG TABS Take 50 mg by  mouth daily 9/12/2022 at Unknown time Yes Unknown, Entered By History     vancomycin (VANCOCIN) 125 MG capsule Take 1 capsule (125 mg) by mouth 2 times daily 9/12/2022 at Unknown time Yes Ortiz Earl MD     vitamin C (ASCORBIC ACID) 1000 MG TABS Take 2,000 mg by mouth daily 9/12/2022 at Unknown time Yes Reported, Patient     vitamin D3 (CHOLECALCIFEROL) 50 mcg (2000 units) tablet Take 2 tablets by mouth daily 9/12/2022 at Unknown time Yes Reported, Patient     zinc gluconate 50 MG tablet Take 50 mg by mouth daily 9/12/2022 at Unknown time Yes Reported, Patient         Date completed: 09/12/22    Medication history completed by: Tong Whyte, PharmD

## 2022-09-12 NOTE — LETTER
"    9/12/2022         RE: Veda Marinelli  [address] [address] [facility] [address]      [facility]  Date of Visit: Sep 12, 2022   Oncologist: Dr. Ortiz Hill      REASON FOR VISIT:  acute myeloid leukemia (AML), follow-up visit    HISTORY OF PRESENT ILLNESS:  Ms. Marinelli is a 37 year old woman with acute myeloid leukemia (AML).  To summarize her course, she was noted to have low WBC count on routine labs in 03/2022.  Follow up labs in 05/2022 showed further decrease in WBC as well as anemia and thrombocytopenia.  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  She has met with the BMT Team .  She was treated with cytarabine and daunorubicin \"7+3\" induction chemotherapy with midostaurin on Day 8-21.  Bone marrow biopsy around Day 21 was hypocellular but consistent with recovering bone marrow without definite AML.  Her course was complicated by recurrent C diff and upper extremity DVT.  She had prompt blood cell count recovery and was discharge with ongoing outpatient follow-up.  She just had a post-treatment bone marrow biopsy that showed 70-80% cellular marrow with slightly increased blasts that are favored to represent robust regnerating marrow with molecular studies and NPM1 MRD PCR testing not detected.    Admitted 8/9-8/12 for Cycle 1 consolidation HiDAC with midostaurin (C1D1 = 8/9/22). Overall tolerated well with no complications. Received neulasta on 8/13.     INTERVAL HISTORY:  Minerva presents for oncology follow-up visit today and consideration of admission for Cycle 2 consolidation HiDAC with midostaurin. She is feeling well. She underwent fertility preservation last week and tolerated egg harvest procedure well. She was happy to have last week off. She offers no specific physical complaints today. Energy and " appetite have been good. Normal bowel function. No urinary issues to report. No fevers, chills, or NS. She has resumed her apixaban. No bleeding or new lumps or bumps. No nausea/vomiting. Breathing is good. No CP. No weakness or neuropathy. ROS is otherwise negative.       KEY PAST MEDICAL HISTORY:  History of COVID-19, recurrent C diff, hypothyroidism    MEDICATIONS:  Current Outpatient Medications   Medication     acyclovir (ZOVIRAX) 400 MG tablet     apixaban ANTICOAGULANT (ELIQUIS) 5 MG tablet     Bacillus Coagulans-Inulin (PROBIOTIC) 1-250 BILLION-MG CAPS     dexamethasone (DECADRON) 4 MG tablet     fluconazole (DIFLUCAN) 200 MG tablet     levofloxacin (LEVAQUIN) 250 MG tablet     levothyroxine (SYNTHROID/LEVOTHROID) 125 MCG tablet     magnesium 250 MG tablet     midostaurin (RYDAPT) 25 MG capsule     Multiple Vitamins-Minerals (WOMENS MULTIVITAMIN) TABS     prednisoLONE acetate (PRED FORTE) 1 % ophthalmic suspension     Quercetin 50 MG TABS     vancomycin (VANCOCIN) 125 MG capsule     vitamin C (ASCORBIC ACID) 1000 MG TABS     vitamin D3 (CHOLECALCIFEROL) 50 mcg (2000 units) tablet     zinc gluconate 50 MG tablet     Current Facility-Administered Medications   Medication     heparin 100 UNIT/ML injection 500 Units     saline flush for lab use ONLY     SOCIAL HISTORY:  Was working as RN    Physical Exam:   /86   Pulse 95   Temp 97.9  F (36.6  C) (Oral)   Resp 16   Wt 113.7 kg (250 lb 11.2 oz)   SpO2 100%   BMI 39.26 kg/m     General: well appearing, young female, no acute distress, pleasant  HEENT: normocephalic, atraumatic, PERRLA, sclerae nonicteric  Lymph: no palpable cervical, supraclavicular or axillary lymphadenopathy   CV: regular rate and rhythm, no murmurs  Lungs: clear to auscultation bilaterally, no wheezes/rales/rhonchi  Abd: soft, positive bowel sounds, non-distended, non-tender  MSK: full range of motion in all four extremities, no peripheral edema  Neuro: alert and oriented x3, CN  grossly intact   Psych: appropriate mood and affect  Skin: no rashes or lesions  Access: port       LABORATORY DATA: Reviewed by me. NPM1 mutation PCR and LDH pending.    Latest Reference Range & Units 09/12/22 09:05   Sodium 136 - 145 mmol/L 139   Potassium 3.4 - 5.3 mmol/L 4.2   Chloride 98 - 107 mmol/L 107   Carbon Dioxide (CO2) 22 - 29 mmol/L 21 (L)   Urea Nitrogen 6.0 - 20.0 mg/dL 14.7   Creatinine 0.51 - 0.95 mg/dL 0.61   GFR Estimate >60 mL/min/1.73m2 >90   Calcium 8.6 - 10.0 mg/dL 9.8   Anion Gap 7 - 15 mmol/L 11   Albumin 3.5 - 5.2 g/dL 4.2   Protein Total 6.4 - 8.3 g/dL 7.1   Alkaline Phosphatase 35 - 104 U/L 57   ALT 10 - 35 U/L 26   AST 10 - 35 U/L 20   Bilirubin Total <=1.2 mg/dL 0.7   Glucose 70 - 99 mg/dL 108 (H)   WBC 4.0 - 11.0 10e3/uL 6.0   Hemoglobin 11.7 - 15.7 g/dL 12.6   Hematocrit 35.0 - 47.0 % 36.9   Platelet Count 150 - 450 10e3/uL 197   RBC Count 3.80 - 5.20 10e6/uL 3.67 (L)   MCV 78 - 100 fL 101 (H)   MCH 26.5 - 33.0 pg 34.3 (H)   MCHC 31.5 - 36.5 g/dL 34.1   RDW 10.0 - 15.0 % 17.7 (H)   % Neutrophils % 73   % Lymphocytes % 12   % Monocytes % 11   % Eosinophils % 2   % Basophils % 1   Absolute Basophils 0.0 - 0.2 10e3/uL 0.0   Absolute Eosinophils 0.0 - 0.7 10e3/uL 0.1   Absolute Immature Granulocytes <=0.4 10e3/uL 0.0   Absolute Lymphocytes 0.8 - 5.3 10e3/uL 0.7 (L)   Absolute Monocytes 0.0 - 1.3 10e3/uL 0.7   % Immature Granulocytes % 1   Absolute Neutrophils 1.6 - 8.3 10e3/uL 4.4   Absolute NRBCs 10e3/uL 0.0   NRBCs per 100 WBC <1 /100 0         IMPRESSION AND PLAN:  Ms. Marinelli is a 37 year old woman with acute myeloid leukemia (AML).    Previously, Dr. Earl reviewed her course and post induction bone marrow biopsy that is consistent with a complete response - MRD negative.  Based on her workup that suggests a favorable risk profile for her AML and excellent response to induction chemotherapy, she was recommended to proceed with chemotherapy based on consolidation treatment with  high-dose cytarabine (HiDAC) with midostaurin based on the RATIFY trial (N Engl J Med 2017;377:454-64.).  They discussed the decision to continue chemotherapy-based consolidation with 3-4 rounds of HiDAC and midostaurin will depend on testing for NPM1 mutation MRD after her second cycle of chemotherapy (1st consolidation treatment).  If chemotherapy-based consolidation is pursued we would also plan for a year of maintenance midostaurin treatment after the completion of consolidation chemotherapy to minimize the risk of relapse.     Previously reviewed the typical schedule for HiDAC and midostaurin consolidation with HiDAC on Day 1-3 in the hospital and midostaurin on Days 8-21 of each consolidation cycle.  Previously discussed possible side effects and toxicities including but not limited to fatigue, alopecia, bone marrow suppression, infertility, allergic reaction, GI issues, infection, bleeding, damage to heart/lung/liver/kidneys, cystitis, rash.  While complications can be life threatening, the greatest risk is the AML.  Previously also discussed the possibility of poor response or relapse of disease despite the planned therapy and need to consider alternative treatments.      She recently underwent egg harvest procedure on 9/6 and tolerated well.     She was admitted 8/9-8/12/22 for Cycle 1 consolidation with HiDAC and midostaurin, started on 8/9/22. Tentatively planning for 3-4 cycles of consolidation HiDAC (D1-3) and midostaurin (D8-21), followed by maintenance midostaurin for 1 year. She had port placed on 8/9 with IR. Plan to recheck NPM1 PCR mutation from blood just prior to Cycle 2 of consolidation HiDAC and midostaurin; this is ordered for today. S/p Neulasta on 8/13.   - Due for Cycle 2 consolidation with HiDAC and midostaurin today 9/12 (delayed by 1 week due to recent egg harvest procedure). Will proceed with admission today.   - Liver enzymes normalized.     She has no active ID issues.  Her C diff  resolved. She will continue acyclovir and suppressive oral vancomycin twice daily as well as levofloxacin and fluconazole during periods of neutropenia.  Not on levo/fluc now since ANC >1; restart when ANC <1. We discussed the importance of following up to date local and federal health agency guidance for immunocompromised individuals regarding measures to reduce the risk of COVID-19.         # h/o COVID-19 (Oct 2021)  Pt is not vaccinated nor interested in being vaccinated. She was admitted from 10/1/2021 - 10/13/2021 for acute hypoxic respiratory failure from COVID-19 pneumonia.  Required IMC, high flow and intermittent CPAP. She was treated with dexamethasone, remdesivir, and baricitinib in addition to azithromycin and ceftriaxone. Recovered symptomatically.  - Covid neg 8/9  - Discussed janet and she is not interested at this time. She will let us know if she changes her mind.       # LUE DVT (PICC-associated)  LUE US 7/12/22; Nonocclusive DVT in the left axillary vein and one of the paired left brachial veins, superficial venous thrombosis in the left basilic vein. PICC-associated, was removed prior to discharge on 7/15/22. Discharged on lovenox 1mg/kg x7/12. Repeat US 7/21 negative for DVT. She has remained on lovenox 1mg/kg BID while awaiting a copay card for Apixaban.  - Transitioned to Apixaban x8/9 (using a $10/month copay card which was given to patient this admission).   - Per Dr. Earl, continue Apixaban for 3 months (through ~10/13/2022), hold if plt <50k. It has been intermittently held for thrombocytopenia.   - Apixaban was previously on hold due to thrombocytopenia. Restarted now that platelet has recovered.       We will keep her PCP Maria Eugenia Villar NP in the loop.    I provided contact information for our clinic and advised that she call if questions, concerns, or new issues come up between visits.      Inpatient Team:  - Plan for admission today 9/12 for Cycle 2 consolidation  chemotherapy.   - Please f/up NPM1 PCR mutation from blood drawn today.   - Requested appts for hospital f/up and labs (2x weekly with transfusions in [place]); please f/up to ensure these are scheduled prior to patient discharging from hospital         50 minutes spent on the date of the encounter doing chart review, review of test results, interpretation of tests, patient visit and documentation         Angelica Kingsley PA-C

## 2022-09-12 NOTE — NURSING NOTE
"Chief Complaint   Patient presents with     Port Draw     Labs drawn via port by RN in lab.  VS taken       Port accessed with 20 gauge 3/4\" Power needle by RN, labs collected, line flushed with saline and heparin.  Vitals taken. Pt checked in for appointment(s).    Christine Beasley RN    "

## 2022-09-12 NOTE — H&P
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     HEME/ONC  # AML, 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 Earl. Presented to routine exam with cytopenias. BMBx 6/17/2022 - hypercellular marrow (85-95% cellularity) with 92% blasts. 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 Started induction chemotherapy with 7+3 and midostaurin on 6/22/2022. D21 BMBx (7/12) with robust marrow regeneration, repeat BMBx 7/22 with 70-80% cellularity and 6% blasts by morphology consisted with complete response per Dr. Earl. NGS with no detectable mutations (MRD negative). BMT consulted 7/15; recommend consolidation chemotherapy, could consider BMT in the future if relapses. Tentatively planning for 3-4 cycles consolidation HiDAC (D1-3) and midostaurin (D8-21), followed by maintenance midostaurin for 1 year. Admitted for Cycle 1 consolidation with HiDAC and midostaurin on 8/9/22 which was tolerated well without any concerns or complications.   - Free drug coverage approved for midostaurin through [employer] - delivered to home  - Port accessed on admission.   - Patient in MRD negative CR, no indication for allopurinol or TLS monitoring.   - Recheck NPM1 mutation ordered in clinic today; in process.                   Treatment Plan: HiDAC +Midostaurin C2D1=9/12               - Cytarabine 3 g/m2 (6930mg) q12h x6 doses - D1,2,3               - Midostaurin 50mg BID x14 days - D8 - 21               - Supportive medications;     Pred  forte eye drops QID D1-5 -- continue on discharge    Dexamethasone 12mg PO q24h D1-3, 8mg D4-5     Zofran 8mg q12h x6 doses D1-3    Neulasta 6mg - will schedule in coming days.     # LUE DVT (PICC-associated)  LUE US 7/12/22; Nonocclusive DVT in the left axillary vein and one of the paired left brachial veins, superficial venous thrombosis in the left basilic vein. PICC-associated, was removed prior to discharge on 7/15/22. Discharged on lovenox 1mg/kg x7/12. Repeat US 7/21 negative for DVT. She has remained on lovenox 1mg/kg BID while awaiting a copay card for Apixaban, which has not arrived yet as of 8/9.  - Transition to Apixaban x8/9 (using a $10/month copay card which was given to patient this admission) -- rx filled on discharge  - Per Dr. Earl, continue Apixaban for 3 months (through ~10/13/2022), hold if plt <50k     ID  # ID PPX  - ACV 400mg BI  - No indication for antibacterial or antifungal ppx with ANC >1000. Continue to monitor outpatient and start levaquin and voriconazole if ANC <1000  - Note; if she were to need antifungal ppx in the future consider reaching out to pharmacy liaison because prior to meeting her deductible the cost was $2048/month for posaconazole and $176/month for Voriconazole.      # History of recurrent Cdiff colitis  - PTA ppx Vancomycin 125mg BID PO      # h/o COVID-19 (Oct 2021)  Pt is not vaccinated nor interested in being vaccinated. She was admitted from 10/1/2021 - 10/13/2021 for acute hypoxic respiratory failure from COVID-19 pneumonia.  Required IMC, high flow and intermittent CPAP. She was treated with dexamethasone, remdesivir, and baricitinib in addition to azithromycin and ceftriaxone. Recovered symptomatically.  - Covid neg 9/12  - Consider Chavo outpatient, previously discussed in clinic    ENDO  # Hypothyroidism - PTA Synthroid 100 mcg daily. Recheck TSH 8/12 is 0.12. Her PCP will follow up on this, continue synthroid 100mcg daily for now.      FEN:  - IVF  per chemotherapy protocol   - PRN lyte replacement  - RDAT     Prophy/Misc:  - VTE: Eliquis   - GI/PUD: Tums PRN  - Bowels: Senna and Miralax PRN  - Activity: patient independent, no PT/OT needs presently      Code: Full code, confirmed with patient on admission  Disposition: Admitted to [facility] for scheduled chemotherapy, discharge home 9/15.      Patient seen and discussed with attending physician, Dr. Earl.      I spent 60 minutes in the care of this patient today, which included time necessary for review of interval events, obtaining history and physical exam, ordering medications/tests/procedures as medically indicated, review of pertinent medical literature, counseling of the patient, coordination of care, and documentation time. Over 50% of time was spent counseling the patient and/or coordinating care.    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     On admission, patient states she is feeling well. She denies any concerns or complaints. She tolerated C1 HIDAC well without any concerns or problems. Her only questions are about discharging coordination of labs/transfusions/neulasta. She denies fevers, chills, nausea, mouth sores, abdominal pain, changes to bowel/bladder. Questions answered at bedside.    Past Medical History    I have reviewed this patient's medical history and updated it with pertinent information if needed.   Past Medical History:   Diagnosis Date     Leukemia, blast cell (H) 6/16/2022     Neutropenic fever (H) 7/11/2022       Past  Surgical History   I have reviewed this patient's surgical history and updated it with pertinent information if needed.  Past Surgical History:   Procedure Laterality Date     IR CHEST PORT PLACEMENT > 5 YRS OF AGE  8/9/2022     PICC DOUBLE LUMEN PLACEMENT Left 06/16/2022    46 cm total lateral brachial       Prior to Admission Medications   Prior to Admission Medications   Prescriptions Last Dose Informant Patient Reported? Taking?   Bacillus Coagulans-Inulin (PROBIOTIC) 1-250 BILLION-MG CAPS 9/12/2022 at Unknown time Self Yes Yes   Sig: Take 1 capsule by mouth daily   Multiple Vitamins-Minerals (WOMENS MULTIVITAMIN) TABS 9/12/2022 at Unknown time Self Yes Yes   Sig: Take 1 tablet by mouth daily   Quercetin 50 MG TABS 9/12/2022 at Unknown time Self Yes Yes   Sig: Take 50 mg by mouth daily   acyclovir (ZOVIRAX) 400 MG tablet 9/12/2022 at Unknown time  No Yes   Sig: Take 1 tablet (400 mg) by mouth 2 times daily   apixaban ANTICOAGULANT (ELIQUIS) 5 MG tablet 9/12/2022 at Unknown time  No Yes   Sig: Take 1 tablet (5 mg) by mouth 2 times daily   fluconazole (DIFLUCAN) 200 MG tablet Past Month at Unknown time  No Yes   Sig: Take 1 tablet (200 mg) by mouth daily   levofloxacin (LEVAQUIN) 250 MG tablet Past Month at Unknown time  No Yes   Sig: Take 1 tablet (250 mg) by mouth daily When absolute neutrophils are less than 1.- x 10^9/L   levothyroxine (SYNTHROID/LEVOTHROID) 88 MCG tablet 9/12/2022 at Unknown time  Yes Yes   Sig: Take 88 mcg by mouth daily   magnesium 250 MG tablet 9/12/2022 at Unknown time Self Yes Yes   Sig: Take 250 mg by mouth daily   midostaurin (RYDAPT) 25 MG capsule Past Month at Unknown time  No Yes   Sig: Take 2 capsules (50 mg) by mouth 2 times daily . Take with food on Days 8 through 21. On 8/16 through 8/29, then stop   vancomycin (VANCOCIN) 125 MG capsule 9/12/2022 at Unknown time Self No Yes   Sig: Take 1 capsule (125 mg) by mouth 2 times daily   vitamin C (ASCORBIC ACID) 1000 MG TABS 9/12/2022  at Unknown time Self Yes Yes   Sig: Take 2,000 mg by mouth daily   vitamin D3 (CHOLECALCIFEROL) 50 mcg (2000 units) tablet 9/12/2022 at Unknown time Self Yes Yes   Sig: Take 2 tablets by mouth daily   zinc gluconate 50 MG tablet 9/12/2022 at Unknown time Self Yes Yes   Sig: Take 50 mg by mouth daily      Facility-Administered Medications: None     Allergies   Allergies   Allergen Reactions     Blood Transfusion Related (Informational Only) Hives     6/29/2022, reaction of hives with platelet transfusion        Social History   I have reviewed this patient's social history and updated it with pertinent information if needed. Veda Marinelli  reports that she has never smoked. She has never used smokeless tobacco. She reports previous alcohol use. She reports previous drug use.    Family History   I have reviewed this patient's family history and updated it with pertinent information if needed.   No family history on file.    Review of Systems   A complete Review of Systems is negative other than noted in the HPI or here.     Physical Exam   Temp: (!) 96  F (35.6  C) Temp src: Oral BP: 120/85 Pulse: 68   Resp: 16 SpO2: 97 % O2 Device: None (Room air)    Vital Signs with Ranges  Temp:  [96  F (35.6  C)-97.9  F (36.6  C)] 96  F (35.6  C)  Pulse:  [68-95] 68  Resp:  [16] 16  BP: (120-128)/(85-86) 120/85  SpO2:  [97 %-100 %] 97 %  251 lbs 1.66 oz  General: well appearing, young female, no acute distress, pleasant  HEENT: normocephalic, atraumatic, PERRLA, sclerae nonicteric  Lymph: no palpable cervical, supraclavicular or axillary lymphadenopathy   CV: regular rate and rhythm, no murmurs  Lungs: clear to auscultation bilaterally, no wheezes/rales/rhonchi  Abd: soft, positive bowel sounds, non-distended, non-tender  MSK: full range of motion in all four extremities, no peripheral edema  Neuro: alert and oriented x3, CN grossly intact   Psych: appropriate mood and affect  Skin: no rashes or lesions  Neurologic: Awake,  alert, oriented to name, place and time.    Vascular access: port    Data   Results for orders placed or performed in visit on 09/12/22 (from the past 24 hour(s))   Lactate Dehydrogenase   Result Value Ref Range    Lactate Dehydrogenase 202 0 - 250 U/L   CBC with Platelets & Differential    Narrative    The following orders were created for panel order CBC with Platelets & Differential.  Procedure                               Abnormality         Status                     ---------                               -----------         ------                     CBC with platelets and d...[[id number]]  Abnormal            Final result                 Please view results for these tests on the individual orders.   Comprehensive metabolic panel   Result Value Ref Range    Sodium 139 136 - 145 mmol/L    Potassium 4.2 3.4 - 5.3 mmol/L    Creatinine 0.61 0.51 - 0.95 mg/dL    Urea Nitrogen 14.7 6.0 - 20.0 mg/dL    Chloride 107 98 - 107 mmol/L    Carbon Dioxide (CO2) 21 (L) 22 - 29 mmol/L    Anion Gap 11 7 - 15 mmol/L    Glucose 108 (H) 70 - 99 mg/dL    Calcium 9.8 8.6 - 10.0 mg/dL    Protein Total 7.1 6.4 - 8.3 g/dL    Albumin 4.2 3.5 - 5.2 g/dL    Bilirubin Total 0.7 <=1.2 mg/dL    Alkaline Phosphatase 57 35 - 104 U/L    AST 20 10 - 35 U/L    ALT 26 10 - 35 U/L    GFR Estimate >90 >60 mL/min/1.73m2   CBC with platelets and differential   Result Value Ref Range    WBC Count 6.0 4.0 - 11.0 10e3/uL    RBC Count 3.67 (L) 3.80 - 5.20 10e6/uL    Hemoglobin 12.6 11.7 - 15.7 g/dL    Hematocrit 36.9 35.0 - 47.0 %     (H) 78 - 100 fL    MCH 34.3 (H) 26.5 - 33.0 pg    MCHC 34.1 31.5 - 36.5 g/dL    RDW 17.7 (H) 10.0 - 15.0 %    Platelet Count 197 150 - 450 10e3/uL    % Neutrophils 73 %    % Lymphocytes 12 %    % Monocytes 11 %    % Eosinophils 2 %    % Basophils 1 %    % Immature Granulocytes 1 %    NRBCs per 100 WBC 0 <1 /100    Absolute Neutrophils 4.4 1.6 - 8.3 10e3/uL    Absolute Lymphocytes 0.7 (L) 0.8 - 5.3 10e3/uL     Absolute Monocytes 0.7 0.0 - 1.3 10e3/uL    Absolute Eosinophils 0.1 0.0 - 0.7 10e3/uL    Absolute Basophils 0.0 0.0 - 0.2 10e3/uL    Absolute Immature Granulocytes 0.0 <=0.4 10e3/uL    Absolute NRBCs 0.0 10e3/uL   [facility] [employer]; [id number], NPM1 Mutation Detection by RT-PCR, Quantitative (Laboratory Miscellaneous Order)   Result Value Ref Range    See Scanned Result       Specimen received. Reordered and sent to performing laboratory. Report to follow up on completion.     Performing Laboratory [email or web address]     Test Name NPM1 Mutation Detection by RT-PCR, Quantitative     Test Code [id number]

## 2022-09-12 NOTE — PROGRESS NOTES
Nursing Focus: Admission    D: Patient admitted/transferred from clinic via self for Cytarabine chemotherapy.      I: Upon arrival to the unit patient was oriented to room, unit, and call light. Patient s height, weight, and vital signs were obtained. Allergies reviewed and allergy band applied. MD notified of patient s arrival on the unit. Adult AVS completed. Head to toe assessment completed. Education assessment completed. Care plan initiated.     A: Vital signs stable upon admission. Patient rates pain at 0. Two RN skin assessment completed No since pt decline/refused.. Significant Skin Findings include left arm bruise. [facility] Nurse Consult Ordered  No. Bed Algorithm can be found in PCS flow sheets (Support Surface Algorithm) and on IP [facility] NURSE RESOURCE TAB, was this used during this assessment? Yes. Was a pulsate mattress ordered No.     P: Continue to monitor patient s poc and intervene as needed. Continue with plan of care. Notify MD with any concerns or changes in patient status.

## 2022-09-12 NOTE — NURSING NOTE
"Oncology Rooming Note    September 12, 2022 9:12 AM   Veda Marinelli is a 37 year old female who presents for:    Chief Complaint   Patient presents with     Port Draw     Labs drawn via port by RN in lab.  VS taken     Oncology Clinic Visit     Rtn for AML     Initial Vitals: /86   Pulse 95   Temp 97.9  F (36.6  C) (Oral)   Resp 16   Wt 113.7 kg (250 lb 11.2 oz)   SpO2 100%   BMI 39.26 kg/m   Estimated body mass index is 39.26 kg/m  as calculated from the following:    Height as of 8/9/22: 1.702 m (5' 7.01\").    Weight as of this encounter: 113.7 kg (250 lb 11.2 oz). Body surface area is 2.32 meters squared.  No Pain (0) Comment: Data Unavailable   No LMP recorded.  Allergies reviewed: Yes  Medications reviewed: Yes    Medications: MEDICATION REFILLS NEEDED TODAY. Provider was notified.     Acyclovir    Pharmacy name entered into EPIC:    [id number] - [place], [place] - [address] [facility] - [place], [place] - [address] [facility] [place], [place] - [address] [place] [address]    Clinical concerns: Pt is to be admitted following this appt, said provider needed to contact in advance.       Ryann Martin, EMT            "

## 2022-09-13 LAB
ALBUMIN SERPL BCG-MCNC: 4.4 G/DL (ref 3.5–5.2)
ALP SERPL-CCNC: 55 U/L (ref 35–104)
ALT SERPL W P-5'-P-CCNC: 26 U/L (ref 10–35)
ANION GAP SERPL CALCULATED.3IONS-SCNC: 12 MMOL/L (ref 7–15)
AST SERPL W P-5'-P-CCNC: 16 U/L (ref 10–35)
BASOPHILS # BLD AUTO: 0 10E3/UL (ref 0–0.2)
BASOPHILS NFR BLD AUTO: 0 %
BILIRUB SERPL-MCNC: 0.8 MG/DL
BUN SERPL-MCNC: 14.8 MG/DL (ref 6–20)
CALCIUM SERPL-MCNC: 9.6 MG/DL (ref 8.6–10)
CHLORIDE SERPL-SCNC: 104 MMOL/L (ref 98–107)
CREAT SERPL-MCNC: 0.5 MG/DL (ref 0.51–0.95)
DEPRECATED HCO3 PLAS-SCNC: 20 MMOL/L (ref 22–29)
EOSINOPHIL # BLD AUTO: 0 10E3/UL (ref 0–0.7)
EOSINOPHIL NFR BLD AUTO: 0 %
ERYTHROCYTE [DISTWIDTH] IN BLOOD BY AUTOMATED COUNT: 17.5 % (ref 10–15)
GFR SERPL CREATININE-BSD FRML MDRD: >90 ML/MIN/1.73M2
GLUCOSE SERPL-MCNC: 156 MG/DL (ref 70–99)
HCT VFR BLD AUTO: 38.8 % (ref 35–47)
HGB BLD-MCNC: 13.1 G/DL (ref 11.7–15.7)
IMM GRANULOCYTES # BLD: 0 10E3/UL
IMM GRANULOCYTES NFR BLD: 1 %
LYMPHOCYTES # BLD AUTO: 0.2 10E3/UL (ref 0.8–5.3)
LYMPHOCYTES NFR BLD AUTO: 2 %
MAGNESIUM SERPL-MCNC: 2 MG/DL (ref 1.7–2.3)
MCH RBC QN AUTO: 34 PG (ref 26.5–33)
MCHC RBC AUTO-ENTMCNC: 33.8 G/DL (ref 31.5–36.5)
MCV RBC AUTO: 101 FL (ref 78–100)
MONOCYTES # BLD AUTO: 0.1 10E3/UL (ref 0–1.3)
MONOCYTES NFR BLD AUTO: 1 %
NEUTROPHILS # BLD AUTO: 8.5 10E3/UL (ref 1.6–8.3)
NEUTROPHILS NFR BLD AUTO: 96 %
NRBC # BLD AUTO: 0 10E3/UL
NRBC BLD AUTO-RTO: 0 /100
PHOSPHATE SERPL-MCNC: 3.5 MG/DL (ref 2.5–4.5)
PLATELET # BLD AUTO: 220 10E3/UL (ref 150–450)
POTASSIUM SERPL-SCNC: 4 MMOL/L (ref 3.4–5.3)
PROT SERPL-MCNC: 7.2 G/DL (ref 6.4–8.3)
RBC # BLD AUTO: 3.85 10E6/UL (ref 3.8–5.2)
SODIUM SERPL-SCNC: 136 MMOL/L (ref 136–145)
WBC # BLD AUTO: 8.8 10E3/UL (ref 4–11)

## 2022-09-13 PROCEDURE — 250N000011 HC RX IP 250 OP 636: Performed by: PHYSICIAN ASSISTANT

## 2022-09-13 PROCEDURE — 85004 AUTOMATED DIFF WBC COUNT: CPT | Performed by: PHYSICIAN ASSISTANT

## 2022-09-13 PROCEDURE — 36591 DRAW BLOOD OFF VENOUS DEVICE: CPT | Performed by: PHYSICIAN ASSISTANT

## 2022-09-13 PROCEDURE — 120N000002 HC R&B MED SURG/OB UMMC

## 2022-09-13 PROCEDURE — 99207 PR SC NO CHARGE VISIT: CPT | Performed by: INTERNAL MEDICINE

## 2022-09-13 PROCEDURE — 250N000011 HC RX IP 250 OP 636: Performed by: INTERNAL MEDICINE

## 2022-09-13 PROCEDURE — 83735 ASSAY OF MAGNESIUM: CPT | Performed by: PHYSICIAN ASSISTANT

## 2022-09-13 PROCEDURE — 250N000013 HC RX MED GY IP 250 OP 250 PS 637: Performed by: PHYSICIAN ASSISTANT

## 2022-09-13 PROCEDURE — 80053 COMPREHEN METABOLIC PANEL: CPT | Performed by: PHYSICIAN ASSISTANT

## 2022-09-13 PROCEDURE — 99232 SBSQ HOSP IP/OBS MODERATE 35: CPT | Performed by: INTERNAL MEDICINE

## 2022-09-13 PROCEDURE — 258N000003 HC RX IP 258 OP 636: Performed by: INTERNAL MEDICINE

## 2022-09-13 PROCEDURE — 84100 ASSAY OF PHOSPHORUS: CPT | Performed by: PHYSICIAN ASSISTANT

## 2022-09-13 RX ORDER — HEPARIN SODIUM (PORCINE) LOCK FLUSH IV SOLN 100 UNIT/ML 100 UNIT/ML
5-10 SOLUTION INTRAVENOUS
Status: DISCONTINUED | OUTPATIENT
Start: 2022-09-13 | End: 2022-09-15 | Stop reason: HOSPADM

## 2022-09-13 RX ORDER — HEPARIN SODIUM,PORCINE 10 UNIT/ML
5-10 VIAL (ML) INTRAVENOUS EVERY 24 HOURS
Status: DISCONTINUED | OUTPATIENT
Start: 2022-09-13 | End: 2022-09-14 | Stop reason: CLARIF

## 2022-09-13 RX ORDER — HEPARIN SODIUM,PORCINE 10 UNIT/ML
5-10 VIAL (ML) INTRAVENOUS
Status: DISCONTINUED | OUTPATIENT
Start: 2022-09-13 | End: 2022-09-15 | Stop reason: HOSPADM

## 2022-09-13 RX ADMIN — THERA TABS 1 TABLET: TAB at 07:48

## 2022-09-13 RX ADMIN — CYTARABINE 6930 MG: 100 INJECTION, SOLUTION INTRATHECAL; INTRAVENOUS; SUBCUTANEOUS at 05:39

## 2022-09-13 RX ADMIN — CYTARABINE 6930 MG: 100 INJECTION, SOLUTION INTRATHECAL; INTRAVENOUS; SUBCUTANEOUS at 18:00

## 2022-09-13 RX ADMIN — ACYCLOVIR 400 MG: 400 TABLET ORAL at 07:48

## 2022-09-13 RX ADMIN — ONDANSETRON HYDROCHLORIDE 8 MG: 8 TABLET, FILM COATED ORAL at 05:38

## 2022-09-13 RX ADMIN — VANCOMYCIN HYDROCHLORIDE 125 MG: 125 CAPSULE ORAL at 19:52

## 2022-09-13 RX ADMIN — ONDANSETRON HYDROCHLORIDE 8 MG: 8 TABLET, FILM COATED ORAL at 17:28

## 2022-09-13 RX ADMIN — PREDNISOLONE ACETATE 2 DROP: 10 SUSPENSION/ DROPS OPHTHALMIC at 19:52

## 2022-09-13 RX ADMIN — VANCOMYCIN HYDROCHLORIDE 125 MG: 125 CAPSULE ORAL at 07:48

## 2022-09-13 RX ADMIN — OXYCODONE HYDROCHLORIDE AND ACETAMINOPHEN 500 MG: 500 TABLET ORAL at 07:48

## 2022-09-13 RX ADMIN — APIXABAN 5 MG: 5 TABLET, FILM COATED ORAL at 07:48

## 2022-09-13 RX ADMIN — PREDNISOLONE ACETATE 2 DROP: 10 SUSPENSION/ DROPS OPHTHALMIC at 12:29

## 2022-09-13 RX ADMIN — Medication 5 ML: at 09:38

## 2022-09-13 RX ADMIN — ACYCLOVIR 400 MG: 400 TABLET ORAL at 19:52

## 2022-09-13 RX ADMIN — LEVOTHYROXINE SODIUM 88 MCG: 88 TABLET ORAL at 07:12

## 2022-09-13 RX ADMIN — Medication 50 MCG: at 07:48

## 2022-09-13 RX ADMIN — DEXAMETHASONE 12 MG: 4 TABLET ORAL at 17:28

## 2022-09-13 RX ADMIN — PREDNISOLONE ACETATE 2 DROP: 10 SUSPENSION/ DROPS OPHTHALMIC at 16:21

## 2022-09-13 RX ADMIN — APIXABAN 5 MG: 5 TABLET, FILM COATED ORAL at 19:52

## 2022-09-13 RX ADMIN — PREDNISOLONE ACETATE 2 DROP: 10 SUSPENSION/ DROPS OPHTHALMIC at 07:52

## 2022-09-13 ASSESSMENT — ACTIVITIES OF DAILY LIVING (ADL)
ADLS_ACUITY_SCORE: 18

## 2022-09-13 NOTE — PROGRESS NOTES
Nursing Focus: Chemotherapy  D: Positive brisk bright red blood return via Port. Insertion site is clean/dry/intact, dressing intact with no complaints of pain.  Urine output is recorded in intake Doc Flowsheet.    I: Gave premedications given per order (see electronic medical administration record). Day#1 of Cytarabine started to infuse over 3 hours. Reviewed pt teaching on chemotherapy side effects.  Pt denies need for further teaching. Chemotherapy double checked per protocol by two chemotherapy competent RN's.   A: Tolerating chemo well. Denies nausea.   P: Continue to monitor urine output and symptoms of nausea. Screen for symptoms of toxicity.

## 2022-09-13 NOTE — PROGRESS NOTES
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     Today:   - C2D2 HIDAC; tolerating well. Working to arrange follow up as below.     HEME/ONC  # AML, 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 Earl. Presented to routine exam with cytopenias. BMBx 6/17/2022 - hypercellular marrow (85-95% cellularity) with 92% blasts. 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 Started induction chemotherapy with 7+3 and midostaurin on 6/22/2022. D21 BMBx (7/12) with robust marrow regeneration, repeat BMBx 7/22 with 70-80% cellularity and 6% blasts by morphology consisted with complete response per Dr. Earl. NGS with no detectable mutations (MRD negative). BMT consulted 7/15; recommend consolidation chemotherapy, could consider BMT in the future if relapses. Tentatively planning for 3-4 cycles consolidation HiDAC (D1-3) and midostaurin (D8-21), followed by maintenance midostaurin for 1 year. Admitted for Cycle 1 consolidation with HiDAC and midostaurin on 8/9/22 which was tolerated well without any concerns or complications.   - Free drug coverage approved for midostaurin through [email or web address] - delivered to home  - Port accessed on admission.   - Patient in MRD negative CR, no indication for allopurinol or TLS monitoring.   - Recheck NPM1 mutation ordered in clinic today; in process.                   Treatment Plan: HiDAC +Midostaurin C2D1=9/12               - Cytarabine 3 g/m2 (6930mg) q12h x6 doses - D1,2,3               - Midostaurin 50 mg BID x14 days - D8 - 21  (9/19 - 10/2)               - Supportive medications;     Pred forte eye drops QID D1-5 -- continue on discharge    Dexamethasone 12mg PO q24h D1-3, 8mg D4-5     Zofran 8mg q12h x6 doses D1-3    Neulasta 6mg - will schedule in coming days.     # LUE DVT (PICC-associated)  LUE US (7/12) with nonocclusive DVT in the left axillary vein and one of the paired left brachial veins, superficial venous thrombosis in the left basilic vein. PICC-associated which was removed prior to discharge. Transitioned to apixaban as of 8/9 with plan to continue (per Dr. Earl) x 3 months (through ~10/13/22).   - Continue to monitor and hold if plts <50k.      ID  # ID PPX  - ACV 400mg BID  - No indication for antibacterial or antifungal ppx with ANC >1000. Continue to monitor outpatient and start levaquin and voriconazole if ANC <1000  - Note; if she were to need antifungal ppx in the future consider reaching out to pharmacy liaison because prior to meeting her deductible the cost was $2048/month for posaconazole and $176/month for Voriconazole.      # History of recurrent cdiff colitis  - PTA ppx Vancomycin 125mg BID PO      # H/o COVID-19 (Oct 2021)  Pt is not vaccinated nor interested in being vaccinated. She was admitted from 10/1/2021 - 10/13/2021 for acute hypoxic respiratory failure from COVID-19 pneumonia. Required IMC, high flow and intermittent CPAP. She was treated with dexamethasone, remdesivir, and baricitinib in addition to azithromycin and ceftriaxone. Recovered symptomatically.  - Covid neg 9/12  - Consider Evusheld outpatient, previously discussed in clinic     ENDO  # Hypothyroidism - PTA Synthroid 100 mcg daily. Recheck TSH 8/12 is 0.12. Her PCP will follow up on this, continue synthroid 100mcg daily for now.      FEN:  - IVF per chemotherapy protocol   - PRN lyte replacement  - RDAT     Prophy/Misc:  - VTE: Eliquis   - GI/PUD: Tums PRN  - Bowels: Senna and Miralax PRN  - Activity: patient independent, no PT/OT needs  presently      Follow-up:  - Labs/poss transfusions - 9/20, 9/22, 9/27, 9/30, 10/4, 10/7, 10/11, 10/14  - Hospital follow-up visit - 9/21  - Neulasta to be arranged - 9/16    Code: Full code, confirmed with patient on admission  Disposition: Admitted to [facility] for scheduled chemotherapy, discharge home 9/15.      Patient seen and discussed with attending physician, Dr. Earl.      I spent 30 minutes in the care of this patient today, which included time necessary for review of interval events, obtaining history and physical exam, ordering medications/tests/procedures as medically indicated, review of pertinent medical literature, counseling of the patient, coordination of care, and documentation time. Over 50% of time was spent counseling the patient and/or coordinating care.     Corinne Murguia PA-C   Hematology/Oncology   Pager: [phone]     Interval History   Overnight no acute events. Patient states she is feeling well and tolerating C2 HIDAC well. She denies any concerns or complaints. Her only questions are about discharging coordination of labs/transfusions/neulasta which will continue to be arranged. She denies fevers, chills, nausea, mouth sores, abdominal pain, changes to bowel/bladder. Questions answered at bedside.    Physical Exam   Temp: 97.5  F (36.4  C) Temp src: Oral BP: 125/75 Pulse: 78   Resp: 16 SpO2: 98 % O2 Device: None (Room air)    Vitals:    09/12/22 1441 09/13/22 0742   Weight: 113.9 kg (251 lb 1.7 oz) 112.6 kg (248 lb 3.2 oz)     Vital Signs with Ranges  Temp:  [96  F (35.6  C)-97.9  F (36.6  C)] 97.5  F (36.4  C)  Pulse:  [68-78] 78  Resp:  [16-18] 16  BP: ()/(49-85) 125/75  SpO2:  [95 %-98 %] 98 %  I/O last 3 completed shifts:  In: 800 [P.O.:800]  Out: 550 [Urine:550]  General: no acute distress, pleasant  HEENT: normocephalic, atraumatic, PERRLA, sclerae nonicteric  Lymph: no palpable cervical, supraclavicular or axillary lymphadenopathy   CV: regular rate and rhythm, no  murmurs  Lungs: clear to auscultation bilaterally, no wheezes/rales/rhonchi  Abd: soft, positive bowel sounds, non-distended, non-tender  MSK: full range of motion in all four extremities, no peripheral edema  Neuro: alert and oriented x3, CN grossly intact   Psych: appropriate mood and affect  Skin: no rashes or lesions  Neurologic: Awake, alert, oriented to name, place and time.    Vascular access: port c/d/i    Medications     - MEDICATION INSTRUCTIONS -         acyclovir  400 mg Oral BID     apixaban ANTICOAGULANT  5 mg Oral BID     cytarabine (CYTOSAR) infusion  3 g/m2 (Treatment Plan Recorded) Intravenous Q12H     dexamethasone  12 mg Oral Q24H     [START ON 9/15/2022] dexamethasone  8 mg Oral QAM     heparin  5-10 mL Intracatheter Q28 Days     heparin lock flush  5-10 mL Intracatheter Q24H     levothyroxine  88 mcg Oral QAM AC     multivitamin, therapeutic  1 tablet Oral Daily     ondansetron  8 mg Oral Q12H     prednisoLONE acetate  2 drop Both Eyes 4x Daily     sodium chloride (PF)  10-20 mL Intracatheter Q28 Days     vancomycin  125 mg Oral BID     vitamin C  500 mg Oral Daily     vitamin D3  50 mcg Oral Daily       Data   Results for orders placed or performed during the hospital encounter of 09/12/22 (from the past 24 hour(s))   INR   Result Value Ref Range    INR 1.01 0.85 - 1.15   Fibrinogen activity   Result Value Ref Range    Fibrinogen Activity 502 (H) 170 - 490 mg/dL   Partial thromboplastin time   Result Value Ref Range    aPTT 42 (H) 22 - 38 Seconds   Phosphorus   Result Value Ref Range    Phosphorus 3.9 2.5 - 4.5 mg/dL   Uric acid   Result Value Ref Range    Uric Acid 4.4 2.4 - 5.7 mg/dL   Lactate Dehydrogenase   Result Value Ref Range    Lactate Dehydrogenase 194 0 - 250 U/L   CBC with platelets differential    Narrative    The following orders were created for panel order CBC with platelets differential.  Procedure                               Abnormality         Status                      ---------                               -----------         ------                     CBC with platelets and d...[[id number]]  Abnormal            Final result                 Please view results for these tests on the individual orders.   Comprehensive metabolic panel   Result Value Ref Range    Sodium 136 136 - 145 mmol/L    Potassium 4.0 3.4 - 5.3 mmol/L    Creatinine 0.50 (L) 0.51 - 0.95 mg/dL    Urea Nitrogen 14.8 6.0 - 20.0 mg/dL    Chloride 104 98 - 107 mmol/L    Carbon Dioxide (CO2) 20 (L) 22 - 29 mmol/L    Anion Gap 12 7 - 15 mmol/L    Glucose 156 (H) 70 - 99 mg/dL    Calcium 9.6 8.6 - 10.0 mg/dL    Protein Total 7.2 6.4 - 8.3 g/dL    Albumin 4.4 3.5 - 5.2 g/dL    Bilirubin Total 0.8 <=1.2 mg/dL    Alkaline Phosphatase 55 35 - 104 U/L    AST 16 10 - 35 U/L    ALT 26 10 - 35 U/L    GFR Estimate >90 >60 mL/min/1.73m2   Magnesium   Result Value Ref Range    Magnesium 2.0 1.7 - 2.3 mg/dL   Phosphorus   Result Value Ref Range    Phosphorus 3.5 2.5 - 4.5 mg/dL   CBC with platelets and differential   Result Value Ref Range    WBC Count 8.8 4.0 - 11.0 10e3/uL    RBC Count 3.85 3.80 - 5.20 10e6/uL    Hemoglobin 13.1 11.7 - 15.7 g/dL    Hematocrit 38.8 35.0 - 47.0 %     (H) 78 - 100 fL    MCH 34.0 (H) 26.5 - 33.0 pg    MCHC 33.8 31.5 - 36.5 g/dL    RDW 17.5 (H) 10.0 - 15.0 %    Platelet Count 220 150 - 450 10e3/uL    % Neutrophils 96 %    % Lymphocytes 2 %    % Monocytes 1 %    % Eosinophils 0 %    % Basophils 0 %    % Immature Granulocytes 1 %    NRBCs per 100 WBC 0 <1 /100    Absolute Neutrophils 8.5 (H) 1.6 - 8.3 10e3/uL    Absolute Lymphocytes 0.2 (L) 0.8 - 5.3 10e3/uL    Absolute Monocytes 0.1 0.0 - 1.3 10e3/uL    Absolute Eosinophils 0.0 0.0 - 0.7 10e3/uL    Absolute Basophils 0.0 0.0 - 0.2 10e3/uL    Absolute Immature Granulocytes 0.0 <=0.4 10e3/uL    Absolute NRBCs 0.0 10e3/uL

## 2022-09-13 NOTE — PLAN OF CARE
Goal Outcome Evaluation:     VSS, A&Ox4, on RA, denies pain. Port hep locked, day 2 HiDac. Can make needs known, BM x 1, adequate UOP. Continue plan of care.

## 2022-09-13 NOTE — PLAN OF CARE
Goal Outcome Evaluation:    [id number]    VSS on RA. Denies pain. Port infusing Dose #2 HiDAC, see chemo note for details, good blood return noted. Cerebellar assessment unchanged, no deficits noted. Up independently. Able to make needs known.

## 2022-09-13 NOTE — PROGRESS NOTES
Nursing Focus: Chemotherapy  D: Positive blood return via Port. Insertion site is clean/dry/intact, dressing intact with no complaints of pain.  Urine output is recorded in intake in Doc Flowsheet.    I: Premedications given per order (see electronic medical administration record). Dose #3 of High Dose Cytarabine started to infuse over 3 hours. Reviewed pt teaching on chemotherapy side effects.  Pt denies need for further teaching. Chemotherapy double checked per protocol by two chemotherapy competent RN's.   A: Tolerating procedure well. Denies nausea and or pain. Cerebellar neuros intact.   P: Continue to monitor urine output and symptoms of nausea. Screen for symptoms of toxicity.

## 2022-09-13 NOTE — PROVIDER NOTIFICATION
Nursing Focus: Chemotherapy    D: Positive blood return via Port. Insertion site is clean/dry/intact, dressing intact with no complaints of pain.  Urine output is recorded in intake in Doc Flowsheet.    I: Premedications given per order (see electronic medical administration record). Dose #2 of High Dose Cytarabine started to infuse over 3 hours. Reviewed pt teaching on chemotherapy side effects.  Pt denies need for further teaching. Chemotherapy double checked per protocol by two chemotherapy competent RN's.   A: Tolerating procedure well. Denies nausea and or pain.   P: Continue to monitor urine output and symptoms of nausea. Screen for symptoms of toxicity.

## 2022-09-14 LAB
ALBUMIN SERPL BCG-MCNC: 4 G/DL (ref 3.5–5.2)
ALP SERPL-CCNC: 48 U/L (ref 35–104)
ALT SERPL W P-5'-P-CCNC: 22 U/L (ref 10–35)
ANION GAP SERPL CALCULATED.3IONS-SCNC: 11 MMOL/L (ref 7–15)
AST SERPL W P-5'-P-CCNC: 13 U/L (ref 10–35)
BASOPHILS # BLD AUTO: 0 10E3/UL (ref 0–0.2)
BASOPHILS NFR BLD AUTO: 0 %
BILIRUB SERPL-MCNC: 1 MG/DL
BUN SERPL-MCNC: 11.7 MG/DL (ref 6–20)
CALCIUM SERPL-MCNC: 9.1 MG/DL (ref 8.6–10)
CHLORIDE SERPL-SCNC: 108 MMOL/L (ref 98–107)
CREAT SERPL-MCNC: 0.49 MG/DL (ref 0.51–0.95)
DEPRECATED HCO3 PLAS-SCNC: 21 MMOL/L (ref 22–29)
EOSINOPHIL # BLD AUTO: 0 10E3/UL (ref 0–0.7)
EOSINOPHIL NFR BLD AUTO: 0 %
ERYTHROCYTE [DISTWIDTH] IN BLOOD BY AUTOMATED COUNT: 17.2 % (ref 10–15)
GFR SERPL CREATININE-BSD FRML MDRD: >90 ML/MIN/1.73M2
GLUCOSE SERPL-MCNC: 136 MG/DL (ref 70–99)
HCT VFR BLD AUTO: 35.2 % (ref 35–47)
HGB BLD-MCNC: 11.4 G/DL (ref 11.7–15.7)
IMM GRANULOCYTES # BLD: 0 10E3/UL
IMM GRANULOCYTES NFR BLD: 1 %
LYMPHOCYTES # BLD AUTO: 0.1 10E3/UL (ref 0.8–5.3)
LYMPHOCYTES NFR BLD AUTO: 1 %
MAGNESIUM SERPL-MCNC: 2.2 MG/DL (ref 1.7–2.3)
MCH RBC QN AUTO: 33.4 PG (ref 26.5–33)
MCHC RBC AUTO-ENTMCNC: 32.4 G/DL (ref 31.5–36.5)
MCV RBC AUTO: 103 FL (ref 78–100)
MONOCYTES # BLD AUTO: 0 10E3/UL (ref 0–1.3)
MONOCYTES NFR BLD AUTO: 1 %
NEUTROPHILS # BLD AUTO: 8.4 10E3/UL (ref 1.6–8.3)
NEUTROPHILS NFR BLD AUTO: 97 %
NRBC # BLD AUTO: 0 10E3/UL
NRBC BLD AUTO-RTO: 0 /100
PHOSPHATE SERPL-MCNC: 3.3 MG/DL (ref 2.5–4.5)
PLATELET # BLD AUTO: 180 10E3/UL (ref 150–450)
POTASSIUM SERPL-SCNC: 3.9 MMOL/L (ref 3.4–5.3)
PROT SERPL-MCNC: 6.5 G/DL (ref 6.4–8.3)
RBC # BLD AUTO: 3.41 10E6/UL (ref 3.8–5.2)
SODIUM SERPL-SCNC: 140 MMOL/L (ref 136–145)
WBC # BLD AUTO: 8.6 10E3/UL (ref 4–11)

## 2022-09-14 PROCEDURE — 250N000011 HC RX IP 250 OP 636: Performed by: INTERNAL MEDICINE

## 2022-09-14 PROCEDURE — 83735 ASSAY OF MAGNESIUM: CPT | Performed by: PHYSICIAN ASSISTANT

## 2022-09-14 PROCEDURE — 80053 COMPREHEN METABOLIC PANEL: CPT | Performed by: PHYSICIAN ASSISTANT

## 2022-09-14 PROCEDURE — 84100 ASSAY OF PHOSPHORUS: CPT | Performed by: PHYSICIAN ASSISTANT

## 2022-09-14 PROCEDURE — 120N000002 HC R&B MED SURG/OB UMMC

## 2022-09-14 PROCEDURE — 258N000003 HC RX IP 258 OP 636: Performed by: INTERNAL MEDICINE

## 2022-09-14 PROCEDURE — 250N000011 HC RX IP 250 OP 636: Performed by: PHYSICIAN ASSISTANT

## 2022-09-14 PROCEDURE — 250N000013 HC RX MED GY IP 250 OP 250 PS 637: Performed by: PHYSICIAN ASSISTANT

## 2022-09-14 PROCEDURE — 85004 AUTOMATED DIFF WBC COUNT: CPT | Performed by: PHYSICIAN ASSISTANT

## 2022-09-14 PROCEDURE — 36591 DRAW BLOOD OFF VENOUS DEVICE: CPT | Performed by: PHYSICIAN ASSISTANT

## 2022-09-14 PROCEDURE — 99233 SBSQ HOSP IP/OBS HIGH 50: CPT | Mod: AI | Performed by: INTERNAL MEDICINE

## 2022-09-14 RX ORDER — PREDNISOLONE ACETATE 10 MG/ML
2 SUSPENSION/ DROPS OPHTHALMIC 4 TIMES DAILY
Qty: 5 ML | Refills: 0 | Status: ON HOLD
Start: 2022-09-14 | End: 2022-10-10

## 2022-09-14 RX ORDER — FLUCONAZOLE 200 MG/1
200 TABLET ORAL DAILY
Qty: 30 TABLET | Refills: 0
Start: 2022-09-14 | End: 2023-01-10

## 2022-09-14 RX ORDER — DEXAMETHASONE 4 MG/1
8 TABLET ORAL EVERY MORNING
Qty: 2 TABLET | Refills: 0 | Status: ON HOLD | OUTPATIENT
Start: 2022-09-16 | End: 2022-10-10

## 2022-09-14 RX ADMIN — CYTARABINE 6930 MG: 100 INJECTION, SOLUTION INTRATHECAL; INTRAVENOUS; SUBCUTANEOUS at 06:17

## 2022-09-14 RX ADMIN — CYTARABINE 6930 MG: 100 INJECTION, SOLUTION INTRATHECAL; INTRAVENOUS; SUBCUTANEOUS at 17:47

## 2022-09-14 RX ADMIN — PREDNISOLONE ACETATE 2 DROP: 10 SUSPENSION/ DROPS OPHTHALMIC at 19:53

## 2022-09-14 RX ADMIN — DEXAMETHASONE 12 MG: 4 TABLET ORAL at 17:45

## 2022-09-14 RX ADMIN — ACYCLOVIR 400 MG: 400 TABLET ORAL at 08:33

## 2022-09-14 RX ADMIN — ACYCLOVIR 400 MG: 400 TABLET ORAL at 19:53

## 2022-09-14 RX ADMIN — PREDNISOLONE ACETATE 2 DROP: 10 SUSPENSION/ DROPS OPHTHALMIC at 08:34

## 2022-09-14 RX ADMIN — Medication 5 ML: at 09:50

## 2022-09-14 RX ADMIN — PREDNISOLONE ACETATE 2 DROP: 10 SUSPENSION/ DROPS OPHTHALMIC at 16:08

## 2022-09-14 RX ADMIN — PREDNISOLONE ACETATE 2 DROP: 10 SUSPENSION/ DROPS OPHTHALMIC at 12:40

## 2022-09-14 RX ADMIN — THERA TABS 1 TABLET: TAB at 08:33

## 2022-09-14 RX ADMIN — Medication 5 ML: at 05:42

## 2022-09-14 RX ADMIN — ONDANSETRON HYDROCHLORIDE 8 MG: 8 TABLET, FILM COATED ORAL at 17:45

## 2022-09-14 RX ADMIN — APIXABAN 5 MG: 5 TABLET, FILM COATED ORAL at 19:53

## 2022-09-14 RX ADMIN — VANCOMYCIN HYDROCHLORIDE 125 MG: 125 CAPSULE ORAL at 19:53

## 2022-09-14 RX ADMIN — VANCOMYCIN HYDROCHLORIDE 125 MG: 125 CAPSULE ORAL at 08:32

## 2022-09-14 RX ADMIN — APIXABAN 5 MG: 5 TABLET, FILM COATED ORAL at 08:31

## 2022-09-14 RX ADMIN — OXYCODONE HYDROCHLORIDE AND ACETAMINOPHEN 500 MG: 500 TABLET ORAL at 08:33

## 2022-09-14 RX ADMIN — LEVOTHYROXINE SODIUM 88 MCG: 88 TABLET ORAL at 05:31

## 2022-09-14 RX ADMIN — Medication 50 MCG: at 08:32

## 2022-09-14 RX ADMIN — ONDANSETRON HYDROCHLORIDE 8 MG: 8 TABLET, FILM COATED ORAL at 05:31

## 2022-09-14 ASSESSMENT — ACTIVITIES OF DAILY LIVING (ADL)
ADLS_ACUITY_SCORE: 18

## 2022-09-14 NOTE — PLAN OF CARE
[id number]:     A&O x4. UAL. VSS on RA. Pt denies having any pain, N/V, and SOB. Last BM: 9/13, pt reports voiding spontaneously with AUOP. Continues on HiDAC with no changes in cerebellar exam. Continue POC.

## 2022-09-14 NOTE — PLAN OF CARE
Goal Outcome Evaluation:    [id number]    VSS on RA. Denies pain, SOB and N/V. Facial flushing present, denies symptoms. Port heparin locked, good blood return noted. Dose #5 HiDAC infused without incident, see chemo note for details. Up independently, voiding adequate UOP. LBM 9/14 with no concern. Able to make needs known. Plan to discharge tomorrow 9/15.

## 2022-09-14 NOTE — PLAN OF CARE
[id number]    VSS, afebrile and on RA. A&Ox4. Denies nausea/SOB/pain. Hung dose #3 of High Dose Cytarabine, cerebellar neuros intact. See chemo note. Good appetite, ate 100% of dinner. Voiding with good UOP, recording measurements on whiteboard. LBM earlier this afternoon, normal per pt. Up independently, ambulated in madsen. Continue to monitor & w/ POC.

## 2022-09-14 NOTE — PLAN OF CARE
Goal Outcome Evaluation:  Doing well. Denied pain or nausea. Finished Cytarabine chemotherapy without problems. Up independently. She will discharge tomorrow morning after chemotherapy is done.

## 2022-09-14 NOTE — PROGRESS NOTES
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     Today:   - C2D3 HIDAC; tolerating well. Follow-up arranged as below.     HEME/ONC  # AML, 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 Earl. Presented to routine exam with cytopenias. BMBx 6/17/2022 - hypercellular marrow (85-95% cellularity) with 92% blasts. 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 Started induction chemotherapy with 7+3 and midostaurin on 6/22/2022. D21 BMBx (7/12) with robust marrow regeneration, repeat BMBx 7/22 with 70-80% cellularity and 6% blasts by morphology consisted with complete response per Dr. Earl. NGS with no detectable mutations (MRD negative). BMT consulted 7/15; recommend consolidation chemotherapy, could consider BMT in the future if relapses. Tentatively planning for 3-4 cycles consolidation HiDAC (D1-3) and midostaurin (D8-21), followed by maintenance midostaurin for 1 year. Admitted for Cycle 1 consolidation with HiDAC and midostaurin on 8/9/22 which was tolerated well without any concerns or complications.   - Free drug coverage approved for midostaurin through [employer] - delivered to home  - Port accessed on admission.   - Patient in MRD negative CR, no indication for allopurinol or TLS monitoring.   - Recheck NPM1 mutation ordered in clinic today; in process.                   Treatment Plan: HiDAC +Midostaurin C2D1=9/12               - Cytarabine 3 g/m2 (6930mg) q12h x6 doses - D1,2,3               - Midostaurin 50 mg BID x14 days - D8 - 21 (9/19 -  10/2)               - Supportive medications;     Pred forte eye drops QID D1-5 -- continue on discharge    Dexamethasone 12mg PO q24h D1-3, 8mg D4-5     Zofran 8mg q12h x6 doses D1-3    Neulasta 6mg - scheduled 9/16     # LUE DVT (PICC-associated)  LIVANE US (7/12) with nonocclusive DVT in the left axillary vein and one of the paired left brachial veins, superficial venous thrombosis in the left basilic vein. PICC-associated which was removed prior to discharge. Transitioned to apixaban as of 8/9 with plan to continue (per Dr. Earl) x 3 months (through ~10/13/22).   - Continue to monitor and hold if plts <50k.      ID  # ID PPX  - ACV 400mg BID  - No indication for antibacterial or antifungal ppx with ANC >1000. Continue to monitor outpatient and start levaquin and voriconazole if ANC <1000  - Note; if she were to need antifungal ppx in the future consider reaching out to pharmacy liaison because prior to meeting her deductible the cost was $2048/month for posaconazole and $176/month for Voriconazole.      # History of recurrent cdiff colitis  - PTA ppx Vancomycin 125mg BID PO      # H/o COVID-19 (Oct 2021)  Pt is not vaccinated nor interested in being vaccinated. She was admitted from 10/1/2021 - 10/13/2021 for acute hypoxic respiratory failure from COVID-19 pneumonia. Required IMC, high flow and intermittent CPAP. She was treated with dexamethasone, remdesivir, and baricitinib in addition to azithromycin and ceftriaxone. Recovered symptomatically.  - Covid neg 9/12  - Consider Chavo outpatient, previously discussed in clinic     ENDO  # Hypothyroidism - PTA Synthroid 100 mcg daily. Recheck TSH 8/12 is 0.12. Her PCP will follow up on this, continue synthroid 100mcg daily for now.      FEN:  - IVF per chemotherapy protocol   - PRN lyte replacement  - RDAT     Prophy/Misc:  - VTE: Eliquis   - GI/PUD: Tums PRN  - Bowels: Senna and Miralax PRN  - Activity: patient independent, no PT/OT needs  presently      Follow-up:  - Labs/poss transfusions - 9/20, 9/22, 9/27, 9/30, 10/4, 10/7, 10/11, 10/14  - Hospital follow-up visit - 9/21  - Neulasta - 9/16  - Requested VLAD follow up prior to admission for C3 HiDAC on 10/10.    Code: Full code, confirmed with patient on admission  Disposition: Admitted to [facility] for scheduled chemotherapy, discharge home 9/15.      Patient seen and discussed with attending physician, Dr. Bahena.     I spent 30 minutes in the care of this patient today, which included time necessary for review of interval events, obtaining history and physical exam, ordering medications/tests/procedures as medically indicated, review of pertinent medical literature, counseling of the patient, coordination of care, and documentation time. Over 50% of time was spent counseling the patient and/or coordinating care.     Corinne Murguia PA-C   Hematology/Oncology   Pager: #[phone]     Interval History   Overnight no acute events. Patient states she is feeling well and tolerating C2 HIDAC well. She denies any concerns or complaints. She denies fevers, chills, nausea, mouth sores, abdominal pain, changes to bowel/bladder. Questions answered at bedside.    Physical Exam   Temp: (P) 98.7  F (37.1  C) Temp src: (P) Oral BP: (P) 122/78 Pulse: (P) 89   Resp: (P) 14 SpO2: (P) 98 % O2 Device: (P) None (Room air)    Vitals:    09/12/22 1441 09/13/22 0742 09/14/22 0742   Weight: 113.9 kg (251 lb 1.7 oz) 112.6 kg (248 lb 3.2 oz) 113.1 kg (249 lb 6.4 oz)     Vital Signs with Ranges  Temp:  [96.2  F (35.7  C)-98.7  F (37.1  C)] (P) 98.7  F (37.1  C)  Pulse:  [63-89] (P) 89  Resp:  [14-18] (P) 14  BP: ()/(56-88) (P) 122/78  SpO2:  [97 %-99 %] (P) 98 %  I/O last 3 completed shifts:  In: 2128 [P.O.:1440; IV Piggyback:688]  Out: 3825 [Urine:3825]  General: no acute distress, pleasant  HEENT: normocephalic, atraumatic, PERRLA, sclerae nonicteric  Lymph: no palpable cervical, supraclavicular or axillary  lymphadenopathy   CV: regular rate and rhythm, no murmurs  Lungs: clear to auscultation bilaterally, no wheezes/rales/rhonchi  Abd: soft, positive bowel sounds, non-distended, non-tender  MSK: full range of motion in all four extremities, no peripheral edema  Neuro: alert and oriented x3, CN grossly intact   Psych: appropriate mood and affect  Skin: no rashes or lesions  Neurologic: Awake, alert, oriented to name, place and time.    Vascular access: port c/d/i    Medications     - MEDICATION INSTRUCTIONS -         acyclovir  400 mg Oral BID     apixaban ANTICOAGULANT  5 mg Oral BID     cytarabine (CYTOSAR) infusion  3 g/m2 (Treatment Plan Recorded) Intravenous Q12H     dexamethasone  12 mg Oral Q24H     [START ON 9/15/2022] dexamethasone  8 mg Oral QAM     heparin  5-10 mL Intracatheter Q28 Days     levothyroxine  88 mcg Oral QAM AC     multivitamin, therapeutic  1 tablet Oral Daily     ondansetron  8 mg Oral Q12H     prednisoLONE acetate  2 drop Both Eyes 4x Daily     vancomycin  125 mg Oral BID     vitamin C  500 mg Oral Daily     vitamin D3  50 mcg Oral Daily       Data   Results for orders placed or performed during the hospital encounter of 09/12/22 (from the past 24 hour(s))   CBC with platelets differential    Narrative    The following orders were created for panel order CBC with platelets differential.  Procedure                               Abnormality         Status                     ---------                               -----------         ------                     CBC with platelets and d...[[id number]]  Abnormal            Final result                 Please view results for these tests on the individual orders.   Comprehensive metabolic panel   Result Value Ref Range    Sodium 140 136 - 145 mmol/L    Potassium 3.9 3.4 - 5.3 mmol/L    Chloride 108 (H) 98 - 107 mmol/L    Carbon Dioxide (CO2) 21 (L) 22 - 29 mmol/L    Anion Gap 11 7 - 15 mmol/L    Urea Nitrogen 11.7 6.0 - 20.0 mg/dL    Creatinine  0.49 (L) 0.51 - 0.95 mg/dL    Calcium 9.1 8.6 - 10.0 mg/dL    Glucose 136 (H) 70 - 99 mg/dL    Alkaline Phosphatase 48 35 - 104 U/L    AST 13 10 - 35 U/L    ALT 22 10 - 35 U/L    Protein Total 6.5 6.4 - 8.3 g/dL    Albumin 4.0 3.5 - 5.2 g/dL    Bilirubin Total 1.0 <=1.2 mg/dL    GFR Estimate >90 >60 mL/min/1.73m2   Magnesium   Result Value Ref Range    Magnesium 2.2 1.7 - 2.3 mg/dL   Phosphorus   Result Value Ref Range    Phosphorus 3.3 2.5 - 4.5 mg/dL   CBC with platelets and differential   Result Value Ref Range    WBC Count 8.6 4.0 - 11.0 10e3/uL    RBC Count 3.41 (L) 3.80 - 5.20 10e6/uL    Hemoglobin 11.4 (L) 11.7 - 15.7 g/dL    Hematocrit 35.2 35.0 - 47.0 %     (H) 78 - 100 fL    MCH 33.4 (H) 26.5 - 33.0 pg    MCHC 32.4 31.5 - 36.5 g/dL    RDW 17.2 (H) 10.0 - 15.0 %    Platelet Count 180 150 - 450 10e3/uL    % Neutrophils 97 %    % Lymphocytes 1 %    % Monocytes 1 %    % Eosinophils 0 %    % Basophils 0 %    % Immature Granulocytes 1 %    NRBCs per 100 WBC 0 <1 /100    Absolute Neutrophils 8.4 (H) 1.6 - 8.3 10e3/uL    Absolute Lymphocytes 0.1 (L) 0.8 - 5.3 10e3/uL    Absolute Monocytes 0.0 0.0 - 1.3 10e3/uL    Absolute Eosinophils 0.0 0.0 - 0.7 10e3/uL    Absolute Basophils 0.0 0.0 - 0.2 10e3/uL    Absolute Immature Granulocytes 0.0 <=0.4 10e3/uL    Absolute NRBCs 0.0 10e3/uL     I have seen, interviewed, and examined the patient independently.  I have reviewed the vital signs and labs.  This note reflects my assessment and plan.      Veda Marinelli was seen and evaluated today as part of a shared APRN/PA visit. I reviewed today s vital signs, labs, imaging, notes, and other studies.   My key exam findings include: feels well, no new symptoms or findings, labs and vitals in good range  Key management decisions made by me and carried out under my direction include:  Continue with chemo per protocol    I spent >40 minutes face-to-face and/or coordinating care. Over 50% of our time on the unit was  spent counseling the patient and/or coordinating care regarding: management of chemo for AML consolidation    Alexandria Bahena MD/PhD

## 2022-09-14 NOTE — PROGRESS NOTES
Nursing Focus: Chemotherapy    D: Positive blood return via Port. Insertion site is clean/dry/intact, dressing intact with no complaints of pain.  Urine output is recorded in intake in Doc Flowsheet.    I: Premedications given per order (see electronic medical administration record). Dose #5 of HiDAC started to infuse over 3 hours. Reviewed pt teaching on chemotherapy side effects.  Pt denies need for further teaching. Chemotherapy double checked per protocol by two chemotherapy competent RN's.   A: Tolerating procedure well. Denies nausea and or pain.   P: Continue to monitor urine output and symptoms of nausea. Screen for symptoms of toxicity.

## 2022-09-14 NOTE — PROGRESS NOTES
Nursing Focus: Chemotherapy    D: Positive blood return via Port. Insertion site is clean/dry/intact, dressing intact with no complaints of pain.  Urine output is recorded in intake in Doc Flowsheet. No change in cerebellar exam.   I: Premedications given per order (see electronic medical administration record). Dose #4 of HD Cytarabine started to infuse over 3 hours. Reviewed pt teaching on chemotherapy side effects.  Pt denies need for further teaching. Chemotherapy double checked per protocol by two chemotherapy competent RN's.   A: Tolerating infusion well. Denies nausea and or pain.   P: Continue to monitor urine output and symptoms of nausea. Screen for symptoms of toxicity.

## 2022-09-15 VITALS
DIASTOLIC BLOOD PRESSURE: 65 MMHG | TEMPERATURE: 96.5 F | RESPIRATION RATE: 18 BRPM | HEIGHT: 67 IN | SYSTOLIC BLOOD PRESSURE: 113 MMHG | HEART RATE: 57 BPM | BODY MASS INDEX: 39.13 KG/M2 | OXYGEN SATURATION: 100 % | WEIGHT: 249.3 LBS

## 2022-09-15 LAB
ALBUMIN SERPL BCG-MCNC: 3.7 G/DL (ref 3.5–5.2)
ALP SERPL-CCNC: 47 U/L (ref 35–104)
ALT SERPL W P-5'-P-CCNC: 22 U/L (ref 10–35)
ANION GAP SERPL CALCULATED.3IONS-SCNC: 10 MMOL/L (ref 7–15)
AST SERPL W P-5'-P-CCNC: 16 U/L (ref 10–35)
BASOPHILS # BLD MANUAL: 0 10E3/UL (ref 0–0.2)
BASOPHILS NFR BLD MANUAL: 0 %
BILIRUB SERPL-MCNC: 0.8 MG/DL
BUN SERPL-MCNC: 13.1 MG/DL (ref 6–20)
CALCIUM SERPL-MCNC: 8.8 MG/DL (ref 8.6–10)
CHLORIDE SERPL-SCNC: 109 MMOL/L (ref 98–107)
CREAT SERPL-MCNC: 0.49 MG/DL (ref 0.51–0.95)
DACRYOCYTES BLD QL SMEAR: SLIGHT
DEPRECATED HCO3 PLAS-SCNC: 21 MMOL/L (ref 22–29)
EOSINOPHIL # BLD MANUAL: 0 10E3/UL (ref 0–0.7)
EOSINOPHIL NFR BLD MANUAL: 0 %
ERYTHROCYTE [DISTWIDTH] IN BLOOD BY AUTOMATED COUNT: 17 % (ref 10–15)
GFR SERPL CREATININE-BSD FRML MDRD: >90 ML/MIN/1.73M2
GLUCOSE SERPL-MCNC: 126 MG/DL (ref 70–99)
HCT VFR BLD AUTO: 32.9 % (ref 35–47)
HGB BLD-MCNC: 10.9 G/DL (ref 11.7–15.7)
LYMPHOCYTES # BLD MANUAL: 0.1 10E3/UL (ref 0.8–5.3)
LYMPHOCYTES NFR BLD MANUAL: 1 %
MAGNESIUM SERPL-MCNC: 2.1 MG/DL (ref 1.7–2.3)
MCH RBC QN AUTO: 33.3 PG (ref 26.5–33)
MCHC RBC AUTO-ENTMCNC: 33.1 G/DL (ref 31.5–36.5)
MCV RBC AUTO: 101 FL (ref 78–100)
MONOCYTES # BLD MANUAL: 0 10E3/UL (ref 0–1.3)
MONOCYTES NFR BLD MANUAL: 0 %
NEUTROPHILS # BLD MANUAL: 6.1 10E3/UL (ref 1.6–8.3)
NEUTROPHILS NFR BLD MANUAL: 99 %
PHOSPHATE SERPL-MCNC: 3 MG/DL (ref 2.5–4.5)
PLAT MORPH BLD: ABNORMAL
PLATELET # BLD AUTO: 137 10E3/UL (ref 150–450)
POTASSIUM SERPL-SCNC: 3.9 MMOL/L (ref 3.4–5.3)
PROT SERPL-MCNC: 6.1 G/DL (ref 6.4–8.3)
RBC # BLD AUTO: 3.27 10E6/UL (ref 3.8–5.2)
RBC MORPH BLD: ABNORMAL
SODIUM SERPL-SCNC: 140 MMOL/L (ref 136–145)
WBC # BLD AUTO: 6.2 10E3/UL (ref 4–11)

## 2022-09-15 PROCEDURE — 80053 COMPREHEN METABOLIC PANEL: CPT | Performed by: PHYSICIAN ASSISTANT

## 2022-09-15 PROCEDURE — 85007 BL SMEAR W/DIFF WBC COUNT: CPT | Performed by: PHYSICIAN ASSISTANT

## 2022-09-15 PROCEDURE — 250N000013 HC RX MED GY IP 250 OP 250 PS 637: Performed by: PHYSICIAN ASSISTANT

## 2022-09-15 PROCEDURE — 85027 COMPLETE CBC AUTOMATED: CPT | Performed by: PHYSICIAN ASSISTANT

## 2022-09-15 PROCEDURE — 258N000003 HC RX IP 258 OP 636: Performed by: INTERNAL MEDICINE

## 2022-09-15 PROCEDURE — 84100 ASSAY OF PHOSPHORUS: CPT | Performed by: PHYSICIAN ASSISTANT

## 2022-09-15 PROCEDURE — 250N000011 HC RX IP 250 OP 636: Performed by: INTERNAL MEDICINE

## 2022-09-15 PROCEDURE — 99239 HOSP IP/OBS DSCHRG MGMT >30: CPT | Performed by: INTERNAL MEDICINE

## 2022-09-15 PROCEDURE — 83735 ASSAY OF MAGNESIUM: CPT | Performed by: PHYSICIAN ASSISTANT

## 2022-09-15 PROCEDURE — 250N000011 HC RX IP 250 OP 636: Performed by: PHYSICIAN ASSISTANT

## 2022-09-15 PROCEDURE — 82040 ASSAY OF SERUM ALBUMIN: CPT | Performed by: PHYSICIAN ASSISTANT

## 2022-09-15 PROCEDURE — 36591 DRAW BLOOD OFF VENOUS DEVICE: CPT | Performed by: PHYSICIAN ASSISTANT

## 2022-09-15 RX ADMIN — Medication 50 MCG: at 08:43

## 2022-09-15 RX ADMIN — OXYCODONE HYDROCHLORIDE AND ACETAMINOPHEN 500 MG: 500 TABLET ORAL at 08:43

## 2022-09-15 RX ADMIN — DEXAMETHASONE 8 MG: 4 TABLET ORAL at 08:43

## 2022-09-15 RX ADMIN — VANCOMYCIN HYDROCHLORIDE 125 MG: 125 CAPSULE ORAL at 08:43

## 2022-09-15 RX ADMIN — LEVOTHYROXINE SODIUM 88 MCG: 88 TABLET ORAL at 05:42

## 2022-09-15 RX ADMIN — PREDNISOLONE ACETATE 2 DROP: 10 SUSPENSION/ DROPS OPHTHALMIC at 08:42

## 2022-09-15 RX ADMIN — CYTARABINE 6930 MG: 100 INJECTION, SOLUTION INTRATHECAL; INTRAVENOUS; SUBCUTANEOUS at 05:59

## 2022-09-15 RX ADMIN — APIXABAN 5 MG: 5 TABLET, FILM COATED ORAL at 08:42

## 2022-09-15 RX ADMIN — ACYCLOVIR 400 MG: 400 TABLET ORAL at 08:43

## 2022-09-15 RX ADMIN — Medication 5 ML: at 09:54

## 2022-09-15 RX ADMIN — ONDANSETRON HYDROCHLORIDE 8 MG: 8 TABLET, FILM COATED ORAL at 05:42

## 2022-09-15 RX ADMIN — THERA TABS 1 TABLET: TAB at 08:43

## 2022-09-15 ASSESSMENT — ACTIVITIES OF DAILY LIVING (ADL)
ADLS_ACUITY_SCORE: 18

## 2022-09-15 NOTE — DISCHARGE SUMMARY
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     Recommendations for Outpatient:  - Follow up NPM1 mutation PCR.  - Monitor blood counts for instructions on apixaban, ppx.    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 Earl. Presented to routine exam with cytopenias. BMBx 6/17/2022 - hypercellular marrow (85-95% cellularity) with 92% blasts. 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 Started induction chemotherapy with 7+3 and midostaurin on 6/22/2022. D21 BMBx (7/12) with robust marrow regeneration, repeat BMBx 7/22 with 70-80% cellularity and 6% blasts by morphology consisted with complete response per Dr. Earl. NGS with no detectable mutations (MRD negative). BMT consulted 7/15; recommend consolidation chemotherapy, could consider BMT in the future if relapses. Tentatively planning for 3-4 cycles consolidation HiDAC (D1-3) and midostaurin  (D8-21), followed by maintenance midostaurin for 1 year. Admitted for Cycle 1 consolidation with HiDAC and midostaurin on 8/9/22 which was tolerated well without any concerns or complications.   - Free drug coverage approved for midostaurin through [employer] - delivered to home  - Port accessed on admission.   - Patient in MRD negative CR, no indication for allopurinol or TLS monitoring.   - Recheck NPM1 mutation ordered in clinic today; in process.                   Treatment Plan: HiDAC +Midostaurin C2D1=9/12               - Cytarabine 3 g/m2 (6930mg) q12h x6 doses - D1,2,3               - Midostaurin 50 mg BID x14 days - D8 - 21 (9/19 - 10/2)               - Supportive medications;     Pred forte eye drops QID D1-5 -- continue on discharge    Dexamethasone 12mg PO q24h D1-3, 8mg D4-5     Zofran 8mg q12h x6 doses D1-3    Neulasta 6mg - scheduled 9/16     # CLINT DVT (PICC-associated)  CLINT US (7/12) with nonocclusive DVT in the left axillary vein and one of the paired left brachial veins, superficial venous thrombosis in the left basilic vein. PICC-associated which was removed prior to discharge. Transitioned to apixaban as of 8/9 with plan to continue (per Dr. Earl) x 3 months (through ~10/13/22).   - Continue to monitor and hold if plts <50k.      ID  # ID PPX  - ACV 400mg BID  - No indication for antibacterial or antifungal ppx with ANC >1000. Continue to monitor outpatient and start levaquin and voriconazole if ANC <1000  - Note; if she were to need antifungal ppx in the future consider reaching out to pharmacy liaison because prior to meeting her deductible the cost was $2048/month for posaconazole and $176/month for Voriconazole.      # History of recurrent cdiff colitis  - PTA ppx Vancomycin 125mg BID PO      # H/o COVID-19 (Oct 2021)  Pt is not vaccinated nor interested in being vaccinated. She was admitted from 10/1/2021 - 10/13/2021 for acute hypoxic respiratory failure from COVID-19  pneumonia. Required IMC, high flow and intermittent CPAP. She was treated with dexamethasone, remdesivir, and baricitinib in addition to azithromycin and ceftriaxone. Recovered symptomatically.  - Covid neg 9/12  - Consider Valorieru outpatient, previously discussed in clinic     ENDO  # Hypothyroidism - PTA Synthroid 100 mcg daily. Recheck TSH 8/12 is 0.12. Her PCP will follow up on this, continue synthroid 100mcg daily for now.      FEN:  - IVF per chemotherapy protocol   - PRN lyte replacement  - RDAT     Prophy/Misc:  - VTE: Eliquis   - GI/PUD: Tums PRN  - Bowels: Senna and Miralax PRN  - Activity: patient independent, no PT/OT needs presently      Follow-up:  - Labs/poss transfusions - 9/20, 9/22, 9/27, 9/30, 10/4, 10/7, 10/11, 10/14  - Hospital follow-up visit - 9/21  - Neulasta - 9/16  - Requested VLAD follow up prior to admission for C3 HiDAC on 10/10.     Code: Full code, confirmed with patient on admission  Disposition: Admitted to [facility] for scheduled chemotherapy, discharge home 9/15.      Patient seen and discussed with attending physician, Dr. Bahena.     I spent 30 minutes in the care of this patient today, which included time necessary for review of interval events, obtaining history and physical exam, ordering medications/tests/procedures as medically indicated, review of pertinent medical literature, counseling of the patient, coordination of care, and documentation time. Over 50% of time was spent counseling the patient and/or coordinating care.     Corinne Murguia PA-C   Hematology/Oncology   Pager: #[phone]     Significant Results and Procedures   See below    Pending Results   These results will be followed up by outpatient team  Unresulted Labs Ordered in the Past 30 Days of this Admission     Date and Time Order Name Status Description    9/12/2022  1:42 PM [facility] MISCELLANEOUS TEST In process     8/29/2022  9:15 AM PREPARE PHERESED PLATELETS (UNIT) Preliminary     8/29/2022  9:15 AM PREPARE  "PHERESED PLATELETS (UNIT) Preliminary     8/24/2022  4:15 PM PREPARE PHERESED PLATELETS (UNIT) Preliminary     8/24/2022  4:15 PM PREPARE PHERESED PLATELETS (UNIT) Preliminary     8/24/2022  4:15 PM PREPARE PHERESED PLATELETS (UNIT) Preliminary     8/24/2022  4:15 PM PREPARE PHERESED PLATELETS (UNIT) Preliminary           Code Status   Full Code    Primary Care Physician   TATA NELOSN    /65 (BP Location: Right arm)   Pulse 57   Temp (!) 96.5  F (35.8  C) (Temporal)   Resp 18   Ht 1.702 m (5' 7\")   Wt 113.1 kg (249 lb 4.8 oz)   SpO2 100%   BMI 39.05 kg/m    General: no acute distress, pleasant  HEENT: normocephalic, atraumatic, sclerae nonicteric  CV: regular rate and rhythm, no murmurs  Lungs: clear to auscultation bilaterally, no wheezes/rales/rhonchi  Abd: soft, non-distended, non-tender  MSK: full range of motion in all four extremities, no peripheral edema  Neuro: alert and oriented x3, CN grossly intact   Psych: appropriate mood and affect  Skin: no rashes or lesions  Neurologic: Awake, alert, oriented to name, place and time.    Vascular access: port c/d/i    Time Spent on this Encounter   Corinne MATAMOROS PA-C, personally saw the patient today and spent greater than 30 minutes discharging this patient.    Discharge Disposition   Discharged to home  Condition at discharge: Stable    Consultations This Hospital Stay   None    Discharge Orders      Reason for your hospital stay    You were hospitalized for C2 HIDAC which was tolerated well without concerns. You are arranged for the appropriate outpatient follow up as listed below.     Activity    Your activity upon discharge: activity as tolerated     When to contact your care team    [facility] clinic triage line at [phone] for temp > or = 100.4, uncontrolled nausea/vomiting/diarrhea/constipation, unrelieved pain, bleeding not relieved with pressure, dizziness, chest pain, shortness of breath, loss of consciousness, and " any new or concerning symptoms.     Follow Up and recommended labs and tests    - Labs/poss transfusions - 9/20, 9/22, 9/27, 9/30, 10/4, 10/7, 10/11, 10/14  - Hospital follow-up visit - 9/21  - Neulasta - 9/16  - VLAD visit prior to admission for C3 HiDAC - 10/10     Diet    Follow this diet upon discharge: regular adult diet     Check Out Appointment Request    - VLAD visit in person prior to admission for C3 HIDAC chemotherapy (patient driving from 3 hours away, so please schedule in late morning)     Discharge Medications   Discharge Medication List as of 9/15/2022  9:45 AM      START taking these medications    Details   dexamethasone (DECADRON) 4 MG tablet Take 2 tablets (8 mg) by mouth every morning, Disp-2 tablet, R-0, E-Prescribe      prednisoLONE acetate (PRED FORTE) 1 % ophthalmic suspension Place 2 drops into both eyes 4 times daily for 48 hours, Disp-5 mL, R-0, No Print Out         CONTINUE these medications which have CHANGED    Details   apixaban ANTICOAGULANT (ELIQUIS) 5 MG tablet Take 1 tablet (5 mg) by mouth 2 times daily (Hold when instructed by outpatient team), Disp-60 tablet, R-2, No Print Out      fluconazole (DIFLUCAN) 200 MG tablet Take 1 tablet (200 mg) by mouth daily When absolute neutrophils are less than 1.- x 10^9/L, Disp-30 tablet, R-0, No Print Out      midostaurin (RYDAPT) 25 MG capsule Take 2 capsules (50 mg) by mouth 2 times daily . Take with food on Days 8 through 21 (9/19 - 10/2) then stop, Disp-56 capsule, R-0, E-Prescribe         CONTINUE these medications which have NOT CHANGED    Details   acyclovir (ZOVIRAX) 400 MG tablet Take 1 tablet (400 mg) by mouth 2 times daily, Disp-60 tablet, R-0, E-Prescribe      Bacillus Coagulans-Inulin (PROBIOTIC) 1-250 BILLION-MG CAPS Take 1 capsule by mouth daily, Historical      levofloxacin (LEVAQUIN) 250 MG tablet Take 1 tablet (250 mg) by mouth daily When absolute neutrophils are less than 1.- x 10^9/L, Disp-30 tablet, R-4, E-Prescribe       levothyroxine (SYNTHROID/LEVOTHROID) 88 MCG tablet Take 88 mcg by mouth daily, Historical      magnesium 250 MG tablet Take 250 mg by mouth daily, Historical      Multiple Vitamins-Minerals (WOMENS MULTIVITAMIN) TABS Take 1 tablet by mouth daily, Historical      Quercetin 50 MG TABS Take 50 mg by mouth daily, Historical      vancomycin (VANCOCIN) 125 MG capsule Take 1 capsule (125 mg) by mouth 2 times daily, Disp-60 capsule, R-4, E-Prescribe      vitamin C (ASCORBIC ACID) 1000 MG TABS Take 2,000 mg by mouth daily, Historical      vitamin D3 (CHOLECALCIFEROL) 50 mcg (2000 units) tablet Take 2 tablets by mouth daily, Historical      zinc gluconate 50 MG tablet Take 50 mg by mouth daily, Historical           Allergies   Allergies   Allergen Reactions     Blood Transfusion Related (Informational Only) Hives     6/29/2022, reaction of hives with platelet transfusion      Data   Most Recent 3 CBC's:Recent Labs   Lab Test 09/15/22  0645 09/14/22  0548 09/13/22  0705   WBC 6.2 8.6 8.8   HGB 10.9* 11.4* 13.1   * 103* 101*   * 180 220      Most Recent 3 BMP's:  Recent Labs   Lab Test 09/15/22  0645 09/14/22  0548 09/13/22  0705    140 136   POTASSIUM 3.9 3.9 4.0   CHLORIDE 109* 108* 104   CO2 21* 21* 20*   BUN 13.1 11.7 14.8   CR 0.49* 0.49* 0.50*   ANIONGAP 10 11 12   MICHAEL 8.8 9.1 9.6   * 136* 156*     Most Recent 2 LFT's:  Recent Labs   Lab Test 09/15/22  0645 09/14/22  0548   AST 16 13   ALT 22 22   ALKPHOS 47 48   BILITOTAL 0.8 1.0     Most Recent INR's and Anticoagulation Dosing History:  Anticoagulation Dose History     Recent Dosing and Labs Latest Ref Rng & Units 6/25/2022 6/26/2022 6/27/2022 6/30/2022 7/4/2022 8/9/2022 9/12/2022    INR 0.85 - 1.15 1.29(H) 1.16(H) 1.09 1.14 1.10 0.98 1.01        Most Recent 3 Troponin's:No lab results found.  Most Recent Cholesterol Panel:  Recent Labs   Lab Test 03/08/22  0930   TRIG 93     Most Recent 6 Bacteria Isolates From Any Culture (See EPIC Reports  for Culture Details):No lab results found.  Most Recent TSH, T4 and A1c Labs:  Recent Labs   Lab Test 08/12/22  0724   TSH 0.12*   T4 1.51     Results for orders placed or performed during the hospital encounter of 08/09/22   IR Chest Port Placement > 5 Yrs of Age    Narrative    PRE-PROCEDURE DIAGNOSIS: Acute myeloid leukemia in remission    POST-PROCEDURE DIAGNOSIS: Same    PROCEDURE: Chest port placement    Impression    IMPRESSION: Completed image-guided placement of 6 Niuean, 23.5 cm  single lumen power-injectable central venous port via right internal  jugular vein. Catheter tip in the right atrium. Aspirates and flushes  freely, heparin locked and is ready for immediate use. No  complication.    ----------    CLINICAL HISTORY: Patient requiring central venous access for  chemotherapy, IV medications and blood draws. Chest port placement  requested.    PERFORMED BY: Chema Dominguez PA-C    CONSENT: Written informed consent was obtained and is documented in  the patient record.    SEDATION CONSENT: It was explained to the patient that medications  used for sedation and pain relief may be administered, if needed, to  reduce anxiety and discomfort that may be associated with the  procedure. Serious side effects from the medications are rare but may  include prolonged drowsiness and difficulty breathing, possibly  requiring placement of a breathing tube to ventilate the lungs.    MEDICATIONS:  Intravenous sedation was administered with 1.5 mg  midazolam and 75 mcg fentanyl. 1% lidocaine without epinephrine was  available for local anesthesia. The port was heparin locked upon  completion of placement.    NURSING: The patient was placed on continuous vital signs monitoring.  Vital signs and sedation were monitored by nursing staff under IR  physician staff supervision.     SEDATION TIME: 30 minutes face-to-face    FLUOROSCOPY TIME: 0.2 minutes    DESCRIPTION: The right neck and upper chest were prepped and draped  in  the usual sterile fashion.      Under ultrasound guidance, the right internal jugular vein was  identified and the overlying skin was anesthetized and skin  dermatotomy was made. Under ultrasound guidance, right internal  jugular venipuncture was made with needle. Image saved documenting  venipuncture and patency.    Needle was exchanged over guidewire for a dilator under fluoroscopic  guidance. Length to right atrium was measured with guidewire.  Guidewire and inner dilator were removed. Wire was advanced into  inferior vena cava under fluoroscopic guidance and secured.     The anterior right chest skin was anesthetized and incision was made.  A subcutaneous port pocket was created using a combination of sharp  and blunt dissection. The pocket was irrigated with saline and packed  with gauze to obtain hemostasis. The gauze was removed.      Port catheter was subcutaneously tunneled from the anterior chest  pocket to the internal jugular venipuncture site after path of tunnel  was anesthetized. Catheter cut to length. The dilator was exchanged  over guidewire for a peel-away sheath. Guidewire was removed. Under  fluoroscopic guidance, the catheter was placed through the peel-away  sheath and positioned with its tip in the right atrium. Peel-away  sheath was removed.      Final port and catheter position saved. Port aspirated and flushed  freely and was heparin locked. The chest incision was closed with  three 3-0 Vicryl interrupted sutures and topical adhesive. The skin  dermatotomy site overlying the internal jugular venipuncture was  closed with topical adhesive.    COMPLICATIONS: No immediate complication, the patient remained stable  throughout the procedure and tolerated it well.    ESTIMATED BLOOD LOSS: Minimal    SPECIMENS: None    ABBEY MCGEE PA-C         SYSTEM ID:  [id number]     I have seen, interviewed, and examined the patient independently.  I have reviewed the vital signs and labs.  This note  reflects my assessment and plan.    We have spent greater than 30 minutes organizing outpatient care, follow up appointments, and medications.      Veda Marinelli was seen and evaluated today as part of a shared APRN/PA visit. I reviewed today s vital signs, labs, imaging, notes, and other studies.   My key exam findings include: feels well, no significant or unexpected toxicities notes  Key management decisions made by me and carried out under my direction include:    Safe to discharge with close clinic follow up    I spent >40 minutes face-to-face and/or coordinating care. Over 50% of our time on the unit was spent counseling the patient and/or coordinating care regarding:  Consolidation for AML    Alexandria Bahena MD/PhD

## 2022-09-15 NOTE — PROGRESS NOTES
Nursing Focus: Chemotherapy    D: Positive blood return via Port. Insertion site is clean/dry/intact, dressing intact with no complaints of pain.  Urine output is recorded in intake in Doc Flowsheet.    I: Premedications given per order (see electronic medical administration record). Dose #6 of Cytarabine started to infuse over 3 hours. Reviewed pt teaching on chemotherapy side effects.  Pt denies need for further teaching. Chemotherapy double checked per protocol by two chemotherapy competent RN's.   A: Tolerating procedure well. Denies nausea and or pain.   P: Continue to monitor urine output and symptoms of nausea. Screen for symptoms of toxicity. No changes in cerebellar exam.

## 2022-09-15 NOTE — PLAN OF CARE
[id number]:     A&O x4. UAL. VSS on RA. Pt denies having any pain, N/V or SOB. Last BM: 9/14, pt reports voiding spontaneously with AUOP. Last dose of cytarabine infusing, plan for pt to discharge upon completion. Continue POC.

## 2022-09-15 NOTE — PROGRESS NOTES
Reviewed discharge medications and orders with Minerva and she verbalized understanding, She has a follow up clinic appointment and phone numbers to call with questions.

## 2022-09-16 ENCOUNTER — PATIENT OUTREACH (OUTPATIENT)
Dept: CARE COORDINATION | Facility: CLINIC | Age: 37
End: 2022-09-16

## 2022-09-16 ENCOUNTER — INFUSION THERAPY VISIT (OUTPATIENT)
Dept: INFUSION THERAPY | Facility: CLINIC | Age: 37
DRG: 839 | End: 2022-09-16
Attending: INTERNAL MEDICINE
Payer: COMMERCIAL

## 2022-09-16 ENCOUNTER — PATIENT OUTREACH (OUTPATIENT)
Dept: ONCOLOGY | Facility: CLINIC | Age: 37
End: 2022-09-16

## 2022-09-16 VITALS — SYSTOLIC BLOOD PRESSURE: 144 MMHG | TEMPERATURE: 99.1 F | DIASTOLIC BLOOD PRESSURE: 86 MMHG

## 2022-09-16 DIAGNOSIS — C92.01 ACUTE MYELOID LEUKEMIA IN REMISSION (H): Primary | ICD-10-CM

## 2022-09-16 PROCEDURE — 250N000011 HC RX IP 250 OP 636: Performed by: INTERNAL MEDICINE

## 2022-09-16 RX ADMIN — PEGFILGRASTIM 6 MG: 6 INJECTION SUBCUTANEOUS at 15:00

## 2022-09-16 NOTE — PROGRESS NOTES
Patient discharged on 9/15/22, please follow up per TCM workflow.    Maximo Lindsay on 9/16/2022 at 8:25 AM

## 2022-09-16 NOTE — PROGRESS NOTES
"Clinic Care Coordination Contact  [facility]: Post-Discharge Note  SITUATION                                                      Admission:    Admission Date: 09/12/22   Reason for Admission: AML, favorable risk (FLT3-ITD low, NPM-1, IDH2 mutations)  Discharge:   Discharge Date: 09/15/22  Discharge Diagnosis: AML, favorable risk (FLT3-ITD low, NPM-1, IDH2 mutations)    BACKGROUND                                                      Per hospital discharge summary and inpatient provider notes:Veda Marinelli is a 37 year old female with pertinent PMHx of recurrent Cdiff, LUE DVT (7/12/22), and favorable-risk AML (FLT3-ITD, NPM-1, IDH2 mutations) in MRD negative CR who is admitted for cycle 2 consolidation HiDAC with midostaurin (C2D1=9/12).      Recommendations for Outpatient:  - Follow up NPM1 mutation PCR.  - Monitor blood counts for instructions on apixaban, ppx      ASSESSMENT           Discharge Assessment  How are you doing now that you are home?: \" I am doing ok \"  How are your symptoms? (Red Flag symptoms escalate to triage hotline per guidelines): Improved  Do you feel your condition is stable enough to be safe at home until your provider visit?: Yes  Does the patient have their discharge instructions? : Yes  Does the patient have questions regarding their discharge instructions? : No  Were you started on any new medications or were there changes to any of your previous medications? : Yes  Does the patient have all of their medications?: Yes  Do you have questions regarding any of your medications? : No  Do you have all of your needed medical supplies or equipment (DME)?  (i.e. oxygen tank, CPAP, cane, etc.): Yes  Discharge follow-up appointment scheduled within 14 calendar days? : Yes  Discharge Follow Up Appointment Date: 09/21/22  Discharge Follow Up Appointment Scheduled with?: Specialty Care Provider    Post-op ([facility] CTA Only)  If the patient had a surgery or procedure, do they have any " questions for a nurse?: No             PLAN                                                      Outpatient Plan:Labs/poss transfusions - 9/20, 9/22, 9/27, 9/30, 10/4, 10/7, 10/11, 10/14  - Hospital follow-up visit - 9/21  - Neulasta - 9/16  - Requested VLAD follow up prior to admission for C3 HiDAC on 10/10.     Future Appointments   Date Time Provider Department Center   9/20/2022 10:00 AM [facility]   9/20/2022 10:30 AM [facility] INFUS[facility] NURSE [facility]   9/21/2022  2:30 PM Angelica Kingsley PA-C [facility]   9/22/2022 10:00 AM [facility]   9/22/2022 10:30 AM [facility]URSE [facility]   9/27/2022  9:30 AM [facility]   9/27/2022 10:00 AM [facility]URSE [facility]   9/30/2022 10:00 AM [facility]   9/30/2022 10:30 AM [facility]URSE [facility]   10/4/2022 10:00 AM [facility]   10/4/2022 10:30 AM [facility]URSE [facility]   10/7/2022 10:00 AM [facility]   10/7/2022 10:30 AM [facility] NURSE [facility]   10/10/2022 11:30 AM  [facility]   10/10/2022 12:15 PM Fidelia Nair, APRN CNP [facility]   10/11/2022 10:00 AM [facility]   10/11/2022 10:30 AM [facility]URSE [facility]   10/14/2022 10:00 AM [facility]   10/14/2022 10:30 AM [facility] INF[facility]URSE [facility]         For any urgent concerns, please contact our 24 hour nurse triage line: [phone] ([phone])         Vik Stokes

## 2022-09-16 NOTE — PROGRESS NOTES
Infusion Nursing Note:  Veda L Yves presents today for Neulasta.    Patient seen by provider today: No   present during visit today: Not Applicable.    Note: N/A.    Intravenous Access:  No Intravenous access/labs at this visit.    Treatment Conditions:  Not Applicable.    Post Infusion Assessment:  Patient tolerated injection without incident.  Site patent and intact, free from redness, edema or discomfort.  No evidence of extravasations.  Access discontinued per protocol.     Discharge Plan:   Copy of AVS reviewed with patient and/or family.  Patient will return 9/20/22 for next appointment.  Patient discharged in stable condition accompanied by: self.  Departure Mode: Ambulatory.      Bernadine Benson RN

## 2022-09-18 LAB — MISCELLANEOUS TEST 1 (ARUP): NORMAL

## 2022-09-20 ENCOUNTER — LAB (OUTPATIENT)
Dept: INFUSION THERAPY | Facility: CLINIC | Age: 37
End: 2022-09-20
Attending: PHYSICIAN ASSISTANT
Payer: COMMERCIAL

## 2022-09-20 VITALS
SYSTOLIC BLOOD PRESSURE: 109 MMHG | RESPIRATION RATE: 16 BRPM | TEMPERATURE: 98.6 F | HEART RATE: 86 BPM | DIASTOLIC BLOOD PRESSURE: 73 MMHG

## 2022-09-20 DIAGNOSIS — C95.01 ACUTE LEUKEMIA IN REMISSION (H): Primary | ICD-10-CM

## 2022-09-20 LAB
BLD PROD TYP BPU: NORMAL
BLD PROD TYP BPU: NORMAL
BLOOD COMPONENT TYPE: NORMAL
BLOOD COMPONENT TYPE: NORMAL
CODING SYSTEM: NORMAL
CODING SYSTEM: NORMAL
ERYTHROCYTE [DISTWIDTH] IN BLOOD BY AUTOMATED COUNT: 15.4 % (ref 10–15)
HCT VFR BLD AUTO: 33.2 % (ref 35–47)
HGB BLD-MCNC: 11.4 G/DL (ref 11.7–15.7)
ISSUE DATE AND TIME: NORMAL
ISSUE DATE AND TIME: NORMAL
MCH RBC QN AUTO: 33.8 PG (ref 26.5–33)
MCHC RBC AUTO-ENTMCNC: 34.3 G/DL (ref 31.5–36.5)
MCV RBC AUTO: 99 FL (ref 78–100)
PLAT MORPH BLD: NORMAL
PLAT MORPH BLD: NORMAL
PLATELET # BLD AUTO: 26 10E3/UL (ref 150–450)
PLATELET # BLD AUTO: 43 10E3/UL (ref 150–450)
RBC # BLD AUTO: 3.37 10E6/UL (ref 3.8–5.2)
RBC MORPH BLD: NORMAL
RBC MORPH BLD: NORMAL
UNIT ABO/RH: NORMAL
UNIT ABO/RH: NORMAL
UNIT NUMBER: NORMAL
UNIT NUMBER: NORMAL
UNIT STATUS: NORMAL
UNIT STATUS: NORMAL
UNIT TYPE ISBT: 600
UNIT TYPE ISBT: 600
WBC # BLD AUTO: 0.3 10E3/UL (ref 4–11)

## 2022-09-20 PROCEDURE — 36430 TRANSFUSION BLD/BLD COMPNT: CPT

## 2022-09-20 PROCEDURE — 85049 AUTOMATED PLATELET COUNT: CPT | Performed by: INTERNAL MEDICINE

## 2022-09-20 PROCEDURE — 85027 COMPLETE CBC AUTOMATED: CPT | Performed by: PHYSICIAN ASSISTANT

## 2022-09-20 PROCEDURE — P9037 PLATE PHERES LEUKOREDU IRRAD: HCPCS | Performed by: PHYSICIAN ASSISTANT

## 2022-09-20 PROCEDURE — 250N000011 HC RX IP 250 OP 636: Performed by: PHYSICIAN ASSISTANT

## 2022-09-20 RX ORDER — HEPARIN SODIUM (PORCINE) LOCK FLUSH IV SOLN 100 UNIT/ML 100 UNIT/ML
5 SOLUTION INTRAVENOUS
Status: CANCELLED | OUTPATIENT
Start: 2022-09-20

## 2022-09-20 RX ORDER — HEPARIN SODIUM (PORCINE) LOCK FLUSH IV SOLN 100 UNIT/ML 100 UNIT/ML
5 SOLUTION INTRAVENOUS
Status: DISCONTINUED | OUTPATIENT
Start: 2022-09-20 | End: 2022-09-20 | Stop reason: HOSPADM

## 2022-09-20 RX ADMIN — Medication 5 ML: at 16:01

## 2022-09-20 NOTE — PROGRESS NOTES
Infusion Nursing Note:  Veda Marinelli presents today for Platelets.    Patient seen by provider today: No   present during visit today: Not Applicable.    Note: Pt denies any concerns today.     Per Dr. Devlin patient is receive only 1 unit of platelets today. Recheck platelets post transfusion. Called placed to Dr. Devlin with results.     Intravenous Access:  Implanted Port.    Treatment Conditions:  Lab Results   Component Value Date    HGB 11.4 (L) 09/20/2022    WBC 0.3 (LL) 09/20/2022    ANEU 6.1 09/15/2022    ANEUTAUTO 8.4 (H) 09/14/2022    PLT 26 (LL) 09/20/2022      Results reviewed, labs MET treatment parameters, ok to proceed with treatment.  Platelets 26,000    Post Infusion Assessment:  Patient tolerated infusion without incident.  Blood return noted pre and post infusion.  Site patent and intact, free from redness, edema or discomfort.  No evidence of extravasations.  Access discontinued per protocol.     Discharge Plan:   Patient discharged in stable condition accompanied by: self.  Departure Mode: Ambulatory. Pt will return 9/22/2023.       Braden Lindo RN

## 2022-09-20 NOTE — PROGRESS NOTES
"Minerva is a 37 year old who is being evaluated via a billable video visit.      Patient stated she is in the state of [place] for the visit today.    How would you like to obtain your AVS? MyChart  If the video visit is dropped, the invitation should be resent by: Send to e-mail at: [email or web address]  Will anyone else be joining your video visit? No        Video-Visit Details    Video Start Time: 2:46 PM    Type of service:  Video Visit    Video End Time:3:02 PM    Originating Location (pt. Location): Home    Distant Location (provider location):  [facility]     Platform used for Video Visit: Silvina Patino, Virtual Visit Facilitator      [facility]  Date of Visit: Sep 21, 2022   Oncologist: Dr. Ortiz Hill      REASON FOR VISIT:  acute myeloid leukemia (AML), hospital follow-up    HISTORY OF PRESENT ILLNESS:  Ms. Marinelli is a 37 year old woman with acute myeloid leukemia (AML).  To summarize her course, she was noted to have low WBC count on routine labs in 03/2022.  Follow up labs in 05/2022 showed further decrease in WBC as well as anemia and thrombocytopenia.  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  She has met with the BMT Team .  She was treated with cytarabine and daunorubicin \"7+3\" induction chemotherapy with midostaurin on Day 8-21.  Bone marrow biopsy around Day 21 was hypocellular but consistent with recovering bone marrow without definite AML.  Her course was complicated by recurrent C diff and upper extremity DVT.  She had prompt blood cell count recovery and was discharge with ongoing outpatient follow-up.  She just had a post-treatment bone marrow biopsy that showed 70-80% cellular marrow with slightly increased blasts that are favored to represent robust " regnerating marrow with molecular studies and NPM1 MRD PCR testing not detected.    Admitted 8/9-8/12 for Cycle 1 consolidation HiDAC with midostaurin (C1D1 = 8/9/22). Overall tolerated well with no complications. Received neulasta on 8/13.     Admitted 9/12-9/15 for Cycle 2 consolidation HiDAC with midostaurin (C1D1 = 9/12/22). Received neulasta on 9/16.     INTERVAL HISTORY:  Minerva presents for hospital follow-up visit today via video. She is doing well. She started midostaurin. Energy and appetie are fine. No nausea/vomiting. Normal bowel movements. Breathing is good. No CP or SOB. No fevers or chills. She just started her period and is bleeding a little more than usual. Otherwise no bleeding elsewhere. No new lumps or bumps. No weakness or neuropathy. ROS is otherwise negative and she denies acute complaints today.       KEY PAST MEDICAL HISTORY:  History of COVID-19, recurrent C diff, hypothyroidism    MEDICATIONS:  Current Outpatient Medications   Medication     acyclovir (ZOVIRAX) 400 MG tablet     apixaban ANTICOAGULANT (ELIQUIS) 5 MG tablet     Bacillus Coagulans-Inulin (PROBIOTIC) 1-250 BILLION-MG CAPS     dexamethasone (DECADRON) 4 MG tablet     fluconazole (DIFLUCAN) 200 MG tablet     levofloxacin (LEVAQUIN) 250 MG tablet     levothyroxine (SYNTHROID/LEVOTHROID) 88 MCG tablet     magnesium 250 MG tablet     midostaurin (RYDAPT) 25 MG capsule     Multiple Vitamins-Minerals (WOMENS MULTIVITAMIN) TABS     prednisoLONE acetate (PRED FORTE) 1 % ophthalmic suspension     Quercetin 50 MG TABS     vancomycin (VANCOCIN) 125 MG capsule     vitamin C (ASCORBIC ACID) 1000 MG TABS     vitamin D3 (CHOLECALCIFEROL) 50 mcg (2000 units) tablet     zinc gluconate 50 MG tablet     No current facility-administered medications for this visit.     Facility-Administered Medications Ordered in Other Visits   Medication     heparin 100 UNIT/ML injection 5 mL     sodium chloride (PF) 0.9% PF flush 10 mL     SOCIAL HISTORY:  Was  working as RN    Video physical exam  General: Patient appears well in no acute distress.   Skin: No visualized rash or lesions on visualized skin  Resp: Appears to be breathing comfortably without accessory muscle usage, speaking in full sentences, no cough  MSK: Appears to have normal range of motion based on visualized movements  Neurologic: No apparent tremors, facial movements symmetric  Psych: affect normal, alert and oriented       LABORATORY DATA: Reviewed by me.   Most Recent 3 CBC's:Recent Labs   Lab Test 09/20/22  1523 09/20/22  1003 09/15/22  0645 09/14/22  0548   WBC  --  0.3* 6.2 8.6   HGB  --  11.4* 10.9* 11.4*   MCV  --  99 101* 103*   PLT 43* 26* 137* 180     Most Recent 3 BMP's:Recent Labs   Lab Test 09/15/22  0645 09/14/22  0548 09/13/22  0705    140 136   POTASSIUM 3.9 3.9 4.0   CHLORIDE 109* 108* 104   CO2 21* 21* 20*   BUN 13.1 11.7 14.8   CR 0.49* 0.49* 0.50*   ANIONGAP 10 11 12   MICHAEL 8.8 9.1 9.6   * 136* 156*     Most Recent 2 LFT's:Recent Labs   Lab Test 09/15/22  0645 09/14/22  0548   AST 16 13   ALT 22 22   ALKPHOS 47 48   BILITOTAL 0.8 1.0       IMPRESSION AND PLAN:  Ms. Marinelli is a 37 year old woman with acute myeloid leukemia (AML).    Previously, Dr. Earl reviewed her course and post induction bone marrow biopsy that is consistent with a complete response - MRD negative.  Based on her workup that suggests a favorable risk profile for her AML and excellent response to induction chemotherapy, she was recommended to proceed with chemotherapy based on consolidation treatment with high-dose cytarabine (HiDAC) with midostaurin based on the RATIFY trial (N Engl J Med 2017;377:454-64.).  They discussed the decision to continue chemotherapy-based consolidation with 3-4 rounds of HiDAC and midostaurin will depend on testing for NPM1 mutation MRD after her second cycle of chemotherapy (1st consolidation treatment).  If chemotherapy-based consolidation is pursued we would also  plan for a year of maintenance midostaurin treatment after the completion of consolidation chemotherapy to minimize the risk of relapse.     Previously reviewed the typical schedule for HiDAC and midostaurin consolidation with HiDAC on Day 1-3 in the hospital and midostaurin on Days 8-21 of each consolidation cycle.  Previously discussed possible side effects and toxicities including but not limited to fatigue, alopecia, bone marrow suppression, infertility, allergic reaction, GI issues, infection, bleeding, damage to heart/lung/liver/kidneys, cystitis, rash.  While complications can be life threatening, the greatest risk is the AML.  Previously also discussed the possibility of poor response or relapse of disease despite the planned therapy and need to consider alternative treatments.      She recently underwent egg harvest procedure on 9/6 and tolerated well.     She was admitted 8/9-8/12/22 for Cycle 1 consolidation with HiDAC and midostaurin, started on 8/9/22. Tentatively planning for 3-4 cycles of consolidation HiDAC (D1-3) and midostaurin (D8-21), followed by maintenance midostaurin for 1 year. She had port placed on 8/9 with IR. NPM1 PCR mutation from blood on 9/12: not detected.   Can plan for bone marrow biopsy after she is done with consolidation chemotherapy.     She was recently admitted 9/12-9/15 for Cycle 2 consolidation with HiDAC and midostaurin, started on 9/12. Tolerated well. She continues on Midostaurin D8-21 (9/19-10/2). She is feeling well. Her labs are notable for pancytopenia, related to treatment. Reviewed neutropenic precautions. S/p neulasta on 9/16. Continue ppx with levaquin and fluc while neutropenic.   - Continue with 2x weekly labs and possible transfusion: parameters hgb >7, platelet >10 (or >20 if bleeding).     She has no active ID issues.  Her C diff resolved. She will continue acyclovir and suppressive oral vancomycin twice daily as well as levofloxacin and fluconazole during  periods of neutropenia.  On levo/fluc now since ANC <1.  Due to high cost with posa and vori, she was Rxed fluc.  Will need to watch qtc with fluc and midostaurin.  EKG from 7/2022 qtc 450.  We discussed the importance of following up to date local and federal health agency guidance for immunocompromised individuals regarding measures to reduce the risk of COVID-19.         # h/o COVID-19 (Oct 2021)  Pt is not vaccinated nor interested in being vaccinated. She was admitted from 10/1/2021 - 10/13/2021 for acute hypoxic respiratory failure from COVID-19 pneumonia.  Required IMC, high flow and intermittent CPAP. She was treated with dexamethasone, remdesivir, and baricitinib in addition to azithromycin and ceftriaxone. Recovered symptomatically.  - Covid neg 8/9  - Discussed janet and she is not interested at this time. She will let us know if she changes her mind.       # LUE DVT (PICC-associated)  LUE US (7/12) with nonocclusive DVT in the left axillary vein and one of the paired left brachial veins, superficial venous thrombosis in the left basilic vein. PICC-associated which was removed prior to discharge. Transitioned to apixaban as of 8/9 with plan to continue (per Dr. Earl) x 3 months (through ~10/13/22). It has been intermittently held for thrombocytopenia.   - Continue to monitor and hold if plts <50k. She held eliquis starting yesterday evening 9/20 for thrombocytopenia (plt 26). Continue to monitor.       We will keep her PCP Maria Eugenia Villar NP in the loop.      Admission for Cycle 3 consolidation HiDAC with midostaurin planned for 10/10.  I provided contact information for our clinic and advised that she call if questions, concerns, or new issues come up between visits.      Transition Care Management Services  Admission Date: 9/12/2022    Discharge Date: 9/15/2022    Discharge Diagnosis: AML, favorable risk (FLT3-ITD low, NPM-1, IDH2 mutations)    Face-to-face visit date: 9/21/2022        Medical  complexity decision making: High complexity ([id number])    45 minutes spent on the date of the encounter doing chart review, review of test results, interpretation of tests, patient visit and documentation     Angelica Kingsley PA-C  [facility] [address] [address] [phone]

## 2022-09-21 ENCOUNTER — VIRTUAL VISIT (OUTPATIENT)
Dept: ONCOLOGY | Facility: CLINIC | Age: 37
End: 2022-09-21
Attending: PHYSICIAN ASSISTANT
Payer: COMMERCIAL

## 2022-09-21 DIAGNOSIS — C92.00 ACUTE MYELOID LEUKEMIA NOT HAVING ACHIEVED REMISSION (H): Primary | ICD-10-CM

## 2022-09-21 PROCEDURE — G0463 HOSPITAL OUTPT CLINIC VISIT: HCPCS | Mod: PN,RTG | Performed by: PHYSICIAN ASSISTANT

## 2022-09-21 PROCEDURE — 99215 OFFICE O/P EST HI 40 MIN: CPT | Mod: GT | Performed by: PHYSICIAN ASSISTANT

## 2022-09-21 NOTE — NURSING NOTE
Patient verified medications and allergies are correct via eCheck-in. Patient confirms no changes at this time and/or since last reviewed by clinic staff.    Pratima Patino, Virtual Facilitator

## 2022-09-21 NOTE — LETTER
"    9/21/2022         RE: Veda Marinelli  [id number] [facility] [id number]      Minerva is a 37 year old who is being evaluated via a billable video visit.      Patient stated she is in the state of [place] for the visit today.    How would you like to obtain your AVS? MyChart  If the video visit is dropped, the invitation should be resent by: Send to e-mail at: [email or web address]  Will anyone else be joining your video visit? No        Video-Visit Details    Video Start Time: 2:46 PM    Type of service:  Video Visit    Video End Time:3:02 PM    Originating Location (pt. Location): Home    Distant Location (provider location):  [facility]     Platform used for Video Visit: iSlvina Patino, Virtual Visit Facilitator      [facility]  Date of Visit: Sep 21, 2022   Oncologist: Dr. Ortiz Hill      REASON FOR VISIT:  acute myeloid leukemia (AML), hospital follow-up    HISTORY OF PRESENT ILLNESS:  Ms. Marinelli is a 37 year old woman with acute myeloid leukemia (AML).  To summarize her course, she was noted to have low WBC count on routine labs in 03/2022.  Follow up labs in 05/2022 showed further decrease in WBC as well as anemia and thrombocytopenia.  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  She has met with the BMT Team .  She was treated with cytarabine and daunorubicin \"7+3\" induction chemotherapy with midostaurin on Day 8-21.  Bone marrow biopsy around Day 21 was hypocellular but consistent with recovering bone marrow without definite AML.  Her course was complicated by recurrent C diff and upper extremity DVT.  She had prompt blood cell count recovery and was discharge with ongoing outpatient follow-up.  She just had a post-treatment bone marrow biopsy that showed 70-80% " cellular marrow with slightly increased blasts that are favored to represent robust regnerating marrow with molecular studies and NPM1 MRD PCR testing not detected.    Admitted 8/9-8/12 for Cycle 1 consolidation HiDAC with midostaurin (C1D1 = 8/9/22). Overall tolerated well with no complications. Received neulasta on 8/13.     Admitted 9/12-9/15 for Cycle 2 consolidation HiDAC with midostaurin (C1D1 = 9/12/22). Received neulasta on 9/16.     INTERVAL HISTORY:  Minerva presents for hospital follow-up visit today via video. She is doing well. She started midostaurin. Energy and appetie are fine. No nausea/vomiting. Normal bowel movements. Breathing is good. No CP or SOB. No fevers or chills. She just started her period and is bleeding a little more than usual. Otherwise no bleeding elsewhere. No new lumps or bumps. No weakness or neuropathy. ROS is otherwise negative and she denies acute complaints today.       KEY PAST MEDICAL HISTORY:  History of COVID-19, recurrent C diff, hypothyroidism    MEDICATIONS:  Current Outpatient Medications   Medication     acyclovir (ZOVIRAX) 400 MG tablet     apixaban ANTICOAGULANT (ELIQUIS) 5 MG tablet     Bacillus Coagulans-Inulin (PROBIOTIC) 1-250 BILLION-MG CAPS     dexamethasone (DECADRON) 4 MG tablet     fluconazole (DIFLUCAN) 200 MG tablet     levofloxacin (LEVAQUIN) 250 MG tablet     levothyroxine (SYNTHROID/LEVOTHROID) 88 MCG tablet     magnesium 250 MG tablet     midostaurin (RYDAPT) 25 MG capsule     Multiple Vitamins-Minerals (WOMENS MULTIVITAMIN) TABS     prednisoLONE acetate (PRED FORTE) 1 % ophthalmic suspension     Quercetin 50 MG TABS     vancomycin (VANCOCIN) 125 MG capsule     vitamin C (ASCORBIC ACID) 1000 MG TABS     vitamin D3 (CHOLECALCIFEROL) 50 mcg (2000 units) tablet     zinc gluconate 50 MG tablet     No current facility-administered medications for this visit.     Facility-Administered Medications Ordered in Other Visits   Medication     heparin 100 UNIT/ML  injection 5 mL     sodium chloride (PF) 0.9% PF flush 10 mL     SOCIAL HISTORY:  Was working as RN    Video physical exam  General: Patient appears well in no acute distress.   Skin: No visualized rash or lesions on visualized skin  Resp: Appears to be breathing comfortably without accessory muscle usage, speaking in full sentences, no cough  MSK: Appears to have normal range of motion based on visualized movements  Neurologic: No apparent tremors, facial movements symmetric  Psych: affect normal, alert and oriented       LABORATORY DATA: Reviewed by me.   Most Recent 3 CBC's:Recent Labs   Lab Test 09/20/22  1523 09/20/22  1003 09/15/22  0645 09/14/22  0548   WBC  --  0.3* 6.2 8.6   HGB  --  11.4* 10.9* 11.4*   MCV  --  99 101* 103*   PLT 43* 26* 137* 180     Most Recent 3 BMP's:Recent Labs   Lab Test 09/15/22  0645 09/14/22  0548 09/13/22  0705    140 136   POTASSIUM 3.9 3.9 4.0   CHLORIDE 109* 108* 104   CO2 21* 21* 20*   BUN 13.1 11.7 14.8   CR 0.49* 0.49* 0.50*   ANIONGAP 10 11 12   MICHAEL 8.8 9.1 9.6   * 136* 156*     Most Recent 2 LFT's:Recent Labs   Lab Test 09/15/22  0645 09/14/22  0548   AST 16 13   ALT 22 22   ALKPHOS 47 48   BILITOTAL 0.8 1.0       IMPRESSION AND PLAN:  Ms. Marinelli is a 37 year old woman with acute myeloid leukemia (AML).    Previously, Dr. Earl reviewed her course and post induction bone marrow biopsy that is consistent with a complete response - MRD negative.  Based on her workup that suggests a favorable risk profile for her AML and excellent response to induction chemotherapy, she was recommended to proceed with chemotherapy based on consolidation treatment with high-dose cytarabine (HiDAC) with midostaurin based on the RATIFY trial (N Engl J Med 2017;377:454-64.).  They discussed the decision to continue chemotherapy-based consolidation with 3-4 rounds of HiDAC and midostaurin will depend on testing for NPM1 mutation MRD after her second cycle of chemotherapy (1st  consolidation treatment).  If chemotherapy-based consolidation is pursued we would also plan for a year of maintenance midostaurin treatment after the completion of consolidation chemotherapy to minimize the risk of relapse.     Previously reviewed the typical schedule for HiDAC and midostaurin consolidation with HiDAC on Day 1-3 in the hospital and midostaurin on Days 8-21 of each consolidation cycle.  Previously discussed possible side effects and toxicities including but not limited to fatigue, alopecia, bone marrow suppression, infertility, allergic reaction, GI issues, infection, bleeding, damage to heart/lung/liver/kidneys, cystitis, rash.  While complications can be life threatening, the greatest risk is the AML.  Previously also discussed the possibility of poor response or relapse of disease despite the planned therapy and need to consider alternative treatments.      She recently underwent egg harvest procedure on 9/6 and tolerated well.     She was admitted 8/9-8/12/22 for Cycle 1 consolidation with HiDAC and midostaurin, started on 8/9/22. Tentatively planning for 3-4 cycles of consolidation HiDAC (D1-3) and midostaurin (D8-21), followed by maintenance midostaurin for 1 year. She had port placed on 8/9 with IR. NPM1 PCR mutation from blood on 9/12: not detected.   Can plan for bone marrow biopsy after she is done with consolidation chemotherapy.     She was recently admitted 9/12-9/15 for Cycle 2 consolidation with HiDAC and midostaurin, started on 9/12. Tolerated well. She continues on Midostaurin D8-21 (9/19-10/2). She is feeling well. Her labs are notable for pancytopenia, related to treatment. Reviewed neutropenic precautions. S/p neulasta on 9/16. Continue ppx with levaquin and fluc while neutropenic.   - Continue with 2x weekly labs and possible transfusion: parameters hgb >7, platelet >10 (or >20 if bleeding).     She has no active ID issues.  Her C diff resolved. She will continue acyclovir and  suppressive oral vancomycin twice daily as well as levofloxacin and fluconazole during periods of neutropenia.  On levo/fluc now since ANC <1.  Due to high cost with posa and vori, she was Rxed fluc.  Will need to watch qtc with fluc and midostaurin.  EKG from 7/2022 qtc 450.  We discussed the importance of following up to date local and federal health agency guidance for immunocompromised individuals regarding measures to reduce the risk of COVID-19.         # h/o COVID-19 (Oct 2021)  Pt is not vaccinated nor interested in being vaccinated. She was admitted from 10/1/2021 - 10/13/2021 for acute hypoxic respiratory failure from COVID-19 pneumonia.  Required IMC, high flow and intermittent CPAP. She was treated with dexamethasone, remdesivir, and baricitinib in addition to azithromycin and ceftriaxone. Recovered symptomatically.  - Covid neg 8/9  - Discussed andreinausheld and she is not interested at this time. She will let us know if she changes her mind.       # LUE DVT (PICC-associated)  LUE US (7/12) with nonocclusive DVT in the left axillary vein and one of the paired left brachial veins, superficial venous thrombosis in the left basilic vein. PICC-associated which was removed prior to discharge. Transitioned to apixaban as of 8/9 with plan to continue (per Dr. Earl) x 3 months (through ~10/13/22). It has been intermittently held for thrombocytopenia.   - Continue to monitor and hold if plts <50k. She held eliquis starting yesterday evening 9/20 for thrombocytopenia (plt 26). Continue to monitor.       We will keep her PCP Maria Eugenia Villar NP in the loop.      Admission for Cycle 3 consolidation HiDAC with midostaurin planned for 10/10.  I provided contact information for our clinic and advised that she call if questions, concerns, or new issues come up between visits.      Transition Care Management Services  Admission Date: 9/12/2022    Discharge Date: 9/15/2022    Discharge Diagnosis: AML, favorable risk  (FLT3-ITD low, NPM-1, IDH2 mutations)    Face-to-face visit date: 9/21/2022        Medical complexity decision making: High complexity ([id number])    45 minutes spent on the date of the encounter doing chart review, review of test results, interpretation of tests, patient visit and documentation     Angelica Kingsley PA-C  [facility]  [address] [place] [id number] [phone]

## 2022-09-22 ENCOUNTER — INFUSION THERAPY VISIT (OUTPATIENT)
Dept: INFUSION THERAPY | Facility: CLINIC | Age: 37
End: 2022-09-22
Attending: PHYSICIAN ASSISTANT
Payer: COMMERCIAL

## 2022-09-22 VITALS
TEMPERATURE: 98.5 F | DIASTOLIC BLOOD PRESSURE: 81 MMHG | SYSTOLIC BLOOD PRESSURE: 122 MMHG | RESPIRATION RATE: 16 BRPM | HEART RATE: 76 BPM

## 2022-09-22 DIAGNOSIS — C95.01 ACUTE LEUKEMIA IN REMISSION (H): Primary | ICD-10-CM

## 2022-09-22 LAB
BLD PROD TYP BPU: NORMAL
BLOOD COMPONENT TYPE: NORMAL
CODING SYSTEM: NORMAL
ERYTHROCYTE [DISTWIDTH] IN BLOOD BY AUTOMATED COUNT: 14.5 % (ref 10–15)
HCT VFR BLD AUTO: 27.5 % (ref 35–47)
HGB BLD-MCNC: 9.4 G/DL (ref 11.7–15.7)
HOLD SPECIMEN: NORMAL
ISSUE DATE AND TIME: NORMAL
MCH RBC QN AUTO: 33.3 PG (ref 26.5–33)
MCHC RBC AUTO-ENTMCNC: 34.2 G/DL (ref 31.5–36.5)
MCV RBC AUTO: 98 FL (ref 78–100)
PLATELET # BLD AUTO: 5 10E3/UL (ref 150–450)
RBC # BLD AUTO: 2.82 10E6/UL (ref 3.8–5.2)
UNIT ABO/RH: NORMAL
UNIT NUMBER: NORMAL
UNIT STATUS: NORMAL
UNIT TYPE ISBT: 600
WBC # BLD AUTO: 0.2 10E3/UL (ref 4–11)

## 2022-09-22 PROCEDURE — 250N000011 HC RX IP 250 OP 636: Performed by: PHYSICIAN ASSISTANT

## 2022-09-22 PROCEDURE — P9037 PLATE PHERES LEUKOREDU IRRAD: HCPCS | Performed by: PHYSICIAN ASSISTANT

## 2022-09-22 PROCEDURE — 85014 HEMATOCRIT: CPT | Performed by: PHYSICIAN ASSISTANT

## 2022-09-22 PROCEDURE — 36430 TRANSFUSION BLD/BLD COMPNT: CPT

## 2022-09-22 RX ORDER — HEPARIN SODIUM (PORCINE) LOCK FLUSH IV SOLN 100 UNIT/ML 100 UNIT/ML
5 SOLUTION INTRAVENOUS
Status: CANCELLED | OUTPATIENT
Start: 2022-09-22

## 2022-09-22 RX ORDER — HEPARIN SODIUM (PORCINE) LOCK FLUSH IV SOLN 100 UNIT/ML 100 UNIT/ML
5 SOLUTION INTRAVENOUS
Status: DISCONTINUED | OUTPATIENT
Start: 2022-09-22 | End: 2022-09-22 | Stop reason: HOSPADM

## 2022-09-22 RX ADMIN — Medication 5 ML: at 13:23

## 2022-09-22 NOTE — PROGRESS NOTES
Infusion Nursing Note:  Veda Marinelli presents today for Platelets.    Patient seen by provider today: No   present during visit today: Not Applicable.    Note: Per Dr. Devlin patient to receive 2 units of platelets due to platelet count of 5,000.  Orders updated.     Intravenous Access:  Implanted Port.    Treatment Conditions:  Lab Results   Component Value Date    HGB 9.4 (L) 09/22/2022    WBC 0.2 (LL) 09/22/2022    ANEU 6.1 09/15/2022    ANEUTAUTO 8.4 (H) 09/14/2022    PLT 5 (LL) 09/22/2022      Results reviewed, labs MET treatment parameters, ok to proceed with treatment.  Platelets 5,000    Post Infusion Assessment:  Patient tolerated infusion without incident.  Blood return noted pre and post infusion.  Site patent and intact, free from redness, edema or discomfort.  No evidence of extravasations.  Access discontinued per protocol.     Discharge Plan:   Patient discharged in stable condition accompanied by: self.  Departure Mode: Ambulatory.      Braden Lindo RN

## 2022-09-27 ENCOUNTER — LAB (OUTPATIENT)
Dept: INFUSION THERAPY | Facility: CLINIC | Age: 37
End: 2022-09-27
Attending: PHYSICIAN ASSISTANT
Payer: COMMERCIAL

## 2022-09-27 VITALS
RESPIRATION RATE: 14 BRPM | HEART RATE: 91 BPM | DIASTOLIC BLOOD PRESSURE: 72 MMHG | TEMPERATURE: 98.4 F | SYSTOLIC BLOOD PRESSURE: 130 MMHG

## 2022-09-27 DIAGNOSIS — C95.01 ACUTE LEUKEMIA IN REMISSION (H): Primary | ICD-10-CM

## 2022-09-27 DIAGNOSIS — C92.00 ACUTE MYELOID LEUKEMIA NOT HAVING ACHIEVED REMISSION (H): ICD-10-CM

## 2022-09-27 LAB
BASOPHILS # BLD MANUAL: 0 10E3/UL (ref 0–0.2)
BASOPHILS NFR BLD MANUAL: 0 %
BLD PROD TYP BPU: NORMAL
BLOOD COMPONENT TYPE: NORMAL
CODING SYSTEM: NORMAL
EOSINOPHIL # BLD MANUAL: 0 10E3/UL (ref 0–0.7)
EOSINOPHIL NFR BLD MANUAL: 0 %
ERYTHROCYTE [DISTWIDTH] IN BLOOD BY AUTOMATED COUNT: 14.4 % (ref 10–15)
HCT VFR BLD AUTO: 22.5 % (ref 35–47)
HGB BLD-MCNC: 7.7 G/DL (ref 11.7–15.7)
HOLD SPECIMEN: NORMAL
ISSUE DATE AND TIME: NORMAL
LYMPHOCYTES # BLD MANUAL: 1.3 10E3/UL (ref 0.8–5.3)
LYMPHOCYTES NFR BLD MANUAL: 15 %
MCH RBC QN AUTO: 33.5 PG (ref 26.5–33)
MCHC RBC AUTO-ENTMCNC: 34.2 G/DL (ref 31.5–36.5)
MCV RBC AUTO: 98 FL (ref 78–100)
METAMYELOCYTES # BLD MANUAL: 0.9 10E3/UL
METAMYELOCYTES NFR BLD MANUAL: 11 %
MONOCYTES # BLD MANUAL: 0.8 10E3/UL (ref 0–1.3)
MONOCYTES NFR BLD MANUAL: 10 %
MYELOCYTES # BLD MANUAL: 0.2 10E3/UL
MYELOCYTES NFR BLD MANUAL: 2 %
NEUTROPHILS # BLD MANUAL: 5.2 10E3/UL (ref 1.6–8.3)
NEUTROPHILS NFR BLD MANUAL: 62 %
PLAT MORPH BLD: ABNORMAL
PLATELET # BLD AUTO: 9 10E3/UL (ref 150–450)
RBC # BLD AUTO: 2.3 10E6/UL (ref 3.8–5.2)
RBC MORPH BLD: ABNORMAL
UNIT ABO/RH: NORMAL
UNIT NUMBER: NORMAL
UNIT STATUS: NORMAL
UNIT TYPE ISBT: 6200
WBC # BLD AUTO: 8.4 10E3/UL (ref 4–11)

## 2022-09-27 PROCEDURE — 85027 COMPLETE CBC AUTOMATED: CPT | Performed by: INTERNAL MEDICINE

## 2022-09-27 PROCEDURE — 36430 TRANSFUSION BLD/BLD COMPNT: CPT

## 2022-09-27 PROCEDURE — 250N000011 HC RX IP 250 OP 636: Performed by: PHYSICIAN ASSISTANT

## 2022-09-27 PROCEDURE — P9037 PLATE PHERES LEUKOREDU IRRAD: HCPCS | Performed by: PHYSICIAN ASSISTANT

## 2022-09-27 PROCEDURE — 85007 BL SMEAR W/DIFF WBC COUNT: CPT | Performed by: INTERNAL MEDICINE

## 2022-09-27 PROCEDURE — 36591 DRAW BLOOD OFF VENOUS DEVICE: CPT

## 2022-09-27 RX ORDER — HEPARIN SODIUM (PORCINE) LOCK FLUSH IV SOLN 100 UNIT/ML 100 UNIT/ML
5 SOLUTION INTRAVENOUS
Status: CANCELLED | OUTPATIENT
Start: 2022-09-27

## 2022-09-27 RX ORDER — HEPARIN SODIUM (PORCINE) LOCK FLUSH IV SOLN 100 UNIT/ML 100 UNIT/ML
5 SOLUTION INTRAVENOUS
Status: DISCONTINUED | OUTPATIENT
Start: 2022-09-27 | End: 2022-09-27 | Stop reason: HOSPADM

## 2022-09-27 RX ADMIN — Medication 5 ML: at 15:04

## 2022-09-27 NOTE — PROGRESS NOTES
Infusion Nursing Note:  Veda Marinelli presents today for possible transfusion(s).    Patient seen by provider today: No   present during visit today: Not Applicable.    Note: Minerva denies any active bleeding.    Intravenous Access:  Implanted Port.    Treatment Conditions:  Lab Results   Component Value Date    HGB 7.7 (L) 09/27/2022    WBC 8.4 09/27/2022    ANEU 5.2 09/27/2022    ANEUTAUTO 8.4 (H) 09/14/2022    PLT 9 (LL) 09/27/2022      Results reviewed, labs MET treatment parameters, ok to proceed with treatment. Platelet transfusion indicated.  Informed consent to receive blood products signed 7/18/2022    Post Infusion Assessment:  Patient tolerated infusion without incident.  Blood return noted pre and post infusion.  Site patent and intact, free from redness, edema or discomfort.  No evidence of extravasations.  Access discontinued per protocol.     Discharge Plan:   Patient discharged in stable condition accompanied by: self.  Departure Mode: Ambulatory.      Dulce Ace RN

## 2022-09-30 ENCOUNTER — INFUSION THERAPY VISIT (OUTPATIENT)
Dept: INFUSION THERAPY | Facility: CLINIC | Age: 37
End: 2022-09-30
Attending: PHYSICIAN ASSISTANT
Payer: COMMERCIAL

## 2022-09-30 ENCOUNTER — TELEPHONE (OUTPATIENT)
Dept: ONCOLOGY | Facility: CLINIC | Age: 37
End: 2022-09-30

## 2022-09-30 VITALS — TEMPERATURE: 98.9 F | SYSTOLIC BLOOD PRESSURE: 122 MMHG | HEART RATE: 96 BPM | DIASTOLIC BLOOD PRESSURE: 81 MMHG

## 2022-09-30 DIAGNOSIS — C95.01 ACUTE LEUKEMIA IN REMISSION (H): Primary | ICD-10-CM

## 2022-09-30 DIAGNOSIS — C92.00 ACUTE MYELOID LEUKEMIA NOT HAVING ACHIEVED REMISSION (H): ICD-10-CM

## 2022-09-30 LAB
BASOPHILS # BLD MANUAL: 0 10E3/UL (ref 0–0.2)
BASOPHILS NFR BLD MANUAL: 0 %
EOSINOPHIL # BLD MANUAL: 0 10E3/UL (ref 0–0.7)
EOSINOPHIL NFR BLD MANUAL: 0 %
ERYTHROCYTE [DISTWIDTH] IN BLOOD BY AUTOMATED COUNT: 14.5 % (ref 10–15)
HCT VFR BLD AUTO: 22.6 % (ref 35–47)
HGB BLD-MCNC: 7.6 G/DL (ref 11.7–15.7)
HOLD SPECIMEN: NORMAL
LYMPHOCYTES # BLD MANUAL: 1.2 10E3/UL (ref 0.8–5.3)
LYMPHOCYTES NFR BLD MANUAL: 8 %
MCH RBC QN AUTO: 33.3 PG (ref 26.5–33)
MCHC RBC AUTO-ENTMCNC: 33.6 G/DL (ref 31.5–36.5)
MCV RBC AUTO: 99 FL (ref 78–100)
METAMYELOCYTES # BLD MANUAL: 0.6 10E3/UL
METAMYELOCYTES NFR BLD MANUAL: 4 %
MONOCYTES # BLD MANUAL: 1.6 10E3/UL (ref 0–1.3)
MONOCYTES NFR BLD MANUAL: 11 %
MYELOCYTES # BLD MANUAL: 0.9 10E3/UL
MYELOCYTES NFR BLD MANUAL: 6 %
NEUTROPHILS # BLD MANUAL: 10.4 10E3/UL (ref 1.6–8.3)
NEUTROPHILS NFR BLD MANUAL: 71 %
NRBC # BLD AUTO: 0.1 10E3/UL
NRBC BLD MANUAL-RTO: 1 %
PLAT MORPH BLD: ABNORMAL
PLATELET # BLD AUTO: 115 10E3/UL (ref 150–450)
POLYCHROMASIA BLD QL SMEAR: SLIGHT
RBC # BLD AUTO: 2.28 10E6/UL (ref 3.8–5.2)
RBC MORPH BLD: ABNORMAL
WBC # BLD AUTO: 14.6 10E3/UL (ref 4–11)

## 2022-09-30 PROCEDURE — 85007 BL SMEAR W/DIFF WBC COUNT: CPT | Performed by: INTERNAL MEDICINE

## 2022-09-30 PROCEDURE — 85027 COMPLETE CBC AUTOMATED: CPT | Performed by: INTERNAL MEDICINE

## 2022-09-30 PROCEDURE — 36591 DRAW BLOOD OFF VENOUS DEVICE: CPT

## 2022-09-30 PROCEDURE — 250N000011 HC RX IP 250 OP 636

## 2022-09-30 RX ORDER — HEPARIN SODIUM (PORCINE) LOCK FLUSH IV SOLN 100 UNIT/ML 100 UNIT/ML
SOLUTION INTRAVENOUS
Status: COMPLETED
Start: 2022-09-30 | End: 2022-09-30

## 2022-09-30 RX ADMIN — Medication 500 UNITS: at 10:38

## 2022-09-30 NOTE — PROGRESS NOTES
Infusion Nursing Note:  Veda Marinelli presents today for possible blood transfusion   Patient seen by provider today: No   present during visit today: Not Applicable.    Note: N/A.    Intravenous Access:  Labs drawn without difficulty.  Implanted Port.    Treatment Conditions:  Lab Results   Component Value Date    HGB 7.6 (L) 09/30/2022    WBC 14.6 (H) 09/30/2022    ANEU 10.4 (H) 09/30/2022    ANEUTAUTO 8.4 (H) 09/14/2022     (L) 09/30/2022      Results reviewed, labs did NOT meet treatment parameters: Hemoglobin 7.6 and Platelets are 115 .    Post Infusion Assessment:  Patient tolerated port access and flush without incident.  Access discontinued per protocol.     Discharge Plan:   Patient discharged in stable condition accompanied by: self.  Departure Mode: Ambulatory.  Pt to return on 10/4/22 at 10:00 am for pac labs followed by possible blood transfusion.        Haley Funez RN

## 2022-09-30 NOTE — TELEPHONE ENCOUNTER
Oral Chemotherapy Monitoring Program    Primary Oncologist: Dr Ortiz Earl  Primary Oncology Clinic: [facility]  Cancer Diagnosis: AML    Therapy History:  Rydapt 50mg (2x25mg) PO daily; this cycle started 9/19/22.    Subjective/Objective:  Veda Marinelli is a 37 year old female contacted by phone for a follow-up visit for oral chemotherapy.  Patient says she is doing well with the Rydapt and reports no side effects.  She denies nausea, vomiting, mucositis and headache    ORAL CHEMOTHERAPY 7/26/2022 8/8/2022 8/23/2022 8/25/2022 9/9/2022 9/30/2022   Assessment Type Initial Work up New Teach Left Voicemail Initial Follow up Lab Monitoring Lab Monitoring;Monthly Follow up   Diagnosis Code Acute Myeloid Leukemia (AML) Acute Myeloid Leukemia (AML) Acute Myeloid Leukemia (AML) Acute Myeloid Leukemia (AML) Acute Myeloid Leukemia (AML) Acute Myeloid Leukemia (AML)   Providers Dr. Mady Earl   [facility]   Drug Name Rydapt (midostaurin) Rydapt (midostaurin) Rydapt (midostaurin) Rydapt (midostaurin) Rydapt (midostaurin) Rydapt (midostaurin)   Dose 50 mg 50 mg 50 mg 50 mg 50 mg 50 mg   Current Schedule Daily Daily Daily Daily Daily Daily   Cycle Details 2 weeks on, 2 weeks off 2 weeks on, 2 weeks off 2 weeks on, 2 weeks off Other Other Other   Start Date of Last Cycle - - 8/16/2022 8/16/2022 - 9/19/2022   Planned next cycle start date - 8/16/2022 - 9/13/2022 9/13/2022 -   Doses missed in last 2 weeks - - - 0 - 0   Adherence Assessment - - - Adherent - Adherent   Adverse Effects - - - No AE identified during assessment - No AE identified during assessment   Any new drug interactions? - - - No - -   Is the dose as ordered appropriate for the patient? - - - Yes Yes -       Vitals:  BP:   BP Readings from Last 1 Encounters:   09/30/22 122/81     Wt Readings from Last 1 Encounters:   09/15/22  "113.1 kg (249 lb 4.8 oz)     Estimated body surface area is 2.31 meters squared as calculated from the following:    Height as of 9/12/22: 1.702 m (5' 7\").    Weight as of 9/15/22: 113.1 kg (249 lb 4.8 oz).    Labs:  _  Result Component Current Result Ref Range   Sodium 140 (9/15/2022) 136 - 145 mmol/L     _  Result Component Current Result Ref Range   Potassium 3.9 (9/15/2022) 3.4 - 5.3 mmol/L     _  Result Component Current Result Ref Range   Calcium 8.8 (9/15/2022) 8.6 - 10.0 mg/dL     _  Result Component Current Result Ref Range   Magnesium 2.1 (9/15/2022) 1.7 - 2.3 mg/dL     _  Result Component Current Result Ref Range   Phosphorus 3.0 (9/15/2022) 2.5 - 4.5 mg/dL     _  Result Component Current Result Ref Range   Albumin 3.7 (9/15/2022) 3.5 - 5.2 g/dL     _  Result Component Current Result Ref Range   Urea Nitrogen 13.1 (9/15/2022) 6.0 - 20.0 mg/dL     _  Result Component Current Result Ref Range   Creatinine 0.49 (L) (9/15/2022) 0.51 - 0.95 mg/dL       _  Result Component Current Result Ref Range   AST 16 (9/15/2022) 10 - 35 U/L     _  Result Component Current Result Ref Range   ALT 22 (9/15/2022) 10 - 35 U/L     _  Result Component Current Result Ref Range   Bilirubin Total 0.8 (9/15/2022) <=1.2 mg/dL       _  Result Component Current Result Ref Range   WBC Count 14.6 (H) (9/30/2022) 4.0 - 11.0 10e3/uL     _  Result Component Current Result Ref Range   Hemoglobin 7.6 (L) (9/30/2022) 11.7 - 15.7 g/dL     _  Result Component Current Result Ref Range   Platelet Count 115 (L) (9/30/2022) 150 - 450 10e3/uL     _  Result Component Current Result Ref Range   Absolute Neutrophils 10.4 (H) (9/30/2022) 1.6 - 8.3 10e3/uL       Assessment:  Labs show no concerning abnormalities.  Patient is tolerating Rydapt well.    Plan:  No change needed at this time.    Dorcas Alonzo, PharmD, BCPS, [facility]  Oncology Clinical Pharmacy Specialist  [facility]/ [facility]  [phone]  "

## 2022-10-03 ENCOUNTER — HEALTH MAINTENANCE LETTER (OUTPATIENT)
Age: 37
End: 2022-10-03

## 2022-10-04 ENCOUNTER — LAB (OUTPATIENT)
Dept: INFUSION THERAPY | Facility: CLINIC | Age: 37
End: 2022-10-04
Attending: PHYSICIAN ASSISTANT
Payer: COMMERCIAL

## 2022-10-04 ENCOUNTER — MYC MEDICAL ADVICE (OUTPATIENT)
Dept: ONCOLOGY | Facility: CLINIC | Age: 37
End: 2022-10-04

## 2022-10-04 VITALS — TEMPERATURE: 98.4 F | HEART RATE: 86 BPM | DIASTOLIC BLOOD PRESSURE: 75 MMHG | SYSTOLIC BLOOD PRESSURE: 111 MMHG

## 2022-10-04 DIAGNOSIS — C92.00 ACUTE MYELOID LEUKEMIA NOT HAVING ACHIEVED REMISSION (H): Primary | ICD-10-CM

## 2022-10-04 DIAGNOSIS — C92.00 ACUTE MYELOID LEUKEMIA NOT HAVING ACHIEVED REMISSION (H): ICD-10-CM

## 2022-10-04 LAB
BASOPHILS # BLD MANUAL: 0 10E3/UL (ref 0–0.2)
BASOPHILS NFR BLD MANUAL: 0 %
EOSINOPHIL # BLD MANUAL: 0 10E3/UL (ref 0–0.7)
EOSINOPHIL NFR BLD MANUAL: 0 %
ERYTHROCYTE [DISTWIDTH] IN BLOOD BY AUTOMATED COUNT: 18.4 % (ref 10–15)
HCT VFR BLD AUTO: 23.2 % (ref 35–47)
HGB BLD-MCNC: 7.7 G/DL (ref 11.7–15.7)
HOLD SPECIMEN: NORMAL
LYMPHOCYTES # BLD MANUAL: 0.8 10E3/UL (ref 0.8–5.3)
LYMPHOCYTES NFR BLD MANUAL: 10 %
MCH RBC QN AUTO: 34.4 PG (ref 26.5–33)
MCHC RBC AUTO-ENTMCNC: 33.2 G/DL (ref 31.5–36.5)
MCV RBC AUTO: 104 FL (ref 78–100)
MONOCYTES # BLD MANUAL: 0.3 10E3/UL (ref 0–1.3)
MONOCYTES NFR BLD MANUAL: 4 %
NEUTROPHILS # BLD MANUAL: 7 10E3/UL (ref 1.6–8.3)
NEUTROPHILS NFR BLD MANUAL: 86 %
PLAT MORPH BLD: ABNORMAL
PLATELET # BLD AUTO: 216 10E3/UL (ref 150–450)
POLYCHROMASIA BLD QL SMEAR: SLIGHT
RBC # BLD AUTO: 2.24 10E6/UL (ref 3.8–5.2)
RBC MORPH BLD: ABNORMAL
WBC # BLD AUTO: 8.1 10E3/UL (ref 4–11)

## 2022-10-04 PROCEDURE — 85027 COMPLETE CBC AUTOMATED: CPT | Performed by: INTERNAL MEDICINE

## 2022-10-04 PROCEDURE — 36591 DRAW BLOOD OFF VENOUS DEVICE: CPT

## 2022-10-04 PROCEDURE — 85007 BL SMEAR W/DIFF WBC COUNT: CPT | Performed by: INTERNAL MEDICINE

## 2022-10-04 PROCEDURE — 250N000011 HC RX IP 250 OP 636: Performed by: PHYSICIAN ASSISTANT

## 2022-10-04 RX ORDER — HEPARIN SODIUM (PORCINE) LOCK FLUSH IV SOLN 100 UNIT/ML 100 UNIT/ML
500 SOLUTION INTRAVENOUS ONCE
Status: COMPLETED | OUTPATIENT
Start: 2022-10-04 | End: 2022-10-04

## 2022-10-04 RX ADMIN — Medication 500 UNITS: at 10:18

## 2022-10-04 NOTE — PROGRESS NOTES
Infusion Nursing Note:  Veda Marinelli presents today for possible PRBC's/Platelets.    Patient seen by provider today: No   present during visit today: Not Applicable.    Note: Pt requested that her Friday appt (10/7) be cancelled.     Intravenous Access:  Implanted Port.    Treatment Conditions:  Lab Results   Component Value Date    HGB 7.7 (L) 10/04/2022    WBC 8.1 10/04/2022    ANEU 10.4 (H) 09/30/2022    ANEUTAUTO 8.4 (H) 09/14/2022     10/04/2022      Results reviewed, labs did NOT meet treatment parameters:   Hgb 7.7  Platelets 216.    Post Infusion Assessment:  Blood return noted pre and post infusion.  Site patent and intact, free from redness, edema or discomfort.  No evidence of extravasations.  Access discontinued per protocol.     Discharge Plan:   Patient discharged in stable condition accompanied by: self.  Departure Mode: Ambulatory.      Braden Lindo RN

## 2022-10-04 NOTE — TELEPHONE ENCOUNTER
Oral Chemotherapy Monitoring Program  Lab Follow Up    Reviewed lab results from 10/4/22.    ORAL CHEMOTHERAPY 7/26/2022 8/8/2022 8/23/2022 8/25/2022 9/9/2022 9/30/2022 10/4/2022   Assessment Type Initial Work up New Teach Left Voicemail Initial Follow up Lab Monitoring Lab Monitoring;Monthly Follow up -   Diagnosis Code Acute Myeloid Leukemia (AML) Acute Myeloid Leukemia (AML) Acute Myeloid Leukemia (AML) Acute Myeloid Leukemia (AML) Acute Myeloid Leukemia (AML) Acute Myeloid Leukemia (AML) Acute Myeloid Leukemia (AML)   Providers Dr. Mady Earl   [facility]   Drug Name Rydapt (midostaurin) Rydapt (midostaurin) Rydapt (midostaurin) Rydapt (midostaurin) Rydapt (midostaurin) Rydapt (midostaurin) Rydapt (midostaurin)   Dose 50 mg 50 mg 50 mg 50 mg 50 mg 50 mg 50 mg   Current Schedule Daily Daily Daily Daily Daily Daily Daily   Cycle Details 2 weeks on, 2 weeks off 2 weeks on, 2 weeks off 2 weeks on, 2 weeks off Other Other Other Other   Start Date of Last Cycle - - 8/16/2022 8/16/2022 - 9/19/2022 -   Planned next cycle start date - 8/16/2022 - 9/13/2022 9/13/2022 - -   Doses missed in last 2 weeks - - - 0 - 0 -   Adherence Assessment - - - Adherent - Adherent -   Adverse Effects - - - No AE identified during assessment - No AE identified during assessment -   Any new drug interactions? - - - No - - -   Is the dose as ordered appropriate for the patient? - - - Yes Yes - -       Labs:  _  Result Component Current Result Ref Range   Sodium 140 (9/15/2022) 136 - 145 mmol/L     _  Result Component Current Result Ref Range   Potassium 3.9 (9/15/2022) 3.4 - 5.3 mmol/L     _  Result Component Current Result Ref Range   Calcium 8.8 (9/15/2022) 8.6 - 10.0 mg/dL     _  Result Component Current Result Ref Range   Magnesium 2.1 (9/15/2022) 1.7 - 2.3 mg/dL     _  Result Component Current  Result Ref Range   Phosphorus 3.0 (9/15/2022) 2.5 - 4.5 mg/dL     _  Result Component Current Result Ref Range   Albumin 3.7 (9/15/2022) 3.5 - 5.2 g/dL     _  Result Component Current Result Ref Range   Urea Nitrogen 13.1 (9/15/2022) 6.0 - 20.0 mg/dL     _  Result Component Current Result Ref Range   Creatinine 0.49 (L) (9/15/2022) 0.51 - 0.95 mg/dL     _  Result Component Current Result Ref Range   AST 16 (9/15/2022) 10 - 35 U/L     _  Result Component Current Result Ref Range   ALT 22 (9/15/2022) 10 - 35 U/L     _  Result Component Current Result Ref Range   Bilirubin Total 0.8 (9/15/2022) <=1.2 mg/dL     _  Result Component Current Result Ref Range   WBC Count 14.6 (H) (9/30/2022) 4.0 - 11.0 10e3/uL     _  Result Component Current Result Ref Range   Hemoglobin 7.6 (L) (9/30/2022) 11.7 - 15.7 g/dL     _  Result Component Current Result Ref Range   Platelet Count 115 (L) (9/30/2022) 150 - 450 10e3/uL     _  Result Component Current Result Ref Range   Absolute Neutrophils 10.4 (H) (9/30/2022) 1.6 - 8.3 10e3/uL     _  Result Component Current Result Ref Range   Absolute Neutrophils 8.4 (H) (9/14/2022) 1.6 - 8.3 10e3/uL        Assessment & Plan:  No new concerning abnormalities.    Hel[employer] message sent to patient.      Dorcas Alonzo, PharmD, BCPS, BCOP  Oncology Clinical Pharmacy Specialist  [facility]/ [facility]  [phone]

## 2022-10-10 ENCOUNTER — ONCOLOGY VISIT (OUTPATIENT)
Dept: ONCOLOGY | Facility: CLINIC | Age: 37
DRG: 838 | End: 2022-10-10
Attending: NURSE PRACTITIONER
Payer: COMMERCIAL

## 2022-10-10 ENCOUNTER — APPOINTMENT (OUTPATIENT)
Dept: LAB | Facility: CLINIC | Age: 37
DRG: 838 | End: 2022-10-10
Attending: INTERNAL MEDICINE
Payer: COMMERCIAL

## 2022-10-10 ENCOUNTER — HOSPITAL ENCOUNTER (INPATIENT)
Facility: CLINIC | Age: 37
LOS: 3 days | Discharge: HOME | DRG: 838 | End: 2022-10-13
Attending: INTERNAL MEDICINE | Admitting: INTERNAL MEDICINE
Payer: COMMERCIAL

## 2022-10-10 VITALS
TEMPERATURE: 97.8 F | DIASTOLIC BLOOD PRESSURE: 91 MMHG | WEIGHT: 254 LBS | RESPIRATION RATE: 16 BRPM | HEART RATE: 74 BPM | BODY MASS INDEX: 39.78 KG/M2 | OXYGEN SATURATION: 100 % | SYSTOLIC BLOOD PRESSURE: 138 MMHG

## 2022-10-10 DIAGNOSIS — C92.00 ACUTE MYELOID LEUKEMIA NOT HAVING ACHIEVED REMISSION (H): Primary | ICD-10-CM

## 2022-10-10 DIAGNOSIS — C92.01 ACUTE MYELOID LEUKEMIA IN REMISSION (H): Primary | ICD-10-CM

## 2022-10-10 DIAGNOSIS — C92.01 ACUTE MYELOID LEUKEMIA IN REMISSION (H): ICD-10-CM

## 2022-10-10 LAB
ALBUMIN SERPL BCG-MCNC: 4.1 G/DL (ref 3.5–5.2)
ALBUMIN SERPL BCG-MCNC: 4.4 G/DL (ref 3.5–5.2)
ALP SERPL-CCNC: 53 U/L (ref 35–104)
ALP SERPL-CCNC: 56 U/L (ref 35–104)
ALT SERPL W P-5'-P-CCNC: 26 U/L (ref 10–35)
ALT SERPL W P-5'-P-CCNC: 29 U/L (ref 10–35)
ANION GAP SERPL CALCULATED.3IONS-SCNC: 12 MMOL/L (ref 7–15)
ANION GAP SERPL CALCULATED.3IONS-SCNC: 12 MMOL/L (ref 7–15)
AST SERPL W P-5'-P-CCNC: 16 U/L (ref 10–35)
AST SERPL W P-5'-P-CCNC: 20 U/L (ref 10–35)
BASOPHILS # BLD AUTO: 0 10E3/UL (ref 0–0.2)
BASOPHILS # BLD AUTO: 0 10E3/UL (ref 0–0.2)
BASOPHILS NFR BLD AUTO: 0 %
BASOPHILS NFR BLD AUTO: 1 %
BILIRUB SERPL-MCNC: 0.5 MG/DL
BILIRUB SERPL-MCNC: 0.5 MG/DL
BUN SERPL-MCNC: 15.2 MG/DL (ref 6–20)
BUN SERPL-MCNC: 15.6 MG/DL (ref 6–20)
CALCIUM SERPL-MCNC: 10.5 MG/DL (ref 8.6–10)
CALCIUM SERPL-MCNC: 9.9 MG/DL (ref 8.6–10)
CHLORIDE SERPL-SCNC: 103 MMOL/L (ref 98–107)
CHLORIDE SERPL-SCNC: 105 MMOL/L (ref 98–107)
CREAT SERPL-MCNC: 0.65 MG/DL (ref 0.51–0.95)
CREAT SERPL-MCNC: 0.76 MG/DL (ref 0.51–0.95)
DEPRECATED HCO3 PLAS-SCNC: 21 MMOL/L (ref 22–29)
DEPRECATED HCO3 PLAS-SCNC: 23 MMOL/L (ref 22–29)
EOSINOPHIL # BLD AUTO: 0 10E3/UL (ref 0–0.7)
EOSINOPHIL # BLD AUTO: 0 10E3/UL (ref 0–0.7)
EOSINOPHIL NFR BLD AUTO: 0 %
EOSINOPHIL NFR BLD AUTO: 0 %
ERYTHROCYTE [DISTWIDTH] IN BLOOD BY AUTOMATED COUNT: 21.1 % (ref 10–15)
ERYTHROCYTE [DISTWIDTH] IN BLOOD BY AUTOMATED COUNT: 21.3 % (ref 10–15)
FIBRINOGEN PPP-MCNC: 420 MG/DL (ref 170–490)
GFR SERPL CREATININE-BSD FRML MDRD: >90 ML/MIN/1.73M2
GFR SERPL CREATININE-BSD FRML MDRD: >90 ML/MIN/1.73M2
GLUCOSE SERPL-MCNC: 107 MG/DL (ref 70–99)
GLUCOSE SERPL-MCNC: 98 MG/DL (ref 70–99)
HCT VFR BLD AUTO: 29.9 % (ref 35–47)
HCT VFR BLD AUTO: 30.2 % (ref 35–47)
HGB BLD-MCNC: 10 G/DL (ref 11.7–15.7)
HGB BLD-MCNC: 9.7 G/DL (ref 11.7–15.7)
IMM GRANULOCYTES # BLD: 0 10E3/UL
IMM GRANULOCYTES # BLD: 0 10E3/UL
IMM GRANULOCYTES NFR BLD: 0 %
IMM GRANULOCYTES NFR BLD: 1 %
INR PPP: 1.14 (ref 0.85–1.15)
LDH SERPL L TO P-CCNC: 242 U/L (ref 0–250)
LDH SERPL L TO P-CCNC: 249 U/L (ref 0–250)
LYMPHOCYTES # BLD AUTO: 0.3 10E3/UL (ref 0.8–5.3)
LYMPHOCYTES # BLD AUTO: 0.5 10E3/UL (ref 0.8–5.3)
LYMPHOCYTES NFR BLD AUTO: 11 %
LYMPHOCYTES NFR BLD AUTO: 6 %
MAGNESIUM SERPL-MCNC: 1.9 MG/DL (ref 1.7–2.3)
MCH RBC QN AUTO: 34.6 PG (ref 26.5–33)
MCH RBC QN AUTO: 35 PG (ref 26.5–33)
MCHC RBC AUTO-ENTMCNC: 32.4 G/DL (ref 31.5–36.5)
MCHC RBC AUTO-ENTMCNC: 33.1 G/DL (ref 31.5–36.5)
MCV RBC AUTO: 106 FL (ref 78–100)
MCV RBC AUTO: 107 FL (ref 78–100)
MONOCYTES # BLD AUTO: 0.6 10E3/UL (ref 0–1.3)
MONOCYTES # BLD AUTO: 0.6 10E3/UL (ref 0–1.3)
MONOCYTES NFR BLD AUTO: 11 %
MONOCYTES NFR BLD AUTO: 14 %
NEUTROPHILS # BLD AUTO: 3.2 10E3/UL (ref 1.6–8.3)
NEUTROPHILS # BLD AUTO: 4.9 10E3/UL (ref 1.6–8.3)
NEUTROPHILS NFR BLD AUTO: 73 %
NEUTROPHILS NFR BLD AUTO: 83 %
NRBC # BLD AUTO: 0 10E3/UL
NRBC # BLD AUTO: 0 10E3/UL
NRBC BLD AUTO-RTO: 0 /100
NRBC BLD AUTO-RTO: 0 /100
PHOSPHATE SERPL-MCNC: 3.6 MG/DL (ref 2.5–4.5)
PLATELET # BLD AUTO: 236 10E3/UL (ref 150–450)
PLATELET # BLD AUTO: 241 10E3/UL (ref 150–450)
POTASSIUM SERPL-SCNC: 3.8 MMOL/L (ref 3.4–5.3)
POTASSIUM SERPL-SCNC: 4 MMOL/L (ref 3.4–5.3)
PROT SERPL-MCNC: 6.6 G/DL (ref 6.4–8.3)
PROT SERPL-MCNC: 7.3 G/DL (ref 6.4–8.3)
RBC # BLD AUTO: 2.8 10E6/UL (ref 3.8–5.2)
RBC # BLD AUTO: 2.86 10E6/UL (ref 3.8–5.2)
SARS-COV-2 RNA RESP QL NAA+PROBE: NEGATIVE
SODIUM SERPL-SCNC: 136 MMOL/L (ref 136–145)
SODIUM SERPL-SCNC: 140 MMOL/L (ref 136–145)
URATE SERPL-MCNC: 5 MG/DL (ref 2.4–5.7)
WBC # BLD AUTO: 4.4 10E3/UL (ref 4–11)
WBC # BLD AUTO: 5.9 10E3/UL (ref 4–11)

## 2022-10-10 PROCEDURE — 85384 FIBRINOGEN ACTIVITY: CPT | Performed by: PHYSICIAN ASSISTANT

## 2022-10-10 PROCEDURE — 250N000011 HC RX IP 250 OP 636: Performed by: NURSE PRACTITIONER

## 2022-10-10 PROCEDURE — 36591 DRAW BLOOD OFF VENOUS DEVICE: CPT | Performed by: NURSE PRACTITIONER

## 2022-10-10 PROCEDURE — 83735 ASSAY OF MAGNESIUM: CPT | Performed by: PHYSICIAN ASSISTANT

## 2022-10-10 PROCEDURE — G0463 HOSPITAL OUTPT CLINIC VISIT: HCPCS

## 2022-10-10 PROCEDURE — 83615 LACTATE (LD) (LDH) ENZYME: CPT | Performed by: PHYSICIAN ASSISTANT

## 2022-10-10 PROCEDURE — 85610 PROTHROMBIN TIME: CPT | Performed by: PHYSICIAN ASSISTANT

## 2022-10-10 PROCEDURE — 84100 ASSAY OF PHOSPHORUS: CPT | Performed by: PHYSICIAN ASSISTANT

## 2022-10-10 PROCEDURE — 258N000003 HC RX IP 258 OP 636: Performed by: NURSE PRACTITIONER

## 2022-10-10 PROCEDURE — 85014 HEMATOCRIT: CPT | Performed by: PHYSICIAN ASSISTANT

## 2022-10-10 PROCEDURE — 36591 DRAW BLOOD OFF VENOUS DEVICE: CPT | Performed by: PHYSICIAN ASSISTANT

## 2022-10-10 PROCEDURE — 99223 1ST HOSP IP/OBS HIGH 75: CPT | Performed by: INTERNAL MEDICINE

## 2022-10-10 PROCEDURE — 99222 1ST HOSP IP/OBS MODERATE 55: CPT | Mod: AI | Performed by: INTERNAL MEDICINE

## 2022-10-10 PROCEDURE — U0005 INFEC AGEN DETEC AMPLI PROBE: HCPCS | Performed by: PHYSICIAN ASSISTANT

## 2022-10-10 PROCEDURE — 99214 OFFICE O/P EST MOD 30 MIN: CPT | Performed by: NURSE PRACTITIONER

## 2022-10-10 PROCEDURE — 85014 HEMATOCRIT: CPT | Performed by: NURSE PRACTITIONER

## 2022-10-10 PROCEDURE — 250N000011 HC RX IP 250 OP 636: Performed by: PHYSICIAN ASSISTANT

## 2022-10-10 PROCEDURE — 120N000002 HC R&B MED SURG/OB UMMC

## 2022-10-10 PROCEDURE — 80053 COMPREHEN METABOLIC PANEL: CPT | Performed by: NURSE PRACTITIONER

## 2022-10-10 PROCEDURE — 85041 AUTOMATED RBC COUNT: CPT | Performed by: PHYSICIAN ASSISTANT

## 2022-10-10 PROCEDURE — 99207 PR SC NO CHARGE VISIT: CPT | Performed by: INTERNAL MEDICINE

## 2022-10-10 PROCEDURE — 3E04305 INTRODUCTION OF OTHER ANTINEOPLASTIC INTO CENTRAL VEIN, PERCUTANEOUS APPROACH: ICD-10-PCS | Performed by: STUDENT IN AN ORGANIZED HEALTH CARE EDUCATION/TRAINING PROGRAM

## 2022-10-10 PROCEDURE — 84155 ASSAY OF PROTEIN SERUM: CPT | Performed by: PHYSICIAN ASSISTANT

## 2022-10-10 PROCEDURE — 250N000009 HC RX 250: Performed by: NURSE PRACTITIONER

## 2022-10-10 PROCEDURE — 84550 ASSAY OF BLOOD/URIC ACID: CPT | Performed by: PHYSICIAN ASSISTANT

## 2022-10-10 PROCEDURE — 84450 TRANSFERASE (AST) (SGOT): CPT | Performed by: PHYSICIAN ASSISTANT

## 2022-10-10 PROCEDURE — 250N000013 HC RX MED GY IP 250 OP 250 PS 637: Performed by: PHYSICIAN ASSISTANT

## 2022-10-10 PROCEDURE — 83615 LACTATE (LD) (LDH) ENZYME: CPT | Performed by: NURSE PRACTITIONER

## 2022-10-10 RX ORDER — CALCIUM CARBONATE 500 MG/1
500 TABLET, CHEWABLE ORAL 3 TIMES DAILY PRN
Status: DISCONTINUED | OUTPATIENT
Start: 2022-10-10 | End: 2022-10-13 | Stop reason: HOSPADM

## 2022-10-10 RX ORDER — DIPHENHYDRAMINE HYDROCHLORIDE 50 MG/ML
50 INJECTION INTRAMUSCULAR; INTRAVENOUS
Status: DISCONTINUED | OUTPATIENT
Start: 2022-10-10 | End: 2022-10-13 | Stop reason: HOSPADM

## 2022-10-10 RX ORDER — PROCHLORPERAZINE MALEATE 10 MG
10 TABLET ORAL EVERY 6 HOURS PRN
Status: CANCELLED
Start: 2022-10-10

## 2022-10-10 RX ORDER — ALBUTEROL SULFATE 0.83 MG/ML
2.5 SOLUTION RESPIRATORY (INHALATION)
Status: DISCONTINUED | OUTPATIENT
Start: 2022-10-10 | End: 2022-10-13 | Stop reason: HOSPADM

## 2022-10-10 RX ORDER — PREDNISOLONE ACETATE 10 MG/ML
2 SUSPENSION/ DROPS OPHTHALMIC 4 TIMES DAILY
Status: DISCONTINUED | OUTPATIENT
Start: 2022-10-10 | End: 2022-10-13 | Stop reason: HOSPADM

## 2022-10-10 RX ORDER — ZINC SULFATE 50(220)MG
220 CAPSULE ORAL
Status: DISCONTINUED | OUTPATIENT
Start: 2022-10-11 | End: 2022-10-13 | Stop reason: HOSPADM

## 2022-10-10 RX ORDER — ONDANSETRON 2 MG/ML
4-8 INJECTION INTRAMUSCULAR; INTRAVENOUS EVERY 8 HOURS PRN
Status: DISCONTINUED | OUTPATIENT
Start: 2022-10-10 | End: 2022-10-13 | Stop reason: HOSPADM

## 2022-10-10 RX ORDER — ONDANSETRON 4 MG/1
4-8 TABLET, ORALLY DISINTEGRATING ORAL EVERY 8 HOURS PRN
Status: DISCONTINUED | OUTPATIENT
Start: 2022-10-10 | End: 2022-10-13 | Stop reason: HOSPADM

## 2022-10-10 RX ORDER — ASCORBIC ACID 500 MG
2000 TABLET ORAL DAILY
Status: DISCONTINUED | OUTPATIENT
Start: 2022-10-11 | End: 2022-10-13 | Stop reason: HOSPADM

## 2022-10-10 RX ORDER — LORAZEPAM 0.5 MG/1
.5-1 TABLET ORAL EVERY 6 HOURS PRN
Status: CANCELLED
Start: 2022-10-10

## 2022-10-10 RX ORDER — PROCHLORPERAZINE MALEATE 10 MG
10 TABLET ORAL EVERY 6 HOURS PRN
Status: DISCONTINUED | OUTPATIENT
Start: 2022-10-10 | End: 2022-10-10

## 2022-10-10 RX ORDER — ACETAMINOPHEN 325 MG/1
650 TABLET ORAL EVERY 4 HOURS PRN
Status: DISCONTINUED | OUTPATIENT
Start: 2022-10-10 | End: 2022-10-13 | Stop reason: HOSPADM

## 2022-10-10 RX ORDER — MULTIPLE VITAMINS W/ MINERALS TAB 9MG-400MCG
1 TAB ORAL
Status: DISCONTINUED | OUTPATIENT
Start: 2022-10-11 | End: 2022-10-13 | Stop reason: HOSPADM

## 2022-10-10 RX ORDER — ALBUTEROL SULFATE 0.83 MG/ML
2.5 SOLUTION RESPIRATORY (INHALATION)
Status: CANCELLED | OUTPATIENT
Start: 2022-10-10

## 2022-10-10 RX ORDER — ONDANSETRON 8 MG/1
8 TABLET, FILM COATED ORAL EVERY 12 HOURS
Status: COMPLETED | OUTPATIENT
Start: 2022-10-10 | End: 2022-10-13

## 2022-10-10 RX ORDER — HEPARIN SODIUM (PORCINE) LOCK FLUSH IV SOLN 100 UNIT/ML 100 UNIT/ML
5-10 SOLUTION INTRAVENOUS
Status: DISCONTINUED | OUTPATIENT
Start: 2022-10-10 | End: 2022-10-13 | Stop reason: HOSPADM

## 2022-10-10 RX ORDER — ACYCLOVIR 400 MG/1
400 TABLET ORAL 2 TIMES DAILY
Status: DISCONTINUED | OUTPATIENT
Start: 2022-10-10 | End: 2022-10-13 | Stop reason: HOSPADM

## 2022-10-10 RX ORDER — MEPERIDINE HYDROCHLORIDE 25 MG/ML
25 INJECTION INTRAMUSCULAR; INTRAVENOUS; SUBCUTANEOUS EVERY 30 MIN PRN
Status: CANCELLED | OUTPATIENT
Start: 2022-10-10

## 2022-10-10 RX ORDER — LEVOTHYROXINE SODIUM 88 UG/1
88 TABLET ORAL
Status: DISCONTINUED | OUTPATIENT
Start: 2022-10-11 | End: 2022-10-13 | Stop reason: HOSPADM

## 2022-10-10 RX ORDER — MEPERIDINE HYDROCHLORIDE 25 MG/ML
25 INJECTION INTRAMUSCULAR; INTRAVENOUS; SUBCUTANEOUS EVERY 30 MIN PRN
Status: DISCONTINUED | OUTPATIENT
Start: 2022-10-10 | End: 2022-10-13 | Stop reason: HOSPADM

## 2022-10-10 RX ORDER — ACYCLOVIR 400 MG/1
400 TABLET ORAL 2 TIMES DAILY
Qty: 60 TABLET | Refills: 3 | Status: SHIPPED | OUTPATIENT
Start: 2022-10-10 | End: 2023-01-10

## 2022-10-10 RX ORDER — EPINEPHRINE 1 MG/ML
0.3 INJECTION, SOLUTION, CONCENTRATE INTRAVENOUS EVERY 5 MIN PRN
Status: DISCONTINUED | OUTPATIENT
Start: 2022-10-10 | End: 2022-10-13 | Stop reason: HOSPADM

## 2022-10-10 RX ORDER — LORAZEPAM 2 MG/ML
.5-1 INJECTION INTRAMUSCULAR EVERY 6 HOURS PRN
Status: DISCONTINUED | OUTPATIENT
Start: 2022-10-10 | End: 2022-10-13 | Stop reason: HOSPADM

## 2022-10-10 RX ORDER — ALBUTEROL SULFATE 90 UG/1
1-2 AEROSOL, METERED RESPIRATORY (INHALATION)
Status: CANCELLED
Start: 2022-10-10

## 2022-10-10 RX ORDER — LANOLIN ALCOHOL/MO/W.PET/CERES
3-6 CREAM (GRAM) TOPICAL
Status: DISCONTINUED | OUTPATIENT
Start: 2022-10-10 | End: 2022-10-13 | Stop reason: HOSPADM

## 2022-10-10 RX ORDER — HEPARIN SODIUM (PORCINE) LOCK FLUSH IV SOLN 100 UNIT/ML 100 UNIT/ML
5 SOLUTION INTRAVENOUS ONCE
Status: COMPLETED | OUTPATIENT
Start: 2022-10-10 | End: 2022-10-10

## 2022-10-10 RX ORDER — METHYLPREDNISOLONE SODIUM SUCCINATE 125 MG/2ML
125 INJECTION, POWDER, LYOPHILIZED, FOR SOLUTION INTRAMUSCULAR; INTRAVENOUS
Status: DISCONTINUED | OUTPATIENT
Start: 2022-10-10 | End: 2022-10-13 | Stop reason: HOSPADM

## 2022-10-10 RX ORDER — METHYLPREDNISOLONE SODIUM SUCCINATE 125 MG/2ML
125 INJECTION, POWDER, LYOPHILIZED, FOR SOLUTION INTRAMUSCULAR; INTRAVENOUS
Status: CANCELLED
Start: 2022-10-10

## 2022-10-10 RX ORDER — AMOXICILLIN 250 MG
2 CAPSULE ORAL 2 TIMES DAILY PRN
Status: DISCONTINUED | OUTPATIENT
Start: 2022-10-10 | End: 2022-10-13 | Stop reason: HOSPADM

## 2022-10-10 RX ORDER — DEXAMETHASONE 4 MG/1
8 TABLET ORAL EVERY MORNING
Status: CANCELLED
Start: 2022-10-13

## 2022-10-10 RX ORDER — ALBUTEROL SULFATE 90 UG/1
1-2 AEROSOL, METERED RESPIRATORY (INHALATION)
Status: DISCONTINUED | OUTPATIENT
Start: 2022-10-10 | End: 2022-10-13 | Stop reason: HOSPADM

## 2022-10-10 RX ORDER — LORAZEPAM 0.5 MG/1
.5-1 TABLET ORAL EVERY 6 HOURS PRN
Status: DISCONTINUED | OUTPATIENT
Start: 2022-10-10 | End: 2022-10-13 | Stop reason: HOSPADM

## 2022-10-10 RX ORDER — DIPHENHYDRAMINE HYDROCHLORIDE 50 MG/ML
50 INJECTION INTRAMUSCULAR; INTRAVENOUS
Status: CANCELLED
Start: 2022-10-10

## 2022-10-10 RX ORDER — HEPARIN SODIUM,PORCINE 10 UNIT/ML
5-10 VIAL (ML) INTRAVENOUS EVERY 24 HOURS
Status: DISCONTINUED | OUTPATIENT
Start: 2022-10-10 | End: 2022-10-11 | Stop reason: CLARIF

## 2022-10-10 RX ORDER — ONDANSETRON 8 MG/1
8 TABLET, FILM COATED ORAL EVERY 12 HOURS
Status: CANCELLED
Start: 2022-10-10

## 2022-10-10 RX ORDER — AMOXICILLIN 250 MG
1 CAPSULE ORAL 2 TIMES DAILY PRN
Status: DISCONTINUED | OUTPATIENT
Start: 2022-10-10 | End: 2022-10-13 | Stop reason: HOSPADM

## 2022-10-10 RX ORDER — PREDNISOLONE ACETATE 10 MG/ML
2 SUSPENSION/ DROPS OPHTHALMIC 4 TIMES DAILY
Status: CANCELLED | OUTPATIENT
Start: 2022-10-10

## 2022-10-10 RX ORDER — DEXAMETHASONE 4 MG/1
12 TABLET ORAL EVERY 24 HOURS
Status: COMPLETED | OUTPATIENT
Start: 2022-10-10 | End: 2022-10-12

## 2022-10-10 RX ORDER — LORAZEPAM 2 MG/ML
.5-1 INJECTION INTRAMUSCULAR EVERY 6 HOURS PRN
Status: CANCELLED | OUTPATIENT
Start: 2022-10-10

## 2022-10-10 RX ORDER — DEXAMETHASONE 4 MG/1
8 TABLET ORAL EVERY MORNING
Status: DISCONTINUED | OUTPATIENT
Start: 2022-10-13 | End: 2022-10-13 | Stop reason: HOSPADM

## 2022-10-10 RX ORDER — HEPARIN SODIUM,PORCINE 10 UNIT/ML
5-10 VIAL (ML) INTRAVENOUS
Status: DISCONTINUED | OUTPATIENT
Start: 2022-10-10 | End: 2022-10-13 | Stop reason: HOSPADM

## 2022-10-10 RX ORDER — POLYETHYLENE GLYCOL 3350 17 G/17G
17 POWDER, FOR SOLUTION ORAL DAILY PRN
Status: DISCONTINUED | OUTPATIENT
Start: 2022-10-10 | End: 2022-10-13 | Stop reason: HOSPADM

## 2022-10-10 RX ORDER — ONDANSETRON 4 MG/1
4-8 TABLET, FILM COATED ORAL EVERY 8 HOURS PRN
Status: DISCONTINUED | OUTPATIENT
Start: 2022-10-10 | End: 2022-10-13 | Stop reason: HOSPADM

## 2022-10-10 RX ORDER — PROCHLORPERAZINE MALEATE 5 MG
5-10 TABLET ORAL EVERY 6 HOURS PRN
Status: DISCONTINUED | OUTPATIENT
Start: 2022-10-10 | End: 2022-10-13 | Stop reason: HOSPADM

## 2022-10-10 RX ORDER — VANCOMYCIN HYDROCHLORIDE 125 MG/1
125 CAPSULE ORAL 2 TIMES DAILY
Status: DISCONTINUED | OUTPATIENT
Start: 2022-10-10 | End: 2022-10-13 | Stop reason: HOSPADM

## 2022-10-10 RX ORDER — DEXAMETHASONE 4 MG/1
12 TABLET ORAL EVERY 24 HOURS
Status: CANCELLED
Start: 2022-10-10

## 2022-10-10 RX ORDER — EPINEPHRINE 1 MG/ML
0.3 INJECTION, SOLUTION INTRAMUSCULAR; SUBCUTANEOUS EVERY 5 MIN PRN
Status: CANCELLED | OUTPATIENT
Start: 2022-10-10

## 2022-10-10 RX ORDER — VITAMIN B COMPLEX
100 TABLET ORAL DAILY
Status: DISCONTINUED | OUTPATIENT
Start: 2022-10-11 | End: 2022-10-13 | Stop reason: HOSPADM

## 2022-10-10 RX ADMIN — CYTARABINE 6930 MG: 100 INJECTION, SOLUTION INTRATHECAL; INTRAVENOUS; SUBCUTANEOUS at 17:28

## 2022-10-10 RX ADMIN — PREDNISOLONE ACETATE 2 DROP: 10 SUSPENSION/ DROPS OPHTHALMIC at 16:40

## 2022-10-10 RX ADMIN — PREDNISOLONE ACETATE 2 DROP: 10 SUSPENSION/ DROPS OPHTHALMIC at 20:26

## 2022-10-10 RX ADMIN — ONDANSETRON HYDROCHLORIDE 8 MG: 8 TABLET, FILM COATED ORAL at 16:40

## 2022-10-10 RX ADMIN — SODIUM CHLORIDE, PRESERVATIVE FREE 5 ML: 5 INJECTION INTRAVENOUS at 13:42

## 2022-10-10 RX ADMIN — ACYCLOVIR 400 MG: 400 TABLET ORAL at 20:26

## 2022-10-10 RX ADMIN — DEXAMETHASONE 12 MG: 4 TABLET ORAL at 16:40

## 2022-10-10 RX ADMIN — VANCOMYCIN HYDROCHLORIDE 125 MG: 125 CAPSULE ORAL at 20:26

## 2022-10-10 RX ADMIN — Medication 5 ML: at 11:47

## 2022-10-10 RX ADMIN — APIXABAN 5 MG: 5 TABLET, FILM COATED ORAL at 20:26

## 2022-10-10 ASSESSMENT — ACTIVITIES OF DAILY LIVING (ADL)
ADLS_ACUITY_SCORE: 18
ADLS_ACUITY_SCORE: 35
ADLS_ACUITY_SCORE: 18
ADLS_ACUITY_SCORE: 18

## 2022-10-10 ASSESSMENT — PAIN SCALES - GENERAL: PAINLEVEL: NO PAIN (0)

## 2022-10-10 NOTE — PLAN OF CARE
"[id number]    /75 (BP Location: Right arm)   Pulse 82   Temp 98.8  F (37.1  C) (Oral)   Resp 18   Ht 1.69 m (5' 6.54\")   Wt 114.5 kg (252 lb 8 oz)   SpO2 100%   BMI 40.10 kg/m      Dose #1 of Cytarabine started to infuse via port. Patient was okay with early start time of next dose (at 0530).     VSS. Afebrile. Denies pain, nausea and SOB. Had a BM at home. UpAdLib. Able to make needs known. Continue with POC.         "

## 2022-10-10 NOTE — PROGRESS NOTES
Nursing Focus: Admission    D: Patient admitted from home via car for Consolidation Chemotherapy.      I: Upon arrival to the unit patient was oriented to room, unit, and call light. Patient s height, weight, and vital signs were obtained. Allergies reviewed and allergy band applied. MD notified of patient s arrival on the unit. Adult AVS completed. Head to toe assessment completed. Education assessment completed. Care plan initiated.     A: Vital signs stable upon admission. Patient rates pain at 0. Declined skin check, reports her skin is fine and there are no significant findings.      P: Continue to monitor patient s status and intervene as needed. Continue with plan of care. Notify MD with any concerns or changes in patient status.

## 2022-10-10 NOTE — NURSING NOTE
Chief Complaint   Patient presents with     Port Draw     Labs drawn by RN via port, vitals taken.     Port accessed with 20 gauge 3/4 inch flat needle by RN, labs collected, line flushed with saline and heparin.  Vitals taken. Pt checked in for appointment(s).    Hazel Dumont RN

## 2022-10-10 NOTE — PROGRESS NOTES
[facility]  Date of visit: 10/10/22      Reason for Visit: follow up AML--favorable risk      Oncology HPI:   Follows with Dr. Earl. Presented to routine exam with cytopenias. BMBx 6/17/2022 - hypercellular marrow (85-95% cellularity) with 92% blasts. 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 Started induction chemotherapy with 7+3 and midostaurin on 6/22/2022. D21 BMBx (7/12) with robust marrow regeneration, repeat BMBx 7/22 with 70-80% cellularity and 6% blasts by morphology consisted with complete response per Dr. Earl. NGS with no detectable mutations (MRD negative). BMT consulted 7/15; recommend consolidation chemotherapy, could consider BMT in the future if relapses. Tentatively planning for 3-4 cycles consolidation HiDAC (D1-3) and midostaurin (D8-21), followed by maintenance midostaurin for 1 year. Admitted for Cycle 1 consolidation with HiDAC and midostaurin on 8/9/22 which was tolerated well without any concerns or complications.     C2 HiDAC + midastaurin, D1 = 9/12/2022  C3 HiDAC + midastaurin, D1 = 10/10/2022      Interval history:   Minerva is feeling well. No complaints. No infectious concerns. No bleeding. She is eating well. No recurrent diarrhea. Energy is stable. Expresses no concerns today.       Current Outpatient Medications   Medication Sig Dispense Refill     acyclovir (ZOVIRAX) 400 MG tablet Take 1 tablet (400 mg) by mouth 2 times daily 60 tablet 0     apixaban ANTICOAGULANT (ELIQUIS) 5 MG tablet Take 1 tablet (5 mg) by mouth 2 times daily (Hold when instructed by outpatient team) 60 tablet 2     Bacillus Coagulans-Inulin (PROBIOTIC) 1-250 BILLION-MG CAPS Take 1 capsule by mouth daily       dexamethasone (DECADRON) 4 MG tablet Take 2 tablets (8 mg) by mouth every morning 2 tablet 0     fluconazole (DIFLUCAN) 200 MG tablet Take 1 tablet (200 mg) by mouth daily When absolute neutrophils are less than  1.- x 10^9/L 30 tablet 0     levofloxacin (LEVAQUIN) 250 MG tablet Take 1 tablet (250 mg) by mouth daily When absolute neutrophils are less than 1.- x 10^9/L 30 tablet 4     levothyroxine (SYNTHROID/LEVOTHROID) 88 MCG tablet Take 88 mcg by mouth daily       magnesium 250 MG tablet Take 250 mg by mouth daily       midostaurin (RYDAPT) 25 MG capsule Take 2 capsules (50 mg) by mouth 2 times daily . Take with food on Days 8 through 21 (9/19 - 10/2) then stop 56 capsule 0     Multiple Vitamins-Minerals (WOMENS MULTIVITAMIN) TABS Take 1 tablet by mouth daily       prednisoLONE acetate (PRED FORTE) 1 % ophthalmic suspension Place 2 drops into both eyes 4 times daily for 48 hours 5 mL 0     Quercetin 50 MG TABS Take 50 mg by mouth daily       vancomycin (VANCOCIN) 125 MG capsule Take 1 capsule (125 mg) by mouth 2 times daily 60 capsule 4     vitamin C (ASCORBIC ACID) 1000 MG TABS Take 2,000 mg by mouth daily       vitamin D3 (CHOLECALCIFEROL) 50 mcg (2000 units) tablet Take 2 tablets by mouth daily       zinc gluconate 50 MG tablet Take 50 mg by mouth daily         Allergies   Allergen Reactions     Blood Transfusion Related (Informational Only) Hives     6/29/2022, reaction of hives with platelet transfusion          Physical Exam:  BP (!) 138/91   Pulse 74   Temp 97.8  F (36.6  C)   Resp 16   Wt 115.2 kg (254 lb)   SpO2 100%   BMI 39.78 kg/m    General: No acute distress.  HEENT: EOMI, PERRL. Sclerae are anicteric. Oral mucosa is pink and moist with no lesions or thrush.   Lymph: Neck is supple with no lymphadenopathy in the cervical or supraclavicular areas.   Heart: Regular rate and rhythm.   Lungs: Clear to auscultation bilaterally.   Abdomen: Bowel sounds present, soft, nontender with no palpable hepatosplenomegaly or masses.   Extremities: No lower extremity edema noted bilaterally.   Neuro: Alert and oriented x3, CN grossly intact, steady gait  Skin: No rashes, petechiae, or bruising noted on exposed  skin.      Labs:     I personally reviewed the following labs:    Most Recent 3 CBC's:  Recent Labs   Lab Test 10/10/22  1136 10/04/22  1007 09/30/22  1010   WBC 4.4 8.1 14.6*   HGB 10.0* 7.7* 7.6*   * 104* 99    216 115*     Most Recent 3 BMP's:  Recent Labs   Lab Test 10/10/22  1136 09/15/22  0645 09/14/22  0548    140 140   POTASSIUM 4.0 3.9 3.9   CHLORIDE 105 109* 108*   CO2 23 21* 21*   BUN 15.6 13.1 11.7   CR 0.76 0.49* 0.49*   ANIONGAP 12 10 11   MICHAEL 10.5* 8.8 9.1   * 126* 136*     Most Recent 2 LFT's:  Recent Labs   Lab Test 10/10/22  1136 09/15/22  0645   AST 16 16   ALT 29 22   ALKPHOS 56 47   BILITOTAL 0.5 0.8           Imaging: n/a      Impression/plan:     # AML, 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 Earl. Presented to routine exam with cytopenias. BMBx 6/17/2022 - hypercellular marrow (85-95% cellularity) with 92% blasts. 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 Started induction chemotherapy with 7+3 and midostaurin on 6/22/2022. D21 BMBx (7/12) with robust marrow regeneration, repeat BMBx 7/22 with 70-80% cellularity and 6% blasts by morphology consisted with complete response per Dr. Earl. NGS with no detectable mutations (MRD negative). BMT consulted 7/15; recommend consolidation chemotherapy, could consider BMT in the future if relapses. Tentatively planning for 3-4 cycles consolidation HiDAC (D1-3) and midostaurin (D8-21), followed by maintenance midostaurin for 1 year. Admitted for Cycle 1 consolidation with HiDAC and midostaurin on 8/9/22 which was tolerated well without any concerns or complications.   - Free drug coverage approved for midostaurin through [employer] - delivered to home  - Port in place  - Patient in MRD negative CR, no indication for allopurinol or TLS monitoring.   - Recheck NPM1 mutation                   Treatment Plan: HiDAC  +Midostaurin C3D1=10/10               - Cytarabine 3 g/m2 q12h x6 doses - D1,2,3               - Midostaurin 50 mg BID x14 days - D8 - 21                - Supportive medications;   - Pred forte eye drops QID D1-5   - Dexamethasone 12mg PO q24h D1-3, 8mg D4-5   - Zofran 8mg q12h x6 doses D1-3  - Neulasta 6mg    - RTC next week (video) for post discontinue follow up. Labs 3x weekly at [place]  - Confirmed with Dr Earl-- will proceed with 4th cycle of HiDAC since she is tolerating therapy well.   - Plan for bone marrow biopsy about 6-8 weeks after final cycle with send out PCR for NPM1 MRD (to [facility]) to confirm depth of response          # LUE DVT (PICC-associated)  LUE US (7/12) with nonocclusive DVT in the left axillary vein and one of the paired left brachial veins, superficial venous thrombosis in the left basilic vein. PICC-associated which was removed prior to discharge. Transitioned to apixaban as of 8/9 with plan to continue (per Dr. Earl) x 3 months (through ~10/13/22).   - Continue to monitor and hold if plts <50k  - Plan to end therapeutic anticoagulation at the end of 3 months period this week        # ID PPX  - ACV 400mg BID  - No indication for antibacterial or antifungal ppx with ANC >1000. Continue to monitor outpatient and start levaquin and voriconazole if ANC <1000  - Note; if she were to need antifungal ppx in the future consider reaching out to pharmacy liaison because prior to meeting her deductible the cost was $2048/month for posaconazole and $176/month for Voriconazole.      # History of recurrent cdiff colitis  - Ppx Vancomycin 125 mg BID PO      # H/o COVID-19 (Oct 2021)  Pt is not vaccinated nor interested in being vaccinated. She was admitted from 10/1/2021 - 10/13/2021 for acute hypoxic respiratory failure from COVID-19 pneumonia. Required IMC, high flow and intermittent CPAP. She was treated with dexamethasone, remdesivir, and baricitinib in addition to azithromycin and  ceftriaxone. Recovered symptomatically.  - Consider Chavo, will continue to discuss as OP     # Hypothyroidism   - Synthroid 100 mcg daily         Inpatient team--  - Midastaurin sent to [facility] specialty pharmacy   - Will plan for C4 with EOT bmbx 6-8 weeks later  - Requested follow up next week as well as 3x weekly labs  - Admit for C4 11/14 (delay 1 week due to travel)  - Has infusion at [place] on Friday 10/14 if possible [facility]    40 minutes spent on the date of the encounter doing chart review, review of test results, interpretation of tests, patient visit, documentation and discussion with other provider(s)

## 2022-10-10 NOTE — NURSING NOTE
"Oncology Rooming Note    October 10, 2022 12:25 PM   Veda Marinelli is a 37 year old female who presents for:    Chief Complaint   Patient presents with     Port Draw     Labs drawn by RN via port, vitals taken.     Oncology Clinic Visit     Acute myeloid leukemia (H)     Initial Vitals: BP (!) 138/91   Pulse 74   Temp 97.8  F (36.6  C)   Resp 16   Wt 115.2 kg (254 lb)   SpO2 100%   BMI 39.78 kg/m   Estimated body mass index is 39.78 kg/m  as calculated from the following:    Height as of 9/12/22: 1.702 m (5' 7\").    Weight as of this encounter: 115.2 kg (254 lb). Body surface area is 2.33 meters squared.  No Pain (0) Comment: Data Unavailable   No LMP recorded.  Allergies reviewed: Yes  Medications reviewed: Yes    Medications: MEDICATION REFILLS NEEDED TODAY. Provider was notified.  Pharmacy name entered into EPIC:    [id number] - [place], [place] - [address] [facility] - [place], [place] - [address] [facility] [place], [place] - [address] [place] [address]    Clinical concerns: Patient states that she will need a refill of Acyclovir when she is discharged from the hospital-prefers to  at hospital discharge pharmacy.        Dede Winslow LPN October 10, 2022 12:27 PM            "

## 2022-10-10 NOTE — LETTER
10/10/2022         RE: Veda Marinelli  [id number] [facility] [id number]        Dear Colleague,    Thank you for referring your patient, Veda Marinelli, to the [facility]. Please see a copy of my visit note below.    [facility]  Date of visit: 10/10/22      Reason for Visit: follow up AML--favorable risk      Oncology HPI:   Follows with Dr. Earl. Presented to routine exam with cytopenias. BMBx 6/17/2022 - hypercellular marrow (85-95% cellularity) with 92% blasts. 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 Started induction chemotherapy with 7+3 and midostaurin on 6/22/2022. D21 BMBx (7/12) with robust marrow regeneration, repeat BMBx 7/22 with 70-80% cellularity and 6% blasts by morphology consisted with complete response per Dr. Earl. NGS with no detectable mutations (MRD negative). BMT consulted 7/15; recommend consolidation chemotherapy, could consider BMT in the future if relapses. Tentatively planning for 3-4 cycles consolidation HiDAC (D1-3) and midostaurin (D8-21), followed by maintenance midostaurin for 1 year. Admitted for Cycle 1 consolidation with HiDAC and midostaurin on 8/9/22 which was tolerated well without any concerns or complications.     C2 HiDAC + midastaurin, D1 = 9/12/2022  C3 HiDAC + midastaurin, D1 = 10/10/2022      Interval history:   Minerva is feeling well. No complaints. No infectious concerns. No bleeding. She is eating well. No recurrent diarrhea. Energy is stable. Expresses no concerns today.       Current Outpatient Medications   Medication Sig Dispense Refill     acyclovir (ZOVIRAX) 400 MG tablet Take 1 tablet (400 mg) by mouth 2 times daily 60 tablet 0     apixaban ANTICOAGULANT (ELIQUIS) 5 MG tablet Take 1 tablet (5 mg) by mouth 2 times daily (Hold when instructed by outpatient team) 60 tablet 2     Bacillus Coagulans-Inulin (PROBIOTIC) 1-250  BILLION-MG CAPS Take 1 capsule by mouth daily       dexamethasone (DECADRON) 4 MG tablet Take 2 tablets (8 mg) by mouth every morning 2 tablet 0     fluconazole (DIFLUCAN) 200 MG tablet Take 1 tablet (200 mg) by mouth daily When absolute neutrophils are less than 1.- x 10^9/L 30 tablet 0     levofloxacin (LEVAQUIN) 250 MG tablet Take 1 tablet (250 mg) by mouth daily When absolute neutrophils are less than 1.- x 10^9/L 30 tablet 4     levothyroxine (SYNTHROID/LEVOTHROID) 88 MCG tablet Take 88 mcg by mouth daily       magnesium 250 MG tablet Take 250 mg by mouth daily       midostaurin (RYDAPT) 25 MG capsule Take 2 capsules (50 mg) by mouth 2 times daily . Take with food on Days 8 through 21 (9/19 - 10/2) then stop 56 capsule 0     Multiple Vitamins-Minerals (WOMENS MULTIVITAMIN) TABS Take 1 tablet by mouth daily       prednisoLONE acetate (PRED FORTE) 1 % ophthalmic suspension Place 2 drops into both eyes 4 times daily for 48 hours 5 mL 0     Quercetin 50 MG TABS Take 50 mg by mouth daily       vancomycin (VANCOCIN) 125 MG capsule Take 1 capsule (125 mg) by mouth 2 times daily 60 capsule 4     vitamin C (ASCORBIC ACID) 1000 MG TABS Take 2,000 mg by mouth daily       vitamin D3 (CHOLECALCIFEROL) 50 mcg (2000 units) tablet Take 2 tablets by mouth daily       zinc gluconate 50 MG tablet Take 50 mg by mouth daily         Allergies   Allergen Reactions     Blood Transfusion Related (Informational Only) Hives     6/29/2022, reaction of hives with platelet transfusion          Physical Exam:  BP (!) 138/91   Pulse 74   Temp 97.8  F (36.6  C)   Resp 16   Wt 115.2 kg (254 lb)   SpO2 100%   BMI 39.78 kg/m    General: No acute distress.  HEENT: EOMI, PERRL. Sclerae are anicteric. Oral mucosa is pink and moist with no lesions or thrush.   Lymph: Neck is supple with no lymphadenopathy in the cervical or supraclavicular areas.   Heart: Regular rate and rhythm.   Lungs: Clear to auscultation bilaterally.   Abdomen: Bowel  sounds present, soft, nontender with no palpable hepatosplenomegaly or masses.   Extremities: No lower extremity edema noted bilaterally.   Neuro: Alert and oriented x3, CN grossly intact, steady gait  Skin: No rashes, petechiae, or bruising noted on exposed skin.      Labs:     I personally reviewed the following labs:    Most Recent 3 CBC's:  Recent Labs   Lab Test 10/10/22  1136 10/04/22  1007 09/30/22  1010   WBC 4.4 8.1 14.6*   HGB 10.0* 7.7* 7.6*   * 104* 99    216 115*     Most Recent 3 BMP's:  Recent Labs   Lab Test 10/10/22  1136 09/15/22  0645 09/14/22  0548    140 140   POTASSIUM 4.0 3.9 3.9   CHLORIDE 105 109* 108*   CO2 23 21* 21*   BUN 15.6 13.1 11.7   CR 0.76 0.49* 0.49*   ANIONGAP 12 10 11   MICHAEL 10.5* 8.8 9.1   * 126* 136*     Most Recent 2 LFT's:  Recent Labs   Lab Test 10/10/22  1136 09/15/22  0645   AST 16 16   ALT 29 22   ALKPHOS 56 47   BILITOTAL 0.5 0.8           Imaging: n/a      Impression/plan:     # AML, 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 Earl. Presented to routine exam with cytopenias. BMBx 6/17/2022 - hypercellular marrow (85-95% cellularity) with 92% blasts. 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 Started induction chemotherapy with 7+3 and midostaurin on 6/22/2022. D21 BMBx (7/12) with robust marrow regeneration, repeat BMBx 7/22 with 70-80% cellularity and 6% blasts by morphology consisted with complete response per Dr. Earl. NGS with no detectable mutations (MRD negative). BMT consulted 7/15; recommend consolidation chemotherapy, could consider BMT in the future if relapses. Tentatively planning for 3-4 cycles consolidation HiDAC (D1-3) and midostaurin (D8-21), followed by maintenance midostaurin for 1 year. Admitted for Cycle 1 consolidation with HiDAC and midostaurin on 8/9/22 which was tolerated well without any concerns or complications.   - Free drug  coverage approved for midostaurin through [employer] - delivered to home  - Port in place  - Patient in MRD negative CR, no indication for allopurinol or TLS monitoring.   - Recheck NPM1 mutation                   Treatment Plan: HiDAC +Midostaurin C3D1=10/10               - Cytarabine 3 g/m2 q12h x6 doses - D1,2,3               - Midostaurin 50 mg BID x14 days - D8 - 21                - Supportive medications;   - Pred forte eye drops QID D1-5   - Dexamethasone 12mg PO q24h D1-3, 8mg D4-5   - Zofran 8mg q12h x6 doses D1-3  - Neulasta 6mg    - RTC next week (video) for post discontinue follow up. Labs 3x weekly at [place]  - Confirmed with Dr Earl-- will proceed with 4th cycle of HiDAC since she is tolerating therapy well.   - Plan for bone marrow biopsy about 6-8 weeks after final cycle with send out PCR for NPM1 MRD (to [facility]) to confirm depth of response          # LUE DVT (PICC-associated)  LUE US (7/12) with nonocclusive DVT in the left axillary vein and one of the paired left brachial veins, superficial venous thrombosis in the left basilic vein. PICC-associated which was removed prior to discharge. Transitioned to apixaban as of 8/9 with plan to continue (per Dr. Earl) x 3 months (through ~10/13/22).   - Continue to monitor and hold if plts <50k  - Plan to end therapeutic anticoagulation at the end of 3 months period this week        # ID PPX  - ACV 400mg BID  - No indication for antibacterial or antifungal ppx with ANC >1000. Continue to monitor outpatient and start levaquin and voriconazole if ANC <1000  - Note; if she were to need antifungal ppx in the future consider reaching out to pharmacy liaison because prior to meeting her deductible the cost was $2048/month for posaconazole and $176/month for Voriconazole.      # History of recurrent cdiff colitis  - Ppx Vancomycin 125 mg BID PO      # H/o COVID-19 (Oct 2021)  Pt is not vaccinated nor interested in being vaccinated. She was admitted from  10/1/2021 - 10/13/2021 for acute hypoxic respiratory failure from COVID-19 pneumonia. Required IMC, high flow and intermittent CPAP. She was treated with dexamethasone, remdesivir, and baricitinib in addition to azithromycin and ceftriaxone. Recovered symptomatically.  - Consider [facility], will continue to discuss as OP     # Hypothyroidism   - Synthroid 100 mcg daily       Inpatient team--  - Midastaurin sent to [facility] specialty pharmacy   - Will plan for C4 with EOT bmbx 6-8 weeks later  - Requested follow up next week as well as 3x weekly labs  - Admit for C4 11/14 (delay 1 week due to travel)  - Has infusion at [place] on Friday 10/14 if possible to use for neulasta      40 minutes spent on the date of the encounter doing chart review, review of test results, interpretation of tests, patient visit, documentation and discussion with other provider(s)           Again, thank you for allowing me to participate in the care of your patient.      Sincerely,    RAINE Rosario CNP

## 2022-10-10 NOTE — H&P
[facility]    History and Physical  Hematology / Oncology     Date of Admission:  10/10/22  Date of Service (when I saw the patient): 10/10/22    Assessment & Plan   Veda Marinelli is a 37 year old female who presents with past medical history of recurrent C.Diff, LUE DVT (7/12/22), and favorable risk AML (FLT3-ITD, NPM-1, IDH2 mutations) in MRD negative CR currently being admitted for cycle 3 of consolidation HiDAC with midostaurin.       HEME/ONC  # AML, 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 Earl. Presented to routine exam with cytopenias. BMBx 6/17/2022 - hypercellular marrow (85-95% cellularity) with 92% blasts. 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 Started induction chemotherapy with 7+3 and midostaurin on 6/22/2022. D21 BMBx (7/12) with robust marrow regeneration, repeat BMBx 7/22 with 70-80% cellularity and 6% blasts by morphology consisted with complete response per Dr. Earl. NGS with no detectable mutations (MRD negative). BMT consulted 7/15; recommend consolidation chemotherapy, could consider BMT in the future if relapses. Tentatively planning for 3-4 cycles consolidation HiDAC (D1-3) and midostaurin (D8-21), followed by maintenance midostaurin for 1 year. Admitted for Cycle 1 consolidation with HiDAC and midostaurin; (C1=8/9/22, C2=9/12/22) and has tolerated well without any concerns or complications. She is currently being admitted for cycle 3.   - Free drug coverage previously approved for midostaurin through [employer]   - Port accessed on admission.   - Patient in MRD negative CR, no indication for allopurinol or routine TLS monitoring                  Treatment Plan: HiDAC + Midostaurin (C3D1=10/10/22)               - Cytarabine 3 g/m2 (6930mg) q12h x6 doses - D1-3               - Midostaurin 50 mg BID x14 days - D8 - 21                - Supportive  medications;   - Pred forte eye drops QID D1-5 -- continue on discharge  - Dexamethasone 12mg PO q24h D1-3, 8mg D4-5 -- will order discharge script  - Zofran 8mg q12h x6 doses D1-3  - Neulasta 6mg - requested on 10/14     # LIVANE DVT (PICC-associated)  LUE US (7/12) with nonocclusive DVT in the left axillary vein and one of the paired left brachial veins, superficial venous thrombosis in the left basilic vein. PICC-associated which was removed prior to discharge. Transitioned to apixaban as of 8/9 with plan to continue (per Dr. Earl) x 3 months (through ~10/13/22).   - Continue to monitor and hold if plts <50k.   - Will confirm duration of Eliquis with OP team, continue for now      ID  # ID PPX  - ACV 400mg BID  - No indication for antibacterial or antifungal ppx with ANC >1000. Continue to monitor outpatient and start levaquin and voriconazole if ANC <1000  - Note; if she were to need antifungal ppx in the future consider reaching out to pharmacy liaison because prior to meeting her deductible the cost was $2048/month for posaconazole and $176/month for Voriconazole.      # History of recurrent cdiff colitis  - PTA ppx Vancomycin 125mg BID PO      # H/o COVID-19 (Oct 2021)  Pt is not vaccinated nor interested in being vaccinated. She was admitted from 10/1/2021 - 10/13/2021 for acute hypoxic respiratory failure from COVID-19 pneumonia. Required IMC, high flow and intermittent CPAP. She was treated with dexamethasone, remdesivir, and baricitinib in addition to azithromycin and ceftriaxone. Recovered symptomatically.  - Consider Chavo outpatient, previously discussed in clinic  - Pre-admission COVID testing completed 10/8 at [place], [place] - spoke with lab and unfortunately there has been a delay in processing and this is unlikely to result until later tonight. Repeat asx testing ordered STAT on admission - pending.      ENDO  # Hypothyroidism   - PTA Synthroid 100 mcg daily     MISC  #  Hypercalcemia  Ca is 10.5 on admission, last wnl at 8.8 on 9/15. Patient is currently asx.   - Monitor daily BMP  - IVF per chemo regimen     FEN:  - IVF per chemotherapy protocol   - PRN lyte replacement  - RDAT    Prophy/Misc:  -VTE: therapeutic Eliquis   -Bowels: Senna/Miralax prn     Dispo: Anticipate 3-4 day stay for completion of chemotherapy. Anticipate tentative discharge 10/13 AM pending complications.     Follow-up:   - 10/14: labs/possible transfusion at [facility]  - VLAD visit next week - requested  - Neulasta to be added to 10/14 infusion appt - requested       Patient and plan of care was discussed with attending physician Dr. Bahena.     >65 minutes spent on the date of the encounter. Over 50% of time was spent counseling the patient and/or coordinating care.     Malika Martin PA-C  Hematology/Oncology  Ph: [phone], Pager: [phone]    Code Status   Full Code    Primary Care Physician   TATA NELSON    Chief Complaint   Admission for scheduled chemotherapy     History is obtained from the patient    History of Present Illness   Veda Marinelli is a 37 year old female who presents with past medical history of recurrent C.Diff, LUE DVT (7/12/22), and favorable risk AML (FLT3-ITD, NPM-1, IDH2 mutations) in MRD negative CR currently being admitted for cycle 3 of consolidation HiDAC with midostaurin.     Today, Minerva presents with her parents following her clinic visit. She reports feeling very well. States prior consolidation chemo cycles have gone smoothly and without complications. She states that even following her chemo when her counts are low, she typically feels fine. No specific complaints today. She reports good PO intake. She has continued Eliquis for prior PICC-related DVT and is unsure whether this will be continued or not after completion of current 3-month course (10/13). Discussed we will confirm with Dr. Earl. Lab work this morning ok to proceed with chemo. Patient is familiar with  side effects and expectations for hospitalization such as duration of stay. She is agreeable and willing to proceed with chemotherapy. All questions answered.     A comprehensive review of systems was obtained and is negative other than noted here or in the HPI.      Past Medical History    I have reviewed this patient's medical history and updated it with pertinent information if needed.   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  Past Surgical History:   Procedure Laterality Date     IR CHEST PORT PLACEMENT > 5 YRS OF AGE  8/9/2022     PICC DOUBLE LUMEN PLACEMENT Left 06/16/2022    46 cm total lateral brachial     Prior to Admission Medications   Prior to Admission Medications   Prescriptions Last Dose Informant Patient Reported? Taking?   Bacillus Coagulans-Inulin (PROBIOTIC) 1-250 BILLION-MG CAPS 10/10/2022 at AM Self Yes Yes   Sig: Take 1 capsule by mouth daily   Multiple Vitamins-Minerals (WOMENS MULTIVITAMIN) TABS 10/10/2022 at AM Self Yes Yes   Sig: Take 1 tablet by mouth daily   Quercetin 50 MG TABS 10/10/2022 at AM Self Yes Yes   Sig: Take 50 mg by mouth daily   acyclovir (ZOVIRAX) 400 MG tablet 10/10/2022 at AM  No Yes   Sig: Take 1 tablet (400 mg) by mouth 2 times daily   apixaban ANTICOAGULANT (ELIQUIS) 5 MG tablet 10/10/2022 at AM  No Yes   Sig: Take 1 tablet (5 mg) by mouth 2 times daily (Hold when instructed by outpatient team)   fluconazole (DIFLUCAN) 200 MG tablet Past Month  No Yes   Sig: Take 1 tablet (200 mg) by mouth daily When absolute neutrophils are less than 1.- x 10^9/L   levofloxacin (LEVAQUIN) 250 MG tablet Past Month  No Yes   Sig: Take 1 tablet (250 mg) by mouth daily When absolute neutrophils are less than 1.- x 10^9/L   levothyroxine (SYNTHROID/LEVOTHROID) 88 MCG tablet 10/10/2022 at  AM  Yes Yes   Sig: Take 88 mcg by mouth daily   magnesium 250 MG tablet 10/10/2022 at AM Self Yes Yes   Sig: Take 250 mg by mouth daily   midostaurin (RYDAPT) 25 MG capsule Past Month  No Yes   Sig: Take 2 capsules (50 mg) by mouth 2 times daily . Take with food on Days 8 through 21 (9/19 - 10/2) then stop   vancomycin (VANCOCIN) 125 MG capsule 10/10/2022 at AM Self No Yes   Sig: Take 1 capsule (125 mg) by mouth 2 times daily   vitamin C (ASCORBIC ACID) 1000 MG TABS 10/10/2022 at AM Self Yes Yes   Sig: Take 2,000 mg by mouth daily   vitamin D3 (CHOLECALCIFEROL) 50 mcg (2000 units) tablet 10/10/2022 at AM Self Yes Yes   Sig: Take 1 tablet (50 mcg) by mouth daily   zinc gluconate 50 MG tablet 10/10/2022 at AM Self Yes Yes   Sig: Take 50 mg by mouth daily      Facility-Administered Medications: None     Allergies   Allergies   Allergen Reactions     Blood Transfusion Related (Informational Only) Hives     6/29/2022, reaction of hives with platelet transfusion        Social History   I have reviewed this patient's social history and updated it with pertinent information if needed. Veda Marinelli  reports that she has never smoked. She has never used smokeless tobacco. She reports that she does not currently use alcohol. She reports that she does not currently use drugs.    Family History   I have reviewed this patient's family history and updated it with pertinent information if needed.   No family history on file.    Review of Systems   A comprehensive review of symptoms was obtained and negative unless noted above.    Physical Exam   Temp: 98.4  F (36.9  C) Temp src: Oral BP: (!) 144/86 Pulse: 97   Resp: 18 SpO2: 97 % O2 Device: None (Room air)    Vital Signs with Ranges  Temp:  [97.8  F (36.6  C)-98.4  F (36.9  C)] 98.4  F (36.9  C)  Pulse:  [74-97] 97  Resp:  [16-18] 18  BP: (138-144)/(86-91) 144/86  SpO2:  [97 %-100 %] 97 %  252 lbs 8 oz    Constitutional: Pleasant female seen resting comfortably in bed in  bedside chair. Alert and interactive.   HEENT: NCAT. PERRL, EOMI, anicteric sclera. Oral mucosa pink and moist with no lesions or thrush.  Hematologic / Lymphatic: No overt bleeding. No cervical or clavicular adenopathy.  Respiratory: Non-labored breathing, good air exchange, lungs clear to auscultation bilaterally. No cough or wheeze noted.   Cardiovascular: Regular rate and rhythm. No murmur or rub.   GI: Normoactive bowel sounds. Abdomen soft, non-distended, and non-tender. No palpable masses or organomegaly.  Skin: Warm and dry. No concerning lesions or rash on exposed surfaces.  Musculoskeletal: Trace edema to bilateral LE, greatest at ankles. Extremities otherwise grossly normal, non-tender. Strong peripheral pulses. Good strength and ROM.    Neurologic: A&O x 3, CNs 2-12 grossly intact, speech normal, gait normal, sensation to light touch grossly WNL. Grossly non-focal.  Neuropsychiatric: Mentation and affect appear normal/appropriate.  Vascular Access: Port is CDI without erythema, swelling, or discharge.     Data   Results for orders placed or performed during the hospital encounter of 10/10/22 (from the past 24 hour(s))   Uric acid   Result Value Ref Range    Uric Acid 5.0 2.4 - 5.7 mg/dL   Lactate Dehydrogenase   Result Value Ref Range    Lactate Dehydrogenase 249 0 - 250 U/L   CBC with platelets differential    Narrative    The following orders were created for panel order CBC with platelets differential.  Procedure                               Abnormality         Status                     ---------                               -----------         ------                     CBC with platelets and d...[[id number]]  Abnormal            Final result                 Please view results for these tests on the individual orders.   Comprehensive metabolic panel   Result Value Ref Range    Sodium 136 136 - 145 mmol/L    Potassium 3.8 3.4 - 5.3 mmol/L    Chloride 103 98 - 107 mmol/L    Carbon Dioxide (CO2) 21  (L) 22 - 29 mmol/L    Anion Gap 12 7 - 15 mmol/L    Urea Nitrogen 15.2 6.0 - 20.0 mg/dL    Creatinine 0.65 0.51 - 0.95 mg/dL    Calcium 9.9 8.6 - 10.0 mg/dL    Glucose 98 70 - 99 mg/dL    Alkaline Phosphatase 53 35 - 104 U/L    AST 20 10 - 35 U/L    ALT 26 10 - 35 U/L    Protein Total 6.6 6.4 - 8.3 g/dL    Albumin 4.1 3.5 - 5.2 g/dL    Bilirubin Total 0.5 <=1.2 mg/dL    GFR Estimate >90 >60 mL/min/1.73m2   Magnesium   Result Value Ref Range    Magnesium 1.9 1.7 - 2.3 mg/dL   Phosphorus   Result Value Ref Range    Phosphorus 3.6 2.5 - 4.5 mg/dL   INR   Result Value Ref Range    INR 1.14 0.85 - 1.15   Fibrinogen activity   Result Value Ref Range    Fibrinogen Activity 420 170 - 490 mg/dL   CBC with platelets and differential   Result Value Ref Range    WBC Count 5.9 4.0 - 11.0 10e3/uL    RBC Count 2.80 (L) 3.80 - 5.20 10e6/uL    Hemoglobin 9.7 (L) 11.7 - 15.7 g/dL    Hematocrit 29.9 (L) 35.0 - 47.0 %     (H) 78 - 100 fL    MCH 34.6 (H) 26.5 - 33.0 pg    MCHC 32.4 31.5 - 36.5 g/dL    RDW 21.3 (H) 10.0 - 15.0 %    Platelet Count 241 150 - 450 10e3/uL    % Neutrophils 83 %    % Lymphocytes 6 %    % Monocytes 11 %    % Eosinophils 0 %    % Basophils 0 %    % Immature Granulocytes 0 %    NRBCs per 100 WBC 0 <1 /100    Absolute Neutrophils 4.9 1.6 - 8.3 10e3/uL    Absolute Lymphocytes 0.3 (L) 0.8 - 5.3 10e3/uL    Absolute Monocytes 0.6 0.0 - 1.3 10e3/uL    Absolute Eosinophils 0.0 0.0 - 0.7 10e3/uL    Absolute Basophils 0.0 0.0 - 0.2 10e3/uL    Absolute Immature Granulocytes 0.0 <=0.4 10e3/uL    Absolute NRBCs 0.0 10e3/uL   Asymptomatic COVID-19 Virus (Coronavirus) by PCR Nasopharyngeal    Specimen: Nasopharyngeal; Swab   Result Value Ref Range    SARS CoV2 PCR Negative Negative    Narrative    Testing was performed using the [employer] Xpress SARS-CoV-2 Assay on the  Cepheid Gene-Xpert Instrument Systems. Additional information about  this Emergency Use Authorization (EUA) assay can be found via the Lab  Guide. This test  should be ordered for the detection of SARS-CoV-2 in  individuals who meet SARS-CoV-2 clinical and/or epidemiological  criteria. Test performance is unknown in asymptomatic patients. This  test is for in vitro diagnostic use under the FDA EUA for  laboratories certified under CLIA to perform high complexity testing.  This test has not been FDA cleared or approved. A negative result  does not rule out the presence of PCR inhibitors in the specimen or  target RNA in concentration below the limit of detection for the  assay. The possibility of a false negative should be considered if  the patient's recent exposure or clinical presentation suggests  COVID-19. This test was validated by the [facility]. This laboratory is certified under  the Clinical Laboratory Improvement Amendments of 1988 (CLIA-88) as  qualified to perform high complexity laboratory testing.

## 2022-10-10 NOTE — PROGRESS NOTES
Nursing Focus: Chemotherapy    D: Positive blood return via Port. Insertion site is clean/dry/intact, dressing intact with no complaints of pain.  Urine output is recorded in intake in Doc Flowsheet.      I: Premedications given per order (see electronic medical administration record). Cerebellar Neurotoxicity Assessment unremarkable. Dose #1 of HD Cytarabine started to infuse over 3 hours. Reviewed pt teaching on chemotherapy side effects.  Pt denies need for further teaching. Chemotherapy double checked per protocol by two chemotherapy competent RN's.     A: Tolerating chemotherapy well. Denies nausea and or pain.     P: Continue to monitor urine output and symptoms of nausea. Screen for symptoms of toxicity.

## 2022-10-10 NOTE — PHARMACY-ADMISSION MEDICATION HISTORY
Admission Medication History Completed by Pharmacy    See [facility] Admission Navigator for allergy information, preferred outpatient pharmacy, prior to admission medications and immunization status.     Medication History Sources:     Patient, SureScripts    Changes made to PTA medication list (reason):    Added: None    Deleted: dexamethasone tablet (one dose after discharge), prednisolone ophthalmic suspension (completed)    Changed: None    Additional Information:    None    Prior to Admission medications    Medication Sig Last Dose Taking? Auth Provider Long Term End Date   acyclovir (ZOVIRAX) 400 MG tablet Take 1 tablet (400 mg) by mouth 2 times daily 10/10/2022 at AM Yes Angelica Kingsley PA-C Yes    apixaban ANTICOAGULANT (ELIQUIS) 5 MG tablet Take 1 tablet (5 mg) by mouth 2 times daily (Hold when instructed by outpatient team) 10/10/2022 at AM Yes Corinne Murguia PA-C     Bacillus Coagulans-Inulin (PROBIOTIC) 1-250 BILLION-MG CAPS Take 1 capsule by mouth daily 10/10/2022 at AM Yes Reported, Patient     fluconazole (DIFLUCAN) 200 MG tablet Take 1 tablet (200 mg) by mouth daily When absolute neutrophils are less than 1.- x 10^9/L Past Month Yes Corinne Murguia PA-C     levofloxacin (LEVAQUIN) 250 MG tablet Take 1 tablet (250 mg) by mouth daily When absolute neutrophils are less than 1.- x 10^9/L Past Month Yes Ortiz Earl MD     levothyroxine (SYNTHROID/LEVOTHROID) 88 MCG tablet Take 88 mcg by mouth daily 10/10/2022 at AM Yes Reported, Patient No    magnesium 250 MG tablet Take 250 mg by mouth daily 10/10/2022 at AM Yes Reported, Patient     midostaurin (RYDAPT) 25 MG capsule Take 2 capsules (50 mg) by mouth 2 times daily . Take with food on Days 8 through 21 (9/19 - 10/2) then stop Past Month Yes Corinne Murguia PA-C     Multiple Vitamins-Minerals (WOMENS MULTIVITAMIN) TABS Take 1 tablet by mouth daily 10/10/2022 at AM Yes Reported, Patient     Quercetin 50 MG TABS Take 50 mg by mouth daily  10/10/2022 at AM Yes Unknown, Entered By History     vancomycin (VANCOCIN) 125 MG capsule Take 1 capsule (125 mg) by mouth 2 times daily 10/10/2022 at AM Yes Ortiz Earl MD     vitamin C (ASCORBIC ACID) 1000 MG TABS Take 2,000 mg by mouth daily 10/10/2022 at AM Yes Reported, Patient     vitamin D3 (CHOLECALCIFEROL) 50 mcg (2000 units) tablet Take 1 tablet (50 mcg) by mouth daily 10/10/2022 at AM Yes Reported, Patient     zinc gluconate 50 MG tablet Take 50 mg by mouth daily 10/10/2022 at AM Yes Reported, Patient     midostaurin (RYDAPT) 25 MG capsule Take 2 capsules (50 mg) by mouth 2 times daily for 14 days . Take with food on Days 8 through 21.   Fidelia Nair APRN CNP  10/24/22       Date completed: 10/10/22    Medication history completed by: Lynn Koehler, Pharmacy Student

## 2022-10-11 LAB
ALBUMIN SERPL BCG-MCNC: 4 G/DL (ref 3.5–5.2)
ALP SERPL-CCNC: 53 U/L (ref 35–104)
ALT SERPL W P-5'-P-CCNC: 24 U/L (ref 10–35)
ANION GAP SERPL CALCULATED.3IONS-SCNC: 16 MMOL/L (ref 7–15)
AST SERPL W P-5'-P-CCNC: 16 U/L (ref 10–35)
BASOPHILS # BLD AUTO: 0 10E3/UL (ref 0–0.2)
BASOPHILS NFR BLD AUTO: 0 %
BILIRUB SERPL-MCNC: 0.7 MG/DL
BUN SERPL-MCNC: 12.8 MG/DL (ref 6–20)
CALCIUM SERPL-MCNC: 9.4 MG/DL (ref 8.6–10)
CHLORIDE SERPL-SCNC: 107 MMOL/L (ref 98–107)
CREAT SERPL-MCNC: 0.58 MG/DL (ref 0.51–0.95)
DEPRECATED HCO3 PLAS-SCNC: 17 MMOL/L (ref 22–29)
EOSINOPHIL # BLD AUTO: 0 10E3/UL (ref 0–0.7)
EOSINOPHIL NFR BLD AUTO: 0 %
ERYTHROCYTE [DISTWIDTH] IN BLOOD BY AUTOMATED COUNT: 20.8 % (ref 10–15)
FIBRINOGEN PPP-MCNC: 406 MG/DL (ref 170–490)
GFR SERPL CREATININE-BSD FRML MDRD: >90 ML/MIN/1.73M2
GLUCOSE SERPL-MCNC: 162 MG/DL (ref 70–99)
HCT VFR BLD AUTO: 29.7 % (ref 35–47)
HGB BLD-MCNC: 9.6 G/DL (ref 11.7–15.7)
IMM GRANULOCYTES # BLD: 0 10E3/UL
IMM GRANULOCYTES NFR BLD: 1 %
INR PPP: 1.17 (ref 0.85–1.15)
LYMPHOCYTES # BLD AUTO: 0.1 10E3/UL (ref 0.8–5.3)
LYMPHOCYTES NFR BLD AUTO: 3 %
MAGNESIUM SERPL-MCNC: 2 MG/DL (ref 1.7–2.3)
MCH RBC QN AUTO: 34.7 PG (ref 26.5–33)
MCHC RBC AUTO-ENTMCNC: 32.3 G/DL (ref 31.5–36.5)
MCV RBC AUTO: 107 FL (ref 78–100)
MONOCYTES # BLD AUTO: 0.1 10E3/UL (ref 0–1.3)
MONOCYTES NFR BLD AUTO: 2 %
NEUTROPHILS # BLD AUTO: 5.3 10E3/UL (ref 1.6–8.3)
NEUTROPHILS NFR BLD AUTO: 94 %
NRBC # BLD AUTO: 0 10E3/UL
NRBC BLD AUTO-RTO: 0 /100
PHOSPHATE SERPL-MCNC: 3.1 MG/DL (ref 2.5–4.5)
PLATELET # BLD AUTO: 244 10E3/UL (ref 150–450)
POTASSIUM SERPL-SCNC: 3.7 MMOL/L (ref 3.4–5.3)
PROT SERPL-MCNC: 6.5 G/DL (ref 6.4–8.3)
RBC # BLD AUTO: 2.77 10E6/UL (ref 3.8–5.2)
SODIUM SERPL-SCNC: 140 MMOL/L (ref 136–145)
URATE SERPL-MCNC: 6 MG/DL (ref 2.4–5.7)
WBC # BLD AUTO: 5.5 10E3/UL (ref 4–11)

## 2022-10-11 PROCEDURE — 258N000003 HC RX IP 258 OP 636: Performed by: NURSE PRACTITIONER

## 2022-10-11 PROCEDURE — 80053 COMPREHEN METABOLIC PANEL: CPT | Performed by: PHYSICIAN ASSISTANT

## 2022-10-11 PROCEDURE — 36591 DRAW BLOOD OFF VENOUS DEVICE: CPT | Performed by: PHYSICIAN ASSISTANT

## 2022-10-11 PROCEDURE — 84550 ASSAY OF BLOOD/URIC ACID: CPT | Performed by: PHYSICIAN ASSISTANT

## 2022-10-11 PROCEDURE — 85384 FIBRINOGEN ACTIVITY: CPT | Performed by: PHYSICIAN ASSISTANT

## 2022-10-11 PROCEDURE — 99207 PR SC NO CHARGE VISIT: CPT | Performed by: INTERNAL MEDICINE

## 2022-10-11 PROCEDURE — 250N000013 HC RX MED GY IP 250 OP 250 PS 637: Performed by: PHYSICIAN ASSISTANT

## 2022-10-11 PROCEDURE — 85610 PROTHROMBIN TIME: CPT | Performed by: PHYSICIAN ASSISTANT

## 2022-10-11 PROCEDURE — 85004 AUTOMATED DIFF WBC COUNT: CPT | Performed by: PHYSICIAN ASSISTANT

## 2022-10-11 PROCEDURE — 83735 ASSAY OF MAGNESIUM: CPT | Performed by: INTERNAL MEDICINE

## 2022-10-11 PROCEDURE — 250N000011 HC RX IP 250 OP 636: Performed by: PHYSICIAN ASSISTANT

## 2022-10-11 PROCEDURE — 84100 ASSAY OF PHOSPHORUS: CPT | Performed by: INTERNAL MEDICINE

## 2022-10-11 PROCEDURE — 250N000011 HC RX IP 250 OP 636: Performed by: NURSE PRACTITIONER

## 2022-10-11 PROCEDURE — 99233 SBSQ HOSP IP/OBS HIGH 50: CPT | Performed by: INTERNAL MEDICINE

## 2022-10-11 PROCEDURE — 120N000002 HC R&B MED SURG/OB UMMC

## 2022-10-11 RX ORDER — ALLOPURINOL 300 MG/1
300 TABLET ORAL DAILY
Status: DISCONTINUED | OUTPATIENT
Start: 2022-10-11 | End: 2022-10-13 | Stop reason: HOSPADM

## 2022-10-11 RX ADMIN — ACYCLOVIR 400 MG: 400 TABLET ORAL at 07:39

## 2022-10-11 RX ADMIN — PREDNISOLONE ACETATE 2 DROP: 10 SUSPENSION/ DROPS OPHTHALMIC at 07:39

## 2022-10-11 RX ADMIN — OXYCODONE HYDROCHLORIDE AND ACETAMINOPHEN 2000 MG: 500 TABLET ORAL at 07:38

## 2022-10-11 RX ADMIN — APIXABAN 5 MG: 5 TABLET, FILM COATED ORAL at 20:39

## 2022-10-11 RX ADMIN — DEXAMETHASONE 12 MG: 4 TABLET ORAL at 16:55

## 2022-10-11 RX ADMIN — CYTARABINE 6930 MG: 100 INJECTION, SOLUTION INTRATHECAL; INTRAVENOUS; SUBCUTANEOUS at 05:34

## 2022-10-11 RX ADMIN — ACYCLOVIR 400 MG: 400 TABLET ORAL at 20:39

## 2022-10-11 RX ADMIN — SODIUM CHLORIDE, PRESERVATIVE FREE 5 ML: 5 INJECTION INTRAVENOUS at 21:25

## 2022-10-11 RX ADMIN — ONDANSETRON HYDROCHLORIDE 8 MG: 8 TABLET, FILM COATED ORAL at 16:55

## 2022-10-11 RX ADMIN — Medication 100 MCG: at 07:39

## 2022-10-11 RX ADMIN — VANCOMYCIN HYDROCHLORIDE 125 MG: 125 CAPSULE ORAL at 20:39

## 2022-10-11 RX ADMIN — PREDNISOLONE ACETATE 2 DROP: 10 SUSPENSION/ DROPS OPHTHALMIC at 11:18

## 2022-10-11 RX ADMIN — PREDNISOLONE ACETATE 2 DROP: 10 SUSPENSION/ DROPS OPHTHALMIC at 16:01

## 2022-10-11 RX ADMIN — ZINC SULFATE 220 MG (50 MG) CAPSULE 220 MG: CAPSULE at 11:16

## 2022-10-11 RX ADMIN — CYTARABINE 6930 MG: 100 INJECTION, SOLUTION INTRATHECAL; INTRAVENOUS; SUBCUTANEOUS at 17:27

## 2022-10-11 RX ADMIN — APIXABAN 5 MG: 5 TABLET, FILM COATED ORAL at 07:39

## 2022-10-11 RX ADMIN — VANCOMYCIN HYDROCHLORIDE 125 MG: 125 CAPSULE ORAL at 07:38

## 2022-10-11 RX ADMIN — SODIUM CHLORIDE, PRESERVATIVE FREE 5 ML: 5 INJECTION INTRAVENOUS at 09:05

## 2022-10-11 RX ADMIN — ALLOPURINOL 300 MG: 300 TABLET ORAL at 11:16

## 2022-10-11 RX ADMIN — PREDNISOLONE ACETATE 2 DROP: 10 SUSPENSION/ DROPS OPHTHALMIC at 20:39

## 2022-10-11 RX ADMIN — Medication 1 TABLET: at 11:16

## 2022-10-11 RX ADMIN — ONDANSETRON HYDROCHLORIDE 8 MG: 8 TABLET, FILM COATED ORAL at 05:01

## 2022-10-11 RX ADMIN — SODIUM CHLORIDE, PRESERVATIVE FREE 5 ML: 5 INJECTION INTRAVENOUS at 05:28

## 2022-10-11 RX ADMIN — LEVOTHYROXINE SODIUM 88 MCG: 88 TABLET ORAL at 07:36

## 2022-10-11 ASSESSMENT — ACTIVITIES OF DAILY LIVING (ADL)
ADLS_ACUITY_SCORE: 18

## 2022-10-11 NOTE — PROGRESS NOTES
Nursing Focus: Chemotherapy    D: Positive blood return via Port. Insertion site is clean/dry/intact, dressing intact with no complaints of pain.  Urine output is recorded in intake in Doc Flowsheet.      I: Premedications given per order (see electronic medical administration record). Cerebellar Neurotoxicity Assessment unremarkable. Dose #2 of HD Cytarabine started to infuse over 3 hours. Reviewed pt teaching on chemotherapy side effects.  Pt denies need for further teaching. Chemotherapy double checked per protocol by two chemotherapy competent RN's.     A: Tolerating procedure well. Denies nausea and or pain.     P: Continue to monitor urine output and symptoms of nausea. Screen for symptoms of toxicity.

## 2022-10-11 NOTE — PROGRESS NOTES
Nursing Focus: Chemotherapy    D: Positive blood return via Port. Insertion site is clean/dry/intact, dressing intact with no complaints of pain.  Urine output is recorded in intake in Doc Flowsheet.    I: Premedications given per order (see electronic medical administration record). Dose #3 of HD cytarabine started to infuse over 3 hours. Reviewed pt teaching on chemotherapy side effects.  Pt denies need for further teaching. Chemotherapy double checked per protocol by two chemotherapy competent RN's.   A: Tolerating procedure well. Denies nausea and or pain.   P: Continue to monitor urine output and symptoms of nausea. Screen for symptoms of toxicity.

## 2022-10-11 NOTE — PLAN OF CARE
Goal Outcome Evaluation:  Finished dose # 2 of Cytarabine chemotherapy without problems. Denied pain or nausea. Uric acid slightly elevated so she was started on Allopurinol. Up independently.

## 2022-10-11 NOTE — PLAN OF CARE
Goal Outcome Evaluation:    [id number]    VSS on RA. Denies pain, SOB and N/V. Port heparin locked with good blood return noted. Dose #3 HiDAC infused without incident, cerebellar unchanged. Up independently. Appetite good. Adequate UOP. LBM 10/11. Able to make needs known.

## 2022-10-11 NOTE — PROGRESS NOTES
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     TODAY:   - D2 HiDAC with midostaurin (D8-21), Dex 12 mg (D1-3)  - Uric acid trending up (6.0). Restart Allopurinol with concurrent chemo, can likely discontinue at discharge.   - Continue Eliquis through 10/13 to complete 3-month course, then stop  - Continue with best supportive cares      HEME/ONC  # AML, 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 Earl. Presented to routine exam with cytopenias. BMBx 6/17/2022 - hypercellular marrow (85-95% cellularity) with 92% blasts. 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 Started induction chemotherapy with 7+3 and midostaurin on 6/22/2022. D21 BMBx (7/12) with robust marrow regeneration, repeat BMBx 7/22 with 70-80% cellularity and 6% blasts by morphology consisted with complete response per Dr. Earl. NGS with no detectable mutations (MRD negative). BMT consulted 7/15; recommend consolidation chemotherapy, could consider BMT in the future if relapses. Tentatively planning for 3-4 cycles consolidation HiDAC (D1-3) and midostaurin (D8-21), followed by maintenance midostaurin for 1 year. Admitted for Cycle 1 consolidation with HiDAC and midostaurin; (C1=8/9/22, C2=9/12/22) and has tolerated well without any concerns or complications. She is currently being admitted for cycle 3.   - Free drug coverage previously approved for midostaurin through [employer]. Per OP team, Midostaurin script sent to [facility] specialty pharmacy. Will confirm with  patient when received.  - Plan is to pursue cycle 4 with EOT bmbx 6-8 weeks later, requested per clinic for week of ~11/14   - Port accessed on admission.                   Treatment Plan: HiDAC + Midostaurin (C3D1=10/10/22)               - Cytarabine 3 g/m2 (6930mg) q12h x6 doses - D1-3               - Midostaurin 50 mg BID x14 days - D8 - 21                - Supportive medications;   - Pred forte eye drops QID D1-5 -- continue on discharge  - Dexamethasone 12mg PO q24h D1-3, 8mg D4-5 -- will order discharge script  - Zofran 8mg q12h x6 doses D1-3  - Neulasta 6mg - requested for 10/14     # LUE DVT (PICC-associated)  # Acquired coagulopathy d/t Eliquis  CLINT US (7/12) with nonocclusive DVT in the left axillary vein and one of the paired left brachial veins, superficial venous thrombosis in the left basilic vein. PICC-associated which was removed prior to discharge. Transitioned to apixaban as of 8/9 with plan to continue (per Dr. Earl) x 3 months (through ~10/13/22). INR mildly elevated on admission likely 2/2 ongoing Eliquis.   - Continue to monitor and hold if plts <50k.   - Per OP team, continue through 10/13 to complete 3-month course then stop     # TLS monitoring  Patient is MRD negative CR and is low-risk for TLS. Though uric acid is trending up after starting concurrent chemo (6.0). No other signs of TLS, labs otherwise wnl including Cr (0.58).   - Restart Allopurinol, plan to discontinue on discharge      ID  # ID PPX  - ACV 400mg BID  - No indication for antibacterial or antifungal ppx with ANC >1000. Continue to monitor outpatient and start levaquin and voriconazole if ANC <1000  - Note; if she were to need antifungal ppx in the future consider reaching out to pharmacy liaison because prior to meeting her deductible the cost was $2048/month for posaconazole and $176/month for Voriconazole.      # History of recurrent cdiff colitis  - PTA ppx Vancomycin 125mg BID PO      # H/o COVID-19 (Oct 2021)  Pt  is not vaccinated nor interested in being vaccinated. She was admitted from 10/1/2021 - 10/13/2021 for acute hypoxic respiratory failure from COVID-19 pneumonia. Required IMC, high flow and intermittent CPAP. She was treated with dexamethasone, remdesivir, and baricitinib in addition to azithromycin and ceftriaxone. Recovered symptomatically.  - Consider Chavo outpatient, previously discussed in clinic  - Pre-admission COVID testing completed 10/8 at [place], [place] - spoke with lab and unfortunately there has been a delay in processing and this is unlikely to result until later tonight. Repeat asx testing ordered STAT on admission - negative.      ENDO  # Hypothyroidism   - PTA Synthroid 100 mcg daily   - Repeat TSH ordered with AM labs on 10/12 per pt request as it has been over 8 weeks since last dose change, follow-up      MISC  # Hypercalcemia, improved  Ca is 10.5 on admission, last wnl at 8.8 on 9/15. Patient is currently asx. Improved to 9.4 on 10/11.    - Monitor daily BMP  - IVF per chemo regimen    # BMI 40.10  Meeting obesity parameters based on BMI.   - Encourage good nutrition     FEN:  - IVF per chemotherapy protocol   - PRN lyte replacement  - RDAT     Prophy/Misc:  -VTE: therapeutic Eliquis   -Bowels: Senna/Miralax prn      Dispo: Anticipate 3-4 day stay for completion of chemotherapy. Tentative discharge 10/13 AM baring complications.      Follow-up:   - 10/14: labs/possible transfusion at [facility]  - VLAD visit next week - requested  - Neulasta to be added to 10/14 infusion appt - requested        Patient and plan of care was discussed with attending physician Dr. Bahena.    Malika Martin PA-C  Hematology/Oncology  Pager: [phone]    >55 minutes spent on the date of the encounter. Over 50% of time was spent counseling the patient and/or coordinating care.     Interval History   Nursing notes reviewed. NAEO. Patient received first-dose Cytarabine last night and second dose is currently running.  She is tolerating well. This morning denies specific complaints. No blood products needed. Discussed plan to proceed with fourth cycle of chemo in November with repeat BMBx after that. Per julio Jean to stop blood thinner this week once completed. All questions answered.     A comprehensive review of systems was obtained and is negative other than noted here or in the HPI.     Physical Exam   Temp: 98  F (36.7  C) Temp src: Oral BP: 101/62 Pulse: 85   Resp: 18 SpO2: 97 % O2 Device: None (Room air)    Vitals:    10/10/22 1310   Weight: 114.5 kg (252 lb 8 oz)     Vital Signs with Ranges  Temp:  [97.8  F (36.6  C)-99  F (37.2  C)] 98  F (36.7  C)  Pulse:  [74-97] 85  Resp:  [16-18] 18  BP: (101-144)/(62-91) 101/62  SpO2:  [97 %-100 %] 97 %  I/O last 3 completed shifts:  In: 20 [I.V.:20]  Out: -      Constitutional: Pleasant female seen resting comfortably in bed in bedside chair working on computer. Alert and interactive.   HEENT: NCAT. PERRL, EOMI, anicteric sclera. Oral mucosa pink and moist with no lesions or thrush.  Hematologic / Lymphatic: No overt bleeding. No cervical or clavicular adenopathy.  Respiratory: Non-labored breathing, good air exchange, lungs clear to auscultation bilaterally. No cough or wheeze noted.   Cardiovascular: Regular rate and rhythm. No murmur or rub.   GI: Normoactive bowel sounds. Abdomen soft, non-distended, and non-tender. No palpable masses or organomegaly.  Skin: Warm and dry. No concerning lesions or rash on exposed surfaces.  Musculoskeletal: Trace edema to bilateral LE, greatest at ankles. Extremities otherwise grossly normal, non-tender. Strong peripheral pulses. Good strength and ROM.    Neurologic: A&O x 3, CNs 2-12 grossly intact, speech normal, gait normal, sensation to light touch grossly WNL. Grossly non-focal.  Neuropsychiatric: Mentation and affect appear normal/appropriate.  Vascular Access: Port is CDI without erythema, swelling, or discharge.     Medications      - MEDICATION INSTRUCTIONS -         acyclovir  400 mg Oral BID     apixaban ANTICOAGULANT  5 mg Oral BID     cytarabine (CYTOSAR) infusion  3 g/m2 (Treatment Plan Recorded) Intravenous Q12H     dexamethasone  12 mg Oral Q24H     [START ON 10/13/2022] dexamethasone  8 mg Oral QAM     heparin  5-10 mL Intracatheter Q28 Days     heparin lock flush  5-10 mL Intracatheter Q24H     levothyroxine  88 mcg Oral QAM AC     [Held by provider] midostaurin  50 mg Oral BID     multivitamin w/minerals  1 tablet Oral Daily with lunch     ondansetron  8 mg Oral Q12H     prednisoLONE acetate  2 drop Both Eyes 4x Daily     sodium chloride (PF)  10-20 mL Intracatheter Q28 Days     vancomycin  125 mg Oral BID     vitamin C  2,000 mg Oral Daily     vitamin D3  100 mcg Oral Daily     zinc sulfate  220 mg Oral Daily with lunch     Data   Results for orders placed or performed during the hospital encounter of 10/10/22 (from the past 24 hour(s))   Uric acid   Result Value Ref Range    Uric Acid 5.0 2.4 - 5.7 mg/dL   Lactate Dehydrogenase   Result Value Ref Range    Lactate Dehydrogenase 249 0 - 250 U/L   CBC with platelets differential    Narrative    The following orders were created for panel order CBC with platelets differential.  Procedure                               Abnormality         Status                     ---------                               -----------         ------                     CBC with platelets and d...[[id number]]  Abnormal            Final result                 Please view results for these tests on the individual orders.   Comprehensive metabolic panel   Result Value Ref Range    Sodium 136 136 - 145 mmol/L    Potassium 3.8 3.4 - 5.3 mmol/L    Chloride 103 98 - 107 mmol/L    Carbon Dioxide (CO2) 21 (L) 22 - 29 mmol/L    Anion Gap 12 7 - 15 mmol/L    Urea Nitrogen 15.2 6.0 - 20.0 mg/dL    Creatinine 0.65 0.51 - 0.95 mg/dL    Calcium 9.9 8.6 - 10.0 mg/dL    Glucose 98 70 - 99 mg/dL    Alkaline Phosphatase 53 35  - 104 U/L    AST 20 10 - 35 U/L    ALT 26 10 - 35 U/L    Protein Total 6.6 6.4 - 8.3 g/dL    Albumin 4.1 3.5 - 5.2 g/dL    Bilirubin Total 0.5 <=1.2 mg/dL    GFR Estimate >90 >60 mL/min/1.73m2   Magnesium   Result Value Ref Range    Magnesium 1.9 1.7 - 2.3 mg/dL   Phosphorus   Result Value Ref Range    Phosphorus 3.6 2.5 - 4.5 mg/dL   INR   Result Value Ref Range    INR 1.14 0.85 - 1.15   Fibrinogen activity   Result Value Ref Range    Fibrinogen Activity 420 170 - 490 mg/dL   CBC with platelets and differential   Result Value Ref Range    WBC Count 5.9 4.0 - 11.0 10e3/uL    RBC Count 2.80 (L) 3.80 - 5.20 10e6/uL    Hemoglobin 9.7 (L) 11.7 - 15.7 g/dL    Hematocrit 29.9 (L) 35.0 - 47.0 %     (H) 78 - 100 fL    MCH 34.6 (H) 26.5 - 33.0 pg    MCHC 32.4 31.5 - 36.5 g/dL    RDW 21.3 (H) 10.0 - 15.0 %    Platelet Count 241 150 - 450 10e3/uL    % Neutrophils 83 %    % Lymphocytes 6 %    % Monocytes 11 %    % Eosinophils 0 %    % Basophils 0 %    % Immature Granulocytes 0 %    NRBCs per 100 WBC 0 <1 /100    Absolute Neutrophils 4.9 1.6 - 8.3 10e3/uL    Absolute Lymphocytes 0.3 (L) 0.8 - 5.3 10e3/uL    Absolute Monocytes 0.6 0.0 - 1.3 10e3/uL    Absolute Eosinophils 0.0 0.0 - 0.7 10e3/uL    Absolute Basophils 0.0 0.0 - 0.2 10e3/uL    Absolute Immature Granulocytes 0.0 <=0.4 10e3/uL    Absolute NRBCs 0.0 10e3/uL   Asymptomatic COVID-19 Virus (Coronavirus) by PCR Nasopharyngeal    Specimen: Nasopharyngeal; Swab   Result Value Ref Range    SARS CoV2 PCR Negative Negative    Narrative    Testing was performed using the Xpert Xpress SARS-CoV-2 Assay on the  Cepheid Gene-Xpert Instrument Systems. Additional information about  this Emergency Use Authorization (EUA) assay can be found via the Lab  Guide. This test should be ordered for the detection of SARS-CoV-2 in  individuals who meet SARS-CoV-2 clinical and/or epidemiological  criteria. Test performance is unknown in asymptomatic patients. This  test is for in vitro  diagnostic use under the FDA EUA for  laboratories certified under CLIA to perform high complexity testing.  This test has not been FDA cleared or approved. A negative result  does not rule out the presence of PCR inhibitors in the specimen or  target RNA in concentration below the limit of detection for the  assay. The possibility of a false negative should be considered if  the patient's recent exposure or clinical presentation suggests  COVID-19. This test was validated by the [facility]. This laboratory is certified under  the Clinical Laboratory Improvement Amendments of 1988 (CLIA-88) as  qualified to perform high complexity laboratory testing.     CBC with platelets differential    Narrative    The following orders were created for panel order CBC with platelets differential.  Procedure                               Abnormality         Status                     ---------                               -----------         ------                     CBC with platelets and d...[[id number]]  Abnormal            Final result                 Please view results for these tests on the individual orders.   INR   Result Value Ref Range    INR 1.17 (H) 0.85 - 1.15   Fibrinogen activity   Result Value Ref Range    Fibrinogen Activity 406 170 - 490 mg/dL   CBC with platelets and differential   Result Value Ref Range    WBC Count 5.5 4.0 - 11.0 10e3/uL    RBC Count 2.77 (L) 3.80 - 5.20 10e6/uL    Hemoglobin 9.6 (L) 11.7 - 15.7 g/dL    Hematocrit 29.7 (L) 35.0 - 47.0 %     (H) 78 - 100 fL    MCH 34.7 (H) 26.5 - 33.0 pg    MCHC 32.3 31.5 - 36.5 g/dL    RDW 20.8 (H) 10.0 - 15.0 %    Platelet Count 244 150 - 450 10e3/uL    % Neutrophils 94 %    % Lymphocytes 3 %    % Monocytes 2 %    % Eosinophils 0 %    % Basophils 0 %    % Immature Granulocytes 1 %    NRBCs per 100 WBC 0 <1 /100    Absolute Neutrophils 5.3 1.6 - 8.3 10e3/uL    Absolute Lymphocytes 0.1 (L) 0.8 - 5.3  10e3/uL    Absolute Monocytes 0.1 0.0 - 1.3 10e3/uL    Absolute Eosinophils 0.0 0.0 - 0.7 10e3/uL    Absolute Basophils 0.0 0.0 - 0.2 10e3/uL    Absolute Immature Granulocytes 0.0 <=0.4 10e3/uL    Absolute NRBCs 0.0 10e3/uL

## 2022-10-11 NOTE — PLAN OF CARE
[id number]:    VSS on RA, afebrile. Denies pain, N/V, SOB. Neuros intact. Up independently. Voiding spontaneously not saving. LBM 10/10. Port currently infusing Dose #2 of Cytarabine- good blood return noted. Pt able to make needs known. No acute events overnight. Continue with POC.

## 2022-10-12 VITALS
SYSTOLIC BLOOD PRESSURE: 112 MMHG | WEIGHT: 254.5 LBS | HEART RATE: 84 BPM | OXYGEN SATURATION: 96 % | TEMPERATURE: 99.6 F | RESPIRATION RATE: 18 BRPM | BODY MASS INDEX: 39.94 KG/M2 | DIASTOLIC BLOOD PRESSURE: 69 MMHG | HEIGHT: 67 IN

## 2022-10-12 LAB
ABO/RH(D): NORMAL
ALBUMIN SERPL BCG-MCNC: 4 G/DL (ref 3.5–5.2)
ALP SERPL-CCNC: 48 U/L (ref 35–104)
ALT SERPL W P-5'-P-CCNC: 22 U/L (ref 10–35)
ANION GAP SERPL CALCULATED.3IONS-SCNC: 10 MMOL/L (ref 7–15)
ANTIBODY SCREEN: NEGATIVE
AST SERPL W P-5'-P-CCNC: 13 U/L (ref 10–35)
BASOPHILS # BLD AUTO: 0 10E3/UL (ref 0–0.2)
BASOPHILS NFR BLD AUTO: 0 %
BILIRUB SERPL-MCNC: 0.8 MG/DL
BUN SERPL-MCNC: 12.3 MG/DL (ref 6–20)
CALCIUM SERPL-MCNC: 9 MG/DL (ref 8.6–10)
CHLORIDE SERPL-SCNC: 106 MMOL/L (ref 98–107)
CREAT SERPL-MCNC: 0.57 MG/DL (ref 0.51–0.95)
DEPRECATED HCO3 PLAS-SCNC: 21 MMOL/L (ref 22–29)
EOSINOPHIL # BLD AUTO: 0 10E3/UL (ref 0–0.7)
EOSINOPHIL NFR BLD AUTO: 0 %
ERYTHROCYTE [DISTWIDTH] IN BLOOD BY AUTOMATED COUNT: 20.6 % (ref 10–15)
FIBRINOGEN PPP-MCNC: 360 MG/DL (ref 170–490)
GFR SERPL CREATININE-BSD FRML MDRD: >90 ML/MIN/1.73M2
GLUCOSE SERPL-MCNC: 137 MG/DL (ref 70–99)
HCT VFR BLD AUTO: 28.4 % (ref 35–47)
HGB BLD-MCNC: 9.2 G/DL (ref 11.7–15.7)
IMM GRANULOCYTES # BLD: 0 10E3/UL
IMM GRANULOCYTES NFR BLD: 0 %
INR PPP: 1.12 (ref 0.85–1.15)
LYMPHOCYTES # BLD AUTO: 0.1 10E3/UL (ref 0.8–5.3)
LYMPHOCYTES NFR BLD AUTO: 1 %
MAGNESIUM SERPL-MCNC: 2.2 MG/DL (ref 1.7–2.3)
MCH RBC QN AUTO: 35.1 PG (ref 26.5–33)
MCHC RBC AUTO-ENTMCNC: 32.4 G/DL (ref 31.5–36.5)
MCV RBC AUTO: 108 FL (ref 78–100)
MONOCYTES # BLD AUTO: 0.1 10E3/UL (ref 0–1.3)
MONOCYTES NFR BLD AUTO: 1 %
NEUTROPHILS # BLD AUTO: 5 10E3/UL (ref 1.6–8.3)
NEUTROPHILS NFR BLD AUTO: 98 %
NRBC # BLD AUTO: 0 10E3/UL
NRBC BLD AUTO-RTO: 0 /100
PHOSPHATE SERPL-MCNC: 3.4 MG/DL (ref 2.5–4.5)
PLATELET # BLD AUTO: 212 10E3/UL (ref 150–450)
POTASSIUM SERPL-SCNC: 3.8 MMOL/L (ref 3.4–5.3)
PROT SERPL-MCNC: 6.3 G/DL (ref 6.4–8.3)
RBC # BLD AUTO: 2.62 10E6/UL (ref 3.8–5.2)
SODIUM SERPL-SCNC: 137 MMOL/L (ref 136–145)
SPECIMEN EXPIRATION DATE: NORMAL
TSH SERPL DL<=0.005 MIU/L-ACNC: 0.3 UIU/ML (ref 0.3–4.2)
URATE SERPL-MCNC: 4.4 MG/DL (ref 2.4–5.7)
WBC # BLD AUTO: 5.1 10E3/UL (ref 4–11)

## 2022-10-12 PROCEDURE — 250N000013 HC RX MED GY IP 250 OP 250 PS 637: Performed by: PHYSICIAN ASSISTANT

## 2022-10-12 PROCEDURE — 36591 DRAW BLOOD OFF VENOUS DEVICE: CPT | Performed by: PHYSICIAN ASSISTANT

## 2022-10-12 PROCEDURE — 84100 ASSAY OF PHOSPHORUS: CPT | Performed by: INTERNAL MEDICINE

## 2022-10-12 PROCEDURE — 99231 SBSQ HOSP IP/OBS SF/LOW 25: CPT | Performed by: STUDENT IN AN ORGANIZED HEALTH CARE EDUCATION/TRAINING PROGRAM

## 2022-10-12 PROCEDURE — 99207 PR SC NO CHARGE VISIT: CPT | Performed by: STUDENT IN AN ORGANIZED HEALTH CARE EDUCATION/TRAINING PROGRAM

## 2022-10-12 PROCEDURE — 83735 ASSAY OF MAGNESIUM: CPT | Performed by: INTERNAL MEDICINE

## 2022-10-12 PROCEDURE — 250N000011 HC RX IP 250 OP 636: Performed by: PHYSICIAN ASSISTANT

## 2022-10-12 PROCEDURE — 80053 COMPREHEN METABOLIC PANEL: CPT | Performed by: PHYSICIAN ASSISTANT

## 2022-10-12 PROCEDURE — 84443 ASSAY THYROID STIM HORMONE: CPT | Performed by: PHYSICIAN ASSISTANT

## 2022-10-12 PROCEDURE — 85384 FIBRINOGEN ACTIVITY: CPT | Performed by: PHYSICIAN ASSISTANT

## 2022-10-12 PROCEDURE — 120N000002 HC R&B MED SURG/OB UMMC

## 2022-10-12 PROCEDURE — 250N000011 HC RX IP 250 OP 636: Performed by: NURSE PRACTITIONER

## 2022-10-12 PROCEDURE — 84550 ASSAY OF BLOOD/URIC ACID: CPT | Performed by: PHYSICIAN ASSISTANT

## 2022-10-12 PROCEDURE — 86901 BLOOD TYPING SEROLOGIC RH(D): CPT | Performed by: PHYSICIAN ASSISTANT

## 2022-10-12 PROCEDURE — 85610 PROTHROMBIN TIME: CPT | Performed by: PHYSICIAN ASSISTANT

## 2022-10-12 PROCEDURE — 258N000003 HC RX IP 258 OP 636: Performed by: NURSE PRACTITIONER

## 2022-10-12 PROCEDURE — 85025 COMPLETE CBC W/AUTO DIFF WBC: CPT | Performed by: PHYSICIAN ASSISTANT

## 2022-10-12 RX ORDER — DEXAMETHASONE 4 MG/1
8 TABLET ORAL EVERY MORNING
Qty: 2 TABLET | Refills: 0 | Status: ON HOLD | OUTPATIENT
Start: 2022-10-14 | End: 2022-11-15

## 2022-10-12 RX ORDER — PREDNISOLONE ACETATE 10 MG/ML
2 SUSPENSION/ DROPS OPHTHALMIC 4 TIMES DAILY
COMMUNITY
Start: 2022-10-12 | End: 2022-10-14

## 2022-10-12 RX ADMIN — PREDNISOLONE ACETATE 2 DROP: 10 SUSPENSION/ DROPS OPHTHALMIC at 19:42

## 2022-10-12 RX ADMIN — PREDNISOLONE ACETATE 2 DROP: 10 SUSPENSION/ DROPS OPHTHALMIC at 17:01

## 2022-10-12 RX ADMIN — ACYCLOVIR 400 MG: 400 TABLET ORAL at 19:42

## 2022-10-12 RX ADMIN — ACYCLOVIR 400 MG: 400 TABLET ORAL at 08:30

## 2022-10-12 RX ADMIN — VANCOMYCIN HYDROCHLORIDE 125 MG: 125 CAPSULE ORAL at 19:42

## 2022-10-12 RX ADMIN — SODIUM CHLORIDE, PRESERVATIVE FREE 5 ML: 5 INJECTION INTRAVENOUS at 10:04

## 2022-10-12 RX ADMIN — PREDNISOLONE ACETATE 2 DROP: 10 SUSPENSION/ DROPS OPHTHALMIC at 12:34

## 2022-10-12 RX ADMIN — PREDNISOLONE ACETATE 2 DROP: 10 SUSPENSION/ DROPS OPHTHALMIC at 08:30

## 2022-10-12 RX ADMIN — APIXABAN 5 MG: 5 TABLET, FILM COATED ORAL at 19:42

## 2022-10-12 RX ADMIN — ZINC SULFATE 220 MG (50 MG) CAPSULE 220 MG: CAPSULE at 12:34

## 2022-10-12 RX ADMIN — SODIUM CHLORIDE, PRESERVATIVE FREE 5 ML: 5 INJECTION INTRAVENOUS at 21:05

## 2022-10-12 RX ADMIN — ONDANSETRON HYDROCHLORIDE 8 MG: 8 TABLET, FILM COATED ORAL at 05:14

## 2022-10-12 RX ADMIN — APIXABAN 5 MG: 5 TABLET, FILM COATED ORAL at 08:30

## 2022-10-12 RX ADMIN — ALLOPURINOL 300 MG: 300 TABLET ORAL at 08:30

## 2022-10-12 RX ADMIN — Medication 1 TABLET: at 12:34

## 2022-10-12 RX ADMIN — Medication 100 MCG: at 08:30

## 2022-10-12 RX ADMIN — CYTARABINE 6930 MG: 100 INJECTION, SOLUTION INTRATHECAL; INTRAVENOUS; SUBCUTANEOUS at 05:42

## 2022-10-12 RX ADMIN — OXYCODONE HYDROCHLORIDE AND ACETAMINOPHEN 2000 MG: 500 TABLET ORAL at 08:30

## 2022-10-12 RX ADMIN — ONDANSETRON HYDROCHLORIDE 8 MG: 8 TABLET, FILM COATED ORAL at 17:02

## 2022-10-12 RX ADMIN — LEVOTHYROXINE SODIUM 88 MCG: 88 TABLET ORAL at 07:08

## 2022-10-12 RX ADMIN — VANCOMYCIN HYDROCHLORIDE 125 MG: 125 CAPSULE ORAL at 08:30

## 2022-10-12 RX ADMIN — DEXAMETHASONE 12 MG: 4 TABLET ORAL at 17:02

## 2022-10-12 RX ADMIN — CYTARABINE 6930 MG: 100 INJECTION, SOLUTION INTRATHECAL; INTRAVENOUS; SUBCUTANEOUS at 17:35

## 2022-10-12 ASSESSMENT — ACTIVITIES OF DAILY LIVING (ADL)
ADLS_ACUITY_SCORE: 18

## 2022-10-12 NOTE — PLAN OF CARE
[id number]:    VSS on RA, afebrile. Denies pain, N/V, SOB. Neuros intact. Up independently. Voiding spontaneously not saving. LBM 10/11. Port currently infusing Dose #4 of Cytarabine- good blood return noted. Pt able to make needs known. No acute events overnight. Continue with POC.

## 2022-10-12 NOTE — PROGRESS NOTES
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     TODAY:   - D3 HiDAC with midostaurin (D8-21), Dex 12 mg (D1-3) and 8 mg (D4-5)  - Uric acid wnl after starting Allopurinol, continue for now though can likely discontinue at discharge. Other TLS labs wnl.   - Continue Eliquis through 10/13 to complete 3-month course, then stop  - Continue with best supportive cares  - Anticipate discharge 10/13 AM after completion of dose #6 of Cytarabine      HEME/ONC  # AML, 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 Earl. Presented to routine exam with cytopenias. BMBx 6/17/2022 - hypercellular marrow (85-95% cellularity) with 92% blasts. 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 Started induction chemotherapy with 7+3 and midostaurin on 6/22/2022. D21 BMBx (7/12) with robust marrow regeneration, repeat BMBx 7/22 with 70-80% cellularity and 6% blasts by morphology consisted with complete response per Dr. Earl. NGS with no detectable mutations (MRD negative). BMT consulted 7/15; recommend consolidation chemotherapy, could consider BMT in the future if relapses. Tentatively planning for 3-4 cycles consolidation HiDAC (D1-3) and midostaurin (D8-21), followed by maintenance midostaurin for 1 year. Admitted for Cycle 1 consolidation with HiDAC and midostaurin; (C1=8/9/22, C2=9/12/22) and has tolerated well without any concerns or complications. She is currently being admitted for cycle 3.   - Free drug coverage previously approved for midostaurin  through [employer]. Per OP team, Midostaurin script was sent to [facility] specialty pharmacy and will be delivered to pt's home - confirmed on 10/12.   - Plan is to pursue cycle 4 with EOT bmbx 6-8 weeks later, requested per clinic for week of ~11/14   - Port accessed on admission.                   Treatment Plan: HiDAC + Midostaurin (C3D1=10/10/22)               - Cytarabine 3 g/m2 (6930mg) q12h x6 doses - D1-3               - Midostaurin 50 mg BID x14 days - D8 - 21                - Supportive medications;   - Pred forte eye drops QID D1-5 -- continue on discharge  - Dexamethasone 12mg PO q24h D1-3, 8mg D4-5 -- will order discharge script  - Zofran 8mg q12h x6 doses D1-3  - Neulasta 6mg - [facility] on 10/14     # LUE DVT (PICC-associated)  # Acquired coagulopathy d/t Eliquis  CLINT US (7/12) with nonocclusive DVT in the left axillary vein and one of the paired left brachial veins, superficial venous thrombosis in the left basilic vein. PICC-associated which was removed prior to discharge. Transitioned to apixaban as of 8/9 with plan to continue (per Dr. Earl) x 3 months (through ~10/13/22). INR mildly elevated on admission likely 2/2 ongoing Eliquis.   - Continue to monitor and hold if plts <50k.   - Per OP team, continue through 10/13 to complete 3-month course then stop     # TLS monitoring  # Hyperuricemia, improved  Patient is MRD negative CR and is low-risk for TLS. Though uric acid is trending up after starting concurrent chemo (6.0). No other signs of TLS, labs otherwise wnl including Cr (0.58).   - Restart Allopurinol x10/11, plan to discontinue on discharge      ID  # ID PPX  - ACV 400mg BID  - No indication for antibacterial or antifungal ppx with ANC >1000. Continue to monitor outpatient and start levaquin and voriconazole if ANC <1000  - Note; if she were to need antifungal ppx in the future consider reaching out to pharmacy liaison because prior to meeting her deductible the cost was  $2048/month for posaconazole and $176/month for Voriconazole.      # History of recurrent cdiff colitis  - PTA ppx Vancomycin 125mg BID PO      # H/o COVID-19 (Oct 2021)  Pt is not vaccinated nor interested in being vaccinated. She was admitted from 10/1/2021 - 10/13/2021 for acute hypoxic respiratory failure from COVID-19 pneumonia. Required IMC, high flow and intermittent CPAP. She was treated with dexamethasone, remdesivir, and baricitinib in addition to azithromycin and ceftriaxone. Recovered symptomatically.  - Consider Chavo outpatient, previously discussed in clinic  - Pre-admission COVID testing completed 10/8 at [place], [place] - spoke with lab and unfortunately there has been a delay in processing and this is unlikely to result until later tonight. Repeat asx testing ordered STAT on admission - negative.      ENDO  # Hypothyroidism   - PTA Synthroid 100 mcg daily   - Repeat TSH ordered per pt request as it has been over 8 weeks since last dose change. Wnl at 0.30.      MISC  # Hypercalcemia, improved  Ca is 10.5 on admission, last wnl at 8.8 on 9/15. Patient is currently asx. Improved to 9.4 on 10/11.    - Monitor daily BMP  - IVF per chemo regimen    # BMI 40.10  Meeting obesity parameters based on BMI.   - Encourage good nutrition     FEN:  - IVF per chemotherapy protocol   - PRN lyte replacement  - RDAT     Prophy/Misc:  -VTE: therapeutic Eliquis   -Bowels: Senna/Miralax prn      Dispo: Anticipate 3-4 day stay for completion of chemotherapy. Tentative discharge 10/13 AM baring complications.      Follow-up:   - 10/14: labs/possible transfusion, [place]asta at  [place]  - VLAD visit next week - requested       Patient and plan of care was discussed with attending physician Dr. Palacios.    Malika Martin PA-C  Hematology/Oncology  Pager: [phone]    >50 minutes spent on the date of the encounter. Over 50% of time was spent counseling the patient and/or coordinating care.     Interval History   Nursing notes  reviewed. AMARILIS. She is tolerating chemo well and with no signs of neurotoxicity. She is receiving dose #4 this morning, will receive dose #5 later today. Plan is for discharge tomorrow morning after final dose of Cytarabine. Patient continues to endorse feeling well. She denies any specific complaints. Reports fair PO intake. Looking forward to discharging early tomorrow.     A comprehensive review of systems was obtained and is negative other than noted here or in the HPI.     Physical Exam   Temp: 98.9  F (37.2  C) Temp src: Oral BP: 115/68 Pulse: 92   Resp: 16 SpO2: 99 % O2 Device: None (Room air)    Vitals:    10/10/22 1310 10/12/22 0730   Weight: 114.5 kg (252 lb 8 oz) 115.4 kg (254 lb 8 oz)     Vital Signs with Ranges  Temp:  [97.2  F (36.2  C)-99  F (37.2  C)] 98.9  F (37.2  C)  Pulse:  [63-92] 92  Resp:  [16-18] 16  BP: (100-137)/(53-77) 115/68  SpO2:  [97 %-100 %] 99 %  I/O last 3 completed shifts:  In: 1084 [P.O.:740; IV Piggyback:344]  Out: -      Constitutional: Pleasant female seen resting comfortably in bedside chair working on computer. Alert and interactive.   HEENT: NCAT. PERRL, EOMI, anicteric sclera. Oral mucosa pink and moist with no lesions or thrush.  Hematologic / Lymphatic: No overt bleeding. No cervical or clavicular adenopathy.  Respiratory: Non-labored breathing, good air exchange, lungs clear to auscultation bilaterally. No cough or wheeze noted.   Cardiovascular: Regular rate and rhythm. No murmur or rub.   GI: Normoactive bowel sounds. Abdomen soft, non-distended, and non-tender. No palpable masses or organomegaly.  Skin: Warm and dry. No concerning lesions or rash on exposed surfaces.  Musculoskeletal: Trace edema to bilateral LE, greatest at ankles. Extremities otherwise grossly normal, non-tender. Strong peripheral pulses. Good strength and ROM.    Neurologic: A&O x 3, CNs 2-12 grossly intact, speech normal, gait normal, sensation to light touch grossly WNL. Grossly  non-focal.  Neuropsychiatric: Mentation and affect appear normal/appropriate.  Vascular Access: Port is CDI without erythema, swelling, or discharge.     Medications     - MEDICATION INSTRUCTIONS -         acyclovir  400 mg Oral BID     allopurinol  300 mg Oral Daily     apixaban ANTICOAGULANT  5 mg Oral BID     cytarabine (CYTOSAR) infusion  3 g/m2 (Treatment Plan Recorded) Intravenous Q12H     dexamethasone  12 mg Oral Q24H     [START ON 10/13/2022] dexamethasone  8 mg Oral QAM     heparin  5-10 mL Intracatheter Q28 Days     levothyroxine  88 mcg Oral QAM AC     [Held by provider] midostaurin  50 mg Oral BID     multivitamin w/minerals  1 tablet Oral Daily with lunch     ondansetron  8 mg Oral Q12H     prednisoLONE acetate  2 drop Both Eyes 4x Daily     sodium chloride (PF)  10-20 mL Intracatheter Q28 Days     vancomycin  125 mg Oral BID     vitamin C  2,000 mg Oral Daily     vitamin D3  100 mcg Oral Daily     zinc sulfate  220 mg Oral Daily with lunch     Data   Results for orders placed or performed during the hospital encounter of 10/10/22 (from the past 24 hour(s))   ABO/Rh type and screen    Narrative    The following orders were created for panel order ABO/Rh type and screen.  Procedure                               Abnormality         Status                     ---------                               -----------         ------                     Adult Type and Screen[[id number]]                            Final result                 Please view results for these tests on the individual orders.   CBC with platelets differential    Narrative    The following orders were created for panel order CBC with platelets differential.  Procedure                               Abnormality         Status                     ---------                               -----------         ------                     CBC with platelets and d...[[id number]]  Abnormal            Final result                 Please view results for  these tests on the individual orders.   Comprehensive metabolic panel   Result Value Ref Range    Sodium 137 136 - 145 mmol/L    Potassium 3.8 3.4 - 5.3 mmol/L    Chloride 106 98 - 107 mmol/L    Carbon Dioxide (CO2) 21 (L) 22 - 29 mmol/L    Anion Gap 10 7 - 15 mmol/L    Urea Nitrogen 12.3 6.0 - 20.0 mg/dL    Creatinine 0.57 0.51 - 0.95 mg/dL    Calcium 9.0 8.6 - 10.0 mg/dL    Glucose 137 (H) 70 - 99 mg/dL    Alkaline Phosphatase 48 35 - 104 U/L    AST 13 10 - 35 U/L    ALT 22 10 - 35 U/L    Protein Total 6.3 (L) 6.4 - 8.3 g/dL    Albumin 4.0 3.5 - 5.2 g/dL    Bilirubin Total 0.8 <=1.2 mg/dL    GFR Estimate >90 >60 mL/min/1.73m2   INR   Result Value Ref Range    INR 1.12 0.85 - 1.15   Fibrinogen activity   Result Value Ref Range    Fibrinogen Activity 360 170 - 490 mg/dL   Uric acid   Result Value Ref Range    Uric Acid 4.4 2.4 - 5.7 mg/dL   TSH   Result Value Ref Range    TSH 0.30 0.30 - 4.20 uIU/mL   Magnesium   Result Value Ref Range    Magnesium 2.2 1.7 - 2.3 mg/dL   Phosphorus   Result Value Ref Range    Phosphorus 3.4 2.5 - 4.5 mg/dL   Adult Type and Screen   Result Value Ref Range    ABO/RH(D) A NEG     Antibody Screen Negative Negative    SPECIMEN EXPIRATION DATE [id number]    CBC with platelets and differential   Result Value Ref Range    WBC Count 5.1 4.0 - 11.0 10e3/uL    RBC Count 2.62 (L) 3.80 - 5.20 10e6/uL    Hemoglobin 9.2 (L) 11.7 - 15.7 g/dL    Hematocrit 28.4 (L) 35.0 - 47.0 %     (H) 78 - 100 fL    MCH 35.1 (H) 26.5 - 33.0 pg    MCHC 32.4 31.5 - 36.5 g/dL    RDW 20.6 (H) 10.0 - 15.0 %    Platelet Count 212 150 - 450 10e3/uL    % Neutrophils 98 %    % Lymphocytes 1 %    % Monocytes 1 %    % Eosinophils 0 %    % Basophils 0 %    % Immature Granulocytes 0 %    NRBCs per 100 WBC 0 <1 /100    Absolute Neutrophils 5.0 1.6 - 8.3 10e3/uL    Absolute Lymphocytes 0.1 (L) 0.8 - 5.3 10e3/uL    Absolute Monocytes 0.1 0.0 - 1.3 10e3/uL    Absolute Eosinophils 0.0 0.0 - 0.7 10e3/uL    Absolute  Basophils 0.0 0.0 - 0.2 10e3/uL    Absolute Immature Granulocytes 0.0 <=0.4 10e3/uL    Absolute NRBCs 0.0 10e3/uL

## 2022-10-12 NOTE — DISCHARGE SUMMARY
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 She overall tolerated chemo well and without complication. Of note Ca was transiently elevated on admission (10.5) though resolved without intervention. Uric acid also elevated after starting chemo. Allopurinol was started with concurrent chemo though will be discontinued on discharge given she is in MRD negative CR and at low-risk for TLS. She will begin taking Midostaurin on D8 (10/17), confirmed with her that this is being delivered to her home.     On day of discharge Minerva is feeling very well. Her final dose of Cytarabine is infusing and she is looking forward to discharge once that is complete. She denies any new complaints. We discussed plan for close follow-up as below. All questions were answered.     Follow-up:    10/14: Labs and Neulasta infusions appt. Ongoing labs and possible transfusions are scheduled thereafter.     10/19: VLAD visit     Per OP team - plan is to pursue cycle 4 with EOT bmbx 6-8 weeks later, C4 requested per clinic for week of ~11/14 (delayed 1 week d/t travel)    Medication Highlights:    Start Midostaurin (D8-21) on 10/17    Eliquis discontinued as 3-month course has been completed    Protocol-directed Dexamethasone 8 mg on 10/14    Pred Forte eye  drops through 10/14    Hospital Course   Veda Marinelli was admitted on 10/10/2022.  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 Earl. Presented to routine exam with cytopenias. BMBx 6/17/2022 - hypercellular marrow (85-95% cellularity) with 92% blasts. 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 Started induction chemotherapy with 7+3 and midostaurin on 6/22/2022. D21 BMBx (7/12) with robust marrow regeneration, repeat BMBx 7/22 with 70-80% cellularity and 6% blasts by morphology consisted with complete response per Dr. Earl. NGS with no detectable mutations (MRD negative). BMT consulted 7/15; recommend consolidation chemotherapy, could consider BMT in the future if relapses. Tentatively planning for 3-4 cycles consolidation HiDAC (D1-3) and midostaurin (D8-21), followed by maintenance midostaurin for 1 year. Admitted for Cycle 1 consolidation with HiDAC and midostaurin; (C1=8/9/22, C2=9/12/22) and has tolerated well without any concerns or complications. She is currently being admitted for cycle 3.   - Free drug coverage previously approved for midostaurin through [employer]. Per OP team, Midostaurin script was sent to [facility] specialty pharmacy and will be delivered to pt's home - confirmed on 10/12.   - Plan is to pursue cycle 4 with EOT bmbx 6-8 weeks later, requested per clinic for week of ~11/14   - Port accessed on admission, de-access on discharge                  Treatment Plan: HiDAC + Midostaurin (C3D1=10/10/22)               - Cytarabine 3 g/m2 (6930mg) q12h x6 doses - D1-3; complete               - Midostaurin 50 mg BID x14 days - D8 - 21; will start taking on 10/17               - Supportive medications;   - Pred forte eye drops QID D1-5 -- continue on discharge  - Dexamethasone 12mg PO q24h D1-3, 8mg D4-5 -- script  sent  - Zofran 8mg q12h x6 doses D1-3  - Neulasta 6mg - scheduled at [facility] on 10/14     # CLINT DVT (PICC-associated), s/p 3-month course of anticoagulation  # Acquired coagulopathy d/t Eliquis  CLINT US (7/12) with nonocclusive DVT in the left axillary vein and one of the paired left brachial veins, superficial venous thrombosis in the left basilic vein. PICC-associated which was removed prior to discharge. Transitioned to apixaban as of 8/9 with plan to continue (per Dr. Earl) x 3 months (through ~10/13/22). INR mildly elevated on admission likely 2/2 ongoing Eliquis.   - Continue to monitor and hold if plts <50k.   - Per OP team, continue through 10/13 to complete 3-month course then stop. Discontinued on discharge.       # TLS monitoring  # Hyperuricemia, improved  Patient is MRD negative CR and is low-risk for TLS. Though uric acid is trending up after starting concurrent chemo (6.0). No other signs of TLS, labs otherwise wnl including Cr (0.58).   - Restart Allopurinol (10/11-10/13) with concurrent chemo, discontinue on discharge      ID  # ID PPX  - ACV 400mg BID  - No indication for antibacterial or antifungal ppx with ANC >1000. Continue to monitor outpatient and start levaquin and voriconazole if ANC <1000  - Note; if she were to need antifungal ppx in the future consider reaching out to pharmacy liaison because prior to meeting her deductible the cost was $2048/month for posaconazole and $176/month for Voriconazole.      # History of recurrent cdiff colitis  - PTA ppx Vancomycin 125mg BID PO      # H/o COVID-19 (Oct 2021)  Pt is not vaccinated nor interested in being vaccinated. She was admitted from 10/1/2021 - 10/13/2021 for acute hypoxic respiratory failure from COVID-19 pneumonia. Required IMC, high flow and intermittent CPAP. She was treated with dexamethasone, remdesivir, and baricitinib in addition to azithromycin and ceftriaxone. Recovered symptomatically.  - Consider Chavo outpatient,  previously discussed in clinic  - Pre-admission COVID testing completed 10/8 at [place], [place] - spoke with lab and unfortunately there has been a delay in processing and this is unlikely to result until later tonight. Repeat asx testing ordered STAT on admission - negative.      ENDO  # Hypothyroidism   - PTA Synthroid 100 mcg daily   - Repeat TSH ordered per pt request as it has been over 8 weeks since last dose change. Wnl at 0.30.      MISC  # Hypercalcemia, resolved  Ca is 10.5 on admission, last wnl at 8.8 on 9/15. Patient is currently asx. Improved to 9.4 on 10/11.    - Monitor daily BMP  - IVF per chemo regimen     # BMI 40.10  Meeting obesity parameters based on BMI.   - Encourage good nutrition     FEN:  - IVF per chemotherapy protocol   - PRN lyte replacement  - RDAT     Prophy/Misc:  -VTE: therapeutic Eliquis   -Bowels: Senna/Miralax prn      Dispo: Discharge 10/13 following completion of chemo.      Follow-up:     10/14: Labs and Neulasta infusions appt. Ongoing labs and possible transfusions are scheduled thereafter.     10/19: VLAD visit     Per OP team - plan is to pursue cycle 4 with EOT bmbx 6-8 weeks later, C4 requested per clinic for week of ~11/14 (delayed 1 week d/t travel)      Patient and plan of care was discussed with attending physician Dr. Palacios.     Malika Martin PA-C  Hematology/Oncology  Pager: [phone]    >50 minutes spent on the date of the encounter. Over 50% of time was spent counseling the patient and/or coordinating care.     Pending Results   Unresulted Labs Ordered in the Past 30 Days of this Admission     No orders found from 9/10/2022 to 10/11/2022.        Code Status   Full Code    Primary Care Physician   TATA NELSON    Constitutional: Pleasant female seen resting comfortably in bed in bedside chair working on computer. Alert and interactive.   HEENT: NCAT. PERRL, EOMI, anicteric sclera. Oral mucosa pink and moist with no lesions or thrush.  Hematologic /  Lymphatic: No overt bleeding. No cervical or clavicular adenopathy.  Respiratory: Non-labored breathing, good air exchange, lungs clear to auscultation bilaterally. No cough or wheeze noted.   Cardiovascular: Regular rate and rhythm. No murmur or rub.   GI: Normoactive bowel sounds. Abdomen soft, non-distended, and non-tender. No palpable masses or organomegaly.  Skin: Warm and dry. No concerning lesions or rash on exposed surfaces.  Musculoskeletal: Trace edema to bilateral LE, greatest at ankles. Extremities otherwise grossly normal, non-tender. Strong peripheral pulses. Good strength and ROM.    Neurologic: A&O x 3, CNs 2-12 grossly intact, speech normal, gait normal, sensation to light touch grossly WNL. Grossly non-focal.  Neuropsychiatric: Mentation and affect appear normal/appropriate.  Vascular Access: Port is CDI without erythema, swelling, or discharge.     Time Spent on this Encounter   Malika MATAMOROS PA-C, personally saw the patient today and spent greater than 30 minutes discharging this patient.    Discharge Disposition   Discharged to home  Condition at discharge: Stable    Consultations This Hospital Stay   None    Discharge Orders      Reason for your hospital stay    Admission for scheduled chemotherapy     Activity    Your activity upon discharge: activity as tolerated     Follow Up and recommended labs and tests    Follow-up as currently scheduled with labs and provider visit in the [facility]. Times and locations as listed below.     When to contact your care team    [facility] triage line at [phone] for temp > or = 100.4, uncontrolled nausea/vomiting/diarrhea/constipation, unrelieved pain, bleeding not relieved with pressure, dizziness, chest pain, shortness of breath, loss of consciousness, and any new or concerning symptoms.     Discharge Instructions    Stop Eliquis on discharge as you have completed your 3-month course.     Take Dexamethsone 8  mg on 10/14 and then stop.     Continue eye drops though 10/14 and then stop.    Start taking Midostaurin (D8-21) on 10/17.     Diet    Follow this diet upon discharge: Regular     Check Out Appointment Request    Requesting the following:   - Neulasta infusion appt to be added to already scheduled infusion appt on 10/14 at [facility]  - Ongoing twice weekly labs and possible transfusions at [facility] for the next two weeks starting ~10/17  - VLAD virtual visit sometime mid-next week     Discharge Medications   Current Discharge Medication List      START taking these medications    Details   dexamethasone (DECADRON) 4 MG tablet Take 2 tablets (8 mg) by mouth every morning  Qty: 2 tablet, Refills: 0    Associated Diagnoses: Acute myeloid leukemia in remission (H)      prednisoLONE acetate (PRED FORTE) 1 % ophthalmic suspension Place 2 drops into both eyes 4 times daily    Associated Diagnoses: Acute myeloid leukemia in remission (H)      !! midostaurin (RYDAPT) 25 MG capsule Take 2 capsules (50 mg) by mouth 2 times daily for 14 days . Take with food on Days 8 through 21.  Qty: 56 capsule, Refills: 0    Associated Diagnoses: Acute myeloid leukemia in remission (H)       !! - Potential duplicate medications found. Please discuss with provider.      CONTINUE these medications which have NOT CHANGED    Details   Bacillus Coagulans-Inulin (PROBIOTIC) 1-250 BILLION-MG CAPS Take 1 capsule by mouth daily      fluconazole (DIFLUCAN) 200 MG tablet Take 1 tablet (200 mg) by mouth daily When absolute neutrophils are less than 1.- x 10^9/L  Qty: 30 tablet, Refills: 0    Associated Diagnoses: Need for prophylactic antibiotic      levofloxacin (LEVAQUIN) 250 MG tablet Take 1 tablet (250 mg) by mouth daily When absolute neutrophils are less than 1.- x 10^9/L  Qty: 30 tablet, Refills: 4    Associated Diagnoses: Acute myeloid leukemia in remission (H)      levothyroxine (SYNTHROID/LEVOTHROID) 88 MCG tablet Take 88 mcg by mouth daily       magnesium 250 MG tablet Take 250 mg by mouth daily      !! midostaurin (RYDAPT) 25 MG capsule Take 2 capsules (50 mg) by mouth 2 times daily . Take with food on Days 8 through 21 (9/19 - 10/2) then stop  Qty: 56 capsule, Refills: 0    Associated Diagnoses: Acute myeloid leukemia in remission (H)      Multiple Vitamins-Minerals (WOMENS MULTIVITAMIN) TABS Take 1 tablet by mouth daily      Quercetin 50 MG TABS Take 50 mg by mouth daily      vancomycin (VANCOCIN) 125 MG capsule Take 1 capsule (125 mg) by mouth 2 times daily  Qty: 60 capsule, Refills: 4    Associated Diagnoses: Clostridium difficile infection      vitamin C (ASCORBIC ACID) 1000 MG TABS Take 2,000 mg by mouth daily      vitamin D3 (CHOLECALCIFEROL) 50 mcg (2000 units) tablet Take 1 tablet (50 mcg) by mouth daily      zinc gluconate 50 MG tablet Take 50 mg by mouth daily      acyclovir (ZOVIRAX) 400 MG tablet Take 1 tablet (400 mg) by mouth 2 times daily  Qty: 60 tablet, Refills: 3    Associated Diagnoses: Acute myeloid leukemia not having achieved remission (H)       !! - Potential duplicate medications found. Please discuss with provider.      STOP taking these medications       apixaban ANTICOAGULANT (ELIQUIS) 5 MG tablet Comments:   Reason for Stopping:             Allergies   Allergies   Allergen Reactions     Blood Transfusion Related (Informational Only) Hives     6/29/2022, reaction of hives with platelet transfusion      Data   Most Recent 3 CBC's:Recent Labs   Lab Test 10/13/22  0636 10/12/22  0536 10/11/22  0532   WBC 5.3 5.1 5.5   HGB 8.8* 9.2* 9.6*   * 108* 107*    212 244      Most Recent 3 BMP's:  Recent Labs   Lab Test 10/13/22  0636 10/12/22  0536 10/11/22  0532    137 140   POTASSIUM 4.0 3.8 3.7   CHLORIDE 109* 106 107   CO2 22 21* 17*   BUN 12.2 12.3 12.8   CR 0.44* 0.57 0.58   ANIONGAP 8 10 16*   MICHAEL 8.9 9.0 9.4   * 137* 162*     Most Recent 2 LFT's:  Recent Labs   Lab Test 10/13/22  0636 10/12/22  0536    AST 12 13   ALT 19 22   ALKPHOS 47 48   BILITOTAL 0.6 0.8     Most Recent INR's and Anticoagulation Dosing History:  Anticoagulation Dose History     Recent Dosing and Labs Latest Ref Rng & Units 7/4/2022 8/9/2022 9/12/2022 10/10/2022 10/11/2022 10/12/2022 10/13/2022    INR 0.85 - 1.15 1.10 0.98 1.01 1.14 1.17(H) 1.12 1.09        Most Recent 3 Troponin's:No lab results found.  Most Recent Cholesterol Panel:  Recent Labs   Lab Test 03/08/22  0930   TRIG 93     Most Recent 6 Bacteria Isolates From Any Culture (See EPIC Reports for Culture Details):No lab results found.  Most Recent TSH, T4 and A1c Labs:  Recent Labs   Lab Test 10/12/22  0536 08/12/22  0724   TSH 0.30 0.12*   T4  --  1.51     Results for orders placed or performed during the hospital encounter of 08/09/22   IR Chest Port Placement > 5 Yrs of Age    Narrative    PRE-PROCEDURE DIAGNOSIS: Acute myeloid leukemia in remission    POST-PROCEDURE DIAGNOSIS: Same    PROCEDURE: Chest port placement    Impression    IMPRESSION: Completed image-guided placement of 6 Barbadian, 23.5 cm  single lumen power-injectable central venous port via right internal  jugular vein. Catheter tip in the right atrium. Aspirates and flushes  freely, heparin locked and is ready for immediate use. No  complication.    ----------    CLINICAL HISTORY: Patient requiring central venous access for  chemotherapy, IV medications and blood draws. Chest port placement  requested.    PERFORMED BY: Chema Dominguez PA-C    CONSENT: Written informed consent was obtained and is documented in  the patient record.    SEDATION CONSENT: It was explained to the patient that medications  used for sedation and pain relief may be administered, if needed, to  reduce anxiety and discomfort that may be associated with the  procedure. Serious side effects from the medications are rare but may  include prolonged drowsiness and difficulty breathing, possibly  requiring placement of a breathing tube to ventilate the  lungs.    MEDICATIONS:  Intravenous sedation was administered with 1.5 mg  midazolam and 75 mcg fentanyl. 1% lidocaine without epinephrine was  available for local anesthesia. The port was heparin locked upon  completion of placement.    NURSING: The patient was placed on continuous vital signs monitoring.  Vital signs and sedation were monitored by nursing staff under IR  physician staff supervision.     SEDATION TIME: 30 minutes face-to-face    FLUOROSCOPY TIME: 0.2 minutes    DESCRIPTION: The right neck and upper chest were prepped and draped in  the usual sterile fashion.      Under ultrasound guidance, the right internal jugular vein was  identified and the overlying skin was anesthetized and skin  dermatotomy was made. Under ultrasound guidance, right internal  jugular venipuncture was made with needle. Image saved documenting  venipuncture and patency.    Needle was exchanged over guidewire for a dilator under fluoroscopic  guidance. Length to right atrium was measured with guidewire.  Guidewire and inner dilator were removed. Wire was advanced into  inferior vena cava under fluoroscopic guidance and secured.     The anterior right chest skin was anesthetized and incision was made.  A subcutaneous port pocket was created using a combination of sharp  and blunt dissection. The pocket was irrigated with saline and packed  with gauze to obtain hemostasis. The gauze was removed.      Port catheter was subcutaneously tunneled from the anterior chest  pocket to the internal jugular venipuncture site after path of tunnel  was anesthetized. Catheter cut to length. The dilator was exchanged  over guidewire for a peel-away sheath. Guidewire was removed. Under  fluoroscopic guidance, the catheter was placed through the peel-away  sheath and positioned with its tip in the right atrium. Peel-away  sheath was removed.      Final port and catheter position saved. Port aspirated and flushed  freely and was heparin locked.  The chest incision was closed with  three 3-0 Vicryl interrupted sutures and topical adhesive. The skin  dermatotomy site overlying the internal jugular venipuncture was  closed with topical adhesive.    COMPLICATIONS: No immediate complication, the patient remained stable  throughout the procedure and tolerated it well.    ESTIMATED BLOOD LOSS: Minimal    SPECIMENS: None    ABBEY MCGEE PA-C         SYSTEM ID:  [id number]

## 2022-10-12 NOTE — PLAN OF CARE
Goal Outcome Evaluation:  Completed dose # 4 of Cytarabine chemotherapy without problems. Denied pain or nausea. She had a stool today. Plan is to discharge by 0930 tomorrow. Up independently.

## 2022-10-13 LAB
ALBUMIN SERPL BCG-MCNC: 3.9 G/DL (ref 3.5–5.2)
ALP SERPL-CCNC: 47 U/L (ref 35–104)
ALT SERPL W P-5'-P-CCNC: 19 U/L (ref 10–35)
ANION GAP SERPL CALCULATED.3IONS-SCNC: 8 MMOL/L (ref 7–15)
AST SERPL W P-5'-P-CCNC: 12 U/L (ref 10–35)
BASOPHILS # BLD AUTO: 0 10E3/UL (ref 0–0.2)
BASOPHILS NFR BLD AUTO: 0 %
BILIRUB SERPL-MCNC: 0.6 MG/DL
BUN SERPL-MCNC: 12.2 MG/DL (ref 6–20)
CALCIUM SERPL-MCNC: 8.9 MG/DL (ref 8.6–10)
CHLORIDE SERPL-SCNC: 109 MMOL/L (ref 98–107)
CREAT SERPL-MCNC: 0.44 MG/DL (ref 0.51–0.95)
DEPRECATED HCO3 PLAS-SCNC: 22 MMOL/L (ref 22–29)
EOSINOPHIL # BLD AUTO: 0 10E3/UL (ref 0–0.7)
EOSINOPHIL NFR BLD AUTO: 0 %
ERYTHROCYTE [DISTWIDTH] IN BLOOD BY AUTOMATED COUNT: 19.6 % (ref 10–15)
FIBRINOGEN PPP-MCNC: 310 MG/DL (ref 170–490)
GFR SERPL CREATININE-BSD FRML MDRD: >90 ML/MIN/1.73M2
GLUCOSE SERPL-MCNC: 132 MG/DL (ref 70–99)
HCT VFR BLD AUTO: 27 % (ref 35–47)
HGB BLD-MCNC: 8.8 G/DL (ref 11.7–15.7)
IMM GRANULOCYTES # BLD: 0 10E3/UL
IMM GRANULOCYTES NFR BLD: 1 %
INR PPP: 1.09 (ref 0.85–1.15)
LYMPHOCYTES # BLD AUTO: 0 10E3/UL (ref 0.8–5.3)
LYMPHOCYTES NFR BLD AUTO: 1 %
MCH RBC QN AUTO: 34.8 PG (ref 26.5–33)
MCHC RBC AUTO-ENTMCNC: 32.6 G/DL (ref 31.5–36.5)
MCV RBC AUTO: 107 FL (ref 78–100)
MONOCYTES # BLD AUTO: 0 10E3/UL (ref 0–1.3)
MONOCYTES NFR BLD AUTO: 0 %
NEUTROPHILS # BLD AUTO: 5.3 10E3/UL (ref 1.6–8.3)
NEUTROPHILS NFR BLD AUTO: 98 %
NRBC # BLD AUTO: 0 10E3/UL
NRBC BLD AUTO-RTO: 0 /100
PLATELET # BLD AUTO: 192 10E3/UL (ref 150–450)
POTASSIUM SERPL-SCNC: 4 MMOL/L (ref 3.4–5.3)
PROT SERPL-MCNC: 6.1 G/DL (ref 6.4–8.3)
RBC # BLD AUTO: 2.53 10E6/UL (ref 3.8–5.2)
SODIUM SERPL-SCNC: 139 MMOL/L (ref 136–145)
URATE SERPL-MCNC: 3 MG/DL (ref 2.4–5.7)
WBC # BLD AUTO: 5.3 10E3/UL (ref 4–11)

## 2022-10-13 PROCEDURE — 82040 ASSAY OF SERUM ALBUMIN: CPT | Performed by: PHYSICIAN ASSISTANT

## 2022-10-13 PROCEDURE — 250N000013 HC RX MED GY IP 250 OP 250 PS 637: Performed by: PHYSICIAN ASSISTANT

## 2022-10-13 PROCEDURE — 36591 DRAW BLOOD OFF VENOUS DEVICE: CPT | Performed by: PHYSICIAN ASSISTANT

## 2022-10-13 PROCEDURE — 258N000003 HC RX IP 258 OP 636: Performed by: NURSE PRACTITIONER

## 2022-10-13 PROCEDURE — 99207 PR SC NO CHARGE VISIT: CPT | Performed by: STUDENT IN AN ORGANIZED HEALTH CARE EDUCATION/TRAINING PROGRAM

## 2022-10-13 PROCEDURE — 85384 FIBRINOGEN ACTIVITY: CPT | Performed by: PHYSICIAN ASSISTANT

## 2022-10-13 PROCEDURE — 85610 PROTHROMBIN TIME: CPT | Performed by: PHYSICIAN ASSISTANT

## 2022-10-13 PROCEDURE — 99239 HOSP IP/OBS DSCHRG MGMT >30: CPT | Performed by: STUDENT IN AN ORGANIZED HEALTH CARE EDUCATION/TRAINING PROGRAM

## 2022-10-13 PROCEDURE — 250N000011 HC RX IP 250 OP 636: Performed by: NURSE PRACTITIONER

## 2022-10-13 PROCEDURE — 250N000011 HC RX IP 250 OP 636: Performed by: PHYSICIAN ASSISTANT

## 2022-10-13 PROCEDURE — 85025 COMPLETE CBC W/AUTO DIFF WBC: CPT | Performed by: PHYSICIAN ASSISTANT

## 2022-10-13 PROCEDURE — 80053 COMPREHEN METABOLIC PANEL: CPT | Performed by: PHYSICIAN ASSISTANT

## 2022-10-13 PROCEDURE — 84550 ASSAY OF BLOOD/URIC ACID: CPT | Performed by: PHYSICIAN ASSISTANT

## 2022-10-13 RX ADMIN — APIXABAN 5 MG: 5 TABLET, FILM COATED ORAL at 08:25

## 2022-10-13 RX ADMIN — VANCOMYCIN HYDROCHLORIDE 125 MG: 125 CAPSULE ORAL at 08:25

## 2022-10-13 RX ADMIN — DEXAMETHASONE 8 MG: 4 TABLET ORAL at 08:25

## 2022-10-13 RX ADMIN — PREDNISOLONE ACETATE 2 DROP: 10 SUSPENSION/ DROPS OPHTHALMIC at 08:25

## 2022-10-13 RX ADMIN — CYTARABINE 6930 MG: 100 INJECTION, SOLUTION INTRATHECAL; INTRAVENOUS; SUBCUTANEOUS at 05:51

## 2022-10-13 RX ADMIN — Medication 5 ML: at 09:28

## 2022-10-13 RX ADMIN — LEVOTHYROXINE SODIUM 88 MCG: 88 TABLET ORAL at 05:13

## 2022-10-13 RX ADMIN — Medication 100 MCG: at 08:24

## 2022-10-13 RX ADMIN — ONDANSETRON HYDROCHLORIDE 8 MG: 8 TABLET, FILM COATED ORAL at 05:13

## 2022-10-13 RX ADMIN — OXYCODONE HYDROCHLORIDE AND ACETAMINOPHEN 2000 MG: 500 TABLET ORAL at 08:24

## 2022-10-13 RX ADMIN — ACYCLOVIR 400 MG: 400 TABLET ORAL at 08:25

## 2022-10-13 RX ADMIN — ALLOPURINOL 300 MG: 300 TABLET ORAL at 08:25

## 2022-10-13 ASSESSMENT — ACTIVITIES OF DAILY LIVING (ADL)
ADLS_ACUITY_SCORE: 18

## 2022-10-13 NOTE — PLAN OF CARE
Goal Outcome Evaluation:    [id number]    A/Ox4. Tmax 99.6. OVSS on RA. Denies pain, SOB and N/V. No PO intake yet this shift, fluids encouraged. Pt voiding spontaneously without saving urine and educated to save, but not agreeable. R-Port HL. Plan for discharge 10/13 AM after dose #6 of Cytarabine.     Nursing Focus:D5 Cytarabine Chemotherapy  D: Positive blood return via Port. Insertion site is clean/dry/intact, dressing intact with no complaints of pain.  Urine output is recorded in intake in Doc Flowsheet.    I: Premedications given per order (see electronic medical administration record). Dose #5 of Cytarabine started to infuse over 3hours. Reviewed pt teaching on chemotherapy side effects.  Pt denies need for further teaching. Chemotherapy double checked per protocol by two chemotherapy competent RN's.   A: Tolerating procedure well. Denies nausea and or pain.   P: Continue to monitor urine output and symptoms of nausea. Screen for symptoms of toxicity.

## 2022-10-13 NOTE — PROGRESS NOTES
Finished dose # 6 of Cytarabine chemotherapy without problems. Reviewed discharge medications and orders and she verbalized understanding. She has follow up clinic appointments and phone numbers to call with questions.

## 2022-10-13 NOTE — PLAN OF CARE
[id number]:  VSS on RA, afebrile. Denies pain, N/V, SOB. Neuros intact. Up independently. Voiding spontaneously not saving. LBM 10/12. Port currently infusing Dose #6 of Cytarabine- good blood return noted. Pt to discharge after completion of chemo.

## 2022-10-14 ENCOUNTER — INFUSION THERAPY VISIT (OUTPATIENT)
Dept: INFUSION THERAPY | Facility: CLINIC | Age: 37
End: 2022-10-14
Attending: PHYSICIAN ASSISTANT
Payer: COMMERCIAL

## 2022-10-14 ENCOUNTER — PATIENT OUTREACH (OUTPATIENT)
Dept: CARE COORDINATION | Facility: CLINIC | Age: 37
End: 2022-10-14

## 2022-10-14 VITALS
RESPIRATION RATE: 14 BRPM | HEART RATE: 87 BPM | DIASTOLIC BLOOD PRESSURE: 77 MMHG | TEMPERATURE: 99.3 F | SYSTOLIC BLOOD PRESSURE: 115 MMHG

## 2022-10-14 DIAGNOSIS — C92.01 ACUTE MYELOID LEUKEMIA IN REMISSION (H): Primary | ICD-10-CM

## 2022-10-14 DIAGNOSIS — C92.00 ACUTE MYELOID LEUKEMIA NOT HAVING ACHIEVED REMISSION (H): ICD-10-CM

## 2022-10-14 LAB
BASOPHILS # BLD AUTO: 0 10E3/UL (ref 0–0.2)
BASOPHILS NFR BLD AUTO: 0 %
EOSINOPHIL # BLD AUTO: 0 10E3/UL (ref 0–0.7)
EOSINOPHIL NFR BLD AUTO: 0 %
ERYTHROCYTE [DISTWIDTH] IN BLOOD BY AUTOMATED COUNT: 18.7 % (ref 10–15)
HCT VFR BLD AUTO: 28.1 % (ref 35–47)
HGB BLD-MCNC: 9.4 G/DL (ref 11.7–15.7)
IMM GRANULOCYTES # BLD: 0 10E3/UL
IMM GRANULOCYTES NFR BLD: 1 %
LYMPHOCYTES # BLD AUTO: 0 10E3/UL (ref 0.8–5.3)
LYMPHOCYTES NFR BLD AUTO: 1 %
MCH RBC QN AUTO: 35.3 PG (ref 26.5–33)
MCHC RBC AUTO-ENTMCNC: 33.5 G/DL (ref 31.5–36.5)
MCV RBC AUTO: 106 FL (ref 78–100)
MONOCYTES # BLD AUTO: 0 10E3/UL (ref 0–1.3)
MONOCYTES NFR BLD AUTO: 0 %
NEUTROPHILS # BLD AUTO: 4.1 10E3/UL (ref 1.6–8.3)
NEUTROPHILS NFR BLD AUTO: 98 %
NRBC # BLD AUTO: 0 10E3/UL
NRBC BLD AUTO-RTO: 0 /100
PLATELET # BLD AUTO: 177 10E3/UL (ref 150–450)
RBC # BLD AUTO: 2.66 10E6/UL (ref 3.8–5.2)
WBC # BLD AUTO: 4.2 10E3/UL (ref 4–11)

## 2022-10-14 PROCEDURE — 85025 COMPLETE CBC W/AUTO DIFF WBC: CPT | Performed by: INTERNAL MEDICINE

## 2022-10-14 PROCEDURE — 250N000011 HC RX IP 250 OP 636: Performed by: NURSE PRACTITIONER

## 2022-10-14 PROCEDURE — 96372 THER/PROPH/DIAG INJ SC/IM: CPT | Performed by: NURSE PRACTITIONER

## 2022-10-14 PROCEDURE — 250N000011 HC RX IP 250 OP 636

## 2022-10-14 PROCEDURE — 36591 DRAW BLOOD OFF VENOUS DEVICE: CPT

## 2022-10-14 RX ORDER — HEPARIN SODIUM (PORCINE) LOCK FLUSH IV SOLN 100 UNIT/ML 100 UNIT/ML
5 SOLUTION INTRAVENOUS ONCE
Status: COMPLETED | OUTPATIENT
Start: 2022-10-14 | End: 2022-10-14

## 2022-10-14 RX ORDER — HEPARIN SODIUM (PORCINE) LOCK FLUSH IV SOLN 100 UNIT/ML 100 UNIT/ML
SOLUTION INTRAVENOUS
Status: COMPLETED
Start: 2022-10-14 | End: 2022-10-14

## 2022-10-14 RX ADMIN — Medication 5 ML: at 10:26

## 2022-10-14 RX ADMIN — HEPARIN SODIUM (PORCINE) LOCK FLUSH IV SOLN 100 UNIT/ML 5 ML: 100 SOLUTION at 10:26

## 2022-10-14 RX ADMIN — PEGFILGRASTIM 6 MG: 6 INJECTION SUBCUTANEOUS at 10:24

## 2022-10-14 NOTE — PROGRESS NOTES
Infusion Nursing Note:  Veda FORBES Yves presents today for Neulasta, possible transfusion(s).    Patient seen by provider today: No   present during visit today: Not Applicable.    Note: N/A.    Intravenous Access:  Implanted Port.    Treatment Conditions:  Results reviewed, labs MET treatment parameters, ok to proceed with treatment. No transfusions indicated.    Post Infusion Assessment:  Patient tolerated injection without incident.     Discharge Plan:   Patient discharged in stable condition accompanied by: self.  Departure Mode: Ambulatory.      Dulce Ace RN

## 2022-10-14 NOTE — PROGRESS NOTES
"Clinic Care Coordination Contact  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 She overall tolerated chemo well and without complication. Of note Ca was transiently elevated on admission (10.5) though resolved without intervention. Uric acid also elevated after starting chemo. Allopurinol was started with concurrent chemo though will be discontinued on discharge given she is in MRD negative CR and at low-risk for TLS. She will begin taking Midostaurin on D8 (10/17), confirmed with her that this is being delivered to her home.      On day of discharge Minerva is feeling very well. Her final dose of Cytarabine is infusing and she is looking forward to discharge once that is complete. She denies any new complaints. We discussed plan for close follow-up as below. All questions were answered.     ASSESSMENT      Discharge Assessment  How are you doing now that you are home?: \"I'm good\"  How are your symptoms? (Red Flag symptoms escalate to triage hotline per guidelines): Improved  Do you feel your condition is stable enough to be safe at home until your provider visit?: Yes  Does the patient have their discharge instructions? : Yes  Does the patient have questions " regarding their discharge instructions? : No  Were you started on any new medications or were there changes to any of your previous medications? : Yes  Does the patient have all of their medications?: No (see comment) (She is waiting on one medication to arrive in the mail)  Do you have questions regarding any of your medications? : No  Do you have all of your needed medical supplies or equipment (DME)?  (i.e. oxygen tank, CPAP, cane, etc.): Yes  Discharge follow-up appointment scheduled within 14 calendar days? : Yes  Discharge Follow Up Appointment Scheduled with?: Specialty Care Provider    Post-op ([facility] Only)  If the patient had a surgery or procedure, do they have any questions for a nurse?: No    PLAN                                                      Outpatient Plan:      Follow-up as currently scheduled with labs and provider visit in the [facility]. Times and locations as listed below.    Future Appointments   Date Time Provider Department Center   10/18/2022 10:15 AM [facility]   10/18/2022 10:30 AM [facility] INFUSION NURSE [facility]   10/19/2022  2:45 PM Joanie Baez PA-C [facility]   10/21/2022  9:45 AM [facility]   10/21/2022 10:00 AM [facility] NURSE [facility]   10/25/2022  9:45 AM [facility]   10/25/2022 10:00 AM [facility]   10/28/2022  9:45 AM [facility]   10/28/2022 10:00 AM [facility] INF[facility] NURSE [facility]   10/31/2022  9:45 AM [facility]   10/31/2022 10:00 AM [facility] NURSE [facility]   11/3/2022  9:45 AM [facility]   11/3/2022 10:00 AM [facility] NURSE [facility]   11/8/2022  9:45 AM [facility]   11/8/2022 10:00 AM [facility] INF[facility]ION NURSE [facility]   11/11/2022  9:45 AM [facility] TRACK  [facility]   11/11/2022 10:00 AM [facility] NURSE [facility]   11/14/2022  7:45 AM [facility]   11/14/2022  8:15 AM Fidelia Nair APRN CNP [facility]   11/15/2022  9:45 AM [facility]   11/15/2022 10:00 AM [facility] NURSE [facility]   11/18/2022  9:45 AM [facility]   11/18/2022 10:00 AM [facility] NURSE [facility]   11/22/2022  9:45 AM [facility]   11/22/2022 10:00 AM [facility] INFUSION NURSE [facility]   11/25/2022  9:45 AM [facility]   11/25/2022 10:00 AM [facility] NURSE [facility]   11/29/2022  9:45 AM [facility]   11/29/2022 10:00 AM [facility] NURSE [facility]   12/2/2022  9:45 AM [facility]   12/2/2022 10:00 AM [facility] NURSE [facility]   12/6/2022  9:45 AM [facility]   12/6/2022 10:00 AM [facility] NURSE [facility]   12/9/2022  9:45 AM [facility]   12/9/2022 10:00 AM [facility] NURSE [facility]   12/12/2022  9:45 AM [facility]   12/12/2022 10:00 AM [facility] NURSE [facility]   12/15/2022  9:45 AM [facility]   12/15/2022 10:00 AM [facility] NURSE [facility]   12/19/2022  9:45 AM [facility]   12/19/2022 10:00 AM [facility] NURSE [facility]   12/22/2022  9:45 AM [facility] TR[facility]   12/22/2022 10:00 AM [facility] INF[facility]ION [facility]UR[facility]   12/27/2022  9:45 AM [facility] TR[facility] LAB [facility]   12/27/2022 10:00 AM [facility] INFUSION NURSE [facility]   12/30/2022  9:45 AM [facility] TR[facility] LAB [facility]   12/30/2022 10:00 AM [facility] INFUSION NURSE [facility]         For any urgent  concerns, please contact our 24 hour nurse triage line: [phone] ([phone])       Mariposa Beasley  Community Health Worker  [facility] [place]  Ph: [phone]

## 2022-10-18 ENCOUNTER — LAB (OUTPATIENT)
Dept: INFUSION THERAPY | Facility: CLINIC | Age: 37
End: 2022-10-18
Attending: PHYSICIAN ASSISTANT
Payer: COMMERCIAL

## 2022-10-18 DIAGNOSIS — C92.00 ACUTE MYELOID LEUKEMIA NOT HAVING ACHIEVED REMISSION (H): Primary | ICD-10-CM

## 2022-10-18 DIAGNOSIS — C92.00 ACUTE MYELOID LEUKEMIA NOT HAVING ACHIEVED REMISSION (H): ICD-10-CM

## 2022-10-18 DIAGNOSIS — C95.01 ACUTE LEUKEMIA IN REMISSION (H): Primary | ICD-10-CM

## 2022-10-18 LAB
ERYTHROCYTE [DISTWIDTH] IN BLOOD BY AUTOMATED COUNT: 16.4 % (ref 10–15)
HCT VFR BLD AUTO: 25.1 % (ref 35–47)
HGB BLD-MCNC: 8.6 G/DL (ref 11.7–15.7)
MCH RBC QN AUTO: 34.7 PG (ref 26.5–33)
MCHC RBC AUTO-ENTMCNC: 34.3 G/DL (ref 31.5–36.5)
MCV RBC AUTO: 101 FL (ref 78–100)
PLAT MORPH BLD: NORMAL
PLATELET # BLD AUTO: 46 10E3/UL (ref 150–450)
RBC # BLD AUTO: 2.48 10E6/UL (ref 3.8–5.2)
RBC MORPH BLD: NORMAL
WBC # BLD AUTO: 0.2 10E3/UL (ref 4–11)

## 2022-10-18 PROCEDURE — 85041 AUTOMATED RBC COUNT: CPT | Performed by: INTERNAL MEDICINE

## 2022-10-18 PROCEDURE — 250N000011 HC RX IP 250 OP 636: Performed by: PHYSICIAN ASSISTANT

## 2022-10-18 PROCEDURE — 85018 HEMOGLOBIN: CPT | Performed by: INTERNAL MEDICINE

## 2022-10-18 PROCEDURE — 36591 DRAW BLOOD OFF VENOUS DEVICE: CPT

## 2022-10-18 RX ORDER — HEPARIN SODIUM (PORCINE) LOCK FLUSH IV SOLN 100 UNIT/ML 100 UNIT/ML
5 SOLUTION INTRAVENOUS
Status: DISCONTINUED | OUTPATIENT
Start: 2022-10-18 | End: 2022-10-18 | Stop reason: HOSPADM

## 2022-10-18 RX ORDER — HEPARIN SODIUM (PORCINE) LOCK FLUSH IV SOLN 100 UNIT/ML 100 UNIT/ML
5 SOLUTION INTRAVENOUS
Status: CANCELLED | OUTPATIENT
Start: 2022-10-18

## 2022-10-18 RX ADMIN — Medication 5 ML: at 10:58

## 2022-10-18 NOTE — PROGRESS NOTES
Infusion Nursing Note:  Veda Marinelli presents today for possible transfusion(s).    Patient seen by provider today: No   present during visit today: Not Applicable.    Note: N/A.    Intravenous Access:  Implanted Port.    Treatment Conditions:  Lab Results   Component Value Date    HGB 8.6 (L) 10/18/2022    WBC 0.2 (LL) 10/18/2022    ANEU 7.0 10/04/2022    ANEUTAUTO 4.1 10/14/2022    PLT 46 (LL) 10/18/2022      Results reviewed, labs did NOT meet treatment parameters.    Post Infusion Assessment:  Site patent and intact, free from redness, edema or discomfort.  No evidence of extravasations.  Access discontinued per protocol.     Discharge Plan:   Patient discharged in stable condition accompanied by: self.  Departure Mode: Ambulatory.      Dulce Ace RN

## 2022-10-18 NOTE — PROGRESS NOTES
Minerva is a 37 year old who is being evaluated via a billable video visit.      How would you like to obtain your AVS? MyChart  If the video visit is dropped, the invitation should be resent by: Send to e-mail at: [email or web address]  Will anyone else be joining your video visit? No    Nick Hernandez VF    Video-Visit Details    Video Start Time: 2:43 PM    Type of service:  Video Visit    Video End Time:2:55 PM    Originating Location (pt. Location): Home    Distant Location (provider location):  On-site    Platform used for Video Visit: [facility]  Date of visit: Oct 19, 2022    Reason for Visit: follow up AML--favorable risk      Oncology HPI:   Follows with Dr. Earl. Presented to routine exam with cytopenias. BMBx 6/17/2022 - hypercellular marrow (85-95% cellularity) with 92% blasts. 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 Started induction chemotherapy with 7+3 and midostaurin on 6/22/2022. D21 BMBx (7/12) with robust marrow regeneration, repeat BMBx 7/22 with 70-80% cellularity and 6% blasts by morphology consisted with complete response per Dr. Earl. NGS with no detectable mutations (MRD negative). BMT consulted 7/15; recommend consolidation chemotherapy, could consider BMT in the future if relapses. Tentatively planning for 3-4 cycles consolidation HiDAC (D1-3) and midostaurin (D8-21), followed by maintenance midostaurin for 1 year. Admitted for Cycle 1 consolidation with HiDAC and midostaurin on 8/9/22 which was tolerated well without any concerns or complications.     C2 HiDAC + midastaurin, D1 = 9/12/2022  C3 HiDAC + midastaurin, D1 = 10/10/2022      Interval history:   Minerva is feeling well. Reports that she has noticed some vaginal spotting today. She had a super heavy menstrual period last month and is concerned this may recur today. She reports she has received a medication to stop/lighten  her period in th past. No complaints. No infectious concerns. No bleeding. She is eating well. No recurrent diarrhea. Energy is stable. Expresses no concerns today.     Minerva inquires about the possibility of having platelets prepared for her lab appointment on Friday 10/21/22.     Current Outpatient Medications   Medication Sig Dispense Refill     acyclovir (ZOVIRAX) 400 MG tablet Take 1 tablet (400 mg) by mouth 2 times daily 60 tablet 3     Bacillus Coagulans-Inulin (PROBIOTIC) 1-250 BILLION-MG CAPS Take 1 capsule by mouth daily       dexamethasone (DECADRON) 4 MG tablet Take 2 tablets (8 mg) by mouth every morning 2 tablet 0     fluconazole (DIFLUCAN) 200 MG tablet Take 1 tablet (200 mg) by mouth daily When absolute neutrophils are less than 1.- x 10^9/L 30 tablet 0     levofloxacin (LEVAQUIN) 250 MG tablet Take 1 tablet (250 mg) by mouth daily When absolute neutrophils are less than 1.- x 10^9/L 30 tablet 4     levothyroxine (SYNTHROID/LEVOTHROID) 88 MCG tablet Take 88 mcg by mouth daily       magnesium 250 MG tablet Take 250 mg by mouth daily       midostaurin (RYDAPT) 25 MG capsule Take 2 capsules (50 mg) by mouth 2 times daily for 14 days . Take with food on Days 8 through 21. 56 capsule 0     midostaurin (RYDAPT) 25 MG capsule Take 2 capsules (50 mg) by mouth 2 times daily . Take with food on Days 8 through 21 (9/19 - 10/2) then stop 56 capsule 0     Multiple Vitamins-Minerals (WOMENS MULTIVITAMIN) TABS Take 1 tablet by mouth daily       Quercetin 50 MG TABS Take 50 mg by mouth daily       vancomycin (VANCOCIN) 125 MG capsule Take 1 capsule (125 mg) by mouth 2 times daily 60 capsule 4     vitamin C (ASCORBIC ACID) 1000 MG TABS Take 2,000 mg by mouth daily       vitamin D3 (CHOLECALCIFEROL) 50 mcg (2000 units) tablet Take 1 tablet (50 mcg) by mouth daily       zinc gluconate 50 MG tablet Take 50 mg by mouth daily         Allergies   Allergen Reactions     Blood Transfusion Related (Informational Only) Hives      6/29/2022, reaction of hives with platelet transfusion          Physical Exam:  Video physical exam  General: Patient appears well in no acute distress.   Skin: No visualized rash or lesions on visualized skin  Eyes: EOMI, no erythema, sclera icterus or discharge noted  Resp: Appears to be breathing comfortably without accessory muscle usage, speaking in full sentences, no cough  MSK: Appears to have normal range of motion based on visualized movements  Neurologic: No apparent tremors, facial movements symmetric  Psych: affect bright, alert and oriented      Labs:     I personally reviewed the following labs:    Most Recent 3 CBC's:  Recent Labs   Lab Test 10/18/22  1016 10/14/22  1009 10/13/22  0636   WBC 0.2* 4.2 5.3   HGB 8.6* 9.4* 8.8*   * 106* 107*   PLT 46* 177 192     Most Recent 3 BMP's:  Recent Labs   Lab Test 10/13/22  0636 10/12/22  0536 10/11/22  0532    137 140   POTASSIUM 4.0 3.8 3.7   CHLORIDE 109* 106 107   CO2 22 21* 17*   BUN 12.2 12.3 12.8   CR 0.44* 0.57 0.58   ANIONGAP 8 10 16*   MICHAEL 8.9 9.0 9.4   * 137* 162*     Most Recent 2 LFT's:  Recent Labs   Lab Test 10/13/22  0636 10/12/22  0536   AST 12 13   ALT 19 22   ALKPHOS 47 48   BILITOTAL 0.6 0.8           Imaging: n/a      Impression/plan:     # AML, 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 Earl. Presented to routine exam with cytopenias. BMBx 6/17/2022 - hypercellular marrow (85-95% cellularity) with 92% blasts. 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 Started induction chemotherapy with 7+3 and midostaurin on 6/22/2022. D21 BMBx (7/12) with robust marrow regeneration, repeat BMBx 7/22 with 70-80% cellularity and 6% blasts by morphology consisted with complete response per Dr. Earl. NGS with no detectable mutations (MRD negative). BMT consulted 7/15; recommend consolidation chemotherapy, could consider BMT in the future if  relapses. Tentatively planning for 3-4 cycles consolidation HiDAC (D1-3) and midostaurin (D8-21), followed by maintenance midostaurin for 1 year. Admitted for Cycle 1 consolidation with HiDAC and midostaurin on 8/9/22 which was tolerated well without any concerns or complications.   - Free drug coverage approved for midostaurin through [employer] - delivered to home  - Port in place  - Patient in MRD negative CR, no indication for allopurinol or TLS monitoring.   - Recheck NPM1 mutation                   Treatment Plan: HiDAC +Midostaurin C3D1=10/10               - Cytarabine 3 g/m2 q12h x6 doses - D1,2,3               - Midostaurin 50 mg BID x14 days - D8 - 21                - Supportive medications;   - Pred forte eye drops QID D1-5   - Dexamethasone 12mg PO q24h D1-3, 8mg D4-5   - Zofran 8mg q12h x6 doses D1-3  - Neulasta 6mg    - RTC on 11/14/22 with Fidelia prior to cycle 4 of HiDAC admission.   - Plan for bone marrow biopsy about 6-8 weeks after final cycle with send out PCR for NPM1 MRD (to AR) to confirm depth of response       # LUE DVT (PICC-associated)  LUE US (7/12) with nonocclusive DVT in the left axillary vein and one of the paired left brachial veins, superficial venous thrombosis in the left basilic vein. PICC-associated which was removed prior to discharge. Transitioned to apixaban as of 8/9 with plan to continue (per Dr. Earl) x 3 months (through ~10/13/22).   - Continue to monitor and hold if plts <50k  - Plan to end therapeutic anticoagulation at the end of 3 months period this week-- stopped blood thinner.        # ID PPX  - ACV 400mg BID  - No indication for antibacterial or antifungal ppx with ANC >1000. Continue to monitor outpatient and start levaquin and voriconazole if ANC <1000  - WBC 0.2, resume ppx until ANC improves as above.   - Note; if she were to need antifungal ppx in the future consider reaching out to pharmacy liaison because prior to meeting her deductible the cost was  $2048/month for posaconazole and $176/month for Voriconazole.      # History of recurrent cdiff colitis  - Ppx Vancomycin 125 mg BID PO      # H/o COVID-19 (Oct 2021)  Pt is not vaccinated nor interested in being vaccinated. She was admitted from 10/1/2021 - 10/13/2021 for acute hypoxic respiratory failure from COVID-19 pneumonia. Required IMC, high flow and intermittent CPAP. She was treated with dexamethasone, remdesivir, and baricitinib in addition to azithromycin and ceftriaxone. Recovered symptomatically.  - Consider Evusheld, will continue to discuss as OP     # Hypothyroidism   - Synthroid 75 mcg daily     #Heavy menstrual bleeding  - patient received Provera 10 mg daily x 5 days while inpatient in 6/2022.   - monitor spotting, if becomes heavy patient was asked to reach out.   - consider ob/gyn referral if continues.     #follow up  - Continue 3x weekly labs with possible transfusion support.   - Admit for C4 11/14 (delay 1 week due to travel); will reach out to scheduling to push labs and AM provider appointment back per patient request.     25 minutes spent on the date of the encounter doing chart review, review of test results, interpretation of tests, patient visit and documentation     Joanie Baez PA-C    Patient called in reporting every menstrual bleeding saturating a tampon and pad every 1 hour or less. Requiring frequent nighttime waking due to pad saturation.     Discussed with Dr. Earl. Recommends start provera. Will start provera 10 mg daily x 5 days.   Consider ob/gyn referral.

## 2022-10-19 ENCOUNTER — VIRTUAL VISIT (OUTPATIENT)
Dept: ONCOLOGY | Facility: CLINIC | Age: 37
End: 2022-10-19
Payer: COMMERCIAL

## 2022-10-19 DIAGNOSIS — N92.0 MENORRHAGIA WITH REGULAR CYCLE: ICD-10-CM

## 2022-10-19 DIAGNOSIS — C92.00 ACUTE MYELOID LEUKEMIA NOT HAVING ACHIEVED REMISSION (H): Primary | ICD-10-CM

## 2022-10-19 DIAGNOSIS — D69.6 THROMBOCYTOPENIA (H): ICD-10-CM

## 2022-10-19 PROCEDURE — G0463 HOSPITAL OUTPT CLINIC VISIT: HCPCS | Mod: PN,RTG

## 2022-10-19 PROCEDURE — 99215 OFFICE O/P EST HI 40 MIN: CPT | Mod: GT

## 2022-10-19 NOTE — NURSING NOTE
Patient declined individual allergy and medication review by support staff because pt states everything was up-to-date during echeck-in.    Nick Hernandez VF

## 2022-10-20 LAB
ABO/RH(D): NORMAL
ANTIBODY SCREEN: NEGATIVE
SPECIMEN EXPIRATION DATE: NORMAL

## 2022-10-20 RX ORDER — MEDROXYPROGESTERONE ACETATE 10 MG
10 TABLET ORAL DAILY
Qty: 5 TABLET | Refills: 0 | Status: ON HOLD | OUTPATIENT
Start: 2022-10-20 | End: 2022-11-15

## 2022-10-21 ENCOUNTER — INFUSION THERAPY VISIT (OUTPATIENT)
Dept: INFUSION THERAPY | Facility: CLINIC | Age: 37
End: 2022-10-21
Attending: PHYSICIAN ASSISTANT
Payer: COMMERCIAL

## 2022-10-21 VITALS
RESPIRATION RATE: 16 BRPM | TEMPERATURE: 99 F | HEART RATE: 86 BPM | OXYGEN SATURATION: 100 % | SYSTOLIC BLOOD PRESSURE: 116 MMHG | DIASTOLIC BLOOD PRESSURE: 75 MMHG

## 2022-10-21 DIAGNOSIS — C92.00 ACUTE MYELOID LEUKEMIA NOT HAVING ACHIEVED REMISSION (H): ICD-10-CM

## 2022-10-21 DIAGNOSIS — C95.01 ACUTE LEUKEMIA IN REMISSION (H): Primary | ICD-10-CM

## 2022-10-21 LAB
BLD PROD TYP BPU: NORMAL
BLD PROD TYP BPU: NORMAL
BLOOD COMPONENT TYPE: NORMAL
BLOOD COMPONENT TYPE: NORMAL
CODING SYSTEM: NORMAL
CODING SYSTEM: NORMAL
CROSSMATCH: NORMAL
ERYTHROCYTE [DISTWIDTH] IN BLOOD BY AUTOMATED COUNT: 15.2 % (ref 10–15)
HCT VFR BLD AUTO: 18.6 % (ref 35–47)
HGB BLD-MCNC: 6.3 G/DL (ref 11.7–15.7)
HOLD SPECIMEN: NORMAL
ISSUE DATE AND TIME: NORMAL
ISSUE DATE AND TIME: NORMAL
MCH RBC QN AUTO: 33.9 PG (ref 26.5–33)
MCHC RBC AUTO-ENTMCNC: 33.9 G/DL (ref 31.5–36.5)
MCV RBC AUTO: 100 FL (ref 78–100)
PLAT MORPH BLD: NORMAL
PLATELET # BLD AUTO: 5 10E3/UL (ref 150–450)
RBC # BLD AUTO: 1.86 10E6/UL (ref 3.8–5.2)
RBC MORPH BLD: NORMAL
UNIT ABO/RH: NORMAL
UNIT ABO/RH: NORMAL
UNIT NUMBER: NORMAL
UNIT NUMBER: NORMAL
UNIT STATUS: NORMAL
UNIT STATUS: NORMAL
UNIT TYPE ISBT: 600
UNIT TYPE ISBT: 600
WBC # BLD AUTO: 0.2 10E3/UL (ref 4–11)

## 2022-10-21 PROCEDURE — 86923 COMPATIBILITY TEST ELECTRIC: CPT | Performed by: PHYSICIAN ASSISTANT

## 2022-10-21 PROCEDURE — P9037 PLATE PHERES LEUKOREDU IRRAD: HCPCS | Performed by: PHYSICIAN ASSISTANT

## 2022-10-21 PROCEDURE — P9040 RBC LEUKOREDUCED IRRADIATED: HCPCS | Performed by: PHYSICIAN ASSISTANT

## 2022-10-21 PROCEDURE — 86901 BLOOD TYPING SEROLOGIC RH(D): CPT

## 2022-10-21 PROCEDURE — 85027 COMPLETE CBC AUTOMATED: CPT | Performed by: INTERNAL MEDICINE

## 2022-10-21 PROCEDURE — 86850 RBC ANTIBODY SCREEN: CPT

## 2022-10-21 PROCEDURE — 36591 DRAW BLOOD OFF VENOUS DEVICE: CPT

## 2022-10-21 PROCEDURE — 36430 TRANSFUSION BLD/BLD COMPNT: CPT

## 2022-10-21 RX ORDER — HEPARIN SODIUM (PORCINE) LOCK FLUSH IV SOLN 100 UNIT/ML 100 UNIT/ML
5 SOLUTION INTRAVENOUS
Status: CANCELLED | OUTPATIENT
Start: 2022-10-21

## 2022-10-21 RX ORDER — HEPARIN SODIUM (PORCINE) LOCK FLUSH IV SOLN 100 UNIT/ML 100 UNIT/ML
5 SOLUTION INTRAVENOUS
Status: DISCONTINUED | OUTPATIENT
Start: 2022-10-21 | End: 2022-10-21 | Stop reason: HOSPADM

## 2022-10-21 NOTE — PROGRESS NOTES
Infusion Nursing Note:  Veda Marinelli presents today for transfusion of platelets and PRBCs.    Patient seen by provider today: No   present during visit today: Not Applicable.    Note: Pt has a papular rash on both posterior forearms  She states that it started 2 days ago and is getting better.  She is taking her prophylactic antibiotics, antifungal and antiviral medications as directed per her report.  Pt states that she started the antifungal and additional antibiotic 3 days ago.    Intravenous Access:  Implanted Port.    Treatment Conditions:  Lab Results   Component Value Date    HGB 6.3 (LL) 10/21/2022    WBC 0.2 (LL) 10/21/2022    ANEU 7.0 10/04/2022    ANEUTAUTO 4.1 10/14/2022    PLT 5 (LL) 10/21/2022      Blood transfusion consent signed and on file.      Post Infusion Assessment:  Patient tolerated transfusions of platelets and 1 unit PRBC without incident.  Access discontinued per protocol.   Reviewed pancytopenic precautions with this patient.  She verbalized understanding.    Discharge Plan:   [facility]  Patient will return 10/25 for labs and possible transfusion support.   Patient discharged in stable condition accompanied by: self.  Departure Mode: Ambulatory.    Cami Zabala RN

## 2022-10-21 NOTE — Clinical Note
FYI, see my note. Papular rash on both forearms. Otherwise stable and received platelets and red cells today.

## 2022-10-25 ENCOUNTER — INFUSION THERAPY VISIT (OUTPATIENT)
Dept: INFUSION THERAPY | Facility: CLINIC | Age: 37
End: 2022-10-25
Attending: PHYSICIAN ASSISTANT
Payer: COMMERCIAL

## 2022-10-25 VITALS
HEART RATE: 81 BPM | RESPIRATION RATE: 20 BRPM | DIASTOLIC BLOOD PRESSURE: 73 MMHG | SYSTOLIC BLOOD PRESSURE: 109 MMHG | TEMPERATURE: 98.9 F

## 2022-10-25 VITALS
RESPIRATION RATE: 18 BRPM | HEART RATE: 85 BPM | DIASTOLIC BLOOD PRESSURE: 68 MMHG | TEMPERATURE: 98.8 F | SYSTOLIC BLOOD PRESSURE: 103 MMHG

## 2022-10-25 DIAGNOSIS — C92.00 ACUTE MYELOID LEUKEMIA NOT HAVING ACHIEVED REMISSION (H): Primary | ICD-10-CM

## 2022-10-25 DIAGNOSIS — C95.01 ACUTE LEUKEMIA IN REMISSION (H): ICD-10-CM

## 2022-10-25 DIAGNOSIS — C95.01 ACUTE LEUKEMIA IN REMISSION (H): Primary | ICD-10-CM

## 2022-10-25 LAB
ABO/RH(D): NORMAL
ANTIBODY SCREEN: NEGATIVE
BASOPHILS # BLD MANUAL: 0 10E3/UL (ref 0–0.2)
BASOPHILS NFR BLD MANUAL: 0 %
BLD PROD TYP BPU: NORMAL
BLD PROD TYP BPU: NORMAL
BLOOD COMPONENT TYPE: NORMAL
BLOOD COMPONENT TYPE: NORMAL
CODING SYSTEM: NORMAL
CODING SYSTEM: NORMAL
CROSSMATCH: NORMAL
EOSINOPHIL # BLD MANUAL: 0 10E3/UL (ref 0–0.7)
EOSINOPHIL NFR BLD MANUAL: 0 %
ERYTHROCYTE [DISTWIDTH] IN BLOOD BY AUTOMATED COUNT: 17 % (ref 10–15)
HCT VFR BLD AUTO: 19 % (ref 35–47)
HGB BLD-MCNC: 6.5 G/DL (ref 11.7–15.7)
HOLD SPECIMEN: NORMAL
HOLD SPECIMEN: NORMAL
ISSUE DATE AND TIME: NORMAL
ISSUE DATE AND TIME: NORMAL
LYMPHOCYTES # BLD MANUAL: 0.9 10E3/UL (ref 0.8–5.3)
LYMPHOCYTES NFR BLD MANUAL: 10 %
MCH RBC QN AUTO: 33.5 PG (ref 26.5–33)
MCHC RBC AUTO-ENTMCNC: 34.2 G/DL (ref 31.5–36.5)
MCV RBC AUTO: 98 FL (ref 78–100)
METAMYELOCYTES # BLD MANUAL: 0.5 10E3/UL
METAMYELOCYTES NFR BLD MANUAL: 5 %
MONOCYTES # BLD MANUAL: 0.7 10E3/UL (ref 0–1.3)
MONOCYTES NFR BLD MANUAL: 8 %
MYELOCYTES # BLD MANUAL: 0.1 10E3/UL
MYELOCYTES NFR BLD MANUAL: 1 %
NEUTROPHILS # BLD MANUAL: 6.8 10E3/UL (ref 1.6–8.3)
NEUTROPHILS NFR BLD MANUAL: 76 %
PLAT MORPH BLD: ABNORMAL
PLATELET # BLD AUTO: 12 10E3/UL (ref 150–450)
RBC # BLD AUTO: 1.94 10E6/UL (ref 3.8–5.2)
RBC MORPH BLD: ABNORMAL
SPECIMEN EXPIRATION DATE: NORMAL
UNIT ABO/RH: NORMAL
UNIT ABO/RH: NORMAL
UNIT NUMBER: NORMAL
UNIT NUMBER: NORMAL
UNIT STATUS: NORMAL
UNIT STATUS: NORMAL
UNIT TYPE ISBT: 600
UNIT TYPE ISBT: 600
WBC # BLD AUTO: 9 10E3/UL (ref 4–11)

## 2022-10-25 PROCEDURE — 86901 BLOOD TYPING SEROLOGIC RH(D): CPT | Performed by: PHYSICIAN ASSISTANT

## 2022-10-25 PROCEDURE — P9040 RBC LEUKOREDUCED IRRADIATED: HCPCS | Performed by: PHYSICIAN ASSISTANT

## 2022-10-25 PROCEDURE — 86850 RBC ANTIBODY SCREEN: CPT | Performed by: PHYSICIAN ASSISTANT

## 2022-10-25 PROCEDURE — 250N000011 HC RX IP 250 OP 636: Performed by: PHYSICIAN ASSISTANT

## 2022-10-25 PROCEDURE — 85027 COMPLETE CBC AUTOMATED: CPT | Performed by: PHYSICIAN ASSISTANT

## 2022-10-25 PROCEDURE — 36430 TRANSFUSION BLD/BLD COMPNT: CPT

## 2022-10-25 PROCEDURE — 85007 BL SMEAR W/DIFF WBC COUNT: CPT | Performed by: PHYSICIAN ASSISTANT

## 2022-10-25 PROCEDURE — P9037 PLATE PHERES LEUKOREDU IRRAD: HCPCS | Performed by: PHYSICIAN ASSISTANT

## 2022-10-25 PROCEDURE — 86923 COMPATIBILITY TEST ELECTRIC: CPT | Performed by: PHYSICIAN ASSISTANT

## 2022-10-25 RX ORDER — HEPARIN SODIUM (PORCINE) LOCK FLUSH IV SOLN 100 UNIT/ML 100 UNIT/ML
5 SOLUTION INTRAVENOUS
Status: DISCONTINUED | OUTPATIENT
Start: 2022-10-25 | End: 2022-10-25 | Stop reason: HOSPADM

## 2022-10-25 RX ORDER — HEPARIN SODIUM (PORCINE) LOCK FLUSH IV SOLN 100 UNIT/ML 100 UNIT/ML
5 SOLUTION INTRAVENOUS
Status: CANCELLED | OUTPATIENT
Start: 2022-10-25

## 2022-10-25 RX ADMIN — Medication 5 ML: at 14:35

## 2022-10-25 NOTE — PROGRESS NOTES
Infusion Nursing Note:  Veda Marinelli presents today for transfusion of platelets and PRBCs.    Patient seen by provider today: No   present during visit today: Not Applicable.    Note: N/A.    Intravenous Access:  Implanted Port.    Treatment Conditions:  Lab Results   Component Value Date    HGB 6.5 (LL) 10/25/2022    WBC 9.0 10/25/2022    ANEU 6.8 10/25/2022    ANEUTAUTO 4.1 10/14/2022    PLT 12 (LL) 10/25/2022      Pt reports light vaginal bleeding.  1 unit of PRBCs and 1 dose platelets indicated    Post Infusion Assessment:  Patient tolerated infusion without incident.       Discharge Plan:   [facility]  Patient will return Friday 10/28 for next lab check and possible transfusion support.  Patient discharged in stable condition accompanied by: self.  Departure Mode: Ambulatory.    Cami Zabala RN

## 2022-10-25 NOTE — ORAL ONC MGMT
Oral Chemotherapy Monitoring Program  Lab Follow Up  Reviewed lab results from 10/25/2022.    Labs:  _  Result Component Current Result Ref Range   Sodium 139 (10/13/2022) 136 - 145 mmol/L   _  Result Component Current Result Ref Range   Potassium 4.0 (10/13/2022) 3.4 - 5.3 mmol/L   _  Result Component Current Result Ref Range   Calcium 8.9 (10/13/2022) 8.6 - 10.0 mg/dL   _  Result Component Current Result Ref Range   Magnesium 2.2 (10/12/2022) 1.7 - 2.3 mg/dL   _  Result Component Current Result Ref Range   Phosphorus 3.4 (10/12/2022) 2.5 - 4.5 mg/dL   _  Result Component Current Result Ref Range   Albumin 3.9 (10/13/2022) 3.5 - 5.2 g/dL   _  Result Component Current Result Ref Range   Urea Nitrogen 12.2 (10/13/2022) 6.0 - 20.0 mg/dL   _  Result Component Current Result Ref Range   Creatinine 0.44 (L) (10/13/2022) 0.51 - 0.95 mg/dL   _  Result Component Current Result Ref Range   AST 12 (10/13/2022) 10 - 35 U/L   _  Result Component Current Result Ref Range   ALT 19 (10/13/2022) 10 - 35 U/L   _  Result Component Current Result Ref Range   Bilirubin Total 0.6 (10/13/2022) <=1.2 mg/dL   _  Result Component Current Result Ref Range   WBC Count 9.0 (10/25/2022) 4.0 - 11.0 10e3/uL   _  Result Component Current Result Ref Range   Hemoglobin 6.5 (LL) (10/25/2022) 11.7 - 15.7 g/dL   _  Result Component Current Result Ref Range   Platelet Count 12 (LL) (10/25/2022) 150 - 450 10e3/uL   _  Result Component Current Result Ref Range   Absolute Neutrophils 6.8 (10/25/2022) 1.6 - 8.3 10e3/uL     Assessment & Plan:  No concerning abnormalities. Continue plan of care.     Follow-Up:  11/3: review labs     Darinel Salazar PharmD  Hematology/Oncology Clinical Pharmacist  [facility]  [facility]

## 2022-10-28 ENCOUNTER — INFUSION THERAPY VISIT (OUTPATIENT)
Dept: INFUSION THERAPY | Facility: CLINIC | Age: 37
End: 2022-10-28
Attending: PHYSICIAN ASSISTANT
Payer: COMMERCIAL

## 2022-10-28 VITALS — TEMPERATURE: 98.3 F | DIASTOLIC BLOOD PRESSURE: 76 MMHG | SYSTOLIC BLOOD PRESSURE: 114 MMHG | HEART RATE: 96 BPM

## 2022-10-28 DIAGNOSIS — C95.01 ACUTE LEUKEMIA IN REMISSION (H): Primary | ICD-10-CM

## 2022-10-28 LAB
BASOPHILS # BLD MANUAL: 0.1 10E3/UL (ref 0–0.2)
BASOPHILS NFR BLD MANUAL: 1 %
EOSINOPHIL # BLD MANUAL: 0.2 10E3/UL (ref 0–0.7)
EOSINOPHIL NFR BLD MANUAL: 2 %
ERYTHROCYTE [DISTWIDTH] IN BLOOD BY AUTOMATED COUNT: 16.7 % (ref 10–15)
HCT VFR BLD AUTO: 24 % (ref 35–47)
HGB BLD-MCNC: 8.3 G/DL (ref 11.7–15.7)
LYMPHOCYTES # BLD MANUAL: 0.8 10E3/UL (ref 0.8–5.3)
LYMPHOCYTES NFR BLD MANUAL: 7 %
MCH RBC QN AUTO: 33.1 PG (ref 26.5–33)
MCHC RBC AUTO-ENTMCNC: 34.6 G/DL (ref 31.5–36.5)
MCV RBC AUTO: 96 FL (ref 78–100)
MONOCYTES # BLD MANUAL: 1.4 10E3/UL (ref 0–1.3)
MONOCYTES NFR BLD MANUAL: 12 %
NEUTROPHILS # BLD MANUAL: 9 10E3/UL (ref 1.6–8.3)
NEUTROPHILS NFR BLD MANUAL: 78 %
PLAT MORPH BLD: ABNORMAL
PLATELET # BLD AUTO: 86 10E3/UL (ref 150–450)
RBC # BLD AUTO: 2.51 10E6/UL (ref 3.8–5.2)
RBC MORPH BLD: ABNORMAL
WBC # BLD AUTO: 11.6 10E3/UL (ref 4–11)

## 2022-10-28 PROCEDURE — 85027 COMPLETE CBC AUTOMATED: CPT | Performed by: PHYSICIAN ASSISTANT

## 2022-10-28 PROCEDURE — 250N000011 HC RX IP 250 OP 636: Performed by: PHYSICIAN ASSISTANT

## 2022-10-28 PROCEDURE — 85007 BL SMEAR W/DIFF WBC COUNT: CPT | Performed by: PHYSICIAN ASSISTANT

## 2022-10-28 PROCEDURE — 36591 DRAW BLOOD OFF VENOUS DEVICE: CPT

## 2022-10-28 RX ORDER — HEPARIN SODIUM (PORCINE) LOCK FLUSH IV SOLN 100 UNIT/ML 100 UNIT/ML
500 SOLUTION INTRAVENOUS ONCE
Status: COMPLETED | OUTPATIENT
Start: 2022-10-28 | End: 2022-10-28

## 2022-10-28 RX ORDER — HEPARIN SODIUM (PORCINE) LOCK FLUSH IV SOLN 100 UNIT/ML 100 UNIT/ML
5 SOLUTION INTRAVENOUS
Status: CANCELLED | OUTPATIENT
Start: 2022-10-28

## 2022-10-28 RX ADMIN — Medication 500 UNITS: at 10:10

## 2022-10-28 NOTE — PROGRESS NOTES
Infusion Nursing Note:  Veda Marinelli presents today for possible PRBC's/Platelets.    Patient seen by provider today: No   present during visit today: Not Applicable.    Note: N/A.    Intravenous Access:  Implanted Port.    Treatment Conditions:  Lab Results   Component Value Date    HGB 8.3 (L) 10/28/2022    WBC 11.6 (H) 10/28/2022    ANEU 6.8 10/25/2022    ANEUTAUTO 4.1 10/14/2022    PLT 86 (L) 10/28/2022      Results reviewed, labs did NOT meet treatment parameters: Hgb 8.3, platelets 86.    Post Infusion Assessment:  Blood return noted.  Site patent and intact, free from redness, edema or discomfort.  Access discontinued per protocol.     Discharge Plan:   Patient discharged in stable condition accompanied by: self.  Departure Mode: Ambulatory.      Braden Lindo RN

## 2022-10-31 ENCOUNTER — INFUSION THERAPY VISIT (OUTPATIENT)
Dept: INFUSION THERAPY | Facility: CLINIC | Age: 37
End: 2022-10-31
Attending: PHYSICIAN ASSISTANT
Payer: COMMERCIAL

## 2022-10-31 DIAGNOSIS — C95.01 ACUTE LEUKEMIA IN REMISSION (H): Primary | ICD-10-CM

## 2022-10-31 LAB
BASOPHILS # BLD MANUAL: 0.1 10E3/UL (ref 0–0.2)
BASOPHILS NFR BLD MANUAL: 1 %
DACRYOCYTES BLD QL SMEAR: SLIGHT
EOSINOPHIL # BLD MANUAL: 0.1 10E3/UL (ref 0–0.7)
EOSINOPHIL NFR BLD MANUAL: 1 %
ERYTHROCYTE [DISTWIDTH] IN BLOOD BY AUTOMATED COUNT: 17.6 % (ref 10–15)
HCT VFR BLD AUTO: 24.1 % (ref 35–47)
HGB BLD-MCNC: 8.1 G/DL (ref 11.7–15.7)
HOLD SPECIMEN: NORMAL
LYMPHOCYTES # BLD MANUAL: 0.5 10E3/UL (ref 0.8–5.3)
LYMPHOCYTES NFR BLD MANUAL: 5 %
MCH RBC QN AUTO: 33.2 PG (ref 26.5–33)
MCHC RBC AUTO-ENTMCNC: 33.6 G/DL (ref 31.5–36.5)
MCV RBC AUTO: 99 FL (ref 78–100)
METAMYELOCYTES # BLD MANUAL: 0.2 10E3/UL
METAMYELOCYTES NFR BLD MANUAL: 2 %
MONOCYTES # BLD MANUAL: 0.9 10E3/UL (ref 0–1.3)
MONOCYTES NFR BLD MANUAL: 10 %
MYELOCYTES # BLD MANUAL: 0.1 10E3/UL
MYELOCYTES NFR BLD MANUAL: 1 %
NEUTROPHILS # BLD MANUAL: 7.4 10E3/UL (ref 1.6–8.3)
NEUTROPHILS NFR BLD MANUAL: 80 %
NRBC # BLD AUTO: 0.2 10E3/UL
NRBC BLD MANUAL-RTO: 2 %
PLAT MORPH BLD: ABNORMAL
PLATELET # BLD AUTO: 202 10E3/UL (ref 150–450)
POLYCHROMASIA BLD QL SMEAR: SLIGHT
RBC # BLD AUTO: 2.44 10E6/UL (ref 3.8–5.2)
RBC MORPH BLD: ABNORMAL
WBC # BLD AUTO: 9.2 10E3/UL (ref 4–11)

## 2022-10-31 PROCEDURE — 85007 BL SMEAR W/DIFF WBC COUNT: CPT | Performed by: PHYSICIAN ASSISTANT

## 2022-10-31 PROCEDURE — 250N000011 HC RX IP 250 OP 636: Performed by: PHYSICIAN ASSISTANT

## 2022-10-31 PROCEDURE — 36591 DRAW BLOOD OFF VENOUS DEVICE: CPT

## 2022-10-31 PROCEDURE — 85014 HEMATOCRIT: CPT | Performed by: PHYSICIAN ASSISTANT

## 2022-10-31 RX ORDER — HEPARIN SODIUM (PORCINE) LOCK FLUSH IV SOLN 100 UNIT/ML 100 UNIT/ML
5 SOLUTION INTRAVENOUS ONCE
Status: COMPLETED | OUTPATIENT
Start: 2022-10-31 | End: 2022-10-31

## 2022-10-31 RX ORDER — HEPARIN SODIUM (PORCINE) LOCK FLUSH IV SOLN 100 UNIT/ML 100 UNIT/ML
5 SOLUTION INTRAVENOUS
Status: CANCELLED | OUTPATIENT
Start: 2022-10-31

## 2022-10-31 RX ADMIN — Medication 5 ML: at 10:54

## 2022-10-31 NOTE — PROGRESS NOTES
Pt did not require any transfusions today. She wants to cancel her appts that are scheduled for upcoming Thursdays and Fridays. Note sent to Anuja Tripp RN. So Sapp RN

## 2022-11-01 LAB — HOLD SPECIMEN: NORMAL

## 2022-11-03 ENCOUNTER — TELEPHONE (OUTPATIENT)
Dept: ONCOLOGY | Facility: CLINIC | Age: 37
End: 2022-11-03

## 2022-11-03 NOTE — TELEPHONE ENCOUNTER
Free Drug Application Initiated  Medication: Rydapt  Sponsor: [employer]  Phone #: [phone]  Fax #: [phone]  Additional Information: emailed sarah to Mady        Thank you,    Farida Pichardo  Oncology Pharmacy Liaison II  [email or web address]  Phone: [phone]  Fax: [phone]

## 2022-11-04 NOTE — ORAL ONC MGMT
I have reviewed and agree to the information submitted for this Free Drug Application.  Dony Dia, PharmD, BCPS, BCOP  Hematology/Oncology Clinical Pharmacist  [facility] - Oral Chemotherapy Monitoring Program  [phone]

## 2022-11-07 DIAGNOSIS — C92.01 ACUTE MYELOID LEUKEMIA IN REMISSION (H): Primary | ICD-10-CM

## 2022-11-07 RX ORDER — DEXAMETHASONE 4 MG/1
8 TABLET ORAL EVERY MORNING
Status: CANCELLED
Start: 2022-11-10

## 2022-11-07 RX ORDER — ONDANSETRON 8 MG/1
8 TABLET, FILM COATED ORAL EVERY 12 HOURS
Status: CANCELLED
Start: 2022-11-07

## 2022-11-07 RX ORDER — ALBUTEROL SULFATE 90 UG/1
1-2 AEROSOL, METERED RESPIRATORY (INHALATION)
Status: CANCELLED
Start: 2022-11-07

## 2022-11-07 RX ORDER — ALBUTEROL SULFATE 0.83 MG/ML
2.5 SOLUTION RESPIRATORY (INHALATION)
Status: CANCELLED | OUTPATIENT
Start: 2022-11-07

## 2022-11-07 RX ORDER — PROCHLORPERAZINE MALEATE 10 MG
10 TABLET ORAL EVERY 6 HOURS PRN
Status: CANCELLED
Start: 2022-11-07

## 2022-11-07 RX ORDER — LORAZEPAM 0.5 MG/1
.5-1 TABLET ORAL EVERY 6 HOURS PRN
Status: CANCELLED
Start: 2022-11-07

## 2022-11-07 RX ORDER — DEXAMETHASONE 4 MG/1
12 TABLET ORAL EVERY 24 HOURS
Status: CANCELLED
Start: 2022-11-07

## 2022-11-07 RX ORDER — METHYLPREDNISOLONE SODIUM SUCCINATE 125 MG/2ML
125 INJECTION, POWDER, LYOPHILIZED, FOR SOLUTION INTRAMUSCULAR; INTRAVENOUS
Status: CANCELLED
Start: 2022-11-07

## 2022-11-07 RX ORDER — LORAZEPAM 2 MG/ML
.5-1 INJECTION INTRAMUSCULAR EVERY 6 HOURS PRN
Status: CANCELLED | OUTPATIENT
Start: 2022-11-07

## 2022-11-07 RX ORDER — PREDNISOLONE ACETATE 10 MG/ML
2 SUSPENSION/ DROPS OPHTHALMIC 4 TIMES DAILY
Status: CANCELLED | OUTPATIENT
Start: 2022-11-07

## 2022-11-07 RX ORDER — MEPERIDINE HYDROCHLORIDE 25 MG/ML
25 INJECTION INTRAMUSCULAR; INTRAVENOUS; SUBCUTANEOUS EVERY 30 MIN PRN
Status: CANCELLED | OUTPATIENT
Start: 2022-11-07

## 2022-11-07 RX ORDER — DIPHENHYDRAMINE HYDROCHLORIDE 50 MG/ML
50 INJECTION INTRAMUSCULAR; INTRAVENOUS
Status: CANCELLED
Start: 2022-11-07

## 2022-11-07 RX ORDER — EPINEPHRINE 1 MG/ML
0.3 INJECTION, SOLUTION INTRAMUSCULAR; SUBCUTANEOUS EVERY 5 MIN PRN
Status: CANCELLED | OUTPATIENT
Start: 2022-11-07

## 2022-11-08 ENCOUNTER — TELEPHONE (OUTPATIENT)
Dept: ONCOLOGY | Facility: CLINIC | Age: 37
End: 2022-11-08

## 2022-11-08 NOTE — ORAL ONC MGMT
Oral Chemotherapy Monitoring Program     Placed call to patient in follow up of oral chemotherapy. Left message requesting call back. No drug names were mentioned. Will update when response received.     Karla Oh, PharmD, [facility]  Oral Chemotherapy Monitoring Program  [facility]  [phone]

## 2022-11-13 DIAGNOSIS — C92.01 ACUTE MYELOID LEUKEMIA IN REMISSION (H): Primary | ICD-10-CM

## 2022-11-13 NOTE — PROGRESS NOTES
[facility]  Date of visit: Nov 14, 2022    Reason for Visit: follow up AML--favorable risk      Oncology HPI:   Follows with Dr. Earl. Presented to routine exam with cytopenias. BMBx 6/17/2022 - hypercellular marrow (85-95% cellularity) with 92% blasts. 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 Started induction chemotherapy with 7+3 and midostaurin on 6/22/2022. D21 BMBx (7/12) with robust marrow regeneration, repeat BMBx 7/22 with 70-80% cellularity and 6% blasts by morphology consisted with complete response per Dr. Earl. NGS with no detectable mutations (MRD negative). BMT consulted 7/15; recommend consolidation chemotherapy, could consider BMT in the future if relapses. Tentatively planning for 3-4 cycles consolidation HiDAC (D1-3) and midostaurin (D8-21), followed by maintenance midostaurin for 1 year. Admitted for Cycle 1 consolidation with HiDAC and midostaurin on 8/9/22 which was tolerated well without any concerns or complications.     C2 HiDAC + midastaurin, D1 = 9/12/2022  C3 HiDAC + midastaurin, D1 = 10/10/2022  C4 HiDAC + midastaurin, D1 = 11/14/2022      Interval history:   Minerva is here prior to admission. Denies new complaints today. Vaginal bleeding/ menstruation has resolved. No infectious concerns. No further abnormal bleeding. She is eating well. No recurrent diarrhea. Energy is stable. Expresses no concerns today. ROS otherwise neg.           Current Outpatient Medications   Medication Sig Dispense Refill     acyclovir (ZOVIRAX) 400 MG tablet Take 1 tablet (400 mg) by mouth 2 times daily 60 tablet 3     Bacillus Coagulans-Inulin (PROBIOTIC) 1-250 BILLION-MG CAPS Take 1 capsule by mouth daily       dexamethasone (DECADRON) 4 MG tablet Take 2 tablets (8 mg) by mouth every morning 2 tablet 0     fluconazole (DIFLUCAN) 200 MG tablet Take 1 tablet (200 mg) by mouth daily When absolute neutrophils are less  than 1.- x 10^9/L 30 tablet 0     levofloxacin (LEVAQUIN) 250 MG tablet Take 1 tablet (250 mg) by mouth daily When absolute neutrophils are less than 1.- x 10^9/L 30 tablet 4     levothyroxine (SYNTHROID/LEVOTHROID) 88 MCG tablet Take 88 mcg by mouth daily       magnesium 250 MG tablet Take 250 mg by mouth daily       medroxyPROGESTERone (PROVERA) 10 MG tablet Take 1 tablet (10 mg) by mouth daily 5 tablet 0     midostaurin (RYDAPT) 25 MG capsule Take 2 capsules (50 mg) by mouth 2 times daily . Take with food on Days 8 through 21 (9/19 - 10/2) then stop 56 capsule 0     Multiple Vitamins-Minerals (WOMENS MULTIVITAMIN) TABS Take 1 tablet by mouth daily       Quercetin 50 MG TABS Take 50 mg by mouth daily       vancomycin (VANCOCIN) 125 MG capsule Take 1 capsule (125 mg) by mouth 2 times daily 60 capsule 4     vitamin C (ASCORBIC ACID) 1000 MG TABS Take 2,000 mg by mouth daily       vitamin D3 (CHOLECALCIFEROL) 50 mcg (2000 units) tablet Take 1 tablet (50 mcg) by mouth daily       zinc gluconate 50 MG tablet Take 50 mg by mouth daily         Allergies   Allergen Reactions     Blood Transfusion Related (Informational Only) Hives     6/29/2022, reaction of hives with platelet transfusion          Physical Exam:  /87   Pulse 81   Temp 98.4  F (36.9  C) (Oral)   Resp 18   Wt 116 kg (255 lb 12.8 oz)   SpO2 98%   BMI 40.63 kg/m    General: No acute distress.  HEENT: EOMI, PERRL. Sclerae are anicteric. Deferred oral exam.    Lymph: Neck is supple with no lymphadenopathy in the cervical or supraclavicular areas.   Heart: Regular rate and rhythm.   Lungs: Clear to auscultation bilaterally.   Abdomen: Bowel sounds present, soft, nontender with no palpable hepatosplenomegaly or masses.   Extremities: No lower extremity edema noted bilaterally.   Neuro: Alert and oriented x3, CN grossly intact, steady gait  Skin: No rashes, petechiae, or bruising noted on exposed skin.        Labs:     I personally reviewed the  following labs:    Most Recent 3 CBC's:  Recent Labs   Lab Test 10/31/22  1009 10/28/22  0947 10/25/22  1002   WBC 9.2 11.6* 9.0   HGB 8.1* 8.3* 6.5*   MCV 99 96 98    86* 12*     Most Recent 3 BMP's:  Recent Labs   Lab Test 10/13/22  0636 10/12/22  0536 10/11/22  0532    137 140   POTASSIUM 4.0 3.8 3.7   CHLORIDE 109* 106 107   CO2 22 21* 17*   BUN 12.2 12.3 12.8   CR 0.44* 0.57 0.58   ANIONGAP 8 10 16*   MICHAEL 8.9 9.0 9.4   * 137* 162*     Most Recent 2 LFT's:  Recent Labs   Lab Test 10/13/22  0636 10/12/22  0536   AST 12 13   ALT 19 22   ALKPHOS 47 48   BILITOTAL 0.6 0.8           Imaging: n/a      Impression/plan:     # AML, 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 Earl. Presented to routine exam with cytopenias. BMBx 6/17/2022 - hypercellular marrow (85-95% cellularity) with 92% blasts. 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 Started induction chemotherapy with 7+3 and midostaurin on 6/22/2022. D21 BMBx (7/12) with robust marrow regeneration, repeat BMBx 7/22 with 70-80% cellularity and 6% blasts by morphology consisted with complete response per Dr. Earl. NGS with no detectable mutations (MRD negative). BMT consulted 7/15; recommend consolidation chemotherapy, could consider BMT in the future if relapses. Tentatively planning for 3-4 cycles consolidation HiDAC (D1-3) and midostaurin (D8-21), followed by maintenance midostaurin for 1 year. Admitted for Cycle 1 consolidation with HiDAC and midostaurin on 8/9/22 which was tolerated well without any concerns or complications.   - Free drug coverage approved for midostaurin through [employer] - delivered to home  - Port in place  - Patient in MRD negative CR, no indication for allopurinol or TLS monitoring.   - Recheck NPM1 mutation   - Plan to admit for C4 HiDAC + Midastaurin consolidation today 11/14-- WBC 3.7, prelim ANC 2.0 and plts look great  - Plan  for bone marrow biopsy about 6-8 weeks after final cycle with send out PCR for NPM1 MRD (to [facility]) to confirm depth of response -- will schedule this last week of December with Dr Earl following week to review                  Treatment Plan: HiDAC +Midostaurin [id number]/14/2022               - Cytarabine 3 g/m2 q12h x6 doses - D1,2,3               - Midostaurin 50 mg BID x14 days - D8 - 21 -- Minerva already has this at home for C4               - Supportive medications--   - Pred forte eye drops QID D1-5   - Dexamethasone 12mg PO q24h D1-3, 8mg D4-5   - Zofran 8mg q12h x6 doses D1-3  - Neulasta 6mg          # LIVANE DVT (PICC-associated)  LUE US (7/12) with nonocclusive DVT in the left axillary vein and one of the paired left brachial veins, superficial venous thrombosis in the left basilic vein. PICC-associated which was removed prior to discharge. Transitioned to apixaban as of 8/9 with plan to continue (per Dr. Earl) x 3 months (through ~10/13/22).   - Continue to monitor and hold if plts <50k  - Completed therapeutic anticoagulation at the end of 3 months       # ID PPX  - ACV 400mg BID  - No indication for antibacterial or antifungal ppx with ANC >1000. Continue to monitor outpatient and start levaquin and voriconazole if ANC <1000  - Note; if she were to need antifungal ppx in the future consider reaching out to pharmacy liaison because prior to meeting her deductible the cost was $2048/month for posaconazole and $176/month for Voriconazole.        # History of recurrent cdiff colitis  - Ppx Vancomycin 125 mg BID PO        # H/o COVID-19 (Oct 2021)  Pt is not vaccinated nor interested in being vaccinated. She was admitted from 10/1/2021 - 10/13/2021 for acute hypoxic respiratory failure from COVID-19 pneumonia. Required IMC, high flow and intermittent CPAP. She was treated with dexamethasone, remdesivir, and baricitinib in addition to azithromycin and ceftriaxone. Recovered symptomatically.  - Consider  Chavo, will continue to discuss as OP       # Hypothyroidism   - Synthroid 75 mcg daily       #Heavy menstrual bleeding  - Dosed with Provera 10 mg daily x 5 days while inpatient in 6/2022. Restarted another 5 day course following last cycle in s/o kiana lining up with her menstrual cycle. Anticipate this may re-occur this cycle as well  - consider ob/gyn referral if continues.         Plan--   - Admit for C4 consolidation today  - Neulasta 11/18 at [place], ok to not draw labs this day  - will request post discharge follow up on 11/22  - Bmbx in 6-8 weeks with Dr Earl afterwards to review- sent to scheduling          Fidelia Nair [facility]-BC    40 minutes spent on the date of the encounter doing chart review, review of test results, interpretation of tests, patient visit and documentation

## 2022-11-13 NOTE — H&P
[facility]    History and Physical  Hematology / Oncology     Date of Admission:  11/14/2022   Date of Service (when I saw the patient): 11/14/22    Assessment & Plan   Veda Marinelli is a 37 year old female who presents with past medical history of recurrent C.Diff, LUE DVT (7/12/22), and favorable risk AML (FLT3-ITD, NPM-1, IDH2 mutations) in MRD negative CR currently being admitted for cycle 4 of consolidation HiDAC with midostaurin.    HEME/ONC  # AML, 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 Earl. Presented to routine exam with cytopenias. BMBx 6/17/2022 - hypercellular marrow (85-95% cellularity) with 92% blasts. 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 Started induction chemotherapy with 7+3 and midostaurin on 6/22/2022. D21 BMBx (7/12) with robust marrow regeneration, repeat BMBx 7/22 with 70-80% cellularity and 6% blasts by morphology consisted with complete response per Dr. Earl. NGS with no detectable mutations (MRD negative). BMT consulted 7/15; recommend consolidation chemotherapy, could consider BMT in the future if relapses. Tentatively planning for 3-4 cycles consolidation HiDAC (D1-3) and midostaurin (D8-21), followed by maintenance midostaurin for 1 year. Admitted for Cycle 1 consolidation with HiDAC and midostaurin; (C1=8/9/22, C2=9/12/22, C3=10/10/22) and has tolerated well without any concerns or complications. She is currently being admitted for cycle 4.    - Port accessed on admission.   - Appears patient may have Free drug coverage approved for midostaurin through [employer] - though patient states that the outpatient team is still working on this. Will follow up.  - Plan for bone marrow biopsy about 6-8 weeks after final cycle (this cycle) with send out PCR for NPM1 MRD (to [facility]) to confirm depth of response                  Treatment Plan: HiDAC +  Midostaurin (N2F7=3011/13/22)               - Cytarabine 3 g/m2 (6930mg) q12h x6 doses - D1-3               - Midostaurin 50 mg BID x14 days - D8 - 21                - Supportive medications;   - Pred forte eye drops QID D1-5   - Dexamethasone 12mg PO q24h D1-3, 8mg D4-5   - Zofran 8mg q12h x6 doses D1-3  - Neulasta 6mg - previously scheduled at [facility]     # H/o LUE DVT (PICC-associated)  # Acquired coagulopathy d/t Eliquis  LUE US (7/12) with nonocclusive DVT in the left axillary vein and one of the paired left brachial veins, superficial venous thrombosis in the left basilic vein. PICC-associated which was removed prior to discharge. Transitioned to apixaban as of 8/9 with plan to continue (per Dr. Earl) x 3 months (through ~10/13/22).       # TLS monitoring  # H/o Hyperuricemia  Patient is MRD negative CR and is low-risk for TLS.   - Monitor daily      ID  # ID PPX  - ACV 400mg BID  - No indication for antibacterial or antifungal ppx with ANC >1000. Continue to monitor outpatient and start levaquin and voriconazole if ANC <1000  - Note; if she were to need antifungal ppx in the future consider reaching out to pharmacy liaison because prior to meeting her deductible the cost was $2048/month for posaconazole and $176/month for Voriconazole.      # History of recurrent cdiff colitis  - PTA ppx Vancomycin 125mg BID PO      # H/o COVID-19 (Oct 2021)  Pt is not vaccinated nor interested in being vaccinated. She was admitted from 10/1/2021 - 10/13/2021 for acute hypoxic respiratory failure from COVID-19 pneumonia. Required IMC, high flow and intermittent CPAP. She was treated with dexamethasone, remdesivir, and baricitinib in addition to azithromycin and ceftriaxone. Recovered symptomatically.  - Consider Chavo outpatient, previously discussed in clinic  - Pre-admission COVID testing 11/14 in clinic. Result pending.     ENDO  # Hypothyroidism   - PTA Synthroid 100 mcg daily     FEN:  -IVF per protocol   -PRN lyte  replacement  -RDAT    Prophy/Misc:  -VTE: Lovenox; hold if platelets < 50K   -GI/PUD:   -Bowels: PRN Senna and Miralax    This patient was discussed with Dr Earl.    I spent 55 minutes face-to-face or coordinating care of Veda Marinelli. Over 50% of our time on the unit was spent counseling the patient and/or coordinating care.    Gaby Arteaga PA-C  Hematology/Oncology  #[id number]     Code Status   Full Code    Primary Care Physician   TATA NELSON    Chief Complaint   Admission for Cycle 4 HiDAC    History is obtained from the patient    History of Present Illness   Veda Marinelli is a 37 year old female who presents with past medical history of recurrent C.Diff, LUE DVT (7/12/22), and favorable risk AML (FLT3-ITD, NPM-1, IDH2 mutations) in MRD negative CR currently being admitted for cycle 4 of consolidation HiDAC with midostaurin.    On admission, Minerva is feeling well. States she tolerated this last chemotherapy well without any significant side effects. Tracks her labs closely, was neutropenic for a couple days. Did go to a work retreat last week to a warmer state. No other new symptoms or concerns. Appetite intact. Denies fever, chills, mouth sores, SOB, cough, abdominal pain, diarrhea, constipation, nausea, vomiting, dysuria, hematuria, numbness, tingling, swelling      A comprehensive review of systems was obtained and is negative other than noted here or in the HPI.      Past Medical History    I have reviewed this patient's medical history and updated it with pertinent information if needed.   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  Past Surgical History:   Procedure Laterality Date     IR CHEST PORT PLACEMENT > 5 YRS OF AGE  8/9/2022     PICC DOUBLE LUMEN PLACEMENT Left 06/16/2022    46  cm total lateral brachial       Prior to Admission Medications   Prior to Admission Medications   Prescriptions Last Dose Informant Patient Reported? Taking?   Bacillus Coagulans-Inulin (PROBIOTIC) 1-250 BILLION-MG CAPS 11/14/2022 at 0800 Self Yes Yes   Sig: Take 1 capsule by mouth daily   Multiple Vitamins-Minerals (WOMENS MULTIVITAMIN) TABS 11/14/2022 at 0800 Self Yes Yes   Sig: Take 1 tablet by mouth daily   Quercetin 50 MG TABS 11/14/2022 at 0800 Self Yes Yes   Sig: Take 50 mg by mouth daily   acyclovir (ZOVIRAX) 400 MG tablet 11/14/2022  No Yes   Sig: Take 1 tablet (400 mg) by mouth 2 times daily   dexamethasone (DECADRON) 4 MG tablet   No No   Sig: Take 2 tablets (8 mg) by mouth every morning   fluconazole (DIFLUCAN) 200 MG tablet Past Month  No Yes   Sig: Take 1 tablet (200 mg) by mouth daily When absolute neutrophils are less than 1.- x 10^9/L   levofloxacin (LEVAQUIN) 250 MG tablet Past Month  No Yes   Sig: Take 1 tablet (250 mg) by mouth daily When absolute neutrophils are less than 1.- x 10^9/L   levothyroxine (SYNTHROID/LEVOTHROID) 75 MCG tablet 11/14/2022 at 0800  Yes Yes   Sig: Take 1 tablet (75 mcg) by mouth daily   magnesium 250 MG tablet 11/14/2022 at 0800 Self Yes Yes   Sig: Take 250 mg by mouth daily   medroxyPROGESTERone (PROVERA) 10 MG tablet 11/14/2022  No Yes   Sig: Take 1 tablet (10 mg) by mouth daily   vancomycin (VANCOCIN) 125 MG capsule 11/14/2022 at 0800 Self No Yes   Sig: Take 1 capsule (125 mg) by mouth 2 times daily   vitamin C (ASCORBIC ACID) 1000 MG TABS 11/14/2022 at 0800 Self Yes Yes   Sig: Take 2,000 mg by mouth daily   vitamin D3 (CHOLECALCIFEROL) 50 mcg (2000 units) tablet 11/14/2022 at 0800 Self Yes Yes   Sig: Take 1 tablet (50 mcg) by mouth daily   zinc gluconate 50 MG tablet 11/14/2022 at 0800 Self Yes Yes   Sig: Take 50 mg by mouth daily      Facility-Administered Medications: None     Allergies   Allergies   Allergen Reactions     Blood Transfusion Related (Informational  Only) Hives     6/29/2022, reaction of hives with platelet transfusion        Social History   I have reviewed this patient's social history and updated it with pertinent information if needed. Veda Marinelli  reports that she has never smoked. She has never used smokeless tobacco. She reports that she does not currently use alcohol. She reports that she does not currently use drugs.    Family History   I have reviewed this patient's family history and updated it with pertinent information if needed.   No family history on file.    Review of Systems   A comprehensive review of symptoms was obtained and negative unless noted above.    Physical Exam   Temp: 98.6  F (37  C) Temp src: Oral BP: 126/79 Pulse: 80   Resp: 18 SpO2: 98 % O2 Device: None (Room air)    Vital Signs with Ranges  Temp:  [97.8  F (36.6  C)-98.6  F (37  C)] 98.6  F (37  C)  Pulse:  [80-81] 80  Resp:  [18] 18  BP: (126-130)/(70-87) 126/79  SpO2:  [98 %-99 %] 98 %  255 lbs 8 oz    Constitutional: Pleasant female sitting up in bed. Awake and conversational. Non- toxic appearing. No acute distress. Well developed, hydrated, and nourished. Appears stated age.   HEENT: Normocephalic, atraumatic without tenderness or palpable masses/depressions. Sclerae anicteric. PERRLA. EOM intact. Moist mucus membranes without lesions, thrush, or exudates appreciated  Lymph: Neck supple. No significant adenopathy noted.   Respiratory: Breathing comfortable with no increased work on room air. Good air exchange. No signs of respiratory distress or accessory muscle use. Lungs clear to auscultation bilaterally, no crackles or wheezing noted.  Cardiovascular: Regular rate and rhythm. Normal S1 and S2. No murmurs, rubs, or gallops. No peripheral edema.    GI: Abdomen is soft, non-distended, non-tender and without distension. Bowel sounds present and normoactive. No masses or hepatosplenomegaly appreciated.  Skin: Skin is clean, dry, intact. No jaundice appreciated.    Musculoskeletal/ Extremities: No redness, warmth, or swelling of the joints appreciated. Distal pulses palpable. Nailbeds pink and without cyanosis or clubbing.   Neurologic: Alert and oriented. Speech normal. Grossly nonfocal. Memory and thought process preserved. Motor function grossly normal. Sensation intact bilaterally. CN II-XII intact.   Neuropsychiatric: Calm, affect congruent to situation. Appropriate mood and affect. Good judgment and insight. No visual/auditory hallucinations.  Vascular Access: Port is CDI without erythema, swelling, or discharge.       Data   Results for orders placed or performed during the hospital encounter of 11/14/22 (from the past 24 hour(s))   CBC with platelets differential    Narrative    The following orders were created for panel order CBC with platelets differential.  Procedure                               Abnormality         Status                     ---------                               -----------         ------                     CBC with platelets and d...[[id number]]  Abnormal            Final result                 Please view results for these tests on the individual orders.   Comprehensive metabolic panel   Result Value Ref Range    Sodium 140 136 - 145 mmol/L    Potassium 3.8 3.4 - 5.3 mmol/L    Chloride 108 (H) 98 - 107 mmol/L    Carbon Dioxide (CO2) 20 (L) 22 - 29 mmol/L    Anion Gap 12 7 - 15 mmol/L    Urea Nitrogen 11.8 6.0 - 20.0 mg/dL    Creatinine 0.58 0.51 - 0.95 mg/dL    Calcium 9.1 8.6 - 10.0 mg/dL    Glucose 101 (H) 70 - 99 mg/dL    Alkaline Phosphatase 57 35 - 104 U/L    AST 19 10 - 35 U/L    ALT 26 10 - 35 U/L    Protein Total 6.7 6.4 - 8.3 g/dL    Albumin 4.2 3.5 - 5.2 g/dL    Bilirubin Total 0.5 <=1.2 mg/dL    GFR Estimate >90 >60 mL/min/1.73m2   Phosphorus   Result Value Ref Range    Phosphorus 3.3 2.5 - 4.5 mg/dL   Uric acid   Result Value Ref Range    Uric Acid 4.8 2.4 - 5.7 mg/dL   Lactate Dehydrogenase   Result Value Ref Range    Lactate  Dehydrogenase 226 0 - 250 U/L   Magnesium   Result Value Ref Range    Magnesium 2.0 1.7 - 2.3 mg/dL   INR   Result Value Ref Range    INR 1.02 0.85 - 1.15   Fibrinogen activity   Result Value Ref Range    Fibrinogen Activity 441 170 - 490 mg/dL   ABO/RH Type and Screen     Narrative    The following orders were created for panel order ABO/RH Type and Screen .  Procedure                               Abnormality         Status                     ---------                               -----------         ------                     Adult Type and Screen[[id number]]                            In process                   Please view results for these tests on the individual orders.   CBC with platelets and differential   Result Value Ref Range    WBC Count 4.2 4.0 - 11.0 10e3/uL    RBC Count 3.12 (L) 3.80 - 5.20 10e6/uL    Hemoglobin 10.8 (L) 11.7 - 15.7 g/dL    Hematocrit 33.9 (L) 35.0 - 47.0 %     (H) 78 - 100 fL    MCH 34.6 (H) 26.5 - 33.0 pg    MCHC 31.9 31.5 - 36.5 g/dL    RDW 21.1 (H) 10.0 - 15.0 %    Platelet Count 233 150 - 450 10e3/uL    % Neutrophils 60 %    % Lymphocytes 27 %    % Monocytes 11 %    % Eosinophils 0 %    % Basophils 1 %    % Immature Granulocytes 1 %    NRBCs per 100 WBC 0 <1 /100    Absolute Neutrophils 2.6 1.6 - 8.3 10e3/uL    Absolute Lymphocytes 1.1 0.8 - 5.3 10e3/uL    Absolute Monocytes 0.5 0.0 - 1.3 10e3/uL    Absolute Eosinophils 0.0 0.0 - 0.7 10e3/uL    Absolute Basophils 0.0 0.0 - 0.2 10e3/uL    Absolute Immature Granulocytes 0.0 <=0.4 10e3/uL    Absolute NRBCs 0.0 10e3/uL

## 2022-11-14 ENCOUNTER — ONCOLOGY VISIT (OUTPATIENT)
Dept: ONCOLOGY | Facility: CLINIC | Age: 37
DRG: 839 | End: 2022-11-14
Attending: NURSE PRACTITIONER
Payer: COMMERCIAL

## 2022-11-14 ENCOUNTER — APPOINTMENT (OUTPATIENT)
Dept: LAB | Facility: CLINIC | Age: 37
DRG: 839 | End: 2022-11-14
Attending: PEDIATRICS
Payer: COMMERCIAL

## 2022-11-14 ENCOUNTER — HOSPITAL ENCOUNTER (INPATIENT)
Facility: CLINIC | Age: 37
LOS: 3 days | Discharge: HOME | DRG: 839 | End: 2022-11-17
Attending: INTERNAL MEDICINE | Admitting: INTERNAL MEDICINE
Payer: COMMERCIAL

## 2022-11-14 VITALS
TEMPERATURE: 98.4 F | WEIGHT: 255.8 LBS | HEART RATE: 81 BPM | BODY MASS INDEX: 40.63 KG/M2 | RESPIRATION RATE: 18 BRPM | DIASTOLIC BLOOD PRESSURE: 87 MMHG | OXYGEN SATURATION: 98 % | SYSTOLIC BLOOD PRESSURE: 127 MMHG

## 2022-11-14 DIAGNOSIS — C92.01 ACUTE MYELOID LEUKEMIA IN REMISSION (H): Primary | ICD-10-CM

## 2022-11-14 DIAGNOSIS — C92.01 ACUTE MYELOID LEUKEMIA IN REMISSION (H): ICD-10-CM

## 2022-11-14 DIAGNOSIS — C92.00 ACUTE MYELOID LEUKEMIA NOT HAVING ACHIEVED REMISSION (H): ICD-10-CM

## 2022-11-14 LAB
ABO/RH(D): NORMAL
ALBUMIN SERPL BCG-MCNC: 4.2 G/DL (ref 3.5–5.2)
ALBUMIN SERPL BCG-MCNC: 4.2 G/DL (ref 3.5–5.2)
ALP SERPL-CCNC: 57 U/L (ref 35–104)
ALP SERPL-CCNC: 58 U/L (ref 35–104)
ALT SERPL W P-5'-P-CCNC: 26 U/L (ref 10–35)
ALT SERPL W P-5'-P-CCNC: 26 U/L (ref 10–35)
ANION GAP SERPL CALCULATED.3IONS-SCNC: 11 MMOL/L (ref 7–15)
ANION GAP SERPL CALCULATED.3IONS-SCNC: 12 MMOL/L (ref 7–15)
ANTIBODY SCREEN: NEGATIVE
AST SERPL W P-5'-P-CCNC: 16 U/L (ref 10–35)
AST SERPL W P-5'-P-CCNC: 19 U/L (ref 10–35)
BASOPHILS # BLD AUTO: 0 10E3/UL (ref 0–0.2)
BASOPHILS # BLD AUTO: 0 10E3/UL (ref 0–0.2)
BASOPHILS NFR BLD AUTO: 1 %
BASOPHILS NFR BLD AUTO: 1 %
BILIRUB SERPL-MCNC: 0.5 MG/DL
BILIRUB SERPL-MCNC: 0.5 MG/DL
BUN SERPL-MCNC: 11.8 MG/DL (ref 6–20)
BUN SERPL-MCNC: 12.9 MG/DL (ref 6–20)
CALCIUM SERPL-MCNC: 9.1 MG/DL (ref 8.6–10)
CALCIUM SERPL-MCNC: 9.6 MG/DL (ref 8.6–10)
CHLORIDE SERPL-SCNC: 108 MMOL/L (ref 98–107)
CHLORIDE SERPL-SCNC: 109 MMOL/L (ref 98–107)
CREAT SERPL-MCNC: 0.58 MG/DL (ref 0.51–0.95)
CREAT SERPL-MCNC: 0.61 MG/DL (ref 0.51–0.95)
DEPRECATED HCO3 PLAS-SCNC: 20 MMOL/L (ref 22–29)
DEPRECATED HCO3 PLAS-SCNC: 22 MMOL/L (ref 22–29)
EOSINOPHIL # BLD AUTO: 0 10E3/UL (ref 0–0.7)
EOSINOPHIL # BLD AUTO: 0 10E3/UL (ref 0–0.7)
EOSINOPHIL NFR BLD AUTO: 0 %
EOSINOPHIL NFR BLD AUTO: 1 %
ERYTHROCYTE [DISTWIDTH] IN BLOOD BY AUTOMATED COUNT: 21.1 % (ref 10–15)
ERYTHROCYTE [DISTWIDTH] IN BLOOD BY AUTOMATED COUNT: 21.2 % (ref 10–15)
FIBRINOGEN PPP-MCNC: 441 MG/DL (ref 170–490)
GFR SERPL CREATININE-BSD FRML MDRD: >90 ML/MIN/1.73M2
GFR SERPL CREATININE-BSD FRML MDRD: >90 ML/MIN/1.73M2
GLUCOSE SERPL-MCNC: 101 MG/DL (ref 70–99)
GLUCOSE SERPL-MCNC: 116 MG/DL (ref 70–99)
HCT VFR BLD AUTO: 33.7 % (ref 35–47)
HCT VFR BLD AUTO: 33.9 % (ref 35–47)
HGB BLD-MCNC: 10.8 G/DL (ref 11.7–15.7)
HGB BLD-MCNC: 10.9 G/DL (ref 11.7–15.7)
IMM GRANULOCYTES # BLD: 0 10E3/UL
IMM GRANULOCYTES # BLD: 0 10E3/UL
IMM GRANULOCYTES NFR BLD: 1 %
IMM GRANULOCYTES NFR BLD: 1 %
INR PPP: 1.02 (ref 0.85–1.15)
LDH SERPL L TO P-CCNC: 226 U/L (ref 0–250)
LDH SERPL L TO P-CCNC: 231 U/L (ref 0–250)
LYMPHOCYTES # BLD AUTO: 1 10E3/UL (ref 0.8–5.3)
LYMPHOCYTES # BLD AUTO: 1.1 10E3/UL (ref 0.8–5.3)
LYMPHOCYTES NFR BLD AUTO: 27 %
LYMPHOCYTES NFR BLD AUTO: 27 %
MAGNESIUM SERPL-MCNC: 2 MG/DL (ref 1.7–2.3)
MCH RBC QN AUTO: 34.5 PG (ref 26.5–33)
MCH RBC QN AUTO: 34.6 PG (ref 26.5–33)
MCHC RBC AUTO-ENTMCNC: 31.9 G/DL (ref 31.5–36.5)
MCHC RBC AUTO-ENTMCNC: 32.3 G/DL (ref 31.5–36.5)
MCV RBC AUTO: 107 FL (ref 78–100)
MCV RBC AUTO: 109 FL (ref 78–100)
MONOCYTES # BLD AUTO: 0.5 10E3/UL (ref 0–1.3)
MONOCYTES # BLD AUTO: 0.6 10E3/UL (ref 0–1.3)
MONOCYTES NFR BLD AUTO: 11 %
MONOCYTES NFR BLD AUTO: 17 %
NEUTROPHILS # BLD AUTO: 2 10E3/UL (ref 1.6–8.3)
NEUTROPHILS # BLD AUTO: 2.6 10E3/UL (ref 1.6–8.3)
NEUTROPHILS NFR BLD AUTO: 53 %
NEUTROPHILS NFR BLD AUTO: 60 %
NRBC # BLD AUTO: 0 10E3/UL
NRBC # BLD AUTO: 0 10E3/UL
NRBC BLD AUTO-RTO: 0 /100
NRBC BLD AUTO-RTO: 0 /100
PHOSPHATE SERPL-MCNC: 3.3 MG/DL (ref 2.5–4.5)
PLAT MORPH BLD: NORMAL
PLATELET # BLD AUTO: 230 10E3/UL (ref 150–450)
PLATELET # BLD AUTO: 233 10E3/UL (ref 150–450)
POTASSIUM SERPL-SCNC: 3.8 MMOL/L (ref 3.4–5.3)
POTASSIUM SERPL-SCNC: 3.9 MMOL/L (ref 3.4–5.3)
PROT SERPL-MCNC: 6.7 G/DL (ref 6.4–8.3)
PROT SERPL-MCNC: 6.8 G/DL (ref 6.4–8.3)
RBC # BLD AUTO: 3.12 10E6/UL (ref 3.8–5.2)
RBC # BLD AUTO: 3.16 10E6/UL (ref 3.8–5.2)
RBC MORPH BLD: NORMAL
SARS-COV-2 RNA RESP QL NAA+PROBE: NEGATIVE
SODIUM SERPL-SCNC: 140 MMOL/L (ref 136–145)
SODIUM SERPL-SCNC: 142 MMOL/L (ref 136–145)
SPECIMEN EXPIRATION DATE: NORMAL
URATE SERPL-MCNC: 4.8 MG/DL (ref 2.4–5.7)
WBC # BLD AUTO: 3.7 10E3/UL (ref 4–11)
WBC # BLD AUTO: 4.2 10E3/UL (ref 4–11)

## 2022-11-14 PROCEDURE — 86850 RBC ANTIBODY SCREEN: CPT | Performed by: PHYSICIAN ASSISTANT

## 2022-11-14 PROCEDURE — 250N000009 HC RX 250: Performed by: INTERNAL MEDICINE

## 2022-11-14 PROCEDURE — 86901 BLOOD TYPING SEROLOGIC RH(D): CPT | Performed by: PHYSICIAN ASSISTANT

## 2022-11-14 PROCEDURE — G0463 HOSPITAL OUTPT CLINIC VISIT: HCPCS

## 2022-11-14 PROCEDURE — 99207 PR CHGS TRANSFERRED TO HOSPITAL: CPT | Performed by: NURSE PRACTITIONER

## 2022-11-14 PROCEDURE — 250N000011 HC RX IP 250 OP 636: Performed by: NURSE PRACTITIONER

## 2022-11-14 PROCEDURE — 36591 DRAW BLOOD OFF VENOUS DEVICE: CPT | Performed by: PHYSICIAN ASSISTANT

## 2022-11-14 PROCEDURE — 36591 DRAW BLOOD OFF VENOUS DEVICE: CPT | Performed by: NURSE PRACTITIONER

## 2022-11-14 PROCEDURE — XW043B3 INTRODUCTION OF CYTARABINE AND DAUNORUBICIN LIPOSOME ANTINEOPLASTIC INTO CENTRAL VEIN, PERCUTANEOUS APPROACH, NEW TECHNOLOGY GROUP 3: ICD-10-PCS | Performed by: INTERNAL MEDICINE

## 2022-11-14 PROCEDURE — 250N000011 HC RX IP 250 OP 636: Performed by: PHYSICIAN ASSISTANT

## 2022-11-14 PROCEDURE — 84450 TRANSFERASE (AST) (SGOT): CPT | Performed by: INTERNAL MEDICINE

## 2022-11-14 PROCEDURE — 99207 PR SC NO CHARGE VISIT: CPT | Performed by: PHYSICIAN ASSISTANT

## 2022-11-14 PROCEDURE — 83615 LACTATE (LD) (LDH) ENZYME: CPT | Performed by: INTERNAL MEDICINE

## 2022-11-14 PROCEDURE — 85384 FIBRINOGEN ACTIVITY: CPT | Performed by: PHYSICIAN ASSISTANT

## 2022-11-14 PROCEDURE — 85025 COMPLETE CBC W/AUTO DIFF WBC: CPT | Performed by: INTERNAL MEDICINE

## 2022-11-14 PROCEDURE — 85610 PROTHROMBIN TIME: CPT | Performed by: PHYSICIAN ASSISTANT

## 2022-11-14 PROCEDURE — 83615 LACTATE (LD) (LDH) ENZYME: CPT | Performed by: NURSE PRACTITIONER

## 2022-11-14 PROCEDURE — 84550 ASSAY OF BLOOD/URIC ACID: CPT | Performed by: INTERNAL MEDICINE

## 2022-11-14 PROCEDURE — 250N000013 HC RX MED GY IP 250 OP 250 PS 637: Performed by: PHYSICIAN ASSISTANT

## 2022-11-14 PROCEDURE — 258N000003 HC RX IP 258 OP 636: Performed by: INTERNAL MEDICINE

## 2022-11-14 PROCEDURE — U0005 INFEC AGEN DETEC AMPLI PROBE: HCPCS | Performed by: NURSE PRACTITIONER

## 2022-11-14 PROCEDURE — 120N000002 HC R&B MED SURG/OB UMMC

## 2022-11-14 PROCEDURE — 85025 COMPLETE CBC W/AUTO DIFF WBC: CPT | Performed by: NURSE PRACTITIONER

## 2022-11-14 PROCEDURE — 250N000011 HC RX IP 250 OP 636: Performed by: INTERNAL MEDICINE

## 2022-11-14 PROCEDURE — 84155 ASSAY OF PROTEIN SERUM: CPT | Performed by: INTERNAL MEDICINE

## 2022-11-14 PROCEDURE — 84100 ASSAY OF PHOSPHORUS: CPT | Performed by: INTERNAL MEDICINE

## 2022-11-14 PROCEDURE — 80053 COMPREHEN METABOLIC PANEL: CPT | Performed by: NURSE PRACTITIONER

## 2022-11-14 PROCEDURE — 83735 ASSAY OF MAGNESIUM: CPT | Performed by: PHYSICIAN ASSISTANT

## 2022-11-14 PROCEDURE — 99223 1ST HOSP IP/OBS HIGH 75: CPT | Performed by: PHYSICIAN ASSISTANT

## 2022-11-14 RX ORDER — ONDANSETRON 4 MG/1
4-8 TABLET, ORALLY DISINTEGRATING ORAL EVERY 8 HOURS PRN
Status: DISCONTINUED | OUTPATIENT
Start: 2022-11-14 | End: 2022-11-17 | Stop reason: HOSPADM

## 2022-11-14 RX ORDER — ACYCLOVIR 400 MG/1
400 TABLET ORAL 2 TIMES DAILY
Status: DISCONTINUED | OUTPATIENT
Start: 2022-11-14 | End: 2022-11-17 | Stop reason: HOSPADM

## 2022-11-14 RX ORDER — MEDROXYPROGESTERONE ACETATE 10 MG
10 TABLET ORAL DAILY
Status: DISCONTINUED | OUTPATIENT
Start: 2022-11-15 | End: 2022-11-14

## 2022-11-14 RX ORDER — PROCHLORPERAZINE MALEATE 10 MG
10 TABLET ORAL EVERY 6 HOURS PRN
Status: DISCONTINUED | OUTPATIENT
Start: 2022-11-14 | End: 2022-11-14

## 2022-11-14 RX ORDER — ALBUTEROL SULFATE 0.83 MG/ML
2.5 SOLUTION RESPIRATORY (INHALATION)
Status: DISCONTINUED | OUTPATIENT
Start: 2022-11-14 | End: 2022-11-17 | Stop reason: HOSPADM

## 2022-11-14 RX ORDER — LEVOTHYROXINE SODIUM 75 UG/1
TABLET ORAL
COMMUNITY
Start: 2022-10-18 | End: 2022-11-14

## 2022-11-14 RX ORDER — MAGNESIUM OXIDE 400 MG/1
400 TABLET ORAL DAILY
Status: DISCONTINUED | OUTPATIENT
Start: 2022-11-15 | End: 2022-11-17 | Stop reason: HOSPADM

## 2022-11-14 RX ORDER — ZINC SULFATE 50(220)MG
220 CAPSULE ORAL DAILY
Status: DISCONTINUED | OUTPATIENT
Start: 2022-11-15 | End: 2022-11-17 | Stop reason: HOSPADM

## 2022-11-14 RX ORDER — ALBUTEROL SULFATE 90 UG/1
1-2 AEROSOL, METERED RESPIRATORY (INHALATION)
Status: DISCONTINUED | OUTPATIENT
Start: 2022-11-14 | End: 2022-11-17 | Stop reason: HOSPADM

## 2022-11-14 RX ORDER — PREDNISOLONE ACETATE 10 MG/ML
2 SUSPENSION/ DROPS OPHTHALMIC 4 TIMES DAILY
Status: DISCONTINUED | OUTPATIENT
Start: 2022-11-14 | End: 2022-11-17 | Stop reason: HOSPADM

## 2022-11-14 RX ORDER — VITAMIN B COMPLEX
50 TABLET ORAL DAILY
Status: DISCONTINUED | OUTPATIENT
Start: 2022-11-15 | End: 2022-11-17 | Stop reason: HOSPADM

## 2022-11-14 RX ORDER — LEVOTHYROXINE SODIUM 88 UG/1
88 TABLET ORAL DAILY
Status: DISCONTINUED | OUTPATIENT
Start: 2022-11-15 | End: 2022-11-17 | Stop reason: HOSPADM

## 2022-11-14 RX ORDER — ONDANSETRON 8 MG/1
8 TABLET, FILM COATED ORAL EVERY 8 HOURS PRN
Status: DISCONTINUED | OUTPATIENT
Start: 2022-11-14 | End: 2022-11-17 | Stop reason: HOSPADM

## 2022-11-14 RX ORDER — HEPARIN SODIUM,PORCINE 10 UNIT/ML
5-10 VIAL (ML) INTRAVENOUS
Status: DISCONTINUED | OUTPATIENT
Start: 2022-11-14 | End: 2022-11-17 | Stop reason: HOSPADM

## 2022-11-14 RX ORDER — MULTIPLE VITAMINS W/ MINERALS TAB 9MG-400MCG
1 TAB ORAL DAILY
Status: DISCONTINUED | OUTPATIENT
Start: 2022-11-15 | End: 2022-11-17 | Stop reason: HOSPADM

## 2022-11-14 RX ORDER — LACTOBACILLUS RHAMNOSUS GG 10B CELL
1 CAPSULE ORAL DAILY
Status: DISCONTINUED | OUTPATIENT
Start: 2022-11-15 | End: 2022-11-17 | Stop reason: HOSPADM

## 2022-11-14 RX ORDER — PROCHLORPERAZINE MALEATE 5 MG
5 TABLET ORAL EVERY 6 HOURS PRN
Status: DISCONTINUED | OUTPATIENT
Start: 2022-11-14 | End: 2022-11-17 | Stop reason: HOSPADM

## 2022-11-14 RX ORDER — AMOXICILLIN 250 MG
1 CAPSULE ORAL 2 TIMES DAILY PRN
Status: DISCONTINUED | OUTPATIENT
Start: 2022-11-14 | End: 2022-11-17 | Stop reason: HOSPADM

## 2022-11-14 RX ORDER — LORAZEPAM 0.5 MG/1
.5-1 TABLET ORAL EVERY 6 HOURS PRN
Status: DISCONTINUED | OUTPATIENT
Start: 2022-11-14 | End: 2022-11-17 | Stop reason: HOSPADM

## 2022-11-14 RX ORDER — MEPERIDINE HYDROCHLORIDE 25 MG/ML
25 INJECTION INTRAMUSCULAR; INTRAVENOUS; SUBCUTANEOUS EVERY 30 MIN PRN
Status: DISCONTINUED | OUTPATIENT
Start: 2022-11-14 | End: 2022-11-17 | Stop reason: HOSPADM

## 2022-11-14 RX ORDER — EPINEPHRINE 1 MG/ML
0.3 INJECTION, SOLUTION, CONCENTRATE INTRAVENOUS EVERY 5 MIN PRN
Status: DISCONTINUED | OUTPATIENT
Start: 2022-11-14 | End: 2022-11-17 | Stop reason: HOSPADM

## 2022-11-14 RX ORDER — LORAZEPAM 2 MG/ML
.5-1 INJECTION INTRAMUSCULAR EVERY 6 HOURS PRN
Status: DISCONTINUED | OUTPATIENT
Start: 2022-11-14 | End: 2022-11-17 | Stop reason: HOSPADM

## 2022-11-14 RX ORDER — HEPARIN SODIUM (PORCINE) LOCK FLUSH IV SOLN 100 UNIT/ML 100 UNIT/ML
5-10 SOLUTION INTRAVENOUS
Status: DISCONTINUED | OUTPATIENT
Start: 2022-11-14 | End: 2022-11-17 | Stop reason: HOSPADM

## 2022-11-14 RX ORDER — VANCOMYCIN HYDROCHLORIDE 125 MG/1
125 CAPSULE ORAL 2 TIMES DAILY
Status: DISCONTINUED | OUTPATIENT
Start: 2022-11-14 | End: 2022-11-17 | Stop reason: HOSPADM

## 2022-11-14 RX ORDER — ENOXAPARIN SODIUM 100 MG/ML
40 INJECTION SUBCUTANEOUS EVERY 24 HOURS
Status: DISCONTINUED | OUTPATIENT
Start: 2022-11-14 | End: 2022-11-15

## 2022-11-14 RX ORDER — ASCORBIC ACID 500 MG
2000 TABLET ORAL DAILY
Status: DISCONTINUED | OUTPATIENT
Start: 2022-11-15 | End: 2022-11-17 | Stop reason: HOSPADM

## 2022-11-14 RX ORDER — ONDANSETRON 2 MG/ML
4-8 INJECTION INTRAMUSCULAR; INTRAVENOUS EVERY 8 HOURS PRN
Status: DISCONTINUED | OUTPATIENT
Start: 2022-11-14 | End: 2022-11-17 | Stop reason: HOSPADM

## 2022-11-14 RX ORDER — POLYETHYLENE GLYCOL 3350 17 G/17G
17 POWDER, FOR SOLUTION ORAL DAILY PRN
Status: DISCONTINUED | OUTPATIENT
Start: 2022-11-14 | End: 2022-11-17 | Stop reason: HOSPADM

## 2022-11-14 RX ORDER — HEPARIN SODIUM (PORCINE) LOCK FLUSH IV SOLN 100 UNIT/ML 100 UNIT/ML
5 SOLUTION INTRAVENOUS ONCE
Status: COMPLETED | OUTPATIENT
Start: 2022-11-14 | End: 2022-11-14

## 2022-11-14 RX ORDER — DIPHENHYDRAMINE HYDROCHLORIDE 50 MG/ML
50 INJECTION INTRAMUSCULAR; INTRAVENOUS
Status: DISCONTINUED | OUTPATIENT
Start: 2022-11-14 | End: 2022-11-17 | Stop reason: HOSPADM

## 2022-11-14 RX ORDER — ONDANSETRON 8 MG/1
8 TABLET, FILM COATED ORAL EVERY 12 HOURS
Status: COMPLETED | OUTPATIENT
Start: 2022-11-14 | End: 2022-11-17

## 2022-11-14 RX ORDER — AMOXICILLIN 250 MG
2 CAPSULE ORAL 2 TIMES DAILY PRN
Status: DISCONTINUED | OUTPATIENT
Start: 2022-11-14 | End: 2022-11-17 | Stop reason: HOSPADM

## 2022-11-14 RX ORDER — DEXAMETHASONE 4 MG/1
8 TABLET ORAL EVERY MORNING
Status: DISCONTINUED | OUTPATIENT
Start: 2022-11-17 | End: 2022-11-17 | Stop reason: HOSPADM

## 2022-11-14 RX ORDER — HEPARIN SODIUM,PORCINE 10 UNIT/ML
5-10 VIAL (ML) INTRAVENOUS EVERY 24 HOURS
Status: DISCONTINUED | OUTPATIENT
Start: 2022-11-14 | End: 2022-11-14 | Stop reason: CLARIF

## 2022-11-14 RX ORDER — LEVOTHYROXINE SODIUM 75 UG/1
75 TABLET ORAL DAILY
COMMUNITY
Start: 2022-11-14

## 2022-11-14 RX ORDER — ACETAMINOPHEN 325 MG/1
650 TABLET ORAL EVERY 4 HOURS PRN
Status: DISCONTINUED | OUTPATIENT
Start: 2022-11-14 | End: 2022-11-17 | Stop reason: HOSPADM

## 2022-11-14 RX ORDER — DEXAMETHASONE 4 MG/1
12 TABLET ORAL EVERY 24 HOURS
Status: COMPLETED | OUTPATIENT
Start: 2022-11-14 | End: 2022-11-16

## 2022-11-14 RX ORDER — METHYLPREDNISOLONE SODIUM SUCCINATE 125 MG/2ML
125 INJECTION, POWDER, LYOPHILIZED, FOR SOLUTION INTRAMUSCULAR; INTRAVENOUS
Status: DISCONTINUED | OUTPATIENT
Start: 2022-11-14 | End: 2022-11-17 | Stop reason: HOSPADM

## 2022-11-14 RX ADMIN — PREDNISOLONE ACETATE 2 DROP: 10 SUSPENSION/ DROPS OPHTHALMIC at 16:37

## 2022-11-14 RX ADMIN — ONDANSETRON HYDROCHLORIDE 8 MG: 8 TABLET, FILM COATED ORAL at 17:20

## 2022-11-14 RX ADMIN — VANCOMYCIN HYDROCHLORIDE 125 MG: 125 CAPSULE ORAL at 20:29

## 2022-11-14 RX ADMIN — Medication 5 ML: at 20:57

## 2022-11-14 RX ADMIN — DEXAMETHASONE 12 MG: 4 TABLET ORAL at 17:20

## 2022-11-14 RX ADMIN — PREDNISOLONE ACETATE 2 DROP: 10 SUSPENSION/ DROPS OPHTHALMIC at 20:29

## 2022-11-14 RX ADMIN — ACYCLOVIR 400 MG: 400 TABLET ORAL at 20:29

## 2022-11-14 RX ADMIN — Medication 5 ML: at 10:26

## 2022-11-14 RX ADMIN — CYTARABINE 6930 MG: 100 INJECTION, SOLUTION INTRATHECAL; INTRAVENOUS; SUBCUTANEOUS at 17:41

## 2022-11-14 ASSESSMENT — ACTIVITIES OF DAILY LIVING (ADL)
ADLS_ACUITY_SCORE: 18
ADLS_ACUITY_SCORE: 35
ADLS_ACUITY_SCORE: 18

## 2022-11-14 ASSESSMENT — PAIN SCALES - GENERAL: PAINLEVEL: NO PAIN (0)

## 2022-11-14 NOTE — PROGRESS NOTES
Nursing Focus: Admission    D: Patient admitted/transferred from home via ambulation for chemotherapy.      I: Upon arrival to the unit patient was oriented to room, unit, and call light. Patient s height, weight, and vital signs were obtained. Allergies reviewed and allergy band applied. MD notified of patient s arrival on the unit. Adult AVS completed. Head to toe assessment completed. Education assessment completed. Care plan initiated.     A: Vital signs stable upon admission. Patient rates pain at zero.  Two RN skin assessment completed nAuja Joseph RN and Melani Bernal RN. And Minerva matthews skin check.No. Significant Skin Findings include N/A. [facility] Nurse Consult Ordered No. Bed Algorithm can be found in PCS flow sheets (Support Surface Algorithm) and on IP [facility] NURSE RESOURCE TAB, was this used during this assessment? No. Was a pulsate mattress ordered No.     P: Continue to monitor patient s skin and intervene as needed. Continue with plan of care. Notify MD with any concerns or changes in patient status.

## 2022-11-14 NOTE — NURSING NOTE
Chief Complaint   Patient presents with     Blood Draw     Port blood draw with heparin flush by lab RN. Vitals taken and appointment arrived      Laura Nye RN

## 2022-11-14 NOTE — LETTER
11/14/2022         RE: Veda Marinelli  [id number] [facility] [address]        Dear Colleague,    Thank you for referring your patient, Veda Marinelli, to the [facility]. Please see a copy of my visit note below.    [facility]  Date of visit: Nov 14, 2022    Reason for Visit: follow up AML--favorable risk      Oncology HPI:   Follows with Dr. Earl. Presented to routine exam with cytopenias. BMBx 6/17/2022 - hypercellular marrow (85-95% cellularity) with 92% blasts. 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 Started induction chemotherapy with 7+3 and midostaurin on 6/22/2022. D21 BMBx (7/12) with robust marrow regeneration, repeat BMBx 7/22 with 70-80% cellularity and 6% blasts by morphology consisted with complete response per Dr. Earl. NGS with no detectable mutations (MRD negative). BMT consulted 7/15; recommend consolidation chemotherapy, could consider BMT in the future if relapses. Tentatively planning for 3-4 cycles consolidation HiDAC (D1-3) and midostaurin (D8-21), followed by maintenance midostaurin for 1 year. Admitted for Cycle 1 consolidation with HiDAC and midostaurin on 8/9/22 which was tolerated well without any concerns or complications.     C2 HiDAC + midastaurin, D1 = 9/12/2022  C3 HiDAC + midastaurin, D1 = 10/10/2022  C4 HiDAC + midastaurin, D1 = 11/14/2022      Interval history:   Minerva is here prior to admission. Denies new complaints today. Vaginal bleeding/ menstruation has resolved. No infectious concerns. No further abnormal bleeding. She is eating well. No recurrent diarrhea. Energy is stable. Expresses no concerns today. ROS otherwise neg.           Current Outpatient Medications   Medication Sig Dispense Refill     acyclovir (ZOVIRAX) 400 MG tablet Take 1 tablet (400 mg) by mouth 2 times daily 60 tablet 3     Bacillus Coagulans-Inulin (PROBIOTIC)  1-250 BILLION-MG CAPS Take 1 capsule by mouth daily       dexamethasone (DECADRON) 4 MG tablet Take 2 tablets (8 mg) by mouth every morning 2 tablet 0     fluconazole (DIFLUCAN) 200 MG tablet Take 1 tablet (200 mg) by mouth daily When absolute neutrophils are less than 1.- x 10^9/L 30 tablet 0     levofloxacin (LEVAQUIN) 250 MG tablet Take 1 tablet (250 mg) by mouth daily When absolute neutrophils are less than 1.- x 10^9/L 30 tablet 4     levothyroxine (SYNTHROID/LEVOTHROID) 88 MCG tablet Take 88 mcg by mouth daily       magnesium 250 MG tablet Take 250 mg by mouth daily       medroxyPROGESTERone (PROVERA) 10 MG tablet Take 1 tablet (10 mg) by mouth daily 5 tablet 0     midostaurin (RYDAPT) 25 MG capsule Take 2 capsules (50 mg) by mouth 2 times daily . Take with food on Days 8 through 21 (9/19 - 10/2) then stop 56 capsule 0     Multiple Vitamins-Minerals (WOMENS MULTIVITAMIN) TABS Take 1 tablet by mouth daily       Quercetin 50 MG TABS Take 50 mg by mouth daily       vancomycin (VANCOCIN) 125 MG capsule Take 1 capsule (125 mg) by mouth 2 times daily 60 capsule 4     vitamin C (ASCORBIC ACID) 1000 MG TABS Take 2,000 mg by mouth daily       vitamin D3 (CHOLECALCIFEROL) 50 mcg (2000 units) tablet Take 1 tablet (50 mcg) by mouth daily       zinc gluconate 50 MG tablet Take 50 mg by mouth daily         Allergies   Allergen Reactions     Blood Transfusion Related (Informational Only) Hives     6/29/2022, reaction of hives with platelet transfusion          Physical Exam:  /87   Pulse 81   Temp 98.4  F (36.9  C) (Oral)   Resp 18   Wt 116 kg (255 lb 12.8 oz)   SpO2 98%   BMI 40.63 kg/m    General: No acute distress.  HEENT: EOMI, PERRL. Sclerae are anicteric. Deferred oral exam.    Lymph: Neck is supple with no lymphadenopathy in the cervical or supraclavicular areas.   Heart: Regular rate and rhythm.   Lungs: Clear to auscultation bilaterally.   Abdomen: Bowel sounds present, soft, nontender with no palpable  hepatosplenomegaly or masses.   Extremities: No lower extremity edema noted bilaterally.   Neuro: Alert and oriented x3, CN grossly intact, steady gait  Skin: No rashes, petechiae, or bruising noted on exposed skin.        Labs:     I personally reviewed the following labs:    Most Recent 3 CBC's:  Recent Labs   Lab Test 10/31/22  1009 10/28/22  0947 10/25/22  1002   WBC 9.2 11.6* 9.0   HGB 8.1* 8.3* 6.5*   MCV 99 96 98    86* 12*     Most Recent 3 BMP's:  Recent Labs   Lab Test 10/13/22  0636 10/12/22  0536 10/11/22  0532    137 140   POTASSIUM 4.0 3.8 3.7   CHLORIDE 109* 106 107   CO2 22 21* 17*   BUN 12.2 12.3 12.8   CR 0.44* 0.57 0.58   ANIONGAP 8 10 16*   MICHAEL 8.9 9.0 9.4   * 137* 162*     Most Recent 2 LFT's:  Recent Labs   Lab Test 10/13/22  0636 10/12/22  0536   AST 12 13   ALT 19 22   ALKPHOS 47 48   BILITOTAL 0.6 0.8           Imaging: n/a      Impression/plan:     # AML, 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 Earl. Presented to routine exam with cytopenias. BMBx 6/17/2022 - hypercellular marrow (85-95% cellularity) with 92% blasts. 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 Started induction chemotherapy with 7+3 and midostaurin on 6/22/2022. D21 BMBx (7/12) with robust marrow regeneration, repeat BMBx 7/22 with 70-80% cellularity and 6% blasts by morphology consisted with complete response per Dr. Earl. NGS with no detectable mutations (MRD negative). BMT consulted 7/15; recommend consolidation chemotherapy, could consider BMT in the future if relapses. Tentatively planning for 3-4 cycles consolidation HiDAC (D1-3) and midostaurin (D8-21), followed by maintenance midostaurin for 1 year. Admitted for Cycle 1 consolidation with HiDAC and midostaurin on 8/9/22 which was tolerated well without any concerns or complications.   - Free drug coverage approved for midostaurin through "[employer]s  - delivered to home  - Port in place  - Patient in MRD negative CR, no indication for allopurinol or TLS monitoring.   - Recheck NPM1 mutation   - Plan to admit for C4 HiDAC + Midastaurin consolidation today 11/14-- WBC 3.7, prelim ANC 2.0 and plts look great  - Plan for bone marrow biopsy about 6-8 weeks after final cycle with send out PCR for NPM1 MRD (to [facility]) to confirm depth of response -- will schedule this last week of December with Dr Earl following week to review                  Treatment Plan: HiDAC +Midostaurin [id number]=4911/14/2022               - Cytarabine 3 g/m2 q12h x6 doses - D1,2,3               - Midostaurin 50 mg BID x14 days - D8 - 21 -- Minerva already has this at home for C4               - Supportive medications--   - Pred forte eye drops QID D1-5   - Dexamethasone 12mg PO q24h D1-3, 8mg D4-5   - Zofran 8mg q12h x6 doses D1-3  - Neulasta 6mg          # LUE DVT (PICC-associated)  LUE US (7/12) with nonocclusive DVT in the left axillary vein and one of the paired left brachial veins, superficial venous thrombosis in the left basilic vein. PICC-associated which was removed prior to discharge. Transitioned to apixaban as of 8/9 with plan to continue (per Dr. Earl) x 3 months (through ~10/13/22).   - Continue to monitor and hold if plts <50k  - Completed therapeutic anticoagulation at the end of 3 months       # ID PPX  - ACV 400mg BID  - No indication for antibacterial or antifungal ppx with ANC >1000. Continue to monitor outpatient and start levaquin and voriconazole if ANC <1000  - Note; if she were to need antifungal ppx in the future consider reaching out to pharmacy liaison because prior to meeting her deductible the cost was $2048/month for posaconazole and $176/month for Voriconazole.        # History of recurrent cdiff colitis  - Ppx Vancomycin 125 mg BID PO        # H/o COVID-19 (Oct 2021)  Pt is not vaccinated nor interested in being vaccinated. She was admitted from 10/1/2021 -  10/13/2021 for acute hypoxic respiratory failure from COVID-19 pneumonia. Required IMC, high flow and intermittent CPAP. She was treated with dexamethasone, remdesivir, and baricitinib in addition to azithromycin and ceftriaxone. Recovered symptomatically.  - Consider Chavo, will continue to discuss as OP       # Hypothyroidism   - Synthroid 75 mcg daily       #Heavy menstrual bleeding  - Dosed with Provera 10 mg daily x 5 days while inpatient in 6/2022. Restarted another 5 day course following last cycle in s/o kiana lining up with her menstrual cycle. Anticipate this may re-occur this cycle as well  - consider ob/gyn referral if continues.       Plan--   - Admit for C4 consolidation today  - Neulasta 11/18 at [place], ok to not draw labs this day  - will request post discharge follow up on 11/22  - Roberth in 6-8 weeks with Dr Earl afterwards to review- sent to scheduling      40 minutes spent on the date of the encounter doing chart review, review of test results, interpretation of tests, patient visit and documentation       Again, thank you for allowing me to participate in the care of your patient.      Sincerely,    RAINE Rosario CNP

## 2022-11-14 NOTE — NURSING NOTE
"Oncology Rooming Note    November 14, 2022 10:37 AM   Veda Marinelli is a 37 year old female who presents for:    Chief Complaint   Patient presents with     Blood Draw     Port blood draw with heparin flush by lab RN. Vitals taken and appointment arrived     Oncology Clinic Visit     AML     Initial Vitals: /87   Pulse 81   Temp 98.4  F (36.9  C) (Oral)   Resp 18   Wt 116 kg (255 lb 12.8 oz)   SpO2 98%   BMI 40.63 kg/m   Estimated body mass index is 40.63 kg/m  as calculated from the following:    Height as of 10/10/22: 1.69 m (5' 6.54\").    Weight as of this encounter: 116 kg (255 lb 12.8 oz). Body surface area is 2.33 meters squared.  No Pain (0) Comment: Data Unavailable   No LMP recorded.  Allergies reviewed: Yes  Medications reviewed: Yes    Medications: Medication refills not needed today.  Pharmacy name entered into EPIC:    [id number] - [place], [place] - [address] [facility] - [place], [place] - [address] [facility] [place], [place] - [address] [place] [address] [facility] [place] - [address]    Clinical concerns: none       Melinda Griggs            "

## 2022-11-15 LAB
ALBUMIN SERPL BCG-MCNC: 4.2 G/DL (ref 3.5–5.2)
ALP SERPL-CCNC: 55 U/L (ref 35–104)
ALT SERPL W P-5'-P-CCNC: 25 U/L (ref 10–35)
ANION GAP SERPL CALCULATED.3IONS-SCNC: 13 MMOL/L (ref 7–15)
AST SERPL W P-5'-P-CCNC: 17 U/L (ref 10–35)
BASOPHILS # BLD AUTO: 0 10E3/UL (ref 0–0.2)
BASOPHILS NFR BLD AUTO: 0 %
BILIRUB SERPL-MCNC: 0.7 MG/DL
BUN SERPL-MCNC: 11.8 MG/DL (ref 6–20)
CALCIUM SERPL-MCNC: 9.4 MG/DL (ref 8.6–10)
CHLORIDE SERPL-SCNC: 107 MMOL/L (ref 98–107)
CREAT SERPL-MCNC: 0.57 MG/DL (ref 0.51–0.95)
DEPRECATED HCO3 PLAS-SCNC: 18 MMOL/L (ref 22–29)
EOSINOPHIL # BLD AUTO: 0 10E3/UL (ref 0–0.7)
EOSINOPHIL NFR BLD AUTO: 0 %
ERYTHROCYTE [DISTWIDTH] IN BLOOD BY AUTOMATED COUNT: 20.2 % (ref 10–15)
FIBRINOGEN PPP-MCNC: 430 MG/DL (ref 170–490)
GFR SERPL CREATININE-BSD FRML MDRD: >90 ML/MIN/1.73M2
GLUCOSE SERPL-MCNC: 147 MG/DL (ref 70–99)
HCT VFR BLD AUTO: 34.7 % (ref 35–47)
HGB BLD-MCNC: 11.4 G/DL (ref 11.7–15.7)
IMM GRANULOCYTES # BLD: 0 10E3/UL
IMM GRANULOCYTES NFR BLD: 0 %
INR PPP: 1.15 (ref 0.85–1.15)
LYMPHOCYTES # BLD AUTO: 0.3 10E3/UL (ref 0.8–5.3)
LYMPHOCYTES NFR BLD AUTO: 6 %
MCH RBC QN AUTO: 35 PG (ref 26.5–33)
MCHC RBC AUTO-ENTMCNC: 32.9 G/DL (ref 31.5–36.5)
MCV RBC AUTO: 106 FL (ref 78–100)
MONOCYTES # BLD AUTO: 0.1 10E3/UL (ref 0–1.3)
MONOCYTES NFR BLD AUTO: 2 %
NEUTROPHILS # BLD AUTO: 4.2 10E3/UL (ref 1.6–8.3)
NEUTROPHILS NFR BLD AUTO: 92 %
NRBC # BLD AUTO: 0 10E3/UL
NRBC BLD AUTO-RTO: 0 /100
PLATELET # BLD AUTO: 243 10E3/UL (ref 150–450)
POTASSIUM SERPL-SCNC: 3.9 MMOL/L (ref 3.4–5.3)
PROT SERPL-MCNC: 6.7 G/DL (ref 6.4–8.3)
RBC # BLD AUTO: 3.26 10E6/UL (ref 3.8–5.2)
SODIUM SERPL-SCNC: 138 MMOL/L (ref 136–145)
URATE SERPL-MCNC: 6.2 MG/DL (ref 2.4–5.7)
WBC # BLD AUTO: 4.6 10E3/UL (ref 4–11)

## 2022-11-15 PROCEDURE — 120N000002 HC R&B MED SURG/OB UMMC

## 2022-11-15 PROCEDURE — 250N000013 HC RX MED GY IP 250 OP 250 PS 637: Performed by: PHYSICIAN ASSISTANT

## 2022-11-15 PROCEDURE — 250N000011 HC RX IP 250 OP 636: Performed by: PHYSICIAN ASSISTANT

## 2022-11-15 PROCEDURE — 258N000003 HC RX IP 258 OP 636: Performed by: INTERNAL MEDICINE

## 2022-11-15 PROCEDURE — 85384 FIBRINOGEN ACTIVITY: CPT | Performed by: PHYSICIAN ASSISTANT

## 2022-11-15 PROCEDURE — 82040 ASSAY OF SERUM ALBUMIN: CPT | Performed by: PHYSICIAN ASSISTANT

## 2022-11-15 PROCEDURE — 80053 COMPREHEN METABOLIC PANEL: CPT | Performed by: PHYSICIAN ASSISTANT

## 2022-11-15 PROCEDURE — 85610 PROTHROMBIN TIME: CPT | Performed by: PHYSICIAN ASSISTANT

## 2022-11-15 PROCEDURE — 85004 AUTOMATED DIFF WBC COUNT: CPT | Performed by: PHYSICIAN ASSISTANT

## 2022-11-15 PROCEDURE — 84550 ASSAY OF BLOOD/URIC ACID: CPT | Performed by: PHYSICIAN ASSISTANT

## 2022-11-15 PROCEDURE — 250N000011 HC RX IP 250 OP 636: Performed by: INTERNAL MEDICINE

## 2022-11-15 PROCEDURE — 99233 SBSQ HOSP IP/OBS HIGH 50: CPT | Performed by: PHYSICIAN ASSISTANT

## 2022-11-15 RX ORDER — ALLOPURINOL 300 MG/1
300 TABLET ORAL DAILY
Status: DISCONTINUED | OUTPATIENT
Start: 2022-11-15 | End: 2022-11-17 | Stop reason: HOSPADM

## 2022-11-15 RX ADMIN — DEXAMETHASONE 12 MG: 4 TABLET ORAL at 17:25

## 2022-11-15 RX ADMIN — Medication 1 CAPSULE: at 08:33

## 2022-11-15 RX ADMIN — ONDANSETRON HYDROCHLORIDE 8 MG: 8 TABLET, FILM COATED ORAL at 05:34

## 2022-11-15 RX ADMIN — Medication 5 ML: at 09:43

## 2022-11-15 RX ADMIN — ZINC SULFATE 220 MG (50 MG) CAPSULE 220 MG: CAPSULE at 08:33

## 2022-11-15 RX ADMIN — ACYCLOVIR 400 MG: 400 TABLET ORAL at 19:54

## 2022-11-15 RX ADMIN — PREDNISOLONE ACETATE 2 DROP: 10 SUSPENSION/ DROPS OPHTHALMIC at 08:32

## 2022-11-15 RX ADMIN — VANCOMYCIN HYDROCHLORIDE 125 MG: 125 CAPSULE ORAL at 08:33

## 2022-11-15 RX ADMIN — ACYCLOVIR 400 MG: 400 TABLET ORAL at 08:33

## 2022-11-15 RX ADMIN — PREDNISOLONE ACETATE 2 DROP: 10 SUSPENSION/ DROPS OPHTHALMIC at 12:40

## 2022-11-15 RX ADMIN — Medication 1 TABLET: at 08:33

## 2022-11-15 RX ADMIN — LEVOTHYROXINE SODIUM 88 MCG: 88 TABLET ORAL at 06:03

## 2022-11-15 RX ADMIN — PREDNISOLONE ACETATE 2 DROP: 10 SUSPENSION/ DROPS OPHTHALMIC at 16:06

## 2022-11-15 RX ADMIN — CYTARABINE 6930 MG: 100 INJECTION, SOLUTION INTRATHECAL; INTRAVENOUS; SUBCUTANEOUS at 18:00

## 2022-11-15 RX ADMIN — Medication 50 MCG: at 08:33

## 2022-11-15 RX ADMIN — ONDANSETRON HYDROCHLORIDE 8 MG: 8 TABLET, FILM COATED ORAL at 17:25

## 2022-11-15 RX ADMIN — OXYCODONE HYDROCHLORIDE AND ACETAMINOPHEN 2000 MG: 500 TABLET ORAL at 08:33

## 2022-11-15 RX ADMIN — MAGNESIUM OXIDE TAB 400 MG (241.3 MG ELEMENTAL MG) 400 MG: 400 (241.3 MG) TAB at 08:33

## 2022-11-15 RX ADMIN — PREDNISOLONE ACETATE 2 DROP: 10 SUSPENSION/ DROPS OPHTHALMIC at 19:53

## 2022-11-15 RX ADMIN — CYTARABINE 6930 MG: 100 INJECTION, SOLUTION INTRATHECAL; INTRAVENOUS; SUBCUTANEOUS at 06:00

## 2022-11-15 RX ADMIN — ALLOPURINOL 300 MG: 300 TABLET ORAL at 08:33

## 2022-11-15 RX ADMIN — VANCOMYCIN HYDROCHLORIDE 125 MG: 125 CAPSULE ORAL at 19:54

## 2022-11-15 ASSESSMENT — ACTIVITIES OF DAILY LIVING (ADL)
ADLS_ACUITY_SCORE: 18

## 2022-11-15 NOTE — PROGRESS NOTES
[facility]    Hematology / Oncology Progress Note    Date of Service (when I saw the patient): 11/15/2022     Assessment & Plan   Veda Marinelli is a 37 year old female who presents with past medical history of recurrent C.Diff, LUE DVT (7/12/22), and favorable risk AML (FLT3-ITD, NPM-1, IDH2 mutations) in MRD negative CR currently being admitted for cycle 4 of consolidation HiDAC with midostaurin.    Today:  - Day 2 of HiDAC. Tolerating well  - Continue best supportive cares.     HEME/ONC  # AML, 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 Earl. Presented to routine exam with cytopenias. BMBx 6/17/2022 - hypercellular marrow (85-95% cellularity) with 92% blasts. 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 Started induction chemotherapy with 7+3 and midostaurin on 6/22/2022. D21 BMBx (7/12) with robust marrow regeneration, repeat BMBx 7/22 with 70-80% cellularity and 6% blasts by morphology consisted with complete response per Dr. Earl. NGS with no detectable mutations (MRD negative). BMT consulted 7/15; recommend consolidation chemotherapy, could consider BMT in the future if relapses. Tentatively planning for 3-4 cycles consolidation HiDAC (D1-3) and midostaurin (D8-21), followed by maintenance midostaurin for 1 year. Admitted for Cycle 1 consolidation with HiDAC and midostaurin; (C1=8/9/22, C2=9/12/22, C3=10/10/22) and has tolerated well without any concerns or complications. She is currently being admitted for cycle 4.    - Port accessed on admission.   - Appears patient may have Free drug coverage approved for midostaurin through [employer] - though patient states that the outpatient team is still working on this. Will follow up.  - Plan for bone marrow biopsy about 6-8 weeks after final cycle (this cycle) with send out PCR for NPM1 MRD (to [facility]) to confirm depth of  response                  Treatment Plan: HiDAC + Midostaurin (Q8B0=1511/13/22)               - Cytarabine 3 g/m2 (6930mg) q12h x6 doses - D1-3               - Midostaurin 50 mg BID x14 days - D8 - 21                - Supportive medications;   - Pred forte eye drops QID D1-5   - Dexamethasone 12mg PO q24h D1-3, 8mg D4-5   - Zofran 8mg q12h x6 doses D1-3  - Neulasta 6mg - previously scheduled at [facility]     # H/o LUE DVT (PICC-associated)  # Acquired coagulopathy d/t Eliquis  LUE US (7/12) with nonocclusive DVT in the left axillary vein and one of the paired left brachial veins, superficial venous thrombosis in the left basilic vein. PICC-associated which was removed prior to discharge. Transitioned to apixaban as of 8/9 with plan to continue (per Dr. Earl) x 3 months (through ~10/13/22).       # TLS monitoring  # H/o Hyperuricemia  Patient is MRD negative CR and is low-risk for TLS.   - Monitor daily      ID  # ID PPX  - ACV 400mg BID  - No indication for antibacterial or antifungal ppx with ANC >1000. Continue to monitor outpatient and start levaquin and voriconazole if ANC <1000  - Note; if she were to need antifungal ppx in the future consider reaching out to pharmacy liaison because prior to meeting her deductible the cost was $2048/month for posaconazole and $176/month for Voriconazole.      # History of recurrent cdiff colitis  - PTA ppx Vancomycin 125mg BID PO      # H/o COVID-19 (Oct 2021)  Pt is not vaccinated nor interested in being vaccinated. She was admitted from 10/1/2021 - 10/13/2021 for acute hypoxic respiratory failure from COVID-19 pneumonia. Required IMC, high flow and intermittent CPAP. She was treated with dexamethasone, remdesivir, and baricitinib in addition to azithromycin and ceftriaxone. Recovered symptomatically.  - Consider Evusheld outpatient, previously discussed in clinic  - Pre-admission COVID testing 11/14 in clinic, negative.     ENDO  # Hypothyroidism   - PTA Synthroid 100 mcg  daily        Fluids/Electrolytes/Nutrition  - IVF per chemotherapy regimen  - PRN lyte replacement per standing protocol  - Regular diet as tolerated     Lines: Port    PPX  VTE: Given that patient is active and only here for short period of time, will hold off on Lovenox  GI: na  Bowels: Senna PRN and Miralax PRN  Activity: Up as Tolerated    Code  Full    Dispo: Plan for 3-4 day stay for scheduled inpatient chemotherapy. Plan for discharge on 11/17 am.    I spent >35 minutes face-to-face and/or coordinating or discussing care plan. Over 50% of our time on the unit was spent counseling the patient and/or coordinating care    Patient is seen and examined by Dr. Winters and I.  Assessment and plan are discussed and delivered to the patient.    Gaby Arteaga (Artem) PA   Hematology/Oncology  Pager: [phone]    Interval History   No acute events overnight.  Minerva is feeling well today. Eating breakfast. No new symptoms or concerns today. Tolerating chemotherapy well.   Energy level good. Appetite intact.   Denies fever, chills, mouth sores, SOB, cough, abdominal pain, diarrhea, constipation, nausea, vomiting, dysuria, hematuria, numbness, tingling, swelling  All questions answered at bedside.      Complete and Comprehensive review of systems review and negative other than noted here or in the HPI.     Physical Exam   Temp: 97.5  F (36.4  C) Temp src: Oral BP: 117/71 Pulse: 68   Resp: 18 SpO2: 98 % O2 Device: None (Room air)    Vitals:    11/14/22 1141 11/15/22 0800   Weight: 115.9 kg (255 lb 8 oz) 114.9 kg (253 lb 6.4 oz)     Vital Signs with Ranges  Temp:  [97.5  F (36.4  C)-98.7  F (37.1  C)] 97.5  F (36.4  C)  Pulse:  [68-80] 68  Resp:  [17-18] 18  BP: (102-126)/(57-79) 117/71  SpO2:  [97 %-98 %] 98 %  I/O last 3 completed shifts:  In: 344 [IV Piggyback:344]  Out: -     Constitutional: Pleasant female sitting up in bed. Awake and conversational. Non- toxic appearing. No acute distress. Well developed, hydrated, and  nourished. Appears stated age.   HEENT: Normocephalic, atraumatic. Sclerae anicteric. EOM intact. Moist mucus membranes   Respiratory: Breathing comfortable with no increased work on room air. No signs of respiratory distress or accessory muscle use. Lungs clear to auscultation bilaterally, no crackles or wheezing noted.  Cardiovascular: Regular rate and rhythm. Normal S1 and S2. No murmurs, rubs, or gallops. No peripheral edema.    GI: Abdomen is soft, non-distended, non-tender and without distension. Bowel sounds present and normoactive.   Skin: Skin is clean, dry, intact. No jaundice appreciated.   Musculoskeletal/ Extremities: No redness, warmth, or swelling of the joints appreciated. Distal pulses palpable. Nailbeds pink and without cyanosis or clubbing.   Neurologic: Alert and oriented. Speech normal. Grossly nonfocal. Memory and thought process preserved.  Neuropsychiatric: Calm, affect congruent to situation. Appropriate mood and affect. Good judgment and insight. No visual/auditory hallucinations.  Vascular Access: Port is CDI without erythema, swelling, or discharge.      Medications     - MEDICATION INSTRUCTIONS -         acyclovir  400 mg Oral BID     allopurinol  300 mg Oral Daily     cytarabine (CYTOSAR) infusion  3 g/m2 (Treatment Plan Recorded) Intravenous Q12H     dexamethasone  12 mg Oral Q24H     [START ON 11/17/2022] dexamethasone  8 mg Oral QAM     heparin  5-10 mL Intracatheter Q28 Days     lactobacillus rhamnosus (GG)  1 capsule Oral Daily     levothyroxine  88 mcg Oral Daily     magnesium oxide  400 mg Oral Daily     multivitamin w/minerals  1 tablet Oral Daily     ondansetron  8 mg Oral Q12H     prednisoLONE acetate  2 drop Both Eyes 4x Daily     vancomycin  125 mg Oral BID     vitamin C  2,000 mg Oral Daily     vitamin D3  50 mcg Oral Daily     zinc sulfate  220 mg Oral Daily       Data   Results for orders placed or performed during the hospital encounter of 11/14/22 (from the past 24  hour(s))   CBC with platelets differential    Narrative    The following orders were created for panel order CBC with platelets differential.  Procedure                               Abnormality         Status                     ---------                               -----------         ------                     CBC with platelets and d...[[id number]]  Abnormal            Final result                 Please view results for these tests on the individual orders.   Comprehensive metabolic panel   Result Value Ref Range    Sodium 140 136 - 145 mmol/L    Potassium 3.8 3.4 - 5.3 mmol/L    Chloride 108 (H) 98 - 107 mmol/L    Carbon Dioxide (CO2) 20 (L) 22 - 29 mmol/L    Anion Gap 12 7 - 15 mmol/L    Urea Nitrogen 11.8 6.0 - 20.0 mg/dL    Creatinine 0.58 0.51 - 0.95 mg/dL    Calcium 9.1 8.6 - 10.0 mg/dL    Glucose 101 (H) 70 - 99 mg/dL    Alkaline Phosphatase 57 35 - 104 U/L    AST 19 10 - 35 U/L    ALT 26 10 - 35 U/L    Protein Total 6.7 6.4 - 8.3 g/dL    Albumin 4.2 3.5 - 5.2 g/dL    Bilirubin Total 0.5 <=1.2 mg/dL    GFR Estimate >90 >60 mL/min/1.73m2   Phosphorus   Result Value Ref Range    Phosphorus 3.3 2.5 - 4.5 mg/dL   Uric acid   Result Value Ref Range    Uric Acid 4.8 2.4 - 5.7 mg/dL   Lactate Dehydrogenase   Result Value Ref Range    Lactate Dehydrogenase 226 0 - 250 U/L   Magnesium   Result Value Ref Range    Magnesium 2.0 1.7 - 2.3 mg/dL   INR   Result Value Ref Range    INR 1.02 0.85 - 1.15   Fibrinogen activity   Result Value Ref Range    Fibrinogen Activity 441 170 - 490 mg/dL   ABO/RH Type and Screen     Narrative    The following orders were created for panel order ABO/RH Type and Screen .  Procedure                               Abnormality         Status                     ---------                               -----------         ------                     Adult Type and Screen[[id number]]                            Final result                 Please view results for these tests on the individual  orders.   CBC with platelets and differential   Result Value Ref Range    WBC Count 4.2 4.0 - 11.0 10e3/uL    RBC Count 3.12 (L) 3.80 - 5.20 10e6/uL    Hemoglobin 10.8 (L) 11.7 - 15.7 g/dL    Hematocrit 33.9 (L) 35.0 - 47.0 %     (H) 78 - 100 fL    MCH 34.6 (H) 26.5 - 33.0 pg    MCHC 31.9 31.5 - 36.5 g/dL    RDW 21.1 (H) 10.0 - 15.0 %    Platelet Count 233 150 - 450 10e3/uL    % Neutrophils 60 %    % Lymphocytes 27 %    % Monocytes 11 %    % Eosinophils 0 %    % Basophils 1 %    % Immature Granulocytes 1 %    NRBCs per 100 WBC 0 <1 /100    Absolute Neutrophils 2.6 1.6 - 8.3 10e3/uL    Absolute Lymphocytes 1.1 0.8 - 5.3 10e3/uL    Absolute Monocytes 0.5 0.0 - 1.3 10e3/uL    Absolute Eosinophils 0.0 0.0 - 0.7 10e3/uL    Absolute Basophils 0.0 0.0 - 0.2 10e3/uL    Absolute Immature Granulocytes 0.0 <=0.4 10e3/uL    Absolute NRBCs 0.0 10e3/uL   Adult Type and Screen   Result Value Ref Range    ABO/RH(D) A NEG     Antibody Screen Negative Negative    SPECIMEN EXPIRATION DATE [id number]    CBC with platelets differential    Narrative    The following orders were created for panel order CBC with platelets differential.  Procedure                               Abnormality         Status                     ---------                               -----------         ------                     CBC with platelets and d...[[id number]]  Abnormal            Final result                 Please view results for these tests on the individual orders.   Comprehensive metabolic panel   Result Value Ref Range    Sodium 138 136 - 145 mmol/L    Potassium 3.9 3.4 - 5.3 mmol/L    Chloride 107 98 - 107 mmol/L    Carbon Dioxide (CO2) 18 (L) 22 - 29 mmol/L    Anion Gap 13 7 - 15 mmol/L    Urea Nitrogen 11.8 6.0 - 20.0 mg/dL    Creatinine 0.57 0.51 - 0.95 mg/dL    Calcium 9.4 8.6 - 10.0 mg/dL    Glucose 147 (H) 70 - 99 mg/dL    Alkaline Phosphatase 55 35 - 104 U/L    AST 17 10 - 35 U/L    ALT 25 10 - 35 U/L    Protein Total 6.7 6.4 -  8.3 g/dL    Albumin 4.2 3.5 - 5.2 g/dL    Bilirubin Total 0.7 <=1.2 mg/dL    GFR Estimate >90 >60 mL/min/1.73m2   INR   Result Value Ref Range    INR 1.15 0.85 - 1.15   Fibrinogen activity   Result Value Ref Range    Fibrinogen Activity 430 170 - 490 mg/dL   Uric acid   Result Value Ref Range    Uric Acid 6.2 (H) 2.4 - 5.7 mg/dL   CBC with platelets and differential   Result Value Ref Range    WBC Count 4.6 4.0 - 11.0 10e3/uL    RBC Count 3.26 (L) 3.80 - 5.20 10e6/uL    Hemoglobin 11.4 (L) 11.7 - 15.7 g/dL    Hematocrit 34.7 (L) 35.0 - 47.0 %     (H) 78 - 100 fL    MCH 35.0 (H) 26.5 - 33.0 pg    MCHC 32.9 31.5 - 36.5 g/dL    RDW 20.2 (H) 10.0 - 15.0 %    Platelet Count 243 150 - 450 10e3/uL    % Neutrophils 92 %    % Lymphocytes 6 %    % Monocytes 2 %    % Eosinophils 0 %    % Basophils 0 %    % Immature Granulocytes 0 %    NRBCs per 100 WBC 0 <1 /100    Absolute Neutrophils 4.2 1.6 - 8.3 10e3/uL    Absolute Lymphocytes 0.3 (L) 0.8 - 5.3 10e3/uL    Absolute Monocytes 0.1 0.0 - 1.3 10e3/uL    Absolute Eosinophils 0.0 0.0 - 0.7 10e3/uL    Absolute Basophils 0.0 0.0 - 0.2 10e3/uL    Absolute Immature Granulocytes 0.0 <=0.4 10e3/uL    Absolute NRBCs 0.0 10e3/uL

## 2022-11-15 NOTE — PLAN OF CARE
Goal Outcome Evaluation:  Denied pain or nausea. Finished dose # 2 of Cytarabine without problems. Afebrile. Up independently.

## 2022-11-15 NOTE — PLAN OF CARE
Goal Outcome Evaluation:  Denied pain or nausea. Given dose # one of chemotherapy without problems. Good blood return from PICC line. Neuro check intact. Up independently.

## 2022-11-15 NOTE — PROGRESS NOTES
Nursing Focus: Chemotherapy  D: Positive blood return via chest port. Insertion site is clean/dry/intact, dressing intact with no complaints of pain.  Urine output is recorded in intake in Doc Flowsheet.    I: Premedications given per order (see electronic medical administration record). Dose #3 of HD Cytarabine started to infuse over 3 hours. Reviewed pt teaching on chemotherapy side effects. Pt denies need for further teaching. Chemotherapy double checked per protocol by two chemotherapy competent RN's.   A: Tolerating procedure well. Denies nausea and or pain.   P: Continue to monitor urine output and symptoms of nausea. Screen for symptoms of toxicity.

## 2022-11-15 NOTE — PHARMACY-ADMISSION MEDICATION HISTORY
Admission Medication History Completed by Pharmacy    See [facility] Admission Navigator for allergy information, preferred outpatient pharmacy, prior to admission medications and immunization status.     Medication History Sources:     Patient    Changes made to PTA medication list (reason):    Added: None    Deleted: Dexamethasone and Medroxyprogesterone    Changed: None    Additional Information:    None    Prior to Admission medications    Medication Sig Last Dose Taking? Auth Provider Long Term End Date   acyclovir (ZOVIRAX) 400 MG tablet Take 1 tablet (400 mg) by mouth 2 times daily 11/14/2022 Yes Fidelia Nair APRN CNP Yes    Bacillus Coagulans-Inulin (PROBIOTIC) 1-250 BILLION-MG CAPS Take 1 capsule by mouth daily 11/14/2022 at 0800 Yes Reported, Patient     fluconazole (DIFLUCAN) 200 MG tablet Take 1 tablet (200 mg) by mouth daily When absolute neutrophils are less than 1.- x 10^9/L Past Month Yes Corinne Murguia PA-C     levofloxacin (LEVAQUIN) 250 MG tablet Take 1 tablet (250 mg) by mouth daily When absolute neutrophils are less than 1.- x 10^9/L Past Month Yes Ortiz Earl MD     levothyroxine (SYNTHROID/LEVOTHROID) 75 MCG tablet Take 1 tablet (75 mcg) by mouth daily 11/14/2022 at 0800 Yes Fidelia Nair APRN CNP Yes    magnesium 250 MG tablet Take 250 mg by mouth daily 11/14/2022 at 0800 Yes Reported, Patient     Multiple Vitamins-Minerals (WOMENS MULTIVITAMIN) TABS Take 1 tablet by mouth daily 11/14/2022 at 0800 Yes Reported, Patient     Quercetin 50 MG TABS Take 50 mg by mouth daily 11/14/2022 at 0800 Yes Unknown, Entered By History     vancomycin (VANCOCIN) 125 MG capsule Take 1 capsule (125 mg) by mouth 2 times daily 11/14/2022 at 0800 Yes Ortiz Earl MD     vitamin C (ASCORBIC ACID) 1000 MG TABS Take 2,000 mg by mouth daily 11/14/2022 at 0800 Yes Reported, Patient     vitamin D3 (CHOLECALCIFEROL) 50 mcg (2000 units) tablet Take 1 tablet (50 mcg) by mouth daily 11/14/2022  at 0800 Yes Reported, Patient     zinc gluconate 50 MG tablet Take 50 mg by mouth daily 11/14/2022 at 0800 Yes Reported, Patient         Date completed: 11/15/22    Medication history completed by: Andy J. Kurtzweil, [facility]

## 2022-11-16 LAB
ABO/RH(D): NORMAL
ALBUMIN SERPL BCG-MCNC: 4 G/DL (ref 3.5–5.2)
ALP SERPL-CCNC: 53 U/L (ref 35–104)
ALT SERPL W P-5'-P-CCNC: 23 U/L (ref 10–35)
ANION GAP SERPL CALCULATED.3IONS-SCNC: 13 MMOL/L (ref 7–15)
ANTIBODY SCREEN: NEGATIVE
AST SERPL W P-5'-P-CCNC: 14 U/L (ref 10–35)
BASOPHILS # BLD AUTO: 0 10E3/UL (ref 0–0.2)
BASOPHILS NFR BLD AUTO: 0 %
BILIRUB SERPL-MCNC: 1.2 MG/DL
BUN SERPL-MCNC: 13.9 MG/DL (ref 6–20)
CALCIUM SERPL-MCNC: 8.8 MG/DL (ref 8.6–10)
CHLORIDE SERPL-SCNC: 109 MMOL/L (ref 98–107)
CREAT SERPL-MCNC: 0.52 MG/DL (ref 0.51–0.95)
DEPRECATED HCO3 PLAS-SCNC: 19 MMOL/L (ref 22–29)
EOSINOPHIL # BLD AUTO: 0 10E3/UL (ref 0–0.7)
EOSINOPHIL NFR BLD AUTO: 0 %
ERYTHROCYTE [DISTWIDTH] IN BLOOD BY AUTOMATED COUNT: 19.9 % (ref 10–15)
FIBRINOGEN PPP-MCNC: 377 MG/DL (ref 170–490)
GFR SERPL CREATININE-BSD FRML MDRD: >90 ML/MIN/1.73M2
GLUCOSE SERPL-MCNC: 125 MG/DL (ref 70–99)
HCT VFR BLD AUTO: 32.6 % (ref 35–47)
HGB BLD-MCNC: 10.6 G/DL (ref 11.7–15.7)
IMM GRANULOCYTES # BLD: 0 10E3/UL
IMM GRANULOCYTES NFR BLD: 0 %
INR PPP: 1.09 (ref 0.85–1.15)
LYMPHOCYTES # BLD AUTO: 0.1 10E3/UL (ref 0.8–5.3)
LYMPHOCYTES NFR BLD AUTO: 1 %
MCH RBC QN AUTO: 34.8 PG (ref 26.5–33)
MCHC RBC AUTO-ENTMCNC: 32.5 G/DL (ref 31.5–36.5)
MCV RBC AUTO: 107 FL (ref 78–100)
MONOCYTES # BLD AUTO: 0.1 10E3/UL (ref 0–1.3)
MONOCYTES NFR BLD AUTO: 2 %
NEUTROPHILS # BLD AUTO: 4.1 10E3/UL (ref 1.6–8.3)
NEUTROPHILS NFR BLD AUTO: 97 %
NRBC # BLD AUTO: 0 10E3/UL
NRBC BLD AUTO-RTO: 0 /100
PLATELET # BLD AUTO: 228 10E3/UL (ref 150–450)
POTASSIUM SERPL-SCNC: 4.2 MMOL/L (ref 3.4–5.3)
PROT SERPL-MCNC: 6.3 G/DL (ref 6.4–8.3)
RBC # BLD AUTO: 3.05 10E6/UL (ref 3.8–5.2)
SODIUM SERPL-SCNC: 141 MMOL/L (ref 136–145)
SPECIMEN EXPIRATION DATE: NORMAL
URATE SERPL-MCNC: 4.6 MG/DL (ref 2.4–5.7)
WBC # BLD AUTO: 4.3 10E3/UL (ref 4–11)

## 2022-11-16 PROCEDURE — 85610 PROTHROMBIN TIME: CPT | Performed by: PHYSICIAN ASSISTANT

## 2022-11-16 PROCEDURE — 36591 DRAW BLOOD OFF VENOUS DEVICE: CPT | Performed by: PHYSICIAN ASSISTANT

## 2022-11-16 PROCEDURE — 99231 SBSQ HOSP IP/OBS SF/LOW 25: CPT | Performed by: PHYSICIAN ASSISTANT

## 2022-11-16 PROCEDURE — 86901 BLOOD TYPING SEROLOGIC RH(D): CPT | Performed by: PHYSICIAN ASSISTANT

## 2022-11-16 PROCEDURE — 80053 COMPREHEN METABOLIC PANEL: CPT | Performed by: PHYSICIAN ASSISTANT

## 2022-11-16 PROCEDURE — 250N000011 HC RX IP 250 OP 636: Performed by: PHYSICIAN ASSISTANT

## 2022-11-16 PROCEDURE — 82040 ASSAY OF SERUM ALBUMIN: CPT | Performed by: PHYSICIAN ASSISTANT

## 2022-11-16 PROCEDURE — 85025 COMPLETE CBC W/AUTO DIFF WBC: CPT | Performed by: PHYSICIAN ASSISTANT

## 2022-11-16 PROCEDURE — 86850 RBC ANTIBODY SCREEN: CPT | Performed by: PHYSICIAN ASSISTANT

## 2022-11-16 PROCEDURE — 120N000002 HC R&B MED SURG/OB UMMC

## 2022-11-16 PROCEDURE — 85384 FIBRINOGEN ACTIVITY: CPT | Performed by: PHYSICIAN ASSISTANT

## 2022-11-16 PROCEDURE — 250N000013 HC RX MED GY IP 250 OP 250 PS 637: Performed by: PHYSICIAN ASSISTANT

## 2022-11-16 PROCEDURE — 84550 ASSAY OF BLOOD/URIC ACID: CPT | Performed by: PHYSICIAN ASSISTANT

## 2022-11-16 PROCEDURE — 258N000003 HC RX IP 258 OP 636: Performed by: INTERNAL MEDICINE

## 2022-11-16 PROCEDURE — 250N000011 HC RX IP 250 OP 636: Performed by: INTERNAL MEDICINE

## 2022-11-16 RX ORDER — DEXAMETHASONE 4 MG/1
8 TABLET ORAL EVERY MORNING
Qty: 2 TABLET | Refills: 0 | Status: SHIPPED | OUTPATIENT
Start: 2022-11-17 | End: 2023-01-10

## 2022-11-16 RX ORDER — PREDNISOLONE ACETATE 10 MG/ML
2 SUSPENSION/ DROPS OPHTHALMIC 4 TIMES DAILY
COMMUNITY
Start: 2022-11-16 | End: 2023-01-10

## 2022-11-16 RX ADMIN — Medication 1 TABLET: at 08:14

## 2022-11-16 RX ADMIN — PREDNISOLONE ACETATE 2 DROP: 10 SUSPENSION/ DROPS OPHTHALMIC at 12:30

## 2022-11-16 RX ADMIN — CYTARABINE 6930 MG: 100 INJECTION, SOLUTION INTRATHECAL; INTRAVENOUS; SUBCUTANEOUS at 18:06

## 2022-11-16 RX ADMIN — ONDANSETRON HYDROCHLORIDE 8 MG: 8 TABLET, FILM COATED ORAL at 05:20

## 2022-11-16 RX ADMIN — DEXAMETHASONE 12 MG: 4 TABLET ORAL at 17:25

## 2022-11-16 RX ADMIN — Medication 5 ML: at 10:28

## 2022-11-16 RX ADMIN — CYTARABINE 6930 MG: 100 INJECTION, SOLUTION INTRATHECAL; INTRAVENOUS; SUBCUTANEOUS at 06:35

## 2022-11-16 RX ADMIN — ACYCLOVIR 400 MG: 400 TABLET ORAL at 19:39

## 2022-11-16 RX ADMIN — Medication 1 CAPSULE: at 08:14

## 2022-11-16 RX ADMIN — VANCOMYCIN HYDROCHLORIDE 125 MG: 125 CAPSULE ORAL at 08:14

## 2022-11-16 RX ADMIN — PREDNISOLONE ACETATE 2 DROP: 10 SUSPENSION/ DROPS OPHTHALMIC at 19:40

## 2022-11-16 RX ADMIN — PREDNISOLONE ACETATE 2 DROP: 10 SUSPENSION/ DROPS OPHTHALMIC at 08:13

## 2022-11-16 RX ADMIN — LEVOTHYROXINE SODIUM 88 MCG: 88 TABLET ORAL at 05:20

## 2022-11-16 RX ADMIN — PREDNISOLONE ACETATE 2 DROP: 10 SUSPENSION/ DROPS OPHTHALMIC at 16:33

## 2022-11-16 RX ADMIN — ONDANSETRON HYDROCHLORIDE 8 MG: 8 TABLET, FILM COATED ORAL at 17:24

## 2022-11-16 RX ADMIN — ACYCLOVIR 400 MG: 400 TABLET ORAL at 08:14

## 2022-11-16 RX ADMIN — Medication 50 MCG: at 08:14

## 2022-11-16 RX ADMIN — Medication 5 ML: at 22:08

## 2022-11-16 RX ADMIN — ALLOPURINOL 300 MG: 300 TABLET ORAL at 08:14

## 2022-11-16 RX ADMIN — VANCOMYCIN HYDROCHLORIDE 125 MG: 125 CAPSULE ORAL at 19:40

## 2022-11-16 RX ADMIN — ZINC SULFATE 220 MG (50 MG) CAPSULE 220 MG: CAPSULE at 08:21

## 2022-11-16 RX ADMIN — MAGNESIUM OXIDE TAB 400 MG (241.3 MG ELEMENTAL MG) 400 MG: 400 (241.3 MG) TAB at 08:14

## 2022-11-16 RX ADMIN — OXYCODONE HYDROCHLORIDE AND ACETAMINOPHEN 2000 MG: 500 TABLET ORAL at 08:13

## 2022-11-16 ASSESSMENT — ACTIVITIES OF DAILY LIVING (ADL)
ADLS_ACUITY_SCORE: 18

## 2022-11-16 NOTE — PROGRESS NOTES
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    Today:  - Day 3 of HiDAC. Tolerating well  - Continue best supportive cares.  - Patient's menstruation started today. Will monitor for now.  - Plan for discharge tomorrow morning     HEME/ONC  # AML, 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 Earl. Presented to routine exam with cytopenias. BMBx 6/17/2022 - hypercellular marrow (85-95% cellularity) with 92% blasts. 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 Started induction chemotherapy with 7+3 and midostaurin on 6/22/2022. D21 BMBx (7/12) with robust marrow regeneration, repeat BMBx 7/22 with 70-80% cellularity and 6% blasts by morphology consisted with complete response per Dr. Earl. NGS with no detectable mutations (MRD negative). BMT consulted 7/15; recommend consolidation chemotherapy, could consider BMT in the future if relapses. Tentatively planning for 3-4 cycles consolidation HiDAC (D1-3) and midostaurin (D8-21), followed by maintenance midostaurin for 1 year. Admitted for Cycle 1 consolidation with HiDAC and midostaurin; (C1=8/9/22, C2=9/12/22, C3=10/10/22) and has tolerated well without any concerns or complications. She is currently being admitted for cycle 4.    - Port accessed on admission.   - Appears patient may have Free drug coverage approved for midostaurin through [employer] - though patient states that the outpatient team is still working on this. Will follow up.  - Plan for bone marrow biopsy about 6-8 weeks  after final cycle (this cycle) with send out PCR for NPM1 MRD (to [facility]) to confirm depth of response                  Treatment Plan: HiDAC + Midostaurin (V9B8=8311/13/22)               - Cytarabine 3 g/m2 (6930mg) q12h x6 doses - D1-3               - Midostaurin 50 mg BID x14 days - D8 - 21                - Supportive medications;   - Pred forte eye drops QID D1-5   - Dexamethasone 12mg PO q24h D1-3, 8mg D4-5   - Zofran 8mg q12h x6 doses D1-3  - Neulasta 6mg - previously scheduled at [facility]     # H/o LUE DVT (PICC-associated)  # Acquired coagulopathy d/t Eliquis  LUE US (7/12) with nonocclusive DVT in the left axillary vein and one of the paired left brachial veins, superficial venous thrombosis in the left basilic vein. PICC-associated which was removed prior to discharge. Transitioned to apixaban as of 8/9 with plan to continue (per Dr. Earl) x 3 months (through ~10/13/22).       # TLS monitoring  # H/o Hyperuricemia  Patient is MRD negative CR and is low-risk for TLS.   - Monitor daily      ID  # ID PPX  - ACV 400mg BID  - No indication for antibacterial or antifungal ppx with ANC >1000. Continue to monitor outpatient and start levaquin and voriconazole if ANC <1000  - Note; if she were to need antifungal ppx in the future consider reaching out to pharmacy liaison because prior to meeting her deductible the cost was $2048/month for posaconazole and $176/month for Voriconazole.      # History of recurrent cdiff colitis  - PTA ppx Vancomycin 125mg BID PO      # H/o COVID-19 (Oct 2021)  Pt is not vaccinated nor interested in being vaccinated. She was admitted from 10/1/2021 - 10/13/2021 for acute hypoxic respiratory failure from COVID-19 pneumonia. Required IMC, high flow and intermittent CPAP. She was treated with dexamethasone, remdesivir, and baricitinib in addition to azithromycin and ceftriaxone. Recovered symptomatically.  - Consider Chavo outpatient, previously discussed in clinic  - Pre-admission COVID  testing 11/14 in clinic, negative.     ENDO  # Hypothyroidism   - PTA Synthroid 100 mcg daily        Fluids/Electrolytes/Nutrition  - IVF per chemotherapy regimen  - PRN lyte replacement per standing protocol  - Regular diet as tolerated     Lines: Port    PPX  VTE: Given that patient is active and only here for short period of time, will hold off on Lovenox  GI: na  Bowels: Senna PRN and Miralax PRN  Activity: Up as Tolerated    Code  Full    Dispo: Plan for 3-4 day stay for scheduled inpatient chemotherapy. Plan for discharge on 11/17 am.    I spent >30 minutes face-to-face and/or coordinating or discussing care plan. Over 50% of our time on the unit was spent counseling the patient and/or coordinating care    Plan of care discussed with Dr Philip Arteaga (Artem) PA   Hematology/Oncology  Pager: [phone]    Interval History   No acute events overnight.  Minerva feels well today. No new symptoms or concerns. Walking the hallways. Continues to work remotely. Looking forward to breakfast. Tolerating chemotherapy well.   Energy level good. Appetite intact.   Denies fever, chills, mouth sores, SOB, cough, abdominal pain, diarrhea, constipation, nausea, vomiting, dysuria, hematuria, numbness, tingling, swelling  All questions answered at bedside.      Complete and Comprehensive review of systems review and negative other than noted here or in the HPI.     Physical Exam   Temp: 98.4  F (36.9  C) Temp src: Oral BP: 106/61 Pulse: 70   Resp: 18 SpO2: 100 % O2 Device: None (Room air)    Vitals:    11/14/22 1141 11/15/22 0800 11/16/22 0948   Weight: 115.9 kg (255 lb 8 oz) 114.9 kg (253 lb 6.4 oz) 115.7 kg (255 lb 1.6 oz)     Vital Signs with Ranges  Temp:  [97.7  F (36.5  C)-99  F (37.2  C)] 98.4  F (36.9  C)  Pulse:  [63-91] 70  Resp:  [16-18] 18  BP: (102-129)/(59-77) 106/61  SpO2:  [96 %-100 %] 100 %  I/O last 3 completed shifts:  In: 480 [P.O.:480]  Out: -     Constitutional: Pleasant female sitting up in bed. Awake  and conversational. Non- toxic appearing. No acute distress. Well developed, hydrated, and nourished. Appears stated age.   HEENT: Normocephalic, atraumatic. Sclerae anicteric. EOM intact. Moist mucus membranes   Respiratory: Breathing comfortable with no increased work on room air. No signs of respiratory distress or accessory muscle use. Lungs clear to auscultation bilaterally, no crackles or wheezing noted.  Cardiovascular: Regular rate and rhythm. Normal S1 and S2. No murmurs, rubs, or gallops. No peripheral edema.    GI: Abdomen is soft, non-distended, non-tender and without distension. Bowel sounds present and normoactive.   Skin: Skin is clean, dry, intact. No jaundice appreciated.   Musculoskeletal/ Extremities: No redness, warmth, or swelling of the joints appreciated. Distal pulses palpable. Nailbeds pink and without cyanosis or clubbing.   Neurologic: Alert and oriented. Speech normal. Grossly nonfocal. Memory and thought process preserved.  Neuropsychiatric: Calm, affect congruent to situation. Appropriate mood and affect. Good judgment and insight. No visual/auditory hallucinations.  Vascular Access: Port is CDI without erythema, swelling, or discharge.      Medications     - MEDICATION INSTRUCTIONS -         acyclovir  400 mg Oral BID     allopurinol  300 mg Oral Daily     cytarabine (CYTOSAR) infusion  3 g/m2 (Treatment Plan Recorded) Intravenous Q12H     dexamethasone  12 mg Oral Q24H     [START ON 11/17/2022] dexamethasone  8 mg Oral QAM     heparin  5-10 mL Intracatheter Q28 Days     lactobacillus rhamnosus (GG)  1 capsule Oral Daily     levothyroxine  88 mcg Oral Daily     magnesium oxide  400 mg Oral Daily     multivitamin w/minerals  1 tablet Oral Daily     ondansetron  8 mg Oral Q12H     prednisoLONE acetate  2 drop Both Eyes 4x Daily     vancomycin  125 mg Oral BID     vitamin C  2,000 mg Oral Daily     vitamin D3  50 mcg Oral Daily     zinc sulfate  220 mg Oral Daily       Data   Results for  orders placed or performed during the hospital encounter of 11/14/22 (from the past 24 hour(s))   CBC with platelets differential    Narrative    The following orders were created for panel order CBC with platelets differential.  Procedure                               Abnormality         Status                     ---------                               -----------         ------                     CBC with platelets and d...[[id number]]  Abnormal            Final result                 Please view results for these tests on the individual orders.   ABO/Rh type and screen    Narrative    The following orders were created for panel order ABO/Rh type and screen.  Procedure                               Abnormality         Status                     ---------                               -----------         ------                     Adult Type and Screen[[id number]]                            Final result                 Please view results for these tests on the individual orders.   Comprehensive metabolic panel   Result Value Ref Range    Sodium 141 136 - 145 mmol/L    Potassium 4.2 3.4 - 5.3 mmol/L    Chloride 109 (H) 98 - 107 mmol/L    Carbon Dioxide (CO2) 19 (L) 22 - 29 mmol/L    Anion Gap 13 7 - 15 mmol/L    Urea Nitrogen 13.9 6.0 - 20.0 mg/dL    Creatinine 0.52 0.51 - 0.95 mg/dL    Calcium 8.8 8.6 - 10.0 mg/dL    Glucose 125 (H) 70 - 99 mg/dL    Alkaline Phosphatase 53 35 - 104 U/L    AST 14 10 - 35 U/L    ALT 23 10 - 35 U/L    Protein Total 6.3 (L) 6.4 - 8.3 g/dL    Albumin 4.0 3.5 - 5.2 g/dL    Bilirubin Total 1.2 <=1.2 mg/dL    GFR Estimate >90 >60 mL/min/1.73m2   INR   Result Value Ref Range    INR 1.09 0.85 - 1.15   Fibrinogen activity   Result Value Ref Range    Fibrinogen Activity 377 170 - 490 mg/dL   Uric acid   Result Value Ref Range    Uric Acid 4.6 2.4 - 5.7 mg/dL   CBC with platelets and differential   Result Value Ref Range    WBC Count 4.3 4.0 - 11.0 10e3/uL    RBC Count 3.05 (L) 3.80 - 5.20  10e6/uL    Hemoglobin 10.6 (L) 11.7 - 15.7 g/dL    Hematocrit 32.6 (L) 35.0 - 47.0 %     (H) 78 - 100 fL    MCH 34.8 (H) 26.5 - 33.0 pg    MCHC 32.5 31.5 - 36.5 g/dL    RDW 19.9 (H) 10.0 - 15.0 %    Platelet Count 228 150 - 450 10e3/uL    % Neutrophils 97 %    % Lymphocytes 1 %    % Monocytes 2 %    % Eosinophils 0 %    % Basophils 0 %    % Immature Granulocytes 0 %    NRBCs per 100 WBC 0 <1 /100    Absolute Neutrophils 4.1 1.6 - 8.3 10e3/uL    Absolute Lymphocytes 0.1 (L) 0.8 - 5.3 10e3/uL    Absolute Monocytes 0.1 0.0 - 1.3 10e3/uL    Absolute Eosinophils 0.0 0.0 - 0.7 10e3/uL    Absolute Basophils 0.0 0.0 - 0.2 10e3/uL    Absolute Immature Granulocytes 0.0 <=0.4 10e3/uL    Absolute NRBCs 0.0 10e3/uL   Adult Type and Screen   Result Value Ref Range    ABO/RH(D) A NEG     Antibody Screen Negative Negative    SPECIMEN EXPIRATION DATE [id number]

## 2022-11-16 NOTE — PLAN OF CARE
Pt is alert and oriented x4, AVSS, afebrile on RA. Pt denies pain, nausea, vomiting, numbness and tingling.  Brisk red blood noted before chemo administration. Neuro check completed and pre made zofran given.  Pt received cytarabine cycle 4 day 2 dose 3 this morning. Chemo double check by 2 chemo RN before administration. No unmet need at this time

## 2022-11-16 NOTE — PROGRESS NOTES
Nursing Focus: Chemotherapy  D: Positive brisk bright red blood return via Porth. Insertion site is clean/dry/intact, dressing intact with no complaints of pain.  Urine output is recorded in intake Doc Flowsheet.    I: Zofran premedications given per order (see electronic medical administration record). Dose #3 of cytarabine started to infuse over 3 hours/minutes. Reviewed pt teaching on chemotherapy side effects.  Pt denies need for further teaching. Chemotherapy double checked per protocol by two chemotherapy competent RN's. Lolly ABBIN RN and Perla MATT  A: Tolerating chemo well. Denies nausea.   P: Continue to monitor urine output and symptoms of nausea. Screen for symptoms of toxicity.

## 2022-11-16 NOTE — PROGRESS NOTES
"/71 (BP Location: Left arm)   Pulse 86   Temp 98.5  F (36.9  C) (Oral)   Resp 18   Ht 1.702 m (5' 7\")   Wt 114.9 kg (253 lb 6.4 oz)   SpO2 96%   BMI 39.69 kg/m      Reason for admission: Hx of AML. Admitted for Cycle 1 consolidation with HiDAC+midostaurin; (G8H9=3511/13/22).  Neuro: A&Ox4.   Cardiac: Afebrile, VSS.   Respiratory: RA   GI/: Voiding spontaneously. LBM 11/14.   Diet/appetite: Tolerating regular diet. Denies nausea   Activity: Up independently    Pain: Denies   Skin: No new deficits noted.  Lines: PORT HL.  Chemo:Cytarabine C4D1 completed, tolerated well  Pt has been resting comfortably. Will continue to monitor and follow plan of care.        "

## 2022-11-16 NOTE — PLAN OF CARE
"Goal Outcome Evaluation:    [id number]    /78 (BP Location: Right arm)   Pulse 81   Temp 98.9  F (37.2  C) (Oral)   Resp 20   Ht 1.702 m (5' 7\")   Wt 115.7 kg (255 lb 1.6 oz)   SpO2 98%   BMI 39.95 kg/m      Reason for admission: Admitted for cycle 4 of consolidation HiDAC with midostaurin.  Activity: UAL  Pain: Denies  Neuro: AxOx4. Neuros intact.   Cardiac: WDL  Respiratory: NLB on RA. O2 sats WDL.   GI/: Voiding spontaneously, not saving, reports WDL. LBM today 11/16.   Diet: Regular diet, good appetite.   Lines: R chest port intact. HL. Site WDL.   Wounds: No noted deficits.   Labs/imaging: Reviewed. See chart.     Plan: Dose #6 HD Cytarabine due at 0600 tomorrow am. Discharge in am following completion.       Continue to monitor and follow POC        "

## 2022-11-16 NOTE — PLAN OF CARE
Goal Outcome Evaluation:  Dose # 4 of chemotherapy finished without problems. Denied pain or nausea. She had a stool today.Up independently. She will discharge tomorrow by 1000.

## 2022-11-17 VITALS
WEIGHT: 253.8 LBS | DIASTOLIC BLOOD PRESSURE: 68 MMHG | BODY MASS INDEX: 39.83 KG/M2 | HEART RATE: 61 BPM | OXYGEN SATURATION: 98 % | TEMPERATURE: 97.9 F | RESPIRATION RATE: 16 BRPM | SYSTOLIC BLOOD PRESSURE: 116 MMHG | HEIGHT: 67 IN

## 2022-11-17 LAB
ALBUMIN SERPL BCG-MCNC: 3.9 G/DL (ref 3.5–5.2)
ALP SERPL-CCNC: 58 U/L (ref 35–104)
ALT SERPL W P-5'-P-CCNC: 21 U/L (ref 10–35)
ANION GAP SERPL CALCULATED.3IONS-SCNC: 10 MMOL/L (ref 7–15)
AST SERPL W P-5'-P-CCNC: 16 U/L (ref 10–35)
BASOPHILS # BLD MANUAL: 0 10E3/UL (ref 0–0.2)
BASOPHILS NFR BLD MANUAL: 0 %
BILIRUB SERPL-MCNC: 0.8 MG/DL
BUN SERPL-MCNC: 12.8 MG/DL (ref 6–20)
CALCIUM SERPL-MCNC: 8.9 MG/DL (ref 8.6–10)
CHLORIDE SERPL-SCNC: 109 MMOL/L (ref 98–107)
CREAT SERPL-MCNC: 0.49 MG/DL (ref 0.51–0.95)
DEPRECATED HCO3 PLAS-SCNC: 19 MMOL/L (ref 22–29)
EOSINOPHIL # BLD MANUAL: 0 10E3/UL (ref 0–0.7)
EOSINOPHIL NFR BLD MANUAL: 0 %
ERYTHROCYTE [DISTWIDTH] IN BLOOD BY AUTOMATED COUNT: 19.1 % (ref 10–15)
FIBRINOGEN PPP-MCNC: 346 MG/DL (ref 170–490)
GFR SERPL CREATININE-BSD FRML MDRD: >90 ML/MIN/1.73M2
GLUCOSE SERPL-MCNC: 132 MG/DL (ref 70–99)
HCT VFR BLD AUTO: 32.1 % (ref 35–47)
HGB BLD-MCNC: 10.3 G/DL (ref 11.7–15.7)
INR PPP: 1.08 (ref 0.85–1.15)
LYMPHOCYTES # BLD MANUAL: 0 10E3/UL (ref 0.8–5.3)
LYMPHOCYTES NFR BLD MANUAL: 1 %
MCH RBC QN AUTO: 34.7 PG (ref 26.5–33)
MCHC RBC AUTO-ENTMCNC: 32.1 G/DL (ref 31.5–36.5)
MCV RBC AUTO: 108 FL (ref 78–100)
MONOCYTES # BLD MANUAL: 0 10E3/UL (ref 0–1.3)
MONOCYTES NFR BLD MANUAL: 0 %
NEUTROPHILS # BLD MANUAL: 4.9 10E3/UL (ref 1.6–8.3)
NEUTROPHILS NFR BLD MANUAL: 99 %
PLAT MORPH BLD: ABNORMAL
PLATELET # BLD AUTO: 192 10E3/UL (ref 150–450)
POTASSIUM SERPL-SCNC: 4.3 MMOL/L (ref 3.4–5.3)
PROT SERPL-MCNC: 6.1 G/DL (ref 6.4–8.3)
RBC # BLD AUTO: 2.97 10E6/UL (ref 3.8–5.2)
RBC MORPH BLD: ABNORMAL
SODIUM SERPL-SCNC: 138 MMOL/L (ref 136–145)
URATE SERPL-MCNC: 3.2 MG/DL (ref 2.4–5.7)
WBC # BLD AUTO: 4.9 10E3/UL (ref 4–11)

## 2022-11-17 PROCEDURE — 84550 ASSAY OF BLOOD/URIC ACID: CPT | Performed by: PHYSICIAN ASSISTANT

## 2022-11-17 PROCEDURE — 250N000013 HC RX MED GY IP 250 OP 250 PS 637: Performed by: PHYSICIAN ASSISTANT

## 2022-11-17 PROCEDURE — 99239 HOSP IP/OBS DSCHRG MGMT >30: CPT | Performed by: PHYSICIAN ASSISTANT

## 2022-11-17 PROCEDURE — 85007 BL SMEAR W/DIFF WBC COUNT: CPT | Performed by: PHYSICIAN ASSISTANT

## 2022-11-17 PROCEDURE — 250N000011 HC RX IP 250 OP 636: Performed by: INTERNAL MEDICINE

## 2022-11-17 PROCEDURE — 80053 COMPREHEN METABOLIC PANEL: CPT | Performed by: PHYSICIAN ASSISTANT

## 2022-11-17 PROCEDURE — 258N000003 HC RX IP 258 OP 636: Performed by: INTERNAL MEDICINE

## 2022-11-17 PROCEDURE — 85027 COMPLETE CBC AUTOMATED: CPT | Performed by: PHYSICIAN ASSISTANT

## 2022-11-17 PROCEDURE — 250N000011 HC RX IP 250 OP 636: Performed by: PHYSICIAN ASSISTANT

## 2022-11-17 PROCEDURE — 85610 PROTHROMBIN TIME: CPT | Performed by: PHYSICIAN ASSISTANT

## 2022-11-17 PROCEDURE — 85384 FIBRINOGEN ACTIVITY: CPT | Performed by: PHYSICIAN ASSISTANT

## 2022-11-17 PROCEDURE — 36591 DRAW BLOOD OFF VENOUS DEVICE: CPT | Performed by: PHYSICIAN ASSISTANT

## 2022-11-17 RX ORDER — HEPARIN SODIUM (PORCINE) LOCK FLUSH IV SOLN 100 UNIT/ML 100 UNIT/ML
5-10 SOLUTION INTRAVENOUS
Status: DISCONTINUED | OUTPATIENT
Start: 2022-11-17 | End: 2022-11-17 | Stop reason: HOSPADM

## 2022-11-17 RX ADMIN — DEXAMETHASONE 8 MG: 4 TABLET ORAL at 08:00

## 2022-11-17 RX ADMIN — VANCOMYCIN HYDROCHLORIDE 125 MG: 125 CAPSULE ORAL at 08:00

## 2022-11-17 RX ADMIN — ACYCLOVIR 400 MG: 400 TABLET ORAL at 07:59

## 2022-11-17 RX ADMIN — PREDNISOLONE ACETATE 2 DROP: 10 SUSPENSION/ DROPS OPHTHALMIC at 07:59

## 2022-11-17 RX ADMIN — Medication 50 MCG: at 08:00

## 2022-11-17 RX ADMIN — MAGNESIUM OXIDE TAB 400 MG (241.3 MG ELEMENTAL MG) 400 MG: 400 (241.3 MG) TAB at 07:59

## 2022-11-17 RX ADMIN — ONDANSETRON HYDROCHLORIDE 8 MG: 8 TABLET, FILM COATED ORAL at 05:05

## 2022-11-17 RX ADMIN — Medication 5 ML: at 09:35

## 2022-11-17 RX ADMIN — ZINC SULFATE 220 MG (50 MG) CAPSULE 220 MG: CAPSULE at 07:59

## 2022-11-17 RX ADMIN — Medication 1 CAPSULE: at 08:00

## 2022-11-17 RX ADMIN — LEVOTHYROXINE SODIUM 88 MCG: 88 TABLET ORAL at 05:41

## 2022-11-17 RX ADMIN — Medication 1 TABLET: at 07:59

## 2022-11-17 RX ADMIN — CYTARABINE 6930 MG: 100 INJECTION, SOLUTION INTRATHECAL; INTRAVENOUS; SUBCUTANEOUS at 05:28

## 2022-11-17 RX ADMIN — ALLOPURINOL 300 MG: 300 TABLET ORAL at 07:59

## 2022-11-17 RX ADMIN — OXYCODONE HYDROCHLORIDE AND ACETAMINOPHEN 2000 MG: 500 TABLET ORAL at 08:00

## 2022-11-17 ASSESSMENT — ACTIVITIES OF DAILY LIVING (ADL)
ADLS_ACUITY_SCORE: 18

## 2022-11-17 NOTE — DISCHARGE SUMMARY
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, NPM-1, IDH2 mutations)  # H/o LUE DVT (PICC-associated)  # Acquired coagulopathy d/t Eliquis  # TLS monitoring  # H/o Hyperuricemia  # History of recurrent cdiff colitis  # H/o COVID-19 (Oct 2021)  # Hypothyroidism     Recommendations for Outpatient:  New/changed medications:   - Dexamethasone 8mg for 1 day (11/18) - Day 5   - Prednisolone eye drops QID in both eyes for 3-4 days after completion of chemotherapy     Follow-Up:    - Neulasta on 11/18   - Twice weekly labs and possible transfusion   - VLAD follow up in 1-2 weeks    Summary of Hospitalization:   Veda Marinelli is a 37 year old female who presents with past medical history of recurrent C.Diff, LUE DVT (7/12/22), and favorable risk AML (FLT3-ITD, NPM-1, IDH2 mutations) in MRD negative CR currently being admitted for cycle 4 of consolidation HiDAC with midostaurin.    Tolerated this cycle very well.     History of Present Illness   Minerva is feeling well on day of discharge. No new symptoms or concerns. Appetite intact. Energy level good.   Denies fever, chills, mouth sores, SOB, cough, abdominal pain, diarrhea, constipation, nausea, vomiting, dysuria, hematuria, numbness, tingling, swelling    Hospital Course   Veda Marinelli was admitted on 11/14/2022.  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 Earl. Presented to routine exam with cytopenias. BMBx 6/17/2022 - hypercellular marrow (85-95% cellularity) with 92% blasts. FLT3-ITD low (ratio 0.21), NPM-1, IDH2 mutations which is consistent with  favorable-risk AML. Started induction chemotherapy with 7+3 and midostaurin on 6/22/2022. D21 BMBx (7/12) with robust marrow regeneration, repeat BMBx 7/22 with 70-80% cellularity and 6% blasts by morphology consisted with complete response per Dr. Earl. NGS with no detectable mutations (MRD negative). BMT consulted 7/15; recommend consolidation chemotherapy, could consider BMT in the future if relapses. Tentatively planning for 3-4 cycles consolidation HiDAC (D1-3) and midostaurin (D8-21), followed by maintenance midostaurin for 1 year. Admitted for Cycle 1 consolidation with HiDAC and midostaurin; (C1=8/9/22, C2=9/12/22, C3=10/10/22) and has tolerated well without any concerns or complications. She is currently being admitted for cycle 4.    - Port accessed on admission.   - Appears patient may have Free drug coverage approved for midostaurin through [employer] - though patient states that the outpatient team is still working on this. Will follow up.  - Plan for bone marrow biopsy about 6-8 weeks after final cycle (this cycle) with send out PCR for NPM1 MRD (to [facility]) to confirm depth of response                  Treatment Plan: HiDAC + Midostaurin (X3T4=8511/13/22)               - Cytarabine 3 g/m2 (6930mg) q12h x6 doses - D1-3               - Midostaurin 50 mg BID x14 days - D8 - 21                - Supportive medications;   - Pred forte eye drops QID D1-5   - Dexamethasone 12mg PO q24h D1-3, 8mg D4-5   - Zofran 8mg q12h x6 doses D1-3  - Neulasta 6mg - scheduled at [facility] on 11/18     # H/o LUE DVT (PICC-associated)  # Acquired coagulopathy d/t Eliquis  LUE US (7/12) with nonocclusive DVT in the left axillary vein and one of the paired left brachial veins, superficial venous thrombosis in the left basilic vein. PICC-associated which was removed prior to discharge. Transitioned to apixaban as of 8/9 with plan to continue (per Dr. Earl) x 3 months (through ~10/13/22).       # TLS  monitoring  # H/o Hyperuricemia  Patient is MRD negative CR and is low-risk for TLS.   - Monitor daily      ID  # ID PPX  - ACV 400mg BID  - No indication for antibacterial or antifungal ppx with ANC >1000. Continue to monitor outpatient and start levaquin and voriconazole if ANC <1000  - Note; if she were to need antifungal ppx in the future consider reaching out to pharmacy liaison because prior to meeting her deductible the cost was $2048/month for posaconazole and $176/month for Voriconazole.      # History of recurrent cdiff colitis  - PTA ppx Vancomycin 125mg BID PO      # H/o COVID-19 (Oct 2021)  Pt is not vaccinated nor interested in being vaccinated. She was admitted from 10/1/2021 - 10/13/2021 for acute hypoxic respiratory failure from COVID-19 pneumonia. Required IMC, high flow and intermittent CPAP. She was treated with dexamethasone, remdesivir, and baricitinib in addition to azithromycin and ceftriaxone. Recovered symptomatically.  - Consider Chavo outpatient, previously discussed in clinic  - Pre-admission COVID testing 11/14 in clinic, negative.     ENDO  # Hypothyroidism   - PTA Synthroid 100 mcg daily      Plan of care discussed with Dr Philip Arteaga (Artem), PA-C  Hematology/Oncology    Significant Results and Procedures   NA    Pending Results   These results will be followed up by outpatient team  Unresulted Labs Ordered in the Past 30 Days of this Admission     No orders found from 10/15/2022 to 11/15/2022.          Code Status   Full Code    Primary Care Physician   TATA NELSON    Physical Exam   Temp: 97.9  F (36.6  C) Temp src: Oral BP: 116/68 Pulse: 61   Resp: 16 SpO2: 98 % O2 Device: None (Room air)    Vitals:    11/15/22 0800 11/16/22 0948 11/17/22 0735   Weight: 114.9 kg (253 lb 6.4 oz) 115.7 kg (255 lb 1.6 oz) 115.1 kg (253 lb 12.8 oz)     Vital Signs with Ranges  Temp:  [97.3  F (36.3  C)-98.9  F (37.2  C)] 97.9  F (36.6  C)  Pulse:  [61-87] 61  Resp:  [16-20]  16  BP: (116-125)/(68-78) 116/68  SpO2:  [97 %-98 %] 98 %  I/O last 3 completed shifts:  In: 1444 [P.O.:1080; I.V.:20; IV Piggyback:344]  Out: -     Constitutional: Pleasant female sitting up in bed. Awake and conversational. Non- toxic appearing. No acute distress. Well developed, hydrated, and nourished. Appears stated age.   HEENT: Normocephalic, atraumatic. Sclerae anicteric. EOM intact. Moist mucus membranes   Respiratory: Breathing comfortable with no increased work on room air. No signs of respiratory distress or accessory muscle use. Lungs clear to auscultation bilaterally, no crackles or wheezing noted.  Cardiovascular: Regular rate and rhythm. Normal S1 and S2. No murmurs, rubs, or gallops. No peripheral edema.    GI: Abdomen is soft, non-distended, non-tender and without distension. Bowel sounds present and normoactive.   Skin: Skin is clean, dry, intact. No jaundice appreciated.   Musculoskeletal/ Extremities: No redness, warmth, or swelling of the joints appreciated. Distal pulses palpable. Nailbeds pink and without cyanosis or clubbing.   Neurologic: Alert and oriented. Speech normal. Grossly nonfocal. Memory and thought process preserved.  Neuropsychiatric: Calm, affect congruent to situation. Appropriate mood and affect. Good judgment and insight. No visual/auditory hallucinations.  Vascular Access: Port is CDI without erythema, swelling, or discharge.      Time Spent on this Encounter   IGaby PA-C, personally saw the patient today and spent greater than 30 minutes discharging this patient.    Discharge Disposition   Discharged to home  Condition at discharge: Stable    Consultations This Hospital Stay   None    Discharge Orders      Reason for your hospital stay    You were admitted for scheduled chemotherapy with consolidation chemo. This is for your AML. You tolerated this very well.     Activity    Your activity upon discharge: activity as tolerated     Adult [facility] Follow-up and  recommended labs and tests    Neulasta on 11/18    Twice weekly labs and transfusions at [facility]    Follow up with an VLAD requested    Appointments on [place] and/or [facility] (with [facility] or [facility] provider or service). Call [phone] if you haven't heard regarding these appointments within 7 days of discharge.     Discharge Instructions    When your ANC drops below 1.0 (1000), please start taking Levaquin 250mg daily and Fluconazole 200mg daily     When to contact your care team    [facility] cancer clinic triage line at [phone] for temp > or = 100.4, uncontrolled nausea/vomiting/diarrhea/constipation, unrelieved pain, bleeding not relieved with pressure, dizziness, chest pain, shortness of breath, loss of consciousness, and any new or concerning symptoms.     IV access    **Ordering Provider MUST call/page Care Coordinator/ to discuss arranging this service**    You are going home with the following vascular access device: Port-a-Cath.     Diet    Follow this diet upon discharge: Orders Placed This Encounter      Regular Diet Adult     Check Out Appointment Request    Please schedule patient to have a follow up VLAD visit in the next 1-2 weeks. Video visit please.     Please keep all of her other appointments     Discharge Medications   Discharge Medication List as of 11/17/2022  9:44 AM      START taking these medications    Details   dexamethasone (DECADRON) 4 MG tablet Take 2 tablets (8 mg) by mouth every morning for 1 day On 11/18/2022, Disp-2 tablet, R-0, E-Prescribe      prednisoLONE acetate (PRED FORTE) 1 % ophthalmic suspension Place 2 drops into both eyes 4 times daily Continue for 4 days after discharge, Historical         CONTINUE these medications which have NOT CHANGED    Details   acyclovir (ZOVIRAX) 400 MG tablet Take 1 tablet (400 mg) by mouth 2 times daily, Disp-60 tablet, R-3, E-Prescribe      Bacillus Coagulans-Inulin (PROBIOTIC) 1-250 BILLION-MG CAPS  Take 1 capsule by mouth daily, Historical      fluconazole (DIFLUCAN) 200 MG tablet Take 1 tablet (200 mg) by mouth daily When absolute neutrophils are less than 1.- x 10^9/L, Disp-30 tablet, R-0, No Print Out      levofloxacin (LEVAQUIN) 250 MG tablet Take 1 tablet (250 mg) by mouth daily When absolute neutrophils are less than 1.- x 10^9/L, Disp-30 tablet, R-4, E-Prescribe      levothyroxine (SYNTHROID/LEVOTHROID) 75 MCG tablet Take 1 tablet (75 mcg) by mouth daily, Historical      magnesium 250 MG tablet Take 250 mg by mouth daily, Historical      Multiple Vitamins-Minerals (WOMENS MULTIVITAMIN) TABS Take 1 tablet by mouth daily, Historical      Quercetin 50 MG TABS Take 50 mg by mouth daily, Historical      vancomycin (VANCOCIN) 125 MG capsule Take 1 capsule (125 mg) by mouth 2 times daily, Disp-60 capsule, R-4, E-Prescribe      vitamin C (ASCORBIC ACID) 1000 MG TABS Take 2,000 mg by mouth daily, Historical      vitamin D3 (CHOLECALCIFEROL) 50 mcg (2000 units) tablet Take 1 tablet (50 mcg) by mouth daily, Historical      zinc gluconate 50 MG tablet Take 50 mg by mouth daily, Historical           Allergies   Allergies   Allergen Reactions     Blood Transfusion Related (Informational Only) Hives     6/29/2022, reaction of hives with platelet transfusion      Data   Most Recent 3 CBC's:Recent Labs   Lab Test 11/17/22 0349 11/16/22  0718 11/15/22  0545   WBC 4.9 4.3 4.6   HGB 10.3* 10.6* 11.4*   * 107* 106*    228 243      Most Recent 3 BMP's:  Recent Labs   Lab Test 11/17/22 0349 11/16/22  0718 11/15/22  0545    141 138   POTASSIUM 4.3 4.2 3.9   CHLORIDE 109* 109* 107   CO2 19* 19* 18*   BUN 12.8 13.9 11.8   CR 0.49* 0.52 0.57   ANIONGAP 10 13 13   MICHAEL 8.9 8.8 9.4   * 125* 147*     Most Recent 2 LFT's:  Recent Labs   Lab Test 11/17/22 0349 11/16/22  0718   AST 16 14   ALT 21 23   ALKPHOS 58 53   BILITOTAL 0.8 1.2     Most Recent INR's and Anticoagulation Dosing  History:  Anticoagulation Dose History     Recent Dosing and Labs Latest Ref Rng & Units 10/11/2022 10/12/2022 10/13/2022 11/14/2022 11/15/2022 11/16/2022 11/17/2022    INR 0.85 - 1.15 1.17(H) 1.12 1.09 1.02 1.15 1.09 1.08        Most Recent 3 Troponin's:No lab results found.  Most Recent Cholesterol Panel:  Recent Labs   Lab Test 03/08/22  0930   TRIG 93     Most Recent 6 Bacteria Isolates From Any Culture (See EPIC Reports for Culture Details):No lab results found.  Most Recent TSH, T4 and A1c Labs:  Recent Labs   Lab Test 10/12/22  0536 08/12/22  0724   TSH 0.30 0.12*   T4  --  1.51     Results for orders placed or performed during the hospital encounter of 08/09/22   IR Chest Port Placement > 5 Yrs of Age    Narrative    PRE-PROCEDURE DIAGNOSIS: Acute myeloid leukemia in remission    POST-PROCEDURE DIAGNOSIS: Same    PROCEDURE: Chest port placement    Impression    IMPRESSION: Completed image-guided placement of 6 Hebrew, 23.5 cm  single lumen power-injectable central venous port via right internal  jugular vein. Catheter tip in the right atrium. Aspirates and flushes  freely, heparin locked and is ready for immediate use. No  complication.    ----------    CLINICAL HISTORY: Patient requiring central venous access for  chemotherapy, IV medications and blood draws. Chest port placement  requested.    PERFORMED BY: Chema Dominguez PA-C    CONSENT: Written informed consent was obtained and is documented in  the patient record.    SEDATION CONSENT: It was explained to the patient that medications  used for sedation and pain relief may be administered, if needed, to  reduce anxiety and discomfort that may be associated with the  procedure. Serious side effects from the medications are rare but may  include prolonged drowsiness and difficulty breathing, possibly  requiring placement of a breathing tube to ventilate the lungs.    MEDICATIONS:  Intravenous sedation was administered with 1.5 mg  midazolam and 75 mcg  fentanyl. 1% lidocaine without epinephrine was  available for local anesthesia. The port was heparin locked upon  completion of placement.    NURSING: The patient was placed on continuous vital signs monitoring.  Vital signs and sedation were monitored by nursing staff under IR  physician staff supervision.     SEDATION TIME: 30 minutes face-to-face    FLUOROSCOPY TIME: 0.2 minutes    DESCRIPTION: The right neck and upper chest were prepped and draped in  the usual sterile fashion.      Under ultrasound guidance, the right internal jugular vein was  identified and the overlying skin was anesthetized and skin  dermatotomy was made. Under ultrasound guidance, right internal  jugular venipuncture was made with needle. Image saved documenting  venipuncture and patency.    Needle was exchanged over guidewire for a dilator under fluoroscopic  guidance. Length to right atrium was measured with guidewire.  Guidewire and inner dilator were removed. Wire was advanced into  inferior vena cava under fluoroscopic guidance and secured.     The anterior right chest skin was anesthetized and incision was made.  A subcutaneous port pocket was created using a combination of sharp  and blunt dissection. The pocket was irrigated with saline and packed  with gauze to obtain hemostasis. The gauze was removed.      Port catheter was subcutaneously tunneled from the anterior chest  pocket to the internal jugular venipuncture site after path of tunnel  was anesthetized. Catheter cut to length. The dilator was exchanged  over guidewire for a peel-away sheath. Guidewire was removed. Under  fluoroscopic guidance, the catheter was placed through the peel-away  sheath and positioned with its tip in the right atrium. Peel-away  sheath was removed.      Final port and catheter position saved. Port aspirated and flushed  freely and was heparin locked. The chest incision was closed with  three 3-0 Vicryl interrupted sutures and topical adhesive. The  skin  dermatotomy site overlying the internal jugular venipuncture was  closed with topical adhesive.    COMPLICATIONS: No immediate complication, the patient remained stable  throughout the procedure and tolerated it well.    ESTIMATED BLOOD LOSS: Minimal    SPECIMENS: None    ABBEY MCGEE PA-C         SYSTEM ID:  [id number]

## 2022-11-17 NOTE — PROGRESS NOTES
Nursing Focus: Chemotherapy  D: Positive blood return via R chest port. Insertion site is clean/dry/intact, dressing intact with no complaints of pain.  Urine output is recorded in intake in Doc Flowsheet.    I: Premedications given per order (see electronic medical administration record). Cerebellar exam unremarkableDose #5 of HD Cytarabine started to infuse over 3 hours. Reviewed pt teaching on chemotherapy side effects. Pt denies need for further teaching. Chemotherapy double checked per protocol by two chemotherapy competent RN's.   A: Tolerating procedure well. Denies nausea and or pain.   P: Continue to monitor urine output and symptoms of nausea. Screen for symptoms of toxicity.

## 2022-11-17 NOTE — PROGRESS NOTES
Reviewed discharge orders and medications and Minerva verbalized understanding. She has a follow up clinic appointment and phone numbers to call with questions.

## 2022-11-17 NOTE — PLAN OF CARE
[id number]:     Pt A&Ox4 with VSS on RA. No complaints of pain, N/V or SOB. Pt rested throughout shift. Dose #6 HD cytarabine hung today at 0600. Tolerating well. Plan to discharge following completion. Able to make needs known.

## 2022-11-17 NOTE — PROGRESS NOTES
Nursing Focus: Chemotherapy    D: Positive blood return via PORT. Insertion site is clean/dry/intact, dressing intact with no complaints of pain.  Urine output is recorded in intake in Doc Flowsheet.      I: Premedications given per order (see electronic medical administration record). Dose #6 of Cytarabine started to infuse over 3 hours. Reviewed pt teaching on chemotherapy side effects.  Pt denies need for further teaching. Chemotherapy double checked per protocol by two chemotherapy competent RN's.     A: Tolerating infusion well. Denies nausea and or pain.     P: Continue to monitor urine output and symptoms of nausea. Screen for symptoms of toxicity.

## 2022-11-18 ENCOUNTER — PATIENT OUTREACH (OUTPATIENT)
Dept: CARE COORDINATION | Facility: CLINIC | Age: 37
End: 2022-11-18

## 2022-11-18 ENCOUNTER — INFUSION THERAPY VISIT (OUTPATIENT)
Dept: INFUSION THERAPY | Facility: CLINIC | Age: 37
End: 2022-11-18
Attending: PHYSICIAN ASSISTANT
Payer: COMMERCIAL

## 2022-11-18 VITALS — HEART RATE: 80 BPM | TEMPERATURE: 98.2 F | DIASTOLIC BLOOD PRESSURE: 79 MMHG | SYSTOLIC BLOOD PRESSURE: 128 MMHG

## 2022-11-18 DIAGNOSIS — C92.01 ACUTE MYELOID LEUKEMIA IN REMISSION (H): Primary | ICD-10-CM

## 2022-11-18 PROCEDURE — 96372 THER/PROPH/DIAG INJ SC/IM: CPT | Performed by: INTERNAL MEDICINE

## 2022-11-18 PROCEDURE — 250N000011 HC RX IP 250 OP 636: Performed by: INTERNAL MEDICINE

## 2022-11-18 RX ADMIN — PEGFILGRASTIM 6 MG: 6 INJECTION SUBCUTANEOUS at 10:05

## 2022-11-18 NOTE — PROGRESS NOTES
[facility]    Background: Transitional Care Management program auto-identified and prompting a chart review by [facility] team.    Assessment: Upon chart review, [facility] Team member will Enroll this episode of Transitional Care Management program due to reason below:    Upon chart review, patient is followed by [facility] team who follow their patients closely. [facility] will not conduct outreach to avoid duplication of outreach to patient.     Plan: Transitional Care Management episode enrolled per reason above.

## 2022-11-18 NOTE — PROGRESS NOTES
Infusion Nursing Note:  Veda Marinelli presents today for Neulasta.    Patient seen by provider today: No   present during visit today: Not Applicable.    Note: Pt has no concerns today.     Intravenous Access:  No Intravenous access/labs at this visit.    Treatment Conditions:  Not Applicable.    Post Infusion Assessment:  Patient tolerated injection without incident.     Discharge Plan:   Patient discharged in stable condition accompanied by: self.  Departure Mode: Ambulatory.      Braden Lindo RN

## 2022-11-22 ENCOUNTER — LAB (OUTPATIENT)
Dept: INFUSION THERAPY | Facility: CLINIC | Age: 37
End: 2022-11-22
Attending: PHYSICIAN ASSISTANT
Payer: COMMERCIAL

## 2022-11-22 VITALS
SYSTOLIC BLOOD PRESSURE: 107 MMHG | HEART RATE: 90 BPM | TEMPERATURE: 97.7 F | DIASTOLIC BLOOD PRESSURE: 66 MMHG | RESPIRATION RATE: 18 BRPM

## 2022-11-22 DIAGNOSIS — C92.00 ACUTE MYELOID LEUKEMIA NOT HAVING ACHIEVED REMISSION (H): ICD-10-CM

## 2022-11-22 DIAGNOSIS — C95.01 ACUTE LEUKEMIA IN REMISSION (H): Primary | ICD-10-CM

## 2022-11-22 DIAGNOSIS — C92.01 ACUTE MYELOID LEUKEMIA IN REMISSION (H): Primary | ICD-10-CM

## 2022-11-22 LAB
DACRYOCYTES BLD QL SMEAR: SLIGHT
ERYTHROCYTE [DISTWIDTH] IN BLOOD BY AUTOMATED COUNT: 16.4 % (ref 10–15)
HCT VFR BLD AUTO: 28.8 % (ref 35–47)
HGB BLD-MCNC: 9.7 G/DL (ref 11.7–15.7)
HOLD SPECIMEN: NORMAL
HOLD SPECIMEN: NORMAL
MCH RBC QN AUTO: 34.8 PG (ref 26.5–33)
MCHC RBC AUTO-ENTMCNC: 33.7 G/DL (ref 31.5–36.5)
MCV RBC AUTO: 103 FL (ref 78–100)
PLAT MORPH BLD: ABNORMAL
PLATELET # BLD AUTO: 51 10E3/UL (ref 150–450)
RBC # BLD AUTO: 2.79 10E6/UL (ref 3.8–5.2)
RBC MORPH BLD: ABNORMAL
WBC # BLD AUTO: 0.2 10E3/UL (ref 4–11)

## 2022-11-22 PROCEDURE — 36591 DRAW BLOOD OFF VENOUS DEVICE: CPT

## 2022-11-22 PROCEDURE — 250N000011 HC RX IP 250 OP 636: Performed by: PHYSICIAN ASSISTANT

## 2022-11-22 PROCEDURE — 85027 COMPLETE CBC AUTOMATED: CPT | Performed by: INTERNAL MEDICINE

## 2022-11-22 RX ORDER — HEPARIN SODIUM (PORCINE) LOCK FLUSH IV SOLN 100 UNIT/ML 100 UNIT/ML
5 SOLUTION INTRAVENOUS
Status: DISCONTINUED | OUTPATIENT
Start: 2022-11-22 | End: 2022-11-22 | Stop reason: HOSPADM

## 2022-11-22 RX ORDER — HEPARIN SODIUM (PORCINE) LOCK FLUSH IV SOLN 100 UNIT/ML 100 UNIT/ML
5 SOLUTION INTRAVENOUS
Status: CANCELLED | OUTPATIENT
Start: 2022-11-22

## 2022-11-22 RX ADMIN — Medication 5 ML: at 11:04

## 2022-11-22 NOTE — PROGRESS NOTES
Minerva is a 37 year old who is being evaluated via a billable video visit.      How would you like to obtain your AVS? MyChart  If the video visit is dropped, the invitation should be resent by: Send to e-mail at: [email or web address]  Will anyone else be joining your video visit? No        Video-Visit Details    Video Duration: 10 minutes    Originating Location (pt. Location): Home    Distant Location (provider location):  On-site    Platform used for Video Visit: Silvina Mederos  11/23/22    Transition Care Management Services  No data recorded  No data recorded  No data recorded  Interactive contact date: 11/18/2022  Face-to-face visit date: 11/23/2022        Medical complexity decision making: Moderate complexity ([id number])          [facility]  Date of visit: Nov 23, 2022    Reason for Visit: follow up AML--favorable risk      Oncology HPI:   Follows with Dr. Earl. Presented to routine exam with cytopenias. BMBx 6/17/2022 - hypercellular marrow (85-95% cellularity) with 92% blasts. 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 Started induction chemotherapy with 7+3 and midostaurin on 6/22/2022. D21 BMBx (7/12) with robust marrow regeneration, repeat BMBx 7/22 with 70-80% cellularity and 6% blasts by morphology consisted with complete response per Dr. Earl. NGS with no detectable mutations (MRD negative). BMT consulted 7/15; recommend consolidation chemotherapy, could consider BMT in the future if relapses. Tentatively planning for 3-4 cycles consolidation HiDAC (D1-3) and midostaurin (D8-21), followed by maintenance midostaurin for 1 year. Admitted for Cycle 1 consolidation with HiDAC and midostaurin on 8/9/22 which was tolerated well without any concerns or complications.     C2 HiDAC + midastaurin, D1 = 9/12/2022  C3 HiDAC + midastaurin, D1 = 10/10/2022  C4 HiDAC + midastaurin, D1 =  11/14/2022      Interval history:   -no bleeding, halina vaginal   -denies f/c  -appetite is good  -no GI or respiratory symptoms  -otherwise feeling very well. Tolerated chemo well. Nothing new to report    ROS: 10 point ROS neg other than the symptoms noted above in the HPI.      Current Outpatient Medications   Medication Sig Dispense Refill     acyclovir (ZOVIRAX) 400 MG tablet Take 1 tablet (400 mg) by mouth 2 times daily 60 tablet 3     Bacillus Coagulans-Inulin (PROBIOTIC) 1-250 BILLION-MG CAPS Take 1 capsule by mouth daily       dexamethasone (DECADRON) 4 MG tablet Take 2 tablets (8 mg) by mouth every morning for 1 day On 11/18/2022 2 tablet 0     fluconazole (DIFLUCAN) 200 MG tablet Take 1 tablet (200 mg) by mouth daily When absolute neutrophils are less than 1.- x 10^9/L 30 tablet 0     levofloxacin (LEVAQUIN) 250 MG tablet Take 1 tablet (250 mg) by mouth daily When absolute neutrophils are less than 1.- x 10^9/L 30 tablet 4     levothyroxine (SYNTHROID/LEVOTHROID) 75 MCG tablet Take 1 tablet (75 mcg) by mouth daily       magnesium 250 MG tablet Take 250 mg by mouth daily       [START ON 12/12/2022] midostaurin (RYDAPT) 25 MG capsule Take 2 capsules (50 mg) by mouth 2 times daily . Take with food. 112 capsule 11     Multiple Vitamins-Minerals (WOMENS MULTIVITAMIN) TABS Take 1 tablet by mouth daily       prednisoLONE acetate (PRED FORTE) 1 % ophthalmic suspension Place 2 drops into both eyes 4 times daily Continue for 4 days after discharge       Quercetin 50 MG TABS Take 50 mg by mouth daily       vancomycin (VANCOCIN) 125 MG capsule Take 1 capsule (125 mg) by mouth 2 times daily 60 capsule 4     vitamin C (ASCORBIC ACID) 1000 MG TABS Take 2,000 mg by mouth daily       vitamin D3 (CHOLECALCIFEROL) 50 mcg (2000 units) tablet Take 1 tablet (50 mcg) by mouth daily       zinc gluconate 50 MG tablet Take 50 mg by mouth daily         Allergies   Allergen Reactions     Blood Transfusion Related (Informational Only)  Hives     6/29/2022, reaction of hives with platelet transfusion          Physical Exam:  There were no vitals taken for this visit.    Video physical exam  General: Patient appears well in no acute distress.   Skin: No visualized rash or lesions on visualized skin  Eyes: EOMI, no erythema, sclera icterus or discharge noted  Resp: Appears to be breathing comfortably without accessory muscle usage, speaking in full sentences, no cough  MSK: Appears to have normal range of motion based on visualized movements  Neurologic: No apparent tremors, facial movements symmetric  Psych: affect normal, alert and oriented    Labs:     I personally reviewed the following labs:    Most Recent 3 CBC's:  Recent Labs   Lab Test 11/22/22  0957 11/17/22  0349 11/16/22  0718   WBC 0.2* 4.9 4.3   HGB 9.7* 10.3* 10.6*   * 108* 107*   PLT 51* 192 228     Most Recent 3 BMP's:  Recent Labs   Lab Test 11/17/22  0349 11/16/22  0718 11/15/22  0545    141 138   POTASSIUM 4.3 4.2 3.9   CHLORIDE 109* 109* 107   CO2 19* 19* 18*   BUN 12.8 13.9 11.8   CR 0.49* 0.52 0.57   ANIONGAP 10 13 13   MICHAEL 8.9 8.8 9.4   * 125* 147*     Most Recent 2 LFT's:  Recent Labs   Lab Test 11/17/22  0349 11/16/22  0718   AST 16 14   ALT 21 23   ALKPHOS 58 53   BILITOTAL 0.8 1.2       Imaging: n/a      Impression/plan:     # AML, 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 Earl. Presented to routine exam with cytopenias. BMBx 6/17/2022 - hypercellular marrow (85-95% cellularity) with 92% blasts. 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 Started induction chemotherapy with 7+3 and midostaurin on 6/22/2022. D21 BMBx (7/12) with robust marrow regeneration, repeat BMBx 7/22 with 70-80% cellularity and 6% blasts by morphology consisted with complete response per Dr. Earl. NGS with no detectable mutations (MRD negative). BMT consulted 7/15; recommend consolidation  chemotherapy, could consider BMT in the future if relapses. Tentatively planning for 3-4 cycles consolidation HiDAC (D1-3) and midostaurin (D8-21), followed by maintenance midostaurin for 1 year. Admitted for Cycle 1 consolidation with HiDAC and midostaurin on 8/9/22 which was tolerated well without any concerns or complications.   - Free drug coverage approved for midostaurin through [employer] - delivered to home  - Port in place  - Patient in MRD negative CR, no indication for allopurinol or TLS monitoring.   - Recheck NPM1 mutation   -Admitted for C4 HiDAC + Midastaurin consolidation on 11/14. She tolerated well  -Plan to complete the 14 days of Midostaurin as normal and then at the visit with Dr. Earl, will discuss starting maintenance midostaurin. Patient aware of this plan.   -continue twice weekly labs/transfusions, can change freq as needed  - Plan for bone marrow biopsy about 6-8 weeks (scheduled for end of Dec) after final cycle with send out PCR for NPM1 MRD (to [facility]) to confirm depth of response. Will then follow-up with Dr. Earl in January       # ID PPX  - ACV 400mg BID  - Should start levaquin and voriconazole as ANC <1000  - Note; if she were to need antifungal ppx in the future consider reaching out to pharmacy liaison because prior to meeting her deductible the cost was $2048/month for posaconazole and $176/month for Voriconazole.               # LUE DVT (PICC-associated)  LUE US (7/12) with nonocclusive DVT in the left axillary vein and one of the paired left brachial veins, superficial venous thrombosis in the left basilic vein. PICC-associated which was removed prior to discharge. Transitioned to apixaban as of 8/9 with plan to continue (per Dr. Earl) x 3 months (through ~10/13/22).   - Completed therapeutic anticoagulation         # History of recurrent cdiff colitis  - Ppx Vancomycin 125 mg BID PO. Continue until she sees Dr. Earl       # H/o COVID-19 (Oct 2021)  Pt is not  vaccinated nor interested in being vaccinated. She was admitted from 10/1/2021 - 10/13/2021 for acute hypoxic respiratory failure from COVID-19 pneumonia. Required IMC, high flow and intermittent CPAP. She was treated with dexamethasone, remdesivir, and baricitinib in addition to azithromycin and ceftriaxone. Recovered symptomatically.       # Hypothyroidism   - Synthroid 75 mcg daily       #Heavy menstrual bleeding  - Dosed with Provera 10 mg daily x 5 days while inpatient in 6/2022. Restarted another 5 day course following last cycle in s/o kiana lining up with her menstrual cycle.   -period not heavy this cycle as kiana and period no longer align. Continue to monitor for   - consider ob/gyn referral if continues.         Edilia Lopez PA-C  [facility]  [address] [place] [address] [phone]

## 2022-11-22 NOTE — PROGRESS NOTES
Infusion Nursing Note:  Veda Marinelli presents today for Possible transfusions.    Patient seen by provider today: No   present during visit today: Not Applicable.    Note: Message sent to Dr. Earl and Anuja Tripp RNCC re critical WBC,- expected value d/t chemo last week.    Intravenous Access:  Implanted Port.    Treatment Conditions:  Results reviewed, labs did NOT meet treatment parameters: Hgb 9.7 and plt 51.    Post Infusion Assessment:  Blood return noted.  Site patent and intact, free from redness, edema or discomfort.  No evidence of extravasations.  Access discontinued per protocol.     Discharge Plan:   Discharge instructions reviewed with: Patient.  Patient and/or family verbalized understanding of discharge instructions and all questions answered.  AVS to patient via [employer].  Patient will return 11/23/2022 video visit with Edilia Lopez for next appointment.   Patient discharged in stable condition accompanied by: self.  Departure Mode: Ambulatory.    Maribel Dominguez RN

## 2022-11-23 ENCOUNTER — VIRTUAL VISIT (OUTPATIENT)
Dept: ONCOLOGY | Facility: CLINIC | Age: 37
End: 2022-11-23
Attending: PHYSICIAN ASSISTANT
Payer: COMMERCIAL

## 2022-11-23 DIAGNOSIS — C92.01 ACUTE MYELOID LEUKEMIA IN REMISSION (H): Primary | ICD-10-CM

## 2022-11-23 PROCEDURE — 99214 OFFICE O/P EST MOD 30 MIN: CPT | Mod: GT | Performed by: PHYSICIAN ASSISTANT

## 2022-11-23 PROCEDURE — G0463 HOSPITAL OUTPT CLINIC VISIT: HCPCS | Mod: PN,GT | Performed by: PHYSICIAN ASSISTANT

## 2022-11-23 NOTE — LETTER
11/23/2022         RE: Veda Marinelli  [address] [address]        Dear Colleague,    Thank you for referring your patient, Veda Marinelli, to the [facility]. Please see a copy of my visit note below.      Transition Care Management Services  No data recorded  No data recorded  No data recorded  Interactive contact date: 11/18/2022  Face-to-face visit date: 11/23/2022      Medical complexity decision making: Moderate complexity ([id number])        [facility]  Date of visit: Nov 23, 2022    Reason for Visit: follow up AML--favorable risk      Oncology HPI:   Follows with Dr. Earl. Presented to routine exam with cytopenias. BMBx 6/17/2022 - hypercellular marrow (85-95% cellularity) with 92% blasts. 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 Started induction chemotherapy with 7+3 and midostaurin on 6/22/2022. D21 BMBx (7/12) with robust marrow regeneration, repeat BMBx 7/22 with 70-80% cellularity and 6% blasts by morphology consisted with complete response per Dr. Earl. NGS with no detectable mutations (MRD negative). BMT consulted 7/15; recommend consolidation chemotherapy, could consider BMT in the future if relapses. Tentatively planning for 3-4 cycles consolidation HiDAC (D1-3) and midostaurin (D8-21), followed by maintenance midostaurin for 1 year. Admitted for Cycle 1 consolidation with HiDAC and midostaurin on 8/9/22 which was tolerated well without any concerns or complications.     C2 HiDAC + midastaurin, D1 = 9/12/2022  C3 HiDAC + midastaurin, D1 = 10/10/2022  C4 HiDAC + midastaurin, D1 = 11/14/2022      Interval history:   -no bleeding, halina vaginal   -denies f/c  -appetite is good  -no GI or respiratory symptoms  -otherwise feeling very well. Tolerated chemo well. Nothing new to report    ROS: 10 point ROS neg other than the symptoms noted above in the  HPI.      Current Outpatient Medications   Medication Sig Dispense Refill     acyclovir (ZOVIRAX) 400 MG tablet Take 1 tablet (400 mg) by mouth 2 times daily 60 tablet 3     Bacillus Coagulans-Inulin (PROBIOTIC) 1-250 BILLION-MG CAPS Take 1 capsule by mouth daily       dexamethasone (DECADRON) 4 MG tablet Take 2 tablets (8 mg) by mouth every morning for 1 day On 11/18/2022 2 tablet 0     fluconazole (DIFLUCAN) 200 MG tablet Take 1 tablet (200 mg) by mouth daily When absolute neutrophils are less than 1.- x 10^9/L 30 tablet 0     levofloxacin (LEVAQUIN) 250 MG tablet Take 1 tablet (250 mg) by mouth daily When absolute neutrophils are less than 1.- x 10^9/L 30 tablet 4     levothyroxine (SYNTHROID/LEVOTHROID) 75 MCG tablet Take 1 tablet (75 mcg) by mouth daily       magnesium 250 MG tablet Take 250 mg by mouth daily       [START ON 12/12/2022] midostaurin (RYDAPT) 25 MG capsule Take 2 capsules (50 mg) by mouth 2 times daily . Take with food. 112 capsule 11     Multiple Vitamins-Minerals (WOMENS MULTIVITAMIN) TABS Take 1 tablet by mouth daily       prednisoLONE acetate (PRED FORTE) 1 % ophthalmic suspension Place 2 drops into both eyes 4 times daily Continue for 4 days after discharge       Quercetin 50 MG TABS Take 50 mg by mouth daily       vancomycin (VANCOCIN) 125 MG capsule Take 1 capsule (125 mg) by mouth 2 times daily 60 capsule 4     vitamin C (ASCORBIC ACID) 1000 MG TABS Take 2,000 mg by mouth daily       vitamin D3 (CHOLECALCIFEROL) 50 mcg (2000 units) tablet Take 1 tablet (50 mcg) by mouth daily       zinc gluconate 50 MG tablet Take 50 mg by mouth daily         Allergies   Allergen Reactions     Blood Transfusion Related (Informational Only) Hives     6/29/2022, reaction of hives with platelet transfusion          Physical Exam:  There were no vitals taken for this visit.    Video physical exam  General: Patient appears well in no acute distress.   Skin: No visualized rash or lesions on visualized  skin  Eyes: EOMI, no erythema, sclera icterus or discharge noted  Resp: Appears to be breathing comfortably without accessory muscle usage, speaking in full sentences, no cough  MSK: Appears to have normal range of motion based on visualized movements  Neurologic: No apparent tremors, facial movements symmetric  Psych: affect normal, alert and oriented    Labs:     I personally reviewed the following labs:    Most Recent 3 CBC's:  Recent Labs   Lab Test 11/22/22  0957 11/17/22  0349 11/16/22  0718   WBC 0.2* 4.9 4.3   HGB 9.7* 10.3* 10.6*   * 108* 107*   PLT 51* 192 228     Most Recent 3 BMP's:  Recent Labs   Lab Test 11/17/22  0349 11/16/22  0718 11/15/22  0545    141 138   POTASSIUM 4.3 4.2 3.9   CHLORIDE 109* 109* 107   CO2 19* 19* 18*   BUN 12.8 13.9 11.8   CR 0.49* 0.52 0.57   ANIONGAP 10 13 13   MICHAEL 8.9 8.8 9.4   * 125* 147*     Most Recent 2 LFT's:  Recent Labs   Lab Test 11/17/22  0349 11/16/22  0718   AST 16 14   ALT 21 23   ALKPHOS 58 53   BILITOTAL 0.8 1.2       Imaging: n/a      Impression/plan:     # AML, 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 Earl. Presented to routine exam with cytopenias. BMBx 6/17/2022 - hypercellular marrow (85-95% cellularity) with 92% blasts. 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 Started induction chemotherapy with 7+3 and midostaurin on 6/22/2022. D21 BMBx (7/12) with robust marrow regeneration, repeat BMBx 7/22 with 70-80% cellularity and 6% blasts by morphology consisted with complete response per Dr. Earl. NGS with no detectable mutations (MRD negative). BMT consulted 7/15; recommend consolidation chemotherapy, could consider BMT in the future if relapses. Tentatively planning for 3-4 cycles consolidation HiDAC (D1-3) and midostaurin (D8-21), followed by maintenance midostaurin for 1 year. Admitted for Cycle 1 consolidation with HiDAC and midostaurin on  8/9/22 which was tolerated well without any concerns or complications.   - Free drug coverage approved for midostaurin through [employer] - delivered to home  - Port in place  - Patient in MRD negative CR, no indication for allopurinol or TLS monitoring.   - Recheck NPM1 mutation   -Admitted for C4 HiDAC + Midastaurin consolidation on 11/14. She tolerated well  -Plan to complete the 14 days of Midostaurin as normal and then at the visit with Dr. Earl, will discuss starting maintenance midostaurin. Patient aware of this plan.   -continue twice weekly labs/transfusions, can change freq as needed  - Plan for bone marrow biopsy about 6-8 weeks (scheduled for end of Dec) after final cycle with send out PCR for NPM1 MRD (to [facility]) to confirm depth of response. Will then follow-up with Dr. Earl in January       # ID PPX  - ACV 400mg BID  - Should start levaquin and voriconazole as ANC <1000  - Note; if she were to need antifungal ppx in the future consider reaching out to pharmacy liaison because prior to meeting her deductible the cost was $2048/month for posaconazole and $176/month for Voriconazole.               # LUE DVT (PICC-associated)  LUE US (7/12) with nonocclusive DVT in the left axillary vein and one of the paired left brachial veins, superficial venous thrombosis in the left basilic vein. PICC-associated which was removed prior to discharge. Transitioned to apixaban as of 8/9 with plan to continue (per Dr. Earl) x 3 months (through ~10/13/22).   - Completed therapeutic anticoagulation         # History of recurrent cdiff colitis  - Ppx Vancomycin 125 mg BID PO. Continue until she sees Dr. Earl       # H/o COVID-19 (Oct 2021)  Pt is not vaccinated nor interested in being vaccinated. She was admitted from 10/1/2021 - 10/13/2021 for acute hypoxic respiratory failure from COVID-19 pneumonia. Required IMC, high flow and intermittent CPAP. She was treated with dexamethasone, remdesivir,  and baricitinib in addition to azithromycin and ceftriaxone. Recovered symptomatically.       # Hypothyroidism   - Synthroid 75 mcg daily       #Heavy menstrual bleeding  - Dosed with Provera 10 mg daily x 5 days while inpatient in 6/2022. Restarted another 5 day course following last cycle in s/o kiana lining up with her menstrual cycle.   -period not heavy this cycle as kiana and period no longer align. Continue to monitor for   - consider ob/gyn referral if continues.         Edilia Lopez PA-C  [facility]  [address] [place] [address] [phone]

## 2022-11-25 ENCOUNTER — LAB (OUTPATIENT)
Dept: INFUSION THERAPY | Facility: CLINIC | Age: 37
End: 2022-11-25
Attending: PHYSICIAN ASSISTANT
Payer: COMMERCIAL

## 2022-11-25 VITALS — DIASTOLIC BLOOD PRESSURE: 75 MMHG | HEART RATE: 92 BPM | SYSTOLIC BLOOD PRESSURE: 110 MMHG | TEMPERATURE: 98.3 F

## 2022-11-25 DIAGNOSIS — C92.00 ACUTE MYELOID LEUKEMIA NOT HAVING ACHIEVED REMISSION (H): ICD-10-CM

## 2022-11-25 DIAGNOSIS — C92.00 ACUTE MYELOID LEUKEMIA NOT HAVING ACHIEVED REMISSION (H): Primary | ICD-10-CM

## 2022-11-25 LAB
ALBUMIN SERPL BCG-MCNC: 4.1 G/DL (ref 3.5–5.2)
ALP SERPL-CCNC: 70 U/L (ref 35–104)
ALT SERPL W P-5'-P-CCNC: 49 U/L (ref 10–35)
ANION GAP SERPL CALCULATED.3IONS-SCNC: 9 MMOL/L (ref 7–15)
AST SERPL W P-5'-P-CCNC: 24 U/L (ref 10–35)
BILIRUB SERPL-MCNC: 1.1 MG/DL
BUN SERPL-MCNC: 10.5 MG/DL (ref 6–20)
CALCIUM SERPL-MCNC: 9.6 MG/DL (ref 8.6–10)
CHLORIDE SERPL-SCNC: 103 MMOL/L (ref 98–107)
CREAT SERPL-MCNC: 0.57 MG/DL (ref 0.51–0.95)
DEPRECATED HCO3 PLAS-SCNC: 26 MMOL/L (ref 22–29)
ERYTHROCYTE [DISTWIDTH] IN BLOOD BY AUTOMATED COUNT: 15.3 % (ref 10–15)
GFR SERPL CREATININE-BSD FRML MDRD: >90 ML/MIN/1.73M2
GLUCOSE SERPL-MCNC: 106 MG/DL (ref 70–99)
HCT VFR BLD AUTO: 24.7 % (ref 35–47)
HGB BLD-MCNC: 8.6 G/DL (ref 11.7–15.7)
HOLD SPECIMEN: NORMAL
MCH RBC QN AUTO: 34.8 PG (ref 26.5–33)
MCHC RBC AUTO-ENTMCNC: 34.8 G/DL (ref 31.5–36.5)
MCV RBC AUTO: 100 FL (ref 78–100)
PLAT MORPH BLD: NORMAL
PLATELET # BLD AUTO: 14 10E3/UL (ref 150–450)
POTASSIUM SERPL-SCNC: 4.1 MMOL/L (ref 3.4–5.3)
PROT SERPL-MCNC: 6.8 G/DL (ref 6.4–8.3)
RBC # BLD AUTO: 2.47 10E6/UL (ref 3.8–5.2)
RBC MORPH BLD: NORMAL
SODIUM SERPL-SCNC: 138 MMOL/L (ref 136–145)
WBC # BLD AUTO: 0.2 10E3/UL (ref 4–11)

## 2022-11-25 PROCEDURE — 82040 ASSAY OF SERUM ALBUMIN: CPT | Performed by: INTERNAL MEDICINE

## 2022-11-25 PROCEDURE — 80053 COMPREHEN METABOLIC PANEL: CPT | Performed by: INTERNAL MEDICINE

## 2022-11-25 PROCEDURE — 36591 DRAW BLOOD OFF VENOUS DEVICE: CPT

## 2022-11-25 PROCEDURE — 250N000011 HC RX IP 250 OP 636

## 2022-11-25 PROCEDURE — 85014 HEMATOCRIT: CPT | Performed by: INTERNAL MEDICINE

## 2022-11-25 RX ORDER — HEPARIN SODIUM (PORCINE) LOCK FLUSH IV SOLN 100 UNIT/ML 100 UNIT/ML
SOLUTION INTRAVENOUS
Status: COMPLETED
Start: 2022-11-25 | End: 2022-11-25

## 2022-11-25 RX ADMIN — Medication 500 UNITS: at 10:21

## 2022-11-25 NOTE — PROGRESS NOTES
Infusion Nursing Note:  Veda Marinelli presents today for labs and possible blood transfusion.    Patient seen by provider today: No   present during visit today: Not Applicable.    Note: N/A.    Intravenous Access:  Labs drawn without difficulty.  Implanted Port.    Treatment Conditions:  Lab Results   Component Value Date    HGB 8.6 (L) 11/25/2022    WBC 0.2 (LL) 11/25/2022    ANEU 4.9 11/17/2022    ANEUTAUTO 4.1 11/16/2022    PLT 14 (LL) 11/25/2022      Results reviewed, labs did NOT meet treatment parameters: Platelets 14 today. Pt comfortable with leaving today and returning on 11/29/22 for possible blood transfusion at that time.  Pt denies any s/sx of bleeding therefor platelets were not infused.    ANC is 0.2.  Discussed neutropenic precautions with pt and she verbalized understanding and has no questions/concerns at this time.     Post Infusion Assessment:  Patient tolerated infusion without incident.  Blood return noted pre and post infusion.  Access discontinued per protocol.     Discharge Plan:   Patient discharged in stable condition accompanied by: self.  Departure Mode: Ambulatory.  Pt to return on 11/29/22 at 9:45 am for labs followed by possible blood transfusion.       Haley Funez RN

## 2022-11-29 ENCOUNTER — LAB (OUTPATIENT)
Dept: INFUSION THERAPY | Facility: CLINIC | Age: 37
End: 2022-11-29
Attending: PHYSICIAN ASSISTANT
Payer: COMMERCIAL

## 2022-11-29 VITALS — DIASTOLIC BLOOD PRESSURE: 79 MMHG | HEART RATE: 88 BPM | SYSTOLIC BLOOD PRESSURE: 124 MMHG | TEMPERATURE: 98.8 F

## 2022-11-29 DIAGNOSIS — C95.01 ACUTE LEUKEMIA IN REMISSION (H): ICD-10-CM

## 2022-11-29 DIAGNOSIS — C92.01 ACUTE MYELOID LEUKEMIA IN REMISSION (H): Primary | ICD-10-CM

## 2022-11-29 DIAGNOSIS — C95.01 ACUTE LEUKEMIA IN REMISSION (H): Primary | ICD-10-CM

## 2022-11-29 DIAGNOSIS — C92.00 ACUTE MYELOID LEUKEMIA NOT HAVING ACHIEVED REMISSION (H): Primary | ICD-10-CM

## 2022-11-29 LAB
ABO/RH(D): NORMAL
ANTIBODY SCREEN: NEGATIVE
BASOPHILS # BLD MANUAL: 0 10E3/UL (ref 0–0.2)
BASOPHILS NFR BLD MANUAL: 0 %
BLD PROD TYP BPU: NORMAL
BLOOD COMPONENT TYPE: NORMAL
CODING SYSTEM: NORMAL
EOSINOPHIL # BLD MANUAL: 0.1 10E3/UL (ref 0–0.7)
EOSINOPHIL NFR BLD MANUAL: 1 %
ERYTHROCYTE [DISTWIDTH] IN BLOOD BY AUTOMATED COUNT: 15.6 % (ref 10–15)
HCT VFR BLD AUTO: 23 % (ref 35–47)
HGB BLD-MCNC: 7.9 G/DL (ref 11.7–15.7)
ISSUE DATE AND TIME: NORMAL
LYMPHOCYTES # BLD MANUAL: 0.3 10E3/UL (ref 0.8–5.3)
LYMPHOCYTES NFR BLD MANUAL: 3 %
MCH RBC QN AUTO: 34.3 PG (ref 26.5–33)
MCHC RBC AUTO-ENTMCNC: 34.3 G/DL (ref 31.5–36.5)
MCV RBC AUTO: 100 FL (ref 78–100)
METAMYELOCYTES # BLD MANUAL: 0.2 10E3/UL
METAMYELOCYTES NFR BLD MANUAL: 2 %
MONOCYTES # BLD MANUAL: 1 10E3/UL (ref 0–1.3)
MONOCYTES NFR BLD MANUAL: 10 %
MYELOCYTES # BLD MANUAL: 0.1 10E3/UL
MYELOCYTES NFR BLD MANUAL: 1 %
NEUTROPHILS # BLD MANUAL: 8.5 10E3/UL (ref 1.6–8.3)
NEUTROPHILS NFR BLD MANUAL: 83 %
PLAT MORPH BLD: ABNORMAL
PLATELET # BLD AUTO: 6 10E3/UL (ref 150–450)
RBC # BLD AUTO: 2.3 10E6/UL (ref 3.8–5.2)
RBC MORPH BLD: ABNORMAL
SPECIMEN EXPIRATION DATE: NORMAL
UNIT ABO/RH: NORMAL
UNIT NUMBER: NORMAL
UNIT STATUS: NORMAL
UNIT TYPE ISBT: 600
WBC # BLD AUTO: 10.3 10E3/UL (ref 4–11)

## 2022-11-29 PROCEDURE — 85007 BL SMEAR W/DIFF WBC COUNT: CPT | Performed by: INTERNAL MEDICINE

## 2022-11-29 PROCEDURE — 86901 BLOOD TYPING SEROLOGIC RH(D): CPT | Performed by: PHYSICIAN ASSISTANT

## 2022-11-29 PROCEDURE — 85014 HEMATOCRIT: CPT | Performed by: INTERNAL MEDICINE

## 2022-11-29 PROCEDURE — 36415 COLL VENOUS BLD VENIPUNCTURE: CPT

## 2022-11-29 PROCEDURE — 86850 RBC ANTIBODY SCREEN: CPT | Performed by: PHYSICIAN ASSISTANT

## 2022-11-29 PROCEDURE — P9037 PLATE PHERES LEUKOREDU IRRAD: HCPCS | Performed by: PHYSICIAN ASSISTANT

## 2022-11-29 PROCEDURE — 250N000011 HC RX IP 250 OP 636: Performed by: PHYSICIAN ASSISTANT

## 2022-11-29 PROCEDURE — 85049 AUTOMATED PLATELET COUNT: CPT | Performed by: INTERNAL MEDICINE

## 2022-11-29 PROCEDURE — 36430 TRANSFUSION BLD/BLD COMPNT: CPT

## 2022-11-29 RX ORDER — HEPARIN SODIUM (PORCINE) LOCK FLUSH IV SOLN 100 UNIT/ML 100 UNIT/ML
5 SOLUTION INTRAVENOUS
Status: CANCELLED | OUTPATIENT
Start: 2022-11-29

## 2022-11-29 RX ORDER — HEPARIN SODIUM (PORCINE) LOCK FLUSH IV SOLN 100 UNIT/ML 100 UNIT/ML
5 SOLUTION INTRAVENOUS
Status: DISCONTINUED | OUTPATIENT
Start: 2022-11-29 | End: 2022-11-29 | Stop reason: HOSPADM

## 2022-11-29 RX ADMIN — Medication 5 ML: at 15:13

## 2022-11-29 NOTE — PROGRESS NOTES
Infusion Nursing Note:  Veda Marinelli presents today for Plts.    Patient seen by provider today: No   present during visit today: Not Applicable.    Note: N/A.    Intravenous Access:  Implanted Port.    Treatment Conditions:  Lab Results   Component Value Date    HGB 7.9 (L) 11/29/2022    WBC 10.3 11/29/2022    ANEU 8.5 (H) 11/29/2022    ANEUTAUTO 4.1 11/16/2022    PLT 6 (LL) 11/29/2022      Results reviewed, labs MET treatment parameters, ok to proceed with treatment.  Blood transfusion consent signed 07/18/22.    Post Infusion Assessment:  Patient tolerated infusion without incident.  Site patent and intact, free from redness, edema or discomfort.  No evidence of extravasations.  Access discontinued per protocol.     Discharge Plan:   Discharge instructions reviewed with: Patient.  Patient discharged in stable condition accompanied by: self.  Departure Mode: Ambulatory.      Leann Love RN

## 2022-12-02 ENCOUNTER — INFUSION THERAPY VISIT (OUTPATIENT)
Dept: INFUSION THERAPY | Facility: CLINIC | Age: 37
End: 2022-12-02
Attending: PHYSICIAN ASSISTANT
Payer: COMMERCIAL

## 2022-12-02 DIAGNOSIS — C92.00 ACUTE MYELOID LEUKEMIA NOT HAVING ACHIEVED REMISSION (H): ICD-10-CM

## 2022-12-02 DIAGNOSIS — C92.01 ACUTE MYELOID LEUKEMIA IN REMISSION (H): Primary | ICD-10-CM

## 2022-12-02 LAB
BASOPHILS # BLD MANUAL: 0 10E3/UL (ref 0–0.2)
BASOPHILS NFR BLD MANUAL: 0 %
EOSINOPHIL # BLD MANUAL: 0 10E3/UL (ref 0–0.7)
EOSINOPHIL NFR BLD MANUAL: 0 %
ERYTHROCYTE [DISTWIDTH] IN BLOOD BY AUTOMATED COUNT: 15.6 % (ref 10–15)
HCT VFR BLD AUTO: 23.5 % (ref 35–47)
HGB BLD-MCNC: 7.9 G/DL (ref 11.7–15.7)
HOLD SPECIMEN: NORMAL
HOLD SPECIMEN: NORMAL
LYMPHOCYTES # BLD MANUAL: 0.9 10E3/UL (ref 0.8–5.3)
LYMPHOCYTES NFR BLD MANUAL: 9 %
MCH RBC QN AUTO: 34.1 PG (ref 26.5–33)
MCHC RBC AUTO-ENTMCNC: 33.6 G/DL (ref 31.5–36.5)
MCV RBC AUTO: 101 FL (ref 78–100)
MONOCYTES # BLD MANUAL: 1.2 10E3/UL (ref 0–1.3)
MONOCYTES NFR BLD MANUAL: 12 %
MYELOCYTES # BLD MANUAL: 0.1 10E3/UL
MYELOCYTES NFR BLD MANUAL: 1 %
NEUTROPHILS # BLD MANUAL: 7.7 10E3/UL (ref 1.6–8.3)
NEUTROPHILS NFR BLD MANUAL: 78 %
PLAT MORPH BLD: ABNORMAL
PLATELET # BLD AUTO: 51 10E3/UL (ref 150–450)
RBC # BLD AUTO: 2.32 10E6/UL (ref 3.8–5.2)
RBC MORPH BLD: ABNORMAL
WBC # BLD AUTO: 9.9 10E3/UL (ref 4–11)

## 2022-12-02 PROCEDURE — 36591 DRAW BLOOD OFF VENOUS DEVICE: CPT

## 2022-12-02 PROCEDURE — 85027 COMPLETE CBC AUTOMATED: CPT | Performed by: INTERNAL MEDICINE

## 2022-12-02 PROCEDURE — 250N000011 HC RX IP 250 OP 636: Performed by: PHYSICIAN ASSISTANT

## 2022-12-02 PROCEDURE — 85007 BL SMEAR W/DIFF WBC COUNT: CPT | Performed by: INTERNAL MEDICINE

## 2022-12-02 RX ORDER — HEPARIN SODIUM (PORCINE) LOCK FLUSH IV SOLN 100 UNIT/ML 100 UNIT/ML
5 SOLUTION INTRAVENOUS ONCE
Status: COMPLETED | OUTPATIENT
Start: 2022-12-02 | End: 2022-12-02

## 2022-12-02 RX ADMIN — Medication 5 ML: at 10:14

## 2022-12-02 NOTE — PROGRESS NOTES
nfusion Nursing Note:  Veda Marinelli presents today for Possible transfusion.    Patient seen by provider today: No   present during visit today: Not Applicable.    Note: Denies any new medical concerns.    Intravenous Access:  Implanted Port.    Treatment Conditions:  Lab Results   Component Value Date    HGB 7.9 (L) 12/02/2022    WBC 9.9 12/02/2022    ANEU 8.5 (H) 11/29/2022    ANEUTAUTO 4.1 11/16/2022    PLT 51 (L) 12/02/2022      Results reviewed, labs did NOT meet treatment parameters: Hgb 7.9 and plt 51.    Post Infusion Assessment:  Blood return noted.  Site patent and intact, free from redness, edema or discomfort.  No evidence of extravasations.  Access discontinued per protocol.     Discharge Plan:   Discharge instructions reviewed with: Patient.  Patient and/or family verbalized understanding of discharge instructions and all questions answered.  AVS to patient via [employer].  Patient will return 12/6/2022 for next appointment.   Patient discharged in stable condition accompanied by: self.  Departure Mode: Ambulatory.    Maribel Dominguez RN

## 2022-12-06 ENCOUNTER — INFUSION THERAPY VISIT (OUTPATIENT)
Dept: INFUSION THERAPY | Facility: CLINIC | Age: 37
End: 2022-12-06
Attending: PHYSICIAN ASSISTANT
Payer: COMMERCIAL

## 2022-12-06 DIAGNOSIS — C92.00 ACUTE MYELOID LEUKEMIA NOT HAVING ACHIEVED REMISSION (H): ICD-10-CM

## 2022-12-06 DIAGNOSIS — C95.01 ACUTE LEUKEMIA IN REMISSION (H): Primary | ICD-10-CM

## 2022-12-06 LAB
BASOPHILS # BLD MANUAL: 0 10E3/UL (ref 0–0.2)
BASOPHILS NFR BLD MANUAL: 0 %
EOSINOPHIL # BLD MANUAL: 0 10E3/UL (ref 0–0.7)
EOSINOPHIL NFR BLD MANUAL: 0 %
ERYTHROCYTE [DISTWIDTH] IN BLOOD BY AUTOMATED COUNT: 17.2 % (ref 10–15)
HCT VFR BLD AUTO: 23.3 % (ref 35–47)
HGB BLD-MCNC: 7.8 G/DL (ref 11.7–15.7)
HOLD SPECIMEN: NORMAL
LYMPHOCYTES # BLD MANUAL: 0.6 10E3/UL (ref 0.8–5.3)
LYMPHOCYTES NFR BLD MANUAL: 10 %
MCH RBC QN AUTO: 35 PG (ref 26.5–33)
MCHC RBC AUTO-ENTMCNC: 33.5 G/DL (ref 31.5–36.5)
MCV RBC AUTO: 105 FL (ref 78–100)
MONOCYTES # BLD MANUAL: 1 10E3/UL (ref 0–1.3)
MONOCYTES NFR BLD MANUAL: 17 %
NEUTROPHILS # BLD MANUAL: 4.3 10E3/UL (ref 1.6–8.3)
NEUTROPHILS NFR BLD MANUAL: 73 %
NRBC # BLD AUTO: 0.1 10E3/UL
NRBC BLD MANUAL-RTO: 2 %
PLAT MORPH BLD: ABNORMAL
PLATELET # BLD AUTO: 140 10E3/UL (ref 150–450)
RBC # BLD AUTO: 2.23 10E6/UL (ref 3.8–5.2)
RBC MORPH BLD: ABNORMAL
WBC # BLD AUTO: 5.9 10E3/UL (ref 4–11)

## 2022-12-06 PROCEDURE — 36591 DRAW BLOOD OFF VENOUS DEVICE: CPT

## 2022-12-06 PROCEDURE — 85007 BL SMEAR W/DIFF WBC COUNT: CPT | Performed by: INTERNAL MEDICINE

## 2022-12-06 PROCEDURE — 250N000011 HC RX IP 250 OP 636

## 2022-12-06 PROCEDURE — 85027 COMPLETE CBC AUTOMATED: CPT | Performed by: INTERNAL MEDICINE

## 2022-12-06 RX ORDER — HEPARIN SODIUM (PORCINE) LOCK FLUSH IV SOLN 100 UNIT/ML 100 UNIT/ML
SOLUTION INTRAVENOUS
Status: COMPLETED
Start: 2022-12-06 | End: 2022-12-06

## 2022-12-06 RX ORDER — HEPARIN SODIUM (PORCINE) LOCK FLUSH IV SOLN 100 UNIT/ML 100 UNIT/ML
5 SOLUTION INTRAVENOUS
Status: CANCELLED | OUTPATIENT
Start: 2022-12-06

## 2022-12-06 RX ORDER — HEPARIN SODIUM (PORCINE) LOCK FLUSH IV SOLN 100 UNIT/ML 100 UNIT/ML
5 SOLUTION INTRAVENOUS
Status: DISCONTINUED | OUTPATIENT
Start: 2022-12-06 | End: 2022-12-06 | Stop reason: HOSPADM

## 2022-12-06 RX ADMIN — Medication 5 ML: at 10:16

## 2022-12-06 RX ADMIN — HEPARIN SODIUM (PORCINE) LOCK FLUSH IV SOLN 100 UNIT/ML 5 ML: 100 SOLUTION at 10:16

## 2022-12-06 NOTE — PROGRESS NOTES
Infusion Nursing Note:  Veda Marinelli presents today for Possible transfusion.    Patient seen by provider today: No   present during visit today: Not Applicable.    Note: N/A.    Intravenous Access:  Implanted Port.    Treatment Conditions:  Lab Results   Component Value Date    HGB 7.8 (L) 12/06/2022    WBC 5.9 12/06/2022    ANEU 4.3 12/06/2022    ANEUTAUTO 4.1 11/16/2022     (L) 12/06/2022      Results reviewed, labs did NOT meet treatment parameters: Hgb 7.8 and plt 140.    Post Infusion Assessment:  Blood return noted.  Site patent and intact, free from redness, edema or discomfort.  No evidence of extravasations.  Access discontinued per protocol.     Discharge Plan:   Discharge instructions reviewed with: Patient.  Patient and/or family verbalized understanding of discharge instructions and all questions answered.  AVS to patient via [employer].  Patient will return 12/9/2022 for next appointment.   Patient discharged in stable condition accompanied by: self.  Departure Mode: Ambulatory.    Maribel Brown RN

## 2022-12-19 DIAGNOSIS — C92.01 ACUTE MYELOID LEUKEMIA IN REMISSION (H): Primary | ICD-10-CM

## 2022-12-28 ENCOUNTER — OFFICE VISIT (OUTPATIENT)
Dept: ONCOLOGY | Facility: CLINIC | Age: 37
End: 2022-12-28
Attending: INTERNAL MEDICINE
Payer: COMMERCIAL

## 2022-12-28 ENCOUNTER — MYC MEDICAL ADVICE (OUTPATIENT)
Dept: ONCOLOGY | Facility: CLINIC | Age: 37
End: 2022-12-28

## 2022-12-28 ENCOUNTER — APPOINTMENT (OUTPATIENT)
Dept: LAB | Facility: CLINIC | Age: 37
End: 2022-12-28
Attending: INTERNAL MEDICINE
Payer: COMMERCIAL

## 2022-12-28 VITALS
OXYGEN SATURATION: 98 % | TEMPERATURE: 97.9 F | RESPIRATION RATE: 16 BRPM | HEART RATE: 82 BPM | SYSTOLIC BLOOD PRESSURE: 122 MMHG | DIASTOLIC BLOOD PRESSURE: 84 MMHG

## 2022-12-28 DIAGNOSIS — C92.01 ACUTE MYELOID LEUKEMIA IN REMISSION (H): ICD-10-CM

## 2022-12-28 DIAGNOSIS — C92.00 ACUTE MYELOID LEUKEMIA NOT HAVING ACHIEVED REMISSION (H): Primary | ICD-10-CM

## 2022-12-28 LAB
ALBUMIN SERPL BCG-MCNC: 4.3 G/DL (ref 3.5–5.2)
ALP SERPL-CCNC: 69 U/L (ref 35–104)
ALT SERPL W P-5'-P-CCNC: 50 U/L (ref 10–35)
ANION GAP SERPL CALCULATED.3IONS-SCNC: 10 MMOL/L (ref 7–15)
AST SERPL W P-5'-P-CCNC: 26 U/L (ref 10–35)
BASOPHILS # BLD AUTO: 0.1 10E3/UL (ref 0–0.2)
BASOPHILS NFR BLD AUTO: 1 %
BILIRUB SERPL-MCNC: 0.6 MG/DL
BUN SERPL-MCNC: 13.1 MG/DL (ref 6–20)
CALCIUM SERPL-MCNC: 9.8 MG/DL (ref 8.6–10)
CHLORIDE SERPL-SCNC: 103 MMOL/L (ref 98–107)
CREAT SERPL-MCNC: 0.58 MG/DL (ref 0.51–0.95)
DEPRECATED HCO3 PLAS-SCNC: 25 MMOL/L (ref 22–29)
EOSINOPHIL # BLD AUTO: 0.2 10E3/UL (ref 0–0.7)
EOSINOPHIL NFR BLD AUTO: 2 %
ERYTHROCYTE [DISTWIDTH] IN BLOOD BY AUTOMATED COUNT: 17.1 % (ref 10–15)
GFR SERPL CREATININE-BSD FRML MDRD: >90 ML/MIN/1.73M2
GLUCOSE SERPL-MCNC: 105 MG/DL (ref 70–99)
HCT VFR BLD AUTO: 36.2 % (ref 35–47)
HGB BLD-MCNC: 11.6 G/DL (ref 11.7–15.7)
IMM GRANULOCYTES # BLD: 0.1 10E3/UL
IMM GRANULOCYTES NFR BLD: 1 %
LDH SERPL L TO P-CCNC: 218 U/L (ref 0–250)
LYMPHOCYTES # BLD AUTO: 0.8 10E3/UL (ref 0.8–5.3)
LYMPHOCYTES NFR BLD AUTO: 11 %
MCH RBC QN AUTO: 34.5 PG (ref 26.5–33)
MCHC RBC AUTO-ENTMCNC: 32 G/DL (ref 31.5–36.5)
MCV RBC AUTO: 108 FL (ref 78–100)
MONOCYTES # BLD AUTO: 0.8 10E3/UL (ref 0–1.3)
MONOCYTES NFR BLD AUTO: 11 %
NEUTROPHILS # BLD AUTO: 5.5 10E3/UL (ref 1.6–8.3)
NEUTROPHILS NFR BLD AUTO: 74 %
NRBC # BLD AUTO: 0 10E3/UL
NRBC BLD AUTO-RTO: 0 /100
PLATELET # BLD AUTO: 245 10E3/UL (ref 150–450)
POTASSIUM SERPL-SCNC: 3.7 MMOL/L (ref 3.4–5.3)
PROT SERPL-MCNC: 7 G/DL (ref 6.4–8.3)
RBC # BLD AUTO: 3.36 10E6/UL (ref 3.8–5.2)
SODIUM SERPL-SCNC: 138 MMOL/L (ref 136–145)
WBC # BLD AUTO: 7.4 10E3/UL (ref 4–11)

## 2022-12-28 PROCEDURE — 38222 DX BONE MARROW BX & ASPIR: CPT | Performed by: PHYSICIAN ASSISTANT

## 2022-12-28 PROCEDURE — 85014 HEMATOCRIT: CPT | Performed by: NURSE PRACTITIONER

## 2022-12-28 PROCEDURE — 88185 FLOWCYTOMETRY/TC ADD-ON: CPT | Performed by: NURSE PRACTITIONER

## 2022-12-28 PROCEDURE — 250N000011 HC RX IP 250 OP 636: Performed by: PHYSICIAN ASSISTANT

## 2022-12-28 PROCEDURE — 88341 IMHCHEM/IMCYTCHM EA ADD ANTB: CPT | Mod: 26 | Performed by: STUDENT IN AN ORGANIZED HEALTH CARE EDUCATION/TRAINING PROGRAM

## 2022-12-28 PROCEDURE — 88275 CYTOGENETICS 100-300: CPT | Performed by: NURSE PRACTITIONER

## 2022-12-28 PROCEDURE — 88305 TISSUE EXAM BY PATHOLOGIST: CPT | Mod: TC | Performed by: NURSE PRACTITIONER

## 2022-12-28 PROCEDURE — 36591 DRAW BLOOD OFF VENOUS DEVICE: CPT | Performed by: NURSE PRACTITIONER

## 2022-12-28 PROCEDURE — 88342 IMHCHEM/IMCYTCHM 1ST ANTB: CPT | Mod: 26 | Performed by: STUDENT IN AN ORGANIZED HEALTH CARE EDUCATION/TRAINING PROGRAM

## 2022-12-28 PROCEDURE — 82374 ASSAY BLOOD CARBON DIOXIDE: CPT | Performed by: NURSE PRACTITIONER

## 2022-12-28 PROCEDURE — 85097 BONE MARROW INTERPRETATION: CPT | Mod: GC | Performed by: STUDENT IN AN ORGANIZED HEALTH CARE EDUCATION/TRAINING PROGRAM

## 2022-12-28 PROCEDURE — 88311 DECALCIFY TISSUE: CPT | Mod: 26 | Performed by: STUDENT IN AN ORGANIZED HEALTH CARE EDUCATION/TRAINING PROGRAM

## 2022-12-28 PROCEDURE — 96374 THER/PROPH/DIAG INJ IV PUSH: CPT | Mod: 59

## 2022-12-28 PROCEDURE — 83615 LACTATE (LD) (LDH) ENZYME: CPT | Performed by: NURSE PRACTITIONER

## 2022-12-28 PROCEDURE — 88305 TISSUE EXAM BY PATHOLOGIST: CPT | Mod: 26 | Performed by: STUDENT IN AN ORGANIZED HEALTH CARE EDUCATION/TRAINING PROGRAM

## 2022-12-28 PROCEDURE — 88311 DECALCIFY TISSUE: CPT | Mod: TC | Performed by: NURSE PRACTITIONER

## 2022-12-28 PROCEDURE — 88237 TISSUE CULTURE BONE MARROW: CPT | Performed by: NURSE PRACTITIONER

## 2022-12-28 PROCEDURE — 88189 FLOWCYTOMETRY/READ 16 & >: CPT | Mod: GC | Performed by: STUDENT IN AN ORGANIZED HEALTH CARE EDUCATION/TRAINING PROGRAM

## 2022-12-28 RX ADMIN — MIDAZOLAM 2 MG: 1 INJECTION INTRAMUSCULAR; INTRAVENOUS at 12:59

## 2022-12-28 ASSESSMENT — PAIN SCALES - GENERAL
PAINLEVEL: MILD PAIN (3)
PAINLEVEL: NO PAIN (0)

## 2022-12-28 NOTE — TELEPHONE ENCOUNTER
Oral Chemotherapy Monitoring Program  Lab Follow Up    Reviewed lab results from 12/28/22.    ORAL CHEMOTHERAPY 9/9/2022 9/30/2022 10/4/2022 10/25/2022 11/8/2022 11/29/2022 12/28/2022   Assessment Type Lab Monitoring Lab Monitoring;Monthly Follow up Lab Monitoring Lab Monitoring Left Voicemail Refill Lab Monitoring   Diagnosis Code Acute Myeloid Leukemia (AML) Acute Myeloid Leukemia (AML) Acute Myeloid Leukemia (AML) Acute Myeloid Leukemia (AML) Acute Myeloid Leukemia (AML) Acute Myeloid Leukemia (AML) Acute Myeloid Leukemia (AML)   Providers Dr. Mady Earl   [facility]   Drug Name Rydapt (midostaurin) Rydapt (midostaurin) Rydapt (midostaurin) Rydapt (midostaurin) Rydapt (midostaurin) Rydapt (midostaurin) Rydapt (midostaurin)   Dose 50 mg 50 mg 50 mg 50 mg 50 mg 50 mg 50 mg   Current Schedule Daily Daily Daily Daily Daily Daily Daily   Cycle Details Other Other Other Other Other Other Other   Start Date of Last Cycle - 9/19/2022 - - - - -   Planned next cycle start date 9/13/2022 - - - 11/14/2022 - -   Doses missed in last 2 weeks - 0 - - - - -   Adherence Assessment - Adherent - - - - -   Adverse Effects - No AE identified during assessment - - - - -   Any new drug interactions? - - - - - - -   Is the dose as ordered appropriate for the patient? Yes - - - - - -       Labs:  _  Result Component Current Result Ref Range   Sodium 138 (12/28/2022) 136 - 145 mmol/L     _  Result Component Current Result Ref Range   Potassium 3.7 (12/28/2022) 3.4 - 5.3 mmol/L     _  Result Component Current Result Ref Range   Calcium 9.8 (12/28/2022) 8.6 - 10.0 mg/dL     No results found for Mag within last 30 days.     No results found for Phos within last 30 days.     _  Result Component Current Result Ref Range   Albumin 4.3 (12/28/2022) 3.5 - 5.2 g/dL     _  Result Component Current Result Ref  Range   Urea Nitrogen 13.1 (12/28/2022) 6.0 - 20.0 mg/dL     _  Result Component Current Result Ref Range   Creatinine 0.58 (12/28/2022) 0.51 - 0.95 mg/dL     _  Result Component Current Result Ref Range   AST 26 (12/28/2022) 10 - 35 U/L     _  Result Component Current Result Ref Range   ALT 50 (H) (12/28/2022) 10 - 35 U/L     _  Result Component Current Result Ref Range   Bilirubin Total 0.6 (12/28/2022) <=1.2 mg/dL     _  Result Component Current Result Ref Range   WBC Count 7.4 (12/28/2022) 4.0 - 11.0 10e3/uL     _  Result Component Current Result Ref Range   Hemoglobin 11.6 (L) (12/28/2022) 11.7 - 15.7 g/dL     _  Result Component Current Result Ref Range   Platelet Count 245 (12/28/2022) 150 - 450 10e3/uL     _  Result Component Current Result Ref Range   Absolute Neutrophils 4.3 (12/6/2022) 1.6 - 8.3 10e3/uL     _  Result Component Current Result Ref Range   Absolute Neutrophils 5.5 (12/28/2022) 1.6 - 8.3 10e3/uL        Assessment & Plan:  No concerning abnormalities.  No changes with Rydapt needed at this time.    ScoreBig message sent to patient.    Dorcas Alonzo, PharmD, BCPS, BCOP  Oncology Clinical Pharmacy Specialist  [facility]  [phone]

## 2022-12-28 NOTE — NURSING NOTE
"Oncology Rooming Note    December 28, 2022 12:41 PM   Veda Marinelli is a 37 year old female who presents for:    Chief Complaint   Patient presents with     Bone Marrow Biopsy     BMBX r/t AML     Initial Vitals: /78   Pulse 82   Temp 97.9  F (36.6  C) (Oral)   Resp 16   SpO2 98%  Estimated body mass index is 39.75 kg/m  as calculated from the following:    Height as of 11/14/22: 1.702 m (5' 7\").    Weight as of 11/17/22: 115.1 kg (253 lb 12.8 oz). There is no height or weight on file to calculate BSA.  No Pain (0) Comment: Data Unavailable   No LMP recorded.  Allergies reviewed: Yes  Medications reviewed: Yes    Medications: Medication refills not needed today.  Pharmacy name entered into EPIC:    [id number] - [place], [place] - [address] [facility] - [place] [facility] [address] [facility] [place] [place] [address] [place] [address] [place] - [facility]  [facility] [address]    Clinical concerns: VSS.     Cat Bynum RN              "

## 2022-12-28 NOTE — NURSING NOTE
Chief Complaint   Patient presents with     Bone Marrow Biopsy     BMBX r/t AML     BMT Teaching Flowsheet   Teaching Topic: post biopsy instructions    Person(s) involved in teaching: Patient  Motivation Level  Asks Questions: Yes  Eager to Learn: Yes  Cooperative: Yes  Receptive (willing/able to accept information): Yes    Patient demonstrates understanding of the following:   - Reason for the appointment, diagnosis and treatment plan: Yes  - Knowledge of proper use of medications and conditions for which they are ordered (with special attention to potential side effects or drug interactions): Yes  - Which situations necessitate calling provider and whom to contact: Yes    Teaching concerns addressed: reviewed activity restrictions if received premeds, potential for bleeding and actions to take if develops any of the issues below    Proper use and care of (medical equipment, care aids, etc.) Yes  Pain management techniques: Yes  Patient instructed on hand hygiene: Yes  How and/when to access community resources: Yes    Infection Control:  Patient demonstrates understanding of the following:   Surgical procedure site care taught NA  Signs and symptoms of infection taught Yes  Wound care taught Yes  Central venous catheter care taught NA    Instructional Materials Used/Given: verbal, print out of post biopsy instructions.     Provider order received to administer Versed 2mg IVP as premed for BMBX. Procedural consent discussed and pt's signature obtained.  Allergies reviewed.  PT currently alert and oriented to plan of care.  Pt lying prone in stretcher.  Call light w/in reach.  Provider and  at bedside.    Patient supine for 30 minutes following biopsy. After 30 minutes, dressing clean, dry and intact. Vital signs stable. See DOC flow sheets for details. Left ambulatory with family member.    Chema Faith RN

## 2022-12-28 NOTE — NURSING NOTE
Chief Complaint   Patient presents with     Bone Marrow Biopsy     BMBX r/t AML     Port was flushed with heparin and de-accessed. Pt tolerated well.     Chema Faith RN

## 2022-12-28 NOTE — PROGRESS NOTES
BMT ONC Adult Bone Marrow Biopsy Procedure Note  December 28, 2022  There were no vitals taken for this visit.       DIAGNOSIS: AML     PROCEDURE: Unilateral Bone Marrow Biopsy    LOCATION: [facility] 2nd Floor    Patient s identification was positively verified by verbal identification and invasive procedure safety checklist was completed. Informed consent was obtained. Following the administration of Midazolam as pre-medication, patient was placed in the prone position and prepped and draped in a sterile manner. Approximately 20 cc of 1% Lidocaine was used over the left posterior iliac spine. Following this a 3 mm incision was made. Trephine bone marrow core(s) was (were) obtained from the [facility]. Bone marrow aspirates were obtained from the [facility]. Aspirates were sent for morphology, immunophenotyping, cytogenetics, molecular diagnostics and research studies. A total of approximately 30 ml of marrow was aspirated. Following this procedure a sterile dressing was applied to the bone marrow biopsy site(s). The patient was placed in the supine position to maintain pressure on the biopsy site. Post-procedure wound care instructions were given.     Complications: NO.     Note: Long trephinate and spinal needle were used.     Pre-procedural pain: 0 out of 10 on the numeric pain rating scale.     Procedural pain: 0 out of 10 on the numeric pain rating scale.     Post-procedural pain assessment: 0 out of 10 on the numeric pain rating scale.     Interventions: NO    Length of procedure:21 minutes to 45 minutes    Procedure performed by: Elli Combs PA-C. Assisted by Lamar Davenport CNP.

## 2022-12-29 ENCOUNTER — TELEPHONE (OUTPATIENT)
Dept: ONCOLOGY | Facility: CLINIC | Age: 37
End: 2022-12-29

## 2022-12-29 LAB
INTERPRETATION: NORMAL
PATH REPORT.COMMENTS IMP SPEC: NORMAL
PATH REPORT.FINAL DX SPEC: NORMAL
PATH REPORT.MICROSCOPIC SPEC OTHER STN: NORMAL
PATH REPORT.RELEVANT HX SPEC: NORMAL

## 2022-12-29 NOTE — TELEPHONE ENCOUNTER
Randi has been received and is in process.    Thank you,    Farida Pichardo  Oncology Pharmacy Liaison ANISA [email or web address]  Phone: [phone]  Fax: [phone]

## 2022-12-29 NOTE — TELEPHONE ENCOUNTER
Oral Chemotherapy Monitoring Program    Subjective/Objective:  Veda Marinelli is a 37 year old female contacted by phone for a follow-up visit for oral chemotherapy. Spoke with Minerva briefly this afternoon, and she informed me that she has not resumed taking the Rydapt (midostaurin) yet until follow up provider appointment with Dr. Earl. Last cycle was in November and reported being adherent, no concerning side effects, and was not on any new medications at the time then.    ORAL CHEMOTHERAPY 9/30/2022 10/4/2022 10/25/2022 11/8/2022 11/29/2022 12/28/2022 12/29/2022   Assessment Type Lab Monitoring;Monthly Follow up Lab Monitoring Lab Monitoring Left Voicemail Refill Lab Monitoring Monthly Follow up   Diagnosis Code Acute Myeloid Leukemia (AML) Acute Myeloid Leukemia (AML) Acute Myeloid Leukemia (AML) Acute Myeloid Leukemia (AML) Acute Myeloid Leukemia (AML) Acute Myeloid Leukemia (AML) Acute Myeloid Leukemia (AML)   Providers Dr. Mady Earl   [facility]   Drug Name Rydapt (midostaurin) Rydapt (midostaurin) Rydapt (midostaurin) Rydapt (midostaurin) Rydapt (midostaurin) Rydapt (midostaurin) Rydapt (midostaurin)   Dose 50 mg 50 mg 50 mg 50 mg 50 mg 50 mg 50 mg   Current Schedule Daily Daily Daily Daily Daily Daily Daily   Cycle Details Other Other Other Other Other Other Other   Start Date of Last Cycle 9/19/2022 - - - - - -   Planned next cycle start date - - - 11/14/2022 - - -   Doses missed in last 2 weeks 0 - - - - - -   Adherence Assessment Adherent - - - - - -   Adverse Effects No AE identified during assessment - - - - - -   Any new drug interactions? - - - - - - -   Is the dose as ordered appropriate for the patient? - - - - - - -       Last PHQ-2 Score on record: No flowsheet data found.    Vitals:  BP:   BP Readings from Last 1 Encounters:   12/28/22 122/84  "    Wt Readings from Last 1 Encounters:   11/17/22 115.1 kg (253 lb 12.8 oz)     Estimated body surface area is 2.33 meters squared as calculated from the following:    Height as of 11/14/22: 1.702 m (5' 7\").    Weight as of 11/17/22: 115.1 kg (253 lb 12.8 oz).    Labs:  _  Result Component Current Result Ref Range   Sodium 138 (12/28/2022) 136 - 145 mmol/L     _  Result Component Current Result Ref Range   Potassium 3.7 (12/28/2022) 3.4 - 5.3 mmol/L     _  Result Component Current Result Ref Range   Calcium 9.8 (12/28/2022) 8.6 - 10.0 mg/dL     _  Result Component Current Result Ref Range   Albumin 4.3 (12/28/2022) 3.5 - 5.2 g/dL     _  Result Component Current Result Ref Range   Urea Nitrogen 13.1 (12/28/2022) 6.0 - 20.0 mg/dL     _  Result Component Current Result Ref Range   Creatinine 0.58 (12/28/2022) 0.51 - 0.95 mg/dL     _  Result Component Current Result Ref Range   AST 26 (12/28/2022) 10 - 35 U/L     _  Result Component Current Result Ref Range   ALT 50 (H) (12/28/2022) 10 - 35 U/L     _  Result Component Current Result Ref Range   Bilirubin Total 0.6 (12/28/2022) <=1.2 mg/dL     _  Result Component Current Result Ref Range   WBC Count 7.4 (12/28/2022) 4.0 - 11.0 10e3/uL     _  Result Component Current Result Ref Range   Hemoglobin 11.6 (L) (12/28/2022) 11.7 - 15.7 g/dL     _  Result Component Current Result Ref Range   Platelet Count 245 (12/28/2022) 150 - 450 10e3/uL     _  Result Component Current Result Ref Range   Absolute Neutrophils 4.3 (12/6/2022) 1.6 - 8.3 10e3/uL     _  Result Component Current Result Ref Range   Absolute Neutrophils 5.5 (12/28/2022) 1.6 - 8.3 10e3/uL      Assessment/Plan:  Since patient is not currently taking the Rydapt (midostaurin), no assessments were made in our call. Told patient about upcoming appointment time and told her we will reconnect sometime after her meeting with Dr. Earl.    Follow-Up:  Provider appt w/Dr. Earl on 1/10 @ 12:15PM    Refill " Due:  N/A    George Galvin  Pharmacy Intern  Oral Chemotherapy Monitoring Program  [facility]  [phone]

## 2023-01-02 LAB
PATH REPORT.COMMENTS IMP SPEC: NORMAL
PATH REPORT.COMMENTS IMP SPEC: NORMAL
PATH REPORT.FINAL DX SPEC: NORMAL
PATH REPORT.GROSS SPEC: NORMAL
PATH REPORT.MICROSCOPIC SPEC OTHER STN: NORMAL
PATH REPORT.MICROSCOPIC SPEC OTHER STN: NORMAL
PATH REPORT.RELEVANT HX SPEC: NORMAL

## 2023-01-05 LAB
CULTURE HARVEST COMPLETE DATE: NORMAL
INTERPRETATION: NORMAL

## 2023-01-09 NOTE — TELEPHONE ENCOUNTER
Free Drug Application Approved  Effective Dates: 01/01/2023-12/31/2023  Patient notified: yes   Additional Information: [facility]            Thank you,    Farida Pichardo  Oncology Pharmacy Liaison ANISA [email or web address]  Phone: [phone]  Fax: [phone]

## 2023-01-09 NOTE — PROGRESS NOTES
"Minerva is a 37 year old who is being evaluated via a billable video visit.      How would you like to obtain your AVS? MyChart  If the video visit is dropped, the invitation should be resent by: Send to e-mail at: [email or web address]  Will anyone else be joining your video visit? No        Video-Visit Details    Type of service:  Video Visit     Originating Location (pt. Location): Home    Distant Location (provider location):  Off-site  Platform used for Video Visit: Silvina Newsome        REASON FOR VISIT:  Management of acute myeloid leukemia (AML)    HISTORY OF PRESENT ILLNESS:  Minerva Marinelli is a 37 year old with acute myeloid leukemia (AML).  To summarize her course, she was noted to have low WBC count on routine labs in 03/2022.  Follow up labs in 05/2022 showed further decrease in WBC as well as anemia and thrombocytopenia.  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  She has met with the BMT Team .  She was treated with cytarabine and daunorubicin \"7+3\" induction chemotherapy with midostaurin on Day 8-21.  Bone marrow biopsy around Day 21 was hypocellular but consistent with recovering bone marrow without definite AML.  Her course was complicated by recurrent C diff and upper extremity DVT.  She had prompt blood cell count recovery and was discharge with ongoing outpatient follow-up.  Post-treatment bone marrow biopsy showed 70-80% cellular marrow with slightly increased blasts that were favored to represent robust regnerating marrow with molecular studies and NPM1 MRD PCR testing negative consistent with MRD-negative complete remission.  She completed 4 cycles of high-dose cytarabine (HiDAC) with midostaurin ending in 12/2022.  She recently had a post-consolidation bone marrow biopsy that showed 70-80% cellular bone marrow with no " evidence of AML by morphology or flow cytometry.  Visit to review the results and discuss ongoing management of AML.      She is with her mom Melonie and dad Rosalee.  Energy level is good.  No fevers, night sweats, or unintentional weight loss.  No headache or focal weakness or sensory changes.  No dyspnea, cough, or chest pain.  Normal bowel function.  No urinary complaints.  No bleeding or unusual bruising.  Has been working and feeling good.    KEY PAST MEDICAL HISTORY:  History of COVID-19, recurrent C diff, hypothyroidism    MEDICATIONS:  Current Outpatient Medications   Medication     acyclovir (ZOVIRAX) 400 MG tablet     Bacillus Coagulans-Inulin (PROBIOTIC) 1-250 BILLION-MG CAPS     dexamethasone (DECADRON) 4 MG tablet     fluconazole (DIFLUCAN) 200 MG tablet     levofloxacin (LEVAQUIN) 250 MG tablet     levothyroxine (SYNTHROID/LEVOTHROID) 75 MCG tablet     magnesium 250 MG tablet     midostaurin (RYDAPT) 25 MG capsule     midostaurin (RYDAPT) 25 MG capsule     Multiple Vitamins-Minerals (WOMENS MULTIVITAMIN) TABS     prednisoLONE acetate (PRED FORTE) 1 % ophthalmic suspension     Quercetin 50 MG TABS     vancomycin (VANCOCIN) 125 MG capsule     vitamin C (ASCORBIC ACID) 1000 MG TABS     vitamin D3 (CHOLECALCIFEROL) 50 mcg (2000 units) tablet     zinc gluconate 50 MG tablet     No current facility-administered medications for this visit.     SOCIAL HISTORY:  Working as RN virtually primary care    PHYSICAL EXAMINATION:  There were no vitals taken for this visit.  Wt Readings from Last 5 Encounters:   11/17/22 115.1 kg (253 lb 12.8 oz)   11/14/22 116 kg (255 lb 12.8 oz)   10/12/22 115.4 kg (254 lb 8 oz)   10/10/22 115.2 kg (254 lb)   09/15/22 113.1 kg (249 lb 4.8 oz)     General: Well appearing.  HEENT: Sclerae anicteric.  Lungs: Breathing comfortably. No cough.  MSK: Grossly normal movement.  Neuro: Grossly non-focal.  Skin/access: Normal skin tone.  Psych: Alert and oriented. No distress.  Performance status:  ECOG 0    LABORATORY DATA: Reviewed by me  Recent Labs   Lab Test 12/28/22  1243 12/06/22  0953 12/02/22  0957 11/29/22  0950 11/17/22  0349 11/16/22  0718 11/15/22  0545 07/18/22  0942 07/15/22  0659 07/14/22  0813 07/13/22  0555 06/16/22  1724 06/16/22  1339 06/16/22  1102 06/14/22  0752   WBC 7.4 5.9 9.9 10.3   < > 4.3 4.6   < > 11.4* 6.4 3.1*   < > 41.0*   < > 25.3*   HGB 11.6* 7.8* 7.9* 7.9*   < > 10.6* 11.4*   < > 8.3* 8.1* 8.0*   < > 9.5*   < > 10.1*    140* 51* 6*   < > 228 243   < > 388 253 145*   < > 72*   < > 90*   ANEU  --  4.3 7.7 8.5*   < >  --   --    < > 5.0 2.1 0.7*   < > 0.8*   < > 0.8*   ANEUTAUTO 5.5  --   --   --   --  4.1 4.2   < >  --   --   --    < >  --   --   --    ABLA  --   --   --   --   --   --   --   --  0.5* 0.3* 0.1*   < > 37.3*  --  20.5*   ALYM  --  0.6* 0.9 0.3*   < >  --   --    < > 1.1 1.9 1.3   < > 2.5   < > 3.3   ALYMPAUTO 0.8  --   --   --   --  0.1* 0.3*   < >  --   --   --    < >  --   --   --    RETICABSCT  --   --   --   --   --   --   --   --   --   --   --   --  0.063  --  0.080    < > = values in this interval not displayed.     Recent Labs   Lab Test 12/28/22  1243 11/25/22  0950 11/17/22  0349 11/16/22  0718 11/15/22  0545 11/14/22  1333 11/14/22  1026 10/13/22  0636 10/12/22  0536 10/11/22  0532    138 138 141 138 140 142   < > 137 140   POTASSIUM 3.7 4.1 4.3 4.2 3.9 3.8 3.9   < > 3.8 3.7   CHLORIDE 103 103 109* 109* 107 108* 109*   < > 106 107   CO2 25 26 19* 19* 18* 20* 22   < > 21* 17*   BUN 13.1 10.5 12.8 13.9 11.8 11.8 12.9   < > 12.3 12.8   CR 0.58 0.57 0.49* 0.52 0.57 0.58 0.61   < > 0.57 0.58   MICHAEL 9.8 9.6 8.9 8.8 9.4 9.1 9.6   < > 9.0 9.4   MAG  --   --   --   --   --  2.0  --   --  2.2 2.0   PHOS  --   --   --   --   --  3.3  --   --  3.4 3.1   URIC  --   --  3.2 4.6 6.2* 4.8  --    < > 4.4 6.0*     --   --   --   --  226 231  --   --   --     < > = values in this interval not displayed.     Recent Labs   Lab Test 12/28/22  124  11/25/22  0950 11/17/22  0349 11/16/22  0718 11/15/22  0545   AST 26 24 16 14 17   ALT 50* 49* 21 23 25   ALKPHOS 69 70 58 53 55   BILITOTAL 0.6 1.1 0.8 1.2 0.7   INR  --   --  1.08 1.09 1.15     12/28/2022 post-consolidation bone marrow biopsy that showed 70-80% cellular bone marrow with no evidence of AML by morphology or flow cytometry    IMPRESSION AND PLAN:  Minerva Marinelli is a 37 year old with acute myeloid leukemia (AML).    The post-consolidation bone marrow biopsy shows a complete remission.  Last NPM1 MRD PCR was negative consistent and MRD-negative remission.  At this point we will plan for a year of maintenance midostaurin treatment based on the RATIFY trial (N Engl J Med 2017;377:454-64) to minimize the risk of AML relapse.  We will monitor her disease status including periodic NPM1 MRD PCR and for treatment toxicity.      She has no active ID issues.  Her past issues with C diff resolved.  She is off prophylactic antimicrobial meds now that blood cell counts have recovered.  She has declined Evusheld and vaccines including COVID vaccination.    She completed anticoagulation for her past provoked upper extremity DVT.  She will continue to work with Maria Eugenia Villar NP for health maintenance and other medical issues.    We will arrange monitoring labs and a return visit in about 3 months.  I reminded her to contact us if questions, concerns, or new issues come up between visits.    Video visit start time: 12:41 PM  Video visit end time: 1:00 PM  Video visit duration: 19 minutes    Total of 35 minutes on patient visit, reviewing records, interpreting test results, placing orders, and documentation on the day of service.    Ortiz Earl MD, PhD  Attending Physician, [facility]   of Medicine, [facility]  Division of Hematology, Oncology, and Transplantation  [phone] [facility]

## 2023-01-10 ENCOUNTER — TELEPHONE (OUTPATIENT)
Dept: ONCOLOGY | Facility: CLINIC | Age: 38
End: 2023-01-10

## 2023-01-10 ENCOUNTER — VIRTUAL VISIT (OUTPATIENT)
Dept: ONCOLOGY | Facility: CLINIC | Age: 38
End: 2023-01-10
Attending: INTERNAL MEDICINE
Payer: COMMERCIAL

## 2023-01-10 DIAGNOSIS — C95.01 ACUTE LEUKEMIA IN REMISSION (H): ICD-10-CM

## 2023-01-10 DIAGNOSIS — C92.01 ACUTE MYELOID LEUKEMIA IN REMISSION (H): Primary | ICD-10-CM

## 2023-01-10 PROCEDURE — 99214 OFFICE O/P EST MOD 30 MIN: CPT | Mod: GT | Performed by: INTERNAL MEDICINE

## 2023-01-10 NOTE — LETTER
"    1/10/2023         RE: Veda Marinelli  [address] [facility] [address]        Dear Colleague,    Thank you for referring your patient, Veda Marinelli, to the [facility]. Please see a copy of my visit note below.    Minerva is a 37 year old who is being evaluated via a billable video visit.      How would you like to obtain your AVS? MyChart  If the video visit is dropped, the invitation should be resent by: Send to e-mail at: [email or web address]  Will anyone else be joining your video visit? No        Video-Visit Details    Type of service:  Video Visit     Originating Location (pt. Location): Home    Distant Location (provider location):  Off-site  Platform used for Video Visit: Silvina Newsome        REASON FOR VISIT:  Management of acute myeloid leukemia (AML)    HISTORY OF PRESENT ILLNESS:  Minerva Marinelli is a 37 year old with acute myeloid leukemia (AML).  To summarize her course, she was noted to have low WBC count on routine labs in 03/2022.  Follow up labs in 05/2022 showed further decrease in WBC as well as anemia and thrombocytopenia.  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  She has met with the BMT Team .  She was treated with cytarabine and daunorubicin \"7+3\" induction chemotherapy with midostaurin on Day 8-21.  Bone marrow biopsy around Day 21 was hypocellular but consistent with recovering bone marrow without definite AML.  Her course was complicated by recurrent C diff and upper extremity DVT.  She had prompt blood cell count recovery and was discharge with ongoing outpatient follow-up.  Post-treatment bone marrow biopsy showed 70-80% cellular marrow with slightly increased blasts that were favored to represent robust regnerating marrow with molecular studies and NPM1 MRD PCR " testing negative consistent with MRD-negative complete remission.  She completed 4 cycles of high-dose cytarabine (HiDAC) with midostaurin ending in 12/2022.  She recently had a post-consolidation bone marrow biopsy that showed 70-80% cellular bone marrow with no evidence of AML by morphology or flow cytometry.  Visit to review the results and discuss ongoing management of AML.      She is with her mom Melonie and dad Rosalee.  Energy level is good.  No fevers, night sweats, or unintentional weight loss.  No headache or focal weakness or sensory changes.  No dyspnea, cough, or chest pain.  Normal bowel function.  No urinary complaints.  No bleeding or unusual bruising.  Has been working and feeling good.    KEY PAST MEDICAL HISTORY:  History of COVID-19, recurrent C diff, hypothyroidism    MEDICATIONS:  Current Outpatient Medications   Medication     acyclovir (ZOVIRAX) 400 MG tablet     Bacillus Coagulans-Inulin (PROBIOTIC) 1-250 BILLION-MG CAPS     dexamethasone (DECADRON) 4 MG tablet     fluconazole (DIFLUCAN) 200 MG tablet     levofloxacin (LEVAQUIN) 250 MG tablet     levothyroxine (SYNTHROID/LEVOTHROID) 75 MCG tablet     magnesium 250 MG tablet     midostaurin (RYDAPT) 25 MG capsule     midostaurin (RYDAPT) 25 MG capsule     Multiple Vitamins-Minerals (WOMENS MULTIVITAMIN) TABS     prednisoLONE acetate (PRED FORTE) 1 % ophthalmic suspension     Quercetin 50 MG TABS     vancomycin (VANCOCIN) 125 MG capsule     vitamin C (ASCORBIC ACID) 1000 MG TABS     vitamin D3 (CHOLECALCIFEROL) 50 mcg (2000 units) tablet     zinc gluconate 50 MG tablet     No current facility-administered medications for this visit.     SOCIAL HISTORY:  Working as RN virtually primary care    PHYSICAL EXAMINATION:  There were no vitals taken for this visit.  Wt Readings from Last 5 Encounters:   11/17/22 115.1 kg (253 lb 12.8 oz)   11/14/22 116 kg (255 lb 12.8 oz)   10/12/22 115.4 kg (254 lb 8 oz)   10/10/22 115.2 kg (254 lb)   09/15/22 113.1 kg  (249 lb 4.8 oz)     General: Well appearing.  HEENT: Sclerae anicteric.  Lungs: Breathing comfortably. No cough.  MSK: Grossly normal movement.  Neuro: Grossly non-focal.  Skin/access: Normal skin tone.  Psych: Alert and oriented. No distress.  Performance status: ECOG 0    LABORATORY DATA: Reviewed by me  Recent Labs   Lab Test 12/28/22  1243 12/06/22  0953 12/02/22  0957 11/29/22  0950 11/17/22  0349 11/16/22  0718 11/15/22  0545 07/18/22  0942 07/15/22  0659 07/14/22  0813 07/13/22  0555 06/16/22  1724 06/16/22  1339 06/16/22  1102 06/14/22  0752   WBC 7.4 5.9 9.9 10.3   < > 4.3 4.6   < > 11.4* 6.4 3.1*   < > 41.0*   < > 25.3*   HGB 11.6* 7.8* 7.9* 7.9*   < > 10.6* 11.4*   < > 8.3* 8.1* 8.0*   < > 9.5*   < > 10.1*    140* 51* 6*   < > 228 243   < > 388 253 145*   < > 72*   < > 90*   ANEU  --  4.3 7.7 8.5*   < >  --   --    < > 5.0 2.1 0.7*   < > 0.8*   < > 0.8*   ANEUTAUTO 5.5  --   --   --   --  4.1 4.2   < >  --   --   --    < >  --   --   --    ABLA  --   --   --   --   --   --   --   --  0.5* 0.3* 0.1*   < > 37.3*  --  20.5*   ALYM  --  0.6* 0.9 0.3*   < >  --   --    < > 1.1 1.9 1.3   < > 2.5   < > 3.3   ALYMPAUTO 0.8  --   --   --   --  0.1* 0.3*   < >  --   --   --    < >  --   --   --    RETICABSCT  --   --   --   --   --   --   --   --   --   --   --   --  0.063  --  0.080    < > = values in this interval not displayed.     Recent Labs   Lab Test 12/28/22  1243 11/25/22  0950 11/17/22  0349 11/16/22  0718 11/15/22  0545 11/14/22  1333 11/14/22  1026 10/13/22  0636 10/12/22  0536 10/11/22  0532    138 138 141 138 140 142   < > 137 140   POTASSIUM 3.7 4.1 4.3 4.2 3.9 3.8 3.9   < > 3.8 3.7   CHLORIDE 103 103 109* 109* 107 108* 109*   < > 106 107   CO2 25 26 19* 19* 18* 20* 22   < > 21* 17*   BUN 13.1 10.5 12.8 13.9 11.8 11.8 12.9   < > 12.3 12.8   CR 0.58 0.57 0.49* 0.52 0.57 0.58 0.61   < > 0.57 0.58   MICHAEL 9.8 9.6 8.9 8.8 9.4 9.1 9.6   < > 9.0 9.4   MAG  --   --   --   --   --  2.0  --   --   2.2 2.0   PHOS  --   --   --   --   --  3.3  --   --  3.4 3.1   URIC  --   --  3.2 4.6 6.2* 4.8  --    < > 4.4 6.0*     --   --   --   --  226 231  --   --   --     < > = values in this interval not displayed.     Recent Labs   Lab Test 12/28/22  1243 11/25/22  0950 11/17/22  0349 11/16/22  0718 11/15/22  0545   AST 26 24 16 14 17   ALT 50* 49* 21 23 25   ALKPHOS 69 70 58 53 55   BILITOTAL 0.6 1.1 0.8 1.2 0.7   INR  --   --  1.08 1.09 1.15     12/28/2022 post-consolidation bone marrow biopsy that showed 70-80% cellular bone marrow with no evidence of AML by morphology or flow cytometry    IMPRESSION AND PLAN:  Minerva Marinelli is a 37 year old with acute myeloid leukemia (AML).    The post-consolidation bone marrow biopsy shows a complete remission.  Last NPM1 MRD PCR was negative consistent and MRD-negative remission.  At this point we will plan for a year of maintenance midostaurin treatment based on the RATIFY trial (N Engl J Med 2017;377:454-64) to minimize the risk of AML relapse.  We will monitor her disease status including periodic NPM1 MRD PCR and for treatment toxicity.      She has no active ID issues.  Her past issues with C diff resolved.  She is off prophylactic antimicrobial meds now that blood cell counts have recovered.  She has declined Evusheld and vaccines including COVID vaccination.    She completed anticoagulation for her past provoked upper extremity DVT.  She will continue to work with Maria Eugenia Villar NP for health maintenance and other medical issues.    We will arrange monitoring labs and a return visit in about 3 months.  I reminded her to contact us if questions, concerns, or new issues come up between visits.    Video visit start time: 12:41 PM  Video visit end time: 1:00 PM  Video visit duration: 19 minutes    Total of 35 minutes on patient visit, reviewing records, interpreting test results, placing orders, and documentation on the day of service.    Ortiz Earl MD,  PhD  Attending Physician, [facility]   of Medicine, [facility]  Division of Hematology, Oncology, and Transplantation  [phone] clinic

## 2023-01-10 NOTE — NURSING NOTE
Patient confirms medications and allergies are accurate via patients echeck in completion, and or denies any changes since last reviewed/verified.     Patient declined individual allergy and medication review by support staff because patient completed online checkin.     Macy Newsome, Virtual Facilitator

## 2023-01-10 NOTE — ORAL ONC MGMT
"Oral Chemotherapy Monitoring Program    Lab Monitoring Plan  CMP/CBC monthly    Subjective/Objective:  Veda Marinelli is a 37 year old female contacted by phone for an initial visit for oral chemotherapy education regarding maintenance midostaurin.    ORAL CHEMOTHERAPY 10/4/2022 10/25/2022 11/8/2022 11/29/2022 12/28/2022 12/29/2022 1/10/2023   Assessment Type Lab Monitoring Lab Monitoring Left Voicemail Refill Lab Monitoring Monthly Follow up New Teach;Initial Work up   Diagnosis Code Acute Myeloid Leukemia (AML) Acute Myeloid Leukemia (AML) Acute Myeloid Leukemia (AML) Acute Myeloid Leukemia (AML) Acute Myeloid Leukemia (AML) Acute Myeloid Leukemia (AML) Acute Myeloid Leukemia (AML)   Providers Dr. Mady Earl   [facility]   Drug Name Rydapt (midostaurin) Rydapt (midostaurin) Rydapt (midostaurin) Rydapt (midostaurin) Rydapt (midostaurin) Rydapt (midostaurin) Rydapt (midostaurin)   Dose 50 mg 50 mg 50 mg 50 mg 50 mg 50 mg 50 mg   Current Schedule Daily Daily Daily Daily Daily Daily Daily   Cycle Details Other Other Other Other Other Other Continuous   Start Date of Last Cycle - - - - - - -   Planned next cycle start date - - 11/14/2022 - - - 1/10/2023   Doses missed in last 2 weeks - - - - - - -   Adherence Assessment - - - - - - -   Adverse Effects - - - - - - -   Any new drug interactions? - - - - - - -   Is the dose as ordered appropriate for the patient? - - - - - - -       Last PHQ-2 Score on record: No flowsheet data found.    Vitals:  BP:   BP Readings from Last 1 Encounters:   12/28/22 122/84     Wt Readings from Last 1 Encounters:   11/17/22 115.1 kg (253 lb 12.8 oz)     Estimated body surface area is 2.33 meters squared as calculated from the following:    Height as of 11/14/22: 1.702 m (5' 7\").    Weight as of 11/17/22: 115.1 kg (253 lb 12.8 " oz).    Labs:  _  Result Component Current Result Ref Range   Sodium 138 (12/28/2022) 136 - 145 mmol/L     _  Result Component Current Result Ref Range   Potassium 3.7 (12/28/2022) 3.4 - 5.3 mmol/L     _  Result Component Current Result Ref Range   Calcium 9.8 (12/28/2022) 8.6 - 10.0 mg/dL     No results found for Mag within last 30 days.     No results found for Phos within last 30 days.     _  Result Component Current Result Ref Range   Albumin 4.3 (12/28/2022) 3.5 - 5.2 g/dL     _  Result Component Current Result Ref Range   Urea Nitrogen 13.1 (12/28/2022) 6.0 - 20.0 mg/dL     _  Result Component Current Result Ref Range   Creatinine 0.58 (12/28/2022) 0.51 - 0.95 mg/dL     _  Result Component Current Result Ref Range   AST 26 (12/28/2022) 10 - 35 U/L     _  Result Component Current Result Ref Range   ALT 50 (H) (12/28/2022) 10 - 35 U/L     _  Result Component Current Result Ref Range   Bilirubin Total 0.6 (12/28/2022) <=1.2 mg/dL     _  Result Component Current Result Ref Range   WBC Count 7.4 (12/28/2022) 4.0 - 11.0 10e3/uL     _  Result Component Current Result Ref Range   Hemoglobin 11.6 (L) (12/28/2022) 11.7 - 15.7 g/dL     _  Result Component Current Result Ref Range   Platelet Count 245 (12/28/2022) 150 - 450 10e3/uL     No results found for ANC within last 30 days.     _  Result Component Current Result Ref Range   Absolute Neutrophils 5.5 (12/28/2022) 1.6 - 8.3 10e3/uL        Assessment:  Patient is appropriate to start therapy. She still has some on hand from consolidation, and plans to start this evening.    Plan:  Basic chemotherapy teaching was reviewed with the patient including indication, start date of therapy, dose, administration, adverse effects, missed doses, food and drug interactions, monitoring, side effect management, office contact information, and safe handling. Written materials were provided and all questions answered. Patient informed that midostaurin now will be taken continuously  (instead of days 8-21 like before).      Follow-Up:  Will contact her in 7-10 days for an assessment.     Sarah Jordan, PharmD, BCPS  Hematology/Oncology Clinical Pharmacist  Oral Chemotherapy Monitoring Program  [facility]  [phone]

## 2023-01-12 ENCOUNTER — PATIENT OUTREACH (OUTPATIENT)
Dept: ONCOLOGY | Facility: CLINIC | Age: 38
End: 2023-01-12

## 2023-01-12 NOTE — PROGRESS NOTES
[facility]: Cancer Care                                                                                        My Chart message sent to pt to introduce self and role as RNCC.  Clinic information provided.    EDUAR GarciaN, RN  RN Care Coordinator  [facility]

## 2023-01-13 ENCOUNTER — TELEPHONE (OUTPATIENT)
Dept: ONCOLOGY | Facility: CLINIC | Age: 38
End: 2023-01-13

## 2023-01-13 NOTE — TELEPHONE ENCOUNTER
"EKG orders faxed per [employer] message below:    \"Can someone please fax this pt's EKG orders from Dr. Earl to [facility] at [phone]?     Thanks!   Evelia \"      Malika Taveras CMA on 1/13/2023 at 3:33 PM      "

## 2023-01-13 NOTE — TELEPHONE ENCOUNTER
Wrong number given for EKG faxing, writer called number provided as fax (it was the phone number) and got correct number.  EKG orders faxed to [phone], transmission successful.    Malika Taveras CMA on 1/13/2023 at 4:03 PM

## 2023-01-17 ENCOUNTER — TELEPHONE (OUTPATIENT)
Dept: ONCOLOGY | Facility: CLINIC | Age: 38
End: 2023-01-17
Payer: COMMERCIAL

## 2023-01-17 NOTE — ORAL ONC MGMT
Oral Chemotherapy Monitoring Program    Subjective/Objective:  Veda Marinelli is a 37 year old female contacted by phone for a follow-up visit for oral chemotherapy.  Minerva said she resumed Rydapt 1/11/2023. She said she is planning to call her local clinic to see if they can draw labs from her port. She is also going to schedule for an EKG. She said things are going well with Rydapt, and denied any adverse effects or concerns at this time.    ORAL CHEMOTHERAPY 10/4/2022 10/25/2022 11/8/2022 11/29/2022 12/28/2022 12/29/2022 1/10/2023   Assessment Type Lab Monitoring Lab Monitoring Left Voicemail Refill Lab Monitoring Monthly Follow up New Teach;Initial Work up   Diagnosis Code Acute Myeloid Leukemia (AML) Acute Myeloid Leukemia (AML) Acute Myeloid Leukemia (AML) Acute Myeloid Leukemia (AML) Acute Myeloid Leukemia (AML) Acute Myeloid Leukemia (AML) Acute Myeloid Leukemia (AML)   Providers Dr. Mady Earl   [facility]   Drug Name Rydapt (midostaurin) Rydapt (midostaurin) Rydapt (midostaurin) Rydapt (midostaurin) Rydapt (midostaurin) Rydapt (midostaurin) Rydapt (midostaurin)   Dose 50 mg 50 mg 50 mg 50 mg 50 mg 50 mg 50 mg   Current Schedule Daily Daily Daily Daily Daily Daily Daily   Cycle Details Other Other Other Other Other Other Continuous   Start Date of Last Cycle - - - - - - -   Planned next cycle start date - - 11/14/2022 - - - 1/10/2023   Doses missed in last 2 weeks - - - - - - -   Adherence Assessment - - - - - - -   Adverse Effects - - - - - - -   Any new drug interactions? - - - - - - -   Is the dose as ordered appropriate for the patient? - - - - - - -       Last PHQ-2 Score on record: No flowsheet data found.    Vitals:  BP:   BP Readings from Last 1 Encounters:   12/28/22 122/84     Wt Readings from Last 1 Encounters:   11/17/22 115.1 kg (253 lb 12.8 oz)  "    Estimated body surface area is 2.33 meters squared as calculated from the following:    Height as of 11/14/22: 1.702 m (5' 7\").    Weight as of 11/17/22: 115.1 kg (253 lb 12.8 oz).    Labs:  _  Result Component Current Result Ref Range   Sodium 138 (12/28/2022) 136 - 145 mmol/L     _  Result Component Current Result Ref Range   Potassium 3.7 (12/28/2022) 3.4 - 5.3 mmol/L     _  Result Component Current Result Ref Range   Calcium 9.8 (12/28/2022) 8.6 - 10.0 mg/dL     No results found for Mag within last 30 days.     No results found for Phos within last 30 days.     _  Result Component Current Result Ref Range   Albumin 4.3 (12/28/2022) 3.5 - 5.2 g/dL     _  Result Component Current Result Ref Range   Urea Nitrogen 13.1 (12/28/2022) 6.0 - 20.0 mg/dL     _  Result Component Current Result Ref Range   Creatinine 0.58 (12/28/2022) 0.51 - 0.95 mg/dL     _  Result Component Current Result Ref Range   AST 26 (12/28/2022) 10 - 35 U/L     _  Result Component Current Result Ref Range   ALT 50 (H) (12/28/2022) 10 - 35 U/L     _  Result Component Current Result Ref Range   Bilirubin Total 0.6 (12/28/2022) <=1.2 mg/dL     _  Result Component Current Result Ref Range   WBC Count 7.4 (12/28/2022) 4.0 - 11.0 10e3/uL     _  Result Component Current Result Ref Range   Hemoglobin 11.6 (L) (12/28/2022) 11.7 - 15.7 g/dL     _  Result Component Current Result Ref Range   Platelet Count 245 (12/28/2022) 150 - 450 10e3/uL     No results found for ANC within last 30 days.     _  Result Component Current Result Ref Range   Absolute Neutrophils 5.5 (12/28/2022) 1.6 - 8.3 10e3/uL          Assessment/Plan:  Continue Rydapt. Schedule EKG as soon as possible, and monthly labs for around 2/8/2023. Minerva expressed understanding and agreement with this plan. She said she would get back to us once she has determined when and where she will get labs and EKG.      Srinivasa Murillo PharmD  [facility]  [phone]  January 17, 2023    "

## 2023-01-23 DIAGNOSIS — E03.9 HYPOTHYROIDISM, UNSPECIFIED TYPE: Primary | ICD-10-CM

## 2023-01-24 ENCOUNTER — HOSPITAL ENCOUNTER (OUTPATIENT)
Dept: CARDIOLOGY | Facility: CLINIC | Age: 38
Discharge: HOME | End: 2023-01-24
Attending: INTERNAL MEDICINE | Admitting: INTERNAL MEDICINE
Payer: COMMERCIAL

## 2023-01-24 ENCOUNTER — LAB (OUTPATIENT)
Dept: INFUSION THERAPY | Facility: CLINIC | Age: 38
End: 2023-01-24
Attending: INTERNAL MEDICINE
Payer: COMMERCIAL

## 2023-01-24 DIAGNOSIS — C92.01 ACUTE MYELOID LEUKEMIA IN REMISSION (H): ICD-10-CM

## 2023-01-24 DIAGNOSIS — E03.9 HYPOTHYROIDISM, UNSPECIFIED TYPE: ICD-10-CM

## 2023-01-24 LAB
ALBUMIN SERPL BCG-MCNC: 4.3 G/DL (ref 3.5–5.2)
ALP SERPL-CCNC: 65 U/L (ref 35–104)
ALT SERPL W P-5'-P-CCNC: 24 U/L (ref 10–35)
ANION GAP SERPL CALCULATED.3IONS-SCNC: 10 MMOL/L (ref 7–15)
AST SERPL W P-5'-P-CCNC: 20 U/L (ref 10–35)
BASOPHILS # BLD AUTO: 0 10E3/UL (ref 0–0.2)
BASOPHILS NFR BLD AUTO: 0 %
BILIRUB SERPL-MCNC: 0.5 MG/DL
BUN SERPL-MCNC: 14.1 MG/DL (ref 6–20)
CALCIUM SERPL-MCNC: 9.7 MG/DL (ref 8.6–10)
CHLORIDE SERPL-SCNC: 105 MMOL/L (ref 98–107)
CREAT SERPL-MCNC: 0.57 MG/DL (ref 0.51–0.95)
DEPRECATED HCO3 PLAS-SCNC: 26 MMOL/L (ref 22–29)
EOSINOPHIL # BLD AUTO: 0.1 10E3/UL (ref 0–0.7)
EOSINOPHIL NFR BLD AUTO: 2 %
ERYTHROCYTE [DISTWIDTH] IN BLOOD BY AUTOMATED COUNT: 13.5 % (ref 10–15)
GFR SERPL CREATININE-BSD FRML MDRD: >90 ML/MIN/1.73M2
GLUCOSE SERPL-MCNC: 123 MG/DL (ref 70–99)
HCT VFR BLD AUTO: 39.8 % (ref 35–47)
HGB BLD-MCNC: 13.1 G/DL (ref 11.7–15.7)
IMM GRANULOCYTES # BLD: 0 10E3/UL
IMM GRANULOCYTES NFR BLD: 0 %
LDH SERPL L TO P-CCNC: 166 U/L (ref 0–250)
LYMPHOCYTES # BLD AUTO: 0.6 10E3/UL (ref 0.8–5.3)
LYMPHOCYTES NFR BLD AUTO: 12 %
Lab: NORMAL
MCH RBC QN AUTO: 33.8 PG (ref 26.5–33)
MCHC RBC AUTO-ENTMCNC: 32.9 G/DL (ref 31.5–36.5)
MCV RBC AUTO: 103 FL (ref 78–100)
MONOCYTES # BLD AUTO: 0.4 10E3/UL (ref 0–1.3)
MONOCYTES NFR BLD AUTO: 7 %
NEUTROPHILS # BLD AUTO: 4.1 10E3/UL (ref 1.6–8.3)
NEUTROPHILS NFR BLD AUTO: 79 %
NRBC # BLD AUTO: 0 10E3/UL
NRBC BLD AUTO-RTO: 0 /100
PERFORMING LABORATORY: NORMAL
PLATELET # BLD AUTO: 201 10E3/UL (ref 150–450)
POTASSIUM SERPL-SCNC: 4.1 MMOL/L (ref 3.4–5.3)
PROT SERPL-MCNC: 7.2 G/DL (ref 6.4–8.3)
RBC # BLD AUTO: 3.88 10E6/UL (ref 3.8–5.2)
SODIUM SERPL-SCNC: 141 MMOL/L (ref 136–145)
SPECIMEN STATUS: NORMAL
TEST NAME: NORMAL
TSH SERPL DL<=0.005 MIU/L-ACNC: 1.48 UIU/ML (ref 0.3–4.2)
WBC # BLD AUTO: 5.2 10E3/UL (ref 4–11)

## 2023-01-24 PROCEDURE — 83615 LACTATE (LD) (LDH) ENZYME: CPT | Performed by: INTERNAL MEDICINE

## 2023-01-24 PROCEDURE — 84999 UNLISTED CHEMISTRY PROCEDURE: CPT | Performed by: INTERNAL MEDICINE

## 2023-01-24 PROCEDURE — 85025 COMPLETE CBC W/AUTO DIFF WBC: CPT | Performed by: INTERNAL MEDICINE

## 2023-01-24 PROCEDURE — 80053 COMPREHEN METABOLIC PANEL: CPT | Performed by: INTERNAL MEDICINE

## 2023-01-24 PROCEDURE — 84443 ASSAY THYROID STIM HORMONE: CPT | Performed by: INTERNAL MEDICINE

## 2023-01-24 PROCEDURE — 93005 ELECTROCARDIOGRAM TRACING: CPT | Performed by: CLINICAL EXERCISE PHYSIOLOGIST

## 2023-01-24 PROCEDURE — 81310 NPM1 GENE: CPT | Performed by: INTERNAL MEDICINE

## 2023-01-24 PROCEDURE — 250N000011 HC RX IP 250 OP 636: Performed by: INTERNAL MEDICINE

## 2023-01-24 PROCEDURE — 93010 ELECTROCARDIOGRAM REPORT: CPT | Performed by: INTERNAL MEDICINE

## 2023-01-24 PROCEDURE — 36591 DRAW BLOOD OFF VENOUS DEVICE: CPT

## 2023-01-24 RX ORDER — HEPARIN SODIUM (PORCINE) LOCK FLUSH IV SOLN 100 UNIT/ML 100 UNIT/ML
500 SOLUTION INTRAVENOUS ONCE
Status: COMPLETED | OUTPATIENT
Start: 2023-01-24 | End: 2023-01-24

## 2023-01-24 RX ADMIN — Medication 500 UNITS: at 11:51

## 2023-01-24 NOTE — PROGRESS NOTES
Infusion Nursing Note:  Veda Marinelli presents today for PAC labs.    Patient seen by provider today: No   present during visit today: Not Applicable.    Note: N/A.    Intravenous Access:  Implanted Port.    Treatment Conditions:  Not Applicable.    Post Infusion Assessment:  Blood return noted.  Site patent and intact, free from redness, edema or discomfort.  No evidence of extravasations.  Access discontinued per protocol.     Discharge Plan:   Patient discharged in stable condition accompanied by: self.  Departure Mode: Ambulatory.      Braden Lindo RN

## 2023-01-24 NOTE — ORAL ONC MGMT
Oral Chemotherapy Monitoring Program  Lab Follow Up    Reviewed lab results from 1/24/23.    Assessment & Plan:  CBC, CMP and EKG show no concerning abnormalities. Will send patient a [employer] message.     Follow-Up:  Will need repeat CMP/CBC in one month    Krystina JarvisD, [facility]  Oral Chemotherapy Monitoring Program  [facility]  [phone]  January 24, 2023      ORAL CHEMOTHERAPY 11/8/2022 11/29/2022 12/28/2022 12/29/2022 1/10/2023 1/17/2023 1/24/2023   Assessment Type Left Voicemail Refill Lab Monitoring Monthly Follow up New Teach;Initial Work up Initial Follow up Lab Monitoring;Other   Diagnosis Code Acute Myeloid Leukemia (AML) Acute Myeloid Leukemia (AML) Acute Myeloid Leukemia (AML) Acute Myeloid Leukemia (AML) Acute Myeloid Leukemia (AML) Acute Myeloid Leukemia (AML) Acute Myeloid Leukemia (AML)   Providers Dr. Mady Earl   [facility]   Drug Name Rydapt (midostaurin) Rydapt (midostaurin) Rydapt (midostaurin) Rydapt (midostaurin) Rydapt (midostaurin) Rydapt (midostaurin) Rydapt (midostaurin)   Dose 50 mg 50 mg 50 mg 50 mg 50 mg 50 mg 50 mg   Current Schedule Daily Daily Daily Daily Daily BID BID   Cycle Details Other Other Other Other Continuous Continuous Continuous   Start Date of Last Cycle - - - - - 1/11/2023 -   Planned next cycle start date 11/14/2022 - - - 1/10/2023 2/10/2023 -   Doses missed in last 2 weeks - - - - - 0 -   Adherence Assessment - - - - - Adherent -   Adverse Effects - - - - - No AE identified during assessment -   Any new drug interactions? - - - - - No -   Is the dose as ordered appropriate for the patient? - - - - - Yes -       Labs:  _  Result Component Current Result Ref Range   Sodium 141 (1/24/2023) 136 - 145 mmol/L     _  Result Component Current Result Ref Range   Potassium 4.1 (1/24/2023) 3.4 - 5.3 mmol/L     _  Result  Component Current Result Ref Range   Calcium 9.7 (1/24/2023) 8.6 - 10.0 mg/dL     No results found for Mag within last 30 days.     No results found for Phos within last 30 days.     _  Result Component Current Result Ref Range   Albumin 4.3 (1/24/2023) 3.5 - 5.2 g/dL     _  Result Component Current Result Ref Range   Urea Nitrogen 14.1 (1/24/2023) 6.0 - 20.0 mg/dL     _  Result Component Current Result Ref Range   Creatinine 0.57 (1/24/2023) 0.51 - 0.95 mg/dL     _  Result Component Current Result Ref Range   AST 20 (1/24/2023) 10 - 35 U/L     _  Result Component Current Result Ref Range   ALT 24 (1/24/2023) 10 - 35 U/L     _  Result Component Current Result Ref Range   Bilirubin Total 0.5 (1/24/2023) <=1.2 mg/dL     _  Result Component Current Result Ref Range   WBC Count 5.2 (1/24/2023) 4.0 - 11.0 10e3/uL     _  Result Component Current Result Ref Range   Hemoglobin 13.1 (1/24/2023) 11.7 - 15.7 g/dL     _  Result Component Current Result Ref Range   Platelet Count 201 (1/24/2023) 150 - 450 10e3/uL     No results found for ANC within last 30 days.     _  Result Component Current Result Ref Range   Absolute Neutrophils 4.1 (1/24/2023) 1.6 - 8.3 10e3/uL

## 2023-01-31 LAB — MISCELLANEOUS TEST 1 (ARUP): NORMAL

## 2023-02-20 ENCOUNTER — TELEPHONE (OUTPATIENT)
Dept: ONCOLOGY | Facility: CLINIC | Age: 38
End: 2023-02-20
Payer: COMMERCIAL

## 2023-02-20 DIAGNOSIS — C92.01 ACUTE MYELOID LEUKEMIA IN REMISSION (H): ICD-10-CM

## 2023-02-20 NOTE — ORAL ONC MGMT
Oral Chemotherapy Monitoring Program  Lab Follow Up    Reviewed lab results from 2/14/23 in [facility].    ORAL CHEMOTHERAPY 11/29/2022 12/28/2022 12/29/2022 1/10/2023 1/17/2023 1/24/2023 2/20/2023   Assessment Type Refill Lab Monitoring Monthly Follow up New Teach;Initial Work up Initial Follow up Lab Monitoring;Other Left Voicemail   Diagnosis Code Acute Myeloid Leukemia (AML) Acute Myeloid Leukemia (AML) Acute Myeloid Leukemia (AML) Acute Myeloid Leukemia (AML) Acute Myeloid Leukemia (AML) Acute Myeloid Leukemia (AML) Acute Myeloid Leukemia (AML)   Providers Dr. Mady Earl   [facility]   Drug Name Rydapt (midostaurin) Rydapt (midostaurin) Rydapt (midostaurin) Rydapt (midostaurin) Rydapt (midostaurin) Rydapt (midostaurin) Rydapt (midostaurin)   Dose 50 mg 50 mg 50 mg 50 mg 50 mg 50 mg 50 mg   Current Schedule Daily Daily Daily Daily BID BID BID   Cycle Details Other Other Other Continuous Continuous Continuous Continuous   Start Date of Last Cycle - - - - 1/11/2023 - -   Planned next cycle start date - - - 1/10/2023 2/10/2023 - -   Doses missed in last 2 weeks - - - - 0 - -   Adherence Assessment - - - - Adherent - -   Adverse Effects - - - - No AE identified during assessment - -   Any new drug interactions? - - - - No - -   Is the dose as ordered appropriate for the patient? - - - - Yes - -       Labs:  _  Result Component Current Result Ref Range   Sodium 141 (1/24/2023) 136 - 145 mmol/L     _  Result Component Current Result Ref Range   Potassium 4.1 (1/24/2023) 3.4 - 5.3 mmol/L     _  Result Component Current Result Ref Range   Calcium 9.7 (1/24/2023) 8.6 - 10.0 mg/dL     No results found for Mag within last 30 days.     No results found for Phos within last 30 days.     _  Result Component Current Result Ref Range   Albumin 4.3 (1/24/2023) 3.5 - 5.2 g/dL     _  Result  Component Current Result Ref Range   Urea Nitrogen 14.1 (1/24/2023) 6.0 - 20.0 mg/dL     _  Result Component Current Result Ref Range   Creatinine 0.57 (1/24/2023) 0.51 - 0.95 mg/dL     _  Result Component Current Result Ref Range   AST 20 (1/24/2023) 10 - 35 U/L     _  Result Component Current Result Ref Range   ALT 24 (1/24/2023) 10 - 35 U/L     _  Result Component Current Result Ref Range   Bilirubin Total 0.5 (1/24/2023) <=1.2 mg/dL     _  Result Component Current Result Ref Range   WBC Count 5.2 (1/24/2023) 4.0 - 11.0 10e3/uL     _  Result Component Current Result Ref Range   Hemoglobin 13.1 (1/24/2023) 11.7 - 15.7 g/dL     _  Result Component Current Result Ref Range   Platelet Count 201 (1/24/2023) 150 - 450 10e3/uL     No results found for ANC within last 30 days.     _  Result Component Current Result Ref Range   Absolute Neutrophils 4.1 (1/24/2023) 1.6 - 8.3 10e3/uL        Assessment & Plan:  No concerning abnormalities however only a CMP was drawn. Will confirm with lab they received all the orders we faxed.    Left voicemail for assessment of therapy.    Follow-Up:  Will confirm with lab they received all lab orders - recheck labs around 3/14/23?    Karla Oh, PharmD, BCPS  Oral Chemotherapy Monitoring Program  [facility]  [phone]

## 2023-02-22 DIAGNOSIS — C92.01 ACUTE MYELOID LEUKEMIA IN REMISSION (H): ICD-10-CM

## 2023-03-14 ENCOUNTER — TELEPHONE (OUTPATIENT)
Dept: ONCOLOGY | Facility: CLINIC | Age: 38
End: 2023-03-14
Payer: COMMERCIAL

## 2023-03-14 ENCOUNTER — PATIENT OUTREACH (OUTPATIENT)
Dept: ONCOLOGY | Facility: CLINIC | Age: 38
End: 2023-03-14
Payer: COMMERCIAL

## 2023-03-14 NOTE — TELEPHONE ENCOUNTER
Oral Chemotherapy Monitoring Program  Lab Follow Up    Reviewed lab results from today. Minerva confirms taking the appropriate dose of Rydapt 50mg twice daily. Denies new or worsening side effects, missed doses, medication changes or recent hospital or ED visits. She states that things are going well at this time and she does not have any concerns or questions at this time..    ORAL CHEMOTHERAPY 12/29/2022 1/10/2023 1/17/2023 1/24/2023 2/20/2023 2/22/2023 3/14/2023   Assessment Type Monthly Follow up New Teach;Initial Work up Initial Follow up Lab Monitoring;Other Left Voicemail;Lab Monitoring Refill Lab Monitoring;Monthly Follow up   Diagnosis Code Acute Myeloid Leukemia (AML) Acute Myeloid Leukemia (AML) Acute Myeloid Leukemia (AML) Acute Myeloid Leukemia (AML) Acute Myeloid Leukemia (AML) Acute Myeloid Leukemia (AML) Acute Myeloid Leukemia (AML)   Providers Dr. Mady Earl   [facility]   Drug Name Rydapt (midostaurin) Rydapt (midostaurin) Rydapt (midostaurin) Rydapt (midostaurin) Rydapt (midostaurin) Rydapt (midostaurin) Rydapt (midostaurin)   Dose 50 mg 50 mg 50 mg 50 mg 50 mg - 50 mg   Current Schedule Daily Daily BID BID BID - BID   Cycle Details Other Continuous Continuous Continuous Continuous - Continuous   Start Date of Last Cycle - - 1/11/2023 - - - -   Planned next cycle start date - 1/10/2023 2/10/2023 - - - -   Doses missed in last 2 weeks - - 0 - - - 0   Adherence Assessment - - Adherent - - - Adherent   Adverse Effects - - No AE identified during assessment - - - No AE identified during assessment   Any new drug interactions? - - No - - - No   Is the dose as ordered appropriate for the patient? - - Yes - - - Yes   Since the last time we talked, have you been hospitalized or used the emergency room? - - - - - - No       Labs:             Assessment &  Plan:  Results show no concerning abnormalities. Continue Rydapt therapy as planned. Patient declined having any questions at this time, but knows to calls should something arise.     Follow-Up:  4/11: appt with Dr. James Stein, PharmD, BCACP  Hematology/Oncology Clinical Pharmacist  Oral Chemotherapy Monitoring Program  [facility]  [phone]

## 2023-03-14 NOTE — PROGRESS NOTES
[facility]: Cancer Care                                                                                          My chart message received this am from pt stating that the infusion center in [place] didn't have updated lab orders.  Writer faxed orders to [facility] for CBC, CMP, LDH and NPM1 mutation.  Lab orders  2024.    My chart message returned to pt to update that orders have been faxed.    EDUAR GarciaN, RN  RN Care Coordinator  [facility]

## 2023-04-10 PROBLEM — C92.00 AML (ACUTE MYELOBLASTIC LEUKEMIA) (H): Status: RESOLVED | Noted: 2022-10-10 | Resolved: 2023-04-10

## 2023-04-10 PROBLEM — C95.00: Status: RESOLVED | Noted: 2022-07-15 | Resolved: 2023-04-10

## 2023-04-10 PROBLEM — C92.00 ACUTE MYELOID LEUKEMIA (H): Status: RESOLVED | Noted: 2022-09-12 | Resolved: 2023-04-10

## 2023-04-10 PROBLEM — U07.1 COVID-19 VIRUS INFECTION: Status: RESOLVED | Noted: 2021-10-19 | Resolved: 2023-04-10

## 2023-04-10 PROBLEM — Z86.718 PERSONAL HISTORY OF DVT (DEEP VEIN THROMBOSIS): Status: ACTIVE | Noted: 2022-07-18

## 2023-04-10 NOTE — PROGRESS NOTES
"Virtual Visit Details    Type of service:  Video Visit     Originating Location (pt. Location): Home    Distant Location (provider location):  Off-site  Platform used for Video Visit: Silvina        REASON FOR VISIT:  Management of acute myeloid leukemia (AML)    HISTORY OF PRESENT ILLNESS:  Minerva Marinelli is a 38 year old with a history of acute myeloid leukemia (AML).  To summarize her course, she was noted to have low WBC count on routine labs in 03/2022.  Follow up labs in 05/2022 showed further decrease in WBC as well as anemia and thrombocytopenia.  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  She was treated with cytarabine and daunorubicin \"7+3\" induction chemotherapy with midostaurin on Day 8-21.  Bone marrow biopsy around Day 21 was hypocellular but consistent with recovering bone marrow without definite AML.  Her course was complicated by recurrent C diff and upper extremity DVT.  She had prompt blood cell count recovery and was discharge with ongoing outpatient follow-up.  Post-treatment bone marrow biopsy showed 70-80% cellular marrow with slightly increased blasts that were favored to represent robust regnerating marrow with molecular studies and NPM1 MRD PCR testing negative consistent with MRD-negative complete remission.  She met with the BMT Team who recommended chemo consolidation.  She completed 4 cycles of high-dose cytarabine (HiDAC) with midostaurin ending in 12/2022.  A post-consolidation bone marrow biopsy that showed 70-80% cellular bone marrow with no evidence of AML by morphology or flow cytometry.  She started maintenance midostaurin in 01/2023.  Visit for management of AML.      She is not with her mom Melonie and dad Rosalee.  Energy level is good.  No fevers, night sweats, or unintentional weight loss.  No headache or focal " weakness or sensory changes.  No dyspnea, cough, or chest pain.  Normal bowel function.  No urinary complaints.  No bleeding or unusual bruising.  Has been working and feeling good.    KEY PAST MEDICAL HISTORY:  History of COVID-19, recurrent C diff, hypothyroidism    MEDICATIONS:  Current Outpatient Medications   Medication     Bacillus Coagulans-Inulin (PROBIOTIC) 1-250 BILLION-MG CAPS     levothyroxine (SYNTHROID/LEVOTHROID) 75 MCG tablet     magnesium 250 MG tablet     midostaurin (RYDAPT) 25 MG capsule     Multiple Vitamins-Minerals (WOMENS MULTIVITAMIN) TABS     Quercetin 50 MG TABS     vitamin C (ASCORBIC ACID) 1000 MG TABS     vitamin D3 (CHOLECALCIFEROL) 50 mcg (2000 units) tablet     zinc gluconate 50 MG tablet     No current facility-administered medications for this visit.     SOCIAL HISTORY:  Working as RN virtually in primary care setting    PHYSICAL EXAMINATION:  There were no vitals taken for this visit.  Wt Readings from Last 5 Encounters:   11/17/22 115.1 kg (253 lb 12.8 oz)   11/14/22 116 kg (255 lb 12.8 oz)   10/12/22 115.4 kg (254 lb 8 oz)   10/10/22 115.2 kg (254 lb)   09/15/22 113.1 kg (249 lb 4.8 oz)     General: Well appearing.  HEENT: Sclerae anicteric.  Lungs: Breathing comfortably. No cough.  MSK: Grossly normal movement.  Neuro: Grossly non-focal.  Skin/access: Normal skin tone.  Psych: Alert and oriented. No distress.  Performance status: ECOG 0    LABORATORY DATA: Reviewed by me  Labs reviewed in Care Everywhere from 3/14/2023 including essentially normal blood cell counts, kidney, and liver function and normal LDH    Recent Labs   Lab Test 01/24/23  1144 12/28/22  1243 12/06/22  0953 12/02/22  0957 11/29/22  0950 11/17/22  0349 11/16/22  0718 07/18/22  0942 07/15/22  0659 07/14/22  0813 07/13/22  0555 06/16/22  1724 06/16/22  1339 06/16/22  1102 06/14/22  0752   WBC 5.2 7.4 5.9 9.9 10.3   < > 4.3   < > 11.4* 6.4 3.1*   < > 41.0*   < > 25.3*   HGB 13.1 11.6* 7.8* 7.9* 7.9*   < > 10.6*    < > 8.3* 8.1* 8.0*   < > 9.5*   < > 10.1*    245 140* 51* 6*   < > 228   < > 388 253 145*   < > 72*   < > 90*   ANEU  --   --  4.3 7.7 8.5*   < >  --    < > 5.0 2.1 0.7*   < > 0.8*   < > 0.8*   ANEUTAUTO 4.1 5.5  --   --   --   --  4.1   < >  --   --   --    < >  --   --   --    ABLA  --   --   --   --   --   --   --   --  0.5* 0.3* 0.1*   < > 37.3*  --  20.5*   ALYM  --   --  0.6* 0.9 0.3*   < >  --    < > 1.1 1.9 1.3   < > 2.5   < > 3.3   ALYMPAUTO 0.6* 0.8  --   --   --   --  0.1*   < >  --   --   --    < >  --   --   --    RETICABSCT  --   --   --   --   --   --   --   --   --   --   --   --  0.063  --  0.080    < > = values in this interval not displayed.     Recent Labs   Lab Test 01/24/23  1144 12/28/22  1243 11/25/22  0950 11/17/22  0349 11/16/22  0718 11/15/22  0545 11/14/22  1333 10/13/22  0636 10/12/22  0536 10/11/22  0532    138 138 138 141 138 140   < > 137 140   POTASSIUM 4.1 3.7 4.1 4.3 4.2 3.9 3.8   < > 3.8 3.7   CHLORIDE 105 103 103 109* 109* 107 108*   < > 106 107   CO2 26 25 26 19* 19* 18* 20*   < > 21* 17*   BUN 14.1 13.1 10.5 12.8 13.9 11.8 11.8   < > 12.3 12.8   CR 0.57 0.58 0.57 0.49* 0.52 0.57 0.58   < > 0.57 0.58   MICHAEL 9.7 9.8 9.6 8.9 8.8 9.4 9.1   < > 9.0 9.4   MAG  --   --   --   --   --   --  2.0  --  2.2 2.0   PHOS  --   --   --   --   --   --  3.3  --  3.4 3.1   URIC  --   --   --  3.2 4.6 6.2* 4.8   < > 4.4 6.0*    218  --   --   --   --  226   < >  --   --     < > = values in this interval not displayed.     Recent Labs   Lab Test 01/24/23  1144 12/28/22  1243 11/25/22  0950 11/17/22  0349 11/16/22  0718 11/15/22  0545   AST 20 26 24 16 14 17   ALT 24 50* 49* 21 23 25   ALKPHOS 65 69 70 58 53 55   BILITOTAL 0.5 0.6 1.1 0.8 1.2 0.7   INR  --   --   --  1.08 1.09 1.15     NPM1 MRD PCR - does not appear to have been drawn with last labs    IMPRESSION AND PLAN:  Minerva Marinelli is a 38 year old with acute myeloid leukemia (AML).    There is nothing to suggest recurrent  AML.  She is completing a year of maintenance midostaurin through about 01/2024 based on the RATIFY trial (N Engl J Med 2017;377:454-64) to minimize the risk of AML relapse.  We will monitor her disease status including periodic NPM1 MRD PCR and for treatment toxicity.  We will track down whether NPM1 MRD PCR has been drawn and request it with next labs if not drawn.     She has no active ID issues.  Her past issues with C diff resolved.  She has vaccines including COVID vaccination.    She completed anticoagulation for her past provoked upper extremity DVT.  She will continue to work with Maria Eugenia Villar NP for health maintenance and other medical issues.    We will arrange monitoring labs and a return visit in about 3 months.  I reminded her to contact us if questions, concerns, or new issues come up between visits.    Video start time: 12:00 PM  Video end time: 12:08 PM  Video duration: 8 minutes    Ortiz Earl MD, PhD  Attending Physician, [facility]   of Medicine, [facility]  Division of Hematology, Oncology, and Transplantation  [phone] [facility]

## 2023-04-11 ENCOUNTER — VIRTUAL VISIT (OUTPATIENT)
Dept: ONCOLOGY | Facility: CLINIC | Age: 38
End: 2023-04-11
Attending: INTERNAL MEDICINE
Payer: COMMERCIAL

## 2023-04-11 DIAGNOSIS — C92.01 ACUTE MYELOID LEUKEMIA IN REMISSION (H): Primary | ICD-10-CM

## 2023-04-11 DIAGNOSIS — Z86.718 PERSONAL HISTORY OF DVT (DEEP VEIN THROMBOSIS): ICD-10-CM

## 2023-04-11 PROCEDURE — 99214 OFFICE O/P EST MOD 30 MIN: CPT | Mod: VID | Performed by: INTERNAL MEDICINE

## 2023-04-11 NOTE — NURSING NOTE
Is the patient currently in the state of [place]? YES    Visit mode:VIDEO    If the visit is dropped, the patient can be reconnected by: VIDEO VISIT: Send to e-mail at: [email or web address]    Will anyone else be joining the visit? NO      How would you like to obtain your AVS? MyChart    Are changes needed to the allergy or medication list? NO  Patient verified medications and allergies are correct via eCheck-in. Patient confirms no changes at this time and/or since last reviewed by clinic staff.    Reason for visit: Veda Marinelli 38 year old female presents today for f/o on AML.  Pratima Patino, Virtual Facilitator

## 2023-04-11 NOTE — LETTER
"    4/11/2023         RE: Veda Marinelli  [address] [address] [facility] [address]      Virtual Visit Details    Type of service:  Video Visit     Originating Location (pt. Location): Home    Distant Location (provider location):  Off-site  Platform used for Video Visit: Silvina        REASON FOR VISIT:  Management of acute myeloid leukemia (AML)    HISTORY OF PRESENT ILLNESS:  Minerva Marinelli is a 38 year old with a history of acute myeloid leukemia (AML).  To summarize her course, she was noted to have low WBC count on routine labs in 03/2022.  Follow up labs in 05/2022 showed further decrease in WBC as well as anemia and thrombocytopenia.  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  She was treated with cytarabine and daunorubicin \"7+3\" induction chemotherapy with midostaurin on Day 8-21.  Bone marrow biopsy around Day 21 was hypocellular but consistent with recovering bone marrow without definite AML.  Her course was complicated by recurrent C diff and upper extremity DVT.  She had prompt blood cell count recovery and was discharge with ongoing outpatient follow-up.  Post-treatment bone marrow biopsy showed 70-80% cellular marrow with slightly increased blasts that were favored to represent robust regnerating marrow with molecular studies and NPM1 MRD PCR testing negative consistent with MRD-negative complete remission.  She met with the BMT Team who recommended chemo consolidation.  She completed 4 cycles of high-dose cytarabine (HiDAC) with midostaurin ending in 12/2022.  A post-consolidation bone marrow biopsy that showed 70-80% cellular bone marrow with no evidence of AML by morphology or flow cytometry.  She started maintenance midostaurin in 01/2023.  Visit for management of AML.      She is not with her mom Melonie and dad Rosalee.  Energy level is " good.  No fevers, night sweats, or unintentional weight loss.  No headache or focal weakness or sensory changes.  No dyspnea, cough, or chest pain.  Normal bowel function.  No urinary complaints.  No bleeding or unusual bruising.  Has been working and feeling good.    KEY PAST MEDICAL HISTORY:  History of COVID-19, recurrent C diff, hypothyroidism    MEDICATIONS:  Current Outpatient Medications   Medication    Bacillus Coagulans-Inulin (PROBIOTIC) 1-250 BILLION-MG CAPS    levothyroxine (SYNTHROID/LEVOTHROID) 75 MCG tablet    magnesium 250 MG tablet    midostaurin (RYDAPT) 25 MG capsule    Multiple Vitamins-Minerals (WOMENS MULTIVITAMIN) TABS    Quercetin 50 MG TABS    vitamin C (ASCORBIC ACID) 1000 MG TABS    vitamin D3 (CHOLECALCIFEROL) 50 mcg (2000 units) tablet    zinc gluconate 50 MG tablet     No current facility-administered medications for this visit.     SOCIAL HISTORY:  Working as RN virtually in primary care setting    PHYSICAL EXAMINATION:  There were no vitals taken for this visit.  Wt Readings from Last 5 Encounters:   11/17/22 115.1 kg (253 lb 12.8 oz)   11/14/22 116 kg (255 lb 12.8 oz)   10/12/22 115.4 kg (254 lb 8 oz)   10/10/22 115.2 kg (254 lb)   09/15/22 113.1 kg (249 lb 4.8 oz)     General: Well appearing.  HEENT: Sclerae anicteric.  Lungs: Breathing comfortably. No cough.  MSK: Grossly normal movement.  Neuro: Grossly non-focal.  Skin/access: Normal skin tone.  Psych: Alert and oriented. No distress.  Performance status: ECOG 0    LABORATORY DATA: Reviewed by me  Labs reviewed in Care Everywhere from 3/14/2023 including essentially normal blood cell counts, kidney, and liver function and normal LDH    Recent Labs   Lab Test 01/24/23  1144 12/28/22  1243 12/06/22  0953 12/02/22  0957 11/29/22  0950 11/17/22  0349 11/16/22  0718 07/18/22  0942 07/15/22  0659 07/14/22  0813 07/13/22  0555 06/16/22  1724 06/16/22  1339 06/16/22  1102 06/14/22  0752   WBC 5.2 7.4 5.9 9.9 10.3   < > 4.3   < > 11.4*  6.4 3.1*   < > 41.0*   < > 25.3*   HGB 13.1 11.6* 7.8* 7.9* 7.9*   < > 10.6*   < > 8.3* 8.1* 8.0*   < > 9.5*   < > 10.1*    245 140* 51* 6*   < > 228   < > 388 253 145*   < > 72*   < > 90*   ANEU  --   --  4.3 7.7 8.5*   < >  --    < > 5.0 2.1 0.7*   < > 0.8*   < > 0.8*   ANEUTAUTO 4.1 5.5  --   --   --   --  4.1   < >  --   --   --    < >  --   --   --    ABLA  --   --   --   --   --   --   --   --  0.5* 0.3* 0.1*   < > 37.3*  --  20.5*   ALYM  --   --  0.6* 0.9 0.3*   < >  --    < > 1.1 1.9 1.3   < > 2.5   < > 3.3   ALYMPAUTO 0.6* 0.8  --   --   --   --  0.1*   < >  --   --   --    < >  --   --   --    RETICABSCT  --   --   --   --   --   --   --   --   --   --   --   --  0.063  --  0.080    < > = values in this interval not displayed.     Recent Labs   Lab Test 01/24/23  1144 12/28/22  1243 11/25/22  0950 11/17/22  0349 11/16/22  0718 11/15/22  0545 11/14/22  1333 10/13/22  0636 10/12/22  0536 10/11/22  0532    138 138 138 141 138 140   < > 137 140   POTASSIUM 4.1 3.7 4.1 4.3 4.2 3.9 3.8   < > 3.8 3.7   CHLORIDE 105 103 103 109* 109* 107 108*   < > 106 107   CO2 26 25 26 19* 19* 18* 20*   < > 21* 17*   BUN 14.1 13.1 10.5 12.8 13.9 11.8 11.8   < > 12.3 12.8   CR 0.57 0.58 0.57 0.49* 0.52 0.57 0.58   < > 0.57 0.58   MICHAEL 9.7 9.8 9.6 8.9 8.8 9.4 9.1   < > 9.0 9.4   MAG  --   --   --   --   --   --  2.0  --  2.2 2.0   PHOS  --   --   --   --   --   --  3.3  --  3.4 3.1   URIC  --   --   --  3.2 4.6 6.2* 4.8   < > 4.4 6.0*    218  --   --   --   --  226   < >  --   --     < > = values in this interval not displayed.     Recent Labs   Lab Test 01/24/23  1144 12/28/22  1243 11/25/22  0950 11/17/22  0349 11/16/22  0718 11/15/22  0545   AST 20 26 24 16 14 17   ALT 24 50* 49* 21 23 25   ALKPHOS 65 69 70 58 53 55   BILITOTAL 0.5 0.6 1.1 0.8 1.2 0.7   INR  --   --   --  1.08 1.09 1.15     NPM1 MRD PCR - does not appear to have been drawn with last labs    IMPRESSION AND PLAN:  Minerva Marinelli is a 38 year  old with acute myeloid leukemia (AML).    There is nothing to suggest recurrent AML.  She is completing a year of maintenance midostaurin through about 01/2024 based on the RATIFY trial (N Engl J Med 2017;377:454-64) to minimize the risk of AML relapse.  We will monitor her disease status including periodic NPM1 MRD PCR and for treatment toxicity.  We will track down whether NPM1 MRD PCR has been drawn and request it with next labs if not drawn.     She has no active ID issues.  Her past issues with C diff resolved.  She has vaccines including COVID vaccination.    She completed anticoagulation for her past provoked upper extremity DVT.  She will continue to work with Maria Eugenia Villar NP for health maintenance and other medical issues.    We will arrange monitoring labs and a return visit in about 3 months.  I reminded her to contact us if questions, concerns, or new issues come up between visits.    Video start time: 12:00 PM  Video end time: 12:08 PM  Video duration: 8 minutes    Ortiz Earl MD, PhD  Attending Physician, [facility]   of Medicine, [facility]  Division of Hematology, Oncology, and Transplantation  [phone] [facility]

## 2023-04-11 NOTE — Clinical Note
"    4/11/2023         RE: Veda Marinelli  [address] [facility] [address]        Dear Colleague,    Thank you for referring your patient, Veda Marinelli, to the [facility]. Please see a copy of my visit note below.    Virtual Visit Details    Type of service:  Video Visit   Video Start Time: {video visit start/end time for provider to select:356744}  Video End Time:{video visit start/end time for provider to select:657656}    Originating Location (pt. Location): Home  {PROVIDER LOCATION On-site should be selected for visits conducted from your clinic location or adjoining [facility] hospital, academic office, or other nearby [facility] building. Off-site should be selected for all other provider locations, including home:[address]}  Distant Location (provider location):  {virtual location provider:067282}  Platform used for Video Visit: "[employer]Well        NOTE INCOMPLETE AND MAY CONTAIN INACCURATE INFORMATION, FINAL NOTE PENDING ***    REASON FOR VISIT:  Management of acute myeloid leukemia (AML)    HISTORY OF PRESENT ILLNESS:  Minerva Marinelli is a 38 year old with a history of acute myeloid leukemia (AML).  To summarize her course, she was noted to have low WBC count on routine labs in 03/2022.  Follow up labs in 05/2022 showed further decrease in WBC as well as anemia and thrombocytopenia.  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  She was treated with cytarabine and daunorubicin \"7+3\" induction chemotherapy with midostaurin on Day 8-21.  Bone marrow biopsy around Day 21 was hypocellular but consistent with recovering bone marrow without definite AML.  Her course was complicated by recurrent C diff and upper extremity DVT.  She had prompt blood cell count recovery and was discharge with ongoing outpatient follow-up.  " Post-treatment bone marrow biopsy showed 70-80% cellular marrow with slightly increased blasts that were favored to represent robust regnerating marrow with molecular studies and NPM1 MRD PCR testing negative consistent with MRD-negative complete remission.  She met with the BMT Team who recommended chemo consolidation.  She completed 4 cycles of high-dose cytarabine (HiDAC) with midostaurin ending in 12/2022.  A post-consolidation bone marrow biopsy that showed 70-80% cellular bone marrow with no evidence of AML by morphology or flow cytometry.  She started maintenance midostaurin in 01/2023.  Visit for management of AML.      She is with her mom Melonie and dad Rosalee.  Energy level is good.  No fevers, night sweats, or unintentional weight loss.  No headache or focal weakness or sensory changes.  No dyspnea, cough, or chest pain.  Normal bowel function.  No urinary complaints.  No bleeding or unusual bruising.  Has been working and feeling good.  ***    KEY PAST MEDICAL HISTORY:  History of COVID-19, recurrent C diff, hypothyroidism    MEDICATIONS:  Current Outpatient Medications   Medication     Bacillus Coagulans-Inulin (PROBIOTIC) 1-250 BILLION-MG CAPS     levothyroxine (SYNTHROID/LEVOTHROID) 75 MCG tablet     magnesium 250 MG tablet     midostaurin (RYDAPT) 25 MG capsule     Multiple Vitamins-Minerals (WOMENS MULTIVITAMIN) TABS     Quercetin 50 MG TABS     vitamin C (ASCORBIC ACID) 1000 MG TABS     vitamin D3 (CHOLECALCIFEROL) 50 mcg (2000 units) tablet     zinc gluconate 50 MG tablet     No current facility-administered medications for this visit.     SOCIAL HISTORY:  Working as RN virtually in primary care setting    PHYSICAL EXAMINATION:  There were no vitals taken for this visit.  Wt Readings from Last 5 Encounters:   11/17/22 115.1 kg (253 lb 12.8 oz)   11/14/22 116 kg (255 lb 12.8 oz)   10/12/22 115.4 kg (254 lb 8 oz)   10/10/22 115.2 kg (254 lb)   09/15/22 113.1 kg (249 lb 4.8 oz)     General: Well  appearing.  HEENT: Sclerae anicteric.  Lungs: Breathing comfortably. No cough.  MSK: Grossly normal movement.  Neuro: Grossly non-focal.  Skin/access: Normal skin tone.  Psych: Alert and oriented. No distress.  Performance status: ECOG 0    LABORATORY DATA: Reviewed by me  Labs reviewed in Care Everywhere from 3/14/2023 including essentially normal blood cell counts, kidney, and liver function and normal LDH    Recent Labs   Lab Test 01/24/23  1144 12/28/22  1243 12/06/22  0953 12/02/22  0957 11/29/22  0950 11/17/22  0349 11/16/22  0718 07/18/22  0942 07/15/22  0659 07/14/22  0813 07/13/22  0555 06/16/22  1724 06/16/22  1339 06/16/22  1102 06/14/22  0752   WBC 5.2 7.4 5.9 9.9 10.3   < > 4.3   < > 11.4* 6.4 3.1*   < > 41.0*   < > 25.3*   HGB 13.1 11.6* 7.8* 7.9* 7.9*   < > 10.6*   < > 8.3* 8.1* 8.0*   < > 9.5*   < > 10.1*    245 140* 51* 6*   < > 228   < > 388 253 145*   < > 72*   < > 90*   ANEU  --   --  4.3 7.7 8.5*   < >  --    < > 5.0 2.1 0.7*   < > 0.8*   < > 0.8*   ANEUTAUTO 4.1 5.5  --   --   --   --  4.1   < >  --   --   --    < >  --   --   --    ABLA  --   --   --   --   --   --   --   --  0.5* 0.3* 0.1*   < > 37.3*  --  20.5*   ALYM  --   --  0.6* 0.9 0.3*   < >  --    < > 1.1 1.9 1.3   < > 2.5   < > 3.3   ALYMPAUTO 0.6* 0.8  --   --   --   --  0.1*   < >  --   --   --    < >  --   --   --    RETICABSCT  --   --   --   --   --   --   --   --   --   --   --   --  0.063  --  0.080    < > = values in this interval not displayed.     Recent Labs   Lab Test 01/24/23  1144 12/28/22  1243 11/25/22  0950 11/17/22  0349 11/16/22  0718 11/15/22  0545 11/14/22  1333 10/13/22  0636 10/12/22  0536 10/11/22  0532    138 138 138 141 138 140   < > 137 140   POTASSIUM 4.1 3.7 4.1 4.3 4.2 3.9 3.8   < > 3.8 3.7   CHLORIDE 105 103 103 109* 109* 107 108*   < > 106 107   CO2 26 25 26 19* 19* 18* 20*   < > 21* 17*   BUN 14.1 13.1 10.5 12.8 13.9 11.8 11.8   < > 12.3 12.8   CR 0.57 0.58 0.57 0.49* 0.52 0.57 0.58   < >  0.57 0.58   MICHAEL 9.7 9.8 9.6 8.9 8.8 9.4 9.1   < > 9.0 9.4   MAG  --   --   --   --   --   --  2.0  --  2.2 2.0   PHOS  --   --   --   --   --   --  3.3  --  3.4 3.1   URIC  --   --   --  3.2 4.6 6.2* 4.8   < > 4.4 6.0*    218  --   --   --   --  226   < >  --   --     < > = values in this interval not displayed.     Recent Labs   Lab Test 01/24/23  1144 12/28/22  1243 11/25/22  0950 11/17/22  0349 11/16/22  0718 11/15/22  0545   AST 20 26 24 16 14 17   ALT 24 50* 49* 21 23 25   ALKPHOS 65 69 70 58 53 55   BILITOTAL 0.5 0.6 1.1 0.8 1.2 0.7   INR  --   --   --  1.08 1.09 1.15     NPM1 MRD PCR - does not appear to have been drawn    IMPRESSION AND PLAN:  Minerva Marinelli is a 38 year old with acute myeloid leukemia (AML).    There is nothing to suggest recurrent AML.  She is completing a year of maintenance midostaurin through about 01/2024 based on the RATIFY trial (N Engl J Med 2017;377:454-64) to minimize the risk of AML relapse.  We will monitor her disease status including periodic NPM1 MRD PCR and for treatment toxicity.  We will track down whether NPM1 MRD PCR has been drawn and request it with next labs if not drawn.     She has no active ID issues.  Her past issues with C diff resolved.  She has vaccines including COVID vaccination.    She completed anticoagulation for her past provoked upper extremity DVT.  She will continue to work with Maria Eugenia Villar NP for health maintenance and other medical issues.    We will arrange monitoring labs and a return visit in about 3 months.  I reminded her to contact us if questions, concerns, or new issues come up between visits.    Video start time: ***  Video end time: ***  Video duration: ***    Total of *** minutes on patient visit, reviewing records, interpreting test results, placing orders, and documentation on the day of service.    CHEMO ORDERS, LAB ORDERS, PLANS, NPM1 PCR TESTING?      Monthly labs including ARUP NPM1 MRD PCR send out with next lab  "draw  Visit with me in about 3 months about 2 weeks after months labs    Virtual Visit Details    Type of service:  Video Visit     Originating Location (pt. Location): Home  {PROVIDER LOCATION On-site should be selected for visits conducted from your clinic location or adjoining [facility] hospital, academic office, or other nearby [facility] building. Off-site should be selected for all other provider locations, including home:[address]}  Distant Location (provider location):  Off-site  Platform used for Video Visit: [employer]        NOTE INCOMPLETE AND MAY CONTAIN INACCURATE INFORMATION, FINAL NOTE PENDING ***    REASON FOR VISIT:  Management of acute myeloid leukemia (AML)    HISTORY OF PRESENT ILLNESS:  Minerva Marinelli is a 38 year old with a history of acute myeloid leukemia (AML).  To summarize her course, she was noted to have low WBC count on routine labs in 03/2022.  Follow up labs in 05/2022 showed further decrease in WBC as well as anemia and thrombocytopenia.  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  She was treated with cytarabine and daunorubicin \"7+3\" induction chemotherapy with midostaurin on Day 8-21.  Bone marrow biopsy around Day 21 was hypocellular but consistent with recovering bone marrow without definite AML.  Her course was complicated by recurrent C diff and upper extremity DVT.  She had prompt blood cell count recovery and was discharge with ongoing outpatient follow-up.  Post-treatment bone marrow biopsy showed 70-80% cellular marrow with slightly increased blasts that were favored to represent robust regnerating marrow with molecular studies and NPM1 MRD PCR testing negative consistent with MRD-negative complete remission.  She met with the BMT Team who recommended chemo consolidation.  She completed 4 cycles of high-dose cytarabine (HiDAC) " with midostaurin ending in 12/2022.  A post-consolidation bone marrow biopsy that showed 70-80% cellular bone marrow with no evidence of AML by morphology or flow cytometry.  She started maintenance midostaurin in 01/2023.  Visit for management of AML.      She is not with her mom Melonie and dad Rosalee.  Energy level is good.  No fevers, night sweats, or unintentional weight loss.  No headache or focal weakness or sensory changes.  No dyspnea, cough, or chest pain.  Normal bowel function.  No urinary complaints.  No bleeding or unusual bruising.  Has been working and feeling good.  ***    KEY PAST MEDICAL HISTORY:  History of COVID-19, recurrent C diff, hypothyroidism    MEDICATIONS:  Current Outpatient Medications   Medication     Bacillus Coagulans-Inulin (PROBIOTIC) 1-250 BILLION-MG CAPS     levothyroxine (SYNTHROID/LEVOTHROID) 75 MCG tablet     magnesium 250 MG tablet     midostaurin (RYDAPT) 25 MG capsule     Multiple Vitamins-Minerals (WOMENS MULTIVITAMIN) TABS     Quercetin 50 MG TABS     vitamin C (ASCORBIC ACID) 1000 MG TABS     vitamin D3 (CHOLECALCIFEROL) 50 mcg (2000 units) tablet     zinc gluconate 50 MG tablet     No current facility-administered medications for this visit.     SOCIAL HISTORY:  Working as RN virtually in primary care setting    PHYSICAL EXAMINATION:  There were no vitals taken for this visit.  Wt Readings from Last 5 Encounters:   11/17/22 115.1 kg (253 lb 12.8 oz)   11/14/22 116 kg (255 lb 12.8 oz)   10/12/22 115.4 kg (254 lb 8 oz)   10/10/22 115.2 kg (254 lb)   09/15/22 113.1 kg (249 lb 4.8 oz)     General: Well appearing.  HEENT: Sclerae anicteric.  Lungs: Breathing comfortably. No cough.  MSK: Grossly normal movement.  Neuro: Grossly non-focal.  Skin/access: Normal skin tone.  Psych: Alert and oriented. No distress.  Performance status: ECOG 0    LABORATORY DATA: Reviewed by me  Labs reviewed in Care Everywhere from 3/14/2023 including essentially normal blood cell counts, kidney, and  liver function and normal LDH    Recent Labs   Lab Test 01/24/23  1144 12/28/22  1243 12/06/22  0953 12/02/22  0957 11/29/22  0950 11/17/22  0349 11/16/22  0718 07/18/22  0942 07/15/22  0659 07/14/22  0813 07/13/22  0555 06/16/22  1724 06/16/22  1339 06/16/22  1102 06/14/22  0752   WBC 5.2 7.4 5.9 9.9 10.3   < > 4.3   < > 11.4* 6.4 3.1*   < > 41.0*   < > 25.3*   HGB 13.1 11.6* 7.8* 7.9* 7.9*   < > 10.6*   < > 8.3* 8.1* 8.0*   < > 9.5*   < > 10.1*    245 140* 51* 6*   < > 228   < > 388 253 145*   < > 72*   < > 90*   ANEU  --   --  4.3 7.7 8.5*   < >  --    < > 5.0 2.1 0.7*   < > 0.8*   < > 0.8*   ANEUTAUTO 4.1 5.5  --   --   --   --  4.1   < >  --   --   --    < >  --   --   --    ABLA  --   --   --   --   --   --   --   --  0.5* 0.3* 0.1*   < > 37.3*  --  20.5*   ALYM  --   --  0.6* 0.9 0.3*   < >  --    < > 1.1 1.9 1.3   < > 2.5   < > 3.3   ALYMPAUTO 0.6* 0.8  --   --   --   --  0.1*   < >  --   --   --    < >  --   --   --    RETICABSCT  --   --   --   --   --   --   --   --   --   --   --   --  0.063  --  0.080    < > = values in this interval not displayed.     Recent Labs   Lab Test 01/24/23  1144 12/28/22  1243 11/25/22  0950 11/17/22  0349 11/16/22  0718 11/15/22  0545 11/14/22  1333 10/13/22  0636 10/12/22  0536 10/11/22  0532    138 138 138 141 138 140   < > 137 140   POTASSIUM 4.1 3.7 4.1 4.3 4.2 3.9 3.8   < > 3.8 3.7   CHLORIDE 105 103 103 109* 109* 107 108*   < > 106 107   CO2 26 25 26 19* 19* 18* 20*   < > 21* 17*   BUN 14.1 13.1 10.5 12.8 13.9 11.8 11.8   < > 12.3 12.8   CR 0.57 0.58 0.57 0.49* 0.52 0.57 0.58   < > 0.57 0.58   MICHAEL 9.7 9.8 9.6 8.9 8.8 9.4 9.1   < > 9.0 9.4   MAG  --   --   --   --   --   --  2.0  --  2.2 2.0   PHOS  --   --   --   --   --   --  3.3  --  3.4 3.1   URIC  --   --   --  3.2 4.6 6.2* 4.8   < > 4.4 6.0*    218  --   --   --   --  226   < >  --   --     < > = values in this interval not displayed.     Recent Labs   Lab Test 01/24/23  1144 12/28/22  1243  11/25/22  0950 11/17/22  0349 11/16/22  0718 11/15/22  0545   AST 20 26 24 16 14 17   ALT 24 50* 49* 21 23 25   ALKPHOS 65 69 70 58 53 55   BILITOTAL 0.5 0.6 1.1 0.8 1.2 0.7   INR  --   --   --  1.08 1.09 1.15     NPM1 MRD PCR - does not appear to have been drawn    IMPRESSION AND PLAN:  Minerva Marinelli is a 38 year old with acute myeloid leukemia (AML).    There is nothing to suggest recurrent AML.  She is completing a year of maintenance midostaurin through about 01/2024 based on the RATIFY trial (N Engl J Med 2017;377:454-64) to minimize the risk of AML relapse.  We will monitor her disease status including periodic NPM1 MRD PCR and for treatment toxicity.  We will track down whether NPM1 MRD PCR has been drawn and request it with next labs if not drawn.     She has no active ID issues.  Her past issues with C diff resolved.  She has vaccines including COVID vaccination.    She completed anticoagulation for her past provoked upper extremity DVT.  She will continue to work with Maria Eugenia Villar NP for health maintenance and other medical issues.    We will arrange monitoring labs and a return visit in about 3 months.  I reminded her to contact us if questions, concerns, or new issues come up between visits.    Video start time: 12:00 PM  Video end time: 12:08 PM  Video duration: 8 minutes    Ortiz Earl MD, PhD  Attending Physician, [facility]   of Medicine, [facility]  Division of Hematology, Oncology, and Transplantation  [phone] clinic      CHEMO ORDERS, LAB ORDERS, PLANS, NPM1 PCR TESTING?      Monthly labs including ARUP NPM1 MRD PCR send out with next lab draw  Visit with me in about 3 months about 2 weeks after months labs      Again, thank you for allowing me to participate in the care of your patient.        Sincerely,        Ortiz Earl MD

## 2023-04-13 ENCOUNTER — PATIENT OUTREACH (OUTPATIENT)
Dept: ONCOLOGY | Facility: CLINIC | Age: 38
End: 2023-04-13
Payer: COMMERCIAL

## 2023-04-13 NOTE — PROGRESS NOTES
[facility]: Cancer Care                                                                                          Lab orders for CBC, CMP and LDH faxed to [facility] at [phone] with instructions to draw monthly via port.    Shira Humphries, EDUARN, RN  RN Care Coordinator  [facility]

## 2023-05-09 ENCOUNTER — LAB (OUTPATIENT)
Dept: INFUSION THERAPY | Facility: CLINIC | Age: 38
End: 2023-05-09
Attending: INTERNAL MEDICINE
Payer: COMMERCIAL

## 2023-05-09 DIAGNOSIS — C92.01 ACUTE MYELOID LEUKEMIA IN REMISSION (H): ICD-10-CM

## 2023-05-09 LAB
ALBUMIN SERPL BCG-MCNC: 4.5 G/DL (ref 3.5–5.2)
ALP SERPL-CCNC: 62 U/L (ref 35–104)
ALT SERPL W P-5'-P-CCNC: 31 U/L (ref 10–35)
ANION GAP SERPL CALCULATED.3IONS-SCNC: 11 MMOL/L (ref 7–15)
AST SERPL W P-5'-P-CCNC: 21 U/L (ref 10–35)
BASOPHILS # BLD AUTO: 0 10E3/UL (ref 0–0.2)
BASOPHILS NFR BLD AUTO: 1 %
BILIRUB SERPL-MCNC: 0.7 MG/DL
BUN SERPL-MCNC: 13.7 MG/DL (ref 6–20)
CALCIUM SERPL-MCNC: 10.3 MG/DL (ref 8.6–10)
CHLORIDE SERPL-SCNC: 106 MMOL/L (ref 98–107)
CREAT SERPL-MCNC: 0.62 MG/DL (ref 0.51–0.95)
DEPRECATED HCO3 PLAS-SCNC: 25 MMOL/L (ref 22–29)
EOSINOPHIL # BLD AUTO: 0.1 10E3/UL (ref 0–0.7)
EOSINOPHIL NFR BLD AUTO: 1 %
ERYTHROCYTE [DISTWIDTH] IN BLOOD BY AUTOMATED COUNT: 13.5 % (ref 10–15)
GFR SERPL CREATININE-BSD FRML MDRD: >90 ML/MIN/1.73M2
GLUCOSE SERPL-MCNC: 107 MG/DL (ref 70–99)
HCT VFR BLD AUTO: 40.3 % (ref 35–47)
HGB BLD-MCNC: 13.7 G/DL (ref 11.7–15.7)
IMM GRANULOCYTES # BLD: 0 10E3/UL
IMM GRANULOCYTES NFR BLD: 0 %
LDH SERPL L TO P-CCNC: 162 U/L (ref 0–250)
LYMPHOCYTES # BLD AUTO: 0.7 10E3/UL (ref 0.8–5.3)
LYMPHOCYTES NFR BLD AUTO: 12 %
MCH RBC QN AUTO: 33.6 PG (ref 26.5–33)
MCHC RBC AUTO-ENTMCNC: 34 G/DL (ref 31.5–36.5)
MCV RBC AUTO: 99 FL (ref 78–100)
MONOCYTES # BLD AUTO: 0.4 10E3/UL (ref 0–1.3)
MONOCYTES NFR BLD AUTO: 7 %
NEUTROPHILS # BLD AUTO: 4.4 10E3/UL (ref 1.6–8.3)
NEUTROPHILS NFR BLD AUTO: 79 %
NRBC # BLD AUTO: 0 10E3/UL
NRBC BLD AUTO-RTO: 0 /100
PLATELET # BLD AUTO: 214 10E3/UL (ref 150–450)
POTASSIUM SERPL-SCNC: 3.8 MMOL/L (ref 3.4–5.3)
PROT SERPL-MCNC: 7.3 G/DL (ref 6.4–8.3)
RBC # BLD AUTO: 4.08 10E6/UL (ref 3.8–5.2)
SODIUM SERPL-SCNC: 142 MMOL/L (ref 136–145)
WBC # BLD AUTO: 5.6 10E3/UL (ref 4–11)

## 2023-05-09 PROCEDURE — 250N000011 HC RX IP 250 OP 636: Performed by: INTERNAL MEDICINE

## 2023-05-09 PROCEDURE — 36591 DRAW BLOOD OFF VENOUS DEVICE: CPT

## 2023-05-09 PROCEDURE — 85025 COMPLETE CBC W/AUTO DIFF WBC: CPT | Performed by: INTERNAL MEDICINE

## 2023-05-09 PROCEDURE — 80053 COMPREHEN METABOLIC PANEL: CPT | Performed by: INTERNAL MEDICINE

## 2023-05-09 PROCEDURE — 83615 LACTATE (LD) (LDH) ENZYME: CPT | Performed by: INTERNAL MEDICINE

## 2023-05-09 RX ORDER — HEPARIN SODIUM (PORCINE) LOCK FLUSH IV SOLN 100 UNIT/ML 100 UNIT/ML
5 SOLUTION INTRAVENOUS ONCE
Status: COMPLETED | OUTPATIENT
Start: 2023-05-09 | End: 2023-05-09

## 2023-05-09 RX ADMIN — HEPARIN 5 ML: 100 SYRINGE at 14:42

## 2023-05-09 NOTE — PROGRESS NOTES
Infusion Nursing Note:  Veda FORBES Yves presents today for Port labs.    Patient seen by provider today: No   present during visit today: Not Applicable.    Note: N/A.    Intravenous Access:  Implanted Port.    Treatment Conditions:  Not Applicable.    Post Infusion Assessment:  Blood return noted.  Site patent and intact, free from redness, edema or discomfort.  No evidence of extravasations.  Access discontinued per protocol.     Discharge Plan:   Discharge instructions reviewed with: Patient.  Patient and/or family verbalized understanding of discharge instructions and all questions answered.  AVS to patient via [employer].  Patient will return 8/9/2023 for next appointment.   Patient discharged in stable condition accompanied by: self.  Departure Mode: Ambulatory.    Maribel Dominguez RN

## 2023-05-16 LAB
Lab: NORMAL
PERFORMING LABORATORY: NORMAL
SPECIMEN STATUS: NORMAL
TEST NAME: NORMAL

## 2023-05-22 ENCOUNTER — TELEPHONE (OUTPATIENT)
Dept: ONCOLOGY | Facility: CLINIC | Age: 38
End: 2023-05-22
Payer: COMMERCIAL

## 2023-05-22 NOTE — TELEPHONE ENCOUNTER
Oral Chemotherapy Monitoring Program    Placed call to patient in follow up of midostaurin (Rydapt) therapy.    Left message to please call back in follow up of therapy. No patient or drug names were mentioned.    George Galvin  Pharmacy Intern  Oral Chemotherapy Monitoring Program  [facility]  [phone]

## 2023-05-30 ENCOUNTER — TELEPHONE (OUTPATIENT)
Dept: ONCOLOGY | Facility: CLINIC | Age: 38
End: 2023-05-30
Payer: COMMERCIAL

## 2023-05-30 NOTE — TELEPHONE ENCOUNTER
Oral Chemotherapy Monitoring Program    Subjective/Objective:  Veda Marinelli is a 38 year old female contacted by phone for a follow-up visit for oral chemotherapy. She confirms her Rydapt dose of 2 capsules (50 mg) every 12 hours. Minerva reports no missed doses in the past two weeks and no new OTC or prescription medications. She says her energy levels have been good. She denies any nausea, vomiting, diarrhea, constipation, edema, or SOB.         1/17/2023     2:00 PM 1/24/2023     4:00 PM 2/20/2023     2:00 PM 2/22/2023     2:00 PM 3/14/2023     1:00 PM 5/22/2023    10:00 AM 5/30/2023    10:00 AM   ORAL CHEMOTHERAPY   Assessment Type Initial Follow up Lab Monitoring;Other Left Voicemail;Lab Monitoring Refill Lab Monitoring;Monthly Follow up Left Voicemail;Monthly Follow up Monthly Follow up   Diagnosis Code Acute Myeloid Leukemia (AML) Acute Myeloid Leukemia (AML) Acute Myeloid Leukemia (AML) Acute Myeloid Leukemia (AML) Acute Myeloid Leukemia (AML) Acute Myeloid Leukemia (AML) Acute Myeloid Leukemia (AML)   Providers Dr. Mady Earl   [facility]   Drug Name Rydapt (midostaurin) Rydapt (midostaurin) Rydapt (midostaurin) Rydapt (midostaurin) Rydapt (midostaurin) Rydapt (midostaurin) Rydapt (midostaurin)   Dose 50 mg 50 mg 50 mg  50 mg 50 mg 50 mg   Current Schedule BID BID BID  BID BID BID   Cycle Details Continuous Continuous Continuous  Continuous Continuous Continuous   Start Date of Last Cycle 1/11/2023         Planned next cycle start date 2/10/2023         Doses missed in last 2 weeks 0    0  0   Adherence Assessment Adherent    Adherent  Adherent   Adverse Effects No AE identified during assessment    No AE identified during assessment  No AE identified during assessment   Any new drug interactions? No    No  No   Is the dose as ordered appropriate for the patient?  "Yes    Yes     Since the last time we talked, have you been hospitalized or used the emergency room?     No         Last PHQ-2 Score on record:        View : No data to display.                Vitals:  BP:   BP Readings from Last 1 Encounters:   12/28/22 122/84     Wt Readings from Last 1 Encounters:   11/17/22 115.1 kg (253 lb 12.8 oz)     Estimated body surface area is 2.33 meters squared as calculated from the following:    Height as of 11/14/22: 1.702 m (5' 7\").    Weight as of 11/17/22: 115.1 kg (253 lb 12.8 oz).    Labs:  _  Result Component Current Result Ref Range   Sodium 142 (5/9/2023) 136 - 145 mmol/L     _  Result Component Current Result Ref Range   Potassium 3.8 (5/9/2023) 3.4 - 5.3 mmol/L     _  Result Component Current Result Ref Range   Calcium 10.3 (H) (5/9/2023) 8.6 - 10.0 mg/dL     No results found for Mag within last 30 days.     No results found for Phos within last 30 days.     _  Result Component Current Result Ref Range   Albumin 4.5 (5/9/2023) 3.5 - 5.2 g/dL     _  Result Component Current Result Ref Range   Urea Nitrogen 13.7 (5/9/2023) 6.0 - 20.0 mg/dL     _  Result Component Current Result Ref Range   Creatinine 0.62 (5/9/2023) 0.51 - 0.95 mg/dL     _  Result Component Current Result Ref Range   AST 21 (5/9/2023) 10 - 35 U/L     _  Result Component Current Result Ref Range   ALT 31 (5/9/2023) 10 - 35 U/L     _  Result Component Current Result Ref Range   Bilirubin Total 0.7 (5/9/2023) <=1.2 mg/dL     _  Result Component Current Result Ref Range   WBC Count 5.6 (5/9/2023) 4.0 - 11.0 10e3/uL     _  Result Component Current Result Ref Range   Hemoglobin 13.7 (5/9/2023) 11.7 - 15.7 g/dL     _  Result Component Current Result Ref Range   Platelet Count 214 (5/9/2023) 150 - 450 10e3/uL     No results found for ANC within last 30 days.     _  Result Component Current Result Ref Range   Absolute Neutrophils 4.4 (5/9/2023) 1.6 - 8.3 10e3/uL          Assessment/Plan:  Minerva seems to be tolerating " Rydapt well.     Continue with therapy as planned.     Follow-Up:  Monthly labs 6/6    Refill Due:  2/9/24    Chris Valero  Pharmacy Intern  Oral Chemotherapy Monitoring Program  [facility]  [phone]

## 2023-06-04 ENCOUNTER — HEALTH MAINTENANCE LETTER (OUTPATIENT)
Age: 38
End: 2023-06-04

## 2023-06-06 ENCOUNTER — DOCUMENTATION ONLY (OUTPATIENT)
Dept: ONCOLOGY | Facility: CLINIC | Age: 38
End: 2023-06-06
Payer: COMMERCIAL

## 2023-06-06 NOTE — PROGRESS NOTES
Oral Chemotherapy Program  Lab review     Reviewed labs from 6/6/23.   Normal CMP, CBC w/ diff.    Labs are unremarkable and do not require any dose adjustments at this time.     Follow-up/plan  Monthly labs 7/11 drawn locally     Vy Macario PharmD  Oral Chemotherapy Monitoring Program  [facility]  [phone]

## 2023-07-11 ENCOUNTER — TELEPHONE (OUTPATIENT)
Dept: ONCOLOGY | Facility: CLINIC | Age: 38
End: 2023-07-11
Payer: COMMERCIAL

## 2023-07-11 NOTE — ORAL ONC MGMT
Oral Chemotherapy Monitoring Program  Lab and Monthly Follow Up    Subjective/Objective:  Veda Marinelli is a 38 year old female contacted by phone for a follow-up visit for oral chemotherapy. Minerva confirms taking Rydapt 50 mg po BID. She denies any missed doses and reports no side effects. She has no questions or concerns with her medication at this time.     Reviewed lab results from 7/11/23.    Assessment & Plan:  Lab results with total bilirubin 1.5 mg/dL. There are no dose adjustments recommended in the package insert and per UpToDate, pharmacokinetics are not significantly altered. Will send in basket to Dr Earl to alert him to this and see if any additional monitoring is warranted. Discussed with Minerva, she is to take Rydapt as prescribed. We will reach out if Dr Earl would like to make any changes to her plan. Minerva confirms understand of plan.     Questions answered to patient's satisfaction.    Follow-Up:  8/9/23 lab appt    Mary Jane Radford PharmD, [facility]  Oral Chemotherapy Monitoring Program  [facility]  [phone]  July 11, 2023    ADDENDUM: Per Dr Earl, no changes to plan needed. Continue monitoring labs per usual schedule. Called Minerva with update. She confirms understanding. Mary Jane Radford PharmD, BCPS            2/20/2023     2:00 PM 2/22/2023     2:00 PM 3/14/2023     1:00 PM 5/22/2023    10:00 AM 5/30/2023    10:00 AM 6/6/2023     3:00 PM 7/11/2023     2:00 PM   ORAL CHEMOTHERAPY   Assessment Type Left Voicemail;Lab Monitoring Refill Lab Monitoring;Monthly Follow up Left Voicemail;Monthly Follow up Monthly Follow up Lab Monitoring Lab Monitoring;Monthly Follow up   Diagnosis Code Acute Myeloid Leukemia (AML) Acute Myeloid Leukemia (AML) Acute Myeloid Leukemia (AML) Acute Myeloid Leukemia (AML) Acute Myeloid Leukemia (AML) Acute Myeloid Leukemia (AML) Acute Myeloid Leukemia (AML)   Providers Dr. Mady Earl Dr  [place]   [facility]   Is this patient followed by the [facility] OC team?       No   Drug Name Rydapt (midostaurin) Rydapt (midostaurin) Rydapt (midostaurin) Rydapt (midostaurin) Rydapt (midostaurin) Rydapt (midostaurin) Rydapt (midostaurin)   Dose 50 mg  50 mg 50 mg 50 mg 50 mg 50 mg   Current Schedule BID  BID BID BID BID BID   Cycle Details Continuous  Continuous Continuous Continuous Continuous Continuous   Doses missed in last 2 weeks   0  0     Adherence Assessment   Adherent  Adherent     Adverse Effects   No AE identified during assessment  No AE identified during assessment     Any new drug interactions?   No  No     Is the dose as ordered appropriate for the patient?   Yes       Since the last time we talked, have you been hospitalized or used the emergency room?   No           Labs per Care Everywhere:

## 2023-08-09 ENCOUNTER — LAB (OUTPATIENT)
Dept: INFUSION THERAPY | Facility: CLINIC | Age: 38
End: 2023-08-09
Attending: INTERNAL MEDICINE
Payer: COMMERCIAL

## 2023-08-09 VITALS
SYSTOLIC BLOOD PRESSURE: 123 MMHG | DIASTOLIC BLOOD PRESSURE: 81 MMHG | TEMPERATURE: 98.5 F | OXYGEN SATURATION: 99 % | HEART RATE: 83 BPM

## 2023-08-09 DIAGNOSIS — C92.01 ACUTE MYELOID LEUKEMIA IN REMISSION (H): ICD-10-CM

## 2023-08-09 LAB
ALBUMIN SERPL BCG-MCNC: 4.7 G/DL (ref 3.5–5.2)
ALP SERPL-CCNC: 56 U/L (ref 35–104)
ALT SERPL W P-5'-P-CCNC: 25 U/L (ref 0–50)
ANION GAP SERPL CALCULATED.3IONS-SCNC: 11 MMOL/L (ref 7–15)
AST SERPL W P-5'-P-CCNC: 15 U/L (ref 0–45)
BASOPHILS # BLD AUTO: 0 10E3/UL (ref 0–0.2)
BASOPHILS NFR BLD AUTO: 0 %
BILIRUB SERPL-MCNC: 0.8 MG/DL
BUN SERPL-MCNC: 8.3 MG/DL (ref 6–20)
CALCIUM SERPL-MCNC: 10 MG/DL (ref 8.6–10)
CHLORIDE SERPL-SCNC: 104 MMOL/L (ref 98–107)
CREAT SERPL-MCNC: 0.56 MG/DL (ref 0.51–0.95)
DEPRECATED HCO3 PLAS-SCNC: 22 MMOL/L (ref 22–29)
EOSINOPHIL # BLD AUTO: 0.1 10E3/UL (ref 0–0.7)
EOSINOPHIL NFR BLD AUTO: 1 %
ERYTHROCYTE [DISTWIDTH] IN BLOOD BY AUTOMATED COUNT: 12.4 % (ref 10–15)
GFR SERPL CREATININE-BSD FRML MDRD: >90 ML/MIN/1.73M2
GLUCOSE SERPL-MCNC: 101 MG/DL (ref 70–99)
HCT VFR BLD AUTO: 43.2 % (ref 35–47)
HGB BLD-MCNC: 14.8 G/DL (ref 11.7–15.7)
IMM GRANULOCYTES # BLD: 0 10E3/UL
IMM GRANULOCYTES NFR BLD: 0 %
LDH SERPL L TO P-CCNC: 139 U/L (ref 0–250)
LYMPHOCYTES # BLD AUTO: 0.9 10E3/UL (ref 0.8–5.3)
LYMPHOCYTES NFR BLD AUTO: 17 %
MCH RBC QN AUTO: 33.6 PG (ref 26.5–33)
MCHC RBC AUTO-ENTMCNC: 34.3 G/DL (ref 31.5–36.5)
MCV RBC AUTO: 98 FL (ref 78–100)
MONOCYTES # BLD AUTO: 0.4 10E3/UL (ref 0–1.3)
MONOCYTES NFR BLD AUTO: 8 %
NEUTROPHILS # BLD AUTO: 3.9 10E3/UL (ref 1.6–8.3)
NEUTROPHILS NFR BLD AUTO: 74 %
NRBC # BLD AUTO: 0 10E3/UL
NRBC BLD AUTO-RTO: 0 /100
PLATELET # BLD AUTO: 195 10E3/UL (ref 150–450)
POTASSIUM SERPL-SCNC: 3.7 MMOL/L (ref 3.4–5.3)
PROT SERPL-MCNC: 7.2 G/DL (ref 6.4–8.3)
RBC # BLD AUTO: 4.4 10E6/UL (ref 3.8–5.2)
SODIUM SERPL-SCNC: 137 MMOL/L (ref 136–145)
WBC # BLD AUTO: 5.3 10E3/UL (ref 4–11)

## 2023-08-09 PROCEDURE — 85025 COMPLETE CBC W/AUTO DIFF WBC: CPT | Performed by: INTERNAL MEDICINE

## 2023-08-09 PROCEDURE — 83615 LACTATE (LD) (LDH) ENZYME: CPT | Performed by: INTERNAL MEDICINE

## 2023-08-09 PROCEDURE — 36591 DRAW BLOOD OFF VENOUS DEVICE: CPT

## 2023-08-09 PROCEDURE — 80053 COMPREHEN METABOLIC PANEL: CPT | Performed by: INTERNAL MEDICINE

## 2023-08-09 PROCEDURE — 250N000011 HC RX IP 250 OP 636: Mod: JZ

## 2023-08-09 RX ORDER — HEPARIN SODIUM (PORCINE) LOCK FLUSH IV SOLN 100 UNIT/ML 100 UNIT/ML
SOLUTION INTRAVENOUS
Status: COMPLETED
Start: 2023-08-09 | End: 2023-08-09

## 2023-08-09 RX ADMIN — Medication 500 UNITS: at 13:40

## 2023-08-09 NOTE — PROGRESS NOTES
Infusion Nursing Note:  Veda Marinelli presents today for PAC lab draws per Dr. Earl.    Patient seen by provider today: No   present during visit today: Not Applicable.    Note: Pt port has shifted, more difficult to access.      Intravenous Access:  Implanted Port.    Treatment Conditions:  Not Applicable.      Post Infusion Assessment:  Access discontinued per protocol.  Port accessed for blood draw, heparin locked and deaccessed.       Discharge Plan:   Patient discharged in stable condition accompanied by: self.  Departure Mode: Ambulatory.      Nany Hernandez RN

## 2023-08-10 ENCOUNTER — MYC MEDICAL ADVICE (OUTPATIENT)
Dept: ONCOLOGY | Facility: CLINIC | Age: 38
End: 2023-08-10
Payer: COMMERCIAL

## 2023-08-10 NOTE — TELEPHONE ENCOUNTER
Oral Chemotherapy Monitoring Program  Lab Follow Up    Reviewed lab results from 8/9/23.        2/22/2023     2:00 PM 3/14/2023     1:00 PM 5/22/2023    10:00 AM 5/30/2023    10:00 AM 6/6/2023     3:00 PM 7/11/2023     2:00 PM 8/10/2023     2:00 PM   ORAL CHEMOTHERAPY   Assessment Type Refill Lab Monitoring;Monthly Follow up Left Voicemail;Monthly Follow up Monthly Follow up Lab Monitoring Lab Monitoring;Monthly Follow up Lab Monitoring   Diagnosis Code Acute Myeloid Leukemia (AML) Acute Myeloid Leukemia (AML) Acute Myeloid Leukemia (AML) Acute Myeloid Leukemia (AML) Acute Myeloid Leukemia (AML) Acute Myeloid Leukemia (AML) Acute Myeloid Leukemia (AML)   Providers Dr. Mady Earl   [facility]   Is this patient followed by the [facility] OC team?      No No   Drug Name Rydapt (midostaurin) Rydapt (midostaurin) Rydapt (midostaurin) Rydapt (midostaurin) Rydapt (midostaurin) Rydapt (midostaurin) Rydapt (midostaurin)   Dose  50 mg 50 mg 50 mg 50 mg 50 mg 50 mg   Current Schedule  BID BID BID BID BID BID   Cycle Details  Continuous Continuous Continuous Continuous Continuous Continuous   Doses missed in last 2 weeks  0  0      Adherence Assessment  Adherent  Adherent      Adverse Effects  No AE identified during assessment  No AE identified during assessment      Any new drug interactions?  No  No      Is the dose as ordered appropriate for the patient?  Yes        Since the last time we talked, have you been hospitalized or used the emergency room?  No            Labs:  _  Result Component Current Result Ref Range   Sodium 137 (8/9/2023) 136 - 145 mmol/L     _  Result Component Current Result Ref Range   Potassium 3.7 (8/9/2023) 3.4 - 5.3 mmol/L     _  Result Component Current Result Ref Range   Calcium 10.0 (8/9/2023) 8.6 - 10.0 mg/dL     No results found for Mag within last 30 days.      No results found for Phos within last 30 days.     _  Result Component Current Result Ref Range   Albumin 4.7 (8/9/2023) 3.5 - 5.2 g/dL     _  Result Component Current Result Ref Range   Urea Nitrogen 8.3 (8/9/2023) 6.0 - 20.0 mg/dL     _  Result Component Current Result Ref Range   Creatinine 0.56 (8/9/2023) 0.51 - 0.95 mg/dL     _  Result Component Current Result Ref Range   AST 15 (8/9/2023) 0 - 45 U/L     _  Result Component Current Result Ref Range   ALT 25 (8/9/2023) 0 - 50 U/L     _  Result Component Current Result Ref Range   Bilirubin Total 0.8 (8/9/2023) <=1.2 mg/dL     _  Result Component Current Result Ref Range   WBC Count 5.3 (8/9/2023) 4.0 - 11.0 10e3/uL     _  Result Component Current Result Ref Range   Hemoglobin 14.8 (8/9/2023) 11.7 - 15.7 g/dL     _  Result Component Current Result Ref Range   Platelet Count 195 (8/9/2023) 150 - 450 10e3/uL     No results found for ANC within last 30 days.     _  Result Component Current Result Ref Range   Absolute Neutrophils 3.9 (8/9/2023) 1.6 - 8.3 10e3/uL        Assessment & Plan:  No concerning lab abnormalities.  No changes with Rydapt needed at this time.    Life[employer] message sent to patient.    Dorcas Alonzo, PharmD, BCPS, BCOP  Oncology Clinical Pharmacy Specialist  [facility]  [phone]

## 2023-08-20 PROBLEM — Z79.2 NEED FOR PROPHYLACTIC ANTIBIOTIC: Status: RESOLVED | Noted: 2022-07-11 | Resolved: 2023-08-20

## 2023-08-21 NOTE — PROGRESS NOTES
"Virtual Visit Details    Type of service:  Video Visit     Originating Location (pt. Location): Home    Distant Location (provider location):  On-site  Platform used for Video Visit: Silvina      REASON FOR VISIT:  Management of acute myeloid leukemia (AML)    HISTORY OF PRESENT ILLNESS:  Minerva Marinelli is a 38 year old with a history of acute myeloid leukemia (AML).  To summarize her course, she was noted to have low WBC count on routine labs in 03/2022.  Follow up labs in 05/2022 showed further decrease in WBC as well as anemia and thrombocytopenia.  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  She was treated with cytarabine and daunorubicin \"7+3\" induction chemotherapy with midostaurin on Day 8-21.  Bone marrow biopsy around Day 21 was hypocellular but consistent with recovering bone marrow without definite AML.  Her course was complicated by recurrent C diff and upper extremity DVT.  She had prompt blood cell count recovery and was discharge with ongoing outpatient follow-up.  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  She met with the BMT Team who recommended chemo consolidation.  She completed 4 cycles of high-dose cytarabine (HiDAC) with midostaurin ending in 12/2022.  A post-consolidation bone marrow biopsy showed 70-80% cellular bone marrow with no evidence of AML by morphology or flow cytometry and NPM1 MRD PCR was undetectable.  She started maintenance midostaurin in 01/2023.  Visit for management of AML.      She is not with her mom Melonie or dad Rosalee.  Energy level has been good.  No fevers, night sweats, or unintentional weight loss.  No " "headache or focal weakness or sensory changes.  No dyspnea, cough, or chest pain.  Normal bowel function.  No urinary complaints.  No bleeding or unusual bruising.  Has been working remotely and feeling good.  No problems with midostaurin pills.    KEY PAST MEDICAL HISTORY:  History of COVID-19, recurrent C diff, hypothyroidism    MEDICATIONS:  Current Outpatient Medications   Medication    Bacillus Coagulans-Inulin (PROBIOTIC) 1-250 BILLION-MG CAPS    levothyroxine (SYNTHROID/LEVOTHROID) 75 MCG tablet    magnesium 250 MG tablet    midostaurin (RYDAPT) 25 MG capsule    Multiple Vitamins-Minerals (WOMENS MULTIVITAMIN) TABS    Quercetin 50 MG TABS    vitamin C (ASCORBIC ACID) 1000 MG TABS    vitamin D3 (CHOLECALCIFEROL) 50 mcg (2000 units) tablet    zinc gluconate 50 MG tablet     No current facility-administered medications for this visit.     SOCIAL HISTORY:  Working as RN virtually in primary care setting    PHYSICAL EXAMINATION:  Pulse 66   Ht 1.702 m (5' 7\")   Wt 112.5 kg (248 lb)   BMI 38.84 kg/m    Wt Readings from Last 5 Encounters:   08/22/23 112.5 kg (248 lb)   11/17/22 115.1 kg (253 lb 12.8 oz)   11/14/22 116 kg (255 lb 12.8 oz)   10/12/22 115.4 kg (254 lb 8 oz)   10/10/22 115.2 kg (254 lb)     General: Well appearing.  HEENT: Sclerae anicteric.  Lungs: Breathing comfortably. No cough.  MSK: Grossly normal movement.  Neuro: Grossly non-focal.  Skin/access: Normal skin tone.  Psych: Alert and oriented. No distress.  Performance status: ECOG 0    LABORATORY DATA: Reviewed by me  Recent Labs   Lab Test 08/09/23  1339 05/09/23  1432 01/24/23  1144 12/28/22  1243 12/06/22  0953 12/02/22  0957 11/29/22  0950 07/18/22  0942 07/15/22  0659 07/14/22  0813 07/13/22  0555 06/16/22  1724 06/16/22  1339 06/16/22  1102 06/14/22  0752   WBC 5.3 5.6 5.2   < > 5.9 9.9 10.3   < > 11.4* 6.4 3.1*   < > 41.0*   < > 25.3*   HGB 14.8 13.7 13.1   < > 7.8* 7.9* 7.9*   < > 8.3* 8.1* 8.0*   < > 9.5*   < > 10.1*    214 201   " < > 140* 51* 6*   < > 388 253 145*   < > 72*   < > 90*   ANEU  --   --   --   --  4.3 7.7 8.5*   < > 5.0 2.1 0.7*   < > 0.8*   < > 0.8*   ANEUTAUTO 3.9 4.4 4.1   < >  --   --   --    < >  --   --   --    < >  --   --   --    ABLA  --   --   --   --   --   --   --   --  0.5* 0.3* 0.1*   < > 37.3*  --  20.5*   ALYM  --   --   --   --  0.6* 0.9 0.3*   < > 1.1 1.9 1.3   < > 2.5   < > 3.3   ALYMPAUTO 0.9 0.7* 0.6*   < >  --   --   --    < >  --   --   --    < >  --   --   --    RETICABSCT  --   --   --   --   --   --   --   --   --   --   --   --  0.063  --  0.080    < > = values in this interval not displayed.     Recent Labs   Lab Test 08/09/23  1339 05/09/23  1432 01/24/23  1144 11/25/22  0950 11/17/22  0349 11/16/22  0718 11/15/22  0545 11/14/22  1333 10/13/22  0636 10/12/22  0536 10/11/22  0532    142 141   < > 138 141 138 140   < > 137 140   POTASSIUM 3.7 3.8 4.1   < > 4.3 4.2 3.9 3.8   < > 3.8 3.7   CHLORIDE 104 106 105   < > 109* 109* 107 108*   < > 106 107   CO2 22 25 26   < > 19* 19* 18* 20*   < > 21* 17*   BUN 8.3 13.7 14.1   < > 12.8 13.9 11.8 11.8   < > 12.3 12.8   CR 0.56 0.62 0.57   < > 0.49* 0.52 0.57 0.58   < > 0.57 0.58   MICHAEL 10.0 10.3* 9.7   < > 8.9 8.8 9.4 9.1   < > 9.0 9.4   MAG  --   --   --   --   --   --   --  2.0  --  2.2 2.0   PHOS  --   --   --   --   --   --   --  3.3  --  3.4 3.1   URIC  --   --   --   --  3.2 4.6 6.2* 4.8   < > 4.4 6.0*    162 166   < >  --   --   --  226   < >  --   --     < > = values in this interval not displayed.     Recent Labs   Lab Test 08/09/23  1339 05/09/23  1432 01/24/23  1144 11/25/22  0950 11/17/22  0349 11/16/22  0718 11/15/22  0545   AST 15 21 20   < > 16 14 17   ALT 25 31 24   < > 21 23 25   ALKPHOS 56 62 65   < > 58 53 55   BILITOTAL 0.8 0.7 0.5   < > 0.8 1.2 0.7   INR  --   --   --   --  1.08 1.09 1.15    < > = values in this interval not displayed.     8/9/2023 NPM1 MRD PCR in process    IMPRESSION AND PLAN:  Minerva Marinelli is a 38 year old  with acute myeloid leukemia (AML).    There is nothing to suggest recurrent AML.  We will keep an eye out for NPM1 MRD PCR that is in process.  She is completing a year of maintenance midostaurin through about 01/2024 based on the RATIFY trial (N Engl J Med 2017;377:454-64) to minimize the risk of AML relapse.  We will monitor disease status including periodic NPM1 MRD PCR and for treatment toxicity.     She has no active ID issues.  Her past issues with C diff resolved.  She has been off prophylactic antimicrobials.  She has declined vaccines including COVID vaccination.    She completed anticoagulation for her past provoked upper extremity DVT.  She will continue to work with Maria Eugenia Villar NP for health maintenance and other medical issues.    We will continue monitoring labs and arrange a return visit in about 3 months.  I reminded her to contact us if questions, concerns, or new issues come up between visits.    Video start time: 12:33 PM  Video end time: 12:39 PM  Video duration: 6 minutes    Ortiz Earl MD, PhD  Attending Physician, [facility]  [phone] [facility]

## 2023-08-22 ENCOUNTER — VIRTUAL VISIT (OUTPATIENT)
Dept: ONCOLOGY | Facility: CLINIC | Age: 38
End: 2023-08-22
Attending: INTERNAL MEDICINE
Payer: COMMERCIAL

## 2023-08-22 VITALS — BODY MASS INDEX: 38.92 KG/M2 | WEIGHT: 248 LBS | HEART RATE: 66 BPM | HEIGHT: 67 IN

## 2023-08-22 DIAGNOSIS — Z86.718 PERSONAL HISTORY OF DVT (DEEP VEIN THROMBOSIS): ICD-10-CM

## 2023-08-22 DIAGNOSIS — C92.01 ACUTE MYELOID LEUKEMIA IN REMISSION (H): Primary | ICD-10-CM

## 2023-08-22 PROCEDURE — 99214 OFFICE O/P EST MOD 30 MIN: CPT | Mod: VID | Performed by: INTERNAL MEDICINE

## 2023-08-22 ASSESSMENT — PAIN SCALES - GENERAL: PAINLEVEL: NO PAIN (0)

## 2023-08-22 NOTE — NURSING NOTE
Is the patient currently in the state of [place]? YES    Visit mode:VIDEO    If the visit is dropped, the patient can be reconnected by: VIDEO VISIT: Text to cell phone:   Telephone Information:   Mobile [phone]       Will anyone else be joining the visit? NO  (If patient encounters technical issues they should call [phone]    How would you like to obtain your AVS? MyChart    Are changes needed to the allergy or medication list? Pt stated no med changes    Reason for visit: RECHECK    No other pt vitals to report per pt      Perla Padgett VVF

## 2023-08-22 NOTE — LETTER
"    8/22/2023         RE: Veda Marinelli  [address] [address] [address]        Dear Colleague,    Thank you for referring your patient, Veda Marinelli, to the [facility]. Please see a copy of my visit note below.    Virtual Visit Details    Type of service:  Video Visit     Originating Location (pt. Location): Home    Distant Location (provider location):  On-site  Platform used for Video Visit: AmWell      REASON FOR VISIT:  Management of acute myeloid leukemia (AML)    HISTORY OF PRESENT ILLNESS:  Minerva Marinelli is a 38 year old with a history of acute myeloid leukemia (AML).  To summarize her course, she was noted to have low WBC count on routine labs in 03/2022.  Follow up labs in 05/2022 showed further decrease in WBC as well as anemia and thrombocytopenia.  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  She was treated with cytarabine and daunorubicin \"7+3\" induction chemotherapy with midostaurin on Day 8-21.  Bone marrow biopsy around Day 21 was hypocellular but consistent with recovering bone marrow without definite AML.  Her course was complicated by recurrent C diff and upper extremity DVT.  She had prompt blood cell count recovery and was discharge with ongoing outpatient follow-up.  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  She met with the BMT Team who recommended chemo consolidation.  She completed 4 cycles of high-dose cytarabine (HiDAC) with midostaurin ending in 12/2022.  A post-consolidation bone marrow biopsy showed 70-80% cellular bone marrow with no evidence of AML by morphology or flow " "cytometry and NPM1 MRD PCR was undetectable.  She started maintenance midostaurin in 01/2023.  Visit for management of AML.      She is not with her mom Melonie or dad Rosalee.  Energy level has been good.  No fevers, night sweats, or unintentional weight loss.  No headache or focal weakness or sensory changes.  No dyspnea, cough, or chest pain.  Normal bowel function.  No urinary complaints.  No bleeding or unusual bruising.  Has been working remotely and feeling good.  No problems with midostaurin pills.    KEY PAST MEDICAL HISTORY:  History of COVID-19, recurrent C diff, hypothyroidism    MEDICATIONS:  Current Outpatient Medications   Medication    Bacillus Coagulans-Inulin (PROBIOTIC) 1-250 BILLION-MG CAPS    levothyroxine (SYNTHROID/LEVOTHROID) 75 MCG tablet    magnesium 250 MG tablet    midostaurin (RYDAPT) 25 MG capsule    Multiple Vitamins-Minerals (WOMENS MULTIVITAMIN) TABS    Quercetin 50 MG TABS    vitamin C (ASCORBIC ACID) 1000 MG TABS    vitamin D3 (CHOLECALCIFEROL) 50 mcg (2000 units) tablet    zinc gluconate 50 MG tablet     No current facility-administered medications for this visit.     SOCIAL HISTORY:  Working as RN virtually in primary care setting    PHYSICAL EXAMINATION:  Pulse 66   Ht 1.702 m (5' 7\")   Wt 112.5 kg (248 lb)   BMI 38.84 kg/m    Wt Readings from Last 5 Encounters:   08/22/23 112.5 kg (248 lb)   11/17/22 115.1 kg (253 lb 12.8 oz)   11/14/22 116 kg (255 lb 12.8 oz)   10/12/22 115.4 kg (254 lb 8 oz)   10/10/22 115.2 kg (254 lb)     General: Well appearing.  HEENT: Sclerae anicteric.  Lungs: Breathing comfortably. No cough.  MSK: Grossly normal movement.  Neuro: Grossly non-focal.  Skin/access: Normal skin tone.  Psych: Alert and oriented. No distress.  Performance status: ECOG 0    LABORATORY DATA: Reviewed by me  Recent Labs   Lab Test 08/09/23  1339 05/09/23  1432 01/24/23  1144 12/28/22  1243 12/06/22  0953 12/02/22  0957 11/29/22  0950 07/18/22  0942 07/15/22  0659 07/14/22  0813 " 07/13/22  0555 06/16/22  1724 06/16/22  1339 06/16/22  1102 06/14/22  0752   WBC 5.3 5.6 5.2   < > 5.9 9.9 10.3   < > 11.4* 6.4 3.1*   < > 41.0*   < > 25.3*   HGB 14.8 13.7 13.1   < > 7.8* 7.9* 7.9*   < > 8.3* 8.1* 8.0*   < > 9.5*   < > 10.1*    214 201   < > 140* 51* 6*   < > 388 253 145*   < > 72*   < > 90*   ANEU  --   --   --   --  4.3 7.7 8.5*   < > 5.0 2.1 0.7*   < > 0.8*   < > 0.8*   ANEUTAUTO 3.9 4.4 4.1   < >  --   --   --    < >  --   --   --    < >  --   --   --    ABLA  --   --   --   --   --   --   --   --  0.5* 0.3* 0.1*   < > 37.3*  --  20.5*   ALYM  --   --   --   --  0.6* 0.9 0.3*   < > 1.1 1.9 1.3   < > 2.5   < > 3.3   ALYMPAUTO 0.9 0.7* 0.6*   < >  --   --   --    < >  --   --   --    < >  --   --   --    RETICABSCT  --   --   --   --   --   --   --   --   --   --   --   --  0.063  --  0.080    < > = values in this interval not displayed.     Recent Labs   Lab Test 08/09/23  1339 05/09/23  1432 01/24/23  1144 11/25/22  0950 11/17/22  0349 11/16/22  0718 11/15/22  0545 11/14/22  1333 10/13/22  0636 10/12/22  0536 10/11/22  0532    142 141   < > 138 141 138 140   < > 137 140   POTASSIUM 3.7 3.8 4.1   < > 4.3 4.2 3.9 3.8   < > 3.8 3.7   CHLORIDE 104 106 105   < > 109* 109* 107 108*   < > 106 107   CO2 22 25 26   < > 19* 19* 18* 20*   < > 21* 17*   BUN 8.3 13.7 14.1   < > 12.8 13.9 11.8 11.8   < > 12.3 12.8   CR 0.56 0.62 0.57   < > 0.49* 0.52 0.57 0.58   < > 0.57 0.58   MICHAEL 10.0 10.3* 9.7   < > 8.9 8.8 9.4 9.1   < > 9.0 9.4   MAG  --   --   --   --   --   --   --  2.0  --  2.2 2.0   PHOS  --   --   --   --   --   --   --  3.3  --  3.4 3.1   URIC  --   --   --   --  3.2 4.6 6.2* 4.8   < > 4.4 6.0*    162 166   < >  --   --   --  226   < >  --   --     < > = values in this interval not displayed.     Recent Labs   Lab Test 08/09/23  1339 05/09/23  1432 01/24/23  1144 11/25/22  0950 11/17/22  0349 11/16/22  0718 11/15/22  0545   AST 15 21 20   < > 16 14 17   ALT 25 31 24   < > 21 23  25   ALKPHOS 56 62 65   < > 58 53 55   BILITOTAL 0.8 0.7 0.5   < > 0.8 1.2 0.7   INR  --   --   --   --  1.08 1.09 1.15    < > = values in this interval not displayed.     8/9/2023 NPM1 MRD PCR in process    IMPRESSION AND PLAN:  Minerva Marinelli is a 38 year old with acute myeloid leukemia (AML).    There is nothing to suggest recurrent AML.  We will keep an eye out for NPM1 MRD PCR that is in process.  She is completing a year of maintenance midostaurin through about 01/2024 based on the RATIFY trial (N Engl J Med 2017;377:454-64) to minimize the risk of AML relapse.  We will monitor disease status including periodic NPM1 MRD PCR and for treatment toxicity.     She has no active ID issues.  Her past issues with C diff resolved.  She has been off prophylactic antimicrobials.  She has declined vaccines including COVID vaccination.    She completed anticoagulation for her past provoked upper extremity DVT.  She will continue to work with Maria Eugenia Villar NP for health maintenance and other medical issues.    We will continue monitoring labs and arrange a return visit in about 3 months.  I reminded her to contact us if questions, concerns, or new issues come up between visits.    Video start time: 12:33 PM  Video end time: 12:39 PM  Video duration: 6 minutes    Ortiz Earl MD, PhD  Attending Physician, [facility]  [phone] clinic

## 2023-10-01 NOTE — PLAN OF CARE
[id number]  Goal Outcome Evaluation:  Pt A&Ox4, VSS on RA. Denies  pain/N/V. Neuros intact and unchanged. Port infusing HD cytarabine, see chemo note below. Makes needs known. Sleep/rest promoted with clustered cares overnight. Pt reports good urine output, not saving. Continue to monitor and with POC.  Nursing Focus: Chemotherapy  D: Positive brisk bright red blood return via Port. Insertion site is clean/dry/intact, dressing intact with no complaints of pain.  Urine output is recorded in intake Doc Flowsheet.    I: Zofran premedications given per order (see electronic medical administration record). Dose #2 of cytarabine started to infuse over 3hours. Reviewed pt teaching on chemotherapy side effects.  Pt denies need for further teaching. Chemotherapy double checked per protocol by two chemotherapy competent RN's.   A: Tolerating chemo well. Denies nausea.   P: Continue to monitor urine output and symptoms of nausea. Screen for symptoms of toxicity.    Patient arrives ambulatory with mother who reports 2 days of fever, raspy/losing voice, and cough.

## 2023-10-03 ENCOUNTER — LAB (OUTPATIENT)
Dept: INFUSION THERAPY | Facility: CLINIC | Age: 38
End: 2023-10-03
Attending: INTERNAL MEDICINE
Payer: COMMERCIAL

## 2023-10-03 DIAGNOSIS — C92.01 ACUTE MYELOID LEUKEMIA IN REMISSION (H): Primary | ICD-10-CM

## 2023-10-03 LAB
ALBUMIN SERPL BCG-MCNC: 4.3 G/DL (ref 3.5–5.2)
ALP SERPL-CCNC: 56 U/L (ref 35–104)
ALT SERPL W P-5'-P-CCNC: 27 U/L (ref 0–50)
ANION GAP SERPL CALCULATED.3IONS-SCNC: 9 MMOL/L (ref 7–15)
AST SERPL W P-5'-P-CCNC: 17 U/L (ref 0–45)
BASO+EOS+MONOS # BLD AUTO: ABNORMAL 10*3/UL
BASO+EOS+MONOS NFR BLD AUTO: ABNORMAL %
BASOPHILS # BLD AUTO: 0 10E3/UL (ref 0–0.2)
BASOPHILS NFR BLD AUTO: 1 %
BILIRUB SERPL-MCNC: 0.9 MG/DL
BUN SERPL-MCNC: 10.3 MG/DL (ref 6–20)
CALCIUM SERPL-MCNC: 9.6 MG/DL (ref 8.6–10)
CHLORIDE SERPL-SCNC: 106 MMOL/L (ref 98–107)
CREAT SERPL-MCNC: 0.59 MG/DL (ref 0.51–0.95)
DEPRECATED HCO3 PLAS-SCNC: 24 MMOL/L (ref 22–29)
EGFRCR SERPLBLD CKD-EPI 2021: >90 ML/MIN/1.73M2
EOSINOPHIL # BLD AUTO: 0.1 10E3/UL (ref 0–0.7)
EOSINOPHIL NFR BLD AUTO: 2 %
ERYTHROCYTE [DISTWIDTH] IN BLOOD BY AUTOMATED COUNT: 12.8 % (ref 10–15)
GLUCOSE SERPL-MCNC: 111 MG/DL (ref 70–99)
HCT VFR BLD AUTO: 40.4 % (ref 35–47)
HGB BLD-MCNC: 14 G/DL (ref 11.7–15.7)
IMM GRANULOCYTES # BLD: 0 10E3/UL
IMM GRANULOCYTES NFR BLD: 0 %
LYMPHOCYTES # BLD AUTO: 0.7 10E3/UL (ref 0.8–5.3)
LYMPHOCYTES NFR BLD AUTO: 16 %
MCH RBC QN AUTO: 34.3 PG (ref 26.5–33)
MCHC RBC AUTO-ENTMCNC: 34.7 G/DL (ref 31.5–36.5)
MCV RBC AUTO: 99 FL (ref 78–100)
MONOCYTES # BLD AUTO: 0.4 10E3/UL (ref 0–1.3)
MONOCYTES NFR BLD AUTO: 8 %
NEUTROPHILS # BLD AUTO: 3.2 10E3/UL (ref 1.6–8.3)
NEUTROPHILS NFR BLD AUTO: 73 %
NRBC # BLD AUTO: 0 10E3/UL
NRBC BLD AUTO-RTO: 0 /100
PLATELET # BLD AUTO: 197 10E3/UL (ref 150–450)
POTASSIUM SERPL-SCNC: 3.9 MMOL/L (ref 3.4–5.3)
PROT SERPL-MCNC: 6.7 G/DL (ref 6.4–8.3)
RBC # BLD AUTO: 4.08 10E6/UL (ref 3.8–5.2)
SODIUM SERPL-SCNC: 139 MMOL/L (ref 135–145)
WBC # BLD AUTO: 4.5 10E3/UL (ref 4–11)

## 2023-10-03 PROCEDURE — 83615 LACTATE (LD) (LDH) ENZYME: CPT | Performed by: INTERNAL MEDICINE

## 2023-10-03 PROCEDURE — 84999 UNLISTED CHEMISTRY PROCEDURE: CPT | Performed by: INTERNAL MEDICINE

## 2023-10-03 PROCEDURE — 80053 COMPREHEN METABOLIC PANEL: CPT | Performed by: INTERNAL MEDICINE

## 2023-10-03 PROCEDURE — 250N000011 HC RX IP 250 OP 636: Mod: JZ | Performed by: INTERNAL MEDICINE

## 2023-10-03 PROCEDURE — 85014 HEMATOCRIT: CPT | Performed by: INTERNAL MEDICINE

## 2023-10-03 PROCEDURE — 36591 DRAW BLOOD OFF VENOUS DEVICE: CPT

## 2023-10-03 PROCEDURE — 81310 NPM1 GENE: CPT | Performed by: INTERNAL MEDICINE

## 2023-10-03 RX ORDER — HEPARIN SODIUM (PORCINE) LOCK FLUSH IV SOLN 100 UNIT/ML 100 UNIT/ML
5 SOLUTION INTRAVENOUS
Status: DISCONTINUED | OUTPATIENT
Start: 2023-10-03 | End: 2023-10-03 | Stop reason: HOSPADM

## 2023-10-03 RX ORDER — HEPARIN SODIUM (PORCINE) LOCK FLUSH IV SOLN 100 UNIT/ML 100 UNIT/ML
5 SOLUTION INTRAVENOUS
Status: CANCELLED | OUTPATIENT
Start: 2023-10-03

## 2023-10-03 RX ADMIN — Medication 5 ML: at 11:30

## 2023-10-03 NOTE — PROGRESS NOTES
Infusion Nursing Note:  Veda Marinelli presents today for Port labs.    Patient seen by provider today: No   present during visit today: Not Applicable.    Note: [facility] laboratories order needs to be drawn in lavender top blood tube.      Intravenous Access:  Implanted Port.    Treatment Conditions:  Not Applicable.      Post Infusion Assessment:  Blood return noted pre and post infusion.  Site patent and intact, free from redness, edema or discomfort.  No evidence of extravasations.  Access discontinued per protocol.       Discharge Plan:   AVS to patient via MYCHART.  Patient will return 12/19/23 at 1:00 pm for next appointment.   Patient discharged in stable condition accompanied by: self.  Departure Mode: Ambulatory.      Mary Lou Whitt RN

## 2023-10-04 ENCOUNTER — TELEPHONE (OUTPATIENT)
Dept: ONCOLOGY | Facility: CLINIC | Age: 38
End: 2023-10-04
Payer: COMMERCIAL

## 2023-10-04 LAB — LDH SERPL L TO P-CCNC: 158 U/L (ref 0–250)

## 2023-10-04 NOTE — TELEPHONE ENCOUNTER
Oral Chemotherapy Monitoring Program    Subjective/Objective:  Veda Marinelli is a 38 year old female contacted by phone for a follow-up visit for oral chemotherapy.  Pt confirms taking the appropriate dose of Rydapt, 2x25mg twice daily. Denies new or worsening side effects, missed doses, and recent hospital or ED visits. Patient has not had any recent medication changes.     Reviewed 10/3 labs.  Pt confirms next month labs will be at [facility] (results in CE).          3/14/2023     1:00 PM 5/22/2023    10:00 AM 5/30/2023    10:00 AM 6/6/2023     3:00 PM 7/11/2023     2:00 PM 8/10/2023     2:00 PM 10/4/2023     3:00 PM   ORAL CHEMOTHERAPY   Assessment Type Lab Monitoring;Monthly Follow up Left Voicemail;Monthly Follow up Monthly Follow up Lab Monitoring Lab Monitoring;Monthly Follow up Lab Monitoring Lab Monitoring;Monthly Follow up   Diagnosis Code Acute Myeloid Leukemia (AML) Acute Myeloid Leukemia (AML) Acute Myeloid Leukemia (AML) Acute Myeloid Leukemia (AML) Acute Myeloid Leukemia (AML) Acute Myeloid Leukemia (AML) Acute Myeloid Leukemia (AML)   Providers Dr. Mady Earl   [facility]   Is this patient followed by the [facility] OC team?     No No No   Drug Name Rydapt (midostaurin) Rydapt (midostaurin) Rydapt (midostaurin) Rydapt (midostaurin) Rydapt (midostaurin) Rydapt (midostaurin) Rydapt (midostaurin)   Dose 50 mg 50 mg 50 mg 50 mg 50 mg 50 mg 50 mg   Current Schedule BID BID BID BID BID BID BID   Cycle Details Continuous Continuous Continuous Continuous Continuous Continuous Continuous   Doses missed in last 2 weeks 0  0    0   Adherence Assessment Adherent  Adherent    Adherent   Adverse Effects No AE identified during assessment  No AE identified during assessment    No AE identified during assessment   Any new drug interactions? No  No    No   Is the dose as ordered  "appropriate for the patient? Yes      Yes   Since the last time we talked, have you been hospitalized or used the emergency room? No      No       Last PHQ-2 Score on record:        No data to display                Vitals:  BP:   BP Readings from Last 1 Encounters:   08/09/23 123/81     Wt Readings from Last 1 Encounters:   08/22/23 112.5 kg (248 lb)     Estimated body surface area is 2.31 meters squared as calculated from the following:    Height as of 8/22/23: 1.702 m (5' 7\").    Weight as of 8/22/23: 112.5 kg (248 lb).    Labs:  _  Result Component Current Result Ref Range   Sodium 139 (10/3/2023) 135 - 145 mmol/L     _  Result Component Current Result Ref Range   Potassium 3.9 (10/3/2023) 3.4 - 5.3 mmol/L     _  Result Component Current Result Ref Range   Calcium 9.6 (10/3/2023) 8.6 - 10.0 mg/dL     No results found for Mag within last 30 days.     No results found for Phos within last 30 days.     _  Result Component Current Result Ref Range   Albumin 4.3 (10/3/2023) 3.5 - 5.2 g/dL     _  Result Component Current Result Ref Range   Urea Nitrogen 10.3 (10/3/2023) 6.0 - 20.0 mg/dL     _  Result Component Current Result Ref Range   Creatinine 0.59 (10/3/2023) 0.51 - 0.95 mg/dL     _  Result Component Current Result Ref Range   AST 17 (10/3/2023) 0 - 45 U/L     _  Result Component Current Result Ref Range   ALT 27 (10/3/2023) 0 - 50 U/L     _  Result Component Current Result Ref Range   Bilirubin Total 0.9 (10/3/2023) <=1.2 mg/dL     _  Result Component Current Result Ref Range   WBC Count 4.5 (10/3/2023) 4.0 - 11.0 10e3/uL     _  Result Component Current Result Ref Range   Hemoglobin 14.0 (10/3/2023) 11.7 - 15.7 g/dL     _  Result Component Current Result Ref Range   Platelet Count 197 (10/3/2023) 150 - 450 10e3/uL     No results found for ANC within last 30 days.     _  Result Component Current Result Ref Range   Absolute Neutrophils 3.2 (10/3/2023) 1.6 - 8.3 10e3/uL      Assessment/Plan:  Pt continues to " tolerate Rydapt well. Labs are stable with no concerning abnormalities. Continue therapy as planned.    Follow-Up:  11/1: Look for monthly labs in CE    Grace Myhre, PharmD  Hematology/Oncology Pharmacist  [facility]  [phone]

## 2023-10-13 LAB
Lab: NORMAL
PERFORMING LABORATORY: NORMAL
TEST NAME: NORMAL

## 2023-11-01 ENCOUNTER — LAB (OUTPATIENT)
Dept: INFUSION THERAPY | Facility: CLINIC | Age: 38
End: 2023-11-01
Attending: INTERNAL MEDICINE
Payer: COMMERCIAL

## 2023-11-01 DIAGNOSIS — C92.01 ACUTE MYELOID LEUKEMIA IN REMISSION (H): ICD-10-CM

## 2023-11-01 LAB
ALBUMIN SERPL BCG-MCNC: 4.7 G/DL (ref 3.5–5.2)
ALP SERPL-CCNC: 58 U/L (ref 35–104)
ALT SERPL W P-5'-P-CCNC: 26 U/L (ref 0–50)
ANION GAP SERPL CALCULATED.3IONS-SCNC: 11 MMOL/L (ref 7–15)
AST SERPL W P-5'-P-CCNC: 15 U/L (ref 0–45)
BASOPHILS # BLD AUTO: 0 10E3/UL (ref 0–0.2)
BASOPHILS NFR BLD AUTO: 0 %
BILIRUB SERPL-MCNC: 0.5 MG/DL
BUN SERPL-MCNC: 16.9 MG/DL (ref 6–20)
CALCIUM SERPL-MCNC: 9.6 MG/DL (ref 8.6–10)
CHLORIDE SERPL-SCNC: 103 MMOL/L (ref 98–107)
CREAT SERPL-MCNC: 0.7 MG/DL (ref 0.51–0.95)
DEPRECATED HCO3 PLAS-SCNC: 22 MMOL/L (ref 22–29)
EGFRCR SERPLBLD CKD-EPI 2021: >90 ML/MIN/1.73M2
EOSINOPHIL # BLD AUTO: 0.1 10E3/UL (ref 0–0.7)
EOSINOPHIL NFR BLD AUTO: 1 %
ERYTHROCYTE [DISTWIDTH] IN BLOOD BY AUTOMATED COUNT: 12.7 % (ref 10–15)
GLUCOSE SERPL-MCNC: 101 MG/DL (ref 70–99)
HCT VFR BLD AUTO: 43.2 % (ref 35–47)
HGB BLD-MCNC: 15 G/DL (ref 11.7–15.7)
IMM GRANULOCYTES # BLD: 0 10E3/UL
IMM GRANULOCYTES NFR BLD: 0 %
LDH SERPL L TO P-CCNC: 146 U/L (ref 0–250)
LYMPHOCYTES # BLD AUTO: 1.2 10E3/UL (ref 0.8–5.3)
LYMPHOCYTES NFR BLD AUTO: 13 %
MCH RBC QN AUTO: 34 PG (ref 26.5–33)
MCHC RBC AUTO-ENTMCNC: 34.7 G/DL (ref 31.5–36.5)
MCV RBC AUTO: 98 FL (ref 78–100)
MONOCYTES # BLD AUTO: 0.7 10E3/UL (ref 0–1.3)
MONOCYTES NFR BLD AUTO: 8 %
NEUTROPHILS # BLD AUTO: 6.9 10E3/UL (ref 1.6–8.3)
NEUTROPHILS NFR BLD AUTO: 78 %
NRBC # BLD AUTO: 0 10E3/UL
NRBC BLD AUTO-RTO: 0 /100
PLATELET # BLD AUTO: 202 10E3/UL (ref 150–450)
POTASSIUM SERPL-SCNC: 4.1 MMOL/L (ref 3.4–5.3)
PROT SERPL-MCNC: 7.3 G/DL (ref 6.4–8.3)
RBC # BLD AUTO: 4.41 10E6/UL (ref 3.8–5.2)
SODIUM SERPL-SCNC: 136 MMOL/L (ref 135–145)
WBC # BLD AUTO: 9 10E3/UL (ref 4–11)

## 2023-11-01 PROCEDURE — 83615 LACTATE (LD) (LDH) ENZYME: CPT | Performed by: INTERNAL MEDICINE

## 2023-11-01 PROCEDURE — 250N000011 HC RX IP 250 OP 636: Mod: JZ

## 2023-11-01 PROCEDURE — 85025 COMPLETE CBC W/AUTO DIFF WBC: CPT | Performed by: INTERNAL MEDICINE

## 2023-11-01 PROCEDURE — 80053 COMPREHEN METABOLIC PANEL: CPT | Performed by: INTERNAL MEDICINE

## 2023-11-01 PROCEDURE — 36591 DRAW BLOOD OFF VENOUS DEVICE: CPT

## 2023-11-01 RX ORDER — HEPARIN SODIUM (PORCINE) LOCK FLUSH IV SOLN 100 UNIT/ML 100 UNIT/ML
SOLUTION INTRAVENOUS
Status: COMPLETED
Start: 2023-11-01 | End: 2023-11-01

## 2023-11-01 RX ADMIN — Medication 500 UNITS: at 15:17

## 2023-11-01 NOTE — PROGRESS NOTES
Port accessed and labs drawn without difficulty.  Port then flushed per [facility] protocol and deaccessed.  Pt tolerated well.    Haley Funez RN

## 2023-11-02 ENCOUNTER — MYC MEDICAL ADVICE (OUTPATIENT)
Dept: ONCOLOGY | Facility: CLINIC | Age: 38
End: 2023-11-02
Payer: COMMERCIAL

## 2023-11-02 NOTE — ORAL ONC MGMT
Oral Chemotherapy Monitoring Program  Lab Follow Up    Reviewed lab results from 11/1.        5/22/2023    10:00 AM 5/30/2023    10:00 AM 6/6/2023     3:00 PM 7/11/2023     2:00 PM 8/10/2023     2:00 PM 10/4/2023     3:00 PM 11/2/2023    11:00 AM   ORAL CHEMOTHERAPY   Assessment Type Left Voicemail;Monthly Follow up Monthly Follow up Lab Monitoring Lab Monitoring;Monthly Follow up Lab Monitoring Lab Monitoring;Monthly Follow up Lab Monitoring;Monthly Follow up   Diagnosis Code Acute Myeloid Leukemia (AML) Acute Myeloid Leukemia (AML) Acute Myeloid Leukemia (AML) Acute Myeloid Leukemia (AML) Acute Myeloid Leukemia (AML) Acute Myeloid Leukemia (AML) Acute Myeloid Leukemia (AML)   Providers Dr. Mady Earl   [facility]   Is this patient followed by the [facility] OC team?    No No No No   Drug Name Rydapt (midostaurin) Rydapt (midostaurin) Rydapt (midostaurin) Rydapt (midostaurin) Rydapt (midostaurin) Rydapt (midostaurin) Rydapt (midostaurin)   Dose 50 mg 50 mg 50 mg 50 mg 50 mg 50 mg 50 mg   Current Schedule BID BID BID BID BID BID BID   Cycle Details Continuous Continuous Continuous Continuous Continuous Continuous Continuous   Doses missed in last 2 weeks  0    0    Adherence Assessment  Adherent    Adherent    Adverse Effects  No AE identified during assessment    No AE identified during assessment    Any new drug interactions?  No    No    Is the dose as ordered appropriate for the patient?      Yes    Since the last time we talked, have you been hospitalized or used the emergency room?      No        Labs:  _  Result Component Current Result Ref Range   Sodium 136 (11/1/2023) 135 - 145 mmol/L     _  Result Component Current Result Ref Range   Potassium 4.1 (11/1/2023) 3.4 - 5.3 mmol/L     _  Result Component Current Result Ref Range   Calcium 9.6 (11/1/2023) 8.6 - 10.0 mg/dL     No  results found for Mag within last 30 days.     No results found for Phos within last 30 days.     _  Result Component Current Result Ref Range   Albumin 4.7 (11/1/2023) 3.5 - 5.2 g/dL     _  Result Component Current Result Ref Range   Urea Nitrogen 16.9 (11/1/2023) 6.0 - 20.0 mg/dL     _  Result Component Current Result Ref Range   Creatinine 0.70 (11/1/2023) 0.51 - 0.95 mg/dL     _  Result Component Current Result Ref Range   AST 15 (11/1/2023) 0 - 45 U/L     _  Result Component Current Result Ref Range   ALT 26 (11/1/2023) 0 - 50 U/L     _  Result Component Current Result Ref Range   Bilirubin Total 0.5 (11/1/2023) <=1.2 mg/dL     _  Result Component Current Result Ref Range   WBC Count 9.0 (11/1/2023) 4.0 - 11.0 10e3/uL     _  Result Component Current Result Ref Range   Hemoglobin 15.0 (11/1/2023) 11.7 - 15.7 g/dL     _  Result Component Current Result Ref Range   Platelet Count 202 (11/1/2023) 150 - 450 10e3/uL     No results found for ANC within last 30 days.     _  Result Component Current Result Ref Range   Absolute Neutrophils 6.9 (11/1/2023) 1.6 - 8.3 10e3/uL        Assessment & Plan:  No concerning abnormalities.  Continue Rydapt as prescribed.    [employer] message sent to patient.     Follow-Up:  11/21 with Dr Mady Park, PharmD  Oral Chemotherapy Monitoring Program  [facility]  [phone]  .

## 2023-11-03 LAB — SCANNED LAB RESULT: NORMAL

## 2023-11-06 ENCOUNTER — TELEPHONE (OUTPATIENT)
Dept: ONCOLOGY | Facility: CLINIC | Age: 38
End: 2023-11-06
Payer: COMMERCIAL

## 2023-11-06 NOTE — TELEPHONE ENCOUNTER
I have reviewed and agree to the information submitted for this Free Drug Application.     Mary Jane Radford, PharmD, [facility]  Oral Chemotherapy Monitoring Program  [facility]  [phone]  November 6, 2023  -------------------------    FREE DRUG APPLICATION INITIATED    Medication: RYDAPT 25 MG PO CAPS  Free Drug Program Name:  [facility]  Date Submitted: 11/6/2023  1:19 PM  Phone #: [phone]  Fax #: [phone]  Additional Information: Faxed to [facility] 11/13/23              Thank you,    Farida Pichardo  Oncology Pharmacy Liaison ANISA [email or web address]  Phone: [phone]  Fax: [phone]

## 2023-11-20 DIAGNOSIS — C92.01 ACUTE MYELOID LEUKEMIA IN REMISSION (H): Primary | ICD-10-CM

## 2023-11-20 PROBLEM — C95.01 ACUTE LEUKEMIA IN REMISSION (H): Status: RESOLVED | Noted: 2022-08-22 | Resolved: 2023-11-20

## 2023-11-20 NOTE — PROGRESS NOTES
"Virtual Visit Details    Type of service:  Video Visit     Originating Location (pt. Location): Home    Distant Location (provider location):  Off-site  Platform used for Video Visit: Silvina        REASON FOR VISIT:  Management of acute myeloid leukemia (AML)    HISTORY OF PRESENT ILLNESS:  Minerva Marinelli is a 38 year old with a history of acute myeloid leukemia (AML).  To summarize her course, she was noted to have low WBC count on routine labs in 03/2022.  Follow up labs in 05/2022 showed further decrease in WBC as well as anemia and thrombocytopenia.  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  She was treated with cytarabine and daunorubicin \"7+3\" induction chemotherapy with midostaurin on Day 8-21.  Bone marrow biopsy around Day 21 was hypocellular but consistent with recovering bone marrow without definite AML.  Her course was complicated by recurrent C diff and upper extremity DVT.  She had prompt blood cell count recovery and was discharge with ongoing outpatient follow-up.  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  She met with the BMT Team who recommended chemo consolidation.  She completed 4 cycles of high-dose cytarabine (HiDAC) with midostaurin ending in 12/2022.  A post-consolidation bone marrow biopsy showed 70-80% cellular bone marrow with no evidence of AML by morphology or flow cytometry and NPM1 MRD PCR was undetectable.  She started maintenance midostaurin in 01/2023.  Visit for management of AML.      She is not with her mom Melonie or dad Rosalee.  Energy level has been good.  No fevers, night sweats, or unintentional weight loss.  " "No headache or focal weakness or sensory changes.  No dyspnea, cough, or chest pain.  No bleeding or unusual bruising.  Has been working remotely and feeling good.  No problems with midostaurin pills.  Spending time with family over the holidays and going back to [place] in March.  No specific concerns today.    KEY PAST MEDICAL HISTORY:  History of COVID-19, recurrent C diff, hypothyroidism    MEDICATIONS:  Current Outpatient Medications   Medication    Bacillus Coagulans-Inulin (PROBIOTIC) 1-250 BILLION-MG CAPS    levothyroxine (SYNTHROID/LEVOTHROID) 75 MCG tablet    magnesium 250 MG tablet    midostaurin (RYDAPT) 25 MG capsule    Multiple Vitamins-Minerals (WOMENS MULTIVITAMIN) TABS    Quercetin 50 MG TABS    vitamin C (ASCORBIC ACID) 1000 MG TABS    vitamin D3 (CHOLECALCIFEROL) 50 mcg (2000 units) tablet    zinc gluconate 50 MG tablet     No current facility-administered medications for this visit.     SOCIAL HISTORY:  Working as RN virtually in primary care setting    PHYSICAL EXAMINATION:  Ht 1.702 m (5' 7\")   Wt 117.9 kg (260 lb)   BMI 40.72 kg/m    Wt Readings from Last 5 Encounters:   11/21/23 117.9 kg (260 lb)   08/22/23 112.5 kg (248 lb)   11/17/22 115.1 kg (253 lb 12.8 oz)   11/14/22 116 kg (255 lb 12.8 oz)   10/12/22 115.4 kg (254 lb 8 oz)     General: Well appearing.  HEENT: Sclerae anicteric.  Lungs: Breathing comfortably. No cough.  MSK: Grossly normal movement.  Neuro: Grossly non-focal.  Skin/access: Normal skin tone.  Psych: Alert and oriented. No distress.  Performance status: ECOG 0    LABORATORY DATA: Reviewed by me  Recent Labs   Lab Test 11/01/23  1509 10/03/23  1129 08/09/23  1339 12/28/22  1243 12/06/22  0953 12/02/22  0957 11/29/22  0950 07/18/22  0942 07/15/22  0659 07/14/22  0813 07/13/22  0555 06/16/22  1724 06/16/22  1339 06/16/22  1102 06/14/22  0752   WBC 9.0 4.5 5.3   < > 5.9 9.9 10.3   < > 11.4* 6.4 3.1*   < > 41.0*   < > 25.3*   HGB 15.0 14.0 14.8   < > 7.8* 7.9* 7.9*   < > 8.3* 8.1* " 8.0*   < > 9.5*   < > 10.1*    197 195   < > 140* 51* 6*   < > 388 253 145*   < > 72*   < > 90*   ANEU  --   --   --   --  4.3 7.7 8.5*   < > 5.0 2.1 0.7*   < > 0.8*   < > 0.8*   ANEUTAUTO 6.9 3.2 3.9   < >  --   --   --    < >  --   --   --    < >  --   --   --    ABLA  --   --   --   --   --   --   --   --  0.5* 0.3* 0.1*   < > 37.3*  --  20.5*   ALYM  --   --   --   --  0.6* 0.9 0.3*   < > 1.1 1.9 1.3   < > 2.5   < > 3.3   ALYMPAUTO 1.2 0.7* 0.9   < >  --   --   --    < >  --   --   --    < >  --   --   --    RETICABSCT  --   --   --   --   --   --   --   --   --   --   --   --  0.063  --  0.080    < > = values in this interval not displayed.     Recent Labs   Lab Test 11/01/23  1509 10/03/23  1129 08/09/23  1339 11/25/22  0950 11/17/22  0349 11/16/22  0718 11/15/22  0545 11/14/22  1333 10/13/22  0636 10/12/22  0536 10/11/22  0532    139 137   < > 138 141 138 140   < > 137 140   POTASSIUM 4.1 3.9 3.7   < > 4.3 4.2 3.9 3.8   < > 3.8 3.7   CHLORIDE 103 106 104   < > 109* 109* 107 108*   < > 106 107   CO2 22 24 22   < > 19* 19* 18* 20*   < > 21* 17*   BUN 16.9 10.3 8.3   < > 12.8 13.9 11.8 11.8   < > 12.3 12.8   CR 0.70 0.59 0.56   < > 0.49* 0.52 0.57 0.58   < > 0.57 0.58   MICHAEL 9.6 9.6 10.0   < > 8.9 8.8 9.4 9.1   < > 9.0 9.4   MAG  --   --   --   --   --   --   --  2.0  --  2.2 2.0   PHOS  --   --   --   --   --   --   --  3.3  --  3.4 3.1   URIC  --   --   --   --  3.2 4.6 6.2* 4.8   < > 4.4 6.0*    158 139   < >  --   --   --  226   < >  --   --     < > = values in this interval not displayed.     Recent Labs   Lab Test 11/01/23  1509 10/03/23  1129 08/09/23  1339 11/25/22  0950 11/17/22  0349 11/16/22  0718 11/15/22  0545   AST 15 17 15   < > 16 14 17   ALT 26 27 25   < > 21 23 25   ALKPHOS 58 56 56   < > 58 53 55   BILITOTAL 0.5 0.9 0.8   < > 0.8 1.2 0.7   INR  --   --   --   --  1.08 1.09 1.15    < > = values in this interval not displayed.     10/3/2023 NPM1 MRD by PCR not  detected    IMPRESSION AND PLAN:  Minerva Marinelli is a 38 year old with acute myeloid leukemia (AML).    There is nothing to suggest recurrent AML.  She is completing a year of maintenance midostaurin through about 01/2024 based on the RATIFY trial (N Engl J Med 2017;377:454-64) to reduce the risk of AML relapse.  We will monitor AML status including periodic NPM1 MRD by PCR testing and for treatment complications/toxicity.     She has no active ID issues.  Her past issues with C diff resolved.  She has been off prophylactic antimicrobials.  She has declined vaccines.    She completed anticoagulation for her past provoked upper extremity DVT.  She will continue to work with Alida Serrato NP for health maintenance and other medical issues.    We will continue monitoring labs and arrange a return visit in about 3 months.  I reminded her to contact us if questions, concerns, or new issues come up between visits.    Video start time: 12:31 PM  Video end time: 12:39 PM  Video duration: 8 minutes    Ortiz Earl MD, PhD  Attending Physician, [facility]  [phone] [facility]

## 2023-11-21 ENCOUNTER — VIRTUAL VISIT (OUTPATIENT)
Dept: ONCOLOGY | Facility: CLINIC | Age: 38
End: 2023-11-21
Attending: INTERNAL MEDICINE
Payer: COMMERCIAL

## 2023-11-21 VITALS — BODY MASS INDEX: 40.81 KG/M2 | HEIGHT: 67 IN | WEIGHT: 260 LBS

## 2023-11-21 DIAGNOSIS — C92.01 ACUTE MYELOID LEUKEMIA IN REMISSION (H): Primary | ICD-10-CM

## 2023-11-21 PROCEDURE — 99214 OFFICE O/P EST MOD 30 MIN: CPT | Mod: VID | Performed by: INTERNAL MEDICINE

## 2023-11-21 ASSESSMENT — PAIN SCALES - GENERAL: PAINLEVEL: NO PAIN (0)

## 2023-11-21 NOTE — LETTER
"    11/21/2023         RE: Veda Marinelli  [address] [address] [facility] [address]        Dear Colleague,    Thank you for referring your patient, Veda Marinelli, to the [facility]. Please see a copy of my visit note below.    Virtual Visit Details    Type of service:  Video Visit     Originating Location (pt. Location): Home    Distant Location (provider location):  Off-site  Platform used for Video Visit: AmWell        REASON FOR VISIT:  Management of acute myeloid leukemia (AML)    HISTORY OF PRESENT ILLNESS:  Minerva Marinelli is a 38 year old with a history of acute myeloid leukemia (AML).  To summarize her course, she was noted to have low WBC count on routine labs in 03/2022.  Follow up labs in 05/2022 showed further decrease in WBC as well as anemia and thrombocytopenia.  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  She was treated with cytarabine and daunorubicin \"7+3\" induction chemotherapy with midostaurin on Day 8-21.  Bone marrow biopsy around Day 21 was hypocellular but consistent with recovering bone marrow without definite AML.  Her course was complicated by recurrent C diff and upper extremity DVT.  She had prompt blood cell count recovery and was discharge with ongoing outpatient follow-up.  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  She met with the BMT Team who recommended chemo consolidation.  She completed 4 cycles of high-dose cytarabine (HiDAC) with midostaurin ending in 12/2022.  A post-consolidation bone marrow biopsy showed 70-80% cellular bone marrow with no evidence of AML by morphology or " "flow cytometry and NPM1 MRD PCR was undetectable.  She started maintenance midostaurin in 01/2023.  Visit for management of AML.      She is not with her mom Melonie or dad Rosalee.  Energy level has been good.  No fevers, night sweats, or unintentional weight loss.  No headache or focal weakness or sensory changes.  No dyspnea, cough, or chest pain.  No bleeding or unusual bruising.  Has been working remotely and feeling good.  No problems with midostaurin pills.  Spending time with family over the holidays and going back to [place] in March.  No specific concerns today.    KEY PAST MEDICAL HISTORY:  History of COVID-19, recurrent C diff, hypothyroidism    MEDICATIONS:  Current Outpatient Medications   Medication    Bacillus Coagulans-Inulin (PROBIOTIC) 1-250 BILLION-MG CAPS    levothyroxine (SYNTHROID/LEVOTHROID) 75 MCG tablet    magnesium 250 MG tablet    midostaurin (RYDAPT) 25 MG capsule    Multiple Vitamins-Minerals (WOMENS MULTIVITAMIN) TABS    Quercetin 50 MG TABS    vitamin C (ASCORBIC ACID) 1000 MG TABS    vitamin D3 (CHOLECALCIFEROL) 50 mcg (2000 units) tablet    zinc gluconate 50 MG tablet     No current facility-administered medications for this visit.     SOCIAL HISTORY:  Working as RN virtually in primary care setting    PHYSICAL EXAMINATION:  Ht 1.702 m (5' 7\")   Wt 117.9 kg (260 lb)   BMI 40.72 kg/m    Wt Readings from Last 5 Encounters:   11/21/23 117.9 kg (260 lb)   08/22/23 112.5 kg (248 lb)   11/17/22 115.1 kg (253 lb 12.8 oz)   11/14/22 116 kg (255 lb 12.8 oz)   10/12/22 115.4 kg (254 lb 8 oz)     General: Well appearing.  HEENT: Sclerae anicteric.  Lungs: Breathing comfortably. No cough.  MSK: Grossly normal movement.  Neuro: Grossly non-focal.  Skin/access: Normal skin tone.  Psych: Alert and oriented. No distress.  Performance status: ECOG 0    LABORATORY DATA: Reviewed by me  Recent Labs   Lab Test 11/01/23  1509 10/03/23  1129 08/09/23  1339 12/28/22  1243 12/06/22  0953 12/02/22  0957 " 11/29/22  0950 07/18/22  0942 07/15/22  0659 07/14/22  0813 07/13/22  0555 06/16/22  1724 06/16/22  1339 06/16/22  1102 06/14/22  0752   WBC 9.0 4.5 5.3   < > 5.9 9.9 10.3   < > 11.4* 6.4 3.1*   < > 41.0*   < > 25.3*   HGB 15.0 14.0 14.8   < > 7.8* 7.9* 7.9*   < > 8.3* 8.1* 8.0*   < > 9.5*   < > 10.1*    197 195   < > 140* 51* 6*   < > 388 253 145*   < > 72*   < > 90*   ANEU  --   --   --   --  4.3 7.7 8.5*   < > 5.0 2.1 0.7*   < > 0.8*   < > 0.8*   ANEUTAUTO 6.9 3.2 3.9   < >  --   --   --    < >  --   --   --    < >  --   --   --    ABLA  --   --   --   --   --   --   --   --  0.5* 0.3* 0.1*   < > 37.3*  --  20.5*   ALYM  --   --   --   --  0.6* 0.9 0.3*   < > 1.1 1.9 1.3   < > 2.5   < > 3.3   ALYMPAUTO 1.2 0.7* 0.9   < >  --   --   --    < >  --   --   --    < >  --   --   --    RETICABSCT  --   --   --   --   --   --   --   --   --   --   --   --  0.063  --  0.080    < > = values in this interval not displayed.     Recent Labs   Lab Test 11/01/23  1509 10/03/23  1129 08/09/23  1339 11/25/22  0950 11/17/22  0349 11/16/22  0718 11/15/22  0545 11/14/22  1333 10/13/22  0636 10/12/22  0536 10/11/22  0532    139 137   < > 138 141 138 140   < > 137 140   POTASSIUM 4.1 3.9 3.7   < > 4.3 4.2 3.9 3.8   < > 3.8 3.7   CHLORIDE 103 106 104   < > 109* 109* 107 108*   < > 106 107   CO2 22 24 22   < > 19* 19* 18* 20*   < > 21* 17*   BUN 16.9 10.3 8.3   < > 12.8 13.9 11.8 11.8   < > 12.3 12.8   CR 0.70 0.59 0.56   < > 0.49* 0.52 0.57 0.58   < > 0.57 0.58   MICHAEL 9.6 9.6 10.0   < > 8.9 8.8 9.4 9.1   < > 9.0 9.4   MAG  --   --   --   --   --   --   --  2.0  --  2.2 2.0   PHOS  --   --   --   --   --   --   --  3.3  --  3.4 3.1   URIC  --   --   --   --  3.2 4.6 6.2* 4.8   < > 4.4 6.0*    158 139   < >  --   --   --  226   < >  --   --     < > = values in this interval not displayed.     Recent Labs   Lab Test 11/01/23  1509 10/03/23  1129 08/09/23  1339 11/25/22  0950 11/17/22  0349 11/16/22  0718 11/15/22  0545    AST 15 17 15   < > 16 14 17   ALT 26 27 25   < > 21 23 25   ALKPHOS 58 56 56   < > 58 53 55   BILITOTAL 0.5 0.9 0.8   < > 0.8 1.2 0.7   INR  --   --   --   --  1.08 1.09 1.15    < > = values in this interval not displayed.     10/3/2023 NPM1 MRD by PCR not detected    IMPRESSION AND PLAN:  Minerva Marinelli is a 38 year old with acute myeloid leukemia (AML).    There is nothing to suggest recurrent AML.  She is completing a year of maintenance midostaurin through about 01/2024 based on the RATIFY trial (N Engl J Med 2017;377:454-64) to reduce the risk of AML relapse.  We will monitor AML status including periodic NPM1 MRD by PCR testing and for treatment complications/toxicity.     She has no active ID issues.  Her past issues with C diff resolved.  She has been off prophylactic antimicrobials.  She has declined vaccines.    She completed anticoagulation for her past provoked upper extremity DVT.  She will continue to work with Alida Serrato NP for health maintenance and other medical issues.    We will continue monitoring labs and arrange a return visit in about 3 months.  I reminded her to contact us if questions, concerns, or new issues come up between visits.    Video start time: 12:31 PM  Video end time: 12:39 PM  Video duration: 8 minutes    Ortiz Earl MD, PhD  Attending Physician, [facility]  [phone] clinic

## 2023-11-21 NOTE — PLAN OF CARE
AVSS, no c/o pain or nausea. 1 unit of pRBC given for Hgb 6.4, 1 unit platelets given for 16. Continues with occassional loose stools. UAL. Reddened ears; prescribed cream applied   418

## 2023-11-21 NOTE — NURSING NOTE
Is the patient currently in the state of [place]? YES    Visit mode:VIDEO    If the visit is dropped, the patient can be reconnected by: VIDEO VISIT: Text to cell phone:   Telephone Information:   Mobile [phone]       Will anyone else be joining the visit? NO  (If patient encounters technical issues they should call [phone]    How would you like to obtain your AVS? MyChart    Are changes needed to the allergy or medication list? Pt stated no changes to allergies and Pt stated no med changes    Reason for visit: CHERRY MENDES

## 2023-11-29 ENCOUNTER — LAB (OUTPATIENT)
Dept: INFUSION THERAPY | Facility: CLINIC | Age: 38
End: 2023-11-29
Attending: INTERNAL MEDICINE
Payer: COMMERCIAL

## 2023-11-29 DIAGNOSIS — C92.01 ACUTE MYELOID LEUKEMIA IN REMISSION (H): Primary | ICD-10-CM

## 2023-11-29 LAB
ALBUMIN SERPL BCG-MCNC: 4.5 G/DL (ref 3.5–5.2)
ALP SERPL-CCNC: 57 U/L (ref 40–150)
ALT SERPL W P-5'-P-CCNC: 30 U/L (ref 0–50)
ANION GAP SERPL CALCULATED.3IONS-SCNC: 17 MMOL/L (ref 7–15)
AST SERPL W P-5'-P-CCNC: 19 U/L (ref 0–45)
BASOPHILS # BLD AUTO: 0 10E3/UL (ref 0–0.2)
BASOPHILS NFR BLD AUTO: 1 %
BILIRUB SERPL-MCNC: 0.8 MG/DL
BUN SERPL-MCNC: 13.7 MG/DL (ref 6–20)
CALCIUM SERPL-MCNC: 9.7 MG/DL (ref 8.6–10)
CHLORIDE SERPL-SCNC: 105 MMOL/L (ref 98–107)
CREAT SERPL-MCNC: 0.58 MG/DL (ref 0.51–0.95)
DEPRECATED HCO3 PLAS-SCNC: 17 MMOL/L (ref 22–29)
EGFRCR SERPLBLD CKD-EPI 2021: >90 ML/MIN/1.73M2
EOSINOPHIL # BLD AUTO: 0.1 10E3/UL (ref 0–0.7)
EOSINOPHIL NFR BLD AUTO: 2 %
ERYTHROCYTE [DISTWIDTH] IN BLOOD BY AUTOMATED COUNT: 12.4 % (ref 10–15)
GLUCOSE SERPL-MCNC: 103 MG/DL (ref 70–99)
HCT VFR BLD AUTO: 42.1 % (ref 35–47)
HGB BLD-MCNC: 14.4 G/DL (ref 11.7–15.7)
IMM GRANULOCYTES # BLD: 0 10E3/UL
IMM GRANULOCYTES NFR BLD: 0 %
LDH SERPL L TO P-CCNC: 216 U/L (ref 0–250)
LYMPHOCYTES # BLD AUTO: 1.2 10E3/UL (ref 0.8–5.3)
LYMPHOCYTES NFR BLD AUTO: 19 %
MCH RBC QN AUTO: 33.9 PG (ref 26.5–33)
MCHC RBC AUTO-ENTMCNC: 34.2 G/DL (ref 31.5–36.5)
MCV RBC AUTO: 99 FL (ref 78–100)
MONOCYTES # BLD AUTO: 0.5 10E3/UL (ref 0–1.3)
MONOCYTES NFR BLD AUTO: 8 %
NEUTROPHILS # BLD AUTO: 4.4 10E3/UL (ref 1.6–8.3)
NEUTROPHILS NFR BLD AUTO: 70 %
NRBC # BLD AUTO: 0 10E3/UL
NRBC BLD AUTO-RTO: 0 /100
PLATELET # BLD AUTO: 194 10E3/UL (ref 150–450)
POTASSIUM SERPL-SCNC: 3.9 MMOL/L (ref 3.4–5.3)
PROT SERPL-MCNC: 7.3 G/DL (ref 6.4–8.3)
RBC # BLD AUTO: 4.25 10E6/UL (ref 3.8–5.2)
SODIUM SERPL-SCNC: 139 MMOL/L (ref 135–145)
WBC # BLD AUTO: 6.3 10E3/UL (ref 4–11)

## 2023-11-29 PROCEDURE — 83615 LACTATE (LD) (LDH) ENZYME: CPT | Performed by: INTERNAL MEDICINE

## 2023-11-29 PROCEDURE — 36591 DRAW BLOOD OFF VENOUS DEVICE: CPT

## 2023-11-29 PROCEDURE — 85025 COMPLETE CBC W/AUTO DIFF WBC: CPT | Performed by: INTERNAL MEDICINE

## 2023-11-29 PROCEDURE — 80053 COMPREHEN METABOLIC PANEL: CPT | Performed by: INTERNAL MEDICINE

## 2023-11-29 PROCEDURE — 250N000011 HC RX IP 250 OP 636: Mod: JZ | Performed by: INTERNAL MEDICINE

## 2023-11-29 RX ORDER — HEPARIN SODIUM (PORCINE) LOCK FLUSH IV SOLN 100 UNIT/ML 100 UNIT/ML
5 SOLUTION INTRAVENOUS
Status: DISCONTINUED | OUTPATIENT
Start: 2023-11-29 | End: 2023-11-29 | Stop reason: HOSPADM

## 2023-11-29 RX ORDER — HEPARIN SODIUM (PORCINE) LOCK FLUSH IV SOLN 100 UNIT/ML 100 UNIT/ML
5 SOLUTION INTRAVENOUS
Status: CANCELLED | OUTPATIENT
Start: 2023-11-29

## 2023-11-29 RX ADMIN — Medication 5 ML: at 14:25

## 2023-11-30 ENCOUNTER — MYC MEDICAL ADVICE (OUTPATIENT)
Dept: ONCOLOGY | Facility: CLINIC | Age: 38
End: 2023-11-30
Payer: COMMERCIAL

## 2023-11-30 NOTE — TELEPHONE ENCOUNTER
Oral Chemotherapy Monitoring Program  Lab Follow Up    Reviewed CBC and CMP lab results from 11/29.        6/6/2023     3:00 PM 7/11/2023     2:00 PM 8/10/2023     2:00 PM 10/4/2023     3:00 PM 11/2/2023    11:00 AM 11/20/2023    11:00 AM 11/30/2023    12:00 PM   ORAL CHEMOTHERAPY   Assessment Type Lab Monitoring Lab Monitoring;Monthly Follow up Lab Monitoring Lab Monitoring;Monthly Follow up Lab Monitoring;Monthly Follow up Refill Lab Monitoring   Diagnosis Code Acute Myeloid Leukemia (AML) Acute Myeloid Leukemia (AML) Acute Myeloid Leukemia (AML) Acute Myeloid Leukemia (AML) Acute Myeloid Leukemia (AML) Acute Myeloid Leukemia (AML) Acute Myeloid Leukemia (AML)   Providers Dr. Mady Earl   [facility]   Is this patient followed by the [facility] OC team?  No No No No No No   Drug Name Rydapt (midostaurin) Rydapt (midostaurin) Rydapt (midostaurin) Rydapt (midostaurin) Rydapt (midostaurin) Rydapt (midostaurin) Rydapt (midostaurin)   Dose 50 mg 50 mg 50 mg 50 mg 50 mg 50 mg 50 mg   Current Schedule BID BID BID BID BID BID BID   Cycle Details Continuous Continuous Continuous Continuous Continuous Continuous Continuous   Doses missed in last 2 weeks    0      Adherence Assessment    Adherent      Adverse Effects    No AE identified during assessment      Any new drug interactions?    No      Is the dose as ordered appropriate for the patient?    Yes      Since the last time we talked, have you been hospitalized or used the emergency room?    No          Labs:  _  Result Component Current Result Ref Range   Sodium 139 (11/29/2023) 135 - 145 mmol/L     _  Result Component Current Result Ref Range   Potassium 3.9 (11/29/2023) 3.4 - 5.3 mmol/L     _  Result Component Current Result Ref Range   Calcium 9.7 (11/29/2023) 8.6 - 10.0 mg/dL     No results found for Mag within last 30 days.     No  results found for Phos within last 30 days.     _  Result Component Current Result Ref Range   Albumin 4.5 (11/29/2023) 3.5 - 5.2 g/dL     _  Result Component Current Result Ref Range   Urea Nitrogen 13.7 (11/29/2023) 6.0 - 20.0 mg/dL     _  Result Component Current Result Ref Range   Creatinine 0.58 (11/29/2023) 0.51 - 0.95 mg/dL     _  Result Component Current Result Ref Range   AST 19 (11/29/2023) 0 - 45 U/L     _  Result Component Current Result Ref Range   ALT 30 (11/29/2023) 0 - 50 U/L     _  Result Component Current Result Ref Range   Bilirubin Total 0.8 (11/29/2023) <=1.2 mg/dL     _  Result Component Current Result Ref Range   WBC Count 6.3 (11/29/2023) 4.0 - 11.0 10e3/uL     _  Result Component Current Result Ref Range   Hemoglobin 14.4 (11/29/2023) 11.7 - 15.7 g/dL     _  Result Component Current Result Ref Range   Platelet Count 194 (11/29/2023) 150 - 450 10e3/uL     No results found for ANC within last 30 days.     _  Result Component Current Result Ref Range   Absolute Neutrophils 4.4 (11/29/2023) 1.6 - 8.3 10e3/uL        Assessment & Plan:  Results are stable with no concerning abnormalities.    Sent [employer] message to pt.    Follow-Up:  12/27: labs/infusion    Palak Casiano PharmD  Hematology/Oncology Clinical Pharmacist  [facility]  [phone]

## 2023-12-21 ENCOUNTER — PATIENT OUTREACH (OUTPATIENT)
Dept: ONCOLOGY | Facility: CLINIC | Age: 38
End: 2023-12-21
Payer: COMMERCIAL

## 2023-12-21 NOTE — PROGRESS NOTES
[facility]: Cancer Care                                                                                          Chart review completed and enrollment status updated.  Pt has appropriate follow up scheduled for lab/MD appts.    EDUAR GarciaN, RN  RN Care Coordinator  [facility]

## 2023-12-22 NOTE — TELEPHONE ENCOUNTER
OSWALDO Heaton : everything has been received Benefit Investigation will start in January.    Esther Fox CPhT  [facility]  Oncology Pharmacy Liaison II  [email or web address]  [phone] (phone  [phone] (fax

## 2023-12-27 ENCOUNTER — LAB (OUTPATIENT)
Dept: INFUSION THERAPY | Facility: CLINIC | Age: 38
End: 2023-12-27
Attending: INTERNAL MEDICINE
Payer: COMMERCIAL

## 2023-12-27 DIAGNOSIS — C92.01 ACUTE MYELOID LEUKEMIA IN REMISSION (H): Primary | ICD-10-CM

## 2023-12-27 LAB
ALBUMIN SERPL BCG-MCNC: 4.7 G/DL (ref 3.5–5.2)
ALP SERPL-CCNC: 62 U/L (ref 40–150)
ALT SERPL W P-5'-P-CCNC: 33 U/L (ref 0–50)
ANION GAP SERPL CALCULATED.3IONS-SCNC: 11 MMOL/L (ref 7–15)
AST SERPL W P-5'-P-CCNC: 20 U/L (ref 0–45)
BASOPHILS # BLD AUTO: 0 10E3/UL (ref 0–0.2)
BASOPHILS NFR BLD AUTO: 0 %
BILIRUB SERPL-MCNC: 0.8 MG/DL
BUN SERPL-MCNC: 11.4 MG/DL (ref 6–20)
CALCIUM SERPL-MCNC: 9.9 MG/DL (ref 8.6–10)
CHLORIDE SERPL-SCNC: 104 MMOL/L (ref 98–107)
CREAT SERPL-MCNC: 0.61 MG/DL (ref 0.51–0.95)
DEPRECATED HCO3 PLAS-SCNC: 22 MMOL/L (ref 22–29)
EGFRCR SERPLBLD CKD-EPI 2021: >90 ML/MIN/1.73M2
EOSINOPHIL # BLD AUTO: 0.1 10E3/UL (ref 0–0.7)
EOSINOPHIL NFR BLD AUTO: 2 %
ERYTHROCYTE [DISTWIDTH] IN BLOOD BY AUTOMATED COUNT: 12.7 % (ref 10–15)
GLUCOSE SERPL-MCNC: 103 MG/DL (ref 70–99)
HCT VFR BLD AUTO: 42.4 % (ref 35–47)
HGB BLD-MCNC: 14.6 G/DL (ref 11.7–15.7)
IMM GRANULOCYTES # BLD: 0 10E3/UL
IMM GRANULOCYTES NFR BLD: 0 %
LDH SERPL L TO P-CCNC: 177 U/L (ref 0–250)
LYMPHOCYTES # BLD AUTO: 1.5 10E3/UL (ref 0.8–5.3)
LYMPHOCYTES NFR BLD AUTO: 22 %
Lab: NORMAL
MCH RBC QN AUTO: 34.1 PG (ref 26.5–33)
MCHC RBC AUTO-ENTMCNC: 34.4 G/DL (ref 31.5–36.5)
MCV RBC AUTO: 99 FL (ref 78–100)
MONOCYTES # BLD AUTO: 0.5 10E3/UL (ref 0–1.3)
MONOCYTES NFR BLD AUTO: 7 %
NEUTROPHILS # BLD AUTO: 4.6 10E3/UL (ref 1.6–8.3)
NEUTROPHILS NFR BLD AUTO: 69 %
NRBC # BLD AUTO: 0 10E3/UL
NRBC BLD AUTO-RTO: 0 /100
PERFORMING LABORATORY: NORMAL
PLATELET # BLD AUTO: 225 10E3/UL (ref 150–450)
POTASSIUM SERPL-SCNC: 4 MMOL/L (ref 3.4–5.3)
PROT SERPL-MCNC: 7.3 G/DL (ref 6.4–8.3)
RBC # BLD AUTO: 4.28 10E6/UL (ref 3.8–5.2)
SODIUM SERPL-SCNC: 137 MMOL/L (ref 135–145)
TEST NAME: NORMAL
WBC # BLD AUTO: 6.8 10E3/UL (ref 4–11)

## 2023-12-27 PROCEDURE — 84999 UNLISTED CHEMISTRY PROCEDURE: CPT | Performed by: INTERNAL MEDICINE

## 2023-12-27 PROCEDURE — 36591 DRAW BLOOD OFF VENOUS DEVICE: CPT

## 2023-12-27 PROCEDURE — 83615 LACTATE (LD) (LDH) ENZYME: CPT | Performed by: INTERNAL MEDICINE

## 2023-12-27 PROCEDURE — 85025 COMPLETE CBC W/AUTO DIFF WBC: CPT | Performed by: INTERNAL MEDICINE

## 2023-12-27 PROCEDURE — 250N000011 HC RX IP 250 OP 636: Performed by: INTERNAL MEDICINE

## 2023-12-27 PROCEDURE — 80053 COMPREHEN METABOLIC PANEL: CPT | Performed by: INTERNAL MEDICINE

## 2023-12-27 PROCEDURE — 81310 NPM1 GENE: CPT | Performed by: INTERNAL MEDICINE

## 2023-12-27 RX ORDER — HEPARIN SODIUM (PORCINE) LOCK FLUSH IV SOLN 100 UNIT/ML 100 UNIT/ML
5 SOLUTION INTRAVENOUS
Status: DISCONTINUED | OUTPATIENT
Start: 2023-12-27 | End: 2023-12-27 | Stop reason: HOSPADM

## 2023-12-27 RX ORDER — HEPARIN SODIUM (PORCINE) LOCK FLUSH IV SOLN 100 UNIT/ML 100 UNIT/ML
5 SOLUTION INTRAVENOUS
Status: CANCELLED | OUTPATIENT
Start: 2023-12-27

## 2023-12-27 RX ADMIN — Medication 5 ML: at 14:00

## 2023-12-27 NOTE — PROGRESS NOTES
Infusion Nursing Note:  Veda FORBES Yves presents today for port labs.    Patient seen by provider today: No   present during visit today: Not Applicable.    Note: N/A.    Intravenous Access:  Implanted Port.    Treatment Conditions:  Not Applicable.    Post Infusion Assessment:  Blood return noted.  Site patent and intact, free from redness, edema or discomfort.  No evidence of extravasations.  Access discontinued per protocol.     Discharge Plan:   Discharge instructions reviewed with: Patient.  Patient and/or family verbalized understanding of discharge instructions and all questions answered.  AVS to patient via [employer].  Patient will return 1/24/2024 for next appointment.   Patient discharged in stable condition accompanied by: self.  Departure Mode: Ambulatory.    Maribel Dominguez RN

## 2023-12-28 ENCOUNTER — MYC MEDICAL ADVICE (OUTPATIENT)
Dept: ONCOLOGY | Facility: CLINIC | Age: 38
End: 2023-12-28
Payer: COMMERCIAL

## 2023-12-28 NOTE — ORAL ONC MGMT
Oral Chemotherapy Monitoring Program  Lab Follow Up    Patient is on midostaurin  Reviewed lab results from 12/27/23.    Assessment & Plan:  CBC and CMP reviewed. Results show no concerning abnormalities.  Plan to continue midostaurin as prescribed. Prime[employer]s message sent to patient regarding results and plan.     Follow-Up:  1/24: next labs      Prisca Rodriguez PharmD  Hematology/Oncology Clinical Pharmacist  Oral Chemotherapy Monitoring Program  [facility]  [phone]  December 28, 2023 7/11/2023     2:00 PM 8/10/2023     2:00 PM 10/4/2023     3:00 PM 11/2/2023    11:00 AM 11/20/2023    11:00 AM 11/30/2023    12:00 PM 12/28/2023     2:00 PM   ORAL CHEMOTHERAPY   Assessment Type Lab Monitoring;Monthly Follow up Lab Monitoring Lab Monitoring;Monthly Follow up Lab Monitoring;Monthly Follow up Refill Lab Monitoring Lab Monitoring   Diagnosis Code Acute Myeloid Leukemia (AML) Acute Myeloid Leukemia (AML) Acute Myeloid Leukemia (AML) Acute Myeloid Leukemia (AML) Acute Myeloid Leukemia (AML) Acute Myeloid Leukemia (AML) Acute Myeloid Leukemia (AML)   Providers Dr Mady Earl   [facility]   Is this patient followed by the [facility] OC team? No No No No No No No   Drug Name Rydapt (midostaurin) Rydapt (midostaurin) Rydapt (midostaurin) Rydapt (midostaurin) Rydapt (midostaurin) Rydapt (midostaurin) Rydapt (midostaurin)   Dose 50 mg 50 mg 50 mg 50 mg 50 mg 50 mg 50 mg   Current Schedule BID BID BID BID BID BID BID   Cycle Details Continuous Continuous Continuous Continuous Continuous Continuous Continuous   Doses missed in last 2 weeks   0       Adherence Assessment   Adherent       Adverse Effects   No AE identified during assessment    No AE identified during assessment   Any new drug interactions?   No       Is the dose as ordered appropriate for the patient?   Yes    Yes    Since the last time we talked, have you been hospitalized or used the emergency room?   No           Labs:  _  Result Component Current Result Ref Range   Sodium 137 (12/27/2023) 135 - 145 mmol/L     _  Result Component Current Result Ref Range   Potassium 4.0 (12/27/2023) 3.4 - 5.3 mmol/L     _  Result Component Current Result Ref Range   Calcium 9.9 (12/27/2023) 8.6 - 10.0 mg/dL     No results found for Mag within last 30 days.     No results found for Phos within last 30 days.     _  Result Component Current Result Ref Range   Albumin 4.7 (12/27/2023) 3.5 - 5.2 g/dL     _  Result Component Current Result Ref Range   Urea Nitrogen 11.4 (12/27/2023) 6.0 - 20.0 mg/dL     _  Result Component Current Result Ref Range   Creatinine 0.61 (12/27/2023) 0.51 - 0.95 mg/dL     _  Result Component Current Result Ref Range   AST 20 (12/27/2023) 0 - 45 U/L     _  Result Component Current Result Ref Range   ALT 33 (12/27/2023) 0 - 50 U/L     _  Result Component Current Result Ref Range   Bilirubin Total 0.8 (12/27/2023) <=1.2 mg/dL     _  Result Component Current Result Ref Range   WBC Count 6.8 (12/27/2023) 4.0 - 11.0 10e3/uL     _  Result Component Current Result Ref Range   Hemoglobin 14.6 (12/27/2023) 11.7 - 15.7 g/dL     _  Result Component Current Result Ref Range   Platelet Count 225 (12/27/2023) 150 - 450 10e3/uL     No results found for ANC within last 30 days.

## 2024-01-02 ENCOUNTER — NURSE TRIAGE (OUTPATIENT)
Dept: ONCOLOGY | Facility: CLINIC | Age: 39
End: 2024-01-02
Payer: COMMERCIAL

## 2024-01-02 NOTE — TELEPHONE ENCOUNTER
Call from Karen, with [facility] Patient Assistant [facility] who states they currently have a shortage of Rydapt. They have notified pt and requested she call to see if supply has changed 2nd week of Feb, but as of now, they cannot fill this medication.

## 2024-01-03 LAB — MISCELLANEOUS TEST 1 (ARUP): NORMAL

## 2024-01-04 NOTE — TELEPHONE ENCOUNTER
Randi is still in review, Drug is on a national shortage.    Thank you,    Luanne Pichardo  Oncology Pharmacy Liaison II  [email or web address]  Phone: [phone]  Fax: [phone]

## 2024-01-09 ENCOUNTER — TELEPHONE (OUTPATIENT)
Dept: ONCOLOGY | Facility: CLINIC | Age: 39
End: 2024-01-09
Payer: COMMERCIAL

## 2024-01-09 NOTE — TELEPHONE ENCOUNTER
Therapy is complete. Called [facility] to cancel application.      Thank you,    Luanne Pichardo  Oncology Pharmacy Liaison II  [email or web address]  Phone: [phone]  Fax: [phone]

## 2024-01-09 NOTE — ORAL ONC MGMT
[facility] Oral Chemotherapy Monitoring Program    Spoke to Minerva regarding her midostaurin. Minerva will complete one year of midostaurin maintenance on 1/11/24, and can then stop taking the midostaurin per Dr Earl. She expressed understanding of this and had no further questions at this time.     The oncology pharmacy will no longer be following this patient for oral chemotherapy. If there are any questions or the plan changes, feel free to contact us.    Natividad Chavez, PharmD, [facility]  Hematology/Oncology Clinical Pharmacist  [facility]  [facility]  [phone]

## 2024-01-24 ENCOUNTER — LAB (OUTPATIENT)
Dept: INFUSION THERAPY | Facility: CLINIC | Age: 39
End: 2024-01-24
Attending: INTERNAL MEDICINE
Payer: COMMERCIAL

## 2024-01-24 DIAGNOSIS — C92.01 ACUTE MYELOID LEUKEMIA IN REMISSION (H): Primary | ICD-10-CM

## 2024-01-24 LAB
ALBUMIN SERPL BCG-MCNC: 4.3 G/DL (ref 3.5–5.2)
ALP SERPL-CCNC: 60 U/L (ref 40–150)
ALT SERPL W P-5'-P-CCNC: 42 U/L (ref 0–50)
ANION GAP SERPL CALCULATED.3IONS-SCNC: 11 MMOL/L (ref 7–15)
AST SERPL W P-5'-P-CCNC: 26 U/L (ref 0–45)
BASOPHILS # BLD AUTO: 0 10E3/UL (ref 0–0.2)
BASOPHILS NFR BLD AUTO: 0 %
BILIRUB SERPL-MCNC: 0.6 MG/DL
BUN SERPL-MCNC: 12.7 MG/DL (ref 6–20)
CALCIUM SERPL-MCNC: 9.4 MG/DL (ref 8.6–10)
CHLORIDE SERPL-SCNC: 106 MMOL/L (ref 98–107)
CREAT SERPL-MCNC: 0.64 MG/DL (ref 0.51–0.95)
DEPRECATED HCO3 PLAS-SCNC: 23 MMOL/L (ref 22–29)
EGFRCR SERPLBLD CKD-EPI 2021: >90 ML/MIN/1.73M2
EOSINOPHIL # BLD AUTO: 0.1 10E3/UL (ref 0–0.7)
EOSINOPHIL NFR BLD AUTO: 1 %
ERYTHROCYTE [DISTWIDTH] IN BLOOD BY AUTOMATED COUNT: 12.7 % (ref 10–15)
GLUCOSE SERPL-MCNC: 104 MG/DL (ref 70–99)
HCT VFR BLD AUTO: 41.9 % (ref 35–47)
HGB BLD-MCNC: 14.2 G/DL (ref 11.7–15.7)
IMM GRANULOCYTES # BLD: 0 10E3/UL
IMM GRANULOCYTES NFR BLD: 0 %
LDH SERPL L TO P-CCNC: 170 U/L (ref 0–250)
LYMPHOCYTES # BLD AUTO: 0.8 10E3/UL (ref 0.8–5.3)
LYMPHOCYTES NFR BLD AUTO: 16 %
MCH RBC QN AUTO: 33.8 PG (ref 26.5–33)
MCHC RBC AUTO-ENTMCNC: 33.9 G/DL (ref 31.5–36.5)
MCV RBC AUTO: 100 FL (ref 78–100)
MONOCYTES # BLD AUTO: 0.6 10E3/UL (ref 0–1.3)
MONOCYTES NFR BLD AUTO: 12 %
NEUTROPHILS # BLD AUTO: 3.6 10E3/UL (ref 1.6–8.3)
NEUTROPHILS NFR BLD AUTO: 71 %
NRBC # BLD AUTO: 0 10E3/UL
NRBC BLD AUTO-RTO: 0 /100
PLATELET # BLD AUTO: 183 10E3/UL (ref 150–450)
POTASSIUM SERPL-SCNC: 4.3 MMOL/L (ref 3.4–5.3)
PROT SERPL-MCNC: 7 G/DL (ref 6.4–8.3)
RBC # BLD AUTO: 4.2 10E6/UL (ref 3.8–5.2)
SODIUM SERPL-SCNC: 140 MMOL/L (ref 135–145)
WBC # BLD AUTO: 5.2 10E3/UL (ref 4–11)

## 2024-01-24 PROCEDURE — 85025 COMPLETE CBC W/AUTO DIFF WBC: CPT | Performed by: INTERNAL MEDICINE

## 2024-01-24 PROCEDURE — 250N000011 HC RX IP 250 OP 636

## 2024-01-24 PROCEDURE — 36591 DRAW BLOOD OFF VENOUS DEVICE: CPT

## 2024-01-24 PROCEDURE — 80053 COMPREHEN METABOLIC PANEL: CPT | Performed by: INTERNAL MEDICINE

## 2024-01-24 PROCEDURE — 83615 LACTATE (LD) (LDH) ENZYME: CPT | Performed by: INTERNAL MEDICINE

## 2024-01-24 RX ORDER — HEPARIN SODIUM (PORCINE) LOCK FLUSH IV SOLN 100 UNIT/ML 100 UNIT/ML
5 SOLUTION INTRAVENOUS
Status: DISCONTINUED | OUTPATIENT
Start: 2024-01-24 | End: 2024-01-24 | Stop reason: HOSPADM

## 2024-01-24 RX ORDER — HEPARIN SODIUM (PORCINE) LOCK FLUSH IV SOLN 100 UNIT/ML 100 UNIT/ML
SOLUTION INTRAVENOUS
Status: COMPLETED
Start: 2024-01-24 | End: 2024-01-24

## 2024-01-24 RX ORDER — HEPARIN SODIUM (PORCINE) LOCK FLUSH IV SOLN 100 UNIT/ML 100 UNIT/ML
5 SOLUTION INTRAVENOUS
OUTPATIENT
Start: 2024-01-24

## 2024-01-24 RX ADMIN — HEPARIN SODIUM (PORCINE) LOCK FLUSH IV SOLN 100 UNIT/ML 5 ML: 100 SOLUTION at 14:14

## 2024-01-24 RX ADMIN — Medication 5 ML: at 14:14

## 2024-01-24 NOTE — PROGRESS NOTES
Infusion Nursing Note:  Labs drawn via port without incident. Port flushed and needle removed per protocol.    Cami Zabala RN

## 2024-02-21 ENCOUNTER — LAB (OUTPATIENT)
Dept: INFUSION THERAPY | Facility: CLINIC | Age: 39
End: 2024-02-21
Attending: INTERNAL MEDICINE
Payer: COMMERCIAL

## 2024-02-21 DIAGNOSIS — C92.01 ACUTE MYELOID LEUKEMIA IN REMISSION (H): ICD-10-CM

## 2024-02-21 LAB
ALBUMIN SERPL BCG-MCNC: 4.2 G/DL (ref 3.5–5.2)
ALP SERPL-CCNC: 57 U/L (ref 40–150)
ALT SERPL W P-5'-P-CCNC: 30 U/L (ref 0–50)
ANION GAP SERPL CALCULATED.3IONS-SCNC: 10 MMOL/L (ref 7–15)
AST SERPL W P-5'-P-CCNC: 17 U/L (ref 0–45)
BASOPHILS # BLD AUTO: 0 10E3/UL (ref 0–0.2)
BASOPHILS NFR BLD AUTO: 1 %
BILIRUB SERPL-MCNC: 0.8 MG/DL
BUN SERPL-MCNC: 12.1 MG/DL (ref 6–20)
CALCIUM SERPL-MCNC: 9.4 MG/DL (ref 8.6–10)
CHLORIDE SERPL-SCNC: 107 MMOL/L (ref 98–107)
CREAT SERPL-MCNC: 0.71 MG/DL (ref 0.51–0.95)
DEPRECATED HCO3 PLAS-SCNC: 23 MMOL/L (ref 22–29)
EGFRCR SERPLBLD CKD-EPI 2021: >90 ML/MIN/1.73M2
EOSINOPHIL # BLD AUTO: 0.1 10E3/UL (ref 0–0.7)
EOSINOPHIL NFR BLD AUTO: 2 %
ERYTHROCYTE [DISTWIDTH] IN BLOOD BY AUTOMATED COUNT: 12.7 % (ref 10–15)
GLUCOSE SERPL-MCNC: 112 MG/DL (ref 70–99)
HCT VFR BLD AUTO: 42 % (ref 35–47)
HGB BLD-MCNC: 14.1 G/DL (ref 11.7–15.7)
IMM GRANULOCYTES # BLD: 0 10E3/UL
IMM GRANULOCYTES NFR BLD: 1 %
LYMPHOCYTES # BLD AUTO: 1.3 10E3/UL (ref 0.8–5.3)
LYMPHOCYTES NFR BLD AUTO: 20 %
MCH RBC QN AUTO: 33.3 PG (ref 26.5–33)
MCHC RBC AUTO-ENTMCNC: 33.6 G/DL (ref 31.5–36.5)
MCV RBC AUTO: 99 FL (ref 78–100)
MONOCYTES # BLD AUTO: 0.6 10E3/UL (ref 0–1.3)
MONOCYTES NFR BLD AUTO: 9 %
NEUTROPHILS # BLD AUTO: 4.3 10E3/UL (ref 1.6–8.3)
NEUTROPHILS NFR BLD AUTO: 67 %
NRBC # BLD AUTO: 0 10E3/UL
NRBC BLD AUTO-RTO: 0 /100
PLATELET # BLD AUTO: 220 10E3/UL (ref 150–450)
POTASSIUM SERPL-SCNC: 4.4 MMOL/L (ref 3.4–5.3)
PROT SERPL-MCNC: 6.9 G/DL (ref 6.4–8.3)
RBC # BLD AUTO: 4.23 10E6/UL (ref 3.8–5.2)
SODIUM SERPL-SCNC: 140 MMOL/L (ref 135–145)
WBC # BLD AUTO: 6.3 10E3/UL (ref 4–11)

## 2024-02-21 PROCEDURE — 36591 DRAW BLOOD OFF VENOUS DEVICE: CPT

## 2024-02-21 PROCEDURE — 82040 ASSAY OF SERUM ALBUMIN: CPT | Performed by: INTERNAL MEDICINE

## 2024-02-21 PROCEDURE — 83615 LACTATE (LD) (LDH) ENZYME: CPT | Performed by: INTERNAL MEDICINE

## 2024-02-21 PROCEDURE — 85004 AUTOMATED DIFF WBC COUNT: CPT | Performed by: INTERNAL MEDICINE

## 2024-02-21 PROCEDURE — 250N000011 HC RX IP 250 OP 636: Performed by: INTERNAL MEDICINE

## 2024-02-21 RX ORDER — HEPARIN SODIUM (PORCINE) LOCK FLUSH IV SOLN 100 UNIT/ML 100 UNIT/ML
5 SOLUTION INTRAVENOUS ONCE
Status: COMPLETED | OUTPATIENT
Start: 2024-02-21 | End: 2024-02-21

## 2024-02-21 RX ADMIN — Medication 5 ML: at 14:10

## 2024-02-22 LAB — LDH SERPL L TO P-CCNC: 140 U/L (ref 0–250)

## 2024-03-04 NOTE — PROGRESS NOTES
"Virtual Visit Details    Type of service:  Video Visit     Originating Location (pt. Location): Home    Distant Location (provider location):  On-site  Platform used for Video Visit: Silvina        REASON FOR VISIT:  Management of acute myeloid leukemia (AML)    HISTORY OF PRESENT ILLNESS:  Minerva Marinelli is a 39 year old with a history of acute myeloid leukemia (AML).  To summarize her course, she was noted to have low WBC count on routine labs in 03/2022.  Follow up labs in 05/2022 showed further decrease in WBC as well as anemia and thrombocytopenia.  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  She was treated with cytarabine and daunorubicin \"7+3\" induction chemotherapy with midostaurin on Day 8-21.  Bone marrow biopsy around Day 21 was hypocellular but consistent with recovering bone marrow without definite AML.  Her course was complicated by recurrent C diff and upper extremity DVT.  She had prompt blood cell count recovery and was discharge with ongoing outpatient follow-up.  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  She met with the BMT Team who recommended chemo consolidation.  She completed 4 cycles of high-dose cytarabine (HiDAC) with midostaurin ending in 12/2022.  A post-consolidation bone marrow biopsy showed 70-80% cellular bone marrow with no evidence of AML by morphology or flow cytometry and NPM1 MRD PCR was undetectable.  She completed 1 year of maintenance midostaurin in 02/2023.  Visit for management of AML.      She is not with her mom Melonie or dad Rosalee.  Energy level has been good.  No fevers, night sweats, or unintentional " "weight loss.  No headache or focal weakness or sensory changes.  No dyspnea, cough, or chest pain.  No bleeding or unusual bruising.  Has been working remotely and feeling good.  Going back to [place] this month.  No specific concerns today.    KEY PAST MEDICAL HISTORY:  History of COVID-19, recurrent C diff, hypothyroidism    MEDICATIONS:  Current Outpatient Medications   Medication    Bacillus Coagulans-Inulin (PROBIOTIC) 1-250 BILLION-MG CAPS    levothyroxine (SYNTHROID/LEVOTHROID) 75 MCG tablet    magnesium 250 MG tablet    midostaurin (RYDAPT) 25 MG capsule    Multiple Vitamins-Minerals (WOMENS MULTIVITAMIN) TABS    Quercetin 50 MG TABS    vitamin C (ASCORBIC ACID) 1000 MG TABS    vitamin D3 (CHOLECALCIFEROL) 50 mcg (2000 units) tablet    zinc gluconate 50 MG tablet     No current facility-administered medications for this visit.     SOCIAL HISTORY:  Working as RN virtually in primary care setting    PHYSICAL EXAMINATION:  Ht 1.702 m (5' 7\")   Wt 117.9 kg (260 lb)   BMI 40.72 kg/m    Wt Readings from Last 5 Encounters:   03/06/24 117.9 kg (260 lb)   11/21/23 117.9 kg (260 lb)   08/22/23 112.5 kg (248 lb)   11/17/22 115.1 kg (253 lb 12.8 oz)   11/14/22 116 kg (255 lb 12.8 oz)     General: Well appearing.  HEENT: Sclerae anicteric.  Lungs: Breathing comfortably. No cough.  MSK: Grossly normal movement.  Neuro: Grossly non-focal.  Skin/access: Normal skin tone.  Psych: Alert and oriented. No distress.  Performance status: ECOG 0    LABORATORY DATA: Reviewed by me  Recent Labs   Lab Test 02/21/24  1402 01/24/24  1413 12/27/23  1400 12/28/22  1243 12/06/22  0953 12/02/22  0957 11/29/22  0950 07/18/22  0942 07/15/22  0659 07/14/22  0813 07/13/22  0555 06/16/22  1724 06/16/22  1339 06/16/22  1102 06/14/22  0752   WBC 6.3 5.2 6.8   < > 5.9 9.9 10.3   < > 11.4* 6.4 3.1*   < > 41.0*   < > 25.3*   HGB 14.1 14.2 14.6   < > 7.8* 7.9* 7.9*   < > 8.3* 8.1* 8.0*   < > 9.5*   < > 10.1*    183 225   < > 140* 51* 6*   < > 388 " 253 145*   < > 72*   < > 90*   ANEU  --   --   --   --  4.3 7.7 8.5*   < > 5.0 2.1 0.7*   < > 0.8*   < > 0.8*   ANEUTAUTO 4.3 3.6 4.6   < >  --   --   --    < >  --   --   --    < >  --   --   --    ABLA  --   --   --   --   --   --   --   --  0.5* 0.3* 0.1*   < > 37.3*  --  20.5*   ALYM  --   --   --   --  0.6* 0.9 0.3*   < > 1.1 1.9 1.3   < > 2.5   < > 3.3   ALYMPAUTO 1.3 0.8 1.5   < >  --   --   --    < >  --   --   --    < >  --   --   --    RETICABSCT  --   --   --   --   --   --   --   --   --   --   --   --  0.063  --  0.080    < > = values in this interval not displayed.     Recent Labs   Lab Test 02/21/24  1402 01/24/24  1413 12/27/23  1400 11/25/22  0950 11/17/22  0349 11/16/22  0718 11/15/22  0545 11/14/22  1333 10/13/22  0636 10/12/22  0536 10/11/22  0532    140 137   < > 138 141 138 140   < > 137 140   POTASSIUM 4.4 4.3 4.0   < > 4.3 4.2 3.9 3.8   < > 3.8 3.7   CHLORIDE 107 106 104   < > 109* 109* 107 108*   < > 106 107   CO2 23 23 22   < > 19* 19* 18* 20*   < > 21* 17*   BUN 12.1 12.7 11.4   < > 12.8 13.9 11.8 11.8   < > 12.3 12.8   CR 0.71 0.64 0.61   < > 0.49* 0.52 0.57 0.58   < > 0.57 0.58   MICHAEL 9.4 9.4 9.9   < > 8.9 8.8 9.4 9.1   < > 9.0 9.4   MAG  --   --   --   --   --   --   --  2.0  --  2.2 2.0   PHOS  --   --   --   --   --   --   --  3.3  --  3.4 3.1   URIC  --   --   --   --  3.2 4.6 6.2* 4.8   < > 4.4 6.0*    170 177   < >  --   --   --  226   < >  --   --     < > = values in this interval not displayed.     Recent Labs   Lab Test 02/21/24  1402 01/24/24  1413 12/27/23  1400 11/25/22  0950 11/17/22  0349 11/16/22  0718 11/15/22  0545   AST 17 26 20   < > 16 14 17   ALT 30 42 33   < > 21 23 25   ALKPHOS 57 60 62   < > 58 53 55   BILITOTAL 0.8 0.6 0.8   < > 0.8 1.2 0.7   INR  --   --   --   --  1.08 1.09 1.15    < > = values in this interval not displayed.     12/27/2023 NPM1 MRD by PCR not detected    IMPRESSION AND PLAN:  Minerva Marinelli is a 39 year old with acute myeloid  leukemia (AML).    There is nothing to suggest recurrent AML.  She completed a year of maintenance midostaurin in 02/2024 based on the RATIFY trial (N Engl J Med 2017;377:454-64) to reduce the risk of AML relapse.  We will continue to monitor AML status including periodic NPM1 MRD by PCR testing.     She has no active ID issues.  Her past issues with C diff resolved.  She has declined vaccines.    She completed anticoagulation for her past provoked upper extremity DVT.  She will continue to work with Alida Serrato NP for health maintenance and other medical issues.    We will continue monitoring labs and arrange a return visit in about 3 months.  I reminded her to contact us if questions, concerns, or new issues come up between visits.    Video start time: 10:55 AM  Video end time: 11:02 AM  Video duration: 7 minutes    Ortiz Earl MD, PhD  Attending Physician, [facility]  [phone] clinic

## 2024-03-06 ENCOUNTER — VIRTUAL VISIT (OUTPATIENT)
Dept: ONCOLOGY | Facility: CLINIC | Age: 39
End: 2024-03-06
Attending: INTERNAL MEDICINE
Payer: COMMERCIAL

## 2024-03-06 VITALS — BODY MASS INDEX: 40.81 KG/M2 | WEIGHT: 260 LBS | HEIGHT: 67 IN

## 2024-03-06 DIAGNOSIS — C92.01 ACUTE MYELOID LEUKEMIA IN REMISSION (H): Primary | ICD-10-CM

## 2024-03-06 PROCEDURE — 99214 OFFICE O/P EST MOD 30 MIN: CPT | Mod: 95 | Performed by: INTERNAL MEDICINE

## 2024-03-06 ASSESSMENT — PAIN SCALES - GENERAL: PAINLEVEL: NO PAIN (0)

## 2024-03-06 NOTE — LETTER
"    3/6/2024         RE: Veda Marinelli  [address] [address] [address]        Dear Colleague,    Thank you for referring your patient, Veda Marinelli, to the [facility]. Please see a copy of my visit note below.    Virtual Visit Details    Type of service:  Video Visit     Originating Location (pt. Location): Home    Distant Location (provider location):  On-site  Platform used for Video Visit: AmWell        REASON FOR VISIT:  Management of acute myeloid leukemia (AML)    HISTORY OF PRESENT ILLNESS:  Minerva Marinelli is a 39 year old with a history of acute myeloid leukemia (AML).  To summarize her course, she was noted to have low WBC count on routine labs in 03/2022.  Follow up labs in 05/2022 showed further decrease in WBC as well as anemia and thrombocytopenia.  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  She was treated with cytarabine and daunorubicin \"7+3\" induction chemotherapy with midostaurin on Day 8-21.  Bone marrow biopsy around Day 21 was hypocellular but consistent with recovering bone marrow without definite AML.  Her course was complicated by recurrent C diff and upper extremity DVT.  She had prompt blood cell count recovery and was discharge with ongoing outpatient follow-up.  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  She met with the BMT Team who recommended chemo consolidation.  She completed 4 cycles of high-dose cytarabine (HiDAC) with midostaurin ending in 12/2022.  A post-consolidation bone marrow biopsy showed 70-80% cellular bone marrow with no evidence of AML by morphology or flow " "cytometry and NPM1 MRD PCR was undetectable.  She completed 1 year of maintenance midostaurin in 02/2023.  Visit for management of AML.      She is not with her mom Melonie or dad Rosalee.  Energy level has been good.  No fevers, night sweats, or unintentional weight loss.  No headache or focal weakness or sensory changes.  No dyspnea, cough, or chest pain.  No bleeding or unusual bruising.  Has been working remotely and feeling good.  Going back to [place] this month.  No specific concerns today.    KEY PAST MEDICAL HISTORY:  History of COVID-19, recurrent C diff, hypothyroidism    MEDICATIONS:  Current Outpatient Medications   Medication    Bacillus Coagulans-Inulin (PROBIOTIC) 1-250 BILLION-MG CAPS    levothyroxine (SYNTHROID/LEVOTHROID) 75 MCG tablet    magnesium 250 MG tablet    midostaurin (RYDAPT) 25 MG capsule    Multiple Vitamins-Minerals (WOMENS MULTIVITAMIN) TABS    Quercetin 50 MG TABS    vitamin C (ASCORBIC ACID) 1000 MG TABS    vitamin D3 (CHOLECALCIFEROL) 50 mcg (2000 units) tablet    zinc gluconate 50 MG tablet     No current facility-administered medications for this visit.     SOCIAL HISTORY:  Working as RN virtually in primary care setting    PHYSICAL EXAMINATION:  Ht 1.702 m (5' 7\")   Wt 117.9 kg (260 lb)   BMI 40.72 kg/m    Wt Readings from Last 5 Encounters:   03/06/24 117.9 kg (260 lb)   11/21/23 117.9 kg (260 lb)   08/22/23 112.5 kg (248 lb)   11/17/22 115.1 kg (253 lb 12.8 oz)   11/14/22 116 kg (255 lb 12.8 oz)     General: Well appearing.  HEENT: Sclerae anicteric.  Lungs: Breathing comfortably. No cough.  MSK: Grossly normal movement.  Neuro: Grossly non-focal.  Skin/access: Normal skin tone.  Psych: Alert and oriented. No distress.  Performance status: ECOG 0    LABORATORY DATA: Reviewed by me  Recent Labs   Lab Test 02/21/24  1402 01/24/24  1413 12/27/23  1400 12/28/22  1243 12/06/22  0953 12/02/22  0957 11/29/22  0950 07/18/22  0942 07/15/22  0659 07/14/22  0813 07/13/22  0555 06/16/22  1724 " 06/16/22  1339 06/16/22  1102 06/14/22  0752   WBC 6.3 5.2 6.8   < > 5.9 9.9 10.3   < > 11.4* 6.4 3.1*   < > 41.0*   < > 25.3*   HGB 14.1 14.2 14.6   < > 7.8* 7.9* 7.9*   < > 8.3* 8.1* 8.0*   < > 9.5*   < > 10.1*    183 225   < > 140* 51* 6*   < > 388 253 145*   < > 72*   < > 90*   ANEU  --   --   --   --  4.3 7.7 8.5*   < > 5.0 2.1 0.7*   < > 0.8*   < > 0.8*   ANEUTAUTO 4.3 3.6 4.6   < >  --   --   --    < >  --   --   --    < >  --   --   --    ABLA  --   --   --   --   --   --   --   --  0.5* 0.3* 0.1*   < > 37.3*  --  20.5*   ALYM  --   --   --   --  0.6* 0.9 0.3*   < > 1.1 1.9 1.3   < > 2.5   < > 3.3   ALYMPAUTO 1.3 0.8 1.5   < >  --   --   --    < >  --   --   --    < >  --   --   --    RETICABSCT  --   --   --   --   --   --   --   --   --   --   --   --  0.063  --  0.080    < > = values in this interval not displayed.     Recent Labs   Lab Test 02/21/24  1402 01/24/24  1413 12/27/23  1400 11/25/22  0950 11/17/22  0349 11/16/22  0718 11/15/22  0545 11/14/22  1333 10/13/22  0636 10/12/22  0536 10/11/22  0532    140 137   < > 138 141 138 140   < > 137 140   POTASSIUM 4.4 4.3 4.0   < > 4.3 4.2 3.9 3.8   < > 3.8 3.7   CHLORIDE 107 106 104   < > 109* 109* 107 108*   < > 106 107   CO2 23 23 22   < > 19* 19* 18* 20*   < > 21* 17*   BUN 12.1 12.7 11.4   < > 12.8 13.9 11.8 11.8   < > 12.3 12.8   CR 0.71 0.64 0.61   < > 0.49* 0.52 0.57 0.58   < > 0.57 0.58   MICHAEL 9.4 9.4 9.9   < > 8.9 8.8 9.4 9.1   < > 9.0 9.4   MAG  --   --   --   --   --   --   --  2.0  --  2.2 2.0   PHOS  --   --   --   --   --   --   --  3.3  --  3.4 3.1   URIC  --   --   --   --  3.2 4.6 6.2* 4.8   < > 4.4 6.0*    170 177   < >  --   --   --  226   < >  --   --     < > = values in this interval not displayed.     Recent Labs   Lab Test 02/21/24  1402 01/24/24  1413 12/27/23  1400 11/25/22  0950 11/17/22  0349 11/16/22  0718 11/15/22  0545   AST 17 26 20   < > 16 14 17   ALT 30 42 33   < > 21 23 25   ALKPHOS 57 60 62   < > 58 53  55   BILITOTAL 0.8 0.6 0.8   < > 0.8 1.2 0.7   INR  --   --   --   --  1.08 1.09 1.15    < > = values in this interval not displayed.     12/27/2023 NPM1 MRD by PCR not detected    IMPRESSION AND PLAN:  Minerva Marinelli is a 39 year old with acute myeloid leukemia (AML).    There is nothing to suggest recurrent AML.  She completed a year of maintenance midostaurin in 02/2024 based on the RATIFY trial (N Engl J Med 2017;377:454-64) to reduce the risk of AML relapse.  We will continue to monitor AML status including periodic NPM1 MRD by PCR testing.     She has no active ID issues.  Her past issues with C diff resolved.  She has declined vaccines.    She completed anticoagulation for her past provoked upper extremity DVT.  She will continue to work with Alida Serrato NP for health maintenance and other medical issues.    We will continue monitoring labs and arrange a return visit in about 3 months.  I reminded her to contact us if questions, concerns, or new issues come up between visits.    Video start time: 10:55 AM  Video end time: 11:02 AM  Video duration: 7 minutes    Ortiz Earl MD, PhD  Attending Physician, [facility]  [phone] [facility]

## 2024-03-06 NOTE — NURSING NOTE
Is the patient currently in the state of [place]? YES    Visit mode:VIDEO    If the visit is dropped, the patient can be reconnected by: VIDEO VISIT: Text to cell phone:   Telephone Information:   Mobile [phone]       Will anyone else be joining the visit? NO  (If patient encounters technical issues they should call [phone]    How would you like to obtain your AVS? MyChart    Are changes needed to the allergy or medication list? Pt stated no changes to allergies and Pt stated no med changes    Reason for visit: CHERRY Singh VVF

## 2024-03-19 NOTE — TELEPHONE ENCOUNTER
Patient attended Phase 2 Cardiac Rehab Exercise Session. Further documentation will be scanned into the medical record upon discharge.   Spoke with patient regarding planned procedure on 8/9/2022.  She acknowleges understanding of instructions.  She asked if I knew if she would be admitted after placement to hospital.  She states that her team indicated that this was a possibility.  I told her I would reach out to ONC. team and look for confirmation.      She understands that if admitted, she will need a COVID PCR test prior to procedure.  If not admitted, that a home COVID test will suffice.  I explained that I would contact her after I communicate with ONC team.     [employer]box staff message sent.    Minerva Pettit RN on 8/5/2022 at 11:54 AM

## 2024-04-08 ENCOUNTER — ANESTHESIA EVENT (OUTPATIENT)
Dept: SURGERY | Facility: AMBULATORY SURGERY CENTER | Age: 39
End: 2024-04-08
Payer: COMMERCIAL

## 2024-04-09 ENCOUNTER — HOSPITAL ENCOUNTER (OUTPATIENT)
Facility: AMBULATORY SURGERY CENTER | Age: 39
Discharge: HOME | End: 2024-04-09
Attending: RADIOLOGY
Payer: COMMERCIAL

## 2024-04-09 ENCOUNTER — ANCILLARY PROCEDURE (OUTPATIENT)
Dept: RADIOLOGY | Facility: AMBULATORY SURGERY CENTER | Age: 39
End: 2024-04-09
Attending: INTERNAL MEDICINE
Payer: COMMERCIAL

## 2024-04-09 ENCOUNTER — ANESTHESIA (OUTPATIENT)
Dept: SURGERY | Facility: AMBULATORY SURGERY CENTER | Age: 39
End: 2024-04-09
Payer: COMMERCIAL

## 2024-04-09 VITALS
SYSTOLIC BLOOD PRESSURE: 110 MMHG | OXYGEN SATURATION: 96 % | BODY MASS INDEX: 40.81 KG/M2 | WEIGHT: 260 LBS | DIASTOLIC BLOOD PRESSURE: 70 MMHG | RESPIRATION RATE: 14 BRPM | HEIGHT: 67 IN | TEMPERATURE: 97.4 F | HEART RATE: 66 BPM

## 2024-04-09 DIAGNOSIS — C92.01 ACUTE MYELOID LEUKEMIA IN REMISSION (H): ICD-10-CM

## 2024-04-09 DIAGNOSIS — C92.01 ACUTE MYELOID LEUKEMIA IN REMISSION (H): Primary | ICD-10-CM

## 2024-04-09 LAB
ALBUMIN SERPL BCG-MCNC: 4.6 G/DL (ref 3.5–5.2)
ALP SERPL-CCNC: 62 U/L (ref 40–150)
ALT SERPL W P-5'-P-CCNC: 20 U/L (ref 0–50)
ANION GAP SERPL CALCULATED.3IONS-SCNC: 12 MMOL/L (ref 7–15)
AST SERPL W P-5'-P-CCNC: 16 U/L (ref 0–45)
BASOPHILS # BLD AUTO: 0.1 10E3/UL (ref 0–0.2)
BASOPHILS NFR BLD AUTO: 1 %
BILIRUB SERPL-MCNC: 0.9 MG/DL
BUN SERPL-MCNC: 9.9 MG/DL (ref 6–20)
CALCIUM SERPL-MCNC: 9.7 MG/DL (ref 8.6–10)
CHLORIDE SERPL-SCNC: 106 MMOL/L (ref 98–107)
CREAT SERPL-MCNC: 0.6 MG/DL (ref 0.51–0.95)
DEPRECATED HCO3 PLAS-SCNC: 23 MMOL/L (ref 22–29)
EGFRCR SERPLBLD CKD-EPI 2021: >90 ML/MIN/1.73M2
EOSINOPHIL # BLD AUTO: 0.1 10E3/UL (ref 0–0.7)
EOSINOPHIL NFR BLD AUTO: 2 %
ERYTHROCYTE [DISTWIDTH] IN BLOOD BY AUTOMATED COUNT: 13.1 % (ref 10–15)
GLUCOSE SERPL-MCNC: 110 MG/DL (ref 70–99)
HCG UR QL: NEGATIVE
HCT VFR BLD AUTO: 43.4 % (ref 35–47)
HGB BLD-MCNC: 15.2 G/DL (ref 11.7–15.7)
IMM GRANULOCYTES # BLD: 0 10E3/UL
IMM GRANULOCYTES NFR BLD: 0 %
INTERNAL QC OK POCT: NORMAL
LDH SERPL L TO P-CCNC: 177 U/L (ref 0–250)
LYMPHOCYTES # BLD AUTO: 1.1 10E3/UL (ref 0.8–5.3)
LYMPHOCYTES NFR BLD AUTO: 16 %
MCH RBC QN AUTO: 33.6 PG (ref 26.5–33)
MCHC RBC AUTO-ENTMCNC: 35 G/DL (ref 31.5–36.5)
MCV RBC AUTO: 96 FL (ref 78–100)
MONOCYTES # BLD AUTO: 0.6 10E3/UL (ref 0–1.3)
MONOCYTES NFR BLD AUTO: 8 %
NEUTROPHILS # BLD AUTO: 5 10E3/UL (ref 1.6–8.3)
NEUTROPHILS NFR BLD AUTO: 73 %
NRBC # BLD AUTO: 0 10E3/UL
NRBC BLD AUTO-RTO: 0 /100
PLATELET # BLD AUTO: 225 10E3/UL (ref 150–450)
POCT KIT EXPIRATION DATE: NORMAL
POCT KIT LOT NUMBER: NORMAL
POTASSIUM SERPL-SCNC: 3.9 MMOL/L (ref 3.4–5.3)
PROT SERPL-MCNC: 7.9 G/DL (ref 6.4–8.3)
RBC # BLD AUTO: 4.53 10E6/UL (ref 3.8–5.2)
SODIUM SERPL-SCNC: 141 MMOL/L (ref 135–145)
WBC # BLD AUTO: 6.9 10E3/UL (ref 4–11)

## 2024-04-09 PROCEDURE — 85025 COMPLETE CBC W/AUTO DIFF WBC: CPT | Performed by: PATHOLOGY

## 2024-04-09 PROCEDURE — 80053 COMPREHEN METABOLIC PANEL: CPT | Performed by: PATHOLOGY

## 2024-04-09 PROCEDURE — 36590 REMOVAL TUNNELED CV CATH: CPT | Mod: LT | Performed by: PHYSICIAN ASSISTANT

## 2024-04-09 PROCEDURE — 83615 LACTATE (LD) (LDH) ENZYME: CPT | Performed by: PATHOLOGY

## 2024-04-09 PROCEDURE — 36590 REMOVAL TUNNELED CV CATH: CPT | Performed by: NURSE ANESTHETIST, CERTIFIED REGISTERED

## 2024-04-09 PROCEDURE — 36590 REMOVAL TUNNELED CV CATH: CPT | Mod: RT | Performed by: PHYSICIAN ASSISTANT

## 2024-04-09 PROCEDURE — 36590 REMOVAL TUNNELED CV CATH: CPT | Performed by: ANESTHESIOLOGY

## 2024-04-09 PROCEDURE — 81025 URINE PREGNANCY TEST: CPT | Performed by: PATHOLOGY

## 2024-04-09 RX ORDER — LIDOCAINE HYDROCHLORIDE 20 MG/ML
INJECTION, SOLUTION INFILTRATION; PERINEURAL PRN
Status: DISCONTINUED | OUTPATIENT
Start: 2024-04-09 | End: 2024-04-09

## 2024-04-09 RX ORDER — NALOXONE HYDROCHLORIDE 0.4 MG/ML
0.1 INJECTION, SOLUTION INTRAMUSCULAR; INTRAVENOUS; SUBCUTANEOUS
Status: DISCONTINUED | OUTPATIENT
Start: 2024-04-09 | End: 2024-04-10 | Stop reason: HOSPADM

## 2024-04-09 RX ORDER — SODIUM CHLORIDE, SODIUM LACTATE, POTASSIUM CHLORIDE, CALCIUM CHLORIDE 600; 310; 30; 20 MG/100ML; MG/100ML; MG/100ML; MG/100ML
INJECTION, SOLUTION INTRAVENOUS CONTINUOUS
Status: DISCONTINUED | OUTPATIENT
Start: 2024-04-09 | End: 2024-04-10 | Stop reason: HOSPADM

## 2024-04-09 RX ORDER — ONDANSETRON 4 MG/1
4 TABLET, ORALLY DISINTEGRATING ORAL EVERY 30 MIN PRN
Status: DISCONTINUED | OUTPATIENT
Start: 2024-04-09 | End: 2024-04-10 | Stop reason: HOSPADM

## 2024-04-09 RX ORDER — ONDANSETRON 2 MG/ML
4 INJECTION INTRAMUSCULAR; INTRAVENOUS EVERY 30 MIN PRN
Status: DISCONTINUED | OUTPATIENT
Start: 2024-04-09 | End: 2024-04-10 | Stop reason: HOSPADM

## 2024-04-09 RX ORDER — OXYCODONE HYDROCHLORIDE 5 MG/1
10 TABLET ORAL
Status: DISCONTINUED | OUTPATIENT
Start: 2024-04-09 | End: 2024-04-10 | Stop reason: HOSPADM

## 2024-04-09 RX ORDER — LIDOCAINE HYDROCHLORIDE 10 MG/ML
INJECTION, SOLUTION EPIDURAL; INFILTRATION; INTRACAUDAL; PERINEURAL DAILY PRN
Status: DISCONTINUED | OUTPATIENT
Start: 2024-04-09 | End: 2024-04-09 | Stop reason: HOSPADM

## 2024-04-09 RX ORDER — PROPOFOL 10 MG/ML
INJECTION, EMULSION INTRAVENOUS PRN
Status: DISCONTINUED | OUTPATIENT
Start: 2024-04-09 | End: 2024-04-09

## 2024-04-09 RX ORDER — HYDROMORPHONE HYDROCHLORIDE 1 MG/ML
0.4 INJECTION, SOLUTION INTRAMUSCULAR; INTRAVENOUS; SUBCUTANEOUS EVERY 5 MIN PRN
Status: DISCONTINUED | OUTPATIENT
Start: 2024-04-09 | End: 2024-04-10 | Stop reason: HOSPADM

## 2024-04-09 RX ORDER — SODIUM CHLORIDE 9 MG/ML
INJECTION, SOLUTION INTRAVENOUS CONTINUOUS
Status: DISCONTINUED | OUTPATIENT
Start: 2024-04-09 | End: 2024-04-10 | Stop reason: HOSPADM

## 2024-04-09 RX ORDER — FENTANYL CITRATE 50 UG/ML
50 INJECTION, SOLUTION INTRAMUSCULAR; INTRAVENOUS EVERY 5 MIN PRN
Status: DISCONTINUED | OUTPATIENT
Start: 2024-04-09 | End: 2024-04-10 | Stop reason: HOSPADM

## 2024-04-09 RX ORDER — FENTANYL CITRATE 50 UG/ML
25 INJECTION, SOLUTION INTRAMUSCULAR; INTRAVENOUS EVERY 5 MIN PRN
Status: DISCONTINUED | OUTPATIENT
Start: 2024-04-09 | End: 2024-04-10 | Stop reason: HOSPADM

## 2024-04-09 RX ORDER — HYDROMORPHONE HYDROCHLORIDE 1 MG/ML
0.2 INJECTION, SOLUTION INTRAMUSCULAR; INTRAVENOUS; SUBCUTANEOUS EVERY 5 MIN PRN
Status: DISCONTINUED | OUTPATIENT
Start: 2024-04-09 | End: 2024-04-10 | Stop reason: HOSPADM

## 2024-04-09 RX ORDER — PROPOFOL 10 MG/ML
INJECTION, EMULSION INTRAVENOUS CONTINUOUS PRN
Status: DISCONTINUED | OUTPATIENT
Start: 2024-04-09 | End: 2024-04-09

## 2024-04-09 RX ORDER — OXYCODONE HYDROCHLORIDE 5 MG/1
5 TABLET ORAL
Status: DISCONTINUED | OUTPATIENT
Start: 2024-04-09 | End: 2024-04-10 | Stop reason: HOSPADM

## 2024-04-09 RX ORDER — LIDOCAINE 40 MG/G
CREAM TOPICAL
Status: DISCONTINUED | OUTPATIENT
Start: 2024-04-09 | End: 2024-04-10 | Stop reason: HOSPADM

## 2024-04-09 RX ORDER — ACETAMINOPHEN 325 MG/1
975 TABLET ORAL ONCE
Status: COMPLETED | OUTPATIENT
Start: 2024-04-09 | End: 2024-04-09

## 2024-04-09 RX ADMIN — SODIUM CHLORIDE, SODIUM LACTATE, POTASSIUM CHLORIDE, CALCIUM CHLORIDE: 600; 310; 30; 20 INJECTION, SOLUTION INTRAVENOUS at 09:30

## 2024-04-09 RX ADMIN — PROPOFOL 40 MG: 10 INJECTION, EMULSION INTRAVENOUS at 09:50

## 2024-04-09 RX ADMIN — PROPOFOL 150 MCG/KG/MIN: 10 INJECTION, EMULSION INTRAVENOUS at 09:42

## 2024-04-09 RX ADMIN — LIDOCAINE HYDROCHLORIDE 80 MG: 20 INJECTION, SOLUTION INFILTRATION; PERINEURAL at 09:42

## 2024-04-09 RX ADMIN — ACETAMINOPHEN 975 MG: 325 TABLET ORAL at 09:30

## 2024-04-09 NOTE — DISCHARGE INSTRUCTIONS
[facility]  Home Care Following Anesthesia  For 24 hours after surgery:  Get plenty of rest.  A responsible adult must stay with you for at least 24 hours after you leave the surgery center.  Do not drive or use heavy equipment.  If you have weakness or tingling, don't drive or use heavy equipment until this feeling goes away.   Do not drink alcohol.   Avoid strenuous or risky activities.  Ask for help when climbing stairs.  You may feel lightheaded.  IF so, sit for a few minutes before standing.  Have someone help you get up.   If you have nausea (feel sick to your stomach): Drink only clear liquids such as apple juice, ginger ale, broth or 7-Up.  Rest may also help.  Be sure to drink enough fluids.  Move to a regular diet as you feel able.   You may have a slight fever.  Call the doctor if your fever is over 100 F (37.7 C) (taken under the tongue) or lasts longer than 24 hours.  You may have a dry mouth, a sore throat, muscle aches or trouble sleeping. These should go away after 24 hours.  Do not make important or legal decisions.   It is recommended to avoid smoking.               Tips for taking pain medications  To get the best pain relief possible, remember these points:  Take pain medications as directed, before pain becomes severe.  Pain medication can upset your stomach: taking it with food may help.  Constipation is a common side effect of pain medication. Drink plenty of  fluids.  Eat foods high in fiber. Take a stool softener if recommended by your doctor or pharmacist.  Do not drink alcohol, drive or operate machinery while taking pain medications.  Ask about other ways to control pain, such as with heat, ice or relaxation.    Tylenol/Acetaminophen Consumption    If you feel your pain relief is insufficient, you may take Tylenol/Acetaminophen in addition to your narcotic pain medication.   Be careful not to exceed 4,000 mg of Tylenol/Acetaminophen in a 24 hour  period from all sources.  If you are taking extra strength Tylenol/acetaminophen (500 mg), the maximum dose is 8 tablets in 24 hours.  If you are taking regular strength acetaminophen (325 mg), the maximum dose is 12 tablets in 24 hours.    Call a doctor for any of the following:  Signs of infection (fever, growing tenderness at the surgery site, a large amount of drainage or bleeding, severe pain, foul-smelling drainage, redness, swelling).  It has been over 8 to 10 hours since surgery and you are still not able to urinate (pass water).  Headache for over 24 hours.  Numbness, tingling or weakness the day after surgery (if you had spinal anesthesia).  Signs of Covid-19 infection (temperature over 100 degrees, shortness of breath, cough, loss of taste/smell, generalized body aches, persistent headache, chills, sore throat, nausea/vomiting/diarrhea)  Your doctor is: PRETTY Cervantes           A collaboration between [facility] and [facility]  Experts in minimally invasive, targeted treatments performed using imaging guidance    Venous Access Device, Port Catheter or Tunneled Central Line Removal    Today you had your existing venous access device removed, either because it was no longer needed or because there was malfunction or infection issues.    After you go home:  Drink plenty of fluids.  Generally 6-8 (8 ounce) glasses a day is recommended.  Resume your regular diet unless otherwise ordered by a medical provider.  Keep any applied tape/gauze dressings clean and dry.  Change tape/gauze dressings if they get wet or soiled.  You may shower the following day after procedure, however cover and protect from moisture any tape/gauze dressings.  You may let water hit and run over dried skin glue, but do not scrub.  Pat the area dry after showering.  Port removal incisions are closed with absorbable suture, meaning they do not need to be removed at a later date, and  a topical skin adhesive (skin glue).  This glue will wear off in 7-14 days.  Do not remove before this time.  If 14 days have passed and residual glue is present, you may gently remove it.  You may remove tape/gauze dressings after 5 days if the site looks closed and in the process of healing.  Do not apply gels, lotions, or ointments to the glue site for the first 10 days as this may cause the glue to prematurely soften and fail.  Do not perform strenuous activities or lift greater than 10 pounds for the next three days.  If there is bleeding or oozing from the procedure site, apply firm pressure to the area for 5-10 minutes.  If the bleeding continues seek medical advice at the numbers below.  Mild procedure site discomfort can be treated with an ice pack and over-the-counter pain relievers.              For 24 hours after any sedation used:  Relax and take it easy.  No strenuous activities.  Do not drive or operate machines at home or at work.  No alcohol consumption.  Do not make any important or legal decisions.    Call our Interventional Radiology (IR) service if:  If you start bleeding from the procedure site.  If you do start to bleed from the site, lie down and hold some pressure on the site.  Our radiology provider can help you decide if you need to return to the hospital.  If you have new or worsening pain related to the procedure.  If you have concerning swelling at the procedure site.  If you develop persistent nausea or vomiting.  If you develop hives or a rash or any unexplained itching.  If you have a fever of greater than 100.5  F and chills in the first 5 days after procedure.  Any other concerns related to your procedure.      [facility]  Interventional Radiology (IR)  [address]  [address] [facility] [place] [address]    Contact Number:  [phone]  (IR Nurse Triage)  Monday - Friday 7am - 4pm    After hours for urgent concerns:   [phone]  After 4pm Monday - Friday, Weekends and Holidays.   Ask for Interventional Radiology on-call.  Someone is available 24 hours a day.  [facility] toll free number:  [phone]

## 2024-04-09 NOTE — ANESTHESIA CARE TRANSFER NOTE
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    Anesthesia Type:   MAC     Note:                    Patient transferred to: Phase II    Handoff Report: Identifed the Patient, Identified the Reponsible Provider, Reviewed the pertinent medical history, Discussed the surgical course, Reviewed Intra-OP anesthesia mangement and issues during anesthesia, Set expectations for post-procedure period and Allowed opportunity for questions and acknowledgement of understanding      Vitals:  Vitals Value Taken Time   /70 04/09/24 1030   Temp 36.3  C (97.4  F) 04/09/24 1030   Pulse 66 04/09/24 1030   Resp 14 04/09/24 1030   SpO2 96 % 04/09/24 1030       Electronically Signed By: Jin Wright MD  April 9, 2024  10:42 AM

## 2024-04-09 NOTE — PROCEDURES
Interventional Radiology Brief Post Procedure Note    Procedure: IR Chest Port Removal     Proceduralist: Chema Dominguez PA-C    Assistant: None    Time Out: Prior to the start of the procedure and with procedural staff participation, I verbally confirmed the patient s identity using two indicators, relevant allergies, that the procedure was appropriate and matched the consent or emergent situation, and that the correct equipment/implants were available. Immediately prior to starting the procedure I conducted the Time Out with the procedural staff and re-confirmed the patient s name, procedure, and site/side. (The Joint Commission universal protocol was followed.)  Yes        Sedation: Monitored Anesthesia Care (MAC) administered and documented by Anesthesia Care Provider    Findings: Completed removal of right chest port in its entirety.    Estimated Blood Loss: Less than 10 ml    SPECIMENS: None    Complications: 1. None     Condition: Stable    Plan: Follow-up per primary team.     Comments: See dictated procedure note for full details.    Chema Dominguez PA-C

## 2024-04-09 NOTE — ANESTHESIA CARE TRANSFER NOTE
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    Anesthesia Type:   MAC     Note:    Oropharynx: oropharynx clear of all foreign objects and spontaneously breathing  Level of Consciousness: awake  Oxygen Supplementation: room air    Independent Airway: airway patency satisfactory and stable  Dentition: dentition unchanged  Vital Signs Stable: post-procedure vital signs reviewed and stable  Report to RN Given: handoff report given  Patient transferred to: Phase II    Handoff Report: Identifed the Patient, Identified the Reponsible Provider, Reviewed the pertinent medical history, Discussed the surgical course, Reviewed Intra-OP anesthesia mangement and issues during anesthesia, Set expectations for post-procedure period and Allowed opportunity for questions and acknowledgement of understanding      Vitals:  Vitals Value Taken Time   /70 04/09/24 1030   Temp 36.3  C (97.4  F) 04/09/24 1030   Pulse 66 04/09/24 1030   Resp 14 04/09/24 1030   SpO2 96 % 04/09/24 1030       Electronically Signed By: RAINE Harper CRNA  April 9, 2024  10:52 AM

## 2024-04-09 NOTE — ANESTHESIA PREPROCEDURE EVALUATION
Anesthesia Pre-Procedure Evaluation    Patient: Veda Marinelli   MRN: [id number] : 1985        Procedure : Procedure(s):  Remove port vascular access right          Past Medical History:   Diagnosis Date    Acute myeloid leukemia (H) 2022    AML (acute myeloblastic leukemia) (H) 10/10/2022    COVID-19 virus infection 10/19/2021    Leukemia, blast cell (H) 2022    Need for prophylactic antibiotic 2022    Neutropenic fever  (H24) 2022      Past Surgical History:   Procedure Laterality Date    IR CHEST PORT PLACEMENT > 5 YRS OF AGE  2022    PICC DOUBLE LUMEN PLACEMENT Left 2022    46 cm total lateral brachial      Allergies   Allergen Reactions    Blood Transfusion Related (Informational Only) Hives     2022, reaction of hives with platelet transfusion       Social History     Tobacco Use    Smoking status: Never     Passive exposure: Never    Smokeless tobacco: Never   Substance Use Topics    Alcohol use: Not Currently      Wt Readings from Last 1 Encounters:   24 117.9 kg (260 lb)           Physical Exam    Airway        Mallampati: II   TM distance: > 3 FB   Neck ROM: full     Respiratory Devices and Support         Dental       (+) Minor Abnormalities - some fillings, tiny chips      Cardiovascular          Rhythm and rate: regular     Pulmonary           breath sounds clear to auscultation           OUTSIDE LABS:  CBC:   Lab Results   Component Value Date    WBC 6.3 2024    WBC 5.2 2024    HGB 14.1 2024    HGB 14.2 2024    HCT 42.0 2024    HCT 41.9 2024     2024     2024     BMP:   Lab Results   Component Value Date     2024     2024    POTASSIUM 4.4 2024    POTASSIUM 4.3 2024    CHLORIDE 107 2024    CHLORIDE 106 2024    CO2 23 2024    CO2 23 2024    BUN 12.1 2024    BUN 12.7 2024    CR 0.71 2024    CR 0.64 2024     " (H) 02/21/2024     (H) 01/24/2024     COAGS:   Lab Results   Component Value Date    PTT 42 (H) 09/12/2022    INR 1.08 11/17/2022    FIBR 346 11/17/2022     POC:   Lab Results   Component Value Date    HCG Negative 04/09/2024     HEPATIC:   Lab Results   Component Value Date    ALBUMIN 4.2 02/21/2024    PROTTOTAL 6.9 02/21/2024    ALT 30 02/21/2024    AST 17 02/21/2024    ALKPHOS 57 02/21/2024    BILITOTAL 0.8 02/21/2024     OTHER:   Lab Results   Component Value Date    LACT 0.6 (L) 07/12/2022    MICHAEL 9.4 02/21/2024    PHOS 3.3 11/14/2022    MAG 2.0 11/14/2022    LIPASE 23 06/30/2022    AMYLASE 35 06/30/2022    TSH 1.48 01/24/2023    T4 1.51 08/12/2022       Anesthesia Plan    ASA Status:  2       Anesthesia Type: MAC.     - Reason for MAC: immobility needed              Consents    Anesthesia Plan(s) and associated risks, benefits, and realistic alternatives discussed. Questions answered and patient/representative(s) expressed understanding.     - Discussed:     - Discussed with:  Patient            Postoperative Care    Pain management: Multi-modal analgesia.   PONV prophylaxis: Ondansetron (or other 5HT-3), Background Propofol Infusion     Comments:               Jin Wright MD    I have reviewed the pertinent notes and labs in the chart from the past 30 days and (re)examined the patient.  Any updates or changes from those notes are reflected in this note.              # Severe Obesity: Estimated body mass index is 40.72 kg/m  as calculated from the following:    Height as of this encounter: 1.702 m (5' 7\").    Weight as of this encounter: 117.9 kg (260 lb).      "

## 2024-04-09 NOTE — ANESTHESIA POSTPROCEDURE EVALUATION
Patient: Veda Marinelli    Procedure: Procedure(s):  Remove port vascular access right       Anesthesia Type:  MAC    Note:  Disposition: Outpatient   Postop Pain Control: Uneventful            Sign Out: Well controlled pain   PONV: No   Neuro/Psych: Uneventful            Sign Out: Acceptable/Baseline neuro status   Airway/Respiratory: Uneventful            Sign Out: Acceptable/Baseline resp. status   CV/Hemodynamics: Uneventful            Sign Out: Acceptable CV status; No obvious hypovolemia; No obvious fluid overload   Other NRE: NONE   DID A NON-ROUTINE EVENT OCCUR?            Last vitals:  Vitals Value Taken Time   /70 04/09/24 1030   Temp 36.3  C (97.4  F) 04/09/24 1030   Pulse 66 04/09/24 1030   Resp 14 04/09/24 1030   SpO2 96 % 04/09/24 1030       Electronically Signed By: Jin Wright MD  April 9, 2024  10:42 AM

## 2024-04-17 LAB — MISCELLANEOUS TEST 1 (ARUP): NORMAL

## 2024-06-26 ENCOUNTER — LAB (OUTPATIENT)
Dept: LAB | Facility: CLINIC | Age: 39
End: 2024-06-26
Payer: COMMERCIAL

## 2024-06-26 ENCOUNTER — PATIENT OUTREACH (OUTPATIENT)
Dept: ONCOLOGY | Facility: CLINIC | Age: 39
End: 2024-06-26

## 2024-06-26 DIAGNOSIS — C92.01 ACUTE MYELOID LEUKEMIA IN REMISSION (H): ICD-10-CM

## 2024-06-26 LAB
ALBUMIN SERPL BCG-MCNC: 4.6 G/DL (ref 3.5–5.2)
ALP SERPL-CCNC: 59 U/L (ref 40–150)
ALT SERPL W P-5'-P-CCNC: 17 U/L (ref 0–50)
ANION GAP SERPL CALCULATED.3IONS-SCNC: 17 MMOL/L (ref 7–15)
AST SERPL W P-5'-P-CCNC: 15 U/L (ref 0–45)
BASOPHILS # BLD AUTO: 0 10E3/UL (ref 0–0.2)
BASOPHILS NFR BLD AUTO: 0 %
BILIRUB SERPL-MCNC: 1 MG/DL
BUN SERPL-MCNC: 11.2 MG/DL (ref 6–20)
CALCIUM SERPL-MCNC: 10 MG/DL (ref 8.6–10)
CHLORIDE SERPL-SCNC: 103 MMOL/L (ref 98–107)
CREAT SERPL-MCNC: 0.69 MG/DL (ref 0.51–0.95)
DEPRECATED HCO3 PLAS-SCNC: 19 MMOL/L (ref 22–29)
EGFRCR SERPLBLD CKD-EPI 2021: >90 ML/MIN/1.73M2
EOSINOPHIL # BLD AUTO: 0.1 10E3/UL (ref 0–0.7)
EOSINOPHIL NFR BLD AUTO: 1 %
ERYTHROCYTE [DISTWIDTH] IN BLOOD BY AUTOMATED COUNT: 12.4 % (ref 10–15)
GLUCOSE SERPL-MCNC: 109 MG/DL (ref 70–99)
HCT VFR BLD AUTO: 46.7 % (ref 35–47)
HGB BLD-MCNC: 15.5 G/DL (ref 11.7–15.7)
IMM GRANULOCYTES # BLD: 0 10E3/UL
IMM GRANULOCYTES NFR BLD: 0 %
LDH SERPL L TO P-CCNC: 156 U/L (ref 0–250)
LYMPHOCYTES # BLD AUTO: 1.8 10E3/UL (ref 0.8–5.3)
LYMPHOCYTES NFR BLD AUTO: 19 %
MCH RBC QN AUTO: 32.6 PG (ref 26.5–33)
MCHC RBC AUTO-ENTMCNC: 33.2 G/DL (ref 31.5–36.5)
MCV RBC AUTO: 98 FL (ref 78–100)
MONOCYTES # BLD AUTO: 0.5 10E3/UL (ref 0–1.3)
MONOCYTES NFR BLD AUTO: 6 %
NEUTROPHILS # BLD AUTO: 6.6 10E3/UL (ref 1.6–8.3)
NEUTROPHILS NFR BLD AUTO: 73 %
PLATELET # BLD AUTO: 239 10E3/UL (ref 150–450)
POTASSIUM SERPL-SCNC: 4 MMOL/L (ref 3.4–5.3)
PROT SERPL-MCNC: 7.8 G/DL (ref 6.4–8.3)
RBC # BLD AUTO: 4.75 10E6/UL (ref 3.8–5.2)
SODIUM SERPL-SCNC: 139 MMOL/L (ref 135–145)
WBC # BLD AUTO: 9 10E3/UL (ref 4–11)

## 2024-06-26 PROCEDURE — 36415 COLL VENOUS BLD VENIPUNCTURE: CPT

## 2024-06-26 PROCEDURE — 80053 COMPREHEN METABOLIC PANEL: CPT

## 2024-06-26 PROCEDURE — 85025 COMPLETE CBC W/AUTO DIFF WBC: CPT

## 2024-06-26 PROCEDURE — 83615 LACTATE (LD) (LDH) ENZYME: CPT

## 2024-07-03 LAB — MISCELLANEOUS TEST 1 (ARUP): NORMAL

## 2024-07-15 NOTE — PROGRESS NOTES
"Virtual Visit Details    Type of service:  Video Visit     Originating Location (pt. Location): Home    Distant Location (provider location):  On-site  Platform used for Video Visit: Silvina        REASON FOR VISIT:  Management of acute myeloid leukemia (AML)    HISTORY OF PRESENT ILLNESS:  Minerva Marinelli is a 39 year old with a history of acute myeloid leukemia (AML).  To summarize her course, she was noted to have low WBC count on routine labs in 03/2022.  Follow up labs in 05/2022 showed further decrease in WBC as well as anemia and thrombocytopenia.  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  She was treated with cytarabine and daunorubicin \"7+3\" induction chemotherapy with midostaurin on Day 8-21.  Bone marrow biopsy around Day 21 was hypocellular but consistent with recovering bone marrow without definite AML.  Her course was complicated by recurrent C diff and upper extremity DVT.  She had prompt blood cell count recovery and was discharge with ongoing outpatient follow-up.  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  She met with the BMT Team who recommended chemo consolidation.  She completed 4 cycles of high-dose cytarabine (HiDAC) with midostaurin ending in 12/2022.  A post-consolidation bone marrow biopsy showed 70-80% cellular bone marrow with no evidence of AML by morphology or flow cytometry and NPM1 MRD PCR was undetectable.  She completed 1 year of maintenance midostaurin in 02/2024.  Visit for management of AML.      She is not with her mom Melonie or dad Rosalee.  Energy level has been good.  No fevers, night sweats, or unintentional " "weight loss.  Has been working remotely and feeling good.  Watching nephews playing baseball several nights per week.  Going to [place] [facility] [place] [facility] later in the summer.  No AML concerns.    KEY PAST MEDICAL HISTORY:  History of COVID-19, recurrent C diff, hypothyroidism    MEDICATIONS:  Current Outpatient Medications   Medication Sig Dispense Refill    Bacillus Coagulans-Inulin (PROBIOTIC) 1-250 BILLION-MG CAPS Take 1 capsule by mouth daily      levothyroxine (SYNTHROID/LEVOTHROID) 75 MCG tablet Take 1 tablet (75 mcg) by mouth daily      magnesium 250 MG tablet Take 250 mg by mouth daily      Multiple Vitamins-Minerals (WOMENS MULTIVITAMIN) TABS Take 1 tablet by mouth daily      Quercetin 50 MG TABS Take 50 mg by mouth daily      vitamin C (ASCORBIC ACID) 1000 MG TABS Take 2,000 mg by mouth daily      vitamin D3 (CHOLECALCIFEROL) 50 mcg (2000 units) tablet Take 1 tablet (50 mcg) by mouth daily      zinc gluconate 50 MG tablet Take 50 mg by mouth daily       No current facility-administered medications for this visit.     SOCIAL HISTORY:  Working as RN virtually in primary care setting    PHYSICAL EXAMINATION:  Ht 1.702 m (5' 7\")   Wt 117.9 kg (260 lb)   BMI 40.72 kg/m    Wt Readings from Last 5 Encounters:   07/16/24 117.9 kg (260 lb)   04/09/24 117.9 kg (260 lb)   03/06/24 117.9 kg (260 lb)   11/21/23 117.9 kg (260 lb)   08/22/23 112.5 kg (248 lb)     General: Well appearing.  HEENT: Sclerae anicteric.  Lungs: Breathing comfortably. No cough.  MSK: Grossly normal movement.  Neuro: Grossly non-focal.  Skin/access: Normal skin tone.  Psych: Alert and oriented. No distress.  Performance status: ECOG 0    LABORATORY DATA: Reviewed by me  Recent Labs   Lab Test 06/26/24  1432 04/09/24  0931 02/21/24  1402 12/28/22  1243 12/06/22  0953 12/02/22  0957 11/29/22  0950 07/18/22  0942 07/15/22  0659 07/14/22  0813 07/13/22  0555 06/16/22  1724 06/16/22  1339 06/16/22  1102 06/14/22  0752   WBC 9.0 6.9 6.3   " < > 5.9 9.9 10.3   < > 11.4* 6.4 3.1*   < > 41.0*   < > 25.3*   HGB 15.5 15.2 14.1   < > 7.8* 7.9* 7.9*   < > 8.3* 8.1* 8.0*   < > 9.5*   < > 10.1*    225 220   < > 140* 51* 6*   < > 388 253 145*   < > 72*   < > 90*   ANEU  --   --   --   --  4.3 7.7 8.5*   < > 5.0 2.1 0.7*   < > 0.8*   < > 0.8*   ANEUTAUTO 6.6 5.0 4.3   < >  --   --   --    < >  --   --   --    < >  --   --   --    ABLA  --   --   --   --   --   --   --   --  0.5* 0.3* 0.1*   < > 37.3*  --  20.5*   ALYM  --   --   --   --  0.6* 0.9 0.3*   < > 1.1 1.9 1.3   < > 2.5   < > 3.3   ALYMPAUTO 1.8 1.1 1.3   < >  --   --   --    < >  --   --   --    < >  --   --   --    RETICABSCT  --   --   --   --   --   --   --   --   --   --   --   --  0.063  --  0.080    < > = values in this interval not displayed.     Recent Labs   Lab Test 06/26/24  1432 04/09/24  0931 02/21/24  1402 11/25/22  0950 11/17/22  0349 11/16/22  0718 11/15/22  0545 11/14/22  1333 10/13/22  0636 10/12/22  0536 10/11/22  0532    141 140   < > 138 141 138 140   < > 137 140   POTASSIUM 4.0 3.9 4.4   < > 4.3 4.2 3.9 3.8   < > 3.8 3.7   CHLORIDE 103 106 107   < > 109* 109* 107 108*   < > 106 107   CO2 19* 23 23   < > 19* 19* 18* 20*   < > 21* 17*   BUN 11.2 9.9 12.1   < > 12.8 13.9 11.8 11.8   < > 12.3 12.8   CR 0.69 0.60 0.71   < > 0.49* 0.52 0.57 0.58   < > 0.57 0.58   MICHAEL 10.0 9.7 9.4   < > 8.9 8.8 9.4 9.1   < > 9.0 9.4   MAG  --   --   --   --   --   --   --  2.0  --  2.2 2.0   PHOS  --   --   --   --   --   --   --  3.3  --  3.4 3.1   URIC  --   --   --   --  3.2 4.6 6.2* 4.8   < > 4.4 6.0*    177 140   < >  --   --   --  226   < >  --   --     < > = values in this interval not displayed.     Recent Labs   Lab Test 06/26/24  1432 04/09/24  0931 02/21/24  1402 11/25/22  0950 11/17/22  0349 11/16/22  0718 11/15/22  0545   AST 15 16 17   < > 16 14 17   ALT 17 20 30   < > 21 23 25   ALKPHOS 59 62 57   < > 58 53 55   BILITOTAL 1.0 0.9 0.8   < > 0.8 1.2 0.7   INR  --   --   --    --  1.08 1.09 1.15    < > = values in this interval not displayed.     06/26/2024 NPM1 MRD by PCR not detected    IMPRESSION AND PLAN:  Minerva Marinelli is a 39 year old with acute myeloid leukemia (AML).    There is nothing to suggest recurrent AML.  She completed a year of maintenance midostaurin in 02/2024 based on the RATIFY trial (N Engl J Med 2017;377:454-64) to reduce the risk of AML relapse.  We will continue to monitor AML status including periodic NPM1 MRD by PCR testing.     There are no active ID issues.  Her past issues with C diff resolved.  She has declined vaccines.    She completed anticoagulation for her past provoked upper extremity DVT.  She will continue to work with Alida Serrato NP for health maintenance and other medical issues.    We will continue monitoring labs and arrange a return visit in about 3 months.  I reminded her to contact us if questions, concerns, or new issues come up between visits.    Video start time: 11:53 AM  Video end time: 11:59 AM  Video duration: 6 minutes    The longitudinal plan of care for the diagnosis(es)/condition(s) as documented were addressed during this visit. Due to the added complexity in care, I will continue to support Minerva in the subsequent management and with ongoing continuity of care.    Ortiz Earl MD, PhD  Attending Physician, [facility]  [phone] [facility]

## 2024-07-16 ENCOUNTER — VIRTUAL VISIT (OUTPATIENT)
Dept: ONCOLOGY | Facility: CLINIC | Age: 39
End: 2024-07-16
Attending: INTERNAL MEDICINE
Payer: COMMERCIAL

## 2024-07-16 VITALS — HEIGHT: 67 IN | BODY MASS INDEX: 40.81 KG/M2 | WEIGHT: 260 LBS

## 2024-07-16 DIAGNOSIS — C92.01 ACUTE MYELOID LEUKEMIA IN REMISSION (H): Primary | ICD-10-CM

## 2024-07-16 DIAGNOSIS — Z86.19 HISTORY OF CLOSTRIDIOIDES DIFFICILE COLITIS: ICD-10-CM

## 2024-07-16 PROCEDURE — 99214 OFFICE O/P EST MOD 30 MIN: CPT | Mod: 95 | Performed by: INTERNAL MEDICINE

## 2024-07-16 PROCEDURE — G2211 COMPLEX E/M VISIT ADD ON: HCPCS | Mod: 95 | Performed by: INTERNAL MEDICINE

## 2024-07-16 ASSESSMENT — PAIN SCALES - GENERAL: PAINLEVEL: NO PAIN (0)

## 2024-07-16 NOTE — LETTER
"7/16/2024      Veda Marinelli  [address] [facility] [address]      Dear Colleague,    Thank you for referring your patient, Veda Marinelli, to the [facility]. Please see a copy of my visit note below.    Virtual Visit Details    Type of service:  Video Visit     Originating Location (pt. Location): Home    Distant Location (provider location):  On-site  Platform used for Video Visit: AmWell        REASON FOR VISIT:  Management of acute myeloid leukemia (AML)    HISTORY OF PRESENT ILLNESS:  Minerva Marinelli is a 39 year old with a history of acute myeloid leukemia (AML).  To summarize her course, she was noted to have low WBC count on routine labs in 03/2022.  Follow up labs in 05/2022 showed further decrease in WBC as well as anemia and thrombocytopenia.  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  She was treated with cytarabine and daunorubicin \"7+3\" induction chemotherapy with midostaurin on Day 8-21.  Bone marrow biopsy around Day 21 was hypocellular but consistent with recovering bone marrow without definite AML.  Her course was complicated by recurrent C diff and upper extremity DVT.  She had prompt blood cell count recovery and was discharge with ongoing outpatient follow-up.  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  She met with the BMT Team who recommended chemo consolidation.  She completed 4 cycles of high-dose cytarabine (HiDAC) with midostaurin ending in 12/2022.  A post-consolidation bone marrow biopsy showed 70-80% cellular bone marrow with no evidence of AML by morphology or flow cytometry " "and NPM1 MRD PCR was undetectable.  She completed 1 year of maintenance midostaurin in 02/2024.  Visit for management of AML.      She is not with her mom Melonie or dad Rosalee.  Energy level has been good.  No fevers, night sweats, or unintentional weight loss.  Has been working remotely and feeling good.  Watching nephews playing baseball several nights per week.  [place] [facility] later in the summer.  No AML concerns.    KEY PAST MEDICAL HISTORY:  History of COVID-19, recurrent C diff, hypothyroidism    MEDICATIONS:  Current Outpatient Medications   Medication Sig Dispense Refill     Bacillus Coagulans-Inulin (PROBIOTIC) 1-250 BILLION-MG CAPS Take 1 capsule by mouth daily       levothyroxine (SYNTHROID/LEVOTHROID) 75 MCG tablet Take 1 tablet (75 mcg) by mouth daily       magnesium 250 MG tablet Take 250 mg by mouth daily       Multiple Vitamins-Minerals (WOMENS MULTIVITAMIN) TABS Take 1 tablet by mouth daily       Quercetin 50 MG TABS Take 50 mg by mouth daily       vitamin C (ASCORBIC ACID) 1000 MG TABS Take 2,000 mg by mouth daily       vitamin D3 (CHOLECALCIFEROL) 50 mcg (2000 units) tablet Take 1 tablet (50 mcg) by mouth daily       zinc gluconate 50 MG tablet Take 50 mg by mouth daily       No current facility-administered medications for this visit.     SOCIAL HISTORY:  Working as RN virtually in primary care setting    PHYSICAL EXAMINATION:  Ht 1.702 m (5' 7\")   Wt 117.9 kg (260 lb)   BMI 40.72 kg/m    Wt Readings from Last 5 Encounters:   07/16/24 117.9 kg (260 lb)   04/09/24 117.9 kg (260 lb)   03/06/24 117.9 kg (260 lb)   11/21/23 117.9 kg (260 lb)   08/22/23 112.5 kg (248 lb)     General: Well appearing.  HEENT: Sclerae anicteric.  Lungs: Breathing comfortably. No cough.  MSK: Grossly normal movement.  Neuro: Grossly non-focal.  Skin/access: Normal skin tone.  Psych: Alert and oriented. No distress.  Performance status: ECOG 0    LABORATORY DATA: Reviewed by me  Recent Labs "   Lab Test 06/26/24  1432 04/09/24  0931 02/21/24  1402 12/28/22  1243 12/06/22  0953 12/02/22  0957 11/29/22  0950 07/18/22  0942 07/15/22  0659 07/14/22  0813 07/13/22  0555 06/16/22  1724 06/16/22  1339 06/16/22  1102 06/14/22  0752   WBC 9.0 6.9 6.3   < > 5.9 9.9 10.3   < > 11.4* 6.4 3.1*   < > 41.0*   < > 25.3*   HGB 15.5 15.2 14.1   < > 7.8* 7.9* 7.9*   < > 8.3* 8.1* 8.0*   < > 9.5*   < > 10.1*    225 220   < > 140* 51* 6*   < > 388 253 145*   < > 72*   < > 90*   ANEU  --   --   --   --  4.3 7.7 8.5*   < > 5.0 2.1 0.7*   < > 0.8*   < > 0.8*   ANEUTAUTO 6.6 5.0 4.3   < >  --   --   --    < >  --   --   --    < >  --   --   --    ABLA  --   --   --   --   --   --   --   --  0.5* 0.3* 0.1*   < > 37.3*  --  20.5*   ALYM  --   --   --   --  0.6* 0.9 0.3*   < > 1.1 1.9 1.3   < > 2.5   < > 3.3   ALYMPAUTO 1.8 1.1 1.3   < >  --   --   --    < >  --   --   --    < >  --   --   --    RETICABSCT  --   --   --   --   --   --   --   --   --   --   --   --  0.063  --  0.080    < > = values in this interval not displayed.     Recent Labs   Lab Test 06/26/24  1432 04/09/24 0931 02/21/24  1402 11/25/22  0950 11/17/22  0349 11/16/22  0718 11/15/22  0545 11/14/22  1333 10/13/22  0636 10/12/22  0536 10/11/22  0532    141 140   < > 138 141 138 140   < > 137 140   POTASSIUM 4.0 3.9 4.4   < > 4.3 4.2 3.9 3.8   < > 3.8 3.7   CHLORIDE 103 106 107   < > 109* 109* 107 108*   < > 106 107   CO2 19* 23 23   < > 19* 19* 18* 20*   < > 21* 17*   BUN 11.2 9.9 12.1   < > 12.8 13.9 11.8 11.8   < > 12.3 12.8   CR 0.69 0.60 0.71   < > 0.49* 0.52 0.57 0.58   < > 0.57 0.58   MICHAEL 10.0 9.7 9.4   < > 8.9 8.8 9.4 9.1   < > 9.0 9.4   MAG  --   --   --   --   --   --   --  2.0  --  2.2 2.0   PHOS  --   --   --   --   --   --   --  3.3  --  3.4 3.1   URIC  --   --   --   --  3.2 4.6 6.2* 4.8   < > 4.4 6.0*    177 140   < >  --   --   --  226   < >  --   --     < > = values in this interval not displayed.     Recent Labs   Lab Test  06/26/24  1432 04/09/24  0931 02/21/24  1402 11/25/22  0950 11/17/22  0349 11/16/22  0718 11/15/22  0545   AST 15 16 17   < > 16 14 17   ALT 17 20 30   < > 21 23 25   ALKPHOS 59 62 57   < > 58 53 55   BILITOTAL 1.0 0.9 0.8   < > 0.8 1.2 0.7   INR  --   --   --   --  1.08 1.09 1.15    < > = values in this interval not displayed.     06/26/2024 NPM1 MRD by PCR not detected    IMPRESSION AND PLAN:  Minerva Marinelli is a 39 year old with acute myeloid leukemia (AML).    There is nothing to suggest recurrent AML.  She completed a year of maintenance midostaurin in 02/2024 based on the RATIFY trial (N Engl J Med 2017;377:454-64) to reduce the risk of AML relapse.  We will continue to monitor AML status including periodic NPM1 MRD by PCR testing.     There are no active ID issues.  Her past issues with C diff resolved.  She has declined vaccines.    She completed anticoagulation for her past provoked upper extremity DVT.  She will continue to work with Alida Serrato NP for health maintenance and other medical issues.    We will continue monitoring labs and arrange a return visit in about 3 months.  I reminded her to contact us if questions, concerns, or new issues come up between visits.    Video start time: 11:53 AM  Video end time: 11:59 AM  Video duration: 6 minutes    The longitudinal plan of care for the diagnosis(es)/condition(s) as documented were addressed during this visit. Due to the added complexity in care, I will continue to support Minerva in the subsequent management and with ongoing continuity of care.    Ortiz Earl MD, PhD  Attending Physician, [facility]  [phone] clinic    Again, thank you for allowing me to participate in the care of your patient.        Sincerely,        Ortiz Earl MD

## 2024-07-16 NOTE — NURSING NOTE
Is the patient currently in the state of [place]? YES    Visit mode:VIDEO    If the visit is dropped, the patient can be reconnected by: VIDEO VISIT: Send to e-mail at: [email or web address]    Will anyone else be joining the visit? NO  (If patient encounters technical issues they should call [phone]    How would you like to obtain your AVS? MyChart    Are changes needed to the allergy or medication list? No  Reason for visit: Video Visit (Follow up)    Charlotte MENDES

## 2024-07-28 ENCOUNTER — HEALTH MAINTENANCE LETTER (OUTPATIENT)
Age: 39
End: 2024-07-28

## 2024-10-18 ENCOUNTER — LAB (OUTPATIENT)
Dept: LAB | Facility: CLINIC | Age: 39
End: 2024-10-18
Payer: COMMERCIAL

## 2024-10-18 DIAGNOSIS — C92.01 ACUTE MYELOID LEUKEMIA IN REMISSION (H): ICD-10-CM

## 2024-10-18 LAB
ALBUMIN SERPL BCG-MCNC: 4.7 G/DL (ref 3.5–5.2)
ALP SERPL-CCNC: 61 U/L (ref 40–150)
ALT SERPL W P-5'-P-CCNC: 19 U/L (ref 0–50)
ANION GAP SERPL CALCULATED.3IONS-SCNC: 11 MMOL/L (ref 7–15)
AST SERPL W P-5'-P-CCNC: 14 U/L (ref 0–45)
BASOPHILS # BLD AUTO: 0 10E3/UL (ref 0–0.2)
BASOPHILS NFR BLD AUTO: 0 %
BILIRUB SERPL-MCNC: 0.6 MG/DL
BUN SERPL-MCNC: 14.9 MG/DL (ref 6–20)
CALCIUM SERPL-MCNC: 10.1 MG/DL (ref 8.8–10.4)
CHLORIDE SERPL-SCNC: 106 MMOL/L (ref 98–107)
CREAT SERPL-MCNC: 0.63 MG/DL (ref 0.51–0.95)
EGFRCR SERPLBLD CKD-EPI 2021: >90 ML/MIN/1.73M2
EOSINOPHIL # BLD AUTO: 0.1 10E3/UL (ref 0–0.7)
EOSINOPHIL NFR BLD AUTO: 1 %
ERYTHROCYTE [DISTWIDTH] IN BLOOD BY AUTOMATED COUNT: 12.6 % (ref 10–15)
GLUCOSE SERPL-MCNC: 103 MG/DL (ref 70–99)
HCO3 SERPL-SCNC: 23 MMOL/L (ref 22–29)
HCT VFR BLD AUTO: 45.7 % (ref 35–47)
HGB BLD-MCNC: 15 G/DL (ref 11.7–15.7)
IMM GRANULOCYTES # BLD: 0 10E3/UL
IMM GRANULOCYTES NFR BLD: 0 %
LDH SERPL L TO P-CCNC: 166 U/L (ref 0–250)
LYMPHOCYTES # BLD AUTO: 1.7 10E3/UL (ref 0.8–5.3)
LYMPHOCYTES NFR BLD AUTO: 19 %
Lab: NORMAL
MCH RBC QN AUTO: 32.1 PG (ref 26.5–33)
MCHC RBC AUTO-ENTMCNC: 32.8 G/DL (ref 31.5–36.5)
MCV RBC AUTO: 98 FL (ref 78–100)
MONOCYTES # BLD AUTO: 0.7 10E3/UL (ref 0–1.3)
MONOCYTES NFR BLD AUTO: 7 %
NEUTROPHILS # BLD AUTO: 6.7 10E3/UL (ref 1.6–8.3)
NEUTROPHILS NFR BLD AUTO: 73 %
PERFORMING LABORATORY: NORMAL
PLATELET # BLD AUTO: 251 10E3/UL (ref 150–450)
POTASSIUM SERPL-SCNC: 4 MMOL/L (ref 3.4–5.3)
PROT SERPL-MCNC: 7.9 G/DL (ref 6.4–8.3)
RBC # BLD AUTO: 4.68 10E6/UL (ref 3.8–5.2)
SODIUM SERPL-SCNC: 140 MMOL/L (ref 135–145)
SPECIMEN STATUS: NORMAL
TEST NAME: NORMAL
WBC # BLD AUTO: 9.3 10E3/UL (ref 4–11)

## 2024-10-18 PROCEDURE — 83615 LACTATE (LD) (LDH) ENZYME: CPT

## 2024-10-18 PROCEDURE — 80053 COMPREHEN METABOLIC PANEL: CPT

## 2024-10-18 PROCEDURE — 36415 COLL VENOUS BLD VENIPUNCTURE: CPT

## 2024-10-18 PROCEDURE — 85025 COMPLETE CBC W/AUTO DIFF WBC: CPT

## 2024-10-23 DIAGNOSIS — C92.01 ACUTE MYELOID LEUKEMIA IN REMISSION (H): Primary | ICD-10-CM

## 2024-10-25 ENCOUNTER — PATIENT OUTREACH (OUTPATIENT)
Dept: ONCOLOGY | Facility: CLINIC | Age: 39
End: 2024-10-25
Payer: COMMERCIAL

## 2024-10-25 NOTE — PROGRESS NOTES
[facility]: Cancer Care                                                                                          Pt sent my chart to writer stating that she did not have the NPM1 lab drawn due to lab error.  Pt is scheduled to redraw on 10/28 and pt sees Dr Earl on 10/30.  Lab result will not be back by time of appt.  No openings with Dr Earl coming up.  Pt states she will keep appt as is and will have Dr Earl send my chart with result once obtained.    Shira Humphries, EDUARN, RN  RN Care Coordinator  [facility]

## 2024-10-28 ENCOUNTER — LAB (OUTPATIENT)
Dept: LAB | Facility: CLINIC | Age: 39
End: 2024-10-28
Payer: COMMERCIAL

## 2024-10-28 DIAGNOSIS — C92.01 ACUTE MYELOID LEUKEMIA IN REMISSION (H): Primary | ICD-10-CM

## 2024-10-28 LAB
Lab: NORMAL
PERFORMING LABORATORY: NORMAL
SPECIMEN STATUS: NORMAL
TEST NAME: NORMAL

## 2024-10-28 PROCEDURE — 36415 COLL VENOUS BLD VENIPUNCTURE: CPT

## 2024-10-28 PROCEDURE — 81310 NPM1 GENE: CPT

## 2024-10-30 ENCOUNTER — VIRTUAL VISIT (OUTPATIENT)
Dept: ONCOLOGY | Facility: CLINIC | Age: 39
End: 2024-10-30
Attending: INTERNAL MEDICINE
Payer: COMMERCIAL

## 2024-10-30 VITALS — WEIGHT: 260 LBS | BODY MASS INDEX: 40.81 KG/M2 | HEIGHT: 67 IN

## 2024-10-30 DIAGNOSIS — C92.01 ACUTE MYELOID LEUKEMIA IN REMISSION (H): Primary | ICD-10-CM

## 2024-10-30 PROCEDURE — 99214 OFFICE O/P EST MOD 30 MIN: CPT | Mod: 95 | Performed by: INTERNAL MEDICINE

## 2024-10-30 PROCEDURE — G2211 COMPLEX E/M VISIT ADD ON: HCPCS | Mod: 95 | Performed by: INTERNAL MEDICINE

## 2024-10-30 ASSESSMENT — PAIN SCALES - GENERAL: PAINLEVEL_OUTOF10: NO PAIN (0)

## 2024-10-30 NOTE — PROGRESS NOTES
"Virtual Visit Details    Type of service:  Video Visit     REASON FOR VISIT:  Management of acute myeloid leukemia (AML)    HISTORY OF PRESENT ILLNESS:  Minerva Marinelli is a 39 year old with a history of acute myeloid leukemia (AML).  To summarize her course, she was noted to have low WBC count on routine labs in 03/2022.  Follow up labs in 05/2022 showed further decrease in WBC as well as anemia and thrombocytopenia.  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  She was treated with cytarabine and daunorubicin \"7+3\" induction chemotherapy with midostaurin on Day 8-21.  Bone marrow biopsy around Day 21 was hypocellular but consistent with recovering bone marrow without definite AML.  Her course was complicated by recurrent C diff and upper extremity DVT.  She had prompt blood cell count recovery and was discharge with ongoing outpatient follow-up.  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  She met with the BMT Team who recommended chemo consolidation.  She completed 4 cycles of high-dose cytarabine (HiDAC) with midostaurin ending in 12/2022.  A post-consolidation bone marrow biopsy showed 70-80% cellular bone marrow with no evidence of AML by morphology or flow cytometry and NPM1 MRD PCR was undetectable.  She completed 1 year of maintenance midostaurin in 02/2024.  Visit for management of AML.      She is not with her mom Melonie or dad Rosalee.  Energy level has been good.  No fevers, night sweats, or unintentional weight loss.  Has been working remotely and feeling good.  Went fishing in the summer.  No AML concerns.    KEY PAST MEDICAL HISTORY:  " "History of COVID-19, recurrent C diff, hypothyroidism    MEDICATIONS:  Current Outpatient Medications   Medication Sig Dispense Refill    Bacillus Coagulans-Inulin (PROBIOTIC) 1-250 BILLION-MG CAPS Take 1 capsule by mouth daily      levothyroxine (SYNTHROID/LEVOTHROID) 75 MCG tablet Take 1 tablet (75 mcg) by mouth daily      magnesium 250 MG tablet Take 250 mg by mouth daily      Multiple Vitamins-Minerals (WOMENS MULTIVITAMIN) TABS Take 1 tablet by mouth daily      Quercetin 50 MG TABS Take 50 mg by mouth daily      vitamin C (ASCORBIC ACID) 1000 MG TABS Take 2,000 mg by mouth daily      vitamin D3 (CHOLECALCIFEROL) 50 mcg (2000 units) tablet Take 1 tablet (50 mcg) by mouth daily      zinc gluconate 50 MG tablet Take 50 mg by mouth daily       No current facility-administered medications for this visit.     SOCIAL HISTORY:  Working as RN virtually in primary care setting    PHYSICAL EXAMINATION:  Ht 1.702 m (5' 7\")   Wt 117.9 kg (260 lb)   BMI 40.72 kg/m    Wt Readings from Last 5 Encounters:   10/30/24 117.9 kg (260 lb)   07/16/24 117.9 kg (260 lb)   04/09/24 117.9 kg (260 lb)   03/06/24 117.9 kg (260 lb)   11/21/23 117.9 kg (260 lb)     General: Well appearing.  HEENT: Sclerae anicteric.  Lungs: Breathing comfortably. No cough.  MSK: Grossly normal movement.  Neuro: Grossly non-focal.  Skin/access: Normal skin tone.  Psych: Alert and oriented. No distress.  Performance status: ECOG 0    LABORATORY DATA: Reviewed by me   Latest Reference Range & Units 04/09/24 09:31 06/26/24 14:32 10/18/24 14:58   WBC 4.0 - 11.0 10e3/uL 6.9 9.0 9.3   Hemoglobin 11.7 - 15.7 g/dL 15.2 15.5 15.0   Hematocrit 35.0 - 47.0 % 43.4 46.7 45.7   Platelet Count 150 - 450 10e3/uL 225 239 251   RBC Count 3.80 - 5.20 10e6/uL 4.53 4.75 4.68   MCV 78 - 100 fL 96 98 98   MCH 26.5 - 33.0 pg 33.6 (H) 32.6 32.1   MCHC 31.5 - 36.5 g/dL 35.0 33.2 32.8   RDW 10.0 - 15.0 % 13.1 12.4 12.6   % Neutrophils % 73 73 73   % Lymphocytes % 16 19 19   % " Monocytes % 8 6 7   % Eosinophils % 2 1 1   % Basophils % 1 0 0   Absolute Basophils 0.0 - 0.2 10e3/uL 0.1 0.0 0.0   Absolute Eosinophils 0.0 - 0.7 10e3/uL 0.1 0.1 0.1   Absolute Immature Granulocytes <=0.4 10e3/uL 0.0 0.0 0.0   Absolute Lymphocytes 0.8 - 5.3 10e3/uL 1.1 1.8 1.7   Absolute Monocytes 0.0 - 1.3 10e3/uL 0.6 0.5 0.7   % Immature Granulocytes % 0 0 0   Absolute Neutrophils 1.6 - 8.3 10e3/uL 5.0 6.6 6.7   (H): Data is abnormally high    06/26/2024 NPM1 MRD by PCR not detected  10/28/2024 NPM1 MRD by PCR pending    IMPRESSION AND PLAN:  Minerva Marinelli is a 39 year old with acute myeloid leukemia (AML).    There is nothing to suggest recurrent AML.  She completed a year of maintenance midostaurin in 02/2024 based on the RATIFY trial (N Engl J Med 2017;377:454-64) to reduce the risk of AML relapse.  We will continue to monitor AML status including periodic NPM1 MRD by PCR testing.     There are no active ID issues.  Her past issues with C diff resolved.  She has declined vaccines.    She will continue to work with Alida Serrato NP for health maintenance and other medical issues.    We will continue monitoring labs and arrange a return visit in about 6 months.  I reminded her to contact us if questions, concerns, or new issues come up between visits.    Patient was seen and plan of care was discussed with attending physician Dr. Earl.    Maycol Scott MD  Hematology/Medical Oncology/BMT (PGY-6)  P: [phone]      ATTENDING ATTESTATION:  The patient was seen and evaluated by me.  I have reviewed the vital signs, medications, labs, and imaging results independently, and have discussed the patient and plan with the resident/fellow, and agree with the findings and plan outlined in this note.  The impression and plan were jointly made.    REASON FOR VISIT:  Management of acute myeloid leukemia (AML)    HISTORY OF PRESENT ILLNESS:  Minerva Marinelli is a 39 year old with a history of acute myeloid leukemia  "(AML).  To summarize her course, she was noted to have low WBC count on routine labs in 03/2022.  Follow up labs in 05/2022 showed further decrease in WBC as well as anemia and thrombocytopenia.  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  She was treated with cytarabine and daunorubicin \"7+3\" induction chemotherapy with midostaurin on Day 8-21.  Bone marrow biopsy around Day 21 was hypocellular but consistent with recovering bone marrow without definite AML.  Her course was complicated by recurrent C diff and upper extremity DVT.  She had prompt blood cell count recovery and was discharge with ongoing outpatient follow-up.  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  She met with the BMT Team who recommended chemo consolidation.  She completed 4 cycles of high-dose cytarabine (HiDAC) with midostaurin ending in 12/2022.  A post-consolidation bone marrow biopsy showed 70-80% cellular bone marrow with no evidence of AML by morphology or flow cytometry and NPM1 MRD PCR was undetectable.  She completed 1 year of maintenance midostaurin in 02/2024 to reduce the risk of AML relapse based on the RATIFY trial (N Engl J Med 2017;377:454-64).  Visit for management of AML.      She is not with her mom Melonie or dad Rosalee.  Energy level has been good.  No fevers, night sweats, or unintentional weight loss.  Has been working remotely and feeling good.  Had a [place] time in [place] of [facility].  No AML concerns.    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    LABORATORY DATA:  Recent Labs   Lab Test 10/18/24  1458 06/26/24  1432 04/09/24  0931 12/28/22  1243 12/06/22  0953 12/02/22  0957 11/29/22  0950 07/18/22  0942 07/15/22  0659 07/14/22  0813 07/13/22  0555 06/16/22  1724 06/16/22  1339 06/16/22  1102 06/14/22  0752   WBC 9.3 9.0 6.9   < > 5.9 9.9 10.3   < > 11.4* 6.4 3.1*   < > 41.0*   < > 25.3*   HGB 15.0 15.5 15.2   < > 7.8* 7.9* 7.9*   < > 8.3* 8.1* 8.0*   < > 9.5*   < > 10.1*    239 225   < > 140* 51* 6*   < > 388 253 145*   < > 72*   < > 90*   ANEU  --   --   --   --  4.3 7.7 8.5*   < > 5.0 2.1 0.7*   < > 0.8*   < > 0.8*   ANEUTAUTO 6.7 6.6 5.0   < >  --   --   --    < >  --   --   --    < >  --   --   --    ABLA  --   --   --   --   --   --   --   --  0.5* 0.3* 0.1*   < > 37.3*  --  20.5*   ALYM  --   --   --   --  0.6* 0.9 0.3*   < > 1.1 1.9 1.3   < > 2.5   < > 3.3   ALYMPAUTO 1.7 1.8 1.1   < >  --   --   --    < >  --   --   --    < >  --   --   --    RETICABSCT  --   --   --   --   --   --   --   --   --   --   --   --  0.063  --  0.080    < > = values in this interval not displayed.     Recent Labs   Lab Test 10/18/24  1458 06/26/24  1432 04/09/24  0931 11/25/22  0950 11/17/22  0349 11/16/22  0718 11/15/22  0545 11/14/22  1333 10/13/22  0636 10/12/22  0536 10/11/22  0532    139 141   < > 138 141 138 140   < > 137 140   POTASSIUM 4.0 4.0 3.9   < > 4.3 4.2 3.9 3.8   < > 3.8 3.7   CHLORIDE 106 103 106   < > 109* 109* 107 108*   < > 106 107   CO2 23 19* 23   < > 19* 19* 18* 20*   < > 21* 17*   BUN 14.9 11.2 9.9   < > 12.8 13.9 11.8 11.8   < > 12.3 12.8   CR 0.63 0.69 0.60   < > 0.49* 0.52 0.57 0.58   < > 0.57 0.58   MICHAEL 10.1 10.0 9.7   < > 8.9 8.8 9.4 9.1   < > 9.0 9.4   MAG  --   --   --   --   --   --   --  2.0  --  2.2 2.0   PHOS  --   --   --   --   --   --   --  3.3  --  3.4 3.1   URIC  --   --   --   --  3.2 4.6 6.2* 4.8   < > 4.4 6.0*    156 177   < >  --   --   --  226   < >  --   --     < > = values in this interval  not displayed.     Recent Labs   Lab Test 10/18/24  1458 06/26/24  1432 04/09/24  0931 11/25/22  0950 11/17/22  0349 11/16/22  0718 11/15/22  0545   AST 14 15 16   < > 16 14 17   ALT 19 17 20   < > 21 23 25   ALKPHOS 61 59 62   < > 58 53 55   BILITOTAL 0.6 1.0 0.9   < > 0.8 1.2 0.7   INR  --   --   --   --  1.08 1.09 1.15    < > = values in this interval not displayed.    06/26/2024 NPM1 MRD by PCR not detected  10/18/2024 NPM1 MRD by PCR in process    IMPRESSION AND PLAN:  Minerva Marinelli is a 39 year old with acute myeloid leukemia (AML).    There is nothing to suggest recurrent AML.  We agreed to continue active surveillance including periodic NPM1 MRD by PCR testing.     There are no active ID issues.  Her past issues with C diff resolved.  She has declined vaccines.    She completed anticoagulation for her past provoked upper extremity DVT.  She will continue to work with her primary care provider Alida Serrato NP for health maintenance and other medical issues.    We will request a return visit in about 6 months.  I reminded her to contact us if questions, concerns, or new issues come up between visits.    The longitudinal plan of care for the diagnosis(es)/condition(s) as documented were addressed during this visit. Due to the added complexity in care, I will continue to support Minerva in the subsequent management and with ongoing continuity of care.    Ortiz Earl MD, PhD  Attending Physician, [facility]  [phone] [facility]

## 2024-10-30 NOTE — PROGRESS NOTES
Virtual Visit Details    Type of service:  Video Visit     Originating Location (pt. Location): Home    Distant Location (provider location):  On-site  Platform used for Video Visit: Zarpo    Video start time: 9:31 AM  Video end time: 9:36 AM  Video duration: 5 minutes

## 2024-10-30 NOTE — LETTER
"10/30/2024      Veda Marinelli  [address] [facility] [address]      Dear Colleague,    Thank you for referring your patient, Veda Marinelli, to the [facility]. Please see a copy of my visit note below.    Virtual Visit Details    Type of service:  Video Visit     Originating Location (pt. Location): Home    Distant Location (provider location):  On-site  Platform used for Video Visit: [employer]    Video start time: 9:31 AM  Video end time: 9:36 AM  Video duration: 5 minutes    Virtual Visit Details    Type of service:  Video Visit     REASON FOR VISIT:  Management of acute myeloid leukemia (AML)    HISTORY OF PRESENT ILLNESS:  Minerva Marinelli is a 39 year old with a history of acute myeloid leukemia (AML).  To summarize her course, she was noted to have low WBC count on routine labs in 03/2022.  Follow up labs in 05/2022 showed further decrease in WBC as well as anemia and thrombocytopenia.  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  She was treated with cytarabine and daunorubicin \"7+3\" induction chemotherapy with midostaurin on Day 8-21.  Bone marrow biopsy around Day 21 was hypocellular but consistent with recovering bone marrow without definite AML.  Her course was complicated by recurrent C diff and upper extremity DVT.  She had prompt blood cell count recovery and was discharge with ongoing outpatient follow-up.  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  She met with the BMT Team who recommended chemo consolidation.  She completed 4 cycles of high-dose cytarabine (HiDAC) with midostaurin ending in " "12/2022.  A post-consolidation bone marrow biopsy showed 70-80% cellular bone marrow with no evidence of AML by morphology or flow cytometry and NPM1 MRD PCR was undetectable.  She completed 1 year of maintenance midostaurin in 02/2024.  Visit for management of AML.      She is not with her mom Melonie or dad Rosalee.  Energy level has been good.  No fevers, night sweats, or unintentional weight loss.  Has been working remotely and feeling good.  Went fishing in the summer.  No AML concerns.    KEY PAST MEDICAL HISTORY:  History of COVID-19, recurrent C diff, hypothyroidism    MEDICATIONS:  Current Outpatient Medications   Medication Sig Dispense Refill     Bacillus Coagulans-Inulin (PROBIOTIC) 1-250 BILLION-MG CAPS Take 1 capsule by mouth daily       levothyroxine (SYNTHROID/LEVOTHROID) 75 MCG tablet Take 1 tablet (75 mcg) by mouth daily       magnesium 250 MG tablet Take 250 mg by mouth daily       Multiple Vitamins-Minerals (WOMENS MULTIVITAMIN) TABS Take 1 tablet by mouth daily       Quercetin 50 MG TABS Take 50 mg by mouth daily       vitamin C (ASCORBIC ACID) 1000 MG TABS Take 2,000 mg by mouth daily       vitamin D3 (CHOLECALCIFEROL) 50 mcg (2000 units) tablet Take 1 tablet (50 mcg) by mouth daily       zinc gluconate 50 MG tablet Take 50 mg by mouth daily       No current facility-administered medications for this visit.     SOCIAL HISTORY:  Working as RN virtually in primary care setting    PHYSICAL EXAMINATION:  Ht 1.702 m (5' 7\")   Wt 117.9 kg (260 lb)   BMI 40.72 kg/m    Wt Readings from Last 5 Encounters:   10/30/24 117.9 kg (260 lb)   07/16/24 117.9 kg (260 lb)   04/09/24 117.9 kg (260 lb)   03/06/24 117.9 kg (260 lb)   11/21/23 117.9 kg (260 lb)     General: Well appearing.  HEENT: Sclerae anicteric.  Lungs: Breathing comfortably. No cough.  MSK: Grossly normal movement.  Neuro: Grossly non-focal.  Skin/access: Normal skin tone.  Psych: Alert and oriented. No distress.  Performance status: ECOG " 0    LABORATORY DATA: Reviewed by me   Latest Reference Range & Units 04/09/24 09:31 06/26/24 14:32 10/18/24 14:58   WBC 4.0 - 11.0 10e3/uL 6.9 9.0 9.3   Hemoglobin 11.7 - 15.7 g/dL 15.2 15.5 15.0   Hematocrit 35.0 - 47.0 % 43.4 46.7 45.7   Platelet Count 150 - 450 10e3/uL 225 239 251   RBC Count 3.80 - 5.20 10e6/uL 4.53 4.75 4.68   MCV 78 - 100 fL 96 98 98   MCH 26.5 - 33.0 pg 33.6 (H) 32.6 32.1   MCHC 31.5 - 36.5 g/dL 35.0 33.2 32.8   RDW 10.0 - 15.0 % 13.1 12.4 12.6   % Neutrophils % 73 73 73   % Lymphocytes % 16 19 19   % Monocytes % 8 6 7   % Eosinophils % 2 1 1   % Basophils % 1 0 0   Absolute Basophils 0.0 - 0.2 10e3/uL 0.1 0.0 0.0   Absolute Eosinophils 0.0 - 0.7 10e3/uL 0.1 0.1 0.1   Absolute Immature Granulocytes <=0.4 10e3/uL 0.0 0.0 0.0   Absolute Lymphocytes 0.8 - 5.3 10e3/uL 1.1 1.8 1.7   Absolute Monocytes 0.0 - 1.3 10e3/uL 0.6 0.5 0.7   % Immature Granulocytes % 0 0 0   Absolute Neutrophils 1.6 - 8.3 10e3/uL 5.0 6.6 6.7   (H): Data is abnormally high    06/26/2024 NPM1 MRD by PCR not detected  10/28/2024 NPM1 MRD by PCR pending    IMPRESSION AND PLAN:  Minerva Marinelli is a 39 year old with acute myeloid leukemia (AML).    There is nothing to suggest recurrent AML.  She completed a year of maintenance midostaurin in 02/2024 based on the RATIFY trial (N Engl J Med 2017;377:454-64) to reduce the risk of AML relapse.  We will continue to monitor AML status including periodic NPM1 MRD by PCR testing.     There are no active ID issues.  Her past issues with C diff resolved.  She has declined vaccines.    She will continue to work with Alida Serrato NP for health maintenance and other medical issues.    We will continue monitoring labs and arrange a return visit in about 6 months.  I reminded her to contact us if questions, concerns, or new issues come up between visits.    Patient was seen and plan of care was discussed with attending physician Dr. Earl.    Maycol Scott MD  Hematology/Medical  "Oncology/BMT (PGY-6)  P: [phone]      ATTENDING ATTESTATION:  The patient was seen and evaluated by me.  I have reviewed the vital signs, medications, labs, and imaging results independently, and have discussed the patient and plan with the resident/fellow, and agree with the findings and plan outlined in this note.  The impression and plan were jointly made.    REASON FOR VISIT:  Management of acute myeloid leukemia (AML)    HISTORY OF PRESENT ILLNESS:  Minerva Marinelli is a 39 year old with a history of acute myeloid leukemia (AML).  To summarize her course, she was noted to have low WBC count on routine labs in 03/2022.  Follow up labs in 05/2022 showed further decrease in WBC as well as anemia and thrombocytopenia.  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  She was treated with cytarabine and daunorubicin \"7+3\" induction chemotherapy with midostaurin on Day 8-21.  Bone marrow biopsy around Day 21 was hypocellular but consistent with recovering bone marrow without definite AML.  Her course was complicated by recurrent C diff and upper extremity DVT.  She had prompt blood cell count recovery and was discharge with ongoing outpatient follow-up.  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  She met with the BMT Team who recommended chemo consolidation.  She completed 4 cycles of high-dose cytarabine (HiDAC) with midostaurin ending in 12/2022.  A post-consolidation bone marrow biopsy showed 70-80% cellular bone marrow with no evidence of AML by morphology or flow cytometry and NPM1 MRD PCR was undetectable.  She completed 1 year of maintenance " midostaurin in 02/2024 to reduce the risk of AML relapse based on the RATIFY trial (N Engl J Med 2017;377:454-64).  Visit for management of AML.      She is not with her mom Melonie or dad Rosalee.  Energy level has been good.  No fevers, night sweats, or unintentional weight loss.  Has been working remotely and feeling good.  [place] in [place] [facility].  No AML concerns.    KEY PAST MEDICAL HISTORY:  History of COVID-19, recurrent C diff, hypothyroidism    SOCIAL HISTORY:  Working as RN virtually in primary care setting    LABORATORY DATA:  Recent Labs   Lab Test 10/18/24  1458 06/26/24  1432 04/09/24  0931 12/28/22  1243 12/06/22  0953 12/02/22  0957 11/29/22  0950 07/18/22  0942 07/15/22  0659 07/14/22  0813 07/13/22  0555 06/16/22  1724 06/16/22  1339 06/16/22  1102 06/14/22  0752   WBC 9.3 9.0 6.9   < > 5.9 9.9 10.3   < > 11.4* 6.4 3.1*   < > 41.0*   < > 25.3*   HGB 15.0 15.5 15.2   < > 7.8* 7.9* 7.9*   < > 8.3* 8.1* 8.0*   < > 9.5*   < > 10.1*    239 225   < > 140* 51* 6*   < > 388 253 145*   < > 72*   < > 90*   ANEU  --   --   --   --  4.3 7.7 8.5*   < > 5.0 2.1 0.7*   < > 0.8*   < > 0.8*   ANEUTAUTO 6.7 6.6 5.0   < >  --   --   --    < >  --   --   --    < >  --   --   --    ABLA  --   --   --   --   --   --   --   --  0.5* 0.3* 0.1*   < > 37.3*  --  20.5*   ALYM  --   --   --   --  0.6* 0.9 0.3*   < > 1.1 1.9 1.3   < > 2.5   < > 3.3   ALYMPAUTO 1.7 1.8 1.1   < >  --   --   --    < >  --   --   --    < >  --   --   --    RETICABSCT  --   --   --   --   --   --   --   --   --   --   --   --  0.063  --  0.080    < > = values in this interval not displayed.     Recent Labs   Lab Test 10/18/24  1458 06/26/24  1432 04/09/24  0931 11/25/22  0950 11/17/22  0349 11/16/22  0718 11/15/22  0545 11/14/22  1333 10/13/22  0636 10/12/22  0536 10/11/22  0532    139 141   < > 138 141 138 140   < > 137 140   POTASSIUM 4.0 4.0 3.9   < > 4.3 4.2 3.9 3.8   < > 3.8 3.7   CHLORIDE 106 103 106    < > 109* 109* 107 108*   < > 106 107   CO2 23 19* 23   < > 19* 19* 18* 20*   < > 21* 17*   BUN 14.9 11.2 9.9   < > 12.8 13.9 11.8 11.8   < > 12.3 12.8   CR 0.63 0.69 0.60   < > 0.49* 0.52 0.57 0.58   < > 0.57 0.58   MICHAEL 10.1 10.0 9.7   < > 8.9 8.8 9.4 9.1   < > 9.0 9.4   MAG  --   --   --   --   --   --   --  2.0  --  2.2 2.0   PHOS  --   --   --   --   --   --   --  3.3  --  3.4 3.1   URIC  --   --   --   --  3.2 4.6 6.2* 4.8   < > 4.4 6.0*    156 177   < >  --   --   --  226   < >  --   --     < > = values in this interval not displayed.     Recent Labs   Lab Test 10/18/24  1458 06/26/24  1432 04/09/24  0931 11/25/22  0950 11/17/22  0349 11/16/22  0718 11/15/22  0545   AST 14 15 16   < > 16 14 17   ALT 19 17 20   < > 21 23 25   ALKPHOS 61 59 62   < > 58 53 55   BILITOTAL 0.6 1.0 0.9   < > 0.8 1.2 0.7   INR  --   --   --   --  1.08 1.09 1.15    < > = values in this interval not displayed.    06/26/2024 NPM1 MRD by PCR not detected  10/18/2024 NPM1 MRD by PCR in process    IMPRESSION AND PLAN:  Minerva Marinelli is a 39 year old with acute myeloid leukemia (AML).    There is nothing to suggest recurrent AML.  We agreed to continue active surveillance including periodic NPM1 MRD by PCR testing.     There are no active ID issues.  Her past issues with C diff resolved.  She has declined vaccines.    She completed anticoagulation for her past provoked upper extremity DVT.  She will continue to work with her primary care provider Alida Serrato NP for health maintenance and other medical issues.    We will request a return visit in about 6 months.  I reminded her to contact us if questions, concerns, or new issues come up between visits.    The longitudinal plan of care for the diagnosis(es)/condition(s) as documented were addressed during this visit. Due to the added complexity in care, I will continue to support Minerva in the subsequent management and with ongoing continuity of care.    Ortiz Earl MD,  PhD  Attending Physician, [facility]  [phone] clinic     Again, thank you for allowing me to participate in the care of your patient.        Sincerely,        Ortiz Earl MD

## 2024-11-08 LAB — MISCELLANEOUS TEST 1 (ARUP): NORMAL

## 2025-03-16 ENCOUNTER — HEALTH MAINTENANCE LETTER (OUTPATIENT)
Age: 40
End: 2025-03-16

## 2025-03-26 NOTE — NURSING NOTE
Current patient location: [address] [place] [address]    Is the patient currently in the state of [place]? YES    Visit mode:VIDEO    If the visit is dropped, the patient can be reconnected by: VIDEO VISIT: Text to cell phone:   Telephone Information:   Mobile [phone]       Will anyone else be joining the visit? NO  (If patient encounters technical issues they should call [phone]    Are changes needed to the allergy or medication list? Pt stated no changes to allergies and Pt stated no med changes    Are refills needed on medications prescribed by this physician? NO    Rooming Documentation:  Questionnaire(s) completed    Reason for visit: RECHBELKIS Chandra LPN       Detail Level: Detailed Depth Of Biopsy: dermis Was A Bandage Applied: Yes Size Of Lesion In Cm: 1.5 X Size Of Lesion In Cm: 0.5 Biopsy Type: H and E Biopsy Method: Dermablade Anesthesia Type: 1% lidocaine with 1:100,000 epinephrine and a 1:3 solution of 8.4% sodium bicarbonate Anesthesia Volume In Cc: 1 Additional Anesthesia Volume In Cc (Will Not Render If 0): 0 Hemostasis: Aluminum Chloride Wound Care: Petrolatum Dressing: bandage Destruction After The Procedure: No Type Of Destruction Used: Curettage Curettage Text: The wound bed was treated with curettage after the biopsy was performed. Cryotherapy Text: The wound bed was treated with cryotherapy after the biopsy was performed. Electrodesiccation Text: The wound bed was treated with electrodesiccation after the biopsy was performed. Electrodesiccation And Curettage Text: The wound bed was treated with electrodesiccation and curettage after the biopsy was performed. Silver Nitrate Text: The wound bed was treated with silver nitrate after the biopsy was performed. Lab: [id number] Lab Facility: 500 Medical Necessity Information: It is in your best interest to select a reason for this procedure from the list below. All of these items fulfill various CMS LCD requirements except the new and changing color options. Consent: Written consent was obtained and risks were reviewed including but not limited to scarring, infection, bleeding, scabbing, incomplete removal, nerve damage and allergy to anesthesia. Post-Care Instructions: I reviewed with the patient in detail post-care instructions. Patient is to keep the biopsy site dry overnight, and then apply bacitracin twice daily until healed. Patient may apply hydrogen peroxide soaks to remove any crusting. Notification Instructions: Patient will be notified of biopsy results. However, patient instructed to call the office if not contacted within 2 weeks. Billing Type: Third-Party Bill Information: Selecting Yes will display possible errors in your note based on the variables you have selected. This validation is only offered as a suggestion for you. PLEASE NOTE THAT THE VALIDATION TEXT WILL BE REMOVED WHEN YOU FINALIZE YOUR NOTE. IF YOU WANT TO FAX A PRELIMINARY NOTE YOU WILL NEED TO TOGGLE THIS TO 'NO' IF YOU DO NOT WANT IT IN YOUR FAXED NOTE.

## 2025-04-15 ENCOUNTER — LAB (OUTPATIENT)
Dept: LAB | Facility: CLINIC | Age: 40
End: 2025-04-15
Payer: COMMERCIAL

## 2025-04-15 DIAGNOSIS — C92.01 ACUTE MYELOID LEUKEMIA IN REMISSION (H): ICD-10-CM

## 2025-04-15 LAB
ALBUMIN SERPL BCG-MCNC: 4.6 G/DL (ref 3.5–5.2)
ALP SERPL-CCNC: 68 U/L (ref 40–150)
ALT SERPL W P-5'-P-CCNC: 26 U/L (ref 0–50)
ANION GAP SERPL CALCULATED.3IONS-SCNC: 12 MMOL/L (ref 7–15)
AST SERPL W P-5'-P-CCNC: 15 U/L (ref 0–45)
BASOPHILS # BLD AUTO: 0 10E3/UL (ref 0–0.2)
BASOPHILS NFR BLD AUTO: 0 %
BILIRUB SERPL-MCNC: 0.9 MG/DL
BUN SERPL-MCNC: 11.3 MG/DL (ref 6–20)
CALCIUM SERPL-MCNC: 10 MG/DL (ref 8.8–10.4)
CHLORIDE SERPL-SCNC: 101 MMOL/L (ref 98–107)
CREAT SERPL-MCNC: 0.64 MG/DL (ref 0.51–0.95)
EGFRCR SERPLBLD CKD-EPI 2021: >90 ML/MIN/1.73M2
EOSINOPHIL # BLD AUTO: 0.1 10E3/UL (ref 0–0.7)
EOSINOPHIL NFR BLD AUTO: 1 %
ERYTHROCYTE [DISTWIDTH] IN BLOOD BY AUTOMATED COUNT: 13.4 % (ref 10–15)
GLUCOSE SERPL-MCNC: 103 MG/DL (ref 70–99)
HCO3 SERPL-SCNC: 25 MMOL/L (ref 22–29)
HCT VFR BLD AUTO: 44.2 % (ref 35–47)
HGB BLD-MCNC: 15 G/DL (ref 11.7–15.7)
IMM GRANULOCYTES # BLD: 0 10E3/UL
IMM GRANULOCYTES NFR BLD: 0 %
LYMPHOCYTES # BLD AUTO: 1.6 10E3/UL (ref 0.8–5.3)
LYMPHOCYTES NFR BLD AUTO: 22 %
Lab: NORMAL
MCH RBC QN AUTO: 32.9 PG (ref 26.5–33)
MCHC RBC AUTO-ENTMCNC: 33.9 G/DL (ref 31.5–36.5)
MCV RBC AUTO: 97 FL (ref 78–100)
MONOCYTES # BLD AUTO: 0.6 10E3/UL (ref 0–1.3)
MONOCYTES NFR BLD AUTO: 9 %
NEUTROPHILS # BLD AUTO: 4.8 10E3/UL (ref 1.6–8.3)
NEUTROPHILS NFR BLD AUTO: 68 %
PERFORMING LABORATORY: NORMAL
PLATELET # BLD AUTO: 231 10E3/UL (ref 150–450)
POTASSIUM SERPL-SCNC: 4.2 MMOL/L (ref 3.4–5.3)
PROT SERPL-MCNC: 7.4 G/DL (ref 6.4–8.3)
RBC # BLD AUTO: 4.56 10E6/UL (ref 3.8–5.2)
SODIUM SERPL-SCNC: 138 MMOL/L (ref 135–145)
SPECIMEN STATUS: NORMAL
TEST NAME: NORMAL
WBC # BLD AUTO: 7.1 10E3/UL (ref 4–11)

## 2025-04-23 LAB — MISCELLANEOUS TEST 1 (ARUP): NORMAL

## 2025-04-28 NOTE — PROGRESS NOTES
"Virtual Visit Details    Type of service:  Video Visit     Originating Location (pt. Location): Home    Distant Location (provider location):  On-site  Platform used for Video Visit: [employer]        REASON FOR VISIT:  Management of acute myeloid leukemia (AML)     HISTORY OF PRESENT ILLNESS:  Minerva Marinelli is a 40 year old with a history of acute myeloid leukemia (AML).  To summarize her course, she was noted to have low WBC count on routine labs in 03/2022.  Follow up labs in 05/2022 showed further decrease in WBC as well as anemia and thrombocytopenia.  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  She was treated with cytarabine and daunorubicin \"7+3\" induction chemotherapy with midostaurin on Day 8-21.  Bone marrow biopsy around Day 21 was hypocellular but consistent with recovering bone marrow without definite AML.  Her course was complicated by recurrent C diff and upper extremity DVT.  She had prompt blood cell count recovery and was discharge with ongoing outpatient follow-up.  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  She met with the BMT Team who recommended chemo consolidation.  She completed 4 cycles of high-dose cytarabine (HiDAC) with midostaurin ending in 12/2022.  A post-consolidation bone marrow biopsy showed 70-80% cellular bone marrow with no evidence of AML by morphology or flow cytometry and NPM1 MRD PCR was undetectable.  She completed 1 year of maintenance midostaurin in 02/2024 to reduce the risk of AML relapse based on the RATIFY trial (N Engl J Med 2017;377:454-64).  Visit for management of AML.       She is not with her mom " "Melonie or dad Rsoalee.  Energy level has been good.  No fevers, night sweats, or unintentional weight loss.  Has been working remotely and feeling good.  Doing some camping over the summer.  No AML concerns.     KEY PAST MEDICAL HISTORY:  History of COVID-19, recurrent C diff, hypothyroidism    MEDICATIONS:  Current Outpatient Medications   Medication Sig Dispense Refill    Bacillus Coagulans-Inulin (PROBIOTIC) 1-250 BILLION-MG CAPS Take 1 capsule by mouth daily      levothyroxine (SYNTHROID/LEVOTHROID) 75 MCG tablet Take 1 tablet (75 mcg) by mouth daily      magnesium 250 MG tablet Take 250 mg by mouth daily      Multiple Vitamins-Minerals (WOMENS MULTIVITAMIN) TABS Take 1 tablet by mouth daily      Quercetin 50 MG TABS Take 50 mg by mouth daily      vitamin C (ASCORBIC ACID) 1000 MG TABS Take 2,000 mg by mouth daily      vitamin D3 (CHOLECALCIFEROL) 50 mcg (2000 units) tablet Take 1 tablet (50 mcg) by mouth daily      zinc gluconate 50 MG tablet Take 50 mg by mouth daily       No current facility-administered medications for this visit.     SOCIAL HISTORY:  Working as RN virtually in primary care setting    PHYSICAL EXAMINATION:  /85   Ht 1.702 m (5' 7\")   Wt 117.9 kg (260 lb)   BMI 40.72 kg/m    Wt Readings from Last 5 Encounters:   04/30/25 117.9 kg (260 lb)   10/30/24 117.9 kg (260 lb)   07/16/24 117.9 kg (260 lb)   04/09/24 117.9 kg (260 lb)   03/06/24 117.9 kg (260 lb)     General: Well appearing.  HEENT: Sclerae anicteric.  Lungs: Breathing comfortably. No cough.  MSK: Grossly normal movement.  Neuro: Grossly non-focal.  Skin/access: Normal skin tone.  Psych: Alert and oriented. No distress.  Performance status: ECOG 0    LABORATORY DATA: Reviewed by me  Recent Labs   Lab Test 04/15/25  1305 10/18/24  1458 06/26/24  1432 07/18/22  0942 07/15/22  0659 07/14/22  0813 07/13/22  0555 06/16/22  1724 06/16/22  1339 06/16/22  1102 06/14/22  0752   WBC 7.1 9.3 9.0   < > 11.4* 6.4 3.1*   < > 41.0*   < > 25.3* "   HGB 15.0 15.0 15.5   < > 8.3* 8.1* 8.0*   < > 9.5*   < > 10.1*    251 239   < > 388 253 145*   < > 72*   < > 90*   ANEU 4.8 6.7 6.6   < > 5.0 2.1 0.7*   < > 0.8*   < > 0.8*   ABLA  --   --   --   --  0.5* 0.3* 0.1*   < > 37.3*  --  20.5*   ALYM 1.6 1.7 1.8   < > 1.1 1.9 1.3   < > 2.5   < > 3.3   RETICABSCT  --   --   --   --   --   --   --   --  0.063  --  0.080    < > = values in this interval not displayed.     Recent Labs   Lab Test 04/15/25  1305 10/18/24  1458 06/26/24  1432 04/09/24  0931 11/25/22  0950 11/17/22  0349 11/16/22  0718 11/15/22  0545 11/14/22  1333 10/13/22  0636 10/12/22  0536 10/11/22  0532    140 139 141   < > 138 141 138 140   < > 137 140   POTASSIUM 4.2 4.0 4.0 3.9   < > 4.3 4.2 3.9 3.8   < > 3.8 3.7   CHLORIDE 101 106 103 106   < > 109* 109* 107 108*   < > 106 107   CO2 25 23 19* 23   < > 19* 19* 18* 20*   < > 21* 17*   BUN 11.3 14.9 11.2 9.9   < > 12.8 13.9 11.8 11.8   < > 12.3 12.8   CR 0.64 0.63 0.69 0.60   < > 0.49* 0.52 0.57 0.58   < > 0.57 0.58   MICHAEL 10.0 10.1 10.0 9.7   < > 8.9 8.8 9.4 9.1   < > 9.0 9.4   MAG  --   --   --   --   --   --   --   --  2.0  --  2.2 2.0   PHOS  --   --   --   --   --   --   --   --  3.3  --  3.4 3.1   URIC  --   --   --   --   --  3.2 4.6 6.2* 4.8   < > 4.4 6.0*   LDH  --  166 156 177   < >  --   --   --  226   < >  --   --     < > = values in this interval not displayed.     Recent Labs   Lab Test 04/15/25  1305 10/18/24  1458 06/26/24  1432 11/25/22  0950 11/17/22  0349 11/16/22  0718 11/15/22  0545   AST 15 14 15   < > 16 14 17   ALT 26 19 17   < > 21 23 25   ALKPHOS 68 61 59   < > 58 53 55   BILITOTAL 0.9 0.6 1.0   < > 0.8 1.2 0.7   INR  --   --   --   --  1.08 1.09 1.15    < > = values in this interval not displayed.     10/28/2024 NPM1 MRD by PCR not detected  04/15/2025 NPM1 MRD by PCR not detected    IMPRESSION AND PLAN:  Minerva Marinelli is a 40 year old with acute myeloid leukemia (AML).    There is nothing to suggest recurrent AML.   We agreed to continue active surveillance including periodic NPM1 MRD by PCR testing.      There are no active ID issues.  Her past issues with C diff resolved.  She has declined vaccines.     She completed anticoagulation for her past provoked upper extremity DVT.  She will continue to work with her primary care provider Alida Serrato NP for health maintenance and other medical issues.     We will request a return visit in about 6 months.  I reminded her to contact us if questions, concerns, or new issues come up between visits.    I spent 20 minutes on the date of encounter of which 5 minutes spent in medical discussion.    The longitudinal plan of care for the diagnosis(es)/condition(s) as documented were addressed during this visit. Due to the added complexity in care, I will continue to support Minerva in the subsequent management and with ongoing continuity of care.    Ortiz Earl MD, PhD  Attending Physician, [facility]  [phone] [facility]

## 2025-04-30 ENCOUNTER — VIRTUAL VISIT (OUTPATIENT)
Dept: ONCOLOGY | Facility: CLINIC | Age: 40
End: 2025-04-30
Attending: INTERNAL MEDICINE
Payer: COMMERCIAL

## 2025-04-30 VITALS
SYSTOLIC BLOOD PRESSURE: 125 MMHG | WEIGHT: 260 LBS | HEIGHT: 67 IN | DIASTOLIC BLOOD PRESSURE: 85 MMHG | BODY MASS INDEX: 40.81 KG/M2

## 2025-04-30 DIAGNOSIS — Z86.718 PERSONAL HISTORY OF DVT (DEEP VEIN THROMBOSIS): ICD-10-CM

## 2025-04-30 DIAGNOSIS — C92.01 ACUTE MYELOID LEUKEMIA IN REMISSION (H): Primary | ICD-10-CM

## 2025-04-30 PROCEDURE — 98005 SYNCH AUDIO-VIDEO EST LOW 20: CPT | Performed by: INTERNAL MEDICINE

## 2025-04-30 PROCEDURE — 3074F SYST BP LT 130 MM HG: CPT | Mod: 95 | Performed by: INTERNAL MEDICINE

## 2025-04-30 PROCEDURE — 3079F DIAST BP 80-89 MM HG: CPT | Mod: 95 | Performed by: INTERNAL MEDICINE

## 2025-04-30 PROCEDURE — 1126F AMNT PAIN NOTED NONE PRSNT: CPT | Mod: 95 | Performed by: INTERNAL MEDICINE

## 2025-04-30 PROCEDURE — G2211 COMPLEX E/M VISIT ADD ON: HCPCS | Mod: 95 | Performed by: INTERNAL MEDICINE

## 2025-04-30 ASSESSMENT — PAIN SCALES - GENERAL: PAINLEVEL_OUTOF10: NO PAIN (0)

## 2025-04-30 NOTE — NURSING NOTE
Current patient location: [address] [place] [address]    Is the patient currently in the state of [place]? YES    Visit mode: VIDEO    If the visit is dropped, the patient can be reconnected by:VIDEO VISIT: Text to cell phone:   Telephone Information:   Mobile [phone]       Will anyone else be joining the visit? NO  (If patient encounters technical issues they should call [phone]    Are changes needed to the allergy or medication list? Pt completed echeck-in an confirms medications and allergies are correct.      Are refills needed on medications prescribed by this physician? NO    Rooming Documentation:  Questionnaire(s) completed    Reason for visit: CHERRY MENDES

## 2025-04-30 NOTE — LETTER
"4/30/2025      Veda Marinelli  [id number] [facility] [address]      Dear Colleague,    Thank you for referring your patient, Veda Marinelli, to the [facility]. Please see a copy of my visit note below.    Virtual Visit Details    Type of service:  Video Visit     Originating Location (pt. Location): Home    Distant Location (provider location):  On-site  Platform used for Video Visit: AmWell        REASON FOR VISIT:  Management of acute myeloid leukemia (AML)     HISTORY OF PRESENT ILLNESS:  Minerva Marinelli is a 40 year old with a history of acute myeloid leukemia (AML).  To summarize her course, she was noted to have low WBC count on routine labs in 03/2022.  Follow up labs in 05/2022 showed further decrease in WBC as well as anemia and thrombocytopenia.  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  She was treated with cytarabine and daunorubicin \"7+3\" induction chemotherapy with midostaurin on Day 8-21.  Bone marrow biopsy around Day 21 was hypocellular but consistent with recovering bone marrow without definite AML.  Her course was complicated by recurrent C diff and upper extremity DVT.  She had prompt blood cell count recovery and was discharge with ongoing outpatient follow-up.  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  She met with the BMT Team who recommended chemo consolidation.  She completed 4 cycles of high-dose cytarabine (HiDAC) with midostaurin ending in 12/2022.  A post-consolidation bone marrow biopsy showed 70-80% cellular bone marrow with no evidence of AML by morphology or flow cytometry " "and NPM1 MRD PCR was undetectable.  She completed 1 year of maintenance midostaurin in 02/2024 to reduce the risk of AML relapse based on the RATIFY trial (N Engl J Med 2017;377:454-64).  Visit for management of AML.       She is not with her mom Melonie or dad Rosalee.  Energy level has been good.  No fevers, night sweats, or unintentional weight loss.  Has been working remotely and feeling good.  Doing some camping over the summer.  No AML concerns.     KEY PAST MEDICAL HISTORY:  History of COVID-19, recurrent C diff, hypothyroidism    MEDICATIONS:  Current Outpatient Medications   Medication Sig Dispense Refill     Bacillus Coagulans-Inulin (PROBIOTIC) 1-250 BILLION-MG CAPS Take 1 capsule by mouth daily       levothyroxine (SYNTHROID/LEVOTHROID) 75 MCG tablet Take 1 tablet (75 mcg) by mouth daily       magnesium 250 MG tablet Take 250 mg by mouth daily       Multiple Vitamins-Minerals (WOMENS MULTIVITAMIN) TABS Take 1 tablet by mouth daily       Quercetin 50 MG TABS Take 50 mg by mouth daily       vitamin C (ASCORBIC ACID) 1000 MG TABS Take 2,000 mg by mouth daily       vitamin D3 (CHOLECALCIFEROL) 50 mcg (2000 units) tablet Take 1 tablet (50 mcg) by mouth daily       zinc gluconate 50 MG tablet Take 50 mg by mouth daily       No current facility-administered medications for this visit.     SOCIAL HISTORY:  Working as RN virtually in primary care setting    PHYSICAL EXAMINATION:  /85   Ht 1.702 m (5' 7\")   Wt 117.9 kg (260 lb)   BMI 40.72 kg/m    Wt Readings from Last 5 Encounters:   04/30/25 117.9 kg (260 lb)   10/30/24 117.9 kg (260 lb)   07/16/24 117.9 kg (260 lb)   04/09/24 117.9 kg (260 lb)   03/06/24 117.9 kg (260 lb)     General: Well appearing.  HEENT: Sclerae anicteric.  Lungs: Breathing comfortably. No cough.  MSK: Grossly normal movement.  Neuro: Grossly non-focal.  Skin/access: Normal skin tone.  Psych: Alert and oriented. No distress.  Performance status: ECOG 0    LABORATORY DATA: Reviewed by " me  Recent Labs   Lab Test 04/15/25  1305 10/18/24  1458 06/26/24  1432 07/18/22  0942 07/15/22  0659 07/14/22  0813 07/13/22  0555 06/16/22  1724 06/16/22  1339 06/16/22  1102 06/14/22  0752   WBC 7.1 9.3 9.0   < > 11.4* 6.4 3.1*   < > 41.0*   < > 25.3*   HGB 15.0 15.0 15.5   < > 8.3* 8.1* 8.0*   < > 9.5*   < > 10.1*    251 239   < > 388 253 145*   < > 72*   < > 90*   ANEU 4.8 6.7 6.6   < > 5.0 2.1 0.7*   < > 0.8*   < > 0.8*   ABLA  --   --   --   --  0.5* 0.3* 0.1*   < > 37.3*  --  20.5*   ALYM 1.6 1.7 1.8   < > 1.1 1.9 1.3   < > 2.5   < > 3.3   RETICABSCT  --   --   --   --   --   --   --   --  0.063  --  0.080    < > = values in this interval not displayed.     Recent Labs   Lab Test 04/15/25  1305 10/18/24  1458 06/26/24  1432 04/09/24  0931 11/25/22  0950 11/17/22  0349 11/16/22  0718 11/15/22  0545 11/14/22  1333 10/13/22  0636 10/12/22  0536 10/11/22  0532    140 139 141   < > 138 141 138 140   < > 137 140   POTASSIUM 4.2 4.0 4.0 3.9   < > 4.3 4.2 3.9 3.8   < > 3.8 3.7   CHLORIDE 101 106 103 106   < > 109* 109* 107 108*   < > 106 107   CO2 25 23 19* 23   < > 19* 19* 18* 20*   < > 21* 17*   BUN 11.3 14.9 11.2 9.9   < > 12.8 13.9 11.8 11.8   < > 12.3 12.8   CR 0.64 0.63 0.69 0.60   < > 0.49* 0.52 0.57 0.58   < > 0.57 0.58   MICHAEL 10.0 10.1 10.0 9.7   < > 8.9 8.8 9.4 9.1   < > 9.0 9.4   MAG  --   --   --   --   --   --   --   --  2.0  --  2.2 2.0   PHOS  --   --   --   --   --   --   --   --  3.3  --  3.4 3.1   URIC  --   --   --   --   --  3.2 4.6 6.2* 4.8   < > 4.4 6.0*   LDH  --  166 156 177   < >  --   --   --  226   < >  --   --     < > = values in this interval not displayed.     Recent Labs   Lab Test 04/15/25  1305 10/18/24  1458 06/26/24  1432 11/25/22  0950 11/17/22  0349 11/16/22  0718 11/15/22  0545   AST 15 14 15   < > 16 14 17   ALT 26 19 17   < > 21 23 25   ALKPHOS 68 61 59   < > 58 53 55   BILITOTAL 0.9 0.6 1.0   < > 0.8 1.2 0.7   INR  --   --   --   --  1.08 1.09 1.15    < > = values  in this interval not displayed.     10/28/2024 NPM1 MRD by PCR not detected  04/15/2025 NPM1 MRD by PCR not detected    IMPRESSION AND PLAN:  Minerva Marinelli is a 40 year old with acute myeloid leukemia (AML).    There is nothing to suggest recurrent AML.  We agreed to continue active surveillance including periodic NPM1 MRD by PCR testing.      There are no active ID issues.  Her past issues with C diff resolved.  She has declined vaccines.     She completed anticoagulation for her past provoked upper extremity DVT.  She will continue to work with her primary care provider Alida Serrato NP for health maintenance and other medical issues.     We will request a return visit in about 6 months.  I reminded her to contact us if questions, concerns, or new issues come up between visits.    I spent 20 minutes on the date of encounter of which 5 minutes spent in medical discussion.    The longitudinal plan of care for the diagnosis(es)/condition(s) as documented were addressed during this visit. Due to the added complexity in care, I will continue to support Minerva in the subsequent management and with ongoing continuity of care.    Ortiz Earl MD, PhD  Attending Physician, [facility]  [phone] clinic    Again, thank you for allowing me to participate in the care of your patient.        Sincerely,        Ortiz Earl MD    Electronically signed

## 2025-05-01 ENCOUNTER — PATIENT OUTREACH (OUTPATIENT)
Dept: ONCOLOGY | Facility: CLINIC | Age: 40
End: 2025-05-01
Payer: COMMERCIAL

## 2025-05-01 NOTE — PROGRESS NOTES
[facility]: Cancer Care                                                                                          Completed chart audit to update Oncology Care Coordination enrollment status.  Reviewed POC and pt has appropriate follow up scheduled.     EDUAR GarciaN, RN  RN Care Coordinator  [facility]

## 2025-08-10 ENCOUNTER — HEALTH MAINTENANCE LETTER (OUTPATIENT)
Age: 40
End: 2025-08-10

## (undated) DEVICE — SU DERMABOND ADVANCED .7ML DNX12

## (undated) DEVICE — SU MONOCRYL 4-0 P-3 18" UND Y494G

## (undated) DEVICE — SU VICRYL 4-0 P-3 18" UND  J494H

## (undated) DEVICE — KNIFE HANDLE W/15 BLADE 371615

## (undated) DEVICE — LINEN TOWEL PACK X5 5464

## (undated) DEVICE — LINEN GOWN XLG 5407

## (undated) DEVICE — PACK CENTRAL LINE INSERTION SAN32CLFCG

## (undated) DEVICE — SOL NACL 0.9% INJ 250ML BAG 2B1322Q

## (undated) DEVICE — DECANTER BAG 2002S

## (undated) DEVICE — GOWN XLG DISP 9545

## (undated) DEVICE — GLOVE BIOGEL PI ULTRATOUCH G SZ 8.0 42180

## (undated) RX ORDER — HEPARIN SODIUM (PORCINE) LOCK FLUSH IV SOLN 100 UNIT/ML 100 UNIT/ML
SOLUTION INTRAVENOUS
Status: DISPENSED
Start: 2022-08-09

## (undated) RX ORDER — HEPARIN SODIUM,PORCINE 10 UNIT/ML
VIAL (ML) INTRAVENOUS
Status: DISPENSED
Start: 2022-08-09

## (undated) RX ORDER — FENTANYL CITRATE 50 UG/ML
INJECTION, SOLUTION INTRAMUSCULAR; INTRAVENOUS
Status: DISPENSED
Start: 2022-08-09

## (undated) RX ORDER — CEFAZOLIN SODIUM 2 G/100ML
INJECTION, SOLUTION INTRAVENOUS
Status: DISPENSED
Start: 2022-08-09

## (undated) RX ORDER — LIDOCAINE HYDROCHLORIDE 10 MG/ML
INJECTION, SOLUTION EPIDURAL; INFILTRATION; INTRACAUDAL; PERINEURAL
Status: DISPENSED
Start: 2022-08-09

## (undated) RX ORDER — SODIUM CHLORIDE 9 MG/ML
INJECTION, SOLUTION INTRAVENOUS
Status: DISPENSED
Start: 2022-08-09

## (undated) RX ORDER — LIDOCAINE HYDROCHLORIDE 10 MG/ML
INJECTION, SOLUTION EPIDURAL; INFILTRATION; INTRACAUDAL; PERINEURAL
Status: DISPENSED
Start: 2022-06-16